# Patient Record
Sex: FEMALE | Race: WHITE | NOT HISPANIC OR LATINO | Employment: OTHER | URBAN - METROPOLITAN AREA
[De-identification: names, ages, dates, MRNs, and addresses within clinical notes are randomized per-mention and may not be internally consistent; named-entity substitution may affect disease eponyms.]

---

## 2017-09-09 ENCOUNTER — APPOINTMENT (EMERGENCY)
Dept: CT IMAGING | Facility: HOSPITAL | Age: 52
End: 2017-09-09
Payer: COMMERCIAL

## 2017-09-09 ENCOUNTER — HOSPITAL ENCOUNTER (EMERGENCY)
Facility: HOSPITAL | Age: 52
Discharge: HOME | End: 2017-09-10
Attending: EMERGENCY MEDICINE | Admitting: EMERGENCY MEDICINE
Payer: COMMERCIAL

## 2017-09-09 DIAGNOSIS — N83.8 OVARIAN MASS: ICD-10-CM

## 2017-09-09 DIAGNOSIS — R10.11 RUQ PAIN: Primary | ICD-10-CM

## 2017-09-09 DIAGNOSIS — R74.8 ELEVATED LIPASE: ICD-10-CM

## 2017-09-09 LAB
ALBUMIN SERPL BCP-MCNC: 3.3 G/DL (ref 3.5–5)
ALP SERPL-CCNC: 123 U/L (ref 46–116)
ALT SERPL W P-5'-P-CCNC: 33 U/L (ref 12–78)
ANION GAP SERPL CALCULATED.3IONS-SCNC: 8 MMOL/L (ref 4–13)
AST SERPL W P-5'-P-CCNC: 18 U/L (ref 5–45)
BASOPHILS # BLD AUTO: 0.05 THOUSANDS/ΜL (ref 0–0.1)
BASOPHILS NFR BLD AUTO: 1 % (ref 0–1)
BILIRUB SERPL-MCNC: 0.4 MG/DL (ref 0.2–1)
BUN SERPL-MCNC: 18 MG/DL (ref 5–25)
CALCIUM SERPL-MCNC: 9.3 MG/DL (ref 8.3–10.1)
CHLORIDE SERPL-SCNC: 99 MMOL/L (ref 100–108)
CO2 SERPL-SCNC: 31 MMOL/L (ref 21–32)
CREAT SERPL-MCNC: 0.94 MG/DL (ref 0.6–1.3)
EOSINOPHIL # BLD AUTO: 0.27 THOUSAND/ΜL (ref 0–0.61)
EOSINOPHIL NFR BLD AUTO: 3 % (ref 0–6)
ERYTHROCYTE [DISTWIDTH] IN BLOOD BY AUTOMATED COUNT: 13.2 % (ref 11.6–15.1)
GFR SERPL CREATININE-BSD FRML MDRD: 71 ML/MIN/1.73SQ M
GLUCOSE SERPL-MCNC: 229 MG/DL (ref 65–140)
HCG UR QL: NEGATIVE
HCT VFR BLD AUTO: 45.3 % (ref 34.8–46.1)
HGB BLD-MCNC: 15.4 G/DL (ref 11.5–15.4)
LIPASE SERPL-CCNC: 535 U/L (ref 73–393)
LYMPHOCYTES # BLD AUTO: 3.76 THOUSANDS/ΜL (ref 0.6–4.47)
LYMPHOCYTES NFR BLD AUTO: 39 % (ref 14–44)
MCH RBC QN AUTO: 27.9 PG (ref 26.8–34.3)
MCHC RBC AUTO-ENTMCNC: 34 G/DL (ref 31.4–37.4)
MCV RBC AUTO: 82 FL (ref 82–98)
MONOCYTES # BLD AUTO: 0.5 THOUSAND/ΜL (ref 0.17–1.22)
MONOCYTES NFR BLD AUTO: 5 % (ref 4–12)
NEUTROPHILS # BLD AUTO: 4.93 THOUSANDS/ΜL (ref 1.85–7.62)
NEUTS SEG NFR BLD AUTO: 52 % (ref 43–75)
NRBC BLD AUTO-RTO: 0 /100 WBCS
PLATELET # BLD AUTO: 300 THOUSANDS/UL (ref 149–390)
PMV BLD AUTO: 9.2 FL (ref 8.9–12.7)
POTASSIUM SERPL-SCNC: 3.6 MMOL/L (ref 3.5–5.3)
PROT SERPL-MCNC: 8 G/DL (ref 6.4–8.2)
RBC # BLD AUTO: 5.52 MILLION/UL (ref 3.81–5.12)
SODIUM SERPL-SCNC: 138 MMOL/L (ref 136–145)
TROPONIN I SERPL-MCNC: <0.02 NG/ML
WBC # BLD AUTO: 9.55 THOUSAND/UL (ref 4.31–10.16)

## 2017-09-09 PROCEDURE — 85025 COMPLETE CBC W/AUTO DIFF WBC: CPT | Performed by: EMERGENCY MEDICINE

## 2017-09-09 PROCEDURE — 74177 CT ABD & PELVIS W/CONTRAST: CPT

## 2017-09-09 PROCEDURE — 36415 COLL VENOUS BLD VENIPUNCTURE: CPT | Performed by: EMERGENCY MEDICINE

## 2017-09-09 PROCEDURE — 80053 COMPREHEN METABOLIC PANEL: CPT | Performed by: EMERGENCY MEDICINE

## 2017-09-09 PROCEDURE — 96374 THER/PROPH/DIAG INJ IV PUSH: CPT

## 2017-09-09 PROCEDURE — 83690 ASSAY OF LIPASE: CPT | Performed by: EMERGENCY MEDICINE

## 2017-09-09 PROCEDURE — 81025 URINE PREGNANCY TEST: CPT | Performed by: EMERGENCY MEDICINE

## 2017-09-09 PROCEDURE — 84484 ASSAY OF TROPONIN QUANT: CPT | Performed by: EMERGENCY MEDICINE

## 2017-09-09 PROCEDURE — 93005 ELECTROCARDIOGRAM TRACING: CPT | Performed by: EMERGENCY MEDICINE

## 2017-09-09 RX ORDER — KETOROLAC TROMETHAMINE 30 MG/ML
30 INJECTION, SOLUTION INTRAMUSCULAR; INTRAVENOUS ONCE
Status: DISCONTINUED | OUTPATIENT
Start: 2017-09-09 | End: 2017-09-09

## 2017-09-09 RX ORDER — KETOROLAC TROMETHAMINE 30 MG/ML
15 INJECTION, SOLUTION INTRAMUSCULAR; INTRAVENOUS ONCE
Status: COMPLETED | OUTPATIENT
Start: 2017-09-09 | End: 2017-09-09

## 2017-09-09 RX ADMIN — KETOROLAC TROMETHAMINE 15 MG: 30 INJECTION, SOLUTION INTRAMUSCULAR at 23:01

## 2017-09-09 RX ADMIN — IOHEXOL 100 ML: 350 INJECTION, SOLUTION INTRAVENOUS at 23:48

## 2017-09-10 VITALS
TEMPERATURE: 98.5 F | HEIGHT: 61 IN | WEIGHT: 176.59 LBS | DIASTOLIC BLOOD PRESSURE: 67 MMHG | HEART RATE: 76 BPM | RESPIRATION RATE: 18 BRPM | SYSTOLIC BLOOD PRESSURE: 112 MMHG | OXYGEN SATURATION: 96 % | BODY MASS INDEX: 33.34 KG/M2

## 2017-09-10 PROCEDURE — 99285 EMERGENCY DEPT VISIT HI MDM: CPT

## 2017-09-10 RX ORDER — NAPROXEN 500 MG/1
500 TABLET ORAL 2 TIMES DAILY PRN
Qty: 20 TABLET | Refills: 0 | Status: SHIPPED | OUTPATIENT
Start: 2017-09-10 | End: 2018-04-26

## 2017-09-12 LAB
ATRIAL RATE: 93 BPM
P AXIS: 68 DEGREES
PR INTERVAL: 144 MS
QRS AXIS: 69 DEGREES
QRSD INTERVAL: 130 MS
QT INTERVAL: 386 MS
QTC INTERVAL: 479 MS
T WAVE AXIS: 43 DEGREES
VENTRICULAR RATE: 93 BPM

## 2017-09-13 ENCOUNTER — ALLSCRIPTS OFFICE VISIT (OUTPATIENT)
Dept: OTHER | Facility: OTHER | Age: 52
End: 2017-09-13

## 2017-09-13 DIAGNOSIS — R93.89 ABNORMAL FINDINGS ON DIAGNOSTIC IMAGING OF OTHER SPECIFIED BODY STRUCTURES: ICD-10-CM

## 2017-09-13 DIAGNOSIS — R10.13 EPIGASTRIC PAIN: ICD-10-CM

## 2017-09-13 DIAGNOSIS — R10.11 RIGHT UPPER QUADRANT PAIN: ICD-10-CM

## 2017-09-13 DIAGNOSIS — N83.201 CYST OF RIGHT OVARY: ICD-10-CM

## 2017-09-14 ENCOUNTER — GENERIC CONVERSION - ENCOUNTER (OUTPATIENT)
Dept: OTHER | Facility: OTHER | Age: 52
End: 2017-09-14

## 2017-09-15 ENCOUNTER — HOSPITAL ENCOUNTER (OUTPATIENT)
Dept: ULTRASOUND IMAGING | Facility: HOSPITAL | Age: 52
Discharge: HOME | End: 2017-09-15
Payer: COMMERCIAL

## 2017-09-15 ENCOUNTER — HOSPITAL ENCOUNTER (OUTPATIENT)
Dept: ULTRASOUND IMAGING | Facility: HOSPITAL | Age: 52
Discharge: HOME | End: 2017-09-15
Attending: OBSTETRICS & GYNECOLOGY
Payer: COMMERCIAL

## 2017-09-15 DIAGNOSIS — R10.11 RIGHT UPPER QUADRANT PAIN: ICD-10-CM

## 2017-09-15 DIAGNOSIS — N83.201 CYST OF RIGHT OVARY: ICD-10-CM

## 2017-09-15 PROCEDURE — 76856 US EXAM PELVIC COMPLETE: CPT

## 2017-09-15 PROCEDURE — 76700 US EXAM ABDOM COMPLETE: CPT

## 2017-09-15 PROCEDURE — 76830 TRANSVAGINAL US NON-OB: CPT

## 2017-09-20 RX ORDER — INDOMETHACIN 50 MG/1
50 CAPSULE ORAL 2 TIMES DAILY WITH MEALS
COMMUNITY
End: 2018-04-26

## 2017-09-20 RX ORDER — BUPROPION HYDROCHLORIDE 150 MG/1
150 TABLET ORAL DAILY
COMMUNITY
End: 2018-04-26

## 2017-09-20 RX ORDER — LISINOPRIL 10 MG/1
10 TABLET ORAL DAILY
COMMUNITY
End: 2018-04-26

## 2017-09-20 RX ORDER — BUTALBITAL, ACETAMINOPHEN AND CAFFEINE 300; 40; 50 MG/1; MG/1; MG/1
CAPSULE ORAL
COMMUNITY
End: 2019-10-02 | Stop reason: ALTCHOICE

## 2017-09-20 RX ORDER — MELOXICAM 15 MG/1
15 TABLET ORAL DAILY
COMMUNITY
End: 2018-04-26

## 2017-09-20 RX ORDER — CYCLOBENZAPRINE HCL 10 MG
10 TABLET ORAL 3 TIMES DAILY PRN
Status: ON HOLD | COMMUNITY
End: 2018-10-10 | Stop reason: ALTCHOICE

## 2017-09-20 RX ORDER — GLYBURIDE 5 MG/1
5 TABLET ORAL
COMMUNITY
End: 2018-04-26

## 2017-09-20 RX ORDER — ATORVASTATIN CALCIUM 40 MG/1
40 TABLET, FILM COATED ORAL DAILY
COMMUNITY
End: 2018-04-26

## 2017-09-20 RX ORDER — METOPROLOL SUCCINATE 100 MG/1
50 TABLET, EXTENDED RELEASE ORAL DAILY
Status: ON HOLD | COMMUNITY
End: 2018-10-10 | Stop reason: ALTCHOICE

## 2017-09-20 RX ORDER — NORTRIPTYLINE HYDROCHLORIDE 10 MG/1
10 CAPSULE ORAL DAILY PRN
Status: ON HOLD | COMMUNITY
End: 2018-10-10 | Stop reason: ALTCHOICE

## 2017-09-20 RX ORDER — ZOLPIDEM TARTRATE 10 MG/1
10 TABLET ORAL
COMMUNITY

## 2017-09-25 ENCOUNTER — ANESTHESIA EVENT (OUTPATIENT)
Dept: PERIOP | Facility: HOSPITAL | Age: 52
End: 2017-09-25
Payer: COMMERCIAL

## 2017-09-26 ENCOUNTER — HOSPITAL ENCOUNTER (OUTPATIENT)
Facility: HOSPITAL | Age: 52
Setting detail: OUTPATIENT SURGERY
Discharge: HOME | End: 2017-09-26
Attending: INTERNAL MEDICINE | Admitting: INTERNAL MEDICINE
Payer: COMMERCIAL

## 2017-09-26 ENCOUNTER — GENERIC CONVERSION - ENCOUNTER (OUTPATIENT)
Dept: OTHER | Facility: OTHER | Age: 52
End: 2017-09-26

## 2017-09-26 ENCOUNTER — ANESTHESIA (OUTPATIENT)
Dept: PERIOP | Facility: HOSPITAL | Age: 52
End: 2017-09-26
Payer: COMMERCIAL

## 2017-09-26 VITALS
HEIGHT: 61 IN | WEIGHT: 169.38 LBS | RESPIRATION RATE: 20 BRPM | OXYGEN SATURATION: 98 % | DIASTOLIC BLOOD PRESSURE: 57 MMHG | BODY MASS INDEX: 31.98 KG/M2 | HEART RATE: 72 BPM | TEMPERATURE: 97.2 F | SYSTOLIC BLOOD PRESSURE: 115 MMHG

## 2017-09-26 DIAGNOSIS — R10.11 RIGHT UPPER QUADRANT PAIN: ICD-10-CM

## 2017-09-26 DIAGNOSIS — R10.13 EPIGASTRIC PAIN: ICD-10-CM

## 2017-09-26 DIAGNOSIS — Z12.11 ENCOUNTER FOR SCREENING FOR MALIGNANT NEOPLASM OF COLON: ICD-10-CM

## 2017-09-26 LAB — GLUCOSE SERPL-MCNC: 168 MG/DL (ref 65–140)

## 2017-09-26 PROCEDURE — 88305 TISSUE EXAM BY PATHOLOGIST: CPT | Performed by: INTERNAL MEDICINE

## 2017-09-26 PROCEDURE — 82948 REAGENT STRIP/BLOOD GLUCOSE: CPT

## 2017-09-26 PROCEDURE — 88342 IMHCHEM/IMCYTCHM 1ST ANTB: CPT | Performed by: INTERNAL MEDICINE

## 2017-09-26 RX ORDER — PANTOPRAZOLE SODIUM 20 MG/1
20 TABLET, DELAYED RELEASE ORAL DAILY
COMMUNITY
End: 2018-02-23 | Stop reason: SDUPTHER

## 2017-09-26 RX ORDER — LIDOCAINE HYDROCHLORIDE 10 MG/ML
INJECTION, SOLUTION INFILTRATION; PERINEURAL AS NEEDED
Status: DISCONTINUED | OUTPATIENT
Start: 2017-09-26 | End: 2017-09-26 | Stop reason: SURG

## 2017-09-26 RX ORDER — HYDROXYZINE HYDROCHLORIDE 25 MG/1
25 TABLET, FILM COATED ORAL EVERY 6 HOURS PRN
COMMUNITY
End: 2018-04-26

## 2017-09-26 RX ORDER — ONDANSETRON 4 MG/1
4 TABLET, FILM COATED ORAL EVERY 8 HOURS PRN
COMMUNITY
End: 2018-04-26

## 2017-09-26 RX ORDER — PROPOFOL 10 MG/ML
INJECTION, EMULSION INTRAVENOUS AS NEEDED
Status: DISCONTINUED | OUTPATIENT
Start: 2017-09-26 | End: 2017-09-26 | Stop reason: SURG

## 2017-09-26 RX ORDER — ASPIRIN 81 MG/1
81 TABLET ORAL DAILY
COMMUNITY
End: 2018-04-26

## 2017-09-26 RX ORDER — SODIUM CHLORIDE, SODIUM LACTATE, POTASSIUM CHLORIDE, CALCIUM CHLORIDE 600; 310; 30; 20 MG/100ML; MG/100ML; MG/100ML; MG/100ML
125 INJECTION, SOLUTION INTRAVENOUS CONTINUOUS
Status: CANCELLED | OUTPATIENT
Start: 2017-09-26

## 2017-09-26 RX ORDER — SODIUM CHLORIDE, SODIUM LACTATE, POTASSIUM CHLORIDE, CALCIUM CHLORIDE 600; 310; 30; 20 MG/100ML; MG/100ML; MG/100ML; MG/100ML
125 INJECTION, SOLUTION INTRAVENOUS CONTINUOUS
Status: DISCONTINUED | OUTPATIENT
Start: 2017-09-26 | End: 2017-09-26 | Stop reason: HOSPADM

## 2017-09-26 RX ORDER — TRAMADOL HYDROCHLORIDE 50 MG/1
50 TABLET ORAL EVERY 6 HOURS PRN
COMMUNITY
End: 2017-10-24

## 2017-09-26 RX ADMIN — PROPOFOL 100 MG: 10 INJECTION, EMULSION INTRAVENOUS at 07:42

## 2017-09-26 RX ADMIN — LIDOCAINE HYDROCHLORIDE 20 MG: 10 INJECTION, SOLUTION INFILTRATION; PERINEURAL at 07:42

## 2017-09-26 RX ADMIN — PROPOFOL 40 MG: 10 INJECTION, EMULSION INTRAVENOUS at 07:48

## 2017-09-26 RX ADMIN — PROPOFOL 60 MG: 10 INJECTION, EMULSION INTRAVENOUS at 07:51

## 2017-09-26 RX ADMIN — PROPOFOL 50 MG: 10 INJECTION, EMULSION INTRAVENOUS at 07:55

## 2017-09-26 RX ADMIN — SODIUM CHLORIDE, POTASSIUM CHLORIDE, SODIUM LACTATE AND CALCIUM CHLORIDE 125 ML/HR: 600; 310; 30; 20 INJECTION, SOLUTION INTRAVENOUS at 06:48

## 2017-09-26 NOTE — OP NOTE
**** GI/ENDOSCOPY REPORT ****     PATIENT NAME: LESLEY AMBROSIO - VISIT ID:  Patient ID: [id number]   YOB: 1965     INTRODUCTION: Esophagogastroduodenoscopy - A 50 female patient presents   for an outpatient Esophagogastroduodenoscopy at [address] [place] [address]  INDICATIONS: Pain located in the right upper quadrant of the abdomen  Nausea  CONSENT: The benefits, risks, and alternatives to the procedure were   discussed and informed consent was obtained from the patient  PREPARATION:  EKG, pulse, pulse oximetry and blood pressure were monitored   throughout the procedure  MEDICATIONS:asa 2     PROCEDURE:  The endoscope was passed without difficulty through the mouth   under direct visualization and advanced to the 3rd portion of the   duodenum  The scope was withdrawn and the mucosa was carefully examined  FINDINGS:   Esophagus: A possible tongue of Menendez's esophagus was found   in the GE junction, spanning 1 cm 34 cm from the entry site  A biopsy was   taken  The Z line was visualized at 35 cm from the entry site  There was a   2 cm hiatus hernia visible in the esophagus  Stomach: The antrum, body of   the stomach, cardia, fundus, incisura, and pylorus appeared to be normal    A biopsy was taken from the antrum, body of the stomach, and fundus  Duodenum: The duodenal bulb, 2nd portion of the duodenum, and 3rd portion   of the duodenum appeared to be normal      COMPLICATIONS: There were no complications  IMPRESSIONS: Menendez's esophagus noted  Biopsy taken  Z line visualized  A   hiatus hernia found  Normal antrum, body of the stomach, cardia, fundus,   incisura, and pylorus  Biopsy taken  Normal duodenal bulb, 2nd portion of   the duodenum, and 3rd portion of the duodenum  RECOMMENDATIONS: Follow-up on the results of the biopsy specimens in 1   week  Continue current medications  ESTIMATED BLOOD LOSS: Insignificant  PATHOLOGY SPECIMENS: Biopsy taken  Associated finding: Menendez's   esophagus  Random biopsy taken from the antrum, body of the stomach, and   fundus  PROCEDURE CODES: 12685 - EGD flexible; with biopsy     ICD-9 Codes: 789 01 Abdominal pain, right upper quadrant 787 02 Nausea   alone 530 85 Menendez's esophagus 553 3 Diaphragmatic hernia without   mention of obstruction or gangrene     ICD-10 Codes: R10 11 Right upper quadrant pain R11 0 Nausea K22 7   Menendez's esophagus K44 Diaphragmatic hernia     PERFORMED BY: FREDY Gamble  [facility]  on 09/26/2017  Version 1, electronically signed by FREDY Stevenson , D O  on   09/26/2017 at 07:51

## 2017-09-26 NOTE — DISCHARGE INSTRUCTIONS

## 2017-09-26 NOTE — OP NOTE
**** GI/ENDOSCOPY REPORT ****     PATIENT NAME: LESLEY AMBROSIO ------ VISIT ID:  Patient ID:   [id number] YOB: 1965     INTRODUCTION: Colonoscopy - A 50 female patient presents for an outpatient   Colonoscopy at [facility]  PREVIOUS COLONOSCOPY: none     INDICATIONS: Screening-ADR  CONSENT:  The benefits, risks, and alternatives to the procedure were   discussed and informed consent was obtained from the patient  PREPARATION: EKG, pulse, pulse oximetry and blood pressure were monitored   throughout the procedure  The patient was identified by myself both   verbally and by visual inspection of ID band  Airway Assessment   Classification: Airway class 2 - Visualization of the soft palate, fauces   and uvula  ASA Classification: Class 2 - Patient has mild to moderate   systemic disturbance that may or may not be related to the disorder   requiring surgery  MEDICATIONS: Anesthesia-check records     PROCEDURE:  The endoscope was passed without difficulty through the anus   under direct visualization and advanced to the cecum, confirmed by   appendiceal orifice and ileocecal valve  The scope was withdrawn and the   mucosa was carefully examined  The quality of the preparation was   excellent  Cecal Intubation Time: 3 minutes(s) Scope Withdrawal Time: 2   minutes(s)     RECTAL EXAM: Normal rectal exam      FINDINGS:  A single diminutive polyp was found in the proximal transverse   colon  The polyp was completely removed by hot biopsy polypectomy  The   polyp was retrieved  Otherwise, the colon appeared to be normal      COMPLICATIONS: There were no complications  IMPRESSIONS: A single diminutive polyp found in the proximal transverse   colon; removed by hot biopsy polypectomy  RECOMMENDATIONS: Follow-up on the results of the biopsy specimens  Colonoscopy recommended in 5 years  ESTIMATED BLOOD LOSS: None       PATHOLOGY SPECIMENS: Single polyp completely removed by hot biopsy   polypectomy   PROCEDURE CODES: : Colonoscopy with removal of tumor(s), polyp(s), or   other lesion(s) by hot biopsy forceps or bipolar cautery     ICD-9 Codes: 211 3 Benign neoplasm of colon     ICD-10 Codes: K63 5 Polyp of colon     PERFORMED BY: FREDY Mireles  on 09/26/2017  Version 1, electronically signed by FREDY Erwin , D O  on   09/26/2017 at 08:01

## 2017-10-11 ENCOUNTER — GENERIC CONVERSION - ENCOUNTER (OUTPATIENT)
Dept: OTHER | Facility: OTHER | Age: 52
End: 2017-10-11

## 2017-10-11 PROCEDURE — 88305 TISSUE EXAM BY PATHOLOGIST: CPT | Performed by: OBSTETRICS & GYNECOLOGY

## 2017-10-12 ENCOUNTER — LAB REQUISITION (OUTPATIENT)
Dept: LAB | Facility: HOSPITAL | Age: 52
End: 2017-10-12
Payer: COMMERCIAL

## 2017-10-12 DIAGNOSIS — R93.89 ABNORMAL FINDINGS ON DIAGNOSTIC IMAGING OF OTHER SPECIFIED BODY STRUCTURES: ICD-10-CM

## 2017-10-19 ENCOUNTER — ALLSCRIPTS OFFICE VISIT (OUTPATIENT)
Dept: OTHER | Facility: OTHER | Age: 52
End: 2017-10-19

## 2017-10-19 DIAGNOSIS — M19.90 OSTEOARTHRITIS: ICD-10-CM

## 2017-10-19 DIAGNOSIS — R10.11 RIGHT UPPER QUADRANT PAIN: ICD-10-CM

## 2017-10-23 ENCOUNTER — HOSPITAL ENCOUNTER (EMERGENCY)
Facility: HOSPITAL | Age: 52
Discharge: HOME | End: 2017-10-24
Attending: EMERGENCY MEDICINE
Payer: COMMERCIAL

## 2017-10-23 ENCOUNTER — APPOINTMENT (EMERGENCY)
Dept: RADIOLOGY | Facility: HOSPITAL | Age: 52
End: 2017-10-23
Payer: COMMERCIAL

## 2017-10-23 VITALS
HEART RATE: 91 BPM | OXYGEN SATURATION: 98 % | DIASTOLIC BLOOD PRESSURE: 79 MMHG | BODY MASS INDEX: 32.28 KG/M2 | SYSTOLIC BLOOD PRESSURE: 168 MMHG | RESPIRATION RATE: 16 BRPM | HEIGHT: 61 IN | WEIGHT: 171 LBS | TEMPERATURE: 97.8 F

## 2017-10-23 DIAGNOSIS — S92.323A: Primary | ICD-10-CM

## 2017-10-23 PROCEDURE — 73630 X-RAY EXAM OF FOOT: CPT | Performed by: EMERGENCY MEDICINE

## 2017-10-24 PROCEDURE — 99283 EMERGENCY DEPT VISIT LOW MDM: CPT

## 2017-10-24 RX ORDER — OXYCODONE HYDROCHLORIDE 5 MG/1
5 TABLET ORAL EVERY 4 HOURS PRN
Qty: 5 TABLET | Refills: 0 | Status: SHIPPED | OUTPATIENT
Start: 2017-10-24 | End: 2018-02-19

## 2017-10-24 NOTE — DISCHARGE INSTRUCTIONS
Foot Fracture in Adults   WHAT YOU NEED TO KNOW:   A foot fracture is a break in one or more of the bones in your foot  Foot fractures are commonly caused by trauma, falls, or repeated stress injuries  DISCHARGE INSTRUCTIONS:   Medicines:   · Antibiotics: This medicine is given to help treat or prevent an infection caused by bacteria  · NSAIDs:  These medicines decrease swelling and pain  NSAIDs are available without a doctor's order  Ask which medicine is right for you  Ask how much to take and when to take it  Take as directed  NSAIDs can cause stomach bleeding and kidney problems if not taken correctly  · Pain medicine: You may be given a prescription medicine to decrease pain  Do not wait until the pain is severe before you take this medicine  · Take your medicine as directed  Contact your healthcare provider if you think your medicine is not helping or if you have side effects  Tell him of her if you are allergic to any medicine  Keep a list of the medicines, vitamins, and herbs you take  Include the amounts, and when and why you take them  Bring the list or the pill bottles to follow-up visits  Carry your medicine list with you in case of an emergency  Follow up with your healthcare provider or bone specialist as directed: You may need to return to have your splint or stitches removed  You may also need to return for tests to make sure your foot is healing  Write down your questions so you remember to ask them during your visits  Wound care:  Carefully wash the wound with soap and water  Dry the area and put on new, clean bandages as directed  Change your bandages when they get wet or dirty  Self-care:   · Rest:  You may need to rest your foot and avoid activities that cause pain  For stress fractures, you will need to avoid the activity that caused the fracture until it heals  Ask when you can return to your normal activities such as work and sports      · Ice:  Ice helps decrease swelling and pain  Ice may also help prevent tissue damage  Use an ice pack or put crushed ice in a plastic bag  Cover it with a towel, and place it on your foot for 15 to 20 minutes every hour as directed  · Elevate your foot:  Raise your foot at or above the level of your heart as often as you can  This will help decrease swelling and pain  Prop your foot on pillows or blankets to keep it elevated comfortably  · Physical therapy: Once your foot has healed, a physical therapist can teach you exercises to help improve movement and strength, and to decrease pain  Splint care:   · Check the skin around your splint daily for any redness or open areas  · Do not use a sharp or pointed object to scratch your skin under the splint  · Do not remove your splint unless your healthcare provider or orthopedic surgeon says it is okay  Bathing with a splint:  Do not let your splint get wet  Before bathing, cover the splint with a plastic bag  Tape the bag to your skin above the splint to seal out the water  Keep your foot out of the water in case the bag leaks  Ask when it is okay to take a bath or shower  Assistive devices: You may be given a hard-soled shoe to wear while your foot is healing  You also may need to use crutches to help you walk while your foot heals  It is important to use your crutches correctly  Ask for more information about how to use crutches  Contact your healthcare provider or bone specialist if:   · You have a fever  · You have new sores around your boot or splint  · You have new or worsening trouble moving your foot  · You notice a foul smell coming from under your splint  · Your boot or splint gets damaged  · You have questions or concerns about your condition or care  Return to the emergency department if:   · The pain in your injured foot gets worse even after you rest and take pain medicine      · The skin or toes of your foot become numb, swollen, cold, white, or blue     · You have more pain or swelling than you did before the splint was put on  · Your wound is draining fluid or pus  · Blood soaks through your bandage  · Your leg feels warm, tender, and painful  It may look swollen and red  · You suddenly feel lightheaded and short of breath  · You have chest pain when you take a deep breath or cough  You may cough up blood  © 2017 [employer]  Information is for End User's use only and may not be sold, redistributed or otherwise used for commercial purposes  All illustrations and images included in CareNotes® are the copyrighted property of A [employer] M [employer]  or [employer]  The above information is an  only  It is not intended as medical advice for individual conditions or treatments  Talk to your doctor, nurse or pharmacist before following any medical regimen to see if it is safe and effective for you

## 2017-10-24 NOTE — ED PROVIDER NOTES
History  Chief Complaint   Patient presents with    Foot Swelling     states "2 weeks ago hit right foot with metal strap   c/o pain and swelling"     Drea Ruff is a 50 y o  female w PMH anxiety, DM, HLD, CAD, HTN, GERD who presents for evaluation of foot pain  Pt was walking outside and trying to get things into her car when a piece of metal hit against her foot  Did not otherwise fall  Since has had pain of the R foot  Some pain w ambulation but able to get around  Prior to Admission Medications   Prescriptions Last Dose Informant Patient Reported? Taking?    Butalbital-APAP-Caffeine (FIORICET) -40 MG CAPS   Yes No   Sig: Take by mouth   Liraglutide 18 MG/3ML SOPN   Yes No   Sig: Inject under the skin   aspirin (ECOTRIN LOW STRENGTH) 81 mg EC tablet   Yes No   Sig: Take 81 mg by mouth daily   atorvastatin (LIPITOR) 40 mg tablet   Yes No   Sig: Take 40 mg by mouth daily   buPROPion (WELLBUTRIN XL) 150 mg 24 hr tablet   Yes No   Sig: Take 150 mg by mouth daily   cyclobenzaprine (FLEXERIL) 10 mg tablet   Yes No   Sig: Take 10 mg by mouth 3 (three) times a day as needed for muscle spasms   glyBURIDE (DIABETA) 5 mg tablet   Yes No   Sig: Take 5 mg by mouth daily with breakfast   hydrOXYzine HCL (ATARAX) 25 mg tablet   Yes No   Sig: Take 25 mg by mouth every 6 (six) hours as needed for itching   indomethacin (INDOCIN) 50 mg capsule   Yes No   Sig: Take 50 mg by mouth 2 (two) times a day with meals   lisinopril (ZESTRIL) 10 mg tablet   Yes No   Sig: Take 10 mg by mouth daily   meloxicam (MOBIC) 15 mg tablet   Yes No   Sig: Take 15 mg by mouth daily   metoprolol succinate (TOPROL-XL) 100 mg 24 hr tablet   Yes No   Sig: Take 100 mg by mouth daily   naproxen (NAPROSYN) 500 mg tablet   No No   Sig: Take 1 tablet by mouth 2 (two) times a day as needed for mild pain (take with food) for up to 20 doses   nortriptyline (PAMELOR) 10 mg capsule   Yes No   Sig: Take by mouth daily at bedtime   ondansetron (ZOFRAN) 4 mg tablet   Yes No   Sig: Take 4 mg by mouth every 8 (eight) hours as needed for nausea or vomiting   pantoprazole (PROTONIX) 20 mg tablet   Yes No   Sig: Take 20 mg by mouth daily   zolpidem (AMBIEN) 10 mg tablet   Yes No   Sig: Take 10 mg by mouth daily at bedtime as needed for sleep      Facility-Administered Medications: None       Past Medical History:   Diagnosis Date    [facility] p[facility]is [facility] [address] )     Anxiety     Arthritis     Diabetes mellitus ([facility] )     Headache     Hyperlipidemia     Kidney stone     Left bundle branch block     MI (myocardial infarction)     Shortness of breath        Past Surgical History:   Procedure Laterality Date    BREAST SURGERY       SECTION      KIDNEY STONE SURGERY      LAPAROSCOPY      TX COLONOSCOPY FLX DX W/COLLJ SPEC WHEN PFRMD N/A 2017    Procedure: EGD AND COLONOSCOPY;  Surgeon: Kevin Montana MD;  Location: [facility]; Service: Gastroenterology    WRIST SURGERY Right        History reviewed  No pertinent family history  I have reviewed and agree with the history as documented  Social History   Substance Use Topics    Smoking status: Current Every Day Smoker     Packs/day: 0 50    Smokeless tobacco: Never Used    Alcohol use No        Review of Systems   Constitutional: Negative for chills, diaphoresis and fever  HENT: Negative for congestion and sore throat  Eyes: Negative for visual disturbance  Respiratory: Negative for cough, chest tightness, shortness of breath and wheezing  Cardiovascular: Negative for chest pain and leg swelling  Gastrointestinal: Negative for abdominal pain, constipation, diarrhea, nausea and vomiting  Genitourinary: Negative for difficulty urinating, dysuria, frequency, hematuria, urgency, vaginal bleeding, vaginal discharge and vaginal pain  Musculoskeletal: Positive for arthralgias  Negative for myalgias     Neurological: Negative for dizziness, weakness, light-headedness, numbness and headaches  Psychiatric/Behavioral: The patient is not nervous/anxious  Physical Exam  ED Triage Vitals [10/23/17 2259]   Temperature Pulse Respirations Blood Pressure SpO2   97 8 °F (36 6 °C) 91 16 168/79 98 %      Temp Source Heart Rate Source Patient Position - Orthostatic VS BP Location FiO2 (%)   Temporal Monitor Sitting Right arm --      Pain Score       Worst Possible Pain           Physical Exam   Constitutional: She is oriented to person, place, and time  She appears well-developed and well-nourished  No distress  HENT:   Head: Normocephalic and atraumatic  Eyes: Pupils are equal, round, and reactive to light  Neck: Neck supple  No tracheal deviation present  Cardiovascular: Normal rate, regular rhythm and intact distal pulses  Exam reveals no gallop and no friction rub  No murmur heard  Pulmonary/Chest: Effort normal and breath sounds normal  No respiratory distress  She has no wheezes  She has no rales  Abdominal: Soft  Bowel sounds are normal  She exhibits no distension and no mass  There is no tenderness  There is no guarding  Musculoskeletal: She exhibits no edema or deformity  Acute pain over the 2nd and 3rd metatarsal  Does have from of the toes  Able to ambulate but w some pain  Has from ankle / no instability  Intact sensation / normal distal pulses and cap refill  5/5 strength to dorsi and plantar flexion   Neurological: She is alert and oriented to person, place, and time  Skin: Skin is warm and dry  She is not diaphoretic  Some ecchymosis in latter stages of healing over the dorsum of foot    Psychiatric: She has a normal mood and affect  Her behavior is normal    Nursing note and vitals reviewed        ED Medications  Medications - No data to display    Diagnostic Studies  Labs Reviewed - No data to display    XR foot 3+ views RIGHT   ED Interpretation   fx of the 2nd MTP       Final Result      Acute oblique nondisplaced fracture right 2nd metatarsal distal metadiaphysis  Findings concur with the preliminary report by the referring clinician already in PACS and/or our electronic record EPIC  Workstation performed: [id number]             Procedures  Procedures      Phone Contacts  ED Phone Contact    ED Course  ED Course                                MDM  Number of Diagnoses or Management Options  Closed fracture of 2nd metatarsal:   Diagnosis management comments: DDX includes but not ltd to: Foot sprain vs fx     Plan is to obtain:  XR of affected joint(s) for acute osseous abnormality     Based on results:  2nd metatarsal fx - short leg splint applied by ED tech and examined by myself prior to d/c w intact nv status - f/u w podiatry and otherwise elevate / nsaids / tylenol     Return parameters discussed  Pt requires f/u as an outpt  Pt expresses understanding w above treatment plan  All questions answered prior to d/c  Portions of the record may have been created with voice recognition software   Occasional wrong word or "sound a like" substitutions may have occurred due to the inherent limitations of voice recognition software   Read the chart carefully and recognize, using context, where substitutions have occurred  CritCare Time    Disposition  Final diagnoses:   Closed fracture of 2nd metatarsal - R foot     Time reflects when diagnosis was documented in both MDM as applicable and the Disposition within this note     Time User Action Codes Description Comment    10/24/2017 12:22 AM Edgar Lopez Add [U87 193B] Closed fracture of 2nd metatarsal     10/24/2017 12:22 AM Edgar Lopez Modify [C19 592V] Closed fracture of 2nd metatarsal R foot      ED Disposition     ED Disposition Condition Comment    Discharge  Mamta Merry discharge to home/self care      Condition at discharge: Good        Follow-up Information     Follow up With Specialties Details Why Contact Info Additional [address] [facility] Emergency Medicine  If symptoms worsen [address] [facility] [place] [address]  [phone] [place] [facility], [address] [place], [place], [address]  Call in 1 day  [address]  [phone]         Discharge Medication List as of 10/24/2017 12:27 AM      START taking these medications    Details   oxyCODONE (ROXICODONE) 5 mg immediate release tablet Take 1 tablet by mouth every 4 (four) hours as needed for moderate pain for up to 5 doses Max Daily Amount: 30 mg, Starting Tue 10/24/2017, Print         CONTINUE these medications which have NOT CHANGED    Details   aspirin (ECOTRIN LOW STRENGTH) 81 mg EC tablet Take 81 mg by mouth daily, Historical Med      atorvastatin (LIPITOR) 40 mg tablet Take 40 mg by mouth daily, Historical Med      buPROPion (WELLBUTRIN XL) 150 mg 24 hr tablet Take 150 mg by mouth daily, Historical Med      Butalbital-APAP-Caffeine (FIORICET) -40 MG CAPS Take by mouth, Historical Med      cyclobenzaprine (FLEXERIL) 10 mg tablet Take 10 mg by mouth 3 (three) times a day as needed for muscle spasms, Historical Med      glyBURIDE (DIABETA) 5 mg tablet Take 5 mg by mouth daily with breakfast, Historical Med      hydrOXYzine HCL (ATARAX) 25 mg tablet Take 25 mg by mouth every 6 (six) hours as needed for itching, Historical Med      indomethacin (INDOCIN) 50 mg capsule Take 50 mg by mouth 2 (two) times a day with meals, Historical Med      Liraglutide 18 MG/3ML SOPN Inject under the skin, Historical Med      lisinopril (ZESTRIL) 10 mg tablet Take 10 mg by mouth daily, Historical Med      meloxicam (MOBIC) 15 mg tablet Take 15 mg by mouth daily, Historical Med      metoprolol succinate (TOPROL-XL) 100 mg 24 hr tablet Take 100 mg by mouth daily, Historical Med      naproxen (NAPROSYN) 500 mg tablet Take 1 tablet by mouth 2 (two) times a day as needed for mild pain (take with food) for up to 20 doses, Starting Sun 9/10/2017, Print      nortriptyline (PAMELOR) 10 mg capsule Take by mouth daily at bedtime, Historical Med      ondansetron (ZOFRAN) 4 mg tablet Take 4 mg by mouth every 8 (eight) hours as needed for nausea or vomiting, Historical Med      pantoprazole (PROTONIX) 20 mg tablet Take 20 mg by mouth daily, Historical Med      zolpidem (AMBIEN) 10 mg tablet Take 10 mg by mouth daily at bedtime as needed for sleep, Historical Med           No discharge procedures on file      ED Provider  Electronically Signed by       Abel Jimenez PA-C  11/13/17 9747

## 2017-11-21 ENCOUNTER — HOSPITAL ENCOUNTER (OUTPATIENT)
Dept: NUCLEAR MEDICINE | Facility: HOSPITAL | Age: 52
Discharge: HOME | End: 2017-11-21
Payer: COMMERCIAL

## 2017-11-21 DIAGNOSIS — M19.90 OSTEOARTHRITIS: ICD-10-CM

## 2017-11-21 DIAGNOSIS — R10.11 RIGHT UPPER QUADRANT PAIN: ICD-10-CM

## 2017-11-21 PROCEDURE — 78264 GASTRIC EMPTYING IMG STUDY: CPT

## 2017-11-21 PROCEDURE — A9541 TC99M SULFUR COLLOID: HCPCS

## 2017-11-22 ENCOUNTER — GENERIC CONVERSION - ENCOUNTER (OUTPATIENT)
Dept: OTHER | Facility: OTHER | Age: 52
End: 2017-11-22

## 2017-11-28 ENCOUNTER — GENERIC CONVERSION - ENCOUNTER (OUTPATIENT)
Dept: OTHER | Facility: OTHER | Age: 52
End: 2017-11-28

## 2017-12-15 DIAGNOSIS — N83.201 CYST OF RIGHT OVARY: ICD-10-CM

## 2017-12-15 DIAGNOSIS — K76.0 FATTY (CHANGE OF) LIVER, NOT ELSEWHERE CLASSIFIED: ICD-10-CM

## 2017-12-28 ENCOUNTER — GENERIC CONVERSION - ENCOUNTER (OUTPATIENT)
Dept: OTHER | Facility: OTHER | Age: 52
End: 2017-12-28

## 2018-01-13 VITALS
BODY MASS INDEX: 31.83 KG/M2 | HEIGHT: 62 IN | DIASTOLIC BLOOD PRESSURE: 74 MMHG | SYSTOLIC BLOOD PRESSURE: 138 MMHG | WEIGHT: 173 LBS

## 2018-01-13 VITALS
DIASTOLIC BLOOD PRESSURE: 68 MMHG | SYSTOLIC BLOOD PRESSURE: 126 MMHG | BODY MASS INDEX: 26.98 KG/M2 | HEIGHT: 68 IN | WEIGHT: 178 LBS

## 2018-01-15 NOTE — MISCELLANEOUS
Message   Recorded as Task   Date: 10/18/2017 09:40 AM, Created By: Sven Silver   Task Name: Call Back   Assigned To: Demetria Torres   Regarding Patient: Kailee Chapman, Status: In Progress   CommentKurtis Keys - 18 Oct 2017 9:40 AM     TASK CREATED  Caller: Self; Results Inquiry; [phone] (Home)  Pt calling for results of uterine biopsy  Pt states it is ok to [facility] kia Purdyarmando Carrasco - 18 Oct 2017 9:47 AM     TASK IN PROGRESS   Clarice Hsieh - 18 Oct 2017 9:53 AM     TASK REPLIED TO: Previously Assigned To [facility],Team  Left message as pt requested but that her results are not back yet and actually the lab is running a little behind with test results asa we get the results we would call her with results  Active Problems    1  Abdominal pain, RUQ (789 01) (R10 11)   2  Arthritis (716 90) (M19 90)   3  Common migraine without aura (346 10) (G43 009)   4  Cyst of right ovary (620 2) (N83 201)   5  Dyspepsia (536 8) (R10 13)   6  Encounter for screening colonoscopy (V76 51) (Z12 11)   7  Endometrial thickening on ultra sound (793 5) (R93 8)   8  Generalized obesity (278 00) (E66 9)   9  Hyperlipidemia (272 4) (E78 5)   10  Thickened endometrium (793 5) (R93 8)   11  Type 2 diabetes mellitus (250 00) (E11 9)    Current Meds   1  Ambien 10 MG Oral Tablet (Zolpidem Tartrate); Therapy: (Recorded:47Ooj6442) to Recorded   2  Atorvastatin Calcium 40 MG Oral Tablet; Therapy: (Recorded:14Kam4204) to Recorded   3  Fioricet -40 MG Oral Capsule (Butalbital-APAP-Caffeine); Therapy: (Recorded:89Xjo8558) to Recorded   4  Flexeril 10 MG TABS (Cyclobenzaprine HCl); Therapy: (Recorded:56Ksi5252) to Recorded   5  GlyBURIDE 5 MG Oral Tablet; Therapy: (Recorded:72Hgu4549) to Recorded   6  Indomethacin 50 MG Oral Capsule; Therapy: (Recorded:39Jzd5695) to Recorded   7  Lisinopril 10 MG Oral Tablet; Therapy: (Recorded:66Ghw0253) to Recorded   8   Meloxicam 15 MG Oral Tablet; Therapy: (Recorded:84Oyh6776) to Recorded   9  Metoprolol Tartrate 100 MG Oral Tablet; Therapy: (Recorded:95Zwg1565) to Recorded   10  Nortriptyline HCl - 10 MG Oral Capsule; Therapy: (Recorded:77Vzj0695) to Recorded   11  Ondansetron HCl - 4 MG Oral Tablet; TAKE 1 TABLET Every 8 hours PRN nausea; Therapy: 66Nwz8889 to (Evaluate:80Csc6431)  Requested for: 06Jye9271; Last    Rx:49Cdp9181 Ordered   12  Pantoprazole Sodium 20 MG Oral Tablet Delayed Release; TAKE 1 TABLET TWICE    DAILY; Therapy: 98Nqt2486 to (Evaluate:41Dll4828)  Requested for: 99Xqs2758; Last    Rx:53Uzy1149 Ordered   13  Wellbutrin  MG Oral Tablet Extended Release 12 Hour (BuPROPion HCl ER    (SR)); Therapy: (Recorded:05Tct3476) to Recorded    Allergies    1   Surgical TAPE    Signatures   Electronically signed by : Tha Duarte MA; Oct 18 2017  9:53AM EST                       (Author)

## 2018-01-22 VITALS
HEIGHT: 61 IN | WEIGHT: 170 LBS | SYSTOLIC BLOOD PRESSURE: 126 MMHG | BODY MASS INDEX: 32.1 KG/M2 | DIASTOLIC BLOOD PRESSURE: 74 MMHG

## 2018-01-22 VITALS
BODY MASS INDEX: 25.91 KG/M2 | WEIGHT: 171 LBS | SYSTOLIC BLOOD PRESSURE: 124 MMHG | HEIGHT: 68 IN | DIASTOLIC BLOOD PRESSURE: 80 MMHG

## 2018-01-22 VITALS
DIASTOLIC BLOOD PRESSURE: 70 MMHG | WEIGHT: 171 LBS | HEIGHT: 68 IN | SYSTOLIC BLOOD PRESSURE: 122 MMHG | BODY MASS INDEX: 25.91 KG/M2

## 2018-01-24 VITALS
BODY MASS INDEX: 31.06 KG/M2 | SYSTOLIC BLOOD PRESSURE: 118 MMHG | HEART RATE: 76 BPM | HEIGHT: 61 IN | DIASTOLIC BLOOD PRESSURE: 70 MMHG | WEIGHT: 164.5 LBS

## 2018-02-12 ENCOUNTER — HOSPITAL ENCOUNTER (EMERGENCY)
Facility: HOSPITAL | Age: 53
Discharge: HOME | End: 2018-02-12
Admitting: EMERGENCY MEDICINE
Payer: COMMERCIAL

## 2018-02-12 ENCOUNTER — APPOINTMENT (EMERGENCY)
Dept: RADIOLOGY | Facility: HOSPITAL | Age: 53
End: 2018-02-12
Payer: COMMERCIAL

## 2018-02-12 VITALS
HEART RATE: 85 BPM | TEMPERATURE: 97.5 F | OXYGEN SATURATION: 95 % | DIASTOLIC BLOOD PRESSURE: 74 MMHG | WEIGHT: 168 LBS | RESPIRATION RATE: 18 BRPM | SYSTOLIC BLOOD PRESSURE: 122 MMHG | BODY MASS INDEX: 31.74 KG/M2

## 2018-02-12 DIAGNOSIS — G89.29 CHRONIC LOWER BACK PAIN: ICD-10-CM

## 2018-02-12 DIAGNOSIS — M25.521 CHRONIC ELBOW PAIN, RIGHT: Primary | ICD-10-CM

## 2018-02-12 DIAGNOSIS — M54.50 CHRONIC LOWER BACK PAIN: ICD-10-CM

## 2018-02-12 DIAGNOSIS — G89.29 CHRONIC ELBOW PAIN, RIGHT: Primary | ICD-10-CM

## 2018-02-12 PROCEDURE — 99283 EMERGENCY DEPT VISIT LOW MDM: CPT

## 2018-02-12 PROCEDURE — 73080 X-RAY EXAM OF ELBOW: CPT

## 2018-02-12 PROCEDURE — 72100 X-RAY EXAM L-S SPINE 2/3 VWS: CPT

## 2018-02-12 RX ORDER — LIDOCAINE 50 MG/G
1 PATCH TOPICAL EVERY 24 HOURS
Qty: 5 PATCH | Refills: 0 | Status: SHIPPED | OUTPATIENT
Start: 2018-02-12 | End: 2018-04-26

## 2018-02-12 NOTE — DISCHARGE INSTRUCTIONS
Chronic Back Pain, Ambulatory Care   GENERAL INFORMATION:   Chronic back pain  is back pain that lasts 3 months or longer  This may include pain that has not been controlled or does not improve with treatment  Your back pain may cause weakness or pain that spreads to your arms or legs  Seek immediate care for the following symptoms:   · Severe pain    · New signs of numbness or weakness, especially in your lower back, legs, arms, or genital area    · Unable to control of your bladder or bowel movements    · A fever or sudden weight loss  Treatment for chronic back pain  may include any of the following:  · NSAIDs  help decrease swelling and pain or fever  This medicine is available with or without a doctor's order  NSAIDs can cause stomach bleeding or kidney problems in certain people  If you take blood thinner medicine, always ask if NSAIDs are safe for you  Always read the medicine label and follow directions  Do not give these medicines to children under 10months of age without direction from your child's doctor  · Acetaminophen  decreases pain  It is available without a doctor's order  Ask how much to take and how often to take it  Follow directions  Acetaminophen can cause liver damage if not taken correctly  · Prescription pain medicine  may be given  Ask how to take this medicine safely  · Muscle relaxers  help decrease muscle spasms and back pain  Manage your chronic back pain:   · Apply heat  on your back for 20 to 30 minutes every 2 hours for as many days as directed  Heat helps decrease pain and muscle spasms  · Stay active  as much as you can without causing more pain  Ask your healthcare provider what exercises are right for you  Do not sit or lie down for long periods  This could make your back pain worse  Avoid heavy lifting until your pain is gone  · Go to physical therapy as directed    A physical therapist teaches you exercises to help improve movement and strength, and to decrease pain  Follow up with your healthcare provider as directed: You may be referred to a sports medicine or spine specialist  Write down your questions so you remember to ask them during your visits  CARE AGREEMENT:   You have the right to help plan your care  Learn about your health condition and how it may be treated  Discuss treatment options with your caregivers to decide what care you want to receive  You always have the right to refuse treatment  The above information is an  only  It is not intended as medical advice for individual conditions or treatments  Talk to your doctor, nurse or pharmacist before following any medical regimen to see if it is safe and effective for you  © 2014 [address] is for End User's use only and may not be sold, redistributed or otherwise used for commercial purposes  All illustrations and images included in CareNotes® are the copyrighted property of [employer] , [employer]  or Jonny Colon  RICE Therapy   WHAT YOU NEED TO KNOW:   RICE therapy is a 4-step process used to reduce swelling and pain from an injury  RICE stands for rest, ice, compression, and elevation  RICE should be done within the first 24 to 48 hours after an injury  DISCHARGE INSTRUCTIONS:   Follow up with your healthcare provider as directed:  Write down your questions so you remember to ask them during your visits  How to use RICE therapy:   · Rest  the injured area so that it can heal  You may need to avoid putting any weight on your injury for at least 48 hours  Return to normal activities as directed  · Ice  the injury for 20 minutes every 4 hours, or as directed  Use an ice pack, or put crushed ice in a plastic bag  Cover it with a towel to protect your skin  Ice helps prevent tissue damage and decreases swelling and pain  · Compress  the injury with an elastic bandage, air cast, special boot, or splint to reduce swelling   Ask your healthcare provider which compression device to use, and how tight it should be  · Elevate  the injured area above the level of your heart as often as you can  This will help decrease swelling and pain  If possible, prop the injured area on pillows or blankets to keep it elevated comfortably  Contact your healthcare provider if:   · Your pain does not decrease, even after treatment  · You have questions or concerns about your condition or care  Return to the emergency department if:   · You have severe pain in the injured area  · You have numbness in the injured area  · You cannot put any weight on or move the injured area  © 2017 [facility] Information is for End User's use only and may not be sold, redistributed or otherwise used for commercial purposes  All illustrations and images included in CareNotes® are the copyrighted property of [employer]  or [address]  The above information is an  only  It is not intended as medical advice for individual conditions or treatments  Talk to your doctor, nurse or pharmacist before following any medical regimen to see if it is safe and effective for you

## 2018-02-12 NOTE — ED PROVIDER NOTES
History  Chief Complaint   Patient presents with    Back Pain     b/l lower back and hip pain  pt denies any injury     Carolina Marlow is a 50 y o  female w PMH anxiety, DM, headache, CAD, arthritis who presents for evaluation of back pain  Pt w chronic back pain that is located to the low back, ongoing for months, some radiation down to the LE and hips  Denies saddle anesthesia and bladder or bowel incontinence  She also has chronic elbow pain of the R elbow, also for months  Has tried cyclobenzaprine at home wo relief  Has tried to go to a chiroparctor but this is not covered by insurance  She cannot recall any trauma, injury or overuse that preceded either area of pain  Prior to Admission Medications   Prescriptions Last Dose Informant Patient Reported? Taking?    Butalbital-APAP-Caffeine (FIORICET) -40 MG CAPS   Yes No   Sig: Take by mouth   Liraglutide 18 MG/3ML SOPN   Yes No   Sig: Inject under the skin   aspirin (ECOTRIN LOW STRENGTH) 81 mg EC tablet   Yes No   Sig: Take 81 mg by mouth daily   atorvastatin (LIPITOR) 40 mg tablet   Yes No   Sig: Take 40 mg by mouth daily   buPROPion (WELLBUTRIN XL) 150 mg 24 hr tablet   Yes No   Sig: Take 150 mg by mouth daily   cyclobenzaprine (FLEXERIL) 10 mg tablet   Yes No   Sig: Take 10 mg by mouth 3 (three) times a day as needed for muscle spasms   glyBURIDE (DIABETA) 5 mg tablet   Yes No   Sig: Take 5 mg by mouth daily with breakfast   hydrOXYzine HCL (ATARAX) 25 mg tablet   Yes No   Sig: Take 25 mg by mouth every 6 (six) hours as needed for itching   indomethacin (INDOCIN) 50 mg capsule   Yes No   Sig: Take 50 mg by mouth 2 (two) times a day with meals   lisinopril (ZESTRIL) 10 mg tablet   Yes No   Sig: Take 10 mg by mouth daily   meloxicam (MOBIC) 15 mg tablet   Yes No   Sig: Take 15 mg by mouth daily   metoprolol succinate (TOPROL-XL) 100 mg 24 hr tablet   Yes No   Sig: Take 100 mg by mouth daily   naproxen (NAPROSYN) 500 mg tablet   No No Sig: Take 1 tablet by mouth 2 (two) times a day as needed for mild pain (take with food) for up to 20 doses   nortriptyline (PAMELOR) 10 mg capsule   Yes No   Sig: Take by mouth daily at bedtime   ondansetron (ZOFRAN) 4 mg tablet   Yes No   Sig: Take 4 mg by mouth every 8 (eight) hours as needed for nausea or vomiting   oxyCODONE (ROXICODONE) 5 mg immediate release tablet   No No   Sig: Take 1 tablet by mouth every 4 (four) hours as needed for moderate pain for up to 5 doses Max Daily Amount: 30 mg   pantoprazole (PROTONIX) 20 mg tablet   Yes No   Sig: Take 20 mg by mouth daily   zolpidem (AMBIEN) 10 mg tablet   Yes No   Sig: Take 10 mg by mouth daily at bedtime as needed for sleep      Facility-Administered Medications: None       Past Medical History:   Diagnosis Date    [facility]is [facility] [address] )     Anxiety     Arthritis     Diabetes mellitus ([facility] [address] )     Headache     Hyperlipidemia     Kidney stone     Left bundle branch block     MI (myocardial infarction)     Shortness of breath        Past Surgical History:   Procedure Laterality Date    BREAST SURGERY       SECTION      KIDNEY STONE SURGERY      LAPAROSCOPY      MI COLONOSCOPY FLX DX W/COLLJ SPEC WHEN PFRMD N/A 2017    Procedure: EGD AND COLONOSCOPY;  Surgeon: Kailash Martinez MD;  Location: [facility]; Service: Gastroenterology    WRIST SURGERY Right        History reviewed  No pertinent family history  I have reviewed and agree with the history as documented  Social History   Substance Use Topics    Smoking status: Current Every Day Smoker     Packs/day: 0 50    Smokeless tobacco: Never Used    Alcohol use No        Review of Systems   Constitutional: Negative for chills, diaphoresis and fever  HENT: Negative for congestion and sore throat  Eyes: Negative for visual disturbance  Respiratory: Negative for cough, chest tightness, shortness of breath and wheezing      Cardiovascular: Negative for chest pain and leg swelling  Gastrointestinal: Negative for abdominal pain, constipation, diarrhea, nausea and vomiting  Genitourinary: Negative for difficulty urinating, dysuria, frequency, hematuria, urgency, vaginal bleeding, vaginal discharge and vaginal pain  Musculoskeletal: Positive for arthralgias and back pain  Negative for myalgias  Neurological: Negative for dizziness, weakness, light-headedness, numbness and headaches  Psychiatric/Behavioral: The patient is not nervous/anxious  Physical Exam  ED Triage Vitals [02/12/18 1244]   Temperature Pulse Respirations Blood Pressure SpO2   97 5 °F (36 4 °C) 87 16 148/80 95 %      Temp Source Heart Rate Source Patient Position - Orthostatic VS BP Location FiO2 (%)   Oral Monitor Sitting Left arm --      Pain Score       6           Orthostatic Vital Signs  Vitals:    02/12/18 1244 02/12/18 1553   BP: 148/80 122/74   Pulse: 87 85   Patient Position - Orthostatic VS: Sitting Sitting       Physical Exam   Constitutional: She is oriented to person, place, and time  She appears well-developed and well-nourished  No distress  HENT:   Head: Normocephalic and atraumatic  Right Ear: External ear normal    Left Ear: External ear normal    Eyes: Pupils are equal, round, and reactive to light  Neck: Neck supple  No tracheal deviation present  Cardiovascular: Normal rate, regular rhythm, normal heart sounds and intact distal pulses  Exam reveals no gallop and no friction rub  No murmur heard  Pulmonary/Chest: Effort normal and breath sounds normal  No respiratory distress  She has no wheezes  She has no rales  Abdominal: Soft  Bowel sounds are normal  She exhibits no distension and no mass  There is no tenderness  There is no guarding  Musculoskeletal: Normal range of motion  She exhibits no edema or deformity     There is lumbar pain to palpation wo any stepoff or deformity   There is no paraspinal tenderness  There is normal strength and sensation of the lower extremities in all muscle groups  The R elbow has no erythema, edema, there is from but she reports mild pain with extension and flexion, no pain w pronation, supination, tender over lateral aspect / epicondyle   Neurological: She is alert and oriented to person, place, and time  Skin: Skin is warm and dry  She is not diaphoretic  Psychiatric: She has a normal mood and affect  Her behavior is normal    Nursing note and vitals reviewed  ED Medications  Medications - No data to display    Diagnostic Studies  Results Reviewed     None                 XR elbow 3+ vw RIGHT   ED Interpretation by Rasta Eisenberg PA-C (02/12 1532)   No acute abnormalities      Final Result by Garima Walsh MD (02/12 1536)      No acute displaced fracture seen   Minimal enthesopathic changes seen in the lateral epicondyle         Workstation performed: [id number]         XR spine lumbar 2 or 3 views injury   ED Interpretation by Rasta Eisenberg PA-C (02/12 1536)   No acute abnormality       Final Result by Garima Walsh MD (02/12 1535)      No acute compression collapse of the vertebra seen      Calcification seen in the left mid abdomen may represent possible calcific lesion or may represent small 4 mm renal calculus      Workstation performed: [id number]                    Procedures  Procedures       Phone Contacts  ED Phone Contact    ED Course  ED Course                                MDM  Number of Diagnoses or Management Options  Chronic elbow pain, right:   Chronic lower back pain:   Diagnosis management comments: DDX includes but not ltd to: Pt w chronic pain of the low back and R elbow - possible arthritis, msk strain     Plan is to obtain:  XR of affected joint(s) for acute osseous abnormality     Based on results:  Discussed XR findings and plan for follow up with ortho  Discussed the possible kidney stone and this is known  It does not correlate w the pain she has  Return parameters discussed   Pt requires f/u as an outpt  Pt expresses understanding w above treatment plan  All questions answered prior to d/c  Portions of the record may have been created with voice recognition software   Occasional wrong word or "sound a like" substitutions may have occurred due to the inherent limitations of voice recognition software   Read the chart carefully and recognize, using context, where substitutions have occurred  CritCare Time    Disposition  Final diagnoses:   Chronic elbow pain, right   Chronic lower back pain     Time reflects when diagnosis was documented in both MDM as applicable and the Disposition within this note     Time User Action Codes Description Comment    2/12/2018  3:37 PM Jacolyn Teto Add [M54 9,  G89 29] Chronic back pain     2/12/2018  3:37 PM Jacolyn Teto Add [C35 346,  G89 29] Chronic elbow pain, right     2/12/2018  3:37 PM Jacolyn Teto Modify [W46 593,  G89 29] Chronic elbow pain, right     2/12/2018  3:37 PM Jacolyn Teto Remove [M54 9,  G89 29] Chronic back pain     2/12/2018  3:38 PM Jacolyn Teto Add [M54 5,  G89 29] Chronic lower back pain       ED Disposition     ED Disposition Condition Comment    Discharge  Shantell Guzman discharge to home/self care      Condition at discharge: Good        Follow-up Information     Follow up With Specialties Details Why Contact Info Additional Information    [address] [place] [address] Emergency Department Emergency Medicine  If symptoms worsen [address]  [phone] [place] [facility], [address] [place], [place], [address] [facility] Orthopedic Surgery Call in 1 day  [address] [place] [address] [phone]         Discharge Medication List as of 2/12/2018  3:40 PM      START taking these medications    Details   diclofenac sodium (VOLTAREN) 1 % Apply 2 g topically 4 (four) times a day for 30 days, Starting Mon 2/12/2018, Until Wed 3/14/2018, Print      lidocaine (LIDODERM) 5 % Place 1 patch on the skin every 24 hours for 5 doses Remove & Discard patch within 12 hours or as directed by MD, Starting Mon 2/12/2018, Until Sat 2/17/2018, Print         CONTINUE these medications which have NOT CHANGED    Details   aspirin (ECOTRIN LOW STRENGTH) 81 mg EC tablet Take 81 mg by mouth daily, Historical Med      atorvastatin (LIPITOR) 40 mg tablet Take 40 mg by mouth daily, Historical Med      buPROPion (WELLBUTRIN XL) 150 mg 24 hr tablet Take 150 mg by mouth daily, Historical Med      Butalbital-APAP-Caffeine (FIORICET) -40 MG CAPS Take by mouth, Historical Med      cyclobenzaprine (FLEXERIL) 10 mg tablet Take 10 mg by mouth 3 (three) times a day as needed for muscle spasms, Historical Med      glyBURIDE (DIABETA) 5 mg tablet Take 5 mg by mouth daily with breakfast, Historical Med      hydrOXYzine HCL (ATARAX) 25 mg tablet Take 25 mg by mouth every 6 (six) hours as needed for itching, Historical Med      indomethacin (INDOCIN) 50 mg capsule Take 50 mg by mouth 2 (two) times a day with meals, Historical Med      Liraglutide 18 MG/3ML SOPN Inject under the skin, Historical Med      lisinopril (ZESTRIL) 10 mg tablet Take 10 mg by mouth daily, Historical Med      meloxicam (MOBIC) 15 mg tablet Take 15 mg by mouth daily, Historical Med      metoprolol succinate (TOPROL-XL) 100 mg 24 hr tablet Take 100 mg by mouth daily, Historical Med      naproxen (NAPROSYN) 500 mg tablet Take 1 tablet by mouth 2 (two) times a day as needed for mild pain (take with food) for up to 20 doses, Starting Sun 9/10/2017, Print      nortriptyline (PAMELOR) 10 mg capsule Take by mouth daily at bedtime, Historical Med      ondansetron (ZOFRAN) 4 mg tablet Take 4 mg by mouth every 8 (eight) hours as needed for nausea or vomiting, Historical Med      oxyCODONE (ROXICODONE) 5 mg immediate release tablet Take 1 tablet by mouth every 4 (four) hours as needed for moderate pain for up to 5 doses Max Daily Amount: 30 mg, Starting Tue 10/24/2017, Print      pantoprazole (PROTONIX) 20 mg tablet Take 20 mg by mouth daily, Historical Med      zolpidem (AMBIEN) 10 mg tablet Take 10 mg by mouth daily at bedtime as needed for sleep, Historical Med           No discharge procedures on file      ED Provider  Electronically Signed by           Shashi Valentin PA-C  02/13/18 6688

## 2018-02-14 ENCOUNTER — OFFICE VISIT (OUTPATIENT)
Dept: OBGYN CLINIC | Facility: CLINIC | Age: 53
End: 2018-02-14
Payer: COMMERCIAL

## 2018-02-14 VITALS
WEIGHT: 171 LBS | HEIGHT: 61 IN | DIASTOLIC BLOOD PRESSURE: 62 MMHG | SYSTOLIC BLOOD PRESSURE: 109 MMHG | BODY MASS INDEX: 32.28 KG/M2 | HEART RATE: 88 BPM

## 2018-02-14 DIAGNOSIS — M54.50 CHRONIC MIDLINE LOW BACK PAIN WITHOUT SCIATICA: ICD-10-CM

## 2018-02-14 DIAGNOSIS — G89.29 CHRONIC MIDLINE LOW BACK PAIN WITHOUT SCIATICA: ICD-10-CM

## 2018-02-14 DIAGNOSIS — M47.816 LUMBAR SPONDYLOSIS: Primary | ICD-10-CM

## 2018-02-14 PROCEDURE — 99203 OFFICE O/P NEW LOW 30 MIN: CPT | Performed by: FAMILY MEDICINE

## 2018-02-14 RX ORDER — MELOXICAM 15 MG/1
15 TABLET ORAL DAILY
Qty: 30 TABLET | Refills: 2 | Status: SHIPPED | OUTPATIENT
Start: 2018-02-14 | End: 2018-04-26

## 2018-02-14 RX ORDER — LIDOCAINE 50 MG/G
1 PATCH TOPICAL DAILY
Qty: 30 PATCH | Refills: 0 | Status: SHIPPED | OUTPATIENT
Start: 2018-02-14 | End: 2018-04-26

## 2018-02-14 NOTE — PATIENT INSTRUCTIONS
Arthritis   WHAT YOU NEED TO KNOW:   What is arthritis? Arthritis is a disease that causes inflammation in one or more joints  There are many types of arthritis, such as osteoarthritis, rheumatoid arthritis, and septic arthritis  Some types cause inflammation in the joints  Other types wear away the cartilage between joints  This makes the bones of the joint rub together when you move the joint  Your symptoms may be constant, or symptoms may come and go  Arthritis often gets worse over time and can cause permanent joint damage  What increases my risk for arthritis? · A family history of arthritis    · Infection, trauma, or injury to the joint    · Obesity    · A disease such as diabetes, heart disease, hypothyroidism, or psoriasis    · An immune deficiency disorder, such as AIDS or lupus  What are the signs and symptoms of arthritis? · Pain, swelling, or stiffness in the joint    · Limited range of motion in the joint    · Warmth or redness over the joint    · Tenderness when you touch the joint    · Stiff joints in the morning that loosen with movement    · A creaking or grinding sound when you move the joint    · Fever  How is arthritis diagnosed? · Blood tests  are used to measure the amount of inflammation in your body  · An x-ray, CT, MRI, or ultrasound  may be used to check for joint damage, swelling, or loss of bone  Do not enter the MRI room with anything metal  Metal can cause serious damage  Tell the healthcare provider if you have any metal in or on your body  · A sample  of the fluid in the joint may be tested for uric acid or calcium crystals, or for signs of infection  How is arthritis treated? Treatment will depend on the type of arthritis you have and if it is severe  You may need any of the following:  · Acetaminophen  decreases pain and fever  It is available without a doctor's order  Ask how much to take and how often to take it  Follow directions   Acetaminophen can cause liver damage if not taken correctly  · NSAIDs , such as ibuprofen, help decrease swelling, pain, and fever  This medicine is available with or without a doctor's order  NSAIDs can cause stomach bleeding or kidney problems in certain people  If you take blood thinner medicine, always ask your healthcare provider if NSAIDs are safe for you  Always read the medicine label and follow directions  · Steroid medicine  helps reduce swelling and pain  · Surgery  may be needed to repair or replace a damaged joint  What can I do to manage arthritis? · Rest your painful joint so it can heal   Your healthcare provider may recommend crutches or a walker if the affected joint is in a leg  · Apply ice or heat to the joint  Both can help decrease swelling and pain  Ice may also help prevent tissue damage  Use an ice pack, or put crushed ice in a plastic bag  Cover it with a towel and place it on your joint for 15 to 20 minutes every hour or as directed  You can apply heat for 20 minutes every 2 hours  Heat treatment includes hot packs or heat lamps  · Elevate your joint  Elevation helps reduce swelling and pain  Raise your joint above the level of your heart as often as you can  Prop your painful joint on pillows to keep it above your heart comfortably  · Go to therapy as directed  A physical therapist can teach you exercises to improve flexibility and range of motion  You may also be shown non-weight-bearing exercises that are safe for your joints, such as swimming  Exercise can help keep your joints flexible and reduce pain  An occupational therapist can help you learn to do your daily activities when your joints are stiff or sore  · Maintain a healthy weight  Extra weight puts increased pressure on your joints  Ask your healthcare provider what you should weigh   If you need to lose weight, he can help you create a weight loss program  Weight loss can help reduce pain and increase your ability to do your activities  The amount of exercise you do may vary each day, depending on your symptoms  · Wear flat or low-heeled shoes  This will help decrease pain and reduce pressure on your ankle, knee, and hip joints  · Use support devices  You may be given splints to wear on your hands to help your joints rest and to decrease inflammation  While you sleep, use a pillow that is firm enough to support your neck and head  What other equipment should I use? The following may help you move and prevent falls:  · Orthotic shoes or insoles  help support your feet when you walk  · Crutches, a cane, or a walker  may help decrease your risk for falling  They also decrease stress on affected joints  · Devices to prevent falls  include raised toilet seats and bathtub bars to help you get up from sitting  Handrails can be placed in areas where you need balance and support  When should I seek immediate care? · You have a fever and severe joint pain or swelling  · You cannot move the affected joint  · You have severe joint pain you cannot tolerate  When should I contact my healthcare provider? · Your pain or swelling does not get better with treatment  · You have questions or concerns about your condition or care  CARE AGREEMENT:   You have the right to help plan your care  Learn about your health condition and how it may be treated  Discuss treatment options with your caregivers to decide what care you want to receive  You always have the right to refuse treatment  The above information is an  only  It is not intended as medical advice for individual conditions or treatments  Talk to your doctor, nurse or pharmacist before following any medical regimen to see if it is safe and effective for you  © 2017 [employer] Information is for End User's use only and may not be sold, redistributed or otherwise used for commercial purposes   All illustrations and images included in CareNotes® are the copyrighted property of [employer]  or Jonny Colon

## 2018-02-14 NOTE — PROGRESS NOTES
Assessment/Plan:  Assessment/Plan   Diagnoses and all orders for this visit:    Lumbar spondylosis  -     meloxicam (MOBIC) 15 mg tablet; Take 1 tablet (15 mg total) by mouth daily  -     lidocaine (LIDODERM) 5 %; Place 1 patch on the skin daily Remove & Discard patch within 12 hours or as directed by MD  -     Ambulatory referral to Physical Therapy; Future    Chronic midline low back pain without sciatica  -     meloxicam (MOBIC) 15 mg tablet; Take 1 tablet (15 mg total) by mouth daily  -     lidocaine (LIDODERM) 5 %; Place 1 patch on the skin daily Remove & Discard patch within 12 hours or as directed by MD  -     Ambulatory referral to Physical Therapy; Future        50year old female with low back pain  Discussed with patient physical exam, radiographs, impression, and plan  X-rays are significant for facet arthritic changes of the lumbar spine and physical exam remarkable for lumbar midline tenderness and paraspinal hypertonicity  I will refer her to physical therapy which she is to start as soon as possible and do home exercises as directed  She is to start taking meloxicam 15mg once daily with food and use lidoderm patch  She will return for re-evaluation in 2 months  If no improvement will consider referral to pain management for further treatment  Subjective:   Patient ID: Awilda Espinosa is a 50 y o  female  Chief Complaint   Patient presents with    Lower Back - Pain, Numbness       50year old female here for evaluation of low back pain of many years duration since fall in 1989 at home  Pain is localized to the lumbosacral area, midline, radiates to both thighs, associated with cramping, 9 out of 10 intensity, sharp, associated with numbness/tingling in left leg, and bilateral leg weakness  She denies any incontinence  She's had physical therapy many years ago, has taken cyclobenzaprine once at night with little improvement  Back Pain   This is a chronic problem  Episode onset: Many years  The problem occurs constantly  The problem has been unchanged  Associated symptoms include arthralgias and numbness  Pertinent negatives include no abdominal pain, chest pain, chills, fever, rash, sore throat or weakness  Treatments tried: Muscle relaxant  The treatment provided mild relief  The following portions of the patient's history were reviewed and updated as appropriate: She  has a past medical history of Angina pectoris ([facility] [address] ); Anxiety; Arthritis; Diabetes ([facility] [address] ); Diabetes mellitus ([facility] [address] ); Headache; Hyperlipidemia; Kidney stone; Left bundle branch block; MI (myocardial infarction); and Shortness of breath  She  has a past surgical history that includes Breast surgery; Kidney stone surgery;  section; Wrist surgery (Right); LAPAROSCOPY; and pr colonoscopy flx dx w/collj spec when pfrmd (N/A, 2017)  Her family history includes Cancer in her father and mother; Diabetes in her mother and sister  She  reports that she has been smoking  She has been smoking about 0 50 packs per day  She has never used smokeless tobacco  She reports that she does not drink alcohol or use drugs  She is allergic to other and prednisone       Review of Systems   Constitutional: Negative for chills and fever  HENT: Negative for sore throat  Eyes: Negative for visual disturbance  Respiratory: Negative for shortness of breath  Cardiovascular: Negative for chest pain  Gastrointestinal: Negative for abdominal pain  Genitourinary: Negative for difficulty urinating  Musculoskeletal: Positive for arthralgias and back pain  Skin: Negative for rash and wound  Neurological: Positive for numbness  Negative for weakness  Hematological: Does not bruise/bleed easily  Psychiatric/Behavioral: Negative for self-injury         Objective:  Vitals:    18 1503   BP: 109/62   BP Location: Right arm   Patient Position: Sitting   Cuff Size: Large   Pulse: 88   Weight: 77 6 kg (171 lb)   Height: 5' 1" (1 549 m)     Right Hip Exam   Right hip exam is normal      Tests   CASSIA: negative    Comments:  Negative FADDIR      Left Hip Exam   Left hip exam is normal     Tests   CASSIA: negative    Comments:  Negative FADDIR      Back Exam     Tenderness   The patient is experiencing tenderness in the lumbar (Lumbar midline L2-L5, paraspinal hypertonicity bilaterally)  Range of Motion   The patient has normal back ROM  Tests   Straight leg raise right: negative  Straight leg raise left: negative    Reflexes   Patellar: 1/4    Other   Gait: normal     Comments:  Equivocal stork   Symmetric reflexes bilaterally  Lower extremity sensation intact            Physical Exam    I have personally reviewed pertinent films in PACS and my interpretation is No acute osseous abnormality  Multilevel facet degenerative changes

## 2018-02-19 ENCOUNTER — OFFICE VISIT (OUTPATIENT)
Dept: OBGYN CLINIC | Facility: CLINIC | Age: 53
End: 2018-02-19
Payer: COMMERCIAL

## 2018-02-19 VITALS
BODY MASS INDEX: 31.91 KG/M2 | HEIGHT: 61 IN | SYSTOLIC BLOOD PRESSURE: 116 MMHG | HEART RATE: 80 BPM | DIASTOLIC BLOOD PRESSURE: 80 MMHG | WEIGHT: 169 LBS

## 2018-02-19 DIAGNOSIS — M25.522 PAIN IN LEFT ELBOW: ICD-10-CM

## 2018-02-19 DIAGNOSIS — M77.12 LATERAL EPICONDYLITIS OF LEFT ELBOW: Primary | ICD-10-CM

## 2018-02-19 PROCEDURE — 99214 OFFICE O/P EST MOD 30 MIN: CPT | Performed by: FAMILY MEDICINE

## 2018-02-19 NOTE — PATIENT INSTRUCTIONS
Tennis Elbow Exercises   AMBULATORY CARE:   Tennis elbow exercises  help decrease pain in your elbow, forearm, wrist, and hand  They also help strengthen your arm muscles and prevent further injury  Start these exercises slowly  Do the exercises on both arms  Stop if you feel pain  · Finger extensions:  Hold your fingers close together  Put a rubber band around the outside of your fingers and thumb  Spread your fingers apart and then slowly bring them together without letting the rubber band fall off  Repeat 40 times  · Wrist flexor stretch:  Hold your arm straight out in front of you with your palm facing down  Use your other hand to grasp your fingers  Keep your elbow straight and slowly bend your hand back  Your fingertips should point up and your palm should face away from you  Do this until you feel a stretch in the top of your wrist  Hold for 10 seconds  Repeat 5 times  · Wrist extensor stretch: This stretch is the opposite of the wrist flexor stretch  Hold your arm straight out in front of you with your palm facing down  Use your other hand to grasp your fingers  Keep your elbow straight and slowly bend your hand down  Your fingertips should point down and your palm should face you  Do this until you feel a stretch in your wrist  Hold for 10 seconds  Repeat 5 times  · Bicep curls:  Place your hand under your injured elbow for support  Turn your palm so that it faces up and hold a weight in your hand  Ask your healthcare provider how much weight you should use  Slowly bend and straighten your elbow 30 times  · :  Hold a soft rubber ball or tennis ball in your hand  Squeeze the ball as hard as you can and hold this position  Ask your healthcare provider how long to hold this position  Repeat this exercise as directed by your healthcare provider    Contact your healthcare provider if:   · You have increased pain or weakness in your arm, wrist, hand, or fingers  · You have new numbness or tingling in your arm, hand, or fingers  · You have questions or concerns about tennis elbow exercises  © 2017 [employer] Information is for End User's use only and may not be sold, redistributed or otherwise used for commercial purposes  All illustrations and images included in CareNotes® are the copyrighted property of [employer]  or [employer]  The above information is an  only  It is not intended as medical advice for individual conditions or treatments  Talk to your doctor, nurse or pharmacist before following any medical regimen to see if it is safe and effective for you

## 2018-02-19 NOTE — PROGRESS NOTES
Assessment/Plan:  Assessment/Plan   Diagnoses and all orders for this visit:    Lateral epicondylitis of left elbow  -     Ambulatory referral to Physical Therapy; Future    Pain in left elbow  -     Ambulatory referral to Physical Therapy; Future        50year old right hand dominant female with left elbow pain  Discussed with patient physical exam, impression, and plan  Her physical symptoms and physical exam are consistent with lateral epicondylitis  She has mild to moderate disease as she has pain with resisted wrist extension while elbow extended, but not while the elbow is flexed  I discussed treatment course of wrist brace, tennis elbow strap for when she is not wearing the wrist brace, and physical therapy to which she agreed  She is to start physical therapy as soon as possible and do home exercises as directed  She will follow-up in 2 weeks at which point she will be re-evaluated  Subjective:   Patient ID: Amie Lee is a 50 y o  female  Chief Complaint   Patient presents with    Left Elbow - Pain       50year old right hand dominant female presents for evaluation of left elbow pain of 5 months duration  She denies any trauma or inciting event  Onset was sudden  Pain is described as localized to the radial aspect of the elbow, nonradiating, constant, 9 out of 10 intensity, worse with lifting objects, and improves with rest  She has been rubbing Vicks over the area which provides some mild temporary relief  She has chronic numbness of both hands and feet  Elbow Pain   This is a chronic problem  Episode onset: 5 months  The problem occurs constantly  The problem has been gradually worsening  Associated symptoms include arthralgias  Pertinent negatives include no abdominal pain, chest pain, chills, fever, numbness, rash, sore throat or weakness  Exacerbated by: Lifting objects  Treatments tried: Topical Vicks  The treatment provided mild relief             The following portions of the patient's history were reviewed and updated as appropriate: She  has a past medical history of Angina pectoris ([facility] [address] ); Anxiety; Arthritis; Diabetes ([facility] [address] ); Diabetes mellitus ([facility] [address] ); Headache; Hyperlipidemia; Kidney stone; Left bundle branch block; MI (myocardial infarction); and Shortness of breath  She  has a past surgical history that includes Breast surgery; Kidney stone surgery;  section; Wrist surgery (Right); LAPAROSCOPY; and pr colonoscopy flx dx w/collj spec when pfrmd (N/A, 2017)  Her family history includes Cancer in her father and mother; Diabetes in her mother and sister  She  reports that she has been smoking  She has been smoking about 0 50 packs per day  She has never used smokeless tobacco  She reports that she does not drink alcohol or use drugs  She is allergic to other and prednisone       Review of Systems   Constitutional: Negative for chills and fever  HENT: Negative for sore throat  Eyes: Negative for visual disturbance  Respiratory: Negative for shortness of breath  Cardiovascular: Negative for chest pain  Gastrointestinal: Negative for abdominal pain  Genitourinary: Negative for difficulty urinating  Musculoskeletal: Positive for arthralgias  Skin: Negative for rash and wound  Neurological: Negative for weakness and numbness  Hematological: Does not bruise/bleed easily  Psychiatric/Behavioral: Negative for self-injury  Objective:  Vitals:    18 1134   BP: 116/80   BP Location: Right arm   Patient Position: Sitting   Cuff Size: Standard   Pulse: 80   Weight: 76 7 kg (169 lb)   Height: 5' 0 98" (1 549 m)     Left Elbow Exam     Tenderness   The patient is experiencing tenderness in the lateral epicondyle  Range of Motion   The patient has normal left elbow ROM      Muscle Strength   Pronation:  5/5   Supination:  4/5     Tests Varus: negative  Valgus: negative  Tinel's Sign (cubital tunnel): negative    Other   Pulse: present    Comments: Lateral elbow pain with resisted wrist extension with elbow extended  No pain with resisted wrist extension with elbow flexed    Wrist extensor muscle belly tenderness      Left Shoulder Exam   Left shoulder exam is normal             Physical Exam   Constitutional: She is oriented to person, place, and time  She appears well-developed  No distress  HENT:   Head: Normocephalic  Eyes: Conjunctivae are normal    Neck: No tracheal deviation present  Cardiovascular: Normal rate  Pulmonary/Chest: Effort normal  No respiratory distress  Abdominal: She exhibits no distension  Neurological: She is alert and oriented to person, place, and time  Skin: Skin is warm and dry  Psychiatric: She has a normal mood and affect   Her behavior is normal

## 2018-02-21 ENCOUNTER — TELEPHONE (OUTPATIENT)
Dept: OBGYN CLINIC | Facility: HOSPITAL | Age: 53
End: 2018-02-21

## 2018-02-21 NOTE — TELEPHONE ENCOUNTER
Please inform patient that pharmacy and her insurance company have been contacted by the physician for authorization for lidocaine patch and we are currently awaiting approval    Thanks

## 2018-02-22 ENCOUNTER — EVALUATION (OUTPATIENT)
Dept: PHYSICAL THERAPY | Facility: CLINIC | Age: 53
End: 2018-02-22
Payer: COMMERCIAL

## 2018-02-22 DIAGNOSIS — M47.816 LUMBAR SPONDYLOSIS: Primary | ICD-10-CM

## 2018-02-22 PROCEDURE — G8991 OTHER PT/OT GOAL STATUS: HCPCS

## 2018-02-22 PROCEDURE — G0283 ELEC STIM OTHER THAN WOUND: HCPCS

## 2018-02-22 PROCEDURE — 97014 ELECTRIC STIMULATION THERAPY: CPT

## 2018-02-22 PROCEDURE — G8990 OTHER PT/OT CURRENT STATUS: HCPCS

## 2018-02-22 PROCEDURE — 97162 PT EVAL MOD COMPLEX 30 MIN: CPT

## 2018-02-22 NOTE — PROGRESS NOTES
PT Evaluation     Today's date: 2018  Patient name: Audra Shahid  : 1965  MRN: [id number]  Referring provider: Favio Delgadillo DO  Dx:   Encounter Diagnosis     ICD-10-CM    1  Lumbar spondylosis M47 816                   Assessment  Impairments: abnormal gait, abnormal or restricted ROM, impaired physical strength, lacks appropriate home exercise program and pain with function    Assessment details: Pt presents with chronic LBP  Reports symptoms radiating to bilateral hips  Reports pain constant  She reports ambulation/ADL's limited due to symptoms  PT notes limited strength/ROM of trunk and BLE  Tenderness to palpation and mm spasms noted Lumbar and Sacral regions  Pt will benefit from PT tx to decrease symptoms and improve functional level  Prognosis: good    Goals  ST  Decrease pain 25% 4 wk  2  Increase trunk ROM by 10-15 degree 4 wk  3  Increase trunk strength to Fair 4 wk  4  Increase BLE strength to 4/5 4 wk  LT  Pt will report 50% decrease in pain 8 wk  2  Increase trunk ROM to WNL 8 wk  3  Increase trunk strength to Fair+  8 wk  4  Pt will report no limitations with ADL's 8 wk  5  Pt will report no limitations with ambulation 8 wk    Plan  Patient would benefit from: PT eval  Planned modality interventions: cryotherapy, thermotherapy: hydrocollator packs, ultrasound and unattended electrical stimulation  Planned therapy interventions: abdominal trunk stabilization, manual therapy, strengthening, therapeutic exercise, stretching, home exercise program, functional ROM exercises and flexibility  Frequency: 2x week  Duration in weeks: 8  Treatment plan discussed with: patient        Subjective Evaluation    History of Present Illness  Mechanism of injury: Pt presents with chronic LBP  Pain radiates into bilateral hips  Reports difficulty with ambulation due to pain  No prior tx noted  Reports RLS and Neuropathy as well  Xray revealed arthritis lower back  Reports waiting approval for Lidocaine patches  Reports pain centralized in L-spine and will radiate to sacrum  Intermittent parasthesia in BLE  Reports LE cramping as well  Pain  Current pain ratin  At best pain ratin  At worst pain rating: 10  Location: Low back/Bilateral Hip  Quality: sharp  Aggravating factors: walking and standing      Diagnostic Tests  X-ray: abnormal        Objective     Tenderness     Lumbar Spine  Tenderness in the spinous process  Additional Tenderness Details  Tender to palpation over spinous processes L-spine segments, all aspects of sacrum  MM spasms noted bilateral lumbar paraspinal mm, bilateral gluteal mm/piriformis  Active Range of Motion     Lumbar   Flexion: 50 degrees with pain  Extension: 20 degrees with pain  Left lateral flexion: 20 degrees with pain  Right lateral flexion: 23 degrees with pain  Left rotation: WFL and with pain  Right rotation: WFL and with pain    Additional Active Range of Motion Details  Pain central aspect L-spine/Sacral region    Strength/Myotome Testing     Left Hip   Planes of Motion   Flexion: 4-  Extension: 4-  Abduction: 4-  Adduction: 4-    Right Hip   Planes of Motion   Flexion: 4-  Extension: 4-  Abduction: 4-  Adduction: 4-    Left Knee   Flexion: 4-  Extension: 4-    Right Knee   Flexion: 4-  Extension: 4-    Left Ankle/Foot   Dorsiflexion: 4-  Plantar flexion: 4-    Right Ankle/Foot   Dorsiflexion: 4-  Plantar flexion: 4-    Additional Strength Details  Fair- trunk strength with seated resisted trunk movements      Tests     Lumbar     Left   Positive passive SLR  Right   Negative passive SLR  Left Pelvic Girdle/Sacrum   Positive: sacral spring       Ambulation     Observational Gait     Additional Observational Gait Details  Reports difficulty clearing feet at times with gait      Flowsheet Rows    Flowsheet Row Most Recent Value   PT/OT G-Codes   Assessment Type  Evaluation   G code set  Other PT/OT Primary Other PT Primary Current Status ()  CL   Other PT Primary Goal Status ()  CJ        Precautions:  No surgical tape    Specialty Daily Treatment Diary     Manual  2-22-18       Stretch BLE                                            Exercise Diary         nustep        UBE  retro        Mini squats        TR/HR        St hip flex/abd/ext        LTP/MTP        PPT        bridges        abd crunch        LTR        Add sq        t-band hip abd                                                                            Modalities 2-22-18       IFC/MHP 15'       Ultrasound

## 2018-02-23 DIAGNOSIS — K21.00 GERD WITH ESOPHAGITIS: Primary | ICD-10-CM

## 2018-02-26 ENCOUNTER — APPOINTMENT (OUTPATIENT)
Dept: PHYSICAL THERAPY | Facility: CLINIC | Age: 53
End: 2018-02-26
Payer: COMMERCIAL

## 2018-02-26 ENCOUNTER — APPOINTMENT (OUTPATIENT)
Dept: OCCUPATIONAL THERAPY | Facility: CLINIC | Age: 53
End: 2018-02-26
Payer: COMMERCIAL

## 2018-02-27 RX ORDER — PANTOPRAZOLE SODIUM 20 MG/1
TABLET, DELAYED RELEASE ORAL
Qty: 60 TABLET | Refills: 1 | Status: SHIPPED | OUTPATIENT
Start: 2018-02-27 | End: 2018-05-01 | Stop reason: SDUPTHER

## 2018-02-28 ENCOUNTER — OFFICE VISIT (OUTPATIENT)
Dept: OCCUPATIONAL THERAPY | Facility: CLINIC | Age: 53
End: 2018-02-28
Payer: COMMERCIAL

## 2018-02-28 ENCOUNTER — OFFICE VISIT (OUTPATIENT)
Dept: PHYSICAL THERAPY | Facility: CLINIC | Age: 53
End: 2018-02-28
Payer: COMMERCIAL

## 2018-02-28 DIAGNOSIS — M47.816 LUMBAR SPONDYLOSIS: Primary | ICD-10-CM

## 2018-02-28 DIAGNOSIS — M77.11 LATERAL EPICONDYLITIS OF RIGHT ELBOW: Primary | ICD-10-CM

## 2018-02-28 PROCEDURE — G8984 CARRY CURRENT STATUS: HCPCS

## 2018-02-28 PROCEDURE — G8985 CARRY GOAL STATUS: HCPCS

## 2018-02-28 PROCEDURE — 97166 OT EVAL MOD COMPLEX 45 MIN: CPT

## 2018-02-28 PROCEDURE — 97010 HOT OR COLD PACKS THERAPY: CPT

## 2018-02-28 PROCEDURE — G8986 CARRY D/C STATUS: HCPCS

## 2018-02-28 PROCEDURE — 97110 THERAPEUTIC EXERCISES: CPT

## 2018-02-28 PROCEDURE — 97140 MANUAL THERAPY 1/> REGIONS: CPT

## 2018-02-28 PROCEDURE — 97035 APP MDLTY 1+ULTRASOUND EA 15: CPT

## 2018-02-28 NOTE — PROGRESS NOTES
Daily Note     Today's date: 2018  Patient name: Edmundo Sellers  : 1965  MRN: [id number]  Referring provider: Anel Farfan DO  Dx:   Encounter Diagnosis     ICD-10-CM    1  Lumbar spondylosis M47 816                   Subjective:  No new c/o      Objective: See treatment diary below    Pain 8/10      Assessment:  Patient 30 minutes late for visit  Fair tolerance with therex  No c/o pain with therex  Plan: Continue with POC as tolerated      Specialty Daily Treatment Diary     Manual  18      Stretch BLE  10                                          Exercise Diary   18      nustep  10      UBE  retro  5      Mini squats        TR/HR        St hip flex/abd/ext        LTP/MTP        PPT  20      bridges  20      abd crunch  20      LTR  20      Add sq        t-band hip abd                                                                            Modalities 18      IFC/P 15'       Ultrasound

## 2018-02-28 NOTE — PROGRESS NOTES
OT Evaluation     Today's date: 2018  Patient name: Edmundo Sellers  : 1965  MRN: [id number]  Referring provider: Anel Farfan DO  Dx:   Encounter Diagnosis     ICD-10-CM    1  Lateral epicondylitis of right elbow M77 11                   Assessment    Assessment details: Patient reports symptoms began in October  Patient reports symptoms included increased pain  Patient reports going to hospital on 2018 and had X-Rays completed  Patient was referred to Ortho  Patient had initial appointment with Dr Betzy Molina on 2018 with a referral to OP OT services  Patient was given a wrist and elbow brace to be worn as frequently as possible as per patient  Patient has follow-up with Ortho on 2018  Understanding of Dx/Px/POC: good   Prognosis: good    Goals  STGs    Pt will increase  strength by 5-10#  Pt will increase elbow strength by 1/2 grade  Pt will report decrease in morning stiffness by 25%    Independent with HEP      LTGs     Pt will increase  strength by an additional 5-10#  Pt will increase elbow strength by 1/2 grade  Pt will report decrease in morning stiffness by 50%    Pt will report an increase in ADL/IADL participation          Plan  Patient would benefit from: OT eval and skilled OT  Planned modality interventions: ultrasound, cryotherapy and thermotherapy: hydrocollator packs  Planned therapy interventions: manual therapy, massage, patient education, stretching, strengthening, therapeutic exercise, home exercise program and fine motor coordination training  Frequency: 2x week  Duration in visits: 16  Duration in weeks: 8  Treatment plan discussed with: patient  Plan details: Patient has presenting to OP OT services with a dx of lateral epicondylitis of right elbow  Patient demonstrating increased pain, decreased strength, decreased ROM and decreased activity   Pt would benefit from continued Occupational Therapy services two times per week for eight weeks to return to prior level of function and achieve all established goals  Thank you for the referral!          Subjective Evaluation    History of Present Illness  Onset date: October    Mechanism of injury: No specific pathology  Quality of life: good    Pain  Current pain ratin  At best pain ratin  At worst pain ratin    Hand dominance: right      Diagnostic Tests  X-ray: normal  Patient Goals  Patient goals for therapy: decreased pain, increased motion, independence with ADLs/IADLs and increased strength          Objective     Active Range of Motion     Left Elbow   Flexion: WFL  Extension: [facility]  Forearm supination: [facility]  Forearm pronation: WFL    Right Elbow   Flexion: WFL  Extension: WFL  Forearm supination: WFL  Forearm pronation: WFL    Left Wrist   Wrist flexion: WFL  Wrist extension: WFL  Radial deviation: WFL  Ulnar deviation: WFL      Right Wrist   Wrist flexion: WFL  Wrist extension: WFl  Radial deviation: WFL  Ulnar deviation: WFL    Strength/Myotome Testing     Left Elbow   Normal strength    Right Elbow   Flexion: 4-  Extension: 4-  Forearm supination: 4-  Forearm pronation: 4-    Left Wrist/Hand   Normal wrist strength     (2nd hand position)     Trial 1: 30    Right Wrist/Hand   Wrist extension: 3+  Wrist flexion: 3+  Radial deviation: 3+  Ulnar deviation: 3+     (2nd hand position)     Trial 1: 5      Flowsheet Rows    Flowsheet Row Most Recent Value   PT/OT G-Codes   FOTO information reviewed  N/A   Assessment Type  Evaluation   G code set  Carrying, Moving & Handling Objects   Carrying, Moving and Handling Objects Current Status ()  CJ   Carrying, Moving and Handling Objects Goal Status ()  CI          Precautions NA    Specialty Daily Treatment Diary     Manual  2018       Massage Extensor Mass NP       GT 4# Extensor Mass NP       Wrist Extensor Stretch NP                           Exercise Diary  2018       Wrist Flex/Ext NP       Supination/ Pronation NP Wrist RD/UD NP       Digi-Flex NP       UBE Machine NP                                                                                                                                   Modalities 02/28/2018       Ultrasound 10 Min       CP w/ Bio Freeze 10 Min

## 2018-03-14 ENCOUNTER — OFFICE VISIT (OUTPATIENT)
Dept: OBGYN CLINIC | Facility: CLINIC | Age: 53
End: 2018-03-14
Payer: COMMERCIAL

## 2018-03-14 VITALS
HEART RATE: 89 BPM | HEIGHT: 61 IN | SYSTOLIC BLOOD PRESSURE: 116 MMHG | WEIGHT: 169.09 LBS | DIASTOLIC BLOOD PRESSURE: 79 MMHG | BODY MASS INDEX: 31.93 KG/M2

## 2018-03-14 DIAGNOSIS — G89.29 CHRONIC MIDLINE LOW BACK PAIN WITH BILATERAL SCIATICA: Primary | ICD-10-CM

## 2018-03-14 DIAGNOSIS — M54.42 CHRONIC MIDLINE LOW BACK PAIN WITH BILATERAL SCIATICA: Primary | ICD-10-CM

## 2018-03-14 DIAGNOSIS — M47.816 FACET ARTHROPATHY, LUMBAR: ICD-10-CM

## 2018-03-14 DIAGNOSIS — M54.41 CHRONIC MIDLINE LOW BACK PAIN WITH BILATERAL SCIATICA: Primary | ICD-10-CM

## 2018-03-14 PROCEDURE — 99213 OFFICE O/P EST LOW 20 MIN: CPT | Performed by: FAMILY MEDICINE

## 2018-03-14 RX ORDER — LIDOCAINE 50 MG/G
1 PATCH TOPICAL DAILY
Qty: 30 PATCH | Refills: 0 | Status: SHIPPED | OUTPATIENT
Start: 2018-03-14 | End: 2018-04-26

## 2018-03-14 RX ORDER — TRAMADOL HYDROCHLORIDE 50 MG/1
50 TABLET ORAL EVERY 8 HOURS PRN
Qty: 30 TABLET | Refills: 0 | Status: SHIPPED | OUTPATIENT
Start: 2018-03-14 | End: 2018-04-09 | Stop reason: SDUPTHER

## 2018-03-14 NOTE — PROGRESS NOTES
Assessment/Plan:  Assessment/Plan    Diagnoses and all orders for this visit:    Chronic midline low back pain with bilateral sciatica  -     lidocaine (LIDODERM) 5 %; Place 1 patch on the skin daily Remove & Discard patch within 12 hours or as directed by MD  -     MRI lumbar spine wo contrast; Future  -     traMADol (ULTRAM) 50 mg tablet; Take 1 tablet (50 mg total) by mouth every 8 (eight) hours as needed for moderate pain    Facet arthropathy, lumbar  -     lidocaine (LIDODERM) 5 %; Place 1 patch on the skin daily Remove & Discard patch within 12 hours or as directed by MD        57-year-old female with exacerbation of chronic low back pain  Discussed with patient physical exam, impression, and plan  She had previous x-ray showing lumbar facet arthropathy  Patient reports having had history of low back  of more than 20 years duration  However it has significantly worsened over the past 2 months  Patient reports pain with radiation into both lower extremities with numbness and tingling  She has been doing physical therapy and home exercises as directed without improvement, as well as taking oral medications without any relief  At this time I will refer her for MRI of the lumbar spine to evaluate for any occult osseous injury and for evaluation of soft tissue particularly disc pathology  She will continue  With taking meloxicam and muscle relaxants  I will prescribe her lidocaine patch to be used once daily and tramadol 50 mg q 8 hours as needed  She will follow up after getting MRI done for determination of further treatment  Subjective:   Patient ID: Amie Lee is a 50 y o  female  Chief Complaint   Patient presents with    Lower Back - Pain, Follow-up       50year old female with low back pain without sciatica  She has been doing physical therapy without any improvement  Sessions are twice a week and she has been doing exercises at home as directed    During therapy sessions she has pain of the low back, and then pain after getting home from therapy  Pain at worst is 10 out of 10 intensity, dull and achy and sometimes sharp  She has been taking Meloxicam 15mg once daily and muscle relaxants twice daily as directed with very little to no improvement  She has been trying to change positions when laying down to help with pain but it has been difficult to find comfortable position  She has tried using lidocaine patch from a friend and states it provided relief of her pain  She denies any bowel or bladder incontinence  She reports numbness of both lower extremities going from the thighs down to the toes of both feet  She has not had any new injury since previous visit  Back Pain   This is a recurrent problem  Episode onset: more than 2 months  The problem occurs constantly  The problem has been unchanged  Associated symptoms include numbness  Pertinent negatives include no abdominal pain  The symptoms are aggravated by walking  She has tried rest and NSAIDs (Physical therapy, muscle relaxants) for the symptoms  The treatment provided no relief  Review of Systems   Gastrointestinal: Negative for abdominal pain  Genitourinary: Negative for difficulty urinating  Musculoskeletal: Positive for back pain  Neurological: Positive for numbness  Objective:  Vitals:    03/14/18 1151   BP: 116/79   Pulse: 89   Weight: 76 7 kg (169 lb 1 5 oz)   Height: 5' 1" (1 549 m)     Back Exam     Tenderness   The patient is experiencing tenderness in the lumbar      Range of Motion   Extension: normal   Flexion: normal   Lateral Bend Right: normal   Lateral Bend Left: normal   Rotation Right: normal   Rotation Left: normal     Muscle Strength   Right Quadriceps:  4/5   Left Quadriceps:  5/5   Right Hamstrings:  4/5   Left Hamstrings:  5/5     Tests   Straight leg raise right: negative  Straight leg raise left: negative    Reflexes   Patellar: 1/4    Other   Gait: normal             Physical Exam Constitutional: She is oriented to person, place, and time  She appears well-developed  No distress  HENT:   Head: Normocephalic  Eyes: Conjunctivae are normal    Neck: No tracheal deviation present  Cardiovascular: Normal rate  Pulmonary/Chest: Effort normal  No respiratory distress  Abdominal: She exhibits no distension  Neurological: She is alert and oriented to person, place, and time  Skin: Skin is warm and dry  Psychiatric: She has a normal mood and affect   Her behavior is normal

## 2018-03-25 ENCOUNTER — HOSPITAL ENCOUNTER (OUTPATIENT)
Dept: MRI IMAGING | Facility: HOSPITAL | Age: 53
Discharge: HOME | End: 2018-03-25
Payer: COMMERCIAL

## 2018-03-25 DIAGNOSIS — G89.29 CHRONIC MIDLINE LOW BACK PAIN WITH BILATERAL SCIATICA: ICD-10-CM

## 2018-03-25 DIAGNOSIS — M54.42 CHRONIC MIDLINE LOW BACK PAIN WITH BILATERAL SCIATICA: ICD-10-CM

## 2018-03-25 DIAGNOSIS — M54.41 CHRONIC MIDLINE LOW BACK PAIN WITH BILATERAL SCIATICA: ICD-10-CM

## 2018-03-25 PROCEDURE — 72148 MRI LUMBAR SPINE W/O DYE: CPT

## 2018-03-28 ENCOUNTER — OFFICE VISIT (OUTPATIENT)
Dept: OBGYN CLINIC | Facility: CLINIC | Age: 53
End: 2018-03-28
Payer: COMMERCIAL

## 2018-03-28 VITALS
WEIGHT: 169.97 LBS | BODY MASS INDEX: 36.67 KG/M2 | HEIGHT: 57 IN | SYSTOLIC BLOOD PRESSURE: 126 MMHG | HEART RATE: 80 BPM | DIASTOLIC BLOOD PRESSURE: 85 MMHG

## 2018-03-28 DIAGNOSIS — M54.41 CHRONIC MIDLINE LOW BACK PAIN WITH BILATERAL SCIATICA: Primary | ICD-10-CM

## 2018-03-28 DIAGNOSIS — M54.42 CHRONIC MIDLINE LOW BACK PAIN WITH BILATERAL SCIATICA: Primary | ICD-10-CM

## 2018-03-28 DIAGNOSIS — G89.29 CHRONIC MIDLINE LOW BACK PAIN WITH BILATERAL SCIATICA: Primary | ICD-10-CM

## 2018-03-28 DIAGNOSIS — B37.0 THRUSH, ORAL: ICD-10-CM

## 2018-03-28 DIAGNOSIS — M51.26 L4-L5 DISC BULGE: ICD-10-CM

## 2018-03-28 DIAGNOSIS — M47.817 FACET HYPERTROPHY OF LUMBOSACRAL REGION: ICD-10-CM

## 2018-03-28 PROCEDURE — 99213 OFFICE O/P EST LOW 20 MIN: CPT | Performed by: FAMILY MEDICINE

## 2018-03-28 RX ORDER — LIDOCAINE 40 MG/G
CREAM TOPICAL 2 TIMES DAILY
Qty: 100 G | Refills: 0 | Status: ON HOLD | OUTPATIENT
Start: 2018-03-28 | End: 2018-10-10 | Stop reason: ALTCHOICE

## 2018-03-28 RX ORDER — PREDNISONE 20 MG/1
20 TABLET ORAL DAILY
Qty: 6 TABLET | Refills: 0 | Status: SHIPPED | OUTPATIENT
Start: 2018-03-28 | End: 2018-04-03

## 2018-03-28 NOTE — PROGRESS NOTES
Assessment/Plan:  Assessment/Plan   Diagnoses and all orders for this visit:    Chronic midline low back pain with bilateral sciatica  -     predniSONE 20 mg tablet; Take 1 tablet (20 mg total) by mouth daily for 6 days  -     lidocaine (LMX) 4 % cream; Apply topically 2 (two) times a day    L4-L5 disc bulge  -     predniSONE 20 mg tablet; Take 1 tablet (20 mg total) by mouth daily for 6 days  -     lidocaine (LMX) 4 % cream; Apply topically 2 (two) times a day    Facet hypertrophy of lumbosacral region  -     predniSONE 20 mg tablet; Take 1 tablet (20 mg total) by mouth daily for 6 days  -     lidocaine (LMX) 4 % cream; Apply topically 2 (two) times a day    Thrush, oral  -     nystatin (MYCOSTATIN) 100,000 units/mL suspension; Apply 5 mL (500,000 Units total) to the mouth or throat 4 (four) times a day    Other orders  -     sertraline (ZOLOFT) 50 mg tablet; Take 50 mg by mouth daily      50year old female with chronic midline low back pain  Discussed with patient physical exam, MRI results, impression, and plan  Her MRI is noted for diffuse disc bulge at L4-L5 and L5-S1 without central canal or neural foraminal narrowing  She has started physical therapy and she is to continue with physical therapy and home exercise program  She is to take Cyclobenzaprine 10mg at night and titrate to three times a day if tolerated  She is to take Prednisone 20mg once daily for 6 days  Due to history of oral thrush with taking prednisone she is to use Nystatin swish and swallow daily as directed while taking Prednisone  I discussed with patient that if there is no improvement with current regimen she may need referral to pain management  She will follow-up with me in 10 days to assess her response to prednisone  Subjective:   Patient ID: Gerhard Sosa is a 50 y o  female  Chief Complaint   Patient presents with    Lower Back - Pain       50year old female following up for low back pain of many years duration   Pain started after fall injury in 1989  Pain is described as localized to the lumbosacral midline with radiation to both thighs, constant, fluctuating in intensity, 9 out of 10 intensity a worst, and associated with left lower extremity numbness  She has been doing physical therapy and home exercises without improvement  She has been on NSAIDs including Meloxicam and pain medication Tramadol with little to no relief  Back Pain   This is a chronic problem  The current episode started more than 1 year ago  The problem occurs constantly  The problem has been unchanged  Associated symptoms include numbness  The symptoms are aggravated by twisting  She has tried rest, NSAIDs and oral narcotics (Physical therapy) for the symptoms  The treatment provided mild relief  Review of Systems   Musculoskeletal: Positive for back pain  Neurological: Positive for numbness  Objective:  Vitals:    03/28/18 1145   BP: 126/85   Pulse: 80   Weight: 77 1 kg (169 lb 15 6 oz)   Height: 4' 9 44" (1 459 m)     Right Hip Exam     Tests   CASSIA: negative    Comments:    Negative FADDIR      Left Hip Exam     Tests   CASSIA: negative    Comments:    Negative FADDIR      Back Exam     Tenderness   The patient is experiencing tenderness in the lumbar  Range of Motion   Extension: 10   Flexion:  50 normal   Lateral Bend Right: normal   Lateral Bend Left: normal   Rotation Right: normal   Rotation Left: normal     Tests   Straight leg raise right: negative  Straight leg raise left: negative    Reflexes   Patellar: 2/4    Other   Sensation: normal  Gait: normal             Physical Exam   Constitutional: She is oriented to person, place, and time  She appears well-developed  No distress  HENT:   Head: Normocephalic  Eyes: Conjunctivae are normal    Neck: No tracheal deviation present  Cardiovascular: Normal rate  Pulmonary/Chest: Effort normal  No respiratory distress  Abdominal: She exhibits no distension  Neurological: She is alert and oriented to person, place, and time  Skin: Skin is warm and dry  Psychiatric: She has a normal mood and affect  Her behavior is normal        I have personally reviewed pertinent films in PACS and my interpretation is L4-L5 and L5-S1 diffuse disc bulge

## 2018-04-09 ENCOUNTER — OFFICE VISIT (OUTPATIENT)
Dept: OBGYN CLINIC | Facility: CLINIC | Age: 53
End: 2018-04-09
Payer: COMMERCIAL

## 2018-04-09 VITALS
BODY MASS INDEX: 36.46 KG/M2 | DIASTOLIC BLOOD PRESSURE: 78 MMHG | SYSTOLIC BLOOD PRESSURE: 127 MMHG | HEART RATE: 75 BPM | WEIGHT: 169 LBS | HEIGHT: 57 IN

## 2018-04-09 DIAGNOSIS — M54.42 CHRONIC MIDLINE LOW BACK PAIN WITH BILATERAL SCIATICA: Primary | ICD-10-CM

## 2018-04-09 DIAGNOSIS — M47.817 FACET ARTHROPATHY, LUMBOSACRAL: ICD-10-CM

## 2018-04-09 DIAGNOSIS — M54.41 CHRONIC MIDLINE LOW BACK PAIN WITH BILATERAL SCIATICA: Primary | ICD-10-CM

## 2018-04-09 DIAGNOSIS — G89.29 CHRONIC MIDLINE LOW BACK PAIN WITH BILATERAL SCIATICA: Primary | ICD-10-CM

## 2018-04-09 DIAGNOSIS — M51.26 L4-L5 DISC BULGE: ICD-10-CM

## 2018-04-09 PROCEDURE — 99213 OFFICE O/P EST LOW 20 MIN: CPT | Performed by: FAMILY MEDICINE

## 2018-04-09 RX ORDER — TRAMADOL HYDROCHLORIDE 50 MG/1
50 TABLET ORAL EVERY 8 HOURS PRN
Qty: 30 TABLET | Refills: 0 | Status: ON HOLD | OUTPATIENT
Start: 2018-04-09 | End: 2018-10-10 | Stop reason: CLARIF

## 2018-04-09 NOTE — PROGRESS NOTES
Assessment/Plan:  Assessment/Plan   Diagnoses and all orders for this visit:    Chronic midline low back pain with bilateral sciatica  -     Ambulatory referral to Pain Management; Future  -     traMADol (ULTRAM) 50 mg tablet; Take 1 tablet (50 mg total) by mouth every 8 (eight) hours as needed for moderate pain    L4-L5 disc bulge  -     Ambulatory referral to Pain Management; Future  -     traMADol (ULTRAM) 50 mg tablet; Take 1 tablet (50 mg total) by mouth every 8 (eight) hours as needed for moderate pain    Facet arthropathy, lumbosacral ([facility] [address] )  -     Ambulatory referral to Pain Management; Future     61-year-old female with low back pain  Discussed with patient physical exam, impression, and plan  At previous visit MRI results were reviewed with her and they revealed L4-L5, and L5-S1 disc bulge  Her clinical exam is noted for weakness with left lower extremity hip flexion, knee extension, and knee flexion  Patient has done extensive physical therapy and home exercise, as well as trial on various oral medications and still has not had improvement in pain  At this time I will refer her to pain management for further evaluation and treatment  She will continue with tramadol 50 milligrams every 8 hours as needed for pain  Further care and treatment as per pain management  Subjective:   Patient ID: Gisele Fernandez is a 50 y o  female  Chief Complaint   Patient presents with    Lower Back - Pain, Follow-up       61-year-old female following up for chronic low back pain  She was last seen 2 weeks ago at which point MRI was reviewed and she was found to have bulging disc at L4-L5 and L5-S1  She was placed on prednisone 20 milligrams once daily for 6 days  Today she reports she has not had any change in her pain   Pain is described as localized to the lumbosacral area, radiating to both thighs, constant, fluctuating in intensity, 9 out of 10 at worst, and associated with intermittent numbness and tingling of the left lower extremity  She denies any injury since her last visit  Back Pain   This is a chronic problem  The current episode started more than 1 year ago  The problem occurs constantly  The problem has been unchanged  Associated symptoms include numbness  She has tried rest, oral narcotics and NSAIDs (Physical therapy) for the symptoms  The treatment provided mild relief  Review of Systems   Musculoskeletal: Positive for back pain  Neurological: Positive for numbness  Objective:  Vitals:    04/09/18 1144   BP: 127/78   Pulse: 75   Weight: 76 7 kg (169 lb)   Height: 4' 9 44" (1 459 m)     Back Exam     Tenderness   The patient is experiencing tenderness in the lumbar and sacroiliac (Midline L3-S1)  Range of Motion   Extension: 10   Flexion: normal   Lateral Bend Right: normal   Lateral Bend Left: normal   Rotation Right: normal   Rotation Left: normal     Muscle Strength   Right Quadriceps:  5/5   Left Quadriceps:  4/5   Right Hamstrings:  5/5   Left Hamstrings:  4/5     Tests   Straight leg raise right: negative  Straight leg raise left: negative    Reflexes   Patellar: 2/4    Other   Sensation: normal  Gait: normal             Physical Exam   Constitutional: She is oriented to person, place, and time  She appears well-developed  No distress  HENT:   Head: Normocephalic  Eyes: Conjunctivae are normal    Neck: No tracheal deviation present  Cardiovascular: Normal rate  Pulmonary/Chest: Effort normal  No respiratory distress  Abdominal: She exhibits no distension  Neurological: She is alert and oriented to person, place, and time  Skin: Skin is warm and dry  Psychiatric: She has a normal mood and affect   Her behavior is normal

## 2018-04-16 ENCOUNTER — HOSPITAL ENCOUNTER (OUTPATIENT)
Dept: ULTRASOUND IMAGING | Facility: HOSPITAL | Age: 53
Discharge: HOME | End: 2018-04-16
Attending: OBSTETRICS & GYNECOLOGY
Payer: COMMERCIAL

## 2018-04-16 DIAGNOSIS — N83.201 CYST OF RIGHT OVARY: ICD-10-CM

## 2018-04-16 PROCEDURE — 76830 TRANSVAGINAL US NON-OB: CPT

## 2018-04-16 PROCEDURE — 76856 US EXAM PELVIC COMPLETE: CPT

## 2018-04-18 ENCOUNTER — TELEPHONE (OUTPATIENT)
Dept: OBGYN CLINIC | Facility: CLINIC | Age: 53
End: 2018-04-18

## 2018-04-18 DIAGNOSIS — N83.8 OVARIAN MASS: Primary | ICD-10-CM

## 2018-04-18 NOTE — TELEPHONE ENCOUNTER
Spoke with patient about her ultrasound report   referral to gyn Oncology for complex septated ovarian mass

## 2018-04-18 NOTE — TELEPHONE ENCOUNTER
Dominic Stephen from Radiology called stating that she was calling to inform the MD that there was significant findings on the pt's most recent pelvic ultrasound      Best number to reach Madhav Ruelas: [phone]

## 2018-04-19 ENCOUNTER — TELEPHONE (OUTPATIENT)
Dept: CARDIOLOGY CLINIC | Facility: CLINIC | Age: 53
End: 2018-04-19

## 2018-04-19 ENCOUNTER — OFFICE VISIT (OUTPATIENT)
Dept: GYNECOLOGIC ONCOLOGY | Facility: CLINIC | Age: 53
End: 2018-04-19
Payer: COMMERCIAL

## 2018-04-19 VITALS
BODY MASS INDEX: 32.08 KG/M2 | SYSTOLIC BLOOD PRESSURE: 130 MMHG | HEIGHT: 61 IN | RESPIRATION RATE: 18 BRPM | HEART RATE: 80 BPM | WEIGHT: 169.9 LBS | DIASTOLIC BLOOD PRESSURE: 70 MMHG | TEMPERATURE: 97.3 F

## 2018-04-19 DIAGNOSIS — D49.59 OVARIAN NEOPLASM: Primary | ICD-10-CM

## 2018-04-19 DIAGNOSIS — N83.8 OVARIAN MASS: ICD-10-CM

## 2018-04-19 PROCEDURE — 99244 OFF/OP CNSLTJ NEW/EST MOD 40: CPT | Performed by: OBSTETRICS & GYNECOLOGY

## 2018-04-19 NOTE — TELEPHONE ENCOUNTER
Fe Landers is calling from [address]  She needs to schedule pt for cardiac clearance for a hysterectomy- she will be a new pt with us  Pts surgery is on May 8th & Dr Den Ahumada do not have any openings before that to clear the pt  Will Dr Josefina Perla see her?? Please call Ofelia back

## 2018-04-19 NOTE — LETTER
April 19, 2018     Herminia Manuel MD  [address] [facility] [address]    Patient: Hugh Ellis   YOB: 1965   Date of Visit: 4/19/2018       Dear Dr Farris Bound: Thank you for referring Logan Del Valle to me for evaluation  Below are my notes for this consultation  If you have questions, please do not hesitate to call me  I look forward to following your patient along with you  Sincerely,        Leonardo Chang MD        CC: No Recipients  Leonardo Chang MD  4/19/2018  3:15 PM  Incomplete  Hugh Ellis  1965  [facility]  [facility] [address] [address] [phone]    Chief Complaint   Patient presents with    Consult     Ovarian neoplasm       Assessment/Plan     Assessment:  1  Ovarian neoplasm  Case request operating room: HYSTERECTOMY LAPAROSCOPIC TOTAL ([address] [place] [address]) W/ ROBOTICS    Type and screen    Comprehensive metabolic panel    CBC and differential    HEMOGLOBIN A1C W/ EAG ESTIMATION    Case request operating room: HYSTERECTOMY LAPAROSCOPIC TOTAL ([address] [place] [address]) W/ ROBOTICS        ECG 12 lead   2  Ovarian mass  Ambulatory referral to Gynecologic Oncology     No matching staging information was found for the patient  Plan:      History of Present Illness: ***     No history exists  Review of Systems   Constitutional: Negative for activity change, appetite change, chills, diaphoresis, fatigue, fever and unexpected weight change  HENT: Positive for sinus pain, sinus pressure and sore throat  Negative for congestion, dental problem, drooling, ear discharge, ear pain, facial swelling, hearing loss, mouth sores, nosebleeds, postnasal drip, rhinorrhea, sneezing, tinnitus, trouble swallowing and voice change  Eyes: Negative for photophobia, pain, discharge, redness, itching and visual disturbance  Respiratory: Positive for cough   Negative for apnea, choking, chest tightness, shortness of breath, wheezing and stridor  Cardiovascular: Negative for chest pain, palpitations and leg swelling  Gastrointestinal: Negative for abdominal distention, abdominal pain, anal bleeding, blood in stool, constipation, diarrhea, nausea, rectal pain and vomiting  Endocrine: Negative for cold intolerance, heat intolerance, polydipsia, polyphagia and polyuria  Genitourinary: Negative for decreased urine volume, difficulty urinating, dyspareunia, dysuria, enuresis, flank pain, frequency, genital sores, hematuria, menstrual problem, pelvic pain, urgency, vaginal bleeding, vaginal discharge and vaginal pain  Musculoskeletal: Positive for back pain, neck pain and neck stiffness  Negative for arthralgias, gait problem, joint swelling and myalgias  Skin: Negative for color change, pallor, rash and wound  Allergic/Immunologic: Negative for environmental allergies, food allergies and immunocompromised state  Neurological: Positive for numbness and headaches  Negative for dizziness, tremors, seizures, syncope, facial asymmetry, speech difficulty, weakness and light-headedness  Hematological: Negative for adenopathy  Does not bruise/bleed easily  Psychiatric/Behavioral: Positive for sleep disturbance  Negative for agitation, behavioral problems, confusion, decreased concentration, dysphoric mood, hallucinations, self-injury and suicidal ideas  The patient is nervous/anxious  The patient is not hyperactive          Past Medical History:   Diagnosis Date    [facility]     Anxiety     Arthritis     Diabetes [facility]     Diabetes mellitus ([facility]     Headache     Hyperlipidemia     Kidney stone     Left bundle branch block     [facility]     Shortness of breath        Past Surgical History:   Procedure Laterality Date    BREAST SURGERY       SECTION      COLONOSCOPY      KIDNEY STONE SURGERY      LAPAROSCOPY      [place] COLONOSCOPY FLX DX W/COLLJ SPEC WHEN PFRMD N/A 2017    Procedure: EGD AND COLONOSCOPY;  Surgeon: Jose Eduardo William MD;  Location: [facility]; Service: Gastroenterology    UPPER GASTROINTESTINAL ENDOSCOPY      WRIST SURGERY Right        OB History      Para Term  AB Living    4 2            SAB TAB Ectopic Multiple Live Births                       Family History   Problem Relation Age of Onset    Diabetes Mother    Kishan Amado Breast cancer Mother 39    Lung cancer Father 47    Diabetes Sister     Brain cancer Maternal Aunt 67    Breast cancer Other 25       Social History     Social History    Marital status: /Civil Union     Spouse name: N/A    Number of children: N/A    Years of education: N/A     Occupational History    Not on file       Social History Main Topics    Smoking status: Current Every Day Smoker     Packs/day: 1 00    Smokeless tobacco: Former User    Alcohol use No    Drug use: No    Sexual activity: Not on file     Other Topics Concern    Not on file     Social History Narrative    No narrative on file         Current Outpatient Prescriptions:     Butalbital-APAP-Caffeine (FIORICET) -40 MG CAPS, Take by mouth, Disp: , Rfl:     cyclobenzaprine (FLEXERIL) 10 mg tablet, Take 10 mg by mouth 3 (three) times a day as needed for muscle spasms, Disp: , Rfl:     hydrOXYzine HCL (ATARAX) 25 mg tablet, Take 25 mg by mouth every 6 (six) hours as needed for itching, Disp: , Rfl:     lidocaine (LMX) 4 % cream, Apply topically 2 (two) times a day, Disp: 100 g, Rfl: 0    Liraglutide 18 MG/3ML SOPN, Inject under the skin, Disp: , Rfl:     meloxicam (MOBIC) 15 mg tablet, Take 15 mg by mouth daily, Disp: , Rfl:     meloxicam (MOBIC) 15 mg tablet, Take 1 tablet (15 mg total) by mouth daily, Disp: 30 tablet, Rfl: 2    naproxen (NAPROSYN) 500 mg tablet, Take 1 tablet by mouth 2 (two) times a day as needed for mild pain (take with food) for up to 20 doses, Disp: 20 tablet, Rfl: 0    nortriptyline (PAMELOR) 10 mg capsule, Take by mouth daily at bedtime, Disp: , Rfl:     nystatin (MYCOSTATIN) 100,000 units/mL suspension, Apply 5 mL (500,000 Units total) to the mouth or throat 4 (four) times a day, Disp: 100 mL, Rfl: 0    ondansetron (ZOFRAN) 4 mg tablet, Take 4 mg by mouth every 8 (eight) hours as needed for nausea or vomiting, Disp: , Rfl:     pantoprazole (PROTONIX) 20 mg tablet, TAKE 1 TABLET BY MOUTH TWICE A DAY, Disp: 60 tablet, Rfl: 1    sertraline (ZOLOFT) 50 mg tablet, Take 50 mg by mouth daily, Disp: , Rfl:     traMADol (ULTRAM) 50 mg tablet, Take 1 tablet (50 mg total) by mouth every 8 (eight) hours as needed for moderate pain, Disp: 30 tablet, Rfl: 0    zolpidem (AMBIEN) 10 mg tablet, Take 10 mg by mouth daily at bedtime as needed for sleep, Disp: , Rfl:     aspirin (ECOTRIN LOW STRENGTH) 81 mg EC tablet, Take 81 mg by mouth daily, Disp: , Rfl:     atorvastatin (LIPITOR) 40 mg tablet, Take 40 mg by mouth daily, Disp: , Rfl:     buPROPion (WELLBUTRIN XL) 150 mg 24 hr tablet, Take 150 mg by mouth daily, Disp: , Rfl:     diclofenac sodium (VOLTAREN) 1 %, Apply 2 g topically 4 (four) times a day for 30 days, Disp: 1 Tube, Rfl: 0    glyBURIDE (DIABETA) 5 mg tablet, Take 5 mg by mouth daily with breakfast, Disp: , Rfl:     indomethacin (INDOCIN) 50 mg capsule, Take 50 mg by mouth 2 (two) times a day with meals, Disp: , Rfl:     lidocaine (LIDODERM) 5 %, Place 1 patch on the skin every 24 hours for 5 doses Remove & Discard patch within 12 hours or as directed by MD, Disp: 5 patch, Rfl: 0    lidocaine (LIDODERM) 5 %, Place 1 patch on the skin daily Remove & Discard patch within 12 hours or as directed by MD, Disp: 30 patch, Rfl: 0    lidocaine (LIDODERM) 5 %, Place 1 patch on the skin daily Remove & Discard patch within 12 hours or as directed by MD, Disp: 30 patch, Rfl: 0    lisinopril (ZESTRIL) 10 mg tablet, Take 10 mg by mouth daily, Disp: , Rfl:     metoprolol succinate (TOPROL-XL) 100 mg 24 hr tablet, Take 100 mg by mouth daily, Disp: , Rfl:     Allergies   Allergen Reactions    Other Hives     Surgical tape    Prednisone Other (See Comments)     Thrush         Physical Exam   Constitutional: She is oriented to person, place, and time  She appears well-developed and well-nourished  No distress  HENT:   Head: Normocephalic and atraumatic  Right Ear: External ear normal    Left Ear: External ear normal    Nose: Nose normal    Mouth/Throat: No oropharyngeal exudate  Eyes: Pupils are equal, round, and reactive to light  Right eye exhibits no discharge  Left eye exhibits no discharge  No scleral icterus  Neck: Normal range of motion  Neck supple  No JVD present  No tracheal deviation present  No thyromegaly present  Cardiovascular: Normal rate, regular rhythm, normal heart sounds and intact distal pulses  Exam reveals no gallop and no friction rub  No murmur heard  Pulmonary/Chest: Effort normal and breath sounds normal  No stridor  No respiratory distress  She has no wheezes  She has no rales  She exhibits no tenderness  Abdominal: Soft  Bowel sounds are normal  She exhibits no distension and no mass  There is no tenderness  There is no rebound and no guarding  Genitourinary: Vagina normal and uterus normal    Genitourinary Comments: The external female genitalia is normal  The bartholin's, uretheral and skenes glands are normal  The urethral meatus is normal  Speculum exam reveals a grossly normal vagina with age-appropriate atrophy  No masses, lesions or bleeding  Bimanual exam notes a normal uterus with no adnexal masses  Musculoskeletal: Normal range of motion  She exhibits no edema, tenderness or deformity  Lymphadenopathy:     She has no cervical adenopathy  Neurological: She is alert and oriented to person, place, and time  She has normal reflexes  No cranial nerve deficit  She exhibits normal muscle tone  Coordination normal    Skin: Skin is warm and dry  No rash noted   She is not diaphoretic  No erythema  No pallor  Psychiatric: She has a normal mood and affect  Her behavior is normal  Judgment and thought content normal      4/16/2018 Pelvic ultrasound: FINDINGS:     UTERUS:  The uterus is anteverted in position, measuring 7 5 x 3 7 x 4 5 cm  Contour and echotexture appear normal    Anterior fibroid again visualized, stable, measuring 1 8 x 1 1 x 1 7 cm  Prior measurement 1 8 x 1 2 x 1 3 cm  The cervix shows no suspicious abnormality      ENDOMETRIUM:    Normal caliber of 3 mm  Mildly heterogeneous with scattered echogenic foci  No hypervascularity      OVARIES/ADNEXA:  Right ovary:  3 7 x 3 6 x 5 4 cm  Complex right ovarian cyst with thick septation again visualized measuring 5 3 x 3 3 x 3 4 cm  Prior measurement 4 8 x 4 cm  Doppler flow within normal limits      Left ovary:  1 6 x 1 4 x 1 4 cm  No suspicious left ovarian abnormality  Doppler flow within normal limits      There is no free fluid      IMPRESSION:     1  Complex right ovarian cyst with thick septation again visualized measuring 5 3 x 3 3 x 3 4 cm  According to the consensus conference statement from the Society of Radiologists in Ultrasound (Radiology:Volume 256: Number 3-September 2010  pp [phone] )   this lesion has imaging features of a indeterminate cyst   In this postmenopausal woman, this should be further evaluated by MR and/or surgical consultation  Daron English MD, PhD, Kishan Hong  Attending Physician, [address] [place] [address]

## 2018-04-19 NOTE — PROGRESS NOTES
Renetta Banda  1965  [facility]  [facility] Skye Mitchpranay  [address] [address] [phone]    Chief Complaint   Patient presents with    Consult     Ovarian neoplasm       Assessment/Plan     Assessment:  1  Ovarian neoplasm  Case request operating room: HYSTERECTOMY LAPAROSCOPIC TOTAL ([address]) W/ ROBOTICS    Type and screen    Comprehensive metabolic panel    CBC and differential    HEMOGLOBIN A1C W/ EAG ESTIMATION    Case request operating room: HYSTERECTOMY LAPAROSCOPIC TOTAL ([address]) W/ ROBOTICS        ECG 12 lead   2  Ovarian mass  Ambulatory referral to Gynecologic Oncology     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  Plan:  The patient desires definitive surgical management  I believe this is warranted since her cyst has been persistent over 7 months and does have some complex features  The patient has signed informed consent for a robotic assisted total laparoscopic hysterectomy, bilateral salpingo-oophorectomy, possible staging, possible exploratory laparotomy  Patient understands the risks, benefits and alternative treatments and agrees to proceed  The patient understands the risks associated with surgery which include, but are not limited to: infection, deep vein thrombosis, blood clots to the lungs, wound healing problems, complications with extensive blood loss, blood transfusion, damage to nearby organs (ureters, bladder, bowel, major nerves and blood vessels), anesthesia and death  The patient is comorbidities are extensive and she will require both medical and cardiac clearance prior to surgery  I will also check a  to establish a baseline  History of Present Illness: The patient is a delightful 61-year-old  referred for an ovarian neoplasm of uncertain behavior  She presents for evaluation and treatment  Patient was 1st diagnosed with a ovarian neoplasm in September when she had a pelvic ultrasound  Patient had a recent ultrasound which shows that the cystic portion of her ovary is marginally bigger  Interestingly enough, the overall size of her ovary has decreased  Patient has considerable comorbidities including hypertension, diabetes, hyperlipidemia, a left bundle branch block  Patient does not seem to be very compliant with her medications  Patient surgical history is notable for 2  sections via Pfannenstiel incision  Patient also had some laparoscopic surgery for infertility in the past           Review of Systems   Constitutional: Negative for activity change, appetite change, chills, diaphoresis, fatigue, fever and unexpected weight change  HENT: Positive for sinus pain, sinus pressure and sore throat  Negative for congestion, dental problem, drooling, ear discharge, ear pain, facial swelling, hearing loss, mouth sores, nosebleeds, postnasal drip, rhinorrhea, sneezing, tinnitus, trouble swallowing and voice change  Eyes: Negative for photophobia, pain, discharge, redness, itching and visual disturbance  Respiratory: Positive for cough  Negative for apnea, choking, chest tightness, shortness of breath, wheezing and stridor  Cardiovascular: Negative for chest pain, palpitations and leg swelling  Gastrointestinal: Negative for abdominal distention, abdominal pain, anal bleeding, blood in stool, constipation, diarrhea, nausea, rectal pain and vomiting  Endocrine: Negative for cold intolerance, heat intolerance, polydipsia, polyphagia and polyuria  Genitourinary: Negative for decreased urine volume, difficulty urinating, dyspareunia, dysuria, enuresis, flank pain, frequency, genital sores, hematuria, menstrual problem, pelvic pain, urgency, vaginal bleeding, vaginal discharge and vaginal pain  Musculoskeletal: Positive for back pain, neck pain and neck stiffness  Negative for arthralgias, gait problem, joint swelling and myalgias     Skin: Negative for color change, pallor, rash and wound  Allergic/Immunologic: Negative for environmental allergies, food allergies and immunocompromised state  Neurological: Positive for numbness and headaches  Negative for dizziness, tremors, seizures, syncope, facial asymmetry, speech difficulty, weakness and light-headedness  Hematological: Negative for adenopathy  Does not bruise/bleed easily  Psychiatric/Behavioral: Positive for sleep disturbance  Negative for agitation, behavioral problems, confusion, decreased concentration, dysphoric mood, hallucinations, self-injury and suicidal ideas  The patient is nervous/anxious  The patient is not hyperactive  Past Medical History:   Diagnosis Date    [facility]is ([facility] )     Anxiety     Arthritis     Diabetes ([facility] )     Diabetes mellitus ([facility] )     Headache     Hyperlipidemia     Kidney stone     Left bundle branch block     MI (myocardial infarction) ([place] [address] )     Shortness of breath        Past Surgical History:   Procedure Laterality Date    BREAST SURGERY       SECTION      COLONOSCOPY      KIDNEY STONE SURGERY      LAPAROSCOPY      [place] COLONOSCOPY FLX DX W/COLLJ SPEC WHEN PFRMD N/A 2017    Procedure: EGD AND COLONOSCOPY;  Surgeon: Artur Haddad MD;  Location: [facility]; Service: Gastroenterology    UPPER GASTROINTESTINAL ENDOSCOPY      WRIST SURGERY Right        OB History      Para Term  AB Living    4 2            SAB TAB Ectopic Multiple Live Births                       Family History   Problem Relation Age of Onset    Diabetes Mother    Carolynn Curry Breast cancer Mother 39    Lung cancer Father 47    Diabetes Sister     Brain cancer Maternal Aunt 67    Breast cancer Other 25       Social History     Social History    Marital status: /Civil Union     Spouse name: N/A    Number of children: N/A    Years of education: N/A     Occupational History    Not on file       Social History Main Topics    Smoking status: Current Every Day Smoker     Packs/day: 1 00    Smokeless tobacco: Former User    Alcohol use No    Drug use: No    Sexual activity: Not on file     Other Topics Concern    Not on file     Social History Narrative    No narrative on file         Current Outpatient Prescriptions:     Butalbital-APAP-Caffeine (FIORICET) -40 MG CAPS, Take by mouth, Disp: , Rfl:     cyclobenzaprine (FLEXERIL) 10 mg tablet, Take 10 mg by mouth 3 (three) times a day as needed for muscle spasms, Disp: , Rfl:     hydrOXYzine HCL (ATARAX) 25 mg tablet, Take 25 mg by mouth every 6 (six) hours as needed for itching, Disp: , Rfl:     lidocaine (LMX) 4 % cream, Apply topically 2 (two) times a day, Disp: 100 g, Rfl: 0    Liraglutide 18 MG/3ML SOPN, Inject under the skin, Disp: , Rfl:     meloxicam (MOBIC) 15 mg tablet, Take 15 mg by mouth daily, Disp: , Rfl:     meloxicam (MOBIC) 15 mg tablet, Take 1 tablet (15 mg total) by mouth daily, Disp: 30 tablet, Rfl: 2    naproxen (NAPROSYN) 500 mg tablet, Take 1 tablet by mouth 2 (two) times a day as needed for mild pain (take with food) for up to 20 doses, Disp: 20 tablet, Rfl: 0    nortriptyline (PAMELOR) 10 mg capsule, Take by mouth daily at bedtime, Disp: , Rfl:     nystatin (MYCOSTATIN) 100,000 units/mL suspension, Apply 5 mL (500,000 Units total) to the mouth or throat 4 (four) times a day, Disp: 100 mL, Rfl: 0    ondansetron (ZOFRAN) 4 mg tablet, Take 4 mg by mouth every 8 (eight) hours as needed for nausea or vomiting, Disp: , Rfl:     pantoprazole (PROTONIX) 20 mg tablet, TAKE 1 TABLET BY MOUTH TWICE A DAY, Disp: 60 tablet, Rfl: 1    sertraline (ZOLOFT) 50 mg tablet, Take 50 mg by mouth daily, Disp: , Rfl:     traMADol (ULTRAM) 50 mg tablet, Take 1 tablet (50 mg total) by mouth every 8 (eight) hours as needed for moderate pain, Disp: 30 tablet, Rfl: 0    zolpidem (AMBIEN) 10 mg tablet, Take 10 mg by mouth daily at bedtime as needed for sleep, Disp: , Rfl:     aspirin (ECOTRIN LOW STRENGTH) 81 mg EC tablet, Take 81 mg by mouth daily, Disp: , Rfl:     atorvastatin (LIPITOR) 40 mg tablet, Take 40 mg by mouth daily, Disp: , Rfl:     buPROPion (WELLBUTRIN XL) 150 mg 24 hr tablet, Take 150 mg by mouth daily, Disp: , Rfl:     diclofenac sodium (VOLTAREN) 1 %, Apply 2 g topically 4 (four) times a day for 30 days, Disp: 1 Tube, Rfl: 0    glyBURIDE (DIABETA) 5 mg tablet, Take 5 mg by mouth daily with breakfast, Disp: , Rfl:     indomethacin (INDOCIN) 50 mg capsule, Take 50 mg by mouth 2 (two) times a day with meals, Disp: , Rfl:     lidocaine (LIDODERM) 5 %, Place 1 patch on the skin every 24 hours for 5 doses Remove & Discard patch within 12 hours or as directed by MD, Disp: 5 patch, Rfl: 0    lidocaine (LIDODERM) 5 %, Place 1 patch on the skin daily Remove & Discard patch within 12 hours or as directed by MD, Disp: 30 patch, Rfl: 0    lidocaine (LIDODERM) 5 %, Place 1 patch on the skin daily Remove & Discard patch within 12 hours or as directed by MD, Disp: 30 patch, Rfl: 0    lisinopril (ZESTRIL) 10 mg tablet, Take 10 mg by mouth daily, Disp: , Rfl:     metoprolol succinate (TOPROL-XL) 100 mg 24 hr tablet, Take 100 mg by mouth daily, Disp: , Rfl:     Allergies   Allergen Reactions    Other Hives     Surgical tape    Prednisone Other (See Comments)     Thrush         Physical Exam   Constitutional: She is oriented to person, place, and time  She appears well-developed and well-nourished  No distress  HENT:   Head: Normocephalic and atraumatic  Right Ear: External ear normal    Left Ear: External ear normal    Nose: Nose normal    Mouth/Throat: No oropharyngeal exudate  Eyes: Pupils are equal, round, and reactive to light  Right eye exhibits no discharge  Left eye exhibits no discharge  No scleral icterus  Neck: Normal range of motion  Neck supple  No JVD present  No tracheal deviation present  No thyromegaly present     Cardiovascular: Normal rate, regular rhythm, normal heart sounds and intact distal pulses  Exam reveals no gallop and no friction rub  No murmur heard  Pulmonary/Chest: Effort normal and breath sounds normal  No stridor  No respiratory distress  She has no wheezes  She has no rales  She exhibits no tenderness  Abdominal: Soft  Bowel sounds are normal  She exhibits no distension and no mass  There is no tenderness  There is no rebound and no guarding  Genitourinary: Vagina normal and uterus normal    Genitourinary Comments: The external female genitalia is normal  The bartholin's, uretheral and skenes glands are normal  The urethral meatus is normal  Speculum exam reveals a grossly normal vagina with age-appropriate atrophy  No masses, lesions or bleeding  Bimanual exam notes a normal uterus with no adnexal masses  Musculoskeletal: Normal range of motion  She exhibits no edema, tenderness or deformity  Lymphadenopathy:     She has no cervical adenopathy  Neurological: She is alert and oriented to person, place, and time  She has normal reflexes  No cranial nerve deficit  She exhibits normal muscle tone  Coordination normal    Skin: Skin is warm and dry  No rash noted  She is not diaphoretic  No erythema  No pallor  Psychiatric: She has a normal mood and affect  Her behavior is normal  Judgment and thought content normal      4/16/2018 Pelvic ultrasound: FINDINGS:     UTERUS:  The uterus is anteverted in position, measuring 7 5 x 3 7 x 4 5 cm  Contour and echotexture appear normal    Anterior fibroid again visualized, stable, measuring 1 8 x 1 1 x 1 7 cm  Prior measurement 1 8 x 1 2 x 1 3 cm  The cervix shows no suspicious abnormality      ENDOMETRIUM:    Normal caliber of 3 mm  Mildly heterogeneous with scattered echogenic foci  No hypervascularity      OVARIES/ADNEXA:  Right ovary:  3 7 x 3 6 x 5 4 cm  Complex right ovarian cyst with thick septation again visualized measuring 5 3 x 3 3 x 3 4 cm  Prior measurement 4 8 x 4 cm    Doppler flow within normal limits      Left ovary:  1 6 x 1 4 x 1 4 cm  No suspicious left ovarian abnormality  Doppler flow within normal limits      There is no free fluid      IMPRESSION:     1  Complex right ovarian cyst with thick septation again visualized measuring 5 3 x 3 3 x 3 4 cm  According to the consensus conference statement from the Society of Radiologists in Ultrasound (Radiology:Volume 256: Number 3-September 2010  pp [phone] )   this lesion has imaging features of a indeterminate cyst   In this postmenopausal woman, this should be further evaluated by MR and/or surgical consultation  Daron Connolly MD, PhD, Sommer Martinez  Attending Physician, [address] [place] [address]

## 2018-04-19 NOTE — LETTER
April 19, 2018     Vishnu Mckinley MD  [address] [place] [id number]    Patient: John Orlando   YOB: 1965   Date of Visit: 4/19/2018       Dear Dr Marvin Suarez: Thank you for referring Mayi Grullon to me for evaluation  Below are my notes for this consultation  If you have questions, please do not hesitate to call me  I look forward to following your patient along with you  Sincerely,        Lindsay Quinn MD        CC: No Recipients  Lindsay Quinn MD  4/19/2018  3:24 PM  Sign at close encounter  John Jimenezour  1965  [facility]  [address]  [phone]    Chief Complaint   Patient presents with    Consult     Ovarian neoplasm       Assessment/Plan     Assessment:  1  Ovarian neoplasm  Case request operating room: HYSTERECTOMY LAPAROSCOPIC TOTAL ([address]) W/ ROBOTICS    Type and screen    Comprehensive metabolic panel    CBC and differential    HEMOGLOBIN A1C W/ EAG ESTIMATION    Case request operating room: HYSTERECTOMY LAPAROSCOPIC TOTAL ([address] [place] [address]) W/ ROBOTICS        ECG 12 lead   2  Ovarian mass  Ambulatory referral to Gynecologic Oncology     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  Plan:  The patient desires definitive surgical management  I believe this is warranted since her cyst has been persistent over 7 months and does have some complex features  The patient has signed informed consent for a robotic assisted total laparoscopic hysterectomy, bilateral salpingo-oophorectomy, possible staging, possible exploratory laparotomy  Patient understands the risks, benefits and alternative treatments and agrees to proceed    The patient understands the risks associated with surgery which include, but are not limited to: infection, deep vein thrombosis, blood clots to the lungs, wound healing problems, complications with extensive blood loss, blood transfusion, damage to nearby organs (ureters, bladder, bowel, major nerves and blood vessels), anesthesia and death  The patient is comorbidities are extensive and she will require both medical and cardiac clearance prior to surgery  I will also check a  to establish a baseline  History of Present Illness: The patient is a delightful 61-year-old  referred for an ovarian neoplasm of uncertain behavior  She presents for evaluation and treatment  Patient was 1st diagnosed with a ovarian neoplasm in September when she had a pelvic ultrasound  Patient had a recent ultrasound which shows that the cystic portion of her ovary is marginally bigger  Interestingly enough, the overall size of her ovary has decreased  Patient has considerable comorbidities including hypertension, diabetes, hyperlipidemia, a left bundle branch block  Patient does not seem to be very compliant with her medications  Patient surgical history is notable for 2  sections via Pfannenstiel incision  Patient also had some laparoscopic surgery for infertility in the past           Review of Systems   Constitutional: Negative for activity change, appetite change, chills, diaphoresis, fatigue, fever and unexpected weight change  HENT: Positive for sinus pain, sinus pressure and sore throat  Negative for congestion, dental problem, drooling, ear discharge, ear pain, facial swelling, hearing loss, mouth sores, nosebleeds, postnasal drip, rhinorrhea, sneezing, tinnitus, trouble swallowing and voice change  Eyes: Negative for photophobia, pain, discharge, redness, itching and visual disturbance  Respiratory: Positive for cough  Negative for apnea, choking, chest tightness, shortness of breath, wheezing and stridor  Cardiovascular: Negative for chest pain, palpitations and leg swelling     Gastrointestinal: Negative for abdominal distention, abdominal pain, anal bleeding, blood in stool, constipation, diarrhea, nausea, rectal pain and vomiting  Endocrine: Negative for cold intolerance, heat intolerance, polydipsia, polyphagia and polyuria  Genitourinary: Negative for decreased urine volume, difficulty urinating, dyspareunia, dysuria, enuresis, flank pain, frequency, genital sores, hematuria, menstrual problem, pelvic pain, urgency, vaginal bleeding, vaginal discharge and vaginal pain  Musculoskeletal: Positive for back pain, neck pain and neck stiffness  Negative for arthralgias, gait problem, joint swelling and myalgias  Skin: Negative for color change, pallor, rash and wound  Allergic/Immunologic: Negative for environmental allergies, food allergies and immunocompromised state  Neurological: Positive for numbness and headaches  Negative for dizziness, tremors, seizures, syncope, facial asymmetry, speech difficulty, weakness and light-headedness  Hematological: Negative for adenopathy  Does not bruise/bleed easily  Psychiatric/Behavioral: Positive for sleep disturbance  Negative for agitation, behavioral problems, confusion, decreased concentration, dysphoric mood, hallucinations, self-injury and suicidal ideas  The patient is nervous/anxious  The patient is not hyperactive  Past Medical History:   Diagnosis Date    [facility]     Anxiety     Arthritis     Diabetes [facility]     Diabetes mellitus ([facility]     Headache     Hyperlipidemia     Kidney stone     Left bundle branch block     [facility]ocardial infarction[facility]     Shortness of breath        Past Surgical History:   Procedure Laterality Date    BREAST SURGERY       SECTION      COLONOSCOPY      KIDNEY STONE SURGERY      LAPAROSCOPY      UT COLONOSCOPY FLX DX W/COLLJ SPEC WHEN PFRMD N/A 2017    Procedure: EGD AND COLONOSCOPY;  Surgeon: Janee Madrigal MD;  Location: [facility];   Service: Gastroenterology    UPPER GASTROINTESTINAL ENDOSCOPY      WRIST SURGERY Right        OB History      Para Term  AB Living    4 2 SAB TAB Ectopic Multiple Live Births                       Family History   Problem Relation Age of Onset    Diabetes Mother    [employer] Breast cancer Mother 39    Lung cancer Father 47    Diabetes Sister     Brain cancer Maternal Aunt 67    Breast cancer Other 25       Social History     Social History    Marital status: /Civil Union     Spouse name: N/A    Number of children: N/A    Years of education: N/A     Occupational History    Not on file       Social History Main Topics    Smoking status: Current Every Day Smoker     Packs/day: 1 00    Smokeless tobacco: Former User    Alcohol use No    Drug use: No    Sexual activity: Not on file     Other Topics Concern    Not on file     Social History Narrative    No narrative on file         Current Outpatient Prescriptions:     Butalbital-APAP-Caffeine (FIORICET) -40 MG CAPS, Take by mouth, Disp: , Rfl:     cyclobenzaprine (FLEXERIL) 10 mg tablet, Take 10 mg by mouth 3 (three) times a day as needed for muscle spasms, Disp: , Rfl:     hydrOXYzine HCL (ATARAX) 25 mg tablet, Take 25 mg by mouth every 6 (six) hours as needed for itching, Disp: , Rfl:     lidocaine (LMX) 4 % cream, Apply topically 2 (two) times a day, Disp: 100 g, Rfl: 0    Liraglutide 18 MG/3ML SOPN, Inject under the skin, Disp: , Rfl:     meloxicam (MOBIC) 15 mg tablet, Take 15 mg by mouth daily, Disp: , Rfl:     meloxicam (MOBIC) 15 mg tablet, Take 1 tablet (15 mg total) by mouth daily, Disp: 30 tablet, Rfl: 2    naproxen (NAPROSYN) 500 mg tablet, Take 1 tablet by mouth 2 (two) times a day as needed for mild pain (take with food) for up to 20 doses, Disp: 20 tablet, Rfl: 0    nortriptyline (PAMELOR) 10 mg capsule, Take by mouth daily at bedtime, Disp: , Rfl:     nystatin (MYCOSTATIN) 100,000 units/mL suspension, Apply 5 mL (500,000 Units total) to the mouth or throat 4 (four) times a day, Disp: 100 mL, Rfl: 0    ondansetron (ZOFRAN) 4 mg tablet, Take 4 mg by mouth every 8 (eight) hours as needed for nausea or vomiting, Disp: , Rfl:     pantoprazole (PROTONIX) 20 mg tablet, TAKE 1 TABLET BY MOUTH TWICE A DAY, Disp: 60 tablet, Rfl: 1    sertraline (ZOLOFT) 50 mg tablet, Take 50 mg by mouth daily, Disp: , Rfl:     traMADol (ULTRAM) 50 mg tablet, Take 1 tablet (50 mg total) by mouth every 8 (eight) hours as needed for moderate pain, Disp: 30 tablet, Rfl: 0    zolpidem (AMBIEN) 10 mg tablet, Take 10 mg by mouth daily at bedtime as needed for sleep, Disp: , Rfl:     aspirin (ECOTRIN LOW STRENGTH) 81 mg EC tablet, Take 81 mg by mouth daily, Disp: , Rfl:     atorvastatin (LIPITOR) 40 mg tablet, Take 40 mg by mouth daily, Disp: , Rfl:     buPROPion (WELLBUTRIN XL) 150 mg 24 hr tablet, Take 150 mg by mouth daily, Disp: , Rfl:     diclofenac sodium (VOLTAREN) 1 %, Apply 2 g topically 4 (four) times a day for 30 days, Disp: 1 Tube, Rfl: 0    glyBURIDE (DIABETA) 5 mg tablet, Take 5 mg by mouth daily with breakfast, Disp: , Rfl:     indomethacin (INDOCIN) 50 mg capsule, Take 50 mg by mouth 2 (two) times a day with meals, Disp: , Rfl:     lidocaine (LIDODERM) 5 %, Place 1 patch on the skin every 24 hours for 5 doses Remove & Discard patch within 12 hours or as directed by MD, Disp: 5 patch, Rfl: 0    lidocaine (LIDODERM) 5 %, Place 1 patch on the skin daily Remove & Discard patch within 12 hours or as directed by MD, Disp: 30 patch, Rfl: 0    lidocaine (LIDODERM) 5 %, Place 1 patch on the skin daily Remove & Discard patch within 12 hours or as directed by MD, Disp: 30 patch, Rfl: 0    lisinopril (ZESTRIL) 10 mg tablet, Take 10 mg by mouth daily, Disp: , Rfl:     metoprolol succinate (TOPROL-XL) 100 mg 24 hr tablet, Take 100 mg by mouth daily, Disp: , Rfl:     Allergies   Allergen Reactions    Other Hives     Surgical tape    Prednisone Other (See Comments)     Thrush         Physical Exam   Constitutional: She is oriented to person, place, and time   She appears well-developed and well-nourished  No distress  HENT:   Head: Normocephalic and atraumatic  Right Ear: External ear normal    Left Ear: External ear normal    Nose: Nose normal    Mouth/Throat: No oropharyngeal exudate  Eyes: Pupils are equal, round, and reactive to light  Right eye exhibits no discharge  Left eye exhibits no discharge  No scleral icterus  Neck: Normal range of motion  Neck supple  No JVD present  No tracheal deviation present  No thyromegaly present  Cardiovascular: Normal rate, regular rhythm, normal heart sounds and intact distal pulses  Exam reveals no gallop and no friction rub  No murmur heard  Pulmonary/Chest: Effort normal and breath sounds normal  No stridor  No respiratory distress  She has no wheezes  She has no rales  She exhibits no tenderness  Abdominal: Soft  Bowel sounds are normal  She exhibits no distension and no mass  There is no tenderness  There is no rebound and no guarding  Genitourinary: Vagina normal and uterus normal    Genitourinary Comments: The external female genitalia is normal  The bartholin's, uretheral and skenes glands are normal  The urethral meatus is normal  Speculum exam reveals a grossly normal vagina with age-appropriate atrophy  No masses, lesions or bleeding  Bimanual exam notes a normal uterus with no adnexal masses  Musculoskeletal: Normal range of motion  She exhibits no edema, tenderness or deformity  Lymphadenopathy:     She has no cervical adenopathy  Neurological: She is alert and oriented to person, place, and time  She has normal reflexes  No cranial nerve deficit  She exhibits normal muscle tone  Coordination normal    Skin: Skin is warm and dry  No rash noted  She is not diaphoretic  No erythema  No pallor  Psychiatric: She has a normal mood and affect  Her behavior is normal  Judgment and thought content normal      4/16/2018 Pelvic ultrasound:   FINDINGS:     UTERUS:  The uterus is anteverted in position, measuring 7 5 x 3 7 x 4 5 cm  Contour and echotexture appear normal    Anterior fibroid again visualized, stable, measuring 1 8 x 1 1 x 1 7 cm  Prior measurement 1 8 x 1 2 x 1 3 cm  The cervix shows no suspicious abnormality      ENDOMETRIUM:    Normal caliber of 3 mm  Mildly heterogeneous with scattered echogenic foci  No hypervascularity      OVARIES/ADNEXA:  Right ovary:  3 7 x 3 6 x 5 4 cm  Complex right ovarian cyst with thick septation again visualized measuring 5 3 x 3 3 x 3 4 cm  Prior measurement 4 8 x 4 cm  Doppler flow within normal limits      Left ovary:  1 6 x 1 4 x 1 4 cm  No suspicious left ovarian abnormality  Doppler flow within normal limits      There is no free fluid      IMPRESSION:     1  Complex right ovarian cyst with thick septation again visualized measuring 5 3 x 3 3 x 3 4 cm  According to the consensus conference statement from the Society of Radiologists in Ultrasound (Radiology:Volume 256: Number 3-September 2010  pp [phone] )   this lesion has imaging features of a indeterminate cyst   In this postmenopausal woman, this should be further evaluated by MR and/or surgical consultation  Daron Sousa MD, PhD, Jake Saucedo  Attending Physician, [address] [place] [address]

## 2018-04-25 ENCOUNTER — ANESTHESIA EVENT (OUTPATIENT)
Dept: PERIOP | Facility: HOSPITAL | Age: 53
End: 2018-04-25
Payer: COMMERCIAL

## 2018-04-26 ENCOUNTER — APPOINTMENT (OUTPATIENT)
Dept: PREADMISSION TESTING | Facility: HOSPITAL | Age: 53
End: 2018-04-26
Payer: COMMERCIAL

## 2018-04-26 DIAGNOSIS — D49.59 OVARIAN NEOPLASM: ICD-10-CM

## 2018-04-26 LAB
ABO GROUP BLD: NORMAL
ALBUMIN SERPL BCP-MCNC: 3 G/DL (ref 3.5–5)
ALP SERPL-CCNC: 112 U/L (ref 46–116)
ALT SERPL W P-5'-P-CCNC: 26 U/L (ref 12–78)
ANION GAP SERPL CALCULATED.3IONS-SCNC: 6 MMOL/L (ref 4–13)
AST SERPL W P-5'-P-CCNC: 15 U/L (ref 5–45)
BASOPHILS # BLD AUTO: 0.02 THOUSANDS/ΜL (ref 0–0.1)
BASOPHILS NFR BLD AUTO: 0 % (ref 0–1)
BILIRUB SERPL-MCNC: 0.32 MG/DL (ref 0.2–1)
BLD GP AB SCN SERPL QL: NEGATIVE
BUN SERPL-MCNC: 13 MG/DL (ref 5–25)
CALCIUM SERPL-MCNC: 8.8 MG/DL (ref 8.3–10.1)
CANCER AG125 SERPL-ACNC: 5.4 U/ML (ref 0–30)
CHLORIDE SERPL-SCNC: 105 MMOL/L (ref 100–108)
CO2 SERPL-SCNC: 28 MMOL/L (ref 21–32)
CREAT SERPL-MCNC: 0.77 MG/DL (ref 0.6–1.3)
EOSINOPHIL # BLD AUTO: 0.4 THOUSAND/ΜL (ref 0–0.61)
EOSINOPHIL NFR BLD AUTO: 5 % (ref 0–6)
ERYTHROCYTE [DISTWIDTH] IN BLOOD BY AUTOMATED COUNT: 13.6 % (ref 11.6–15.1)
EST. AVERAGE GLUCOSE BLD GHB EST-MCNC: 183 MG/DL
GFR SERPL CREATININE-BSD FRML MDRD: 90 ML/MIN/1.73SQ M
GLUCOSE P FAST SERPL-MCNC: 177 MG/DL (ref 65–99)
HBA1C MFR BLD: 8 % (ref 4.2–6.3)
HCT VFR BLD AUTO: 44.2 % (ref 34.8–46.1)
HGB BLD-MCNC: 15.3 G/DL (ref 11.5–15.4)
LYMPHOCYTES # BLD AUTO: 2.7 THOUSANDS/ΜL (ref 0.6–4.47)
LYMPHOCYTES NFR BLD AUTO: 32 % (ref 14–44)
MCH RBC QN AUTO: 28.8 PG (ref 26.8–34.3)
MCHC RBC AUTO-ENTMCNC: 34.6 G/DL (ref 31.4–37.4)
MCV RBC AUTO: 83 FL (ref 82–98)
MONOCYTES # BLD AUTO: 0.39 THOUSAND/ΜL (ref 0.17–1.22)
MONOCYTES NFR BLD AUTO: 5 % (ref 4–12)
NEUTROPHILS # BLD AUTO: 4.83 THOUSANDS/ΜL (ref 1.85–7.62)
NEUTS SEG NFR BLD AUTO: 58 % (ref 43–75)
NRBC BLD AUTO-RTO: 0 /100 WBCS
PLATELET # BLD AUTO: 289 THOUSANDS/UL (ref 149–390)
PMV BLD AUTO: 9.8 FL (ref 8.9–12.7)
POTASSIUM SERPL-SCNC: 3.9 MMOL/L (ref 3.5–5.3)
PROT SERPL-MCNC: 7.5 G/DL (ref 6.4–8.2)
RBC # BLD AUTO: 5.32 MILLION/UL (ref 3.81–5.12)
RH BLD: POSITIVE
SODIUM SERPL-SCNC: 139 MMOL/L (ref 136–145)
SPECIMEN EXPIRATION DATE: NORMAL
WBC # BLD AUTO: 8.38 THOUSAND/UL (ref 4.31–10.16)

## 2018-04-26 PROCEDURE — 86304 IMMUNOASSAY TUMOR CA 125: CPT

## 2018-04-26 PROCEDURE — 86900 BLOOD TYPING SEROLOGIC ABO: CPT

## 2018-04-26 PROCEDURE — 83036 HEMOGLOBIN GLYCOSYLATED A1C: CPT

## 2018-04-26 PROCEDURE — 85025 COMPLETE CBC W/AUTO DIFF WBC: CPT

## 2018-04-26 PROCEDURE — 80053 COMPREHEN METABOLIC PANEL: CPT

## 2018-04-26 PROCEDURE — 36415 COLL VENOUS BLD VENIPUNCTURE: CPT

## 2018-04-26 PROCEDURE — 86850 RBC ANTIBODY SCREEN: CPT

## 2018-04-26 PROCEDURE — 86901 BLOOD TYPING SEROLOGIC RH(D): CPT

## 2018-04-26 RX ORDER — VARENICLINE TARTRATE 0.5 MG/1
0.5 TABLET, FILM COATED ORAL 2 TIMES DAILY
COMMUNITY
End: 2019-10-02 | Stop reason: ALTCHOICE

## 2018-04-26 RX ORDER — DIAZEPAM 5 MG/1
5 TABLET ORAL DAILY PRN
Status: ON HOLD | COMMUNITY
End: 2018-10-10 | Stop reason: ALTCHOICE

## 2018-04-26 NOTE — PRE-PROCEDURE INSTRUCTIONS
Pre-Surgery Instructions:   Medication Instructions    Butalbital-APAP-Caffeine (FIORICET) -40 MG CAPS Instructed patient per Anesthesia Guidelines   cyclobenzaprine (FLEXERIL) 10 mg tablet Instructed patient per Anesthesia Guidelines   diazepam (VALIUM) 5 mg tablet Instructed patient per Anesthesia Guidelines   lidocaine (LMX) 4 % cream Instructed patient per Anesthesia Guidelines   Liraglutide 18 MG/3ML SOPN Instructed patient per Anesthesia Guidelines   metoprolol succinate (TOPROL-XL) 100 mg 24 hr tablet Instructed patient per Anesthesia Guidelines   nortriptyline (PAMELOR) 10 mg capsule Instructed patient per Anesthesia Guidelines   pantoprazole (PROTONIX) 20 mg tablet Instructed patient per Anesthesia Guidelines   sertraline (ZOLOFT) 50 mg tablet Instructed patient per Anesthesia Guidelines   traMADol (ULTRAM) 50 mg tablet Instructed patient per Anesthesia Guidelines   varenicline (CHANTIX) 0 5 mg tablet Instructed patient per Anesthesia Guidelines   zolpidem (AMBIEN) 10 mg tablet Instructed patient per Anesthesia Guidelines  REVIEWED  PRINTED SURGICAL INSTRUCTIONS WITH PATIENT , PATIENT VERBALIZED UNDERSTANDING   MEDICATIONS REVIEWED

## 2018-05-01 ENCOUNTER — TELEPHONE (OUTPATIENT)
Dept: CARDIOLOGY CLINIC | Facility: CLINIC | Age: 53
End: 2018-05-01

## 2018-05-01 ENCOUNTER — OFFICE VISIT (OUTPATIENT)
Dept: CARDIOLOGY CLINIC | Facility: CLINIC | Age: 53
End: 2018-05-01
Payer: COMMERCIAL

## 2018-05-01 VITALS
DIASTOLIC BLOOD PRESSURE: 78 MMHG | HEART RATE: 87 BPM | WEIGHT: 170.6 LBS | SYSTOLIC BLOOD PRESSURE: 112 MMHG | OXYGEN SATURATION: 96 % | HEIGHT: 61 IN | BODY MASS INDEX: 32.21 KG/M2

## 2018-05-01 DIAGNOSIS — I44.7 LBBB (LEFT BUNDLE BRANCH BLOCK): Primary | ICD-10-CM

## 2018-05-01 DIAGNOSIS — K21.00 GERD WITH ESOPHAGITIS: ICD-10-CM

## 2018-05-01 DIAGNOSIS — R06.00 DYSPNEA ON EFFORT: ICD-10-CM

## 2018-05-01 DIAGNOSIS — R07.89 CHEST DISCOMFORT: ICD-10-CM

## 2018-05-01 PROCEDURE — 99244 OFF/OP CNSLTJ NEW/EST MOD 40: CPT | Performed by: INTERNAL MEDICINE

## 2018-05-01 RX ORDER — PANTOPRAZOLE SODIUM 20 MG/1
TABLET, DELAYED RELEASE ORAL
Qty: 60 TABLET | Refills: 1 | Status: SHIPPED | OUTPATIENT
Start: 2018-05-01 | End: 2018-09-11

## 2018-05-01 NOTE — LETTER
May 3, 2018     Jesus Patel DO  [address] [facility] [address] [facility] [address] [address]    Patient: Marina Crowley   YOB: 1965   Date of Visit: 5/1/2018       Dear Dr Angelique Pace: Thank you for referring Pop Berg to me for evaluation  Below are my notes for this consultation  If you have questions, please do not hesitate to call me  I look forward to following your patient along with you  Sincerely,        Candido Primrose, MD        CC: No Recipients  Candido Primrose, MD  5/1/2018  9:02 AM  Sign at close encounter                                             Cardiology Consultation     Marina Crowley  [id number]  1965  [facility]  [address]    This patient presents for cardiology consultation for preoperative evaluation  Patient does have history of diabetes hypertension smoking and strong family history of coronary artery disease  Patient also has history of left bundle branch block  Patient has not had any recent cardiac evaluation  Patient is scheduled for surgery next week  Patient does state that she has symptoms of discomfort in the chest as well as shortness of breath with exertion  Patient smokes at least 1 pack per day  Patient also states that she has history of CAD and has had its myocardial infarction in the past but she does not remember having any procedures including cardiac catheterization  No history of rheumatic fever in the past   She states that she has been compliant with all her present medications  She also has chronic low back pain which has been followed by pain management                Patient Active Problem List   Diagnosis    Ovarian neoplasm     Past Medical History:   Diagnosis Date    Angina pectoris ([address] )     recent    Anxiety     Arthritis     Chronic headaches     [facility]     Diabetes mellitus ([facility]     GERD (gastroesophageal reflux disease)     Headache  Hyperlipidemia     Kidney stone     Left bundle branch block     MI (myocardial infarction) (HCC)     RLS (restless legs syndrome)     Shortness of breath      Social History     Social History    Marital status: /Civil Union     Spouse name: N/A    Number of children: N/A    Years of education: N/A     Occupational History    Not on file  Social History Main Topics    Smoking status: Current Every Day Smoker     Packs/day: 1 00    Smokeless tobacco: Former User      Comment: pt advised to start chantix ASAP    Alcohol use No    Drug use: No    Sexual activity: Not on file     Other Topics Concern    Not on file     Social History Narrative    No narrative on file      Family History   Problem Relation Age of Onset    Diabetes Mother     Breast cancer Mother 39    Lung cancer Father 47    Diabetes Sister     Brain cancer Maternal Aunt 67    Breast cancer Other 25     Past Surgical History:   Procedure Laterality Date    BREAST SURGERY       SECTION      COLONOSCOPY      KIDNEY STONE SURGERY      LAPAROSCOPY      [place] COLONOSCOPY FLX DX W/COLLJ SPEC WHEN PFRMD N/A 2017    Procedure: EGD AND COLONOSCOPY;  Surgeon: Anju Araujo MD;  Location: [facility];   Service: Gastroenterology    UPPER GASTROINTESTINAL ENDOSCOPY      WRIST SURGERY Right        Current Outpatient Prescriptions:     Butalbital-APAP-Caffeine (FIORICET) -40 MG CAPS, Take by mouth, Disp: , Rfl:     cyclobenzaprine (FLEXERIL) 10 mg tablet, Take 10 mg by mouth 3 (three) times a day as needed for muscle spasms, Disp: , Rfl:     diazepam (VALIUM) 5 mg tablet, Take 5 mg by mouth daily as needed for anxiety, Disp: , Rfl:     lidocaine (LMX) 4 % cream, Apply topically 2 (two) times a day, Disp: 100 g, Rfl: 0    Liraglutide 18 MG/3ML SOPN, Inject under the skin, Disp: , Rfl:     metoprolol succinate (TOPROL-XL) 100 mg 24 hr tablet, Take 50 mg by mouth daily Take day of surgery , Disp: , Rfl:     nortriptyline (PAMELOR) 10 mg capsule, Take 10 mg by mouth daily as needed  , Disp: , Rfl:     pantoprazole (PROTONIX) 20 mg tablet, TAKE 1 TABLET BY MOUTH TWICE A DAY, Disp: 60 tablet, Rfl: 1    sertraline (ZOLOFT) 50 mg tablet, Take 100 mg by mouth daily  , Disp: , Rfl:     traMADol (ULTRAM) 50 mg tablet, Take 1 tablet (50 mg total) by mouth every 8 (eight) hours as needed for moderate pain, Disp: 30 tablet, Rfl: 0    varenicline (CHANTIX) 0 5 mg tablet, Take 0 5 mg by mouth 2 (two) times a day, Disp: , Rfl:     zolpidem (AMBIEN) 10 mg tablet, Take 10 mg by mouth daily at bedtime as needed for sleep, Disp: , Rfl:   Allergies   Allergen Reactions    Other Hives     Surgical tape    Prednisone Other (See Comments)     Thrush       Vitals:    05/01/18 0830   BP: 112/78   Pulse: 87   SpO2: 96%   Weight: 77 4 kg (170 lb 9 6 oz)   Height: 5' 1" (1 549 m)       Labs:  Appointment on 04/26/2018   Component Date Value    ABO Grouping 04/26/2018 A     Rh Factor 04/26/2018 Positive     Antibody Screen 04/26/2018 Negative     Specimen Expiration Date 04/26/2018 [id number]     Sodium 04/26/2018 139     Potassium 04/26/2018 3 9     Chloride 04/26/2018 105     CO2 04/26/2018 28     Anion Gap 04/26/2018 6     BUN 04/26/2018 13     Creatinine 04/26/2018 0 77     Glucose, Fasting 04/26/2018 177*    Calcium 04/26/2018 8 8     AST 04/26/2018 15     ALT 04/26/2018 26     Alkaline Phosphatase 04/26/2018 112     Total Protein 04/26/2018 7 5     Albumin 04/26/2018 3 0*    Total Bilirubin 04/26/2018 0 32     eGFR 04/26/2018 90     WBC 04/26/2018 8 38     RBC 04/26/2018 5 32*    Hemoglobin 04/26/2018 15 3     Hematocrit 04/26/2018 44 2     MCV 04/26/2018 83     MCH 04/26/2018 28 8     MCHC 04/26/2018 34 6     RDW 04/26/2018 13 6     MPV 04/26/2018 9 8     Platelets 02/65/5889 289     nRBC 04/26/2018 0     Neutrophils Relative 04/26/2018 58     Lymphocytes Relative 04/26/2018 32     Monocytes Relative 04/26/2018 5     Eosinophils Relative 04/26/2018 5     Basophils Relative 04/26/2018 0     Neutrophils Absolute 04/26/2018 4 83     Lymphocytes Absolute 04/26/2018 2 70     Monocytes Absolute 04/26/2018 0 39     Eosinophils Absolute 04/26/2018 0 40     Basophils Absolute 04/26/2018 0 02     Hemoglobin A1C 04/26/2018 8 0*    EAG 04/26/2018 183      04/26/2018 5 4      Imaging: Us Pelvis Complete W Transvaginal    Result Date: 4/18/2018  Narrative: PELVIC ULTRASOUND, COMPLETE INDICATION:  The patient is 50years old  N83 201: Unspecified ovarian cyst, right side  COMPARISON: 9/15/2017 TECHNIQUE:   Transabdominal pelvic ultrasound was performed in sagittal and transverse planes with a curvilinear transducer  Additional transvaginal imaging was performed to better evaluate the endometrium and ovaries  Imaging included volumetric sweeps as well as traditional still imaging technique  FINDINGS: UTERUS: The uterus is anteverted in position, measuring 7 5 x 3 7 x 4 5 cm  Contour and echotexture appear normal  Anterior fibroid again visualized, stable, measuring 1 8 x 1 1 x 1 7 cm  Prior measurement 1 8 x 1 2 x 1 3 cm  The cervix shows no suspicious abnormality  ENDOMETRIUM:  Normal caliber of 3 mm  Mildly heterogeneous with scattered echogenic foci  No hypervascularity  OVARIES/ADNEXA: Right ovary:  3 7 x 3 6 x 5 4 cm  Complex right ovarian cyst with thick septation again visualized measuring 5 3 x 3 3 x 3 4 cm  Prior measurement 4 8 x 4 cm  Doppler flow within normal limits  Left ovary:  1 6 x 1 4 x 1 4 cm  No suspicious left ovarian abnormality  Doppler flow within normal limits  There is no free fluid  Impression: 1  Complex right ovarian cyst with thick septation again visualized measuring 5 3 x 3 3 x 3 4 cm  According to the consensus conference statement from the Society of Radiologists in Ultrasound (Radiology:Volume 256: Number 3-September 2010   pp [phone] )  this lesion has imaging features of a indeterminate cyst   In this postmenopausal woman, this should be further evaluated by MR and/or surgical consultation  Workstation performed: [id number]       Review of Systems:  Review of Systems   REVIEW OF SYSTEMS:  Constitutional:  Denies fever or chills   Eyes:  Denies change in visual acuity   HENT:  Denies nasal congestion or sore throat   Respiratory: shortness of breath   Cardiovascular:   chest pain   GI:  Denies abdominal pain, nausea, vomiting, bloody stools or diarrhea   :  Denies dysuria, frequency, difficulty in micturition and nocturia  Musculoskeletal:  Denies back pain or joint pain   Neurologic:  Denies headache, focal weakness or sensory changes   Endocrine:  Denies polyuria or polydipsia   Lymphatic:  Denies swollen glands   Psychiatric:  Denies depression or anxiety     Physical Exam:  Physical Exam   PHYSICAL EXAM:  General:  Patient is not in acute distress   Head: Normocephalic, Atraumatic  HEENT:  Both pupils normal-size atraumatic, normocephalic, nonicteric  Neck:  JVP not raised  Trachea central  No carotid bruit  Respiratory:  Decreased breath sounds bilaterally  Cardiovascular:  Regular rate and rhythm no S3 no murmurs  GI:  Abdomen soft nontender  No organomegaly  Lymphatic:  No cervical or inguinal lymphadenopathy  Neurologic:  Patient is awake alert, oriented   Grossly nonfocal      Previous EKGs were reviewed  EKG shows sinus rhythm left bundle branch block    Discussion/Summary:  Patient with known history of diabetes, hypertension, smoking, and strong family history of coronary artery disease with symptoms of chest pain and shortness of breath with left bundle branch block was for preoperative cardiovascular evaluation  Patient will need further cardiac workup prior to surgery  Patient has not had any recent cardiac testing    Patient will be scheduled for an echocardiogram to evaluate ejection fraction valves and look for evidence of pulmonary hypertension given symptoms of shortness of breath smoking and obesity  Patient will also be scheduled for a pharmacological nuclear stress test to assess for ischemia as a part of preoperative risk assessment  Patient has left bundle branch block and hence pharmacological stress testing is the ideal test of choice  In addition patient has significant issues with the back pain and is unable to exercise on the treadmill  Will make further recommendations regarding her preoperative risk assessment for surgery which has been scheduled next week  Patient also counseled to quit smoking to prevent future cardiovascular events  Plan of care was discussed with the patient  She is agreeable with the plan

## 2018-05-01 NOTE — TELEPHONE ENCOUNTER
Tri Rivas checked for prior auth which is not needed for either test, arranged appt with [place] for the echo and stress Thursday at 12-pt aware and Dr Gerson Quintero aware-MS

## 2018-05-01 NOTE — TELEPHONE ENCOUNTER
----- Message from Dylan Fonseca MD sent at 5/1/2018  9:04 AM EDT -----  Regarding: Pre cert  Please see if the echocardiogram and stress test can be precerted and scheduled this week if possible  Patient for surgery next week   Thank you

## 2018-05-01 NOTE — PROGRESS NOTES
Cardiology Consultation     Boogie Santana  [id number]  1965  [facility] [address] [facility] [address]    This patient presents for cardiology consultation for preoperative evaluation  Patient does have history of diabetes hypertension smoking and strong family history of coronary artery disease  Patient also has history of left bundle branch block  Patient has not had any recent cardiac evaluation  Patient is scheduled for surgery next week  Patient does state that she has symptoms of discomfort in the chest as well as shortness of breath with exertion  Patient smokes at least 1 pack per day  Patient also states that she has history of CAD and has had its myocardial infarction in the past but she does not remember having any procedures including cardiac catheterization  No history of rheumatic fever in the past   She states that she has been compliant with all her present medications  She also has chronic low back pain which has been followed by pain management  Patient Active Problem List   Diagnosis    Ovarian neoplasm     Past Medical History:   Diagnosis Date    Angina p[facility] ([facility] )     recent    Anxiety     Arthritis     Chronic headaches     Diabetes ([facility] )     Diabetes mellitus ([facility] )     GERD (gastroesophageal reflux disease)     Headache     Hyperlipidemia     Kidney stone     Left bundle branch block     MI (myocardial infarction) ([facility] )     RLS (restless legs syndrome)     Shortness of breath      Social History     Social History    Marital status: /Civil Union     Spouse name: N/A    Number of children: N/A    Years of education: N/A     Occupational History    Not on file       Social History Main Topics    Smoking status: Current Every Day Smoker     Packs/day: 1 00    Smokeless tobacco: Former User      Comment: pt advised to start chantix ASAP    Alcohol use No    Drug use: No    Sexual activity: Not on file     Other Topics Concern    Not on file     Social History Narrative    No narrative on file      Family History   Problem Relation Age of Onset    Diabetes Mother     Breast cancer Mother 39    Lung cancer Father 47    Diabetes Sister     Brain cancer Maternal Aunt 67    Breast cancer Other 25     Past Surgical History:   Procedure Laterality Date    BREAST SURGERY       SECTION      COLONOSCOPY      KIDNEY STONE SURGERY      LAPAROSCOPY      [place] COLONOSCOPY FLX DX W/COLLJ SPEC WHEN PFRMD N/A 2017    Procedure: EGD AND COLONOSCOPY;  Surgeon: Liz Kasper MD;  Location: [facility];   Service: Gastroenterology    UPPER GASTROINTESTINAL ENDOSCOPY      WRIST SURGERY Right        Current Outpatient Prescriptions:     Butalbital-APAP-Caffeine (FIORICET) -40 MG CAPS, Take by mouth, Disp: , Rfl:     cyclobenzaprine (FLEXERIL) 10 mg tablet, Take 10 mg by mouth 3 (three) times a day as needed for muscle spasms, Disp: , Rfl:     diazepam (VALIUM) 5 mg tablet, Take 5 mg by mouth daily as needed for anxiety, Disp: , Rfl:     lidocaine (LMX) 4 % cream, Apply topically 2 (two) times a day, Disp: 100 g, Rfl: 0    Liraglutide 18 MG/3ML SOPN, Inject under the skin, Disp: , Rfl:     metoprolol succinate (TOPROL-XL) 100 mg 24 hr tablet, Take 50 mg by mouth daily Take day of surgery , Disp: , Rfl:     nortriptyline (PAMELOR) 10 mg capsule, Take 10 mg by mouth daily as needed  , Disp: , Rfl:     pantoprazole (PROTONIX) 20 mg tablet, TAKE 1 TABLET BY MOUTH TWICE A DAY, Disp: 60 tablet, Rfl: 1    sertraline (ZOLOFT) 50 mg tablet, Take 100 mg by mouth daily  , Disp: , Rfl:     traMADol (ULTRAM) 50 mg tablet, Take 1 tablet (50 mg total) by mouth every 8 (eight) hours as needed for moderate pain, Disp: 30 tablet, Rfl: 0    varenicline (CHANTIX) 0 5 mg tablet, Take 0 5 mg by mouth 2 (two) times a day, Disp: , Rfl:    zolpidem (AMBIEN) 10 mg tablet, Take 10 mg by mouth daily at bedtime as needed for sleep, Disp: , Rfl:   Allergies   Allergen Reactions    Other Hives     Surgical tape    Prednisone Other (See Comments)     Thrush       Vitals:    05/01/18 0830   BP: 112/78   Pulse: 87   SpO2: 96%   Weight: 77 4 kg (170 lb 9 6 oz)   Height: 5' 1" (1 549 m)       Labs:  Appointment on 04/26/2018   Component Date Value    ABO Grouping 04/26/2018 A     Rh Factor 04/26/2018 Positive     Antibody Screen 04/26/2018 Negative     Specimen Expiration Date 04/26/2018 [id number]     Sodium 04/26/2018 139     Potassium 04/26/2018 3 9     Chloride 04/26/2018 105     CO2 04/26/2018 28     Anion Gap 04/26/2018 6     BUN 04/26/2018 13     Creatinine 04/26/2018 0 77     Glucose, Fasting 04/26/2018 177*    Calcium 04/26/2018 8 8     AST 04/26/2018 15     ALT 04/26/2018 26     Alkaline Phosphatase 04/26/2018 112     Total Protein 04/26/2018 7 5     Albumin 04/26/2018 3 0*    Total Bilirubin 04/26/2018 0 32     eGFR 04/26/2018 90     WBC 04/26/2018 8 38     RBC 04/26/2018 5 32*    Hemoglobin 04/26/2018 15 3     Hematocrit 04/26/2018 44 2     MCV 04/26/2018 83     MCH 04/26/2018 28 8     MCHC 04/26/2018 34 6     RDW 04/26/2018 13 6     MPV 04/26/2018 9 8     Platelets 35/42/7905 289     nRBC 04/26/2018 0     Neutrophils Relative 04/26/2018 58     Lymphocytes Relative 04/26/2018 32     Monocytes Relative 04/26/2018 5     Eosinophils Relative 04/26/2018 5     Basophils Relative 04/26/2018 0     Neutrophils Absolute 04/26/2018 4 83     Lymphocytes Absolute 04/26/2018 2 70     Monocytes Absolute 04/26/2018 0 39     Eosinophils Absolute 04/26/2018 0 40     Basophils Absolute 04/26/2018 0 02     Hemoglobin A1C 04/26/2018 8 0*    EAG 04/26/2018 183      04/26/2018 5 4      Imaging: Us Pelvis Complete W Transvaginal    Result Date: 4/18/2018  Narrative: PELVIC ULTRASOUND, COMPLETE INDICATION:  The patient is 50 years old  N83 201: Unspecified ovarian cyst, right side  COMPARISON: 9/15/2017 TECHNIQUE:   Transabdominal pelvic ultrasound was performed in sagittal and transverse planes with a curvilinear transducer  Additional transvaginal imaging was performed to better evaluate the endometrium and ovaries  Imaging included volumetric sweeps as well as traditional still imaging technique  FINDINGS: UTERUS: The uterus is anteverted in position, measuring 7 5 x 3 7 x 4 5 cm  Contour and echotexture appear normal  Anterior fibroid again visualized, stable, measuring 1 8 x 1 1 x 1 7 cm  Prior measurement 1 8 x 1 2 x 1 3 cm  The cervix shows no suspicious abnormality  ENDOMETRIUM:  Normal caliber of 3 mm  Mildly heterogeneous with scattered echogenic foci  No hypervascularity  OVARIES/ADNEXA: Right ovary:  3 7 x 3 6 x 5 4 cm  Complex right ovarian cyst with thick septation again visualized measuring 5 3 x 3 3 x 3 4 cm  Prior measurement 4 8 x 4 cm  Doppler flow within normal limits  Left ovary:  1 6 x 1 4 x 1 4 cm  No suspicious left ovarian abnormality  Doppler flow within normal limits  There is no free fluid  Impression: 1  Complex right ovarian cyst with thick septation again visualized measuring 5 3 x 3 3 x 3 4 cm  According to the consensus conference statement from the Society of Radiologists in Ultrasound (Radiology:Volume 256: Number 3-September 2010  pp [phone] )  this lesion has imaging features of a indeterminate cyst   In this postmenopausal woman, this should be further evaluated by MR and/or surgical consultation   Workstation performed: [id number]       Review of Systems:  Review of Systems   REVIEW OF SYSTEMS:  Constitutional:  Denies fever or chills   Eyes:  Denies change in visual acuity   HENT:  Denies nasal congestion or sore throat   Respiratory: shortness of breath   Cardiovascular:   chest pain   GI:  Denies abdominal pain, nausea, vomiting, bloody stools or diarrhea   :  Denies dysuria, frequency, difficulty in micturition and nocturia  Musculoskeletal:  Denies back pain or joint pain   Neurologic:  Denies headache, focal weakness or sensory changes   Endocrine:  Denies polyuria or polydipsia   Lymphatic:  Denies swollen glands   Psychiatric:  Denies depression or anxiety     Physical Exam:  Physical Exam   PHYSICAL EXAM:  General:  Patient is not in acute distress   Head: Normocephalic, Atraumatic  HEENT:  Both pupils normal-size atraumatic, normocephalic, nonicteric  Neck:  JVP not raised  Trachea central  No carotid bruit  Respiratory:  Decreased breath sounds bilaterally  Cardiovascular:  Regular rate and rhythm no S3 no murmurs  GI:  Abdomen soft nontender  No organomegaly  Lymphatic:  No cervical or inguinal lymphadenopathy  Neurologic:  Patient is awake alert, oriented   Grossly nonfocal      Previous EKGs were reviewed  EKG shows sinus rhythm left bundle branch block    Discussion/Summary:  Patient with known history of diabetes, hypertension, smoking, and strong family history of coronary artery disease with symptoms of chest pain and shortness of breath with left bundle branch block was for preoperative cardiovascular evaluation  Patient will need further cardiac workup prior to surgery  Patient has not had any recent cardiac testing  Patient will be scheduled for an echocardiogram to evaluate ejection fraction valves and look for evidence of pulmonary hypertension given symptoms of shortness of breath smoking and obesity  Patient will also be scheduled for a pharmacological nuclear stress test to assess for ischemia as a part of preoperative risk assessment  Patient has left bundle branch block and hence pharmacological stress testing is the ideal test of choice  In addition patient has significant issues with the back pain and is unable to exercise on the treadmill    Will make further recommendations regarding her preoperative risk assessment for surgery which has been scheduled next week  Patient also counseled to quit smoking to prevent future cardiovascular events  Plan of care was discussed with the patient  She is agreeable with the plan

## 2018-05-01 NOTE — LETTER
May 1, 2018     Jennie Worthington DO  [address] [facility] [address] [place] [address] [id number]    Patient: Aruna Zurita   YOB: 1965   Date of Visit: 5/1/2018       Dear Dr Gila Zhao: Thank you for referring Ioana Smoker to me for evaluation  Below are my notes for this consultation  If you have questions, please do not hesitate to call me  I look forward to following your patient along with you           Sincerely,        Paddy Murillo MD        CC: No Recipients

## 2018-05-02 ENCOUNTER — CONSULT (OUTPATIENT)
Dept: PAIN MEDICINE | Facility: CLINIC | Age: 53
End: 2018-05-02
Payer: COMMERCIAL

## 2018-05-02 ENCOUNTER — TELEPHONE (OUTPATIENT)
Dept: PAIN MEDICINE | Facility: CLINIC | Age: 53
End: 2018-05-02

## 2018-05-02 VITALS
SYSTOLIC BLOOD PRESSURE: 130 MMHG | WEIGHT: 170 LBS | HEART RATE: 88 BPM | DIASTOLIC BLOOD PRESSURE: 86 MMHG | HEIGHT: 61 IN | RESPIRATION RATE: 16 BRPM | BODY MASS INDEX: 32.1 KG/M2

## 2018-05-02 DIAGNOSIS — M54.41 CHRONIC MIDLINE LOW BACK PAIN WITH BILATERAL SCIATICA: ICD-10-CM

## 2018-05-02 DIAGNOSIS — M51.26 L4-L5 DISC BULGE: ICD-10-CM

## 2018-05-02 DIAGNOSIS — M47.816 LUMBAR SPONDYLOSIS: Primary | ICD-10-CM

## 2018-05-02 DIAGNOSIS — M47.817 FACET ARTHROPATHY, LUMBOSACRAL: ICD-10-CM

## 2018-05-02 DIAGNOSIS — M54.42 CHRONIC MIDLINE LOW BACK PAIN WITH BILATERAL SCIATICA: ICD-10-CM

## 2018-05-02 DIAGNOSIS — G89.29 CHRONIC MIDLINE LOW BACK PAIN WITH BILATERAL SCIATICA: ICD-10-CM

## 2018-05-02 PROCEDURE — 99204 OFFICE O/P NEW MOD 45 MIN: CPT | Performed by: ANESTHESIOLOGY

## 2018-05-02 NOTE — PROGRESS NOTES
Assessment:  1  Lumbar spondylosis     2  Facet arthropathy, lumbosacral ([address] )  Ambulatory referral to Pain Management   3  Chronic midline low back pain with bilateral sciatica  Ambulatory referral to Pain Management   4  L4-L5 disc bulge  Ambulatory referral to Pain Management       Plan:   this is a 53-year-old female who presents today for initial consultation for management of chronic low back pain which is multifocal in nature  On physical examination, there is tenderness at lumbar paraspinal musculature  Low back pain is primarily aggravated with lateral rotation and extension  The patient's pain persists despite time, relative rest, activity modification and therapy  Based on the patient's symptoms and examination, I suspect that a component of pain is being generated by the facet joints  The facet joints are only one of several possible axial pain generators  This was reviewed with the patient today  We will move forward with medial branch blockade at levels [b/l L5] utilizing a double block paradigm  If the patient receives significant relief of appropriate duration with 2% lidocaine, we will confirm with   25% bupivacaine  If the patient demonstrates appropriate response to medial branch blockade we will schedule radiofrequency ablation of the lumbar medial branches to essentially denervate the facet joints  In the office today, we reviewed the nature of the patient's pathology in depth using diagrams and models  We discussed the approach we would take for the medial branch block and provided literature for home review  The patient understands the risks associated with the procedure including bleeding, infection, tissue action, allergic reaction, nerve injury  Patient states that she is scheduled for hysterectomy next week and that she will call and schedule after she has healed completely       She was provided with name of physicians who participates in Medical Marijuana around the area     Home exercises encouraged  I informed her that I will not write for pain medications as she uses marijuana daily  My impressions and treatment recommendations were discussed in detail with the patient who verbalized understanding and had no further questions  Discharge instructions were provided  I personally saw and examined the patient and I agree with the above discussed plan of care  History of Present Illness:    Katherin Patterson is a 50 y o  female who presents today for Initial consultation for management of chronic low back pain which is constant, with no typical pattern  Today the patient reports pain 10/10 on a pain scale  She further describes her pain as burning, cramping, shooting with numbness and pins and needles sensation down her legs bilaterally  Patient states that her toes cramps up due to pain  Her pain is aggravated with prolonged standing, bending, sitting, walking and exercise  Patient did 2 weeks of physical therapy however, her pain got worse and she is unable to tolerate physical therapy  She has tried tramadol 50mg, and Flexeril 10 mg p o  Daily, without much relief of pain  Patient reports home exercises which includes walking 2-3  Times a week  Patient had an MRI of the lumbosacral spine which demonstrates diffuse disc bulge, at L4-L5, L5-S1  There is also bilateral facet hypertrophy at L5-S1  She states that she does marijuana at bedtime and it helps  I have personally reviewed and/or updated the patient's past medical history, past surgical history, family history, social history, current medications, allergies, and vital signs today  Review of Systems:    Review of Systems   Constitutional: Negative for fever and unexpected weight change  HENT: Negative for trouble swallowing  Eyes: Negative for visual disturbance  Respiratory: Positive for shortness of breath  Negative for wheezing      Cardiovascular: Negative for chest pain and palpitations  Gastrointestinal: Negative for constipation, diarrhea, nausea and vomiting  Endocrine: Negative for cold intolerance, heat intolerance and polydipsia  Genitourinary: Negative for difficulty urinating and frequency  Musculoskeletal: Positive for gait problem  Negative for arthralgias, joint swelling and myalgias  Skin: Negative for rash  Neurological: Negative for dizziness, seizures, syncope, weakness and headaches  Hematological: Does not bruise/bleed easily  Psychiatric/Behavioral: Positive for dysphoric mood  All other systems reviewed and are negative  Patient Active Problem List   Diagnosis    Ovarian neoplasm       Past Medical History:   Diagnosis Date    Angina pectoris ([facility] )     recent    Anxiety     Arthritis     Chronic headaches     Diabetes ([facility] )     Diabetes mellitus ([place] [address] )     GERD (gastroesophageal reflux disease)     Headache     Hyperlipidemia     Kidney stone     Left bundle branch block     MI (myocardial infarction) ([place] [address] )     RLS (restless legs syndrome)     Shortness of breath        Past Surgical History:   Procedure Laterality Date    BREAST SURGERY       SECTION      COLONOSCOPY      KIDNEY STONE SURGERY      LAPAROSCOPY      DE COLONOSCOPY FLX DX W/COLLJ SPEC WHEN PFRMD N/A 2017    Procedure: EGD AND COLONOSCOPY;  Surgeon: Macario Mccain MD;  Location: [facility]; Service: Gastroenterology    UPPER GASTROINTESTINAL ENDOSCOPY      WRIST SURGERY Right        Family History   Problem Relation Age of Onset    Diabetes Mother     Breast cancer Mother 39    Lung cancer Father 47    Diabetes Sister     Brain cancer Maternal Aunt 67    Breast cancer Other 25       Social History     Occupational History    Not on file       Social History Main Topics    Smoking status: Current Every Day Smoker     Packs/day: 1 00    Smokeless tobacco: Former User      Comment: pt advised to start chantix ASAP    Alcohol use No    Drug use: No    Sexual activity: Not on file       Current Outpatient Prescriptions on File Prior to Visit   Medication Sig    Butalbital-APAP-Caffeine (FIORICET) -40 MG CAPS Take by mouth    cyclobenzaprine (FLEXERIL) 10 mg tablet Take 10 mg by mouth 3 (three) times a day as needed for muscle spasms    diazepam (VALIUM) 5 mg tablet Take 5 mg by mouth daily as needed for anxiety    lidocaine (LMX) 4 % cream Apply topically 2 (two) times a day    Liraglutide 18 MG/3ML SOPN Inject under the skin    metoprolol succinate (TOPROL-XL) 100 mg 24 hr tablet Take 50 mg by mouth daily Take day of surgery     nortriptyline (PAMELOR) 10 mg capsule Take 10 mg by mouth daily as needed      pantoprazole (PROTONIX) 20 mg tablet TAKE 1 TABLET BY MOUTH TWICE A DAY    sertraline (ZOLOFT) 50 mg tablet Take 100 mg by mouth daily      traMADol (ULTRAM) 50 mg tablet Take 1 tablet (50 mg total) by mouth every 8 (eight) hours as needed for moderate pain    varenicline (CHANTIX) 0 5 mg tablet Take 0 5 mg by mouth 2 (two) times a day    zolpidem (AMBIEN) 10 mg tablet Take 10 mg by mouth daily at bedtime as needed for sleep     No current facility-administered medications on file prior to visit  Allergies   Allergen Reactions    Other Hives     Surgical tape    Prednisone Other (See Comments)     Thrush         Physical Exam:    /86   Pulse 88   Resp 16   Ht 5' 1" (1 549 m)   Wt 77 1 kg (170 lb)   BMI 32 12 kg/m²     Constitutional: normal, well developed, well nourished, alert, in no distress and non-toxic and no overt pain behavior    Eyes: anicteric  HEENT: grossly intact  Neck: supple, symmetric, trachea midline and no masses   Pulmonary:even and unlabored  Cardiovascular:No edema or pitting edema present  Skin:Normal without rashes or lesions and well hydrated  Psychiatric:Mood and affect appropriate  Neurologic:Cranial Nerves II-XII grossly intact  Musculoskeletal:normal    Lumbar Spine Exam    Appearance:  Normal lordosis  Palpation/Tenderness:  left lumbar paraspinal tenderness  right lumbar paraspinal tenderness  Sensory:  no sensory deficits noted  Range of Motion:  Extension:  Moderately limited  with pain  Motor Strength:  Left foot dorsiflexion:  5/5  Left foot plantar flexion:  5/5  Right foot dorsiflexion:  5/5  Right foot plantar flexion:  5/5  Reflexes:  Left Patellar:  2+   Right Patellar:  2+       Imaging   MRI LUMBAR SPINE WITHOUT CONTRAST     INDICATION:   M54 41: Lumbago with sciatica, right side  M54 42: Lumbago with sciatica, left side  G89 29: Other chronic pain  chronic lower back pain that radiates into both legs and both hips, numbness in both feet,     COMPARISON:  12/12/2018     TECHNIQUE:  Sagittal T1, sagittal T2, sagittal inversion recovery, axial T1 and axial T2, coronal T2        IMAGE QUALITY:  Diagnostic     FINDINGS:     ALIGNMENT:  Mild levoscoliosis of the mid lumbar spine  Slight dextroscoliosis of lower thoracic spine  No subluxation      MARROW SIGNAL:  Normal marrow signal is identified within the visualized bony structures  No discrete marrow lesion      DISTAL CORD AND CONUS:  Normal size and signal within the distal cord and conus  The conus ends at the superior endplate of L2 level      PARASPINAL SOFT TISSUES:  Paraspinal soft tissues are unremarkable      SACRUM:  Normal signal within the sacrum  No evidence of insufficiency or stress fracture      LOWER THORACIC DISC SPACES:  Normal disc height and signal   No disc herniation, canal stenosis or foraminal narrowing      LUMBAR DISC SPACES:     L1-L2:  Normal      L2-L3:  Normal      L3-L4:  Normal      L4-L5:  There is a diffuse disk bulge  No significant central canal or neural foraminal narrowing      L5-S1:  There is a diffuse disk bulge  No significant central canal or neural foraminal narrowing    Bilateral facet hypertrophy noted      IMPRESSION:     Mild S-shaped scoliosis of the visualized spine without focal disc herniation

## 2018-05-02 NOTE — TELEPHONE ENCOUNTER
Called PT to verify which ins she will be using for her 130 appt today  She has [employer] and [facility] family  I lm that we do not take [facility] family

## 2018-05-03 ENCOUNTER — HOSPITAL ENCOUNTER (OUTPATIENT)
Dept: NON INVASIVE DIAGNOSTICS | Facility: CLINIC | Age: 53
Discharge: HOME | End: 2018-05-03
Payer: COMMERCIAL

## 2018-05-03 DIAGNOSIS — I44.7 LBBB (LEFT BUNDLE BRANCH BLOCK): ICD-10-CM

## 2018-05-03 DIAGNOSIS — R07.89 CHEST DISCOMFORT: ICD-10-CM

## 2018-05-03 DIAGNOSIS — R06.00 DYSPNEA ON EFFORT: ICD-10-CM

## 2018-05-03 LAB
ARRHY DURING EX: NORMAL
CHEST PAIN STATEMENT: NORMAL
MAX DIASTOLIC BP: 64 MMHG
MAX HEART RATE: 104 BPM
MAX PREDICTED HEART RATE: 170 BPM
MAX. SYSTOLIC BP: 120 MMHG
PROTOCOL NAME: NORMAL
REASON FOR TERMINATION: NORMAL
TARGET HR FORMULA: NORMAL
TEST INDICATION: NORMAL
TIME IN EXERCISE PHASE: NORMAL

## 2018-05-03 PROCEDURE — 78452 HT MUSCLE IMAGE SPECT MULT: CPT

## 2018-05-03 PROCEDURE — A9502 TC99M TETROFOSMIN: HCPCS

## 2018-05-03 PROCEDURE — 93017 CV STRESS TEST TRACING ONLY: CPT

## 2018-05-03 PROCEDURE — 93306 TTE W/DOPPLER COMPLETE: CPT

## 2018-05-03 RX ADMIN — REGADENOSON 0.4 MG: 0.08 INJECTION, SOLUTION INTRAVENOUS at 13:12

## 2018-05-04 ENCOUNTER — TELEPHONE (OUTPATIENT)
Dept: CARDIOLOGY CLINIC | Facility: CLINIC | Age: 53
End: 2018-05-04

## 2018-05-04 ENCOUNTER — TELEPHONE (OUTPATIENT)
Dept: OBGYN CLINIC | Facility: HOSPITAL | Age: 53
End: 2018-05-04

## 2018-05-04 PROCEDURE — 93018 CV STRESS TEST I&R ONLY: CPT | Performed by: INTERNAL MEDICINE

## 2018-05-04 PROCEDURE — 78452 HT MUSCLE IMAGE SPECT MULT: CPT | Performed by: INTERNAL MEDICINE

## 2018-05-04 PROCEDURE — 93306 TTE W/DOPPLER COMPLETE: CPT | Performed by: INTERNAL MEDICINE

## 2018-05-04 PROCEDURE — 93016 CV STRESS TEST SUPVJ ONLY: CPT | Performed by: INTERNAL MEDICINE

## 2018-05-04 NOTE — TELEPHONE ENCOUNTER
Lien from [facility] called and would like to know if patient is cleared for surgery on 5/8/  Please fax documentation or Amend note in chart   Fax # [phone]

## 2018-05-04 NOTE — TELEPHONE ENCOUNTER
I talked to patient  Surgery on hold   Needs cardiac cath next week at [place], [place] with Conor Guerrero as discussed

## 2018-05-07 ENCOUNTER — TELEPHONE (OUTPATIENT)
Dept: CARDIOLOGY CLINIC | Facility: CLINIC | Age: 53
End: 2018-05-07

## 2018-05-07 DIAGNOSIS — R94.39 ABNORMAL NUCLEAR STRESS TEST: Primary | ICD-10-CM

## 2018-05-07 NOTE — TELEPHONE ENCOUNTER
S/w pt, scheduled for [facility] on Thursday 5-10-18

## 2018-05-07 NOTE — TELEPHONE ENCOUNTER
PT called and would like a call back  PT is a little confused as to why her surgery was placed on hold   PT would like a call back at [phone]

## 2018-05-07 NOTE — TELEPHONE ENCOUNTER
Please advise, I see you spoke with pt RE this in prev task  Is the only reason it's on hold is because she has to get the cath first or is there anything else I should tell her?

## 2018-05-07 NOTE — TELEPHONE ENCOUNTER
Yes   She needs a cardiac cath before we clear her for surgery  This was discussed by me with her   She should be scheduled this week for cath

## 2018-05-08 ENCOUNTER — TELEPHONE (OUTPATIENT)
Dept: NON INVASIVE DIAGNOSTICS | Facility: HOSPITAL | Age: 53
End: 2018-05-08

## 2018-05-08 ENCOUNTER — ANESTHESIA (OUTPATIENT)
Dept: PERIOP | Facility: HOSPITAL | Age: 53
End: 2018-05-08
Payer: COMMERCIAL

## 2018-05-08 RX ORDER — SODIUM CHLORIDE 9 MG/ML
75 INJECTION, SOLUTION INTRAVENOUS CONTINUOUS
Status: CANCELLED | OUTPATIENT
Start: 2018-05-08

## 2018-05-09 ENCOUNTER — HOSPITAL ENCOUNTER (OUTPATIENT)
Dept: INTERVENTIONAL RADIOLOGY/VASCULAR | Facility: HOSPITAL | Age: 53
Discharge: HOME | End: 2018-05-09
Attending: INTERNAL MEDICINE
Payer: COMMERCIAL

## 2018-05-09 VITALS
WEIGHT: 172 LBS | HEART RATE: 75 BPM | HEIGHT: 61 IN | BODY MASS INDEX: 32.47 KG/M2 | OXYGEN SATURATION: 95 % | RESPIRATION RATE: 22 BRPM | DIASTOLIC BLOOD PRESSURE: 71 MMHG | TEMPERATURE: 95.7 F | SYSTOLIC BLOOD PRESSURE: 126 MMHG

## 2018-05-09 DIAGNOSIS — R94.39 ABNORMAL NUCLEAR STRESS TEST: ICD-10-CM

## 2018-05-09 LAB
GLUCOSE SERPL-MCNC: 251 MG/DL (ref 65–140)
INR PPP: 0.89 (ref 0.86–1.16)
PROTHROMBIN TIME: 12.2 SECONDS (ref 12.1–14.4)

## 2018-05-09 PROCEDURE — 85610 PROTHROMBIN TIME: CPT | Performed by: INTERNAL MEDICINE

## 2018-05-09 PROCEDURE — 99152 MOD SED SAME PHYS/QHP 5/>YRS: CPT | Performed by: INTERNAL MEDICINE

## 2018-05-09 PROCEDURE — 93458 L HRT ARTERY/VENTRICLE ANGIO: CPT | Performed by: INTERNAL MEDICINE

## 2018-05-09 PROCEDURE — C1894 INTRO/SHEATH, NON-LASER: HCPCS | Performed by: INTERNAL MEDICINE

## 2018-05-09 PROCEDURE — C1769 GUIDE WIRE: HCPCS | Performed by: INTERNAL MEDICINE

## 2018-05-09 PROCEDURE — 82948 REAGENT STRIP/BLOOD GLUCOSE: CPT

## 2018-05-09 RX ORDER — REPAGLINIDE 1 MG/1
1 TABLET ORAL
COMMUNITY
End: 2022-07-13

## 2018-05-09 RX ORDER — FENTANYL CITRATE 50 UG/ML
INJECTION, SOLUTION INTRAMUSCULAR; INTRAVENOUS CODE/TRAUMA/SEDATION MEDICATION
Status: COMPLETED | OUTPATIENT
Start: 2018-05-09 | End: 2018-05-09

## 2018-05-09 RX ORDER — ALBUTEROL SULFATE 90 UG/1
2 AEROSOL, METERED RESPIRATORY (INHALATION) EVERY 6 HOURS PRN
COMMUNITY

## 2018-05-09 RX ORDER — SODIUM CHLORIDE 9 MG/ML
75 INJECTION, SOLUTION INTRAVENOUS CONTINUOUS
Status: DISPENSED | OUTPATIENT
Start: 2018-05-09 | End: 2018-05-09

## 2018-05-09 RX ORDER — SODIUM CHLORIDE 9 MG/ML
75 INJECTION, SOLUTION INTRAVENOUS CONTINUOUS
Status: DISCONTINUED | OUTPATIENT
Start: 2018-05-09 | End: 2018-05-13 | Stop reason: HOSPADM

## 2018-05-09 RX ORDER — HEPARIN SODIUM 1000 [USP'U]/ML
INJECTION, SOLUTION INTRAVENOUS; SUBCUTANEOUS CODE/TRAUMA/SEDATION MEDICATION
Status: COMPLETED | OUTPATIENT
Start: 2018-05-09 | End: 2018-05-09

## 2018-05-09 RX ORDER — ONDANSETRON 4 MG/1
4 TABLET, FILM COATED ORAL EVERY 8 HOURS PRN
COMMUNITY
End: 2018-05-26 | Stop reason: SDUPTHER

## 2018-05-09 RX ORDER — LIDOCAINE HYDROCHLORIDE 10 MG/ML
INJECTION, SOLUTION INFILTRATION; PERINEURAL CODE/TRAUMA/SEDATION MEDICATION
Status: COMPLETED | OUTPATIENT
Start: 2018-05-09 | End: 2018-05-09

## 2018-05-09 RX ORDER — MIDAZOLAM HYDROCHLORIDE 1 MG/ML
INJECTION INTRAMUSCULAR; INTRAVENOUS CODE/TRAUMA/SEDATION MEDICATION
Status: COMPLETED | OUTPATIENT
Start: 2018-05-09 | End: 2018-05-09

## 2018-05-09 RX ADMIN — MIDAZOLAM HYDROCHLORIDE 2 MG: 1 INJECTION, SOLUTION INTRAMUSCULAR; INTRAVENOUS at 09:24

## 2018-05-09 RX ADMIN — HEPARIN SODIUM 5000 UNITS: 1000 INJECTION INTRAVENOUS; SUBCUTANEOUS at 09:31

## 2018-05-09 RX ADMIN — FENTANYL CITRATE 50 MCG: 50 INJECTION, SOLUTION INTRAMUSCULAR; INTRAVENOUS at 09:24

## 2018-05-09 RX ADMIN — LIDOCAINE HYDROCHLORIDE 1 ML: 10 INJECTION, SOLUTION INFILTRATION; PERINEURAL at 09:24

## 2018-05-09 RX ADMIN — IOHEXOL 40 ML: 350 INJECTION, SOLUTION INTRAVENOUS at 09:33

## 2018-05-09 RX ADMIN — FENTANYL CITRATE 25 MCG: 50 INJECTION, SOLUTION INTRAMUSCULAR; INTRAVENOUS at 09:26

## 2018-05-09 RX ADMIN — SODIUM CHLORIDE 75 ML/HR: 0.9 INJECTION, SOLUTION INTRAVENOUS at 08:55

## 2018-05-09 NOTE — DISCHARGE SUMMARY
Discharge Summary - Shelley Goldstein 50 y o  female MRN: [id number]    Unit/Bed#:  Encounter: [id number]    Admission Date: 5/9/2018    Admitting Diagnosis: Abnormal nuclear stress test [R94 39]    HPI: Patient with history of DM and HTN, smoking and strong FHx of CAD, LBBB originally seen by Dr Roberto Dotson on 05/01/2018 for symptoms of chest discomfort and shortness of breath on exertion  Had abnormal stress test   Patient was brought today for cardiac catheterization  No PCI performed  No complications status post catheterization  Procedures Performed:   Orders Placed This Encounter   Procedures    Cardiac catheterization       Summary of Hospital Course:  Cardiac catheterization procedure performed  , no PCI done  No complications  Significant Findings, Care, Treatment and Services Provided:  Mild nonobstructive CAD    Complications:  None    Discharge Diagnosis:  Mild nonobstructive CAD    Resolved Problems  Date Reviewed: 5/2/2018    None          Condition at Discharge: good       Discharge instructions/Information to patient and family:   See after visit summary for information provided to patient and family  Provisions for Follow-Up Care:  See after visit summary for information related to follow-up care and any pertinent home health orders  PCP: Contreras Juarez DO    Disposition: Home    Planned Readmission: No    Discharge Statement   I spent 35 minutes discharging the patient  This time was spent on the day of discharge  I had direct contact with the patient on the day of discharge  Additional documentation is required if more than 30 minutes were spent on discharge  Discharge Medications:  See after visit summary for reconciled discharge medications provided to patient and family

## 2018-05-09 NOTE — PROGRESS NOTES
Patient transported to PACU 1 on stretcher, report given to [facility], care assumed  Assessed right radial puncture site with RN, site is clean, dry and intact  No signs or symptoms of bleeding or hematoma  Cap refill is brisk

## 2018-05-09 NOTE — DISCHARGE INSTRUCTIONS
After Heart Catheterization   AMBULATORY CARE:   Call 911 for any of the following:   · You have any of the following signs of a heart attack:      ¨ Squeezing, pressure, or pain in your chest that lasts longer than 5 minutes or returns    ¨ Discomfort or pain in your back, neck, jaw, stomach, or arm     ¨ Trouble breathing    ¨ Nausea or vomiting    ¨ Lightheadedness or a sudden cold sweat, especially with chest pain or trouble breathing    · You have any of the following signs of a stroke:      ¨ Numbness or drooping on one side of your face     ¨ Weakness in an arm or leg    ¨ Confusion or difficulty speaking    ¨ Dizziness, a severe headache, or vision loss    · You feel lightheaded, short of breath, and have chest pain  · You cough up blood  · You have trouble breathing  · You cannot stop the bleeding from your wound even after you hold firm pressure for 10 minutes  Seek care immediately if:   · Blood soaks through your bandage  · Your stitches come apart  · Your arm or leg feels numb, cool, or looks pale  · Your wound gets swollen quickly  Contact your healthcare provider if:   · You have a fever or chills  · Your wound is red, swollen, or draining pus  · Your wound looks more bruised or you have new bruising on the side of your leg or arm  · You have nausea or are vomiting  · Your skin is itchy, swollen, or you have a rash  · You have questions or concerns about your condition or care  Medicines: You may need any of the following:  · Blood thinners    help prevent blood clots  Examples of blood thinners include heparin and warfarin  Clots can cause strokes, heart attacks, and death  The following are general safety guidelines to follow while you are taking a blood thinner:    ¨ Watch for bleeding and bruising while you take blood thinners  Watch for bleeding from your gums or nose  Watch for blood in your urine and bowel movements   Use a soft washcloth on your skin, and a soft toothbrush to brush your teeth  This can keep your skin and gums from bleeding  If you shave, use an electric shaver  Do not play contact sports  ¨ Tell your dentist and other healthcare providers that you take anticoagulants  Wear a bracelet or necklace that says you take this medicine  ¨ Do not start or stop any medicines unless your healthcare provider tells you to  Many medicines cannot be used with blood thinners  ¨ Tell your healthcare provider right away if you forget to take the medicine, or if you take too much  ¨ Warfarin  is a blood thinner that you may need to take  The following are things you should be aware of if you take warfarin  § Foods and medicines can affect the amount of warfarin in your blood  Do not make major changes to your diet while you take warfarin  Warfarin works best when you eat about the same amount of vitamin K every day  Vitamin K is found in green leafy vegetables and certain other foods  Ask for more information about what to eat when you are taking warfarin  § You will need to see your healthcare provider for follow-up visits when you are on warfarin  You will need regular blood tests  These tests are used to decide how much medicine you need  · Acetaminophen  helps decrease pain and fever  This medicine is available without a doctor's order  Ask how much medicine is safe to take, and how often to take it  Acetaminophen can cause liver damage if not taken correctly  · Take your medicine as directed  Contact your healthcare provider if you think your medicine is not helping or if you have side effects  Tell him or her if you are allergic to any medicine  Keep a list of the medicines, vitamins, and herbs you take  Include the amounts, and when and why you take them  Bring the list or the pill bottles to follow-up visits  Carry your medicine list with you in case of an emergency  Bathing:   You may be able to shower the day after your procedure  Remove your pressure bandage before you shower  Do not take baths or go in hot tubs or pools  Carefully wash the wound with soap and water  Pat the area dry  Care for your wound as directed:  Change your bandage when it gets wet or dirty  A small bandage can be placed on your wound after you remove the pressure bandage  Do not put powders, lotions, or creams on your wound  They may cause your wound to get infected  Monitor your wound every day for signs of infection, such as redness, swelling, or pus  Mild bruising is normal and expected  If bleeding from your wound occurs:  Apply firm, steady pressure to stop the bleeding  Apply pressure with a clean gauze or towel for 5 to 10 minutes  Call 911 if bleeding becomes heavy or does not stop  Activity:  Do not lift anything heavier than 5 pounds until directed by your healthcare provider  Heavy lifting can put stress on your wound and cause bleeding  Do not push or pull with the arm that was used for the procedure  Do not do vigorous activity for at least 48 hours  Vigorous activity may cause bleeding from your wound  Rest and do quiet activities  Short walks to the bathroom and around the house are okay  Limit your stair climbing to prevent bleeding  Ask your healthcare provider when you can return to your normal activities  Do not strain when you have a bowel movement:  Your wound may bleed if you strain to have a bowel movement  Keep your legs flat on the floor and your hips at a 90° angle  Talk to your healthcare provider if you are constipated  You may need medicine to make it easier for you to have a bowel movement and to prevent straining  Drink liquids as directed:  Liquids will help flush the contrast liquid from your body  Ask how much liquid to drink each day and which liquids are best for you  Driving:  Ask your healthcare provider when it is okay for you to drive   He may tell you to wait 48 hours before you drive to decrease your risk for bleeding  Returning to work: You may not be able to return to work for at least 2 days after your procedure if your job involves heavy lifting  Ask your healthcare provider when it is okay for you to return to work  © 2017 [employer] Information is for End User's use only and may not be sold, redistributed or otherwise used for commercial purposes  All illustrations and images included in CareNotes® are the copyrighted property of [employer]  or [employer]  The above information is an  only  It is not intended as medical advice for individual conditions or treatments  Talk to your doctor, nurse or pharmacist before following any medical regimen to see if it is safe and effective for you

## 2018-05-09 NOTE — PROGRESS NOTES
Pt found several times using right hand  Pt instructed on reasons to not use right hand  Pt verbalizes understanding

## 2018-05-09 NOTE — PROGRESS NOTES
Pt instructed on not using right hand today  Stressed importance to pt  Consent obtained for medical records to send cath report to pt   Request faxed to [place]

## 2018-05-10 ENCOUNTER — TELEPHONE (OUTPATIENT)
Dept: RADIOLOGY | Facility: CLINIC | Age: 53
End: 2018-05-10

## 2018-05-10 ENCOUNTER — TELEPHONE (OUTPATIENT)
Dept: PAIN MEDICINE | Facility: MEDICAL CENTER | Age: 53
End: 2018-05-10

## 2018-05-10 NOTE — TELEPHONE ENCOUNTER
Patient reports using marijuana chronically  I will not write for pain medication at this time because of this  Also, I have only seen her once  I need to establish a continual relationship with her first before I consider writing for any opiates  She may follow up with her PCP regarding filling tramadol

## 2018-05-10 NOTE — TELEPHONE ENCOUNTER
Pt called for refill on Tramadol 50 mg  1 tablet every 8 hours PRN  Pt is completely out of medication  She uses [facility] pharmacy (on file)  Pt can be reached at [phone]

## 2018-05-11 ENCOUNTER — TELEPHONE (OUTPATIENT)
Dept: CARDIOLOGY CLINIC | Facility: CLINIC | Age: 53
End: 2018-05-11

## 2018-05-11 NOTE — TELEPHONE ENCOUNTER
S/w pt  States that the hospital has the lettert and she is cleared from a cardiac standpoint for surgery, she is just waiting on her PCP  As per Dr Trae Farrell, pt can take off her wrist brace  Pt aware

## 2018-05-11 NOTE — TELEPHONE ENCOUNTER
Tried to reach pt  Person answered the phone and stated he had phone and was out of PA at this time and he would try to get a message to pt to cb  No alternate #  Will retry Monday, if pt does not return call

## 2018-05-11 NOTE — TELEPHONE ENCOUNTER
S/w pt, advised that she is cleared for surgery  Pt stated that she will call back with the fax number for her surgeon so that Dr LOGAN's letter dated May 10th  can be faxed to him

## 2018-05-15 ENCOUNTER — TELEPHONE (OUTPATIENT)
Dept: CARDIOLOGY CLINIC | Facility: CLINIC | Age: 53
End: 2018-05-15

## 2018-05-15 NOTE — TELEPHONE ENCOUNTER
PHAN FROM [facility] CALLED AND SAID THEY DONT SEE PT EKG STRIP  PLEASE PUT IN EPIC  PRE ADMISSION NEEDS THIS   Amelie Cary

## 2018-05-22 ENCOUNTER — HOSPITAL ENCOUNTER (OUTPATIENT)
Facility: HOSPITAL | Age: 53
Setting detail: OUTPATIENT SURGERY
Discharge: HOME | End: 2018-05-22
Attending: OBSTETRICS & GYNECOLOGY | Admitting: OBSTETRICS & GYNECOLOGY
Payer: COMMERCIAL

## 2018-05-22 VITALS
RESPIRATION RATE: 18 BRPM | SYSTOLIC BLOOD PRESSURE: 107 MMHG | HEART RATE: 56 BPM | BODY MASS INDEX: 32.28 KG/M2 | WEIGHT: 171 LBS | DIASTOLIC BLOOD PRESSURE: 57 MMHG | OXYGEN SATURATION: 94 % | TEMPERATURE: 99 F | HEIGHT: 61 IN

## 2018-05-22 DIAGNOSIS — D49.59 OVARIAN NEOPLASM: Primary | ICD-10-CM

## 2018-05-22 PROBLEM — Z90.710 STATUS POST LAPAROSCOPIC HYSTERECTOMY: Status: ACTIVE | Noted: 2018-05-22

## 2018-05-22 LAB
ABO GROUP BLD: NORMAL
BLD GP AB SCN SERPL QL: NEGATIVE
EXT PREGNANCY TEST URINE: NEGATIVE
GLUCOSE SERPL-MCNC: 246 MG/DL (ref 65–140)
GLUCOSE SERPL-MCNC: 297 MG/DL (ref 65–140)
RH BLD: POSITIVE
SPECIMEN EXPIRATION DATE: NORMAL

## 2018-05-22 PROCEDURE — 86850 RBC ANTIBODY SCREEN: CPT | Performed by: OBSTETRICS & GYNECOLOGY

## 2018-05-22 PROCEDURE — 82948 REAGENT STRIP/BLOOD GLUCOSE: CPT

## 2018-05-22 PROCEDURE — 81025 URINE PREGNANCY TEST: CPT | Performed by: OBSTETRICS & GYNECOLOGY

## 2018-05-22 PROCEDURE — 88307 TISSUE EXAM BY PATHOLOGIST: CPT | Performed by: PATHOLOGY

## 2018-05-22 PROCEDURE — 58571 TLH W/T/O 250 G OR LESS: CPT | Performed by: OBSTETRICS & GYNECOLOGY

## 2018-05-22 PROCEDURE — 86900 BLOOD TYPING SEROLOGIC ABO: CPT | Performed by: OBSTETRICS & GYNECOLOGY

## 2018-05-22 PROCEDURE — 88112 CYTOPATH CELL ENHANCE TECH: CPT | Performed by: PATHOLOGY

## 2018-05-22 PROCEDURE — 86901 BLOOD TYPING SEROLOGIC RH(D): CPT | Performed by: OBSTETRICS & GYNECOLOGY

## 2018-05-22 RX ORDER — MAGNESIUM HYDROXIDE 1200 MG/15ML
LIQUID ORAL AS NEEDED
Status: DISCONTINUED | OUTPATIENT
Start: 2018-05-22 | End: 2018-05-22 | Stop reason: HOSPADM

## 2018-05-22 RX ORDER — OXYCODONE HYDROCHLORIDE AND ACETAMINOPHEN 5; 325 MG/1; MG/1
1 TABLET ORAL EVERY 4 HOURS PRN
Qty: 30 TABLET | Refills: 0 | Status: SHIPPED | OUTPATIENT
Start: 2018-05-22 | End: 2018-06-01

## 2018-05-22 RX ORDER — BUPIVACAINE HYDROCHLORIDE 2.5 MG/ML
INJECTION, SOLUTION INFILTRATION; PERINEURAL AS NEEDED
Status: DISCONTINUED | OUTPATIENT
Start: 2018-05-22 | End: 2018-05-22 | Stop reason: HOSPADM

## 2018-05-22 RX ORDER — SODIUM CHLORIDE, SODIUM LACTATE, POTASSIUM CHLORIDE, CALCIUM CHLORIDE 600; 310; 30; 20 MG/100ML; MG/100ML; MG/100ML; MG/100ML
125 INJECTION, SOLUTION INTRAVENOUS CONTINUOUS
Status: DISCONTINUED | OUTPATIENT
Start: 2018-05-22 | End: 2018-05-22

## 2018-05-22 RX ORDER — IBUPROFEN 600 MG/1
600 TABLET ORAL EVERY 6 HOURS PRN
Status: DISCONTINUED | OUTPATIENT
Start: 2018-05-22 | End: 2018-05-22 | Stop reason: HOSPADM

## 2018-05-22 RX ORDER — HYDROMORPHONE HYDROCHLORIDE 2 MG/ML
INJECTION, SOLUTION INTRAMUSCULAR; INTRAVENOUS; SUBCUTANEOUS AS NEEDED
Status: DISCONTINUED | OUTPATIENT
Start: 2018-05-22 | End: 2018-05-22 | Stop reason: SURG

## 2018-05-22 RX ORDER — GLYCOPYRROLATE 0.2 MG/ML
INJECTION INTRAMUSCULAR; INTRAVENOUS AS NEEDED
Status: DISCONTINUED | OUTPATIENT
Start: 2018-05-22 | End: 2018-05-22 | Stop reason: SURG

## 2018-05-22 RX ORDER — ONDANSETRON 2 MG/ML
4 INJECTION INTRAMUSCULAR; INTRAVENOUS EVERY 6 HOURS PRN
Status: DISCONTINUED | OUTPATIENT
Start: 2018-05-22 | End: 2018-05-22 | Stop reason: HOSPADM

## 2018-05-22 RX ORDER — ROCURONIUM BROMIDE 10 MG/ML
INJECTION, SOLUTION INTRAVENOUS AS NEEDED
Status: DISCONTINUED | OUTPATIENT
Start: 2018-05-22 | End: 2018-05-22 | Stop reason: SURG

## 2018-05-22 RX ORDER — SODIUM CHLORIDE, SODIUM LACTATE, POTASSIUM CHLORIDE, CALCIUM CHLORIDE 600; 310; 30; 20 MG/100ML; MG/100ML; MG/100ML; MG/100ML
125 INJECTION, SOLUTION INTRAVENOUS CONTINUOUS
Status: DISCONTINUED | OUTPATIENT
Start: 2018-05-22 | End: 2018-05-22 | Stop reason: HOSPADM

## 2018-05-22 RX ORDER — FENTANYL CITRATE/PF 50 MCG/ML
25 SYRINGE (ML) INJECTION
Status: DISCONTINUED | OUTPATIENT
Start: 2018-05-22 | End: 2018-05-22 | Stop reason: HOSPADM

## 2018-05-22 RX ORDER — MIDAZOLAM HYDROCHLORIDE 1 MG/ML
INJECTION INTRAMUSCULAR; INTRAVENOUS AS NEEDED
Status: DISCONTINUED | OUTPATIENT
Start: 2018-05-22 | End: 2018-05-22 | Stop reason: SURG

## 2018-05-22 RX ORDER — ALBUTEROL SULFATE 90 UG/1
AEROSOL, METERED RESPIRATORY (INHALATION) AS NEEDED
Status: DISCONTINUED | OUTPATIENT
Start: 2018-05-22 | End: 2018-05-22 | Stop reason: SURG

## 2018-05-22 RX ORDER — EPHEDRINE SULFATE 50 MG/ML
INJECTION, SOLUTION INTRAVENOUS AS NEEDED
Status: DISCONTINUED | OUTPATIENT
Start: 2018-05-22 | End: 2018-05-22 | Stop reason: SURG

## 2018-05-22 RX ORDER — FENTANYL CITRATE 50 UG/ML
INJECTION, SOLUTION INTRAMUSCULAR; INTRAVENOUS AS NEEDED
Status: DISCONTINUED | OUTPATIENT
Start: 2018-05-22 | End: 2018-05-22 | Stop reason: SURG

## 2018-05-22 RX ORDER — PROPOFOL 10 MG/ML
INJECTION, EMULSION INTRAVENOUS AS NEEDED
Status: DISCONTINUED | OUTPATIENT
Start: 2018-05-22 | End: 2018-05-22 | Stop reason: SURG

## 2018-05-22 RX ORDER — LIDOCAINE HYDROCHLORIDE 10 MG/ML
INJECTION, SOLUTION INFILTRATION; PERINEURAL AS NEEDED
Status: DISCONTINUED | OUTPATIENT
Start: 2018-05-22 | End: 2018-05-22 | Stop reason: SURG

## 2018-05-22 RX ORDER — SODIUM CHLORIDE 9 MG/ML
INJECTION, SOLUTION INTRAVENOUS CONTINUOUS PRN
Status: DISCONTINUED | OUTPATIENT
Start: 2018-05-22 | End: 2018-05-22 | Stop reason: SURG

## 2018-05-22 RX ORDER — OXYCODONE HYDROCHLORIDE AND ACETAMINOPHEN 5; 325 MG/1; MG/1
2 TABLET ORAL EVERY 6 HOURS PRN
Status: DISCONTINUED | OUTPATIENT
Start: 2018-05-22 | End: 2018-05-22 | Stop reason: HOSPADM

## 2018-05-22 RX ORDER — OXYCODONE HYDROCHLORIDE AND ACETAMINOPHEN 5; 325 MG/1; MG/1
1 TABLET ORAL EVERY 4 HOURS PRN
Status: DISCONTINUED | OUTPATIENT
Start: 2018-05-22 | End: 2018-05-22 | Stop reason: HOSPADM

## 2018-05-22 RX ADMIN — SODIUM CHLORIDE, SODIUM LACTATE, POTASSIUM CHLORIDE, AND CALCIUM CHLORIDE: .6; .31; .03; .02 INJECTION, SOLUTION INTRAVENOUS at 13:25

## 2018-05-22 RX ADMIN — ROCURONIUM BROMIDE 10 MG: 10 INJECTION INTRAVENOUS at 14:31

## 2018-05-22 RX ADMIN — LIDOCAINE HYDROCHLORIDE 50 MG: 10 INJECTION, SOLUTION INFILTRATION; PERINEURAL at 13:34

## 2018-05-22 RX ADMIN — EPHEDRINE SULFATE 10 MG: 50 INJECTION, SOLUTION INTRAMUSCULAR; INTRAVENOUS; SUBCUTANEOUS at 13:34

## 2018-05-22 RX ADMIN — SODIUM CHLORIDE: 0.9 INJECTION, SOLUTION INTRAVENOUS at 13:43

## 2018-05-22 RX ADMIN — ALBUTEROL SULFATE 6 PUFF: 90 AEROSOL, METERED RESPIRATORY (INHALATION) at 13:43

## 2018-05-22 RX ADMIN — FENTANYL CITRATE 25 MCG: 50 INJECTION INTRAMUSCULAR; INTRAVENOUS at 16:18

## 2018-05-22 RX ADMIN — FENTANYL CITRATE 25 MCG: 50 INJECTION INTRAMUSCULAR; INTRAVENOUS at 16:13

## 2018-05-22 RX ADMIN — SODIUM CHLORIDE, SODIUM LACTATE, POTASSIUM CHLORIDE, AND CALCIUM CHLORIDE 125 ML/HR: .6; .31; .03; .02 INJECTION, SOLUTION INTRAVENOUS at 16:28

## 2018-05-22 RX ADMIN — FENTANYL CITRATE 25 MCG: 50 INJECTION INTRAMUSCULAR; INTRAVENOUS at 16:05

## 2018-05-22 RX ADMIN — FENTANYL CITRATE 25 MCG: 50 INJECTION INTRAMUSCULAR; INTRAVENOUS at 15:44

## 2018-05-22 RX ADMIN — ROCURONIUM BROMIDE 50 MG: 10 INJECTION INTRAVENOUS at 13:36

## 2018-05-22 RX ADMIN — MIDAZOLAM 2 MG: 1 INJECTION INTRAMUSCULAR; INTRAVENOUS at 13:25

## 2018-05-22 RX ADMIN — HYDROMORPHONE HYDROCHLORIDE 0.5 MG: 2 INJECTION, SOLUTION INTRAMUSCULAR; INTRAVENOUS; SUBCUTANEOUS at 14:11

## 2018-05-22 RX ADMIN — PROPOFOL 120 MG: 10 INJECTION, EMULSION INTRAVENOUS at 13:34

## 2018-05-22 RX ADMIN — FENTANYL CITRATE 50 MCG: 50 INJECTION, SOLUTION INTRAMUSCULAR; INTRAVENOUS at 13:34

## 2018-05-22 RX ADMIN — SODIUM CHLORIDE, SODIUM LACTATE, POTASSIUM CHLORIDE, AND CALCIUM CHLORIDE 125 ML/HR: .6; .31; .03; .02 INJECTION, SOLUTION INTRAVENOUS at 12:15

## 2018-05-22 RX ADMIN — EPHEDRINE SULFATE 10 MG: 50 INJECTION, SOLUTION INTRAMUSCULAR; INTRAVENOUS; SUBCUTANEOUS at 14:20

## 2018-05-22 RX ADMIN — FENTANYL CITRATE 50 MCG: 50 INJECTION, SOLUTION INTRAMUSCULAR; INTRAVENOUS at 15:15

## 2018-05-22 RX ADMIN — FENTANYL CITRATE 25 MCG: 50 INJECTION INTRAMUSCULAR; INTRAVENOUS at 15:39

## 2018-05-22 RX ADMIN — ALBUTEROL SULFATE 6 PUFF: 90 AEROSOL, METERED RESPIRATORY (INHALATION) at 15:07

## 2018-05-22 RX ADMIN — SUGAMMADEX 155 MG: 100 INJECTION, SOLUTION INTRAVENOUS at 15:08

## 2018-05-22 RX ADMIN — GLYCOPYRROLATE 0.7 MG: 0.2 INJECTION, SOLUTION INTRAMUSCULAR; INTRAVENOUS at 15:00

## 2018-05-22 RX ADMIN — NEOSTIGMINE METHYLSULFATE 4 MG: 1 INJECTION, SOLUTION INTRAMUSCULAR; INTRAVENOUS; SUBCUTANEOUS at 15:00

## 2018-05-22 RX ADMIN — PHENYLEPHRINE HYDROCHLORIDE 30 MCG/MIN: 10 INJECTION INTRAVENOUS at 13:45

## 2018-05-22 RX ADMIN — INSULIN HUMAN 5 UNITS: 100 INJECTION, SOLUTION PARENTERAL at 15:52

## 2018-05-22 RX ADMIN — ROCURONIUM BROMIDE 10 MG: 10 INJECTION INTRAVENOUS at 14:41

## 2018-05-22 RX ADMIN — Medication 2000 MG: at 13:50

## 2018-05-22 RX ADMIN — FENTANYL CITRATE 25 MCG: 50 INJECTION INTRAMUSCULAR; INTRAVENOUS at 16:24

## 2018-05-22 NOTE — DISCHARGE INSTRUCTIONS
You will have pain post-operatively while you recover  You have been provided Percocet for moderate to severe pain  Please take them as instructed and as needed for pain  Nothing in the vagina for 6-8 weeks until cleared by your physician  This includes no intercourse or sexual activity, no tampons, no tub baths, and no swimming during this period  Do not carry anything that requires any strain, typically nothing heavier than a gallon of a milk for 1-2 weeks  No driving while requiring pain meds  You may have light spotting, but any heavy bleeding requiring 1 pad/hour is not normal   You have multiple small incisions on your abdomen  Daily soap and water will suffice for cleaning  Please monitor signs of infection like redness, pain, white discharge, bleeding from incision sites  Please call your surgeon for any worsening pain, fevers, nausea/vomiting, or signs of infection  Thank you  Robot Assisted Laparoscopic Hysterectomy   WHAT YOU SHOULD KNOW:   Robot-assisted laparoscopic hysterectomy (RH) is surgery to remove your uterus and cervix using a machine controlled by your surgeon  Your ovaries, fallopian tubes, supporting tissues, some lymph nodes, and the top of your vagina may also be removed  After RH, you will not be able to become pregnant  You will go through menopause if your ovaries are removed  AFTER YOU LEAVE:   Medicines:  · Medicines  may be given to decrease pain or prevent a bacterial infection  You may also need to take hormone medicine such as estrogen  Ask your healthcare provider how to take this medicine safely  · Take your medicine as directed  Call your healthcare provider if you think your medicine is not helping or if you have side effects  Tell him if you are allergic to any medicine  Keep a list of the medicines, vitamins, and herbs you take  Include the amounts, and when and why you take them  Bring the list or the pill bottles to follow-up visits   Carry your medicine list with you in case of an emergency  Activity guidelines:   · You may feel like resting more after surgery  Slowly start to do more each day  Rest when you feel it is needed  · Ask when you can start having sexual intercourse again  What to expect after surgery: It is normal to bleed from your vagina after your uterus and cervix are removed  Change the sanitary pad often to prevent infection  Ask your gynecologist or healthcare provider how much bleeding to expect  Contact your healthcare provider if:   · You have a fever  · Your pain is getting worse, even after you take medicine  · Your incisions look red and swollen, or they have bad-smelling drainage coming from them  · You see new or an increased amount of bright red blood coming from your vagina or your incisions  · You have yellow, green, or bad-smelling discharge coming from your vagina  · You feel pain when you urinate or you need to urinate more often than usual     · You have trouble having a bowel movement  · Your skin is itchy, swollen, or has a rash  · You have questions or concerns about your condition or care  Seek care immediately or call 911 if:   · You feel lightheaded, short of breath, and have chest pain  · You cough up blood  · Your arm or leg feels warm, tender, and painful  It may look swollen and red  · You have more bleeding from your vagina than you were told to expect  © 2014 [employer] is for End User's use only and may not be sold, redistributed or otherwise used for commercial purposes  All illustrations and images included in CareNotes® are the copyrighted property of [employer]  or Jnony Colon  The above information is an  only  It is not intended as medical advice for individual conditions or treatments   Talk to your doctor, nurse or pharmacist before following any medical regimen to see if it is safe and effective for you

## 2018-05-22 NOTE — ANESTHESIA PREPROCEDURE EVALUATION
Review of Systems/Medical History  Patient summary reviewed  Chart reviewed      Cardiovascular  EKG reviewed, Hyperlipidemia, Hypertension , Past MI , Dysrhythmias (LBBB) , Angina ,   Comment: Cardiac cath 5/9/18:   -  Left main: Normal   -  LAD: The vessel was normal sized  Angiography showed mild atherosclerosis  --  Mid LAD: There was a tubular 40 % stenosis  The lesion was irregularly contoured  --  Circumflex: The vessel was normal sized  Angiography showed mild atherosclerosis  --  1st obtuse marginal: The vessel was normal sized  Angiography showed mild atherosclerosis  --  RCA: The vessel was large sized (dominant)  Angiography showed mild atherosclerosis  --  Right PDA: The vessel was normal sized  Angiography showed minor luminal irregularities  --  Right posterolateral segment: The vessel was normal sized  Angiography showed minor luminal irregularities  Stress test 5/3/18  - There was no chest pain during stress  -  ECG conclusions: The stress ECG was non-diagnostic due to baseline left bundle branch block  -  Gated SPECT: The calculated left ventricular ejection fraction was 37 %  There was mild to moderate global left ventricular hypokinesis      IMPRESSIONS: Abnormal study  Small to moderate sized, medium intensity anterior defect with some reversibilty which could represent ischemia  Left ventricular systolic function was reduced, with global hypokinesis  ECHO 5/2/18: LEFT VENTRICLE:  Ejection fraction was estimated to be 40 %  Dyssynchronous septal motion likely secondary to LBBB  There was mild diffuse hypokinesis      MITRAL VALVE:  There was trace regurgitation  ,  Pulmonary  Smoker (long term use of marijuana) , Shortness of breath,        GI/Hepatic    GERD ,        Kidney stones,        Endo/Other  Diabetes ,      GYN       Hematology   Musculoskeletal    Comment: Restless leg syndrome Arthritis     Neurology    Headaches,    Psychology   Anxiety, Depression , Anesthesia Plan  ASA Score- 3     Anesthesia Type- general with ASA Monitors  Additional Monitors: arterial line  Airway Plan: ETT  Comment: GA with ETT, IV, brian, antiemetics  Cardiac clearance: mod risk for cardiac events   Chronic marijuana user  Plan Factors-    Induction- intravenous  Postoperative Plan- Plan for postoperative opioid use   Planned trial extubation    Informed Consent-

## 2018-05-22 NOTE — H&P
H&P Exam - Gynecology   Emily Wolfe 50 y o  female MRN: [id number]  Unit/Bed#:  Encounter: [id number]  History of Present Illness     HPI:  Emily Wolfe is a 50 y o   female was referred to [address] for evaluation and treatment of an ovarian neoplasm of uncertain behavior  She initially presented in 2017 with right ovarian cyst measuring 4 8 x 4 0cm with single borderline thick septation on ultrasound  Follow up imaging 2018 shows that the cystic portion of her ovary is marginally bigger  The patient was evaluated in office by Dr Janis Duran on 2018  She desired definitive surgical management and signed informed consent for a robotic assisted total laparoscopic hysterectomy, bilateral salpingo-oophorectomy, possible staging, possible exploratory laparotomy  Past medical history is notable for:  hypertension, diabetes, hyperlipidemia, a left bundle branch block  Surgical history is notable for: 2  sections via Pfannenstiel incision and laparoscopic surgery for infertility  Review of Systems   Constitutional: Negative for chills and fever  Respiratory: Negative for shortness of breath  Cardiovascular: Negative for chest pain  Gastrointestinal: Negative for abdominal pain, diarrhea, nausea and vomiting  Neurological: Negative for dizziness and headaches          Historical Information   Past Medical History:   Diagnosis Date    Angina pectoris ([facility] [address] )     recent    Anxiety     Arthritis     Chronic headaches     Diabetes ([facility] [address] )     Diabetes mellitus ([facility] [address] )     GERD (gastroesophageal reflux disease)     Headache     Hyperlipidemia     Kidney stone     Left bundle branch block     MI (myocardial infarction) (HCC)     RLS (restless legs syndrome)     Shortness of breath      Past Surgical History:   Procedure Laterality Date    BREAST SURGERY       SECTION      COLONOSCOPY      KIDNEY STONE SURGERY      LAPAROSCOPY      [place] COLONOSCOPY FLX DX W/COLLJ SPEC WHEN PFRMD N/A 2017    Procedure: EGD AND COLONOSCOPY;  Surgeon: Irma Lake MD;  Location: [facility]; Service: Gastroenterology    UPPER GASTROINTESTINAL ENDOSCOPY      WRIST SURGERY Right      OB/GYN History:     section x 2     Family History   Problem Relation Age of Onset    Diabetes Mother    [de-identified] Breast cancer Mother 39    Lung cancer Father 47    Diabetes Sister     Brain cancer Maternal Aunt 67    Breast cancer Other 25     Social History   History   Alcohol Use No     History   Drug Use    Frequency: 0 5 times per week    Types: Marijuana     Comment: medical marijuana     History   Smoking Status    Current Every Day Smoker    Packs/day: 0 50    Years: 44 00   Smokeless Tobacco    Never Used     Comment: pt advised to start chantix ASAP       Meds/Allergies   No prescriptions prior to admission  Allergies   Allergen Reactions    Other Hives     Surgical tape    Prednisone Other (See Comments)     Thrush         Objective   Vitals: There were no vitals taken for this visit  No intake or output data in the 24 hours ending 18 4254    Invasive Devices: Invasive Devices          No matching active lines, drains, or airways          Physical Exam   Constitutional: She is oriented to person, place, and time  She appears well-developed  Cardiovascular: Normal rate and regular rhythm  Pulmonary/Chest: Effort normal  No respiratory distress  Abdominal: Soft  There is no tenderness  Neurological: She is alert and oriented to person, place, and time  Skin: Skin is warm and dry  Psychiatric: She has a normal mood and affect  Lab Results:   No visits with results within 1 Day(s) from this visit     Latest known visit with results is:   Hospital Outpatient Visit on 2018   Component Date Value    Protime 2018 12 2     INR 2018 0 89     POC Glucose 2018 251*      Imaging: I have personally reviewed pertinent reports  EKG, Pathology, and Other Studies: I have personally reviewed pertinent reports  Assessment/Plan     Assessment:  50year old  female with ovarian neoplasm of uncertain behavior scheduled for robotic assisted total laparoscopic hysterectomy, bilateral salpingo-oophorectomy, possible staging, possible exploratory laparotomy     : 5 4    Plan:  - Proceed with surgery as mentioned aboved  - Ancef for infection prophylaxis  - Anesthesia aware    Code Status: Prior    Lazarus Carballo MD

## 2018-05-22 NOTE — ANESTHESIA POSTPROCEDURE EVALUATION
Post-Op Assessment Note      CV Status:  Stable    Mental Status:  Alert and awake    Hydration Status:  Euvolemic    PONV Controlled:  Controlled    Airway Patency:  Patent    Post Op Vitals Reviewed: Yes          Staff: CRNA, Anesthesiologist           BP   120/61   Temp   97 4   Pulse 51   Resp   14   SpO2   98

## 2018-05-22 NOTE — OP NOTE
OPERATIVE REPORT  PATIENT NAME: Emily Wolfe    :  1965  MRN: [id number]  Pt Location: [facility] OR ROOM 14    SURGERY DATE: 2018    Surgeon(s) and Role:     * Mary Guerrero MD - Primary     * Bishop Yudith MD - Assisting     * Kyra lAlen MD - Assisting     * Alla Hernandez PA-C - Assisting    Preop Diagnosis:  Ovarian neoplasm [D49 59]    Post-Op Diagnosis Codes:     * Ovarian neoplasm [D49 59]    Procedures:  Robotic assisted total laparoscopic hysterectomy with bilateral salpingo-oophorectomy for uterus less than 250 g    Specimen(s):  ID Type Source Tests Collected by Time Destination   1 :  Washing Pelvic Washing NON-GYNECOLOGIC CYTOLOGY Mary Guerrero MD 2018 1422    2 : cervix Tissue Uterus w/Bilateral Ovaries and Fallopian Tubes TISSUE EXAM Mary Guerrero MD 2018 1433        Estimated Blood Loss:   100 mL    Drains:  [REMOVED] Urethral Catheter Non-latex; Double-lumen 16 Fr  (Removed)   Number of days: 0       Anesthesia Type:   General    Operative Indications:  Ovarian neoplasm [D49 59]  The patient is a delightful 61-year-old with a persistent ovarian neoplasm  Patient opted for definitive surgical management  Operative Findings:  1) 7 cm cystic appearing right ovarian neoplasm which was benign appearing  2) grossly normal appearing cervix, uterus, bilateral fallopian tubes and left ovary    Complications:   None    Procedure and Technique:  The patient was taken to the operating room where general endotracheal anesthesia was induced without complications  The patient received antibiotic prophylaxis per hospital protocol  Sequential compression devices were applied to the lower extremities and activated prior to induction of anesthesia  The patient was placed in the dorsolithotomy position in [address] [place] [address] and her lower abdomen, perineum and vagina were prepped and draped in the usual sterile fashion   Under direct visualization the uterus was sounded and the endocervix was dilated  A  MART uterine manipulator was easily placed  Christopher catheter was placed and the intravaginal occluder balloon was inflated  Attention was turned to the abdomen  All port sites were infiltrated with bupivacaine at the beginning of completion of the procedure  A 10 mm skin incision was made approximately 4 cm above the umbilicus near the midline  Then, using a 5 mm Endopath Excel trocar and the 5 mm laparoscope, the peritoneal cavity was entered under direct visualization  Good intraperitoneal location was confirmed and pneumoperitoneum was created to maximal pressure 15 mm of mercury  Three 8 mm robotic trocars were placed (2 on the left and 1 on the right) and an additional 8 mm long trocar was placed in the midline port site for camera use  A short 11 mm trocar was placed in the right flank for assistant's use  The patient was placed in steep Trendelenburg and the [employer] system was docked  The above-mentioned findings were noted  The round ligaments were cauterized and divided bilaterally and the bladder was mobilized anteriorly  The peritoneum lateral to the ovarian vessels was divided to both sides, the paravesical and pararectal spaces were developed  The ureters were clearly identified  on both sides  Bilateral ovarian vessels were skeletonized, cauterized and divided  The uterine vessels were skeletonized cauterized and divided bilaterally  The cardinal ligaments were serially cauterized and divided on both sides  The bladder was further mobilized anteriorly and a circumferential colpotomy was made  The specimen was easily delivered through the vagina  The vaginal cuff was closed using a running stitch of 2-0 Stratafix  Airtight closure and excellent hemostasis was confirmed  Copious irrigation was used  All areas of dissection were reinspected and hemostasis was confirmed at low intraperitoneal pressures  The robot was undocked    The 11 mm trocar sites was closed using a deep stitch of zero back with a [employer] Timpson needle device  Pneumoperitoneum was completely released with the assistance of Valsalva maneuvers  The skin at all port sites was closed using a subcuticular stitch of 4-0 Monocryl  Histoacryl was applied to all incisions  The Christopher was removed  The patient tolerated the procedure well  Sponge, lap, needle and instrument counts were reported as correct x2  The patient was successfully extubated in the operating room and transferred to the post anesthetic care unit in stable condition       I was present for the entire procedure    Patient Disposition:  PACU     SIGNATURE: Wicho Farfan MD  DATE: May 22, 2018  TIME: 3:02 PM

## 2018-05-23 LAB — GLUCOSE SERPL-MCNC: 198 MG/DL (ref 65–140)

## 2018-05-24 NOTE — PROGRESS NOTES
PT DISCHARGE    Today's date: 2018  Patient name: Shital Daniels  : 1965  MRN: [id number]  Referring provider: Marycarmen Asencio DO  Dx:   Encounter Diagnosis     ICD-10-CM    1  Lumbar spondylosis M47 816        Start Time: 1030  Stop Time: 1100  Total time in clinic (min): 30 minutes    Assessment  Impairments: abnormal gait, abnormal or restricted ROM, impaired physical strength, lacks appropriate home exercise program and pain with function    Assessment details: Pt presents with chronic LBP  Reports symptoms radiating to bilateral hips  Reports pain constant  She reports ambulation/ADL's limited due to symptoms  PT notes limited strength/ROM of trunk and BLE  Tenderness to palpation and mm spasms noted Lumbar and Sacral regions  Pt attended 2 total PT sessions  She did not return after that time  D/C PT services  Findings per initial evaluation      Prognosis: good    Goals  ST  Decrease pain 25% 4 wk  2  Increase trunk ROM by 10-15 degree 4 wk  3  Increase trunk strength to Fair 4 wk  4  Increase BLE strength to 4/5 4 wk  LT  Pt will report 50% decrease in pain 8 wk  2  Increase trunk ROM to WNL 8 wk  3  Increase trunk strength to Fair+  8 wk  4  Pt will report no limitations with ADL's 8 wk  5  Pt will report no limitations with ambulation 8 wk    Plan  Patient would benefit from: PT eval  Planned modality interventions: cryotherapy, thermotherapy: hydrocollator packs, ultrasound and unattended electrical stimulation  Planned therapy interventions: abdominal trunk stabilization, manual therapy, strengthening, therapeutic exercise, stretching, home exercise program, functional ROM exercises and flexibility  Plan details: D/C PT services  Findings per initial evaluation         Subjective Evaluation    History of Present Illness  Mechanism of injury: Pt presents with chronic LBP  Pain radiates into bilateral hips  Reports difficulty with ambulation due to pain    No prior tx noted  Reports RLS and Neuropathy as well  Xray revealed arthritis lower back  Reports waiting approval for Lidocaine patches  Reports pain centralized in L-spine and will radiate to sacrum  Intermittent parasthesia in BLE  Reports LE cramping as well  Pain  Current pain ratin  At best pain ratin  At worst pain rating: 10  Location: Low back/Bilateral Hip  Quality: sharp  Aggravating factors: walking and standing      Diagnostic Tests  X-ray: abnormal        Objective     Tenderness     Lumbar Spine  Tenderness in the spinous process  Additional Tenderness Details  Tender to palpation over spinous processes L-spine segments, all aspects of sacrum  MM spasms noted bilateral lumbar paraspinal mm, bilateral gluteal mm/piriformis  Active Range of Motion     Lumbar   Flexion: 50 degrees with pain  Extension: 20 degrees with pain  Left lateral flexion: 20 degrees with pain  Right lateral flexion: 23 degrees with pain  Left rotation: WFL and with pain  Right rotation: WFL and with pain    Additional Active Range of Motion Details  Pain central aspect L-spine/Sacral region    Strength/Myotome Testing     Left Hip   Planes of Motion   Flexion: 4-  Extension: 4-  Abduction: 4-  Adduction: 4-    Right Hip   Planes of Motion   Flexion: 4-  Extension: 4-  Abduction: 4-  Adduction: 4-    Left Knee   Flexion: 4-  Extension: 4-    Right Knee   Flexion: 4-  Extension: 4-    Left Ankle/Foot   Dorsiflexion: 4-  Plantar flexion: 4-    Right Ankle/Foot   Dorsiflexion: 4-  Plantar flexion: 4-    Additional Strength Details  Fair- trunk strength with seated resisted trunk movements      Tests     Lumbar     Left   Positive passive SLR  Right   Negative passive SLR  Left Pelvic Girdle/Sacrum   Positive: sacral spring       Ambulation     Observational Gait     Additional Observational Gait Details  Reports difficulty clearing feet at times with gait        Precautions:  No surgical tape    Specialty Daily Treatment Diary     Manual  2-22-18       Stretch BLE                                            Exercise Diary         nustep        UBE  retro        Mini squats        TR/HR        St hip flex/abd/ext        LTP/MTP        PPT        bridges        abd crunch        LTR        Add sq        t-band hip abd                                                                            Modalities 2-22-18       IFC/MHP 15'       Ultrasound

## 2018-05-26 DIAGNOSIS — K31.84 GASTROPARESIS: Primary | ICD-10-CM

## 2018-05-29 RX ORDER — ONDANSETRON 4 MG/1
TABLET, FILM COATED ORAL
Qty: 20 TABLET | Refills: 2 | Status: ON HOLD | OUTPATIENT
Start: 2018-05-29 | End: 2018-10-10 | Stop reason: ALTCHOICE

## 2018-05-30 NOTE — TELEPHONE ENCOUNTER
Patient left a voicemail on [employer] she received a "cant reach you letter" and is returning our call  Please call patient at [phone]  Thank you

## 2018-05-31 NOTE — TELEPHONE ENCOUNTER
Lm advising office is trying to reach her in regards to message she left 3 weeks ago  Advised no need to return call unless pt needed something or wanted to schedule a f/u ov

## 2018-06-05 NOTE — PROGRESS NOTES
OT DISCHARGE    Patient has Consistent attended OP OT services since start of care  Patient has attended 1 visits since start of care  Patient last missed visit/visit was on 02/28/2018  Patient has not returned to clinic for further treatment  Thank you for the referral  Please refer to patient's last assessment for most recent objective measurements

## 2018-06-06 ENCOUNTER — OFFICE VISIT (OUTPATIENT)
Dept: GYNECOLOGIC ONCOLOGY | Facility: CLINIC | Age: 53
End: 2018-06-06

## 2018-06-06 VITALS
WEIGHT: 174 LBS | DIASTOLIC BLOOD PRESSURE: 72 MMHG | RESPIRATION RATE: 18 BRPM | HEART RATE: 70 BPM | HEIGHT: 61 IN | TEMPERATURE: 97.6 F | SYSTOLIC BLOOD PRESSURE: 124 MMHG | BODY MASS INDEX: 32.85 KG/M2

## 2018-06-06 DIAGNOSIS — Z90.710 STATUS POST LAPAROSCOPIC HYSTERECTOMY: Primary | ICD-10-CM

## 2018-06-06 PROCEDURE — 99024 POSTOP FOLLOW-UP VISIT: CPT | Performed by: OBSTETRICS & GYNECOLOGY

## 2018-06-06 NOTE — PROGRESS NOTES
Shoaib Pipes  1965  [facility]  GYN ONC MO  [address] [facility] [address]      Chief Complaint   Patient presents with    Post-op     Previous Therapy: 5/22/2018: Robotic assisted total laparoscopic hysterectomy with bilateral salpingo-oophorectomy revealing benign pathology   History of Present Illness: The patient is a delightful 47-year-old status post definitive surgical management for an ovarian neoplasm which was benign  Patient comes in today for 1st postoperative visit with no complaints  Review of Systems    Patient Active Problem List   Diagnosis    Ovarian neoplasm    Facet arthropathy, lumbosacral (HCC)    Lumbar spondylosis    Chronic midline low back pain with bilateral sciatica    Status post robotic assisted total laparoscopic hysterectomy, bilateral salpingo-oophorectomy     Social History     Social History    Marital status: /Civil Union     Spouse name: N/A    Number of children: N/A    Years of education: N/A     Occupational History    Not on file       Social History Main Topics    Smoking status: Current Every Day Smoker     Packs/day: 0 50     Years: 44 00    Smokeless tobacco: Never Used      Comment: pt advised to start chantix ASAP    Alcohol use No    Drug use: Yes     Frequency: 0 5 times per week     Types: Marijuana      Comment: medical marijuana    Sexual activity: Not on file     Other Topics Concern    Not on file     Social History Narrative    No narrative on file     Past Medical History:   Diagnosis Date    [facility] [address] [facility]     recent    Anxiety     Arthritis     Chronic headaches     Diabetes ([facility] [address] [facility]     Diabetes mellitus ([facility] [address] [facility]     GERD (gastroesophageal reflux disease)     Headache     Hyperlipidemia     Kidney stone     Left bundle branch block     MI (myocardial infarction) ([facility] [address] [facility]     RLS (restless legs syndrome)     Shortness of breath        Current Outpatient Prescriptions:     albuterol (PROVENTIL HFA,VENTOLIN HFA) 90 mcg/act inhaler, Inhale 2 puffs every 6 (six) hours as needed for wheezing, Disp: , Rfl:     Butalbital-APAP-Caffeine (FIORICET) -40 MG CAPS, Take by mouth, Disp: , Rfl:     cyclobenzaprine (FLEXERIL) 10 mg tablet, Take 10 mg by mouth 3 (three) times a day as needed for muscle spasms, Disp: , Rfl:     diazepam (VALIUM) 5 mg tablet, Take 5 mg by mouth daily as needed for anxiety, Disp: , Rfl:     lidocaine (LMX) 4 % cream, Apply topically 2 (two) times a day, Disp: 100 g, Rfl: 0    Liraglutide 18 MG/3ML SOPN, Inject under the skin, Disp: , Rfl:     metoprolol succinate (TOPROL-XL) 100 mg 24 hr tablet, Take 50 mg by mouth daily Take day of surgery , Disp: , Rfl:     nortriptyline (PAMELOR) 10 mg capsule, Take 10 mg by mouth daily as needed  , Disp: , Rfl:     ondansetron (ZOFRAN) 4 mg tablet, TAKE 1 TABLET BY MOUTH EVERY 8 HOURS AS NEEDED FOR NAUSEA, Disp: 20 tablet, Rfl: 2    pantoprazole (PROTONIX) 20 mg tablet, TAKE 1 TABLET BY MOUTH TWICE A DAY, Disp: 60 tablet, Rfl: 1    repaglinide (PRANDIN) 1 mg tablet, Take 1 mg by mouth 3 (three) times a day before meals, Disp: , Rfl:     sertraline (ZOLOFT) 50 mg tablet, Take 100 mg by mouth daily  , Disp: , Rfl:     traMADol (ULTRAM) 50 mg tablet, Take 1 tablet (50 mg total) by mouth every 8 (eight) hours as needed for moderate pain, Disp: 30 tablet, Rfl: 0    varenicline (CHANTIX) 0 5 mg tablet, Take 0 5 mg by mouth 2 (two) times a day, Disp: , Rfl:     zolpidem (AMBIEN) 10 mg tablet, Take 10 mg by mouth daily at bedtime as needed for sleep, Disp: , Rfl:   Allergies   Allergen Reactions    Other Hives     Surgical tape    Prednisone Other (See Comments)     Thrush       Vitals:    06/06/18 1007   BP: 124/72   Pulse: 70   Resp: 18   Temp: 97 6 °F (36 4 °C)       Labs:  CMP  Lab Results   Component Value Date     04/26/2018    K 3 9 04/26/2018     04/26/2018    CO2 28 04/26/2018    ANIONGAP 6 04/26/2018    BUN 13 04/26/2018    CREATININE 0 77 04/26/2018    GLUCOSE 229 (H) 09/09/2017    GLUF 177 (H) 04/26/2018    CALCIUM 8 8 04/26/2018    AST 15 04/26/2018    ALT 26 04/26/2018    ALKPHOS 112 04/26/2018    PROT 7 5 04/26/2018    BILITOT 0 32 04/26/2018    EGFR 90 04/26/2018       BMP  Lab Results   Component Value Date    GLUCOSE 229 (H) 09/09/2017    CALCIUM 8 8 04/26/2018     04/26/2018    K 3 9 04/26/2018    CO2 28 04/26/2018     04/26/2018    BUN 13 04/26/2018    CREATININE 0 77 04/26/2018       Lipids  No results found for: CHOL  No results found for: HDL  No results found for: LDLCALC  No results found for: TRIG  No components found for: CHOLHDL    Hemoglobin A1C  Lab Results   Component Value Date    HGBA1C 8 0 (H) 04/26/2018       Fasting Glucose  Lab Results   Component Value Date    GLUF 177 (H) 04/26/2018       Insulin     Thyroid  No results found for: TSH, Z6SBPTF, J1LTICB, THYROIDAB    Hepatic Function Panel  Lab Results   Component Value Date    ALT 26 04/26/2018    AST 15 04/26/2018    ALKPHOS 112 04/26/2018    BILITOT 0 32 04/26/2018       Celiac Disease Antibody Panel  No results found for: ENDOMYSIAL IGA, GLIADIN IGA, GLIADIN IGG, IGA, TISSUE TRANSGLUT AB, TTG IGA   Iron  No results found for: IRON, TIBC, FERRITIN    Other Labs:     Final Diagnosis   A  Uterus, cervix, bilateral ovaries and fallopian tubes; hysterectomy and salpingo-oophorectomies (61g):  -  Right ovary:  Benign seromucinous cystadenoma  -  Left ovary: Benign left ovarian parenchyma, negative for atypia or malignancy  -  Bilateral fallopian tubes: Benign bilateral fallopian tube with right-sided paratubal cyst, negative for atypia or malignancy  -  Cervix:  Benign cervix, negative for dysplasia or carcinoma  -  Endometrium:  Benign inactive to weakly proliferative endometrium, negative for hyperplasia or carcinoma  -  Myometrium:  Benign myometrium with benign intramural leiomyomata (up to 1 6 cm)               Physical Exam Constitutional: She is oriented to person, place, and time  No distress  Pulmonary/Chest: Effort normal and breath sounds normal  No respiratory distress  She has no wheezes  She has no rales  She exhibits no tenderness  Abdominal: Soft  Bowel sounds are normal  She exhibits no distension and no mass  There is no tenderness  There is no rebound and no guarding  Well healed incision(s)   Neurological: She is alert and oriented to person, place, and time  Skin: She is not diaphoretic  Assessment      Status post surgical resection for benign pathology    Plan      The pathology report was explained to the patient in detail  The patient will return in 4 weeks for her 2nd postoperative visit  Daron Sousa MD, PhD, Jake Saucedo  Attending Physician, [address] [place] [address]

## 2018-06-12 ENCOUNTER — TELEPHONE (OUTPATIENT)
Dept: OBGYN CLINIC | Facility: HOSPITAL | Age: 53
End: 2018-06-12

## 2018-06-18 ENCOUNTER — TELEPHONE (OUTPATIENT)
Dept: GYNECOLOGIC ONCOLOGY | Facility: CLINIC | Age: 53
End: 2018-06-18

## 2018-06-18 DIAGNOSIS — M54.42 CHRONIC MIDLINE LOW BACK PAIN WITH BILATERAL SCIATICA: Primary | ICD-10-CM

## 2018-06-18 DIAGNOSIS — G89.29 CHRONIC MIDLINE LOW BACK PAIN WITH BILATERAL SCIATICA: Primary | ICD-10-CM

## 2018-06-18 DIAGNOSIS — M54.41 CHRONIC MIDLINE LOW BACK PAIN WITH BILATERAL SCIATICA: Primary | ICD-10-CM

## 2018-06-18 RX ORDER — MELOXICAM 15 MG/1
15 TABLET ORAL DAILY
Qty: 30 TABLET | Refills: 0 | Status: ON HOLD | OUTPATIENT
Start: 2018-06-18 | End: 2018-10-10 | Stop reason: ALTCHOICE

## 2018-06-18 NOTE — TELEPHONE ENCOUNTER
Patient called asking if she was able to participate in activity at a [employer] today with her grandchildren  Tomorrow she will be 4 weeks post-op  She was advised to avoid any activity that creates pressure in or on her abdomen or that would engage her core muscles  Also, she was encouraged to avoid situations where collision with other individuals is possible  She verbalizes understanding and states that she will "jump lightly" and wear a support band

## 2018-07-03 ENCOUNTER — OFFICE VISIT (OUTPATIENT)
Dept: GYNECOLOGIC ONCOLOGY | Facility: CLINIC | Age: 53
End: 2018-07-03

## 2018-07-03 VITALS
SYSTOLIC BLOOD PRESSURE: 120 MMHG | RESPIRATION RATE: 16 BRPM | DIASTOLIC BLOOD PRESSURE: 68 MMHG | TEMPERATURE: 97.7 F | BODY MASS INDEX: 32 KG/M2 | HEART RATE: 68 BPM | WEIGHT: 169.5 LBS | HEIGHT: 61 IN

## 2018-07-03 DIAGNOSIS — Z90.710 STATUS POST LAPAROSCOPIC HYSTERECTOMY: Primary | ICD-10-CM

## 2018-07-03 PROCEDURE — 99024 POSTOP FOLLOW-UP VISIT: CPT | Performed by: OBSTETRICS & GYNECOLOGY

## 2018-07-03 NOTE — PROGRESS NOTES
Yumiko Chuck  1965  [facility]  GYN ONC MO  [facility] [address]      Chief Complaint   Patient presents with    Post-op     Previous Therapy: 5/22/2018: Robotic assisted total laparoscopic hysterectomy with bilateral salpingo-oophorectomy revealing benign pathology   History of Present Illness: The patient is a delightful 61-year-old status post definitive surgical management for an ovarian neoplasm which was benign  Patient comes in today for 2nd postoperative visit with no complaints  Review of Systems    Patient Active Problem List   Diagnosis    Ovarian neoplasm    Facet arthropathy, lumbosacral (HCC)    Lumbar spondylosis    Chronic midline low back pain with bilateral sciatica    Status post robotic assisted total laparoscopic hysterectomy, bilateral salpingo-oophorectomy     Social History     Social History    Marital status: /Civil Union     Spouse name: N/A    Number of children: N/A    Years of education: N/A     Occupational History    Not on file       Social History Main Topics    Smoking status: Current Every Day Smoker     Packs/day: 0 50     Years: 44 00    Smokeless tobacco: Never Used      Comment: pt advised to start chantix ASAP    Alcohol use No    Drug use: Yes     Frequency: 0 5 times per week     Types: Marijuana      Comment: medical marijuana    Sexual activity: Not on file     Other Topics Concern    Not on file     Social History Narrative    No narrative on file     Past Medical History:   Diagnosis Date    Angina p[facility]is [facility] [address] [facility]     recent    Anxiety     Arthritis     Chronic headaches     Diabetes ([facility] [address] [facility]     Diabetes mellitus ([facility] [address] [facility]     GERD (gastroesophageal reflux disease)     Headache     Hyperlipidemia     Kidney stone     Left bundle branch block     MI (myocardial infarction) ([facility] [address] [facility]     RLS (restless legs syndrome)     Shortness of breath        Current Outpatient Prescriptions:     albuterol (PROVENTIL HFA,VENTOLIN HFA) 90 mcg/act inhaler, Inhale 2 puffs every 6 (six) hours as needed for wheezing, Disp: , Rfl:     Butalbital-APAP-Caffeine (FIORICET) -40 MG CAPS, Take by mouth, Disp: , Rfl:     diazepam (VALIUM) 5 mg tablet, Take 5 mg by mouth daily as needed for anxiety, Disp: , Rfl:     lidocaine (LMX) 4 % cream, Apply topically 2 (two) times a day, Disp: 100 g, Rfl: 0    Liraglutide 18 MG/3ML SOPN, Inject under the skin, Disp: , Rfl:     metoprolol succinate (TOPROL-XL) 100 mg 24 hr tablet, Take 50 mg by mouth daily Take day of surgery , Disp: , Rfl:     nortriptyline (PAMELOR) 10 mg capsule, Take 10 mg by mouth daily as needed  , Disp: , Rfl:     ondansetron (ZOFRAN) 4 mg tablet, TAKE 1 TABLET BY MOUTH EVERY 8 HOURS AS NEEDED FOR NAUSEA, Disp: 20 tablet, Rfl: 2    pantoprazole (PROTONIX) 20 mg tablet, TAKE 1 TABLET BY MOUTH TWICE A DAY, Disp: 60 tablet, Rfl: 1    repaglinide (PRANDIN) 1 mg tablet, Take 1 mg by mouth 3 (three) times a day before meals, Disp: , Rfl:     sertraline (ZOLOFT) 50 mg tablet, Take 100 mg by mouth daily  , Disp: , Rfl:     varenicline (CHANTIX) 0 5 mg tablet, Take 0 5 mg by mouth 2 (two) times a day, Disp: , Rfl:     zolpidem (AMBIEN) 10 mg tablet, Take 10 mg by mouth daily at bedtime as needed for sleep, Disp: , Rfl:     cyclobenzaprine (FLEXERIL) 10 mg tablet, Take 10 mg by mouth 3 (three) times a day as needed for muscle spasms, Disp: , Rfl:     meloxicam (MOBIC) 15 mg tablet, Take 1 tablet (15 mg total) by mouth daily, Disp: 30 tablet, Rfl: 0    traMADol (ULTRAM) 50 mg tablet, Take 1 tablet (50 mg total) by mouth every 8 (eight) hours as needed for moderate pain, Disp: 30 tablet, Rfl: 0  Allergies   Allergen Reactions    Other Hives     Surgical tape    Prednisone Other (See Comments)     Thrush       Vitals:    07/03/18 1321   BP: 120/68   Pulse: 68   Resp: 16   Temp: 97 7 °F (36 5 °C)       Labs:  CMP  Lab Results   Component Value Date     04/26/2018 K 3 9 04/26/2018     04/26/2018    CO2 28 04/26/2018    ANIONGAP 6 04/26/2018    BUN 13 04/26/2018    CREATININE 0 77 04/26/2018    GLUCOSE 229 (H) 09/09/2017    GLUF 177 (H) 04/26/2018    CALCIUM 8 8 04/26/2018    AST 15 04/26/2018    ALT 26 04/26/2018    ALKPHOS 112 04/26/2018    PROT 7 5 04/26/2018    BILITOT 0 32 04/26/2018    EGFR 90 04/26/2018       BMP  Lab Results   Component Value Date    GLUCOSE 229 (H) 09/09/2017    CALCIUM 8 8 04/26/2018     04/26/2018    K 3 9 04/26/2018    CO2 28 04/26/2018     04/26/2018    BUN 13 04/26/2018    CREATININE 0 77 04/26/2018       Lipids  No results found for: CHOL  No results found for: HDL  No results found for: LDLCALC  No results found for: TRIG  No components found for: CHOLHDL    Hemoglobin A1C  Lab Results   Component Value Date    HGBA1C 8 0 (H) 04/26/2018       Fasting Glucose  Lab Results   Component Value Date    GLUF 177 (H) 04/26/2018       Insulin     Thyroid  No results found for: TSH, S3AZPDV, C9YQVXL, THYROIDAB    Hepatic Function Panel  Lab Results   Component Value Date    ALT 26 04/26/2018    AST 15 04/26/2018    ALKPHOS 112 04/26/2018    BILITOT 0 32 04/26/2018       Celiac Disease Antibody Panel  No results found for: ENDOMYSIAL IGA, GLIADIN IGA, GLIADIN IGG, IGA, TISSUE TRANSGLUT AB, TTG IGA   Iron  No results found for: IRON, TIBC, FERRITIN    Other Labs:     Final Diagnosis   A  Uterus, cervix, bilateral ovaries and fallopian tubes; hysterectomy and salpingo-oophorectomies (61g):  -  Right ovary:  Benign seromucinous cystadenoma  -  Left ovary: Benign left ovarian parenchyma, negative for atypia or malignancy  -  Bilateral fallopian tubes: Benign bilateral fallopian tube with right-sided paratubal cyst, negative for atypia or malignancy  -  Cervix:  Benign cervix, negative for dysplasia or carcinoma  -  Endometrium:  Benign inactive to weakly proliferative endometrium, negative for hyperplasia or carcinoma    -  Myometrium: Benign myometrium with benign intramural leiomyomata (up to 1 6 cm)  Physical Exam   Constitutional: She is oriented to person, place, and time  No distress  Pulmonary/Chest: Effort normal and breath sounds normal  No respiratory distress  She has no wheezes  She has no rales  She exhibits no tenderness  Abdominal: Soft  Bowel sounds are normal  She exhibits no distension and no mass  There is no tenderness  There is no rebound and no guarding  Well healed incision(s)   Genitourinary:   Genitourinary Comments: The vaginal cuff is well healed  Surgical absence of the cervix, uterus, bilateral fallopian tubes and ovaries  Neurological: She is alert and oriented to person, place, and time  Skin: She is not diaphoretic  Assessment      Status post surgical resection for benign pathology    Plan      No further gynecological oncology workup necessary  Daron Milan MD, PhD, Mao Layne  Attending Physician, [address] [place] [address]

## 2018-07-19 ENCOUNTER — TRANSCRIBE ORDERS (OUTPATIENT)
Dept: ADMINISTRATIVE | Facility: HOSPITAL | Age: 53
End: 2018-07-19

## 2018-07-19 DIAGNOSIS — N20.0 URIC ACID NEPHROLITHIASIS: Primary | ICD-10-CM

## 2018-07-24 ENCOUNTER — HOSPITAL ENCOUNTER (OUTPATIENT)
Dept: ULTRASOUND IMAGING | Facility: HOSPITAL | Age: 53
Discharge: HOME | End: 2018-07-24
Payer: COMMERCIAL

## 2018-07-24 DIAGNOSIS — N20.0 URIC ACID NEPHROLITHIASIS: ICD-10-CM

## 2018-07-24 PROCEDURE — 76770 US EXAM ABDO BACK WALL COMP: CPT

## 2018-08-16 ENCOUNTER — EVALUATION (OUTPATIENT)
Dept: PHYSICAL THERAPY | Facility: CLINIC | Age: 53
End: 2018-08-16
Payer: COMMERCIAL

## 2018-08-16 DIAGNOSIS — M50.90 CERVICAL DISC DISORDER, UNSPECIFIED, UNSPECIFIED CERVICAL REGION: Primary | ICD-10-CM

## 2018-08-16 PROCEDURE — G8991 OTHER PT/OT GOAL STATUS: HCPCS

## 2018-08-16 PROCEDURE — 97161 PT EVAL LOW COMPLEX 20 MIN: CPT

## 2018-08-16 PROCEDURE — G8990 OTHER PT/OT CURRENT STATUS: HCPCS

## 2018-08-16 PROCEDURE — 97535 SELF CARE MNGMENT TRAINING: CPT

## 2018-08-16 RX ORDER — ALPRAZOLAM 1 MG/1
TABLET ORAL
COMMUNITY
End: 2022-01-19 | Stop reason: ALTCHOICE

## 2018-08-16 NOTE — PROGRESS NOTES
PT Evaluation     Today's date: 2018  Patient name: Hugh Ellis  : 1965  MRN: [id number]  Referring provider: Velia Mathis MD  Dx:   Encounter Diagnosis     ICD-10-CM    1  Cervical disc disorder, unspecified, unspecified cervical region M50 [id number]        Start Time: 1315  Stop Time: 1350  Total time in clinic (min): 35 minutes    Assessment  Impairments: abnormal or restricted ROM, activity intolerance, impaired physical strength, lacks appropriate home exercise program and pain with function    Assessment details: Pt presents with increased c-spine and RUE pain for the past 2-3 weeks  Reports c-spine pain prior but not as intense  Pt did receive injection to right c-spine this morning which did help decrease intensity of symptoms  She reports continued pain  PT notes limited c-spine strength and ROM  Pt hypersensitive to light palpation right c-spine/upper trap regions/scapular regions  Pt will benefit from PT tx to decrease symptoms and improve functional level  Prognosis: fair    Goals  ST  Decrease pain 25-50% 4 wk  2  Increase c-spine ROM by 10-20 degrees 4 wk  3  Increase c-spine strength to 4/5 4 wk  4  Increase BUE strength to 4+/5 4 wk  LT  Pt will report 50-75% decrease in pain 8 wk  2  Increase c-spine ROM to WNL 8 wk  3  Increase c-spine strength to  8 wk  4    Pt will report no limitations with ADL's 8 wk      Plan  Patient would benefit from: PT eval  Planned modality interventions: cryotherapy, thermotherapy: hydrocollator packs, ultrasound and unattended electrical stimulation  Planned therapy interventions: joint mobilization, manual therapy, strengthening, stretching, therapeutic activities, therapeutic exercise, home exercise program, functional ROM exercises and flexibility  Frequency: 3x week  Duration in weeks: 8  Treatment plan discussed with: patient        Subjective Evaluation    History of Present Illness  Mechanism of injury: Pt reports pain in c-spine and RUE began approx 2-3 weeks ago after having blood work  Reports pain prior but not this severe  Had injection to c-spine today which did help with symptoms  Reports decreased pain c-spine/RUE but symptoms do continue  Reports altered sensation right hand due to CTS  Reports medical marijuana for L-spine which provides only short term relief of symptoms  Taking Tylenol with codiene as well      Pain  Current pain ratin  At best pain ratin  At worst pain rating: 10  Location: Right c-spine/RUE  Quality: sharp    Hand dominance: right  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            Objective     Postural Observations    Additional Postural Observation Details  Forward head  Forward rounded shoulders  Band aid in place right lower c-spine from injection today    Tenderness     Additional Tenderness Details  Hypersensitive to light paplaption right c-spine paraspinal mm, right upper and mid trap, right scapula, right upper arm to elbow all aspects    Active Range of Motion   Cervical/Thoracic Spine   Cervical    Flexion: 35 degrees with pain  Extension: 10 degrees with pain  Left lateral flexion: 40 degrees with pain  Right lateral flexion: 20 degrees with pain  Left rotation: 55 degrees with pain  Right rotation: 40 degrees with pain    Additional Active Range of Motion Details  Pain right c-spine all movements    Strength/Myotome Testing   Cervical Spine   Neck extension: 4-  Neck flexion: 4-    Left   Neck lateral flexion (C3): 4-    Right etr  Neck lateral flexion (C3): 4-    Left Shoulder     Planes of Motion   Flexion: 4   Abduction: 4   External rotation at 0°: 4   Internal rotation at 0°: 4     Isolated Muscles   Upper trapezius: 4-     Right Shoulder     Planes of Motion   Flexion: 4   Abduction: 4   External rotation at 0°: 4   Internal rotation at 0°: 4     Isolated Muscles   Upper trapezius: 4-     Left Elbow   Flexion: 4  Extension: 4    Right Elbow   Flexion: 4  Extension: 4    Additional Strength Details  Pain right c-spine with all resisted head movements    Tests   Cervical     Left   Negative cervical distraction and cervical compression test       Right   Negative cervical distraction and cervical compression test       Additional Tests Details  MM spasms noted right c-spine paraspinal mm and upper trap region      Flowsheet Rows      Most Recent Value   PT/OT G-Codes   Assessment Type  Evaluation   G code set  Other PT/OT Primary   Other PT Primary Current Status ()  CL   Other PT Primary Goal Status ()  CK        Precautions:  N/A    Specialty Daily Treatment Diary     Manual         C-spine                                            Exercise Diary         c-spine AROM        UBE        LTP/MTP        Shrugs/post roll        scap retractions        Bent over row        Chin tucks        c-spine isometrics        Doorway ER stretch        Prone shld flex/abd/ext                                                                                            Modalities        MHP/E-stim        Ultrasound

## 2018-08-16 NOTE — LETTER
2018    Richie Valenzuela MD   [address]    Patient: Katherin Patterson   YOB: 1965   Date of Visit: 2018     Encounter Diagnosis     ICD-10-CM    1  Cervical disc disorder, unspecified, unspecified cervical region M50 90        Dear Dr Holloway Button:    Please review the attached Plan of Care from [facility] recent visit  Please verify that you agree therapy should continue by signing the attached document and sending it back to our office  If you have any questions or concerns, please don't hesitate to call  Sincerely,    Nancy Dickson, PT      Referring Provider:      I certify that I have read the below Plan of Care and certify the need for these services furnished under this plan of treatment while under my care  Richie Valenzuela MD   [address]: [phone]          PT Evaluation     Today's date: 2018  Patient name: Katherin Patterson  : 1965  MRN: [id number]  Referring provider: David Kevin MD  Dx:   Encounter Diagnosis     ICD-10-CM    1  Cervical disc disorder, unspecified, unspecified cervical region M50 90        Start Time: 1315  Stop Time: 1350  Total time in clinic (min): 35 minutes    Assessment  Impairments: abnormal or restricted ROM, activity intolerance, impaired physical strength, lacks appropriate home exercise program and pain with function    Assessment details: Pt presents with increased c-spine and RUE pain for the past 2-3 weeks  Reports c-spine pain prior but not as intense  Pt did receive injection to right c-spine this morning which did help decrease intensity of symptoms  She reports continued pain  PT notes limited c-spine strength and ROM  Pt hypersensitive to light palpation right c-spine/upper trap regions/scapular regions  Pt will benefit from PT tx to decrease symptoms and improve functional level  Prognosis: fair    Goals  ST  Decrease pain 25-50% 4 wk  2  Increase c-spine ROM by 10-20 degrees 4 wk  3  Increase c-spine strength to 4/5 4 wk  4  Increase BUE strength to 4+/5 4 wk  LT  Pt will report 50-75% decrease in pain 8 wk  2  Increase c-spine ROM to WNL 8 wk  3  Increase c-spine strength to  8 wk  4  Pt will report no limitations with ADL's 8 wk      Plan  Patient would benefit from: PT eval  Planned modality interventions: cryotherapy, thermotherapy: hydrocollator packs, ultrasound and unattended electrical stimulation  Planned therapy interventions: joint mobilization, manual therapy, strengthening, stretching, therapeutic activities, therapeutic exercise, home exercise program, functional ROM exercises and flexibility  Frequency: 3x week  Duration in weeks: 8  Treatment plan discussed with: patient        Subjective Evaluation    History of Present Illness  Mechanism of injury: Pt reports pain in c-spine and RUE began approx 2-3 weeks ago after having blood work  Reports pain prior but not this severe  Had injection to c-spine today which did help with symptoms  Reports decreased pain c-spine/RUE but symptoms do continue  Reports altered sensation right hand due to CTS  Reports medical marijuana for L-spine which provides only short term relief of symptoms  Taking Tylenol with codiene as well      Pain  Current pain ratin  At best pain ratin  At worst pain rating: 10  Location: Right c-spine/RUE  Quality: sharp    Hand dominance: right  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            Objective     Postural Observations    Additional Postural Observation Details  Forward head  Forward rounded shoulders  Band aid in place right lower c-spine from injection today    Tenderness     Additional Tenderness Details  Hypersensitive to light paplaption right c-spine paraspinal mm, right upper and mid trap, right scapula, right upper arm to elbow all aspects    Active Range of Motion   Cervical/Thoracic Spine   Cervical    Flexion: 35 degrees with pain  Extension: 10 degrees with pain  Left lateral flexion: 40 degrees with pain  Right lateral flexion: 20 degrees with pain  Left rotation: 55 degrees with pain  Right rotation: 40 degrees with pain    Additional Active Range of Motion Details  Pain right c-spine all movements    Strength/Myotome Testing   Cervical Spine   Neck extension: 4-  Neck flexion: 4-    Left   Neck lateral flexion (C3): 4-    Right etr  Neck lateral flexion (C3): 4-    Left Shoulder     Planes of Motion   Flexion: 4   Abduction: 4   External rotation at 0°:  4   Internal rotation at 0°:  4     Isolated Muscles   Upper trapezius: 4-     Right Shoulder     Planes of Motion   Flexion: 4   Abduction: 4   External rotation at 0°:  4   Internal rotation at 0°:  4     Isolated Muscles   Upper trapezius: 4-     Left Elbow   Flexion: 4  Extension: 4    Right Elbow   Flexion: 4  Extension: 4    Additional Strength Details  Pain right c-spine with all resisted head movements    Tests   Cervical     Left   Negative cervical distraction and cervical compression test       Right   Negative cervical distraction and cervical compression test       Additional Tests Details  MM spasms noted right c-spine paraspinal mm and upper trap region      Flowsheet Rows      Most Recent Value   PT/OT G-Codes   Assessment Type  Evaluation   G code set  Other PT/OT Primary   Other PT Primary Current Status ()  CL   Other PT Primary Goal Status ()  CK        Precautions:  N/A    Specialty Daily Treatment Diary     Manual         C-spine                                            Exercise Diary         c-spine AROM        UBE        LTP/MTP        Shrugs/post roll        scap retractions        Bent over row        Chin tucks        c-spine isometrics        Doorway ER stretch        Prone shld flex/abd/ext Modalities        MHP/E-stim        Ultrasound

## 2018-08-20 ENCOUNTER — OFFICE VISIT (OUTPATIENT)
Dept: PHYSICAL THERAPY | Facility: CLINIC | Age: 53
End: 2018-08-20
Payer: COMMERCIAL

## 2018-08-20 DIAGNOSIS — M50.90 CERVICAL DISC DISORDER, UNSPECIFIED, UNSPECIFIED CERVICAL REGION: Primary | ICD-10-CM

## 2018-08-20 PROCEDURE — 97140 MANUAL THERAPY 1/> REGIONS: CPT

## 2018-08-20 PROCEDURE — 97110 THERAPEUTIC EXERCISES: CPT

## 2018-08-20 NOTE — PROGRESS NOTES
Daily Note     Today's date: 2018  Patient name: Katelyn Cullen  : 1965  MRN: [id number]  Referring provider: Arslan Hanley MD  Dx:   Encounter Diagnosis     ICD-10-CM    1  Cervical disc disorder, unspecified, unspecified cervical region M50 90                   Subjective: "Im in a lot of pain'        Objective: See treatment diary below   Pain in 7/10      Assessment: Tolerated treatment fair  Pt reported increased s/s with therex  End range pain evident with MT      Pt states she did comply with HEP  Plan: Continue per plan of care       Precautions:  N/A    Specialty Daily Treatment Diary     Manual  18       C-spine 10                                           Exercise Diary  18       c-spine AROM 20 each       UBE 10'       LTP/MTP 2x10 each       Shrugs/post roll 20 each       scap retractions 20 each       Bent over row        Chin tucks 20       c-spine isometrics 10 each       Doorway ER stretch 5x15"       Prone shld flex/abd/ext                                                                                            Modalities 18       MHP/E-stim        Ultrasound        CP 10

## 2018-08-23 ENCOUNTER — APPOINTMENT (OUTPATIENT)
Dept: PHYSICAL THERAPY | Facility: CLINIC | Age: 53
End: 2018-08-23
Payer: COMMERCIAL

## 2018-08-27 ENCOUNTER — OFFICE VISIT (OUTPATIENT)
Dept: PHYSICAL THERAPY | Facility: CLINIC | Age: 53
End: 2018-08-27
Payer: COMMERCIAL

## 2018-08-27 DIAGNOSIS — M50.90 CERVICAL DISC DISORDER, UNSPECIFIED, UNSPECIFIED CERVICAL REGION: Primary | ICD-10-CM

## 2018-08-27 PROCEDURE — 97140 MANUAL THERAPY 1/> REGIONS: CPT

## 2018-08-27 PROCEDURE — 97110 THERAPEUTIC EXERCISES: CPT

## 2018-08-27 NOTE — PROGRESS NOTES
Daily Note     Today's date: 2018  Patient name: Kb Blackwell  : 1965  MRN: [id number]  Referring provider: Travon Mendoza MD  Dx:   Encounter Diagnosis     ICD-10-CM    1  Cervical disc disorder, unspecified, unspecified cervical region M50 90                   Subjective:  "i feel  a little bit better"        Objective: See treatment diary below   Pain in 4/10      Assessment: Tolerated treatment well  Patient exhibited good technique with therapeutic exercises    Additional weight and therex without incidence  Patient would benefit from progressive strengthening      Plan: Continue per plan of care         Precautions:  N/A    Specialty Daily Treatment Diary     Manual  18      C-spine 10 10                                          Exercise Diary  18      c-spine AROM 20 each 20      UBE 10' 10      LTP/MTP  IR/ER 2x10 each 20gr  20 each       Shrugs/post roll 20 each 20   1#      scap retractions 20 each 20  1#      Bent over row        Chin tucks 20 20      c-spine isometrics 10 each 10      Doorway ER stretch 5x15" 5X15'      Prone shld flex/abd/ext                                                                                            Modalities 18      MHP/E-stim        Ultrasound        CP 10

## 2018-08-30 ENCOUNTER — APPOINTMENT (OUTPATIENT)
Dept: PHYSICAL THERAPY | Facility: CLINIC | Age: 53
End: 2018-08-30
Payer: COMMERCIAL

## 2018-09-11 ENCOUNTER — OFFICE VISIT (OUTPATIENT)
Dept: GASTROENTEROLOGY | Facility: CLINIC | Age: 53
End: 2018-09-11
Payer: COMMERCIAL

## 2018-09-11 VITALS
BODY MASS INDEX: 32.47 KG/M2 | DIASTOLIC BLOOD PRESSURE: 72 MMHG | HEIGHT: 61 IN | SYSTOLIC BLOOD PRESSURE: 120 MMHG | WEIGHT: 172 LBS | HEART RATE: 70 BPM

## 2018-09-11 DIAGNOSIS — Z79.899 MEDICAL MARIJUANA USE: ICD-10-CM

## 2018-09-11 DIAGNOSIS — G89.29 OTHER CHRONIC PAIN: ICD-10-CM

## 2018-09-11 DIAGNOSIS — R13.19 ESOPHAGEAL DYSPHAGIA: Primary | ICD-10-CM

## 2018-09-11 DIAGNOSIS — K21.00 GASTROESOPHAGEAL REFLUX DISEASE WITH ESOPHAGITIS: ICD-10-CM

## 2018-09-11 DIAGNOSIS — K76.0 FATTY LIVER: ICD-10-CM

## 2018-09-11 DIAGNOSIS — K31.84 GASTROPARESIS DUE TO DM (HCC): ICD-10-CM

## 2018-09-11 DIAGNOSIS — K20.80 PILL ESOPHAGITIS DUE TO POTASSIUM CHLORIDE: ICD-10-CM

## 2018-09-11 DIAGNOSIS — T50.3X5A PILL ESOPHAGITIS DUE TO POTASSIUM CHLORIDE: ICD-10-CM

## 2018-09-11 DIAGNOSIS — R11.14 BILIOUS VOMITING WITH NAUSEA: ICD-10-CM

## 2018-09-11 DIAGNOSIS — E11.43 GASTROPARESIS DUE TO DM (HCC): ICD-10-CM

## 2018-09-11 PROBLEM — R13.10 DYSPHAGIA: Status: ACTIVE | Noted: 2018-09-11

## 2018-09-11 PROCEDURE — 99214 OFFICE O/P EST MOD 30 MIN: CPT | Performed by: NURSE PRACTITIONER

## 2018-09-11 RX ORDER — PANTOPRAZOLE SODIUM 40 MG/1
40 TABLET, DELAYED RELEASE ORAL DAILY
Qty: 90 TABLET | Refills: 0 | Status: SHIPPED | OUTPATIENT
Start: 2018-09-11 | End: 2019-03-19 | Stop reason: SDUPTHER

## 2018-09-11 RX ORDER — METHOCARBAMOL 750 MG/1
500 TABLET, FILM COATED ORAL EVERY 6 HOURS PRN
COMMUNITY

## 2018-09-11 RX ORDER — ACETAMINOPHEN AND CODEINE PHOSPHATE 300; 60 MG/1; MG/1
1 TABLET ORAL EVERY 4 HOURS PRN
COMMUNITY
End: 2020-10-19 | Stop reason: ALTCHOICE

## 2018-09-11 RX ORDER — POTASSIUM CHLORIDE 1125 MG/1
15 TABLET, EXTENDED RELEASE ORAL 2 TIMES DAILY
COMMUNITY

## 2018-09-11 RX ORDER — LISINOPRIL 5 MG/1
5 TABLET ORAL DAILY
COMMUNITY
End: 2019-11-14 | Stop reason: SDUPTHER

## 2018-09-11 RX ORDER — FLUOXETINE 20 MG/1
20 TABLET, FILM COATED ORAL DAILY
Status: ON HOLD | COMMUNITY
End: 2018-10-10 | Stop reason: ALTCHOICE

## 2018-09-11 RX ORDER — RANITIDINE 15 MG/ML
300 SYRUP ORAL DAILY
Qty: 300 ML | Refills: 1 | Status: SHIPPED | OUTPATIENT
Start: 2018-09-11 | End: 2019-03-19 | Stop reason: SDUPTHER

## 2018-09-11 RX ORDER — ATORVASTATIN CALCIUM 40 MG/1
40 TABLET, FILM COATED ORAL DAILY
COMMUNITY
End: 2018-12-21

## 2018-09-11 RX ORDER — GABAPENTIN 300 MG/1
400 CAPSULE ORAL 2 TIMES DAILY
COMMUNITY
End: 2022-01-19 | Stop reason: ALTCHOICE

## 2018-09-11 RX ORDER — DICLOFENAC SODIUM 75 MG/1
75 TABLET, DELAYED RELEASE ORAL 2 TIMES DAILY
COMMUNITY

## 2018-09-11 RX ORDER — METOCLOPRAMIDE HYDROCHLORIDE 5 MG/5ML
5 SOLUTION ORAL 3 TIMES DAILY
Qty: 120 ML | Refills: 0 | Status: SHIPPED | OUTPATIENT
Start: 2018-09-11 | End: 2019-03-19 | Stop reason: SDUPTHER

## 2018-09-11 NOTE — PROGRESS NOTES
Assessment/Plan:    1  Esophageal dysphagia, pill esophagitis, nausea and vomiting:  Endoscopy scheduled  Protonix increased to 40 mg a day  Ranitidine 300 mg in the evening    2  Gastroparesis, nausea and vomiting:  Reglan 5 mg t i d  before meals    3  Fatty liver  Hepatic function panel    4  Patient strongly encouraged to decrease her A1c to 6% or below and to eat small frequent meals  Diagnoses and all orders for this visit:    Esophageal dysphagia  -     metoclopramide (REGLAN) 5 mg/5 mL oral solution; Take 5 mL (5 mg total) by mouth 3 (three) times a day  -     pantoprazole (PROTONIX) 40 mg tablet; Take 1 tablet (40 mg total) by mouth daily  -     ranitidine (ZANTAC) 150 MG/10ML syrup; Take 20 mL (300 mg total) by mouth daily  -     Hepatic function panel; Future    Bilious vomiting with nausea  -     metoclopramide (REGLAN) 5 mg/5 mL oral solution; Take 5 mL (5 mg total) by mouth 3 (three) times a day  -     pantoprazole (PROTONIX) 40 mg tablet; Take 1 tablet (40 mg total) by mouth daily  -     ranitidine (ZANTAC) 150 MG/10ML syrup; Take 20 mL (300 mg total) by mouth daily  -     Hepatic function panel; Future    Gastroesophageal reflux disease with esophagitis  -     metoclopramide (REGLAN) 5 mg/5 mL oral solution; Take 5 mL (5 mg total) by mouth 3 (three) times a day  -     pantoprazole (PROTONIX) 40 mg tablet; Take 1 tablet (40 mg total) by mouth daily  -     ranitidine (ZANTAC) 150 MG/10ML syrup; Take 20 mL (300 mg total) by mouth daily  -     Hepatic function panel; Future    Other chronic pain  -     metoclopramide (REGLAN) 5 mg/5 mL oral solution; Take 5 mL (5 mg total) by mouth 3 (three) times a day  -     pantoprazole (PROTONIX) 40 mg tablet; Take 1 tablet (40 mg total) by mouth daily  -     ranitidine (ZANTAC) 150 MG/10ML syrup; Take 20 mL (300 mg total) by mouth daily  -     Hepatic function panel; Future    Medical marijuana use  -     metoclopramide (REGLAN) 5 mg/5 mL oral solution;  Take 5 mL (5 mg total) by mouth 3 (three) times a day  -     pantoprazole (PROTONIX) 40 mg tablet; Take 1 tablet (40 mg total) by mouth daily  -     ranitidine (ZANTAC) 150 MG/10ML syrup; Take 20 mL (300 mg total) by mouth daily  -     Hepatic function panel; Future    Fatty liver  -     Hepatic function panel; Future    Gastroparesis due to DM (HCC)    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    Other orders  -     FLUoxetine (PROzac) 20 MG tablet; Take 20 mg by mouth daily  -     lisinopril (ZESTRIL) 5 mg tablet; Take 5 mg by mouth daily  -     gabapentin (NEURONTIN) 300 mg capsule; Take 100 mg by mouth 3 (three) times a day  -     acetaminophen-codeine (TYLENOL with CODEINE #4) 300-60 MG per tablet; Take 1 tablet by mouth every 4 (four) hours as needed for moderate pain  -     atorvastatin (LIPITOR) 40 mg tablet; Take 40 mg by mouth daily  -     diclofenac (VOLTAREN) 75 mg EC tablet; Take 50 mg by mouth 2 (two) times a day  -     methocarbamol (ROBAXIN) 750 mg tablet; Take 500 mg by mouth every 6 (six) hours as needed for muscle spasms  -     potassium chloride SA (KLOR-CON M15) 15 MEQ tablet; Take 15 mEq by mouth 2 (two) times a day    @ASSESSMENTEN  D@     Subjective:      Patient ID: Boogie Santana is a 48 y o  female  [age]year-old female with gastroparesis, GERD with esophagitis, fatty liver and chronic pain  She is here due to recent changes in her medication regimen for her chronic pain, diabetes, and worsening upper GI symptoms  She recently had obstructive sleep study test and was told her throat is now 0  She has nausea, vomiting, heartburn, regurgitation of bile, epigastric pain  She has abdominal bloating  She has daily bowel movements without melena or hematochezia  She does not follow a diet for GERD or for gastroparesis but is taking her pantoprazole  She is on multiple and states an is smoking medical marijuana as well as potassium chloride by her renal doctor    We talked about taking her medications with few because she is only eating one large meal a day and take in all of these medications without food  I told her it does worsen her symptoms  Plan is for an endoscopy with possible dilation for the dysphagia, nausea and vomiting and regurgitation by lb  I will also start arm Reglan and she will eat small frequent meals throughout the day and take her medication with food  I did change her pantoprazole to 40 mg a day and ranitidine liquid at night  She is choking on her pills  I did ask her to call her renal doctor and obtain her potassium in liquid form  Her ultrasound from September 2017 showed fatty liver and her most recent liver panel was in April and normal  Her last hemoglobin A1c was 7 7%  For her nausea and vomiting she will continue the Zofran as previously prescribed  I did tell her to do protein drink to help with her protein intake  I did encourage her to lose weight  I also encouraged her to get her A1c after below 6%        The following portions of the patient's history were reviewed and updated as appropriate: She  has a past medical history of Angina pectoris ([address] ); Anxiety; Arthritis; Chronic headaches; Diabetes ([address] ); Diabetes mellitus ([address] ); GERD (gastroesophageal reflux disease); Headache; Hyperlipidemia; Kidney stone; Left bundle branch block; MI (myocardial infarction) ([address] ); RLS (restless legs syndrome); and Shortness of breath    She   Patient Active Problem List    Diagnosis Date Noted    Medical marijuana use 09/11/2018    Other chronic pain 09/11/2018    Gastroesophageal reflux disease with esophagitis 09/11/2018    Bilious vomiting with nausea 09/11/2018    Esophageal dysphagia 09/11/2018    Dysphagia 09/11/2018    Gastroparesis due to DM ([address] ) 09/11/2018    Fatty liver 09/11/2018    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 09/11/2018    Status post robotic assisted total laparoscopic hysterectomy, bilateral salpingo-oophorectomy 05/22/2018    Facet arthropathy, lumbosacral ([facility] ) 2018    Lumbar spondylosis 2018    Chronic midline low back pain with bilateral sciatica 2018    Ovarian neoplasm 2018     She  has a past surgical history that includes Breast surgery; Kidney stone surgery;  section; Wrist surgery (Right); LAPAROSCOPY; pr colonoscopy flx dx w/collj spec when pfrmd (N/A, 2017); Colonoscopy; Upper gastrointestinal endoscopy; and pr laparoscopy w tot hysterectuterus <=250 gram  w tube/ovary (N/A, 2018)  Her family history includes Brain cancer (age of onset: 67) in her maternal aunt; Breast cancer (age of onset: 22) in her other; Breast cancer (age of onset: 39) in her mother; Diabetes in her mother and sister; Lung cancer (age of onset: 47) in her father  She  reports that she has been smoking  She has a 22 00 pack-year smoking history  She has never used smokeless tobacco  She reports that she uses drugs, including Marijuana, about   5 times per week  She reports that she does not drink alcohol    Current Outpatient Prescriptions   Medication Sig Dispense Refill    acetaminophen-codeine (TYLENOL with CODEINE #4) 300-60 MG per tablet Take 1 tablet by mouth every 4 (four) hours as needed for moderate pain      albuterol (PROVENTIL HFA,VENTOLIN HFA) 90 mcg/act inhaler Inhale 2 puffs every 6 (six) hours as needed for wheezing      ALPRAZolam (XANAX) 1 mg tablet Take by mouth daily at bedtime as needed for anxiety      atorvastatin (LIPITOR) 40 mg tablet Take 40 mg by mouth daily      diclofenac (VOLTAREN) 75 mg EC tablet Take 50 mg by mouth 2 (two) times a day      FLUoxetine (PROzac) 20 MG tablet Take 20 mg by mouth daily      gabapentin (NEURONTIN) 300 mg capsule Take 100 mg by mouth 3 (three) times a day      Liraglutide 18 MG/3ML SOPN Inject under the skin      lisinopril (ZESTRIL) 5 mg tablet Take 5 mg by mouth daily      meloxicam (MOBIC) 15 mg tablet Take 1 tablet (15 mg total) by mouth daily 30 tablet 0    methocarbamol (ROBAXIN) 750 mg tablet Take 500 mg by mouth every 6 (six) hours as needed for muscle spasms      metoprolol succinate (TOPROL-XL) 100 mg 24 hr tablet Take 50 mg by mouth daily Take day of surgery       nortriptyline (PAMELOR) 10 mg capsule Take 10 mg by mouth daily as needed        ondansetron (ZOFRAN) 4 mg tablet TAKE 1 TABLET BY MOUTH EVERY 8 HOURS AS NEEDED FOR NAUSEA 20 tablet 2    potassium chloride SA (KLOR-CON M15) 15 MEQ tablet Take 15 mEq by mouth 2 (two) times a day      repaglinide (PRANDIN) 1 mg tablet Take 1 mg by mouth 3 (three) times a day before meals      zolpidem (AMBIEN) 10 mg tablet Take 10 mg by mouth daily at bedtime as needed for sleep      Butalbital-APAP-Caffeine (FIORICET) -40 MG CAPS Take by mouth      cyclobenzaprine (FLEXERIL) 10 mg tablet Take 10 mg by mouth 3 (three) times a day as needed for muscle spasms      diazepam (VALIUM) 5 mg tablet Take 5 mg by mouth daily as needed for anxiety      lidocaine (LMX) 4 % cream Apply topically 2 (two) times a day (Patient not taking: Reported on 9/11/2018 ) 100 g 0    metoclopramide (REGLAN) 5 mg/5 mL oral solution Take 5 mL (5 mg total) by mouth 3 (three) times a day 120 mL 0    pantoprazole (PROTONIX) 40 mg tablet Take 1 tablet (40 mg total) by mouth daily 90 tablet 0    ranitidine (ZANTAC) 150 MG/10ML syrup Take 20 mL (300 mg total) by mouth daily 300 mL 1    sertraline (ZOLOFT) 50 mg tablet Take 100 mg by mouth daily        traMADol (ULTRAM) 50 mg tablet Take 1 tablet (50 mg total) by mouth every 8 (eight) hours as needed for moderate pain (Patient not taking: Reported on 9/11/2018 ) 30 tablet 0    varenicline (CHANTIX) 0 5 mg tablet Take 0 5 mg by mouth 2 (two) times a day       No current facility-administered medications for this visit        Current Outpatient Prescriptions on File Prior to Visit   Medication Sig    albuterol (PROVENTIL HFA,VENTOLIN HFA) 90 mcg/act inhaler Inhale 2 puffs every 6 (six) hours as needed for wheezing    ALPRAZolam (XANAX) 1 mg tablet Take by mouth daily at bedtime as needed for anxiety    Liraglutide 18 MG/3ML SOPN Inject under the skin    meloxicam (MOBIC) 15 mg tablet Take 1 tablet (15 mg total) by mouth daily    metoprolol succinate (TOPROL-XL) 100 mg 24 hr tablet Take 50 mg by mouth daily Take day of surgery     nortriptyline (PAMELOR) 10 mg capsule Take 10 mg by mouth daily as needed      ondansetron (ZOFRAN) 4 mg tablet TAKE 1 TABLET BY MOUTH EVERY 8 HOURS AS NEEDED FOR NAUSEA    repaglinide (PRANDIN) 1 mg tablet Take 1 mg by mouth 3 (three) times a day before meals    zolpidem (AMBIEN) 10 mg tablet Take 10 mg by mouth daily at bedtime as needed for sleep    [DISCONTINUED] pantoprazole (PROTONIX) 20 mg tablet TAKE 1 TABLET BY MOUTH TWICE A DAY    Butalbital-APAP-Caffeine (FIORICET) -40 MG CAPS Take by mouth    cyclobenzaprine (FLEXERIL) 10 mg tablet Take 10 mg by mouth 3 (three) times a day as needed for muscle spasms    diazepam (VALIUM) 5 mg tablet Take 5 mg by mouth daily as needed for anxiety    lidocaine (LMX) 4 % cream Apply topically 2 (two) times a day (Patient not taking: Reported on 9/11/2018 )    sertraline (ZOLOFT) 50 mg tablet Take 100 mg by mouth daily      traMADol (ULTRAM) 50 mg tablet Take 1 tablet (50 mg total) by mouth every 8 (eight) hours as needed for moderate pain (Patient not taking: Reported on 9/11/2018 )    varenicline (CHANTIX) 0 5 mg tablet Take 0 5 mg by mouth 2 (two) times a day     No current facility-administered medications on file prior to visit  She is allergic to other and prednisone       Review of Systems   HENT: Positive for trouble swallowing  Negative for sore throat and voice change  Eyes: Negative  Respiratory: Positive for shortness of breath  Cardiovascular: Negative  Gastrointestinal: Positive for abdominal distention, abdominal pain, nausea and vomiting  Musculoskeletal: Positive for arthralgias, back pain and myalgias  Objective:      /72   Pulse 70   Ht 5' 1" (1 549 m)   Wt 78 kg (172 lb)   BMI 32 50 kg/m²          Physical Exam   Constitutional: She is oriented to person, place, and time  She appears well-developed and well-nourished  obesity   Eyes: No scleral icterus  Cardiovascular: Normal rate and regular rhythm  Pulmonary/Chest: Effort normal and breath sounds normal    Abdominal: Soft  Bowel sounds are normal    Lymphadenopathy:     She has no cervical adenopathy  Neurological: She is alert and oriented to person, place, and time  Skin: Skin is warm and dry  Psychiatric: She has a normal mood and affect

## 2018-09-11 NOTE — PATIENT INSTRUCTIONS
Pantoprazole 40 mg in the evening after dinner  Ranitidine 300 mg in the morning  Reglan 5 mg before meals- breakfast lunch and dinner- no need to take if you do not eat- can cause twitching if it does please stop the medication and call me  Zofran for nausea  Small frequent meals with protein

## 2018-10-03 NOTE — PROGRESS NOTES
PT DISCHARGE    Today's date: 10/3/2018  Patient name: Sonia Barrett  : 1965  MRN: [id number]  Referring provider: Kira Sood MD  Dx:   Encounter Diagnosis     ICD-10-CM    1  Cervical disc disorder, unspecified, unspecified cervical region M50 [id number]        Start Time: 1250  Stop Time: 0155  Total time in clinic (min): 785 minutes    Assessment  Impairments: abnormal or restricted ROM, activity intolerance, impaired physical strength, lacks appropriate home exercise program and pain with function    Assessment details: Pt presented with increased c-spine and RUE pain for the past 2-3 weeks  Reports c-spine pain prior but not as intense  Pt did receive injection to right c-spine this morning which did help decrease intensity of symptoms  She reported continued pain  PT notes limited c-spine strength and ROM  Pt was hypersensitive to light palpation right c-spine/upper trap regions/scapular regions  Pt attended 3 total PT sessions  Last session was on 18  She did not return after that time  D/C PT services  Prognosis: fair    Goals  ST  Decrease pain 25-50% 4 wk  2  Increase c-spine ROM by 10-20 degrees 4 wk  3  Increase c-spine strength to 4/5 4 wk  4  Increase BUE strength to 4+/5 4 wk  LT  Pt will report 50-75% decrease in pain 8 wk  2  Increase c-spine ROM to WNL 8 wk  3  Increase c-spine strength to  8 wk  4    Pt will report no limitations with ADL's 8 wk      Plan  Patient would benefit from: PT eval  Planned modality interventions: cryotherapy, thermotherapy: hydrocollator packs, ultrasound and unattended electrical stimulation  Planned therapy interventions: joint mobilization, manual therapy, strengthening, stretching, therapeutic activities, therapeutic exercise, home exercise program, functional ROM exercises and flexibility  Frequency: 3x week  Duration in weeks: 8  Treatment plan discussed with: patient        Subjective Evaluation    History of Present Illness  Mechanism of injury: Pt reports pain in c-spine and RUE began approx 2-3 weeks ago after having blood work  Reports pain prior but not this severe  Had injection to c-spine today which did help with symptoms  Reports decreased pain c-spine/RUE but symptoms do continue  Reports altered sensation right hand due to CTS  Reports medical marijuana for L-spine which provides only short term relief of symptoms  Taking Tylenol with codiene as well      Pain  Current pain ratin  At best pain ratin  At worst pain rating: 10  Location: Right c-spine/RUE  Quality: sharp    Hand dominance: right  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            Objective     Postural Observations    Additional Postural Observation Details  Forward head  Forward rounded shoulders  Band aid in place right lower c-spine from injection today    Tenderness     Additional Tenderness Details  Hypersensitive to light paplaption right c-spine paraspinal mm, right upper and mid trap, right scapula, right upper arm to elbow all aspects    Active Range of Motion   Cervical/Thoracic Spine   Cervical    Flexion: 35 degrees with pain  Extension: 10 degrees with pain  Left lateral flexion: 40 degrees with pain  Right lateral flexion: 20 degrees with pain  Left rotation: 55 degrees with pain  Right rotation: 40 degrees with pain    Additional Active Range of Motion Details  Pain right c-spine all movements    Strength/Myotome Testing   Cervical Spine   Neck extension: 4-  Neck flexion: 4-    Left   Neck lateral flexion (C3): 4-    Right etr  Neck lateral flexion (C3): 4-    Left Shoulder     Planes of Motion   Flexion: 4   Abduction: 4   External rotation at 0°: 4   Internal rotation at 0°: 4     Isolated Muscles   Upper trapezius: 4-     Right Shoulder     Planes of Motion   Flexion: 4   Abduction: 4   External rotation at 0°: 4   Internal rotation at 0°: 4     Isolated Muscles   Upper trapezius: 4-     Left Elbow   Flexion: 4  Extension: 4    Right Elbow   Flexion: 4  Extension: 4    Additional Strength Details  Pain right c-spine with all resisted head movements    Tests   Cervical     Left   Negative cervical distraction and cervical compression test       Right   Negative cervical distraction and cervical compression test       Additional Tests Details  MM spasms noted right c-spine paraspinal mm and upper trap region

## 2018-10-09 ENCOUNTER — ANESTHESIA EVENT (OUTPATIENT)
Dept: PERIOP | Facility: HOSPITAL | Age: 53
End: 2018-10-09
Payer: COMMERCIAL

## 2018-10-09 NOTE — ANESTHESIA PREPROCEDURE EVALUATION
Review of Systems/Medical History  Patient summary reviewed  Chart reviewed      Cardiovascular  EKG reviewed, Hyperlipidemia, Hypertension , Past MI , Dysrhythmias (LBBB) , Angina ,   Comment: Cardiac cath 5/9/18:   -  Left main: Normal   -  LAD: The vessel was normal sized  Angiography showed mild atherosclerosis  --  Mid LAD: There was a tubular 40 % stenosis  The lesion was irregularly contoured  --  Circumflex: The vessel was normal sized  Angiography showed mild atherosclerosis  --  1st obtuse marginal: The vessel was normal sized  Angiography showed mild atherosclerosis  --  RCA: The vessel was large sized (dominant)  Angiography showed mild atherosclerosis  --  Right PDA: The vessel was normal sized  Angiography showed minor luminal irregularities  --  Right posterolateral segment: The vessel was normal sized  Angiography showed minor luminal irregularities  Stress test 5/3/18  - There was no chest pain during stress  -  ECG conclusions: The stress ECG was non-diagnostic due to baseline left bundle branch block  -  Gated SPECT: The calculated left ventricular ejection fraction was 37 %  There was mild to moderate global left ventricular hypokinesis      IMPRESSIONS: Abnormal study  Small to moderate sized, medium intensity anterior defect with some reversibilty which could represent ischemia  Left ventricular systolic function was reduced, with global hypokinesis  ECHO 5/2/18: LEFT VENTRICLE:  Ejection fraction was estimated to be 40 %  Dyssynchronous septal motion likely secondary to LBBB  There was mild diffuse hypokinesis      MITRAL VALVE:  There was trace regurgitation  ,  Pulmonary  Smoker (long term use of marijuana) cigarette smoker  , Tobacco cessation counseling given , Shortness of breath,        GI/Hepatic    GERD , Liver disease ,        Kidney stones,        Endo/Other  Diabetes ,      GYN       Hematology   Musculoskeletal    Comment: Restless leg syndrome Arthritis Neurology    Headaches,    Psychology   Anxiety, Depression ,              Physical Exam    Airway    Mallampati score: II  TM Distance: >3 FB  Neck ROM: full     Dental       Cardiovascular  Cardiovascular exam normal    Pulmonary  Pulmonary exam normal     Other Findings        Anesthesia Plan  ASA Score- 3     Anesthesia Type- IV sedation with anesthesia with ASA Monitors  Additional Monitors:   Airway Plan:     Comment: Chronic marijuana user  Plan Factors-    Induction- intravenous  Postoperative Plan-     Informed Consent- Anesthetic plan and risks discussed with patient  I personally reviewed this patient with the CRNA  Discussed and agreed on the Anesthesia Plan with the CRNA  Jarett Capone

## 2018-10-10 ENCOUNTER — ANESTHESIA (OUTPATIENT)
Dept: PERIOP | Facility: HOSPITAL | Age: 53
End: 2018-10-10
Payer: COMMERCIAL

## 2018-10-10 ENCOUNTER — HOSPITAL ENCOUNTER (OUTPATIENT)
Facility: HOSPITAL | Age: 53
Setting detail: OUTPATIENT SURGERY
Discharge: HOME | End: 2018-10-10
Attending: INTERNAL MEDICINE | Admitting: INTERNAL MEDICINE
Payer: COMMERCIAL

## 2018-10-10 VITALS
RESPIRATION RATE: 21 BRPM | WEIGHT: 171.13 LBS | SYSTOLIC BLOOD PRESSURE: 118 MMHG | BODY MASS INDEX: 32.31 KG/M2 | HEART RATE: 67 BPM | DIASTOLIC BLOOD PRESSURE: 58 MMHG | HEIGHT: 61 IN | TEMPERATURE: 97.6 F | OXYGEN SATURATION: 96 %

## 2018-10-10 DIAGNOSIS — R13.10 DYSPHAGIA, UNSPECIFIED TYPE: ICD-10-CM

## 2018-10-10 LAB — GLUCOSE SERPL-MCNC: 174 MG/DL (ref 65–140)

## 2018-10-10 PROCEDURE — 88342 IMHCHEM/IMCYTCHM 1ST ANTB: CPT | Performed by: PATHOLOGY

## 2018-10-10 PROCEDURE — 82948 REAGENT STRIP/BLOOD GLUCOSE: CPT

## 2018-10-10 PROCEDURE — 88305 TISSUE EXAM BY PATHOLOGIST: CPT | Performed by: PATHOLOGY

## 2018-10-10 PROCEDURE — 43239 EGD BIOPSY SINGLE/MULTIPLE: CPT | Performed by: INTERNAL MEDICINE

## 2018-10-10 RX ORDER — PROPOFOL 10 MG/ML
INJECTION, EMULSION INTRAVENOUS AS NEEDED
Status: DISCONTINUED | OUTPATIENT
Start: 2018-10-10 | End: 2018-10-10 | Stop reason: SURG

## 2018-10-10 RX ORDER — SODIUM CHLORIDE, SODIUM LACTATE, POTASSIUM CHLORIDE, CALCIUM CHLORIDE 600; 310; 30; 20 MG/100ML; MG/100ML; MG/100ML; MG/100ML
125 INJECTION, SOLUTION INTRAVENOUS CONTINUOUS
Status: DISCONTINUED | OUTPATIENT
Start: 2018-10-10 | End: 2018-10-10 | Stop reason: HOSPADM

## 2018-10-10 RX ORDER — LIDOCAINE HYDROCHLORIDE 10 MG/ML
INJECTION, SOLUTION INFILTRATION; PERINEURAL AS NEEDED
Status: DISCONTINUED | OUTPATIENT
Start: 2018-10-10 | End: 2018-10-10 | Stop reason: SURG

## 2018-10-10 RX ORDER — DULOXETIN HYDROCHLORIDE 60 MG/1
20 CAPSULE, DELAYED RELEASE ORAL DAILY
COMMUNITY
End: 2022-01-19 | Stop reason: ALTCHOICE

## 2018-10-10 RX ORDER — KETAMINE HYDROCHLORIDE 50 MG/ML
INJECTION, SOLUTION, CONCENTRATE INTRAMUSCULAR; INTRAVENOUS AS NEEDED
Status: DISCONTINUED | OUTPATIENT
Start: 2018-10-10 | End: 2018-10-10 | Stop reason: SURG

## 2018-10-10 RX ADMIN — PROPOFOL 20 MG: 10 INJECTION, EMULSION INTRAVENOUS at 07:29

## 2018-10-10 RX ADMIN — SODIUM CHLORIDE, SODIUM LACTATE, POTASSIUM CHLORIDE, AND CALCIUM CHLORIDE 125 ML/HR: .6; .31; .03; .02 INJECTION, SOLUTION INTRAVENOUS at 07:11

## 2018-10-10 RX ADMIN — LIDOCAINE HYDROCHLORIDE 50 MG: 10 INJECTION, SOLUTION INFILTRATION; PERINEURAL at 07:26

## 2018-10-10 RX ADMIN — PROPOFOL 100 MG: 10 INJECTION, EMULSION INTRAVENOUS at 07:26

## 2018-10-10 RX ADMIN — KETAMINE HYDROCHLORIDE 20 MG: 50 INJECTION INTRAMUSCULAR; INTRAVENOUS at 07:26

## 2018-10-10 NOTE — DISCHARGE INSTR - AVS FIRST PAGE
Postoperative Note  PATIENT NAME: Amie Lee  : 1965  MRN: [id number]  [facility] ROOM 01    Surgery Date: 10/10/2018    POST-OP DIAGNOSIS: See the impression below    SEDATION: Monitored anesthesia care, check anesthesia records    PHYSICAL EXAM:  Vitals:    10/10/18 0655   BP: 122/53   Pulse: 58   Resp: 21   Temp: (!) 97 4 °F (36 3 °C)   SpO2: 95%     Body mass index is 32 33 kg/m²  General: NAD  Heart: S1 & S2 normal, RRR  Lungs: CTA, No rales or rhonchi  Abdomen: Soft, nontender, nondistended, good bowel sounds    CONSENT:  Informed consent was obtained for the procedure, including sedation after explaining the risks and benefits of the procedure  Risks including but not limited to bleeding, perforation, infection, aspiration were discussed in detail  Also explained about less than 100%$ sensitivity with the exam and other alternatives  DESCRIPTION:   Procedure:  Esophagogastroduodenoscopy with biopsy  66021    Indications:  68-year-old female with dysphagia, nausea, vomiting    ASA 3    Premedicated with mac anesthesia    Patient is identified by me  She is examined prior to the procedure and found to be in stable condition  She is attached to the cardiac monitor and pulse oximeter and placed in the left lateral position  After adequate intravenous sedation the Olympus video gastroscope was inserted under direct vision into the esophagus  The esophageal mucosa is essentially unremarkable throughout its length  There is slightly irregular Z-line at 33 cm from the incisors  Beyond this there is a 2 cm hiatal hernia with gastric mucosa  The stomach entered  The body and antrum normal   The pylorus is normal   The duodenum was cannulated into the 3rd portion and is normal   Retroversion reveals a normal GE junction fundus and cardia  Biopsies taken of the antrum body fundus with excellent hemostasis  Biopsies then taken of the distal and proximal esophagus with excellent hemostasis    Patient tolerated the procedure well and was stable throughout  Impression:  Hiatal hernia status post gastric and esophageal biopsies    RECOMMENDATIONS:  Follow up biopsy results in 1 week  Patient is advised to stop smoking marijuana as her vomiting is closely associated with this  Follow up with Rakesh Abreu in 3-4 weeks  Continue current medical management  COMPLICATIONS:  None; patient tolerated the procedure well  DISPOSITION: PACU  CONDITION: Stable    Katie Mills MD  10/10/2018,7:30 AM        Portions of the record may have been created with voice recognition software   Occasional wrong word or "sound a like" substitutions may have occurred due to the inherent limitations of voice recognition software   Read the chart carefully and recognize, using context, where substitutions have occurred

## 2018-10-10 NOTE — DISCHARGE INSTRUCTIONS
Upper Endoscopy   WHAT YOU NEED TO KNOW:   An upper endoscopy is also called an upper gastrointestinal (GI) endoscopy, or an esophagogastroduodenoscopy (EGD)  You may feel bloated, gassy, or have some abdominal discomfort after your procedure  Your throat may be sore for 24 to 36 hours  You may burp or pass gas from air that is still inside your body  DISCHARGE INSTRUCTIONS:   Call 911 if:   · You have sudden chest pain or trouble breathing  Seek care immediately if:   · You feel dizzy or faint  · You have trouble swallowing  · You have severe throat pain  · Your bowel movements are very dark or black  · Your abdomen is hard and firm and you have severe pain  · You vomit blood  Contact your healthcare provider if:   · You feel full or bloated and cannot burp or pass gas  · You have not had a bowel movement for 3 days after your procedure  · You have neck pain  · You have a fever or chills  · You have nausea or are vomiting  · You have a rash or hives  · You have questions or concerns about your endoscopy  Relieve a sore throat:  Suck on throat lozenges or crushed ice  Gargle with a small amount of warm salt water  Mix 1 teaspoon of salt and 1 cup of warm water to make salt water  Relieve gas and discomfort from bloating:  Lie on your right side with a heating pad on your abdomen  Take short walks to help pass gas  Eat small meals until bloating is relieved  Rest after your procedure:  Do not drive or make important decisions until the day after your procedure  Return to your normal activity as directed  You can usually return to work the day after your procedure  Follow up with your healthcare provider as directed:  Write down your questions so you remember to ask them during your visits  © 2017 [employer]  Information is for End User's use only and may not be sold, redistributed or otherwise used for commercial purposes   All illustrations and images included in [employer] are the copyrighted property of [employer] , [employer]  or [employer]  The above information is an  only  It is not intended as medical advice for individual conditions or treatments  Talk to your doctor, nurse or pharmacist before following any medical regimen to see if it is safe and effective for you

## 2018-10-10 NOTE — ANESTHESIA POSTPROCEDURE EVALUATION
Post-Op Assessment Note      CV Status:  Stable    Mental Status:  Somnolent    Hydration Status:  Euvolemic    PONV Controlled:  Controlled    Airway Patency:  Patent    Post Op Vitals Reviewed: Yes          Staff: CRNA           /55 (10/10/18 0734)    Temp 97 6 °F (36 4 °C) (10/10/18 0734)    Pulse 73 (10/10/18 0734)   Resp 13 (10/10/18 0734)    SpO2 92 % (10/10/18 0734)

## 2018-10-10 NOTE — OP NOTE
Postoperative Note  PATIENT NAME: Shelly Sanchez  : 1965  MRN: [id number]  [facility] 01    Surgery Date: 10/10/2018    POST-OP DIAGNOSIS: See the impression below    SEDATION: Monitored anesthesia care, check anesthesia records    PHYSICAL EXAM:  Vitals:    10/10/18 0655   BP: 122/53   Pulse: 58   Resp: 21   Temp: (!) 97 4 °F (36 3 °C)   SpO2: 95%     Body mass index is 32 33 kg/m²  General: NAD  Heart: S1 & S2 normal, RRR  Lungs: CTA, No rales or rhonchi  Abdomen: Soft, nontender, nondistended, good bowel sounds    CONSENT:  Informed consent was obtained for the procedure, including sedation after explaining the risks and benefits of the procedure  Risks including but not limited to bleeding, perforation, infection, aspiration were discussed in detail  Also explained about less than 100%$ sensitivity with the exam and other alternatives  DESCRIPTION:   Procedure:  Esophagogastroduodenoscopy with biopsy  [id number]    Indications:  51-year-old female with dysphagia, nausea, vomiting    ASA 3    Premedicated with mac anesthesia    Patient is identified by me  She is examined prior to the procedure and found to be in stable condition  She is attached to the cardiac monitor and pulse oximeter and placed in the left lateral position  After adequate intravenous sedation the Olympus video gastroscope was inserted under direct vision into the esophagus  The esophageal mucosa is essentially unremarkable throughout its length  There is slightly irregular Z-line at 33 cm from the incisors  Beyond this there is a 2 cm hiatal hernia with gastric mucosa  The stomach entered  The body and antrum normal   The pylorus is normal   The duodenum was cannulated into the 3rd portion and is normal   Retroversion reveals a normal GE junction fundus and cardia  Biopsies taken of the antrum body fundus with excellent hemostasis  Biopsies then taken of the distal and proximal esophagus with excellent hemostasis    Patient tolerated the procedure well and was stable throughout  Impression:  Hiatal hernia status post gastric and esophageal biopsies    RECOMMENDATIONS:  Follow up biopsy results in 1 week  Patient is advised to stop smoking marijuana as her vomiting is closely associated with this  Follow up with Shelvy Shone in 3-4 weeks  Continue current medical management  COMPLICATIONS:  None; patient tolerated the procedure well  DISPOSITION: PACU  CONDITION: Stable    Juarez Schilling MD  10/10/2018,7:30 AM        Portions of the record may have been created with voice recognition software   Occasional wrong word or "sound a like" substitutions may have occurred due to the inherent limitations of voice recognition software   Read the chart carefully and recognize, using context, where substitutions have occurred

## 2018-10-18 ENCOUNTER — TELEPHONE (OUTPATIENT)
Dept: GASTROENTEROLOGY | Facility: CLINIC | Age: 53
End: 2018-10-18

## 2018-11-13 ENCOUNTER — OFFICE VISIT (OUTPATIENT)
Dept: GASTROENTEROLOGY | Facility: CLINIC | Age: 53
End: 2018-11-13
Payer: COMMERCIAL

## 2018-11-13 VITALS
HEART RATE: 78 BPM | WEIGHT: 177.6 LBS | RESPIRATION RATE: 18 BRPM | DIASTOLIC BLOOD PRESSURE: 74 MMHG | BODY MASS INDEX: 33.53 KG/M2 | HEIGHT: 61 IN | SYSTOLIC BLOOD PRESSURE: 134 MMHG

## 2018-11-13 DIAGNOSIS — K59.03 DRUG-INDUCED CONSTIPATION: ICD-10-CM

## 2018-11-13 DIAGNOSIS — K31.84 GASTROPARESIS DUE TO DM (HCC): ICD-10-CM

## 2018-11-13 DIAGNOSIS — R13.13 PHARYNGEAL DYSPHAGIA: Primary | ICD-10-CM

## 2018-11-13 DIAGNOSIS — Z79.899 MEDICAL MARIJUANA USE: ICD-10-CM

## 2018-11-13 DIAGNOSIS — K76.0 FATTY LIVER: ICD-10-CM

## 2018-11-13 DIAGNOSIS — R11.14 BILIOUS VOMITING WITH NAUSEA: ICD-10-CM

## 2018-11-13 DIAGNOSIS — R74.8 ELEVATED LIVER ENZYMES: ICD-10-CM

## 2018-11-13 DIAGNOSIS — E11.43 GASTROPARESIS DUE TO DM (HCC): ICD-10-CM

## 2018-11-13 PROCEDURE — 99214 OFFICE O/P EST MOD 30 MIN: CPT | Performed by: NURSE PRACTITIONER

## 2018-11-13 NOTE — PROGRESS NOTES
Assessment/Plan:    A normal endoscopy except for a hiatal hernia  Patient with pharyngeal dysphagia for liquids and solids:  Esophageal manometry  Continue PPI therapy  Incorporate Gaviscon    Fatty liver with minimal elevation in ALT  She has been eating more sweets and ice cream since beginning medicinal marijuana: We discussed having protein when she smokes her medicinal marijuana and not smoking or eating least 3 hours before bed so she does not wake up gagging on acid    Constipation: We will try Linzess 145 mcg  If that does not work we will try more specific drug-induced constipation medications    Diabetic gastroparesis  Continue Reglan    Three months follow-up       Diagnoses and all orders for this visit:    Pharyngeal dysphagia  -     Esophageal manometry; Future  -     aluminum hydroxide-magnesium carbonate (GAVISCON)  mg/15 mL oral suspension; Take 15 mL by mouth 4 (four) times daily (after meals and at bedtime)    Gastroparesis due to DM (HCC)  -     aluminum hydroxide-magnesium carbonate (GAVISCON)  mg/15 mL oral suspension; Take 15 mL by mouth 4 (four) times daily (after meals and at bedtime)    Bilious vomiting with nausea  -     Hepatic function panel; Future  -     aluminum hydroxide-magnesium carbonate (GAVISCON)  mg/15 mL oral suspension; Take 15 mL by mouth 4 (four) times daily (after meals and at bedtime)    Medical marijuana use  -     aluminum hydroxide-magnesium carbonate (GAVISCON)  mg/15 mL oral suspension; Take 15 mL by mouth 4 (four) times daily (after meals and at bedtime)    Fatty liver  -     Hepatic function panel; Future  -     aluminum hydroxide-magnesium carbonate (GAVISCON)  mg/15 mL oral suspension; Take 15 mL by mouth 4 (four) times daily (after meals and at bedtime)    Elevated liver enzymes  -     Hepatic function panel; Future  -     aluminum hydroxide-magnesium carbonate (GAVISCON)  mg/15 mL oral suspension;  Take 15 mL by mouth 4 (four) times daily (after meals and at bedtime)    Drug-induced constipation  -     Linaclotide (LINZESS) 145 MCG CAPS; Take 1 capsule (145 mcg total) by mouth daily            Subjective:      Patient ID: Sophie Pizarro is a 48 y o  female  Has more recently began metal medical marijuana and has been eating more sweets and so her weight has climbed up as well as her liver enzymes  ALT was just slightly above normal with a normal AST and alkaline phosphatase  She has chronic pain in the medical marijuana does help but when she eats a lot of food 2 hours before bedtime she is waking up with GERD  She does report dysphagia for solids and liquids and choking on water  We did discuss central adiposity and held decreasing that will help improve her hiatal hernia as well as her blood sugar and her liver enzymes  She is up-to-date on her ultrasound of her liver  She feels a tickle in her throat when she eats and so we will try Gaviscon for that  She will continue with PPI, Reglan and we will try Linzess for her constipation because she has had long-term constipation even before the pain medications  If that does not work we will try medicines more specific for drug induced constipation  The following portions of the patient's history were reviewed and updated as appropriate: She  has a past medical history of Angina pectoris ([address] ); Anxiety; Arthritis; Chronic headaches; Diabetes ([address] ); Diabetes mellitus ([address] ); GERD (gastroesophageal reflux disease); Headache; Hyperlipidemia; Kidney stone; Left bundle branch block; MI (myocardial infarction) ([address] ); PTSD (post-traumatic stress disorder); RLS (restless legs syndrome); and Shortness of breath    She   Patient Active Problem List    Diagnosis Date Noted    Drug-induced constipation 11/13/2018    Elevated liver enzymes 11/13/2018    Medical marijuana use 09/11/2018    Other chronic pain 09/11/2018    Gastroesophageal reflux disease with esophagitis 2018    Bilious vomiting with nausea 2018    Esophageal dysphagia 2018    Dysphagia 2018    Gastroparesis due to DM ([facility] ) 2018    Fatty liver 2018    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 2018    Status post robotic assisted total laparoscopic hysterectomy, bilateral salpingo-oophorectomy 2018    Facet arthropathy, lumbosacral 2018    Lumbar spondylosis 2018    Chronic midline low back pain with bilateral sciatica 2018    Ovarian neoplasm 2018     She  has a past surgical history that includes Breast surgery; Kidney stone surgery;  section; Wrist surgery (Right); LAPAROSCOPY; pr colonoscopy flx dx w/collj spec when pfrmd (N/A, 2017); Colonoscopy; Upper gastrointestinal endoscopy; pr laparoscopy w tot hysterectuterus <=250 gram  w tube/ovary (N/A, 2018); Hysterectomy; pr esophagogastroduodenoscopy transoral diagnostic (N/A, 10/10/2018); and Cardiac catheterization (2018)  Her family history includes Brain cancer (age of onset: 67) in her maternal aunt; Breast cancer (age of onset: 22) in her other; Breast cancer (age of onset: 39) in her mother; Diabetes in her mother and sister; Lung cancer (age of onset: 47) in her father  She  reports that she has been smoking  She has a 22 00 pack-year smoking history  She has never used smokeless tobacco  She reports that she uses drugs, including Marijuana, about 7 times per week  She reports that she does not drink alcohol    Current Outpatient Prescriptions   Medication Sig Dispense Refill    acetaminophen-codeine (TYLENOL with CODEINE #4) 300-60 MG per tablet Take 1 tablet by mouth every 4 (four) hours as needed for moderate pain      albuterol (PROVENTIL HFA,VENTOLIN HFA) 90 mcg/act inhaler Inhale 2 puffs every 6 (six) hours as needed for wheezing      ALPRAZolam (XANAX) 1 mg tablet Take by mouth daily at bedtime as needed for anxiety      atorvastatin (LIPITOR) 40 mg tablet Take 40 mg by mouth daily      Butalbital-APAP-Caffeine (FIORICET) -40 MG CAPS Take by mouth      citric acid-potassium citrate (POLYCITRA K) 1,100-334 mg/5 mL solution Take by mouth 3 (three) times a day with meals      diclofenac (VOLTAREN) 75 mg EC tablet Take 50 mg by mouth 2 (two) times a day      DULoxetine (CYMBALTA) 60 mg delayed release capsule Take 20 mg by mouth daily      gabapentin (NEURONTIN) 300 mg capsule Take 100 mg by mouth 3 (three) times a day      Liraglutide 18 MG/3ML SOPN Inject under the skin      lisinopril (ZESTRIL) 5 mg tablet Take 5 mg by mouth daily      methocarbamol (ROBAXIN) 750 mg tablet Take 500 mg by mouth every 6 (six) hours as needed for muscle spasms      metoclopramide (REGLAN) 5 mg/5 mL oral solution Take 5 mL (5 mg total) by mouth 3 (three) times a day 120 mL 0    pantoprazole (PROTONIX) 40 mg tablet Take 1 tablet (40 mg total) by mouth daily 90 tablet 0    potassium chloride SA (KLOR-CON M15) 15 MEQ tablet Take 15 mEq by mouth 2 (two) times a day      ranitidine (ZANTAC) 150 MG/10ML syrup Take 20 mL (300 mg total) by mouth daily 300 mL 1    repaglinide (PRANDIN) 1 mg tablet Take 1 mg by mouth 3 (three) times a day before meals      varenicline (CHANTIX) 0 5 mg tablet Take 0 5 mg by mouth 2 (two) times a day      zolpidem (AMBIEN) 10 mg tablet Take 10 mg by mouth daily at bedtime as needed for sleep      aluminum hydroxide-magnesium carbonate (GAVISCON)  mg/15 mL oral suspension Take 15 mL by mouth 4 (four) times daily (after meals and at bedtime) 1 Bottle 0    Linaclotide (LINZESS) 145 MCG CAPS Take 1 capsule (145 mcg total) by mouth daily 30 capsule 6     No current facility-administered medications for this visit        Current Outpatient Prescriptions on File Prior to Visit   Medication Sig    acetaminophen-codeine (TYLENOL with CODEINE #4) 300-60 MG per tablet Take 1 tablet by mouth every 4 (four) hours as needed for moderate pain    albuterol (PROVENTIL HFA,VENTOLIN HFA) 90 mcg/act inhaler Inhale 2 puffs every 6 (six) hours as needed for wheezing    ALPRAZolam (XANAX) 1 mg tablet Take by mouth daily at bedtime as needed for anxiety    atorvastatin (LIPITOR) 40 mg tablet Take 40 mg by mouth daily    Butalbital-APAP-Caffeine (FIORICET) -40 MG CAPS Take by mouth    citric acid-potassium citrate (POLYCITRA K) 1,100-334 mg/5 mL solution Take by mouth 3 (three) times a day with meals    diclofenac (VOLTAREN) 75 mg EC tablet Take 50 mg by mouth 2 (two) times a day    DULoxetine (CYMBALTA) 60 mg delayed release capsule Take 20 mg by mouth daily    gabapentin (NEURONTIN) 300 mg capsule Take 100 mg by mouth 3 (three) times a day    Liraglutide 18 MG/3ML SOPN Inject under the skin    lisinopril (ZESTRIL) 5 mg tablet Take 5 mg by mouth daily    methocarbamol (ROBAXIN) 750 mg tablet Take 500 mg by mouth every 6 (six) hours as needed for muscle spasms    metoclopramide (REGLAN) 5 mg/5 mL oral solution Take 5 mL (5 mg total) by mouth 3 (three) times a day    pantoprazole (PROTONIX) 40 mg tablet Take 1 tablet (40 mg total) by mouth daily    potassium chloride SA (KLOR-CON M15) 15 MEQ tablet Take 15 mEq by mouth 2 (two) times a day    ranitidine (ZANTAC) 150 MG/10ML syrup Take 20 mL (300 mg total) by mouth daily    repaglinide (PRANDIN) 1 mg tablet Take 1 mg by mouth 3 (three) times a day before meals    varenicline (CHANTIX) 0 5 mg tablet Take 0 5 mg by mouth 2 (two) times a day    zolpidem (AMBIEN) 10 mg tablet Take 10 mg by mouth daily at bedtime as needed for sleep     No current facility-administered medications on file prior to visit  She is allergic to other and prednisone       Review of Systems      Objective:      /74   Pulse 78   Resp 18   Ht 5' 1" (1 549 m)   Wt 80 6 kg (177 lb 9 6 oz)   BMI 33 56 kg/m²          Physical Exam   Constitutional: She is oriented to person, place, and time   She appears well-developed and well-nourished  Central adiposity   Eyes: No scleral icterus  Cardiovascular: Normal rate and regular rhythm  Pulmonary/Chest: Effort normal and breath sounds normal    Abdominal: Soft  Bowel sounds are normal    Lymphadenopathy:     She has no cervical adenopathy  Neurological: She is alert and oriented to person, place, and time  Skin: Skin is warm and dry  Psychiatric: She has a normal mood and affect

## 2018-11-14 ENCOUNTER — OFFICE VISIT (OUTPATIENT)
Dept: CARDIOLOGY CLINIC | Facility: CLINIC | Age: 53
End: 2018-11-14
Payer: COMMERCIAL

## 2018-11-14 VITALS
HEART RATE: 64 BPM | HEIGHT: 61 IN | WEIGHT: 178 LBS | SYSTOLIC BLOOD PRESSURE: 100 MMHG | DIASTOLIC BLOOD PRESSURE: 58 MMHG | BODY MASS INDEX: 33.61 KG/M2

## 2018-11-14 DIAGNOSIS — I10 ESSENTIAL (PRIMARY) HYPERTENSION: Primary | ICD-10-CM

## 2018-11-14 DIAGNOSIS — R07.9 CHEST PAIN IN ADULT: ICD-10-CM

## 2018-11-14 DIAGNOSIS — I44.7 COMPLETE LEFT BUNDLE BRANCH BLOCK (LBBB): ICD-10-CM

## 2018-11-14 PROCEDURE — 99213 OFFICE O/P EST LOW 20 MIN: CPT | Performed by: INTERNAL MEDICINE

## 2018-11-14 NOTE — PROGRESS NOTES
HPI:  Moderately obese 73-year-old female with hypertension dyslipidemia type 2 diabetes and atypical chest pain  In May 2018 she underwent heart catheterization showing no critical obstructive coronary artery disease with mild LV dysfunction probably related to the underlying left bundle branch block  Patient denies exertional chest pain at this time any palpitations dizziness lightheadedness orthopnea nocturnal dyspnea or ankle swelling  Social history positive for tobacco use  Past medical history type 2 diabetes hypertension dyslipidemia and family history is positive for coronary artery disease        Current Outpatient Prescriptions:     acetaminophen-codeine (TYLENOL with CODEINE #4) 300-60 MG per tablet, Take 1 tablet by mouth every 4 (four) hours as needed for moderate pain, Disp: , Rfl:     albuterol (PROVENTIL HFA,VENTOLIN HFA) 90 mcg/act inhaler, Inhale 2 puffs every 6 (six) hours as needed for wheezing, Disp: , Rfl:     ALPRAZolam (XANAX) 1 mg tablet, Take by mouth daily at bedtime as needed for anxiety, Disp: , Rfl:     atorvastatin (LIPITOR) 40 mg tablet, Take 40 mg by mouth daily, Disp: , Rfl:     Butalbital-APAP-Caffeine (FIORICET) -40 MG CAPS, Take by mouth, Disp: , Rfl:     citric acid-potassium citrate (POLYCITRA K) 1,100-334 mg/5 mL solution, Take by mouth 3 (three) times a day with meals, Disp: , Rfl:     diclofenac (VOLTAREN) 75 mg EC tablet, Take 50 mg by mouth 2 (two) times a day, Disp: , Rfl:     DULoxetine (CYMBALTA) 60 mg delayed release capsule, Take 20 mg by mouth daily, Disp: , Rfl:     gabapentin (NEURONTIN) 300 mg capsule, Take 100 mg by mouth 2 (two) times a day  , Disp: , Rfl:     Linaclotide (LINZESS) 145 MCG CAPS, Take 1 capsule (145 mcg total) by mouth daily, Disp: 30 capsule, Rfl: 6    Liraglutide 18 MG/3ML SOPN, Inject under the skin, Disp: , Rfl:     lisinopril (ZESTRIL) 5 mg tablet, Take 5 mg by mouth daily, Disp: , Rfl:     methocarbamol (ROBAXIN) 750 mg tablet, Take 500 mg by mouth every 6 (six) hours as needed for muscle spasms, Disp: , Rfl:     metoclopramide (REGLAN) 5 mg/5 mL oral solution, Take 5 mL (5 mg total) by mouth 3 (three) times a day, Disp: 120 mL, Rfl: 0    pantoprazole (PROTONIX) 40 mg tablet, Take 1 tablet (40 mg total) by mouth daily, Disp: 90 tablet, Rfl: 0    potassium chloride SA (KLOR-CON M15) 15 MEQ tablet, Take 15 mEq by mouth 2 (two) times a day, Disp: , Rfl:     ranitidine (ZANTAC) 150 MG/10ML syrup, Take 20 mL (300 mg total) by mouth daily, Disp: 300 mL, Rfl: 1    repaglinide (PRANDIN) 1 mg tablet, Take 1 mg by mouth 3 (three) times a day before meals, Disp: , Rfl:     varenicline (CHANTIX) 0 5 mg tablet, Take 0 5 mg by mouth 2 (two) times a day, Disp: , Rfl:     zolpidem (AMBIEN) 10 mg tablet, Take 10 mg by mouth daily at bedtime as needed for sleep, Disp: , Rfl:     aluminum hydroxide-magnesium carbonate (GAVISCON)  mg/15 mL oral suspension, Take 15 mL by mouth 4 (four) times daily (after meals and at bedtime), Disp: 1 Bottle, Rfl: 0      PHYSICAL EXAM:    Blood pressure 120/70 weight 178 pulse 64 and regular respirations 18 per minute  Head eyes ears nose and throat are normal neck is supple without adenopathy no elevation of jugular venous pressure no carotid bruits carotid upstroke and volume are normal lungs are clear without wheezing rales or rhonchi no chest wall deformity no pleural friction rub cardiac exam reveals normal PMI there are no heaves lifts or thrills no murmurs rubs or gallop sounds the abdomen is obese but soft no organomegaly normal bowel sounds no abnormal pulsations the extremities are without edema cyanosis or clubbing Homans sign is negative    IMPRESSION AND PLAN:  Atypical chest pain has resolved with cardiac catheterization showing no critical obstructive coronary artery disease    Chronic left bundle branch block    Dyslipidemia on statins    Moderate obesity recommend exercise and weight reduction    Type 2 diabetes followed by primary care physician    Follow-up in 1 year

## 2018-12-21 ENCOUNTER — APPOINTMENT (EMERGENCY)
Dept: RADIOLOGY | Facility: HOSPITAL | Age: 53
End: 2018-12-21
Payer: COMMERCIAL

## 2018-12-21 ENCOUNTER — HOSPITAL ENCOUNTER (EMERGENCY)
Facility: HOSPITAL | Age: 53
Discharge: HOME | End: 2018-12-21
Attending: EMERGENCY MEDICINE | Admitting: EMERGENCY MEDICINE
Payer: COMMERCIAL

## 2018-12-21 VITALS
BODY MASS INDEX: 32.66 KG/M2 | WEIGHT: 173 LBS | HEIGHT: 61 IN | TEMPERATURE: 97.7 F | RESPIRATION RATE: 17 BRPM | OXYGEN SATURATION: 96 % | HEART RATE: 82 BPM | SYSTOLIC BLOOD PRESSURE: 137 MMHG | DIASTOLIC BLOOD PRESSURE: 77 MMHG

## 2018-12-21 DIAGNOSIS — J20.9 ACUTE BRONCHITIS: Primary | ICD-10-CM

## 2018-12-21 PROCEDURE — 99283 EMERGENCY DEPT VISIT LOW MDM: CPT

## 2018-12-21 PROCEDURE — 71046 X-RAY EXAM CHEST 2 VIEWS: CPT

## 2018-12-21 RX ORDER — DOXYCYCLINE 100 MG/1
100 CAPSULE ORAL 2 TIMES DAILY
Qty: 20 CAPSULE | Refills: 0 | Status: SHIPPED | OUTPATIENT
Start: 2018-12-21 | End: 2018-12-31

## 2018-12-21 RX ORDER — ALBUTEROL SULFATE 90 UG/1
2 AEROSOL, METERED RESPIRATORY (INHALATION) EVERY 4 HOURS PRN
Qty: 1 INHALER | Refills: 0 | Status: SHIPPED | OUTPATIENT
Start: 2018-12-21 | End: 2020-07-16 | Stop reason: ALTCHOICE

## 2018-12-21 RX ORDER — DOXYCYCLINE HYCLATE 100 MG/1
100 CAPSULE ORAL ONCE
Status: COMPLETED | OUTPATIENT
Start: 2018-12-21 | End: 2018-12-21

## 2018-12-21 RX ADMIN — DOXYCYCLINE HYCLATE 100 MG: 100 CAPSULE ORAL at 23:33

## 2018-12-22 NOTE — ED PROVIDER NOTES
History  Chief Complaint   Patient presents with    Cough     C/o dry, non productive cough and sore throat x 4 days, hurts chest, ribs, and back to cough  Denies fevers, chills, n/v       Patient is a 51-year-old female that presents emergency department complaints of cough and sore throat for last 4 days  Patient denies fever, chills, chest pain  Cough   Associated symptoms: no fever        Prior to Admission Medications   Prescriptions Last Dose Informant Patient Reported? Taking?    ALPRAZolam (XANAX) 1 mg tablet  Self Yes Yes   Sig: Take by mouth daily at bedtime as needed for anxiety   Butalbital-APAP-Caffeine (FIORICET) -40 MG CAPS  Self Yes Yes   Sig: Take by mouth   DULoxetine (CYMBALTA) 60 mg delayed release capsule  Self Yes Yes   Sig: Take 20 mg by mouth daily   Liraglutide 18 MG/3ML SOPN  Self Yes Yes   Sig: Inject under the skin   acetaminophen-codeine (TYLENOL with CODEINE #4) 300-60 MG per tablet  Self Yes Yes   Sig: Take 1 tablet by mouth every 4 (four) hours as needed for moderate pain   albuterol (PROVENTIL HFA,VENTOLIN HFA) 90 mcg/act inhaler  Self Yes Yes   Sig: Inhale 2 puffs every 6 (six) hours as needed for wheezing   aluminum hydroxide-magnesium carbonate (GAVISCON)  mg/15 mL oral suspension   No Yes   Sig: Take 15 mL by mouth 4 (four) times daily (after meals and at bedtime)   diclofenac (VOLTAREN) 75 mg EC tablet  Self Yes Yes   Sig: Take 50 mg by mouth 2 (two) times a day   gabapentin (NEURONTIN) 300 mg capsule  Self Yes Yes   Sig: Take 400 mg by mouth 2 (two) times a day     lisinopril (ZESTRIL) 5 mg tablet  Self Yes Yes   Sig: Take 5 mg by mouth daily   methocarbamol (ROBAXIN) 750 mg tablet  Self Yes Yes   Sig: Take 500 mg by mouth every 6 (six) hours as needed for muscle spasms   metoclopramide (REGLAN) 5 mg/5 mL oral solution  Self No Yes   Sig: Take 5 mL (5 mg total) by mouth 3 (three) times a day   pantoprazole (PROTONIX) 40 mg tablet  Self No Yes   Sig: Take 1 tablet (40 mg total) by mouth daily   potassium chloride SA (KLOR-CON M15) 15 MEQ tablet  Self Yes Yes   Sig: Take 15 mEq by mouth 2 (two) times a day   ranitidine (ZANTAC) 150 MG/10ML syrup  Self No Yes   Sig: Take 20 mL (300 mg total) by mouth daily   repaglinide (PRANDIN) 1 mg tablet  Self Yes Yes   Sig: Take 1 mg by mouth 3 (three) times a day before meals   varenicline (CHANTIX) 0 5 mg tablet  Self Yes Yes   Sig: Take 0 5 mg by mouth 2 (two) times a day   zolpidem (AMBIEN) 10 mg tablet  Self Yes Yes   Sig: Take 10 mg by mouth daily at bedtime as needed for sleep      Facility-Administered Medications: None       Past Medical History:   Diagnosis Date    Angina pectoris (HCC)     recent    Anxiety     Arthritis     Chronic headaches     [facility]     [facility]     GERD (gastroesophageal reflux disease)     Headache     Hyperlipidemia     Hypertension     Kidney stone     Left bundle branch block     LBBB    [facility] infarction[facility] )     PTSD (post-traumatic stress disorder)     RLS (restless legs syndrome)     Shortness of breath        Past Surgical History:   Procedure Laterality Date    BREAST SURGERY      CARDIAC CATHETERIZATION  2018     Mid LAD: There was a tubular 40 % stenosis     SECTION      COLONOSCOPY      HYSTERECTOMY      KIDNEY STONE SURGERY      LAPAROSCOPY      MS COLONOSCOPY FLX DX W/COLLJ SPEC WHEN PFRMD N/A 2017    Procedure: EGD AND COLONOSCOPY;  Surgeon: Myriam Chaves MD;  Location: [facility]; Service: Gastroenterology    MS ESOPHAGOGASTRODUODENOSCOPY TRANSORAL DIAGNOSTIC N/A 10/10/2018    Procedure: ESOPHAGOGASTRODUODENOSCOPY (EGD); Surgeon: Myriam Chaves MD;  Location: [facility];   Service: Gastroenterology    MS LAPAROSCOPY W TOT HYSTERECTUTERUS <=250 Charlsie Abelson TUBE/OVARY N/A 2018    Procedure: ROBOTIC ASSISTED TOTAL LAPAROSCOPIC HYSTERECTOMY, BILATERAL SALPINGOOPHERECTOMY,;  Surgeon: Juliet Olmos MD;  [facility];  Service: Gynecology Oncology    UPPER GASTROINTESTINAL ENDOSCOPY      WRIST SURGERY Right        Family History   Problem Relation Age of Onset    Diabetes Mother    Marv Griffin Breast cancer Mother 39    Lung cancer Father 47    Diabetes Sister     Brain cancer Maternal Aunt 67    Breast cancer Other 25     I have reviewed and agree with the history as documented  Social History   Substance Use Topics    Smoking status: Current Every Day Smoker     Packs/day: 0 50     Years: 44 00    Smokeless tobacco: Never Used      Comment: pt  smoked this am 10/10    Alcohol use No        Review of Systems   Constitutional: Negative for fever  Respiratory: Positive for cough  All other systems reviewed and are negative  Physical Exam  Physical Exam   Constitutional: She is oriented to person, place, and time  She appears well-developed and well-nourished  HENT:   Head: Normocephalic and atraumatic  Right Ear: External ear normal    Left Ear: External ear normal    Nose: Nose normal    Mouth/Throat: Oropharynx is clear and moist    Eyes: Pupils are equal, round, and reactive to light  Conjunctivae and EOM are normal    Neck: Normal range of motion  Cardiovascular: Normal rate, regular rhythm and normal heart sounds  Pulmonary/Chest: Effort normal and breath sounds normal    Abdominal: Soft  Bowel sounds are normal    Neurological: She is alert and oriented to person, place, and time  Skin: Skin is warm  Capillary refill takes less than 2 seconds  Vitals reviewed        Vital Signs  ED Triage Vitals [12/21/18 2232]   Temperature Pulse Respirations Blood Pressure SpO2   97 7 °F (36 5 °C) 82 17 137/77 96 %      Temp Source Heart Rate Source Patient Position - Orthostatic VS BP Location FiO2 (%)   Oral -- Sitting Left arm --      Pain Score       4           Vitals:    12/21/18 2232   BP: 137/77   Pulse: 82   Patient Position - Orthostatic VS: Sitting       Visual Acuity      ED Medications  Medications   doxycycline hyclate (VIBRAMYCIN) capsule 100 mg (100 mg Oral Given 12/21/18 9526)       Diagnostic Studies  Results Reviewed     None                 XR chest pa & lateral   ED Interpretation by Shiraz Meadows PA-C (12/21 0140)   No acute disease                 Procedures  Procedures       Phone Contacts  ED Phone Contact    ED Course                               MDM  Number of Diagnoses or Management Options  Acute bronchitis:   Diagnosis management comments: Patient is a 32-year-old female presents emergency department with complaints of cough and sore throat for the last 4 days  Patient has been afebrile  Patient is a smoker  Chest x-ray was reviewed and does not show any evidence of a pneumonia  Patient be treated as acute bronchitis  She is given prescription for doxycycline and albuterol  She has follow up with her family physician  Return parameters were discussed  Amount and/or Complexity of Data Reviewed  Tests in the radiology section of CPT®: ordered and reviewed  Independent visualization of images, tracings, or specimens: yes    Risk of Complications, Morbidity, and/or Mortality  Presenting problems: moderate  Diagnostic procedures: moderate  Management options: moderate    Patient Progress  Patient progress: improved    CritCare Time    Disposition  Final diagnoses:   Acute bronchitis     Time reflects when diagnosis was documented in both MDM as applicable and the Disposition within this note     Time User Action Codes Description Comment    12/21/2018 11:27 PM Elinor Pearson Add [J20 9] Acute bronchitis       ED Disposition     ED Disposition Condition Comment    Discharge  Kaylee Bear discharge to home/self care      Condition at discharge: Good        Follow-up Information     Follow up With Specialties Details Why [address] [place] [address],  Family Medicine   [address] [place] [id number] [facility] [address]  [phone]            Discharge Medication List as of 12/21/2018 11:28 PM      START taking these medications    Details   doxycycline monohydrate (MONODOX) 100 mg capsule Take 1 capsule (100 mg total) by mouth 2 (two) times a day for 10 days, Starting Fri 12/21/2018, Until Mon 12/31/2018, Print         CONTINUE these medications which have NOT CHANGED    Details   acetaminophen-codeine (TYLENOL with CODEINE #4) 300-60 MG per tablet Take 1 tablet by mouth every 4 (four) hours as needed for moderate pain, Historical Med      albuterol (PROVENTIL HFA,VENTOLIN HFA) 90 mcg/act inhaler Inhale 2 puffs every 6 (six) hours as needed for wheezing, Historical Med      ALPRAZolam (XANAX) 1 mg tablet Take by mouth daily at bedtime as needed for anxiety, Historical Med      aluminum hydroxide-magnesium carbonate (GAVISCON)  mg/15 mL oral suspension Take 15 mL by mouth 4 (four) times daily (after meals and at bedtime), Starting Tue 11/13/2018, Normal      Butalbital-APAP-Caffeine (FIORICET) -40 MG CAPS Take by mouth, Historical Med      diclofenac (VOLTAREN) 75 mg EC tablet Take 50 mg by mouth 2 (two) times a day, Historical Med      DULoxetine (CYMBALTA) 60 mg delayed release capsule Take 20 mg by mouth daily, Historical Med      gabapentin (NEURONTIN) 300 mg capsule Take 400 mg by mouth 2 (two) times a day  , Historical Med      Liraglutide 18 MG/3ML SOPN Inject under the skin, Historical Med      lisinopril (ZESTRIL) 5 mg tablet Take 5 mg by mouth daily, Historical Med      methocarbamol (ROBAXIN) 750 mg tablet Take 500 mg by mouth every 6 (six) hours as needed for muscle spasms, Historical Med      metoclopramide (REGLAN) 5 mg/5 mL oral solution Take 5 mL (5 mg total) by mouth 3 (three) times a day, Starting Tue 9/11/2018, Normal      pantoprazole (PROTONIX) 40 mg tablet Take 1 tablet (40 mg total) by mouth daily, Starting Tue 9/11/2018, Normal      potassium chloride SA (KLOR-CON M15) 15 MEQ tablet Take 15 mEq by mouth 2 (two) times a day, Historical Med      ranitidine (ZANTAC) 150 MG/10ML syrup Take 20 mL (300 mg total) by mouth daily, Starting Tue 9/11/2018, Normal      repaglinide (PRANDIN) 1 mg tablet Take 1 mg by mouth 3 (three) times a day before meals, Historical Med      varenicline (CHANTIX) 0 5 mg tablet Take 0 5 mg by mouth 2 (two) times a day, Historical Med      zolpidem (AMBIEN) 10 mg tablet Take 10 mg by mouth daily at bedtime as needed for sleep, Historical Med           No discharge procedures on file      ED Provider  Electronically Signed by           Bakari Smith PA-C  12/22/18 0140

## 2018-12-28 ENCOUNTER — HOSPITAL ENCOUNTER (OUTPATIENT)
Dept: GASTROENTEROLOGY | Facility: HOSPITAL | Age: 53
Discharge: HOME | End: 2018-12-28
Payer: COMMERCIAL

## 2018-12-28 VITALS
DIASTOLIC BLOOD PRESSURE: 67 MMHG | TEMPERATURE: 97.5 F | RESPIRATION RATE: 16 BRPM | SYSTOLIC BLOOD PRESSURE: 134 MMHG | OXYGEN SATURATION: 94 % | HEART RATE: 74 BPM

## 2018-12-28 DIAGNOSIS — R13.13 PHARYNGEAL DYSPHAGIA: ICD-10-CM

## 2018-12-28 PROCEDURE — 91020 GASTRIC MOTILITY STUDIES: CPT

## 2018-12-28 NOTE — PERIOPERATIVE NURSING NOTE
Patient brought in room and educated on procedure  Lidocaine 2 %topical solution given via both nostrils  Manometry catheter inserted via right nostril and secured  10 liquid 10 viscous and 1 rapid swallow  Catheter removed intact   Discharge instructions given to patient and left the room in no distress

## 2019-01-07 DIAGNOSIS — R13.13 PHARYNGEAL DYSPHAGIA: ICD-10-CM

## 2019-01-07 DIAGNOSIS — K31.84 GASTROPARESIS DUE TO DM (HCC): ICD-10-CM

## 2019-01-07 DIAGNOSIS — K76.0 FATTY LIVER: ICD-10-CM

## 2019-01-07 DIAGNOSIS — E11.43 GASTROPARESIS DUE TO DM (HCC): ICD-10-CM

## 2019-01-07 DIAGNOSIS — R11.14 BILIOUS VOMITING WITH NAUSEA: ICD-10-CM

## 2019-01-07 DIAGNOSIS — R74.8 ELEVATED LIVER ENZYMES: ICD-10-CM

## 2019-01-07 DIAGNOSIS — Z79.899 MEDICAL MARIJUANA USE: ICD-10-CM

## 2019-01-08 RX ORDER — ALUMINUM HYDROXIDE AND MAGNESIUM CARBONATE 95; 358 MG/15ML; MG/15ML
15 LIQUID ORAL
Qty: 355 ML | Refills: 0 | Status: SHIPPED | OUTPATIENT
Start: 2019-01-08 | End: 2019-03-19 | Stop reason: SDUPTHER

## 2019-01-11 PROCEDURE — 91010 ESOPHAGUS MOTILITY STUDY: CPT | Performed by: INTERNAL MEDICINE

## 2019-01-16 ENCOUNTER — TRANSCRIBE ORDERS (OUTPATIENT)
Dept: ADMINISTRATIVE | Facility: HOSPITAL | Age: 54
End: 2019-01-16

## 2019-01-16 DIAGNOSIS — J41.0 SIMPLE CHRONIC BRONCHITIS (HCC): Primary | ICD-10-CM

## 2019-01-24 ENCOUNTER — HOSPITAL ENCOUNTER (OUTPATIENT)
Dept: PULMONOLOGY | Facility: HOSPITAL | Age: 54
Discharge: HOME | End: 2019-01-24
Payer: COMMERCIAL

## 2019-01-24 DIAGNOSIS — J41.0 SIMPLE CHRONIC BRONCHITIS (HCC): ICD-10-CM

## 2019-01-24 PROCEDURE — 94729 DIFFUSING CAPACITY: CPT | Performed by: INTERNAL MEDICINE

## 2019-01-24 PROCEDURE — 94760 N-INVAS EAR/PLS OXIMETRY 1: CPT

## 2019-01-24 PROCEDURE — 94726 PLETHYSMOGRAPHY LUNG VOLUMES: CPT | Performed by: INTERNAL MEDICINE

## 2019-01-24 PROCEDURE — 94729 DIFFUSING CAPACITY: CPT

## 2019-01-24 PROCEDURE — 94727 GAS DIL/WSHOT DETER LNG VOL: CPT

## 2019-01-24 PROCEDURE — 94060 EVALUATION OF WHEEZING: CPT

## 2019-01-24 PROCEDURE — 94060 EVALUATION OF WHEEZING: CPT | Performed by: INTERNAL MEDICINE

## 2019-01-24 RX ORDER — ALBUTEROL SULFATE 2.5 MG/3ML
2.5 SOLUTION RESPIRATORY (INHALATION) ONCE
Status: COMPLETED | OUTPATIENT
Start: 2019-01-24 | End: 2019-01-24

## 2019-01-24 RX ADMIN — ALBUTEROL SULFATE 2.5 MG: 2.5 SOLUTION RESPIRATORY (INHALATION) at 11:18

## 2019-03-19 ENCOUNTER — OFFICE VISIT (OUTPATIENT)
Dept: GASTROENTEROLOGY | Facility: CLINIC | Age: 54
End: 2019-03-19
Payer: COMMERCIAL

## 2019-03-19 VITALS
BODY MASS INDEX: 33.99 KG/M2 | WEIGHT: 180 LBS | HEIGHT: 61 IN | DIASTOLIC BLOOD PRESSURE: 76 MMHG | SYSTOLIC BLOOD PRESSURE: 120 MMHG | RESPIRATION RATE: 18 BRPM | HEART RATE: 77 BPM

## 2019-03-19 DIAGNOSIS — K59.03 DRUG-INDUCED CONSTIPATION: ICD-10-CM

## 2019-03-19 DIAGNOSIS — R13.13 PHARYNGEAL DYSPHAGIA: ICD-10-CM

## 2019-03-19 DIAGNOSIS — T50.3X5A PILL ESOPHAGITIS DUE TO POTASSIUM CHLORIDE: ICD-10-CM

## 2019-03-19 DIAGNOSIS — R74.8 ELEVATED LIVER ENZYMES: ICD-10-CM

## 2019-03-19 DIAGNOSIS — E11.43 GASTROPARESIS DUE TO DM (HCC): ICD-10-CM

## 2019-03-19 DIAGNOSIS — K76.0 FATTY LIVER: Primary | ICD-10-CM

## 2019-03-19 DIAGNOSIS — R13.19 ESOPHAGEAL DYSPHAGIA: ICD-10-CM

## 2019-03-19 DIAGNOSIS — Z79.899 MEDICAL MARIJUANA USE: ICD-10-CM

## 2019-03-19 DIAGNOSIS — L53.8 PALMAR ERYTHEMA: ICD-10-CM

## 2019-03-19 DIAGNOSIS — R11.14 BILIOUS VOMITING WITH NAUSEA: ICD-10-CM

## 2019-03-19 DIAGNOSIS — G89.29 OTHER CHRONIC PAIN: ICD-10-CM

## 2019-03-19 DIAGNOSIS — K21.00 GASTROESOPHAGEAL REFLUX DISEASE WITH ESOPHAGITIS: ICD-10-CM

## 2019-03-19 DIAGNOSIS — K31.84 GASTROPARESIS DUE TO DM (HCC): ICD-10-CM

## 2019-03-19 DIAGNOSIS — K20.80 PILL ESOPHAGITIS DUE TO POTASSIUM CHLORIDE: ICD-10-CM

## 2019-03-19 DIAGNOSIS — R49.0 HOARSENESS: ICD-10-CM

## 2019-03-19 PROCEDURE — 99214 OFFICE O/P EST MOD 30 MIN: CPT | Performed by: NURSE PRACTITIONER

## 2019-03-19 RX ORDER — PANTOPRAZOLE SODIUM 40 MG/1
40 TABLET, DELAYED RELEASE ORAL DAILY
Qty: 90 TABLET | Refills: 0 | Status: SHIPPED | OUTPATIENT
Start: 2019-03-19 | End: 2021-11-22 | Stop reason: DRUGHIGH

## 2019-03-19 RX ORDER — METOCLOPRAMIDE HYDROCHLORIDE 5 MG/5ML
5 SOLUTION ORAL 3 TIMES DAILY
Qty: 120 ML | Refills: 0 | Status: SHIPPED | OUTPATIENT
Start: 2019-03-19 | End: 2019-08-23 | Stop reason: SDUPTHER

## 2019-03-19 RX ORDER — RANITIDINE 15 MG/ML
300 SYRUP ORAL DAILY
Qty: 300 ML | Refills: 1 | Status: SHIPPED | OUTPATIENT
Start: 2019-03-19 | End: 2019-04-30 | Stop reason: SDUPTHER

## 2019-03-19 NOTE — PATIENT INSTRUCTIONS
Fatty Liver- please get blood work  Fatty liver is caused by diet high in fats and carbohydrates  Delayed gastric emptying(gastroparesis) caused by diabetes  Esophageal spasms/and weak esophagus- caused by GERD and heartburn  Hoarse voice- ultrasound of your neck  Please quit smoking and get your A1C down

## 2019-03-19 NOTE — PROGRESS NOTES
Assessment/Plan:     history of fatty liver with no recent blood work  Patient is agreeable to blood work  She told me she I might need to wait three or six months for it  I did ask her to get it as soon as she can  Gastroparesis and GERD  We had a long discussion about held diabetes mellitus affects gastroparesis from patient is aware  Recent manometry showed we can esophageal swallow but no spasms  I asked her to continue with the Gaviscon 4 times a day as well as the pantoprazole on the Pepcid  She is agreeable to Reglan before meals because it does seem to help  We talked about GERD and gastroparesis diet  Hoarse voice with recent endoscopy that was benign will obtain an ultrasound of the soft tissue of her neck  She states she has a history of lymphadenopathy in her neck as well  Smoking cessation was discussed in ducts as well as obtaining teeth because that will help her gastroparesis  Diagnoses and all orders for this visit:    Fatty liver  -     Ferritin; Future  -     Hepatic function panel; Future  -     aluminum hydroxide-magnesium carbonate (ACID GONE)  mg/15 mL oral suspension; Take 15 mL by mouth 4 (four) times daily (after meals and at bedtime)    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    Gastroparesis due to DM (HCC)  -     aluminum hydroxide-magnesium carbonate (ACID GONE)  mg/15 mL oral suspension; Take 15 mL by mouth 4 (four) times daily (after meals and at bedtime)    Pharyngeal dysphagia  -     aluminum hydroxide-magnesium carbonate (ACID GONE)  mg/15 mL oral suspension; Take 15 mL by mouth 4 (four) times daily (after meals and at bedtime)    Drug-induced constipation    Esophageal dysphagia  -     metoclopramide (REGLAN) 5 mg/5 mL oral solution; Take 5 mL (5 mg total) by mouth 3 (three) times a day  -     pantoprazole (PROTONIX) 40 mg tablet;  Take 1 tablet (40 mg total) by mouth daily  -     ranitidine (ZANTAC) 150 MG/10ML syrup; Take 20 mL (300 mg total) by mouth daily    Hoarseness  -     US head neck soft tissue; Future    Bilious vomiting with nausea  -     aluminum hydroxide-magnesium carbonate (ACID GONE)  mg/15 mL oral suspension; Take 15 mL by mouth 4 (four) times daily (after meals and at bedtime)  -     metoclopramide (REGLAN) 5 mg/5 mL oral solution; Take 5 mL (5 mg total) by mouth 3 (three) times a day  -     pantoprazole (PROTONIX) 40 mg tablet; Take 1 tablet (40 mg total) by mouth daily  -     ranitidine (ZANTAC) 150 MG/10ML syrup; Take 20 mL (300 mg total) by mouth daily    Medical marijuana use  -     aluminum hydroxide-magnesium carbonate (ACID GONE)  mg/15 mL oral suspension; Take 15 mL by mouth 4 (four) times daily (after meals and at bedtime)  -     metoclopramide (REGLAN) 5 mg/5 mL oral solution; Take 5 mL (5 mg total) by mouth 3 (three) times a day  -     pantoprazole (PROTONIX) 40 mg tablet; Take 1 tablet (40 mg total) by mouth daily  -     ranitidine (ZANTAC) 150 MG/10ML syrup; Take 20 mL (300 mg total) by mouth daily    Elevated liver enzymes  -     aluminum hydroxide-magnesium carbonate (ACID GONE)  mg/15 mL oral suspension; Take 15 mL by mouth 4 (four) times daily (after meals and at bedtime)    Gastroesophageal reflux disease with esophagitis  -     metoclopramide (REGLAN) 5 mg/5 mL oral solution; Take 5 mL (5 mg total) by mouth 3 (three) times a day  -     pantoprazole (PROTONIX) 40 mg tablet; Take 1 tablet (40 mg total) by mouth daily  -     ranitidine (ZANTAC) 150 MG/10ML syrup; Take 20 mL (300 mg total) by mouth daily    Other chronic pain  -     metoclopramide (REGLAN) 5 mg/5 mL oral solution; Take 5 mL (5 mg total) by mouth 3 (three) times a day  -     pantoprazole (PROTONIX) 40 mg tablet; Take 1 tablet (40 mg total) by mouth daily  -     ranitidine (ZANTAC) 150 MG/10ML syrup;  Take 20 mL (300 mg total) by mouth daily    Palmar erythema            Subjective: Patient ID: Ivan Frazier is a 48 y o  female  Non compliant diabetic who smokes marijuana nightly is here for worsening gastroparesis and dysphagia along with nausea and hoarseness of her voice  Recent endoscopy was benign  She also reports get GERD but she is not taking medications way initially prescribed some  So we reviewed medication and diet  I did ask her to please try to decrease her medical marijuana but she believes that that is the only thing that is helping her chronic pain  She does eat a lot of sweets especially when she is smoking  She does not follow a gastroparesis or GERD diet and we did review that again  The following portions of the patient's history were reviewed and updated as appropriate: She  has a past medical history of Angina pectoris ([facility] ), Anxiety, Arthritis, Chronic headaches, Diabetes ([address] ), Diabetes mellitus ([address] ), GERD (gastroesophageal reflux disease), Headache, Hyperlipidemia, Hypertension, Kidney stone, Left bundle branch block, MI (myocardial infarction) ([facility] ), PTSD (post-traumatic stress disorder), RLS (restless legs syndrome), and Shortness of breath    She   Patient Active Problem List    Diagnosis Date Noted    Hoarseness 03/19/2019    Palmar erythema 03/19/2019    Simple chronic bronchitis (HCC)     Acute bronchitis 12/21/2018    Drug-induced constipation 11/13/2018    Elevated liver enzymes 11/13/2018    Medical marijuana use 09/11/2018    Other chronic pain 09/11/2018    Gastroesophageal reflux disease with esophagitis 09/11/2018    Bilious vomiting with nausea 09/11/2018    Esophageal dysphagia 09/11/2018    Dysphagia 09/11/2018    Gastroparesis due to DM ([address] ) 09/11/2018    Fatty liver 09/11/2018    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 09/11/2018    Status post robotic assisted total laparoscopic hysterectomy, bilateral salpingo-oophorectomy 05/22/2018    Facet arthropathy, lumbosacral 05/02/2018    Lumbar spondylosis 05/02/2018    Chronic midline low back pain with bilateral sciatica 2018    Ovarian neoplasm 2018     She  has a past surgical history that includes Breast surgery; Kidney stone surgery;  section; Wrist surgery (Right); LAPAROSCOPY; pr colonoscopy flx dx w/collj spec when pfrmd (N/A, 2017); Colonoscopy; Upper gastrointestinal endoscopy; pr laparoscopy w tot hysterectuterus <=250 gram  w tube/ovary (N/A, 2018); Hysterectomy; pr esophagogastroduodenoscopy transoral diagnostic (N/A, 10/10/2018); and Cardiac catheterization (2018)  Her family history includes Brain cancer (age of onset: 67) in her maternal aunt; Breast cancer (age of onset: 22) in her other; Breast cancer (age of onset: 39) in her mother; Diabetes in her mother and sister; Lung cancer (age of onset: 47) in her father  She  reports that she has been smoking  She has a 22 00 pack-year smoking history  She has never used smokeless tobacco  She reports that she has current or past drug history  Drug: Marijuana  Frequency: 7 00 times per week  She reports that she does not drink alcohol    Current Outpatient Medications   Medication Sig Dispense Refill    acetaminophen-codeine (TYLENOL with CODEINE #4) 300-60 MG per tablet Take 1 tablet by mouth every 4 (four) hours as needed for moderate pain      albuterol (PROVENTIL HFA,VENTOLIN HFA) 90 mcg/act inhaler Inhale 2 puffs every 6 (six) hours as needed for wheezing      albuterol (PROVENTIL HFA,VENTOLIN HFA) 90 mcg/act inhaler Inhale 2 puffs every 4 (four) hours as needed for wheezing 1 Inhaler 0    ALPRAZolam (XANAX) 1 mg tablet Take by mouth daily at bedtime as needed for anxiety      aluminum hydroxide-magnesium carbonate (ACID GONE)  mg/15 mL oral suspension Take 15 mL by mouth 4 (four) times daily (after meals and at bedtime) 355 mL 0    Butalbital-APAP-Caffeine (FIORICET) -40 MG CAPS Take by mouth      diclofenac (VOLTAREN) 75 mg EC tablet Take 50 mg by mouth 2 (two) times a day      DULoxetine (CYMBALTA) 60 mg delayed release capsule Take 20 mg by mouth daily      gabapentin (NEURONTIN) 300 mg capsule Take 400 mg by mouth 2 (two) times a day        Liraglutide 18 MG/3ML SOPN Inject under the skin      lisinopril (ZESTRIL) 5 mg tablet Take 5 mg by mouth daily      methocarbamol (ROBAXIN) 750 mg tablet Take 500 mg by mouth every 6 (six) hours as needed for muscle spasms      metoclopramide (REGLAN) 5 mg/5 mL oral solution Take 5 mL (5 mg total) by mouth 3 (three) times a day 120 mL 0    pantoprazole (PROTONIX) 40 mg tablet Take 1 tablet (40 mg total) by mouth daily 90 tablet 0    potassium chloride SA (KLOR-CON M15) 15 MEQ tablet Take 15 mEq by mouth 2 (two) times a day      ranitidine (ZANTAC) 150 MG/10ML syrup Take 20 mL (300 mg total) by mouth daily 300 mL 1    repaglinide (PRANDIN) 1 mg tablet Take 1 mg by mouth 3 (three) times a day before meals      varenicline (CHANTIX) 0 5 mg tablet Take 0 5 mg by mouth 2 (two) times a day      zolpidem (AMBIEN) 10 mg tablet Take 10 mg by mouth daily at bedtime as needed for sleep       No current facility-administered medications for this visit        Current Outpatient Medications on File Prior to Visit   Medication Sig    acetaminophen-codeine (TYLENOL with CODEINE #4) 300-60 MG per tablet Take 1 tablet by mouth every 4 (four) hours as needed for moderate pain    albuterol (PROVENTIL HFA,VENTOLIN HFA) 90 mcg/act inhaler Inhale 2 puffs every 6 (six) hours as needed for wheezing    albuterol (PROVENTIL HFA,VENTOLIN HFA) 90 mcg/act inhaler Inhale 2 puffs every 4 (four) hours as needed for wheezing    ALPRAZolam (XANAX) 1 mg tablet Take by mouth daily at bedtime as needed for anxiety    Butalbital-APAP-Caffeine (FIORICET) -40 MG CAPS Take by mouth    diclofenac (VOLTAREN) 75 mg EC tablet Take 50 mg by mouth 2 (two) times a day    DULoxetine (CYMBALTA) 60 mg delayed release capsule Take 20 mg by mouth daily    gabapentin (NEURONTIN) 300 mg capsule Take 400 mg by mouth 2 (two) times a day      Liraglutide 18 MG/3ML SOPN Inject under the skin    lisinopril (ZESTRIL) 5 mg tablet Take 5 mg by mouth daily    methocarbamol (ROBAXIN) 750 mg tablet Take 500 mg by mouth every 6 (six) hours as needed for muscle spasms    potassium chloride SA (KLOR-CON M15) 15 MEQ tablet Take 15 mEq by mouth 2 (two) times a day    repaglinide (PRANDIN) 1 mg tablet Take 1 mg by mouth 3 (three) times a day before meals    varenicline (CHANTIX) 0 5 mg tablet Take 0 5 mg by mouth 2 (two) times a day    zolpidem (AMBIEN) 10 mg tablet Take 10 mg by mouth daily at bedtime as needed for sleep    [DISCONTINUED] ACID GONE  MG/15ML oral suspension TAKE 15 ML BY MOUTH 4 (FOUR) TIMES DAILY (AFTER MEALS AND AT BEDTIME)    [DISCONTINUED] metoclopramide (REGLAN) 5 mg/5 mL oral solution Take 5 mL (5 mg total) by mouth 3 (three) times a day    [DISCONTINUED] pantoprazole (PROTONIX) 40 mg tablet Take 1 tablet (40 mg total) by mouth daily    [DISCONTINUED] ranitidine (ZANTAC) 150 MG/10ML syrup Take 20 mL (300 mg total) by mouth daily     No current facility-administered medications on file prior to visit  She is allergic to other and prednisone       Review of Systems      Objective:      /76   Pulse 77   Resp 18   Ht 5' 1" (1 549 m)   Wt 81 6 kg (180 lb)   BMI 34 01 kg/m²          Physical Exam   Constitutional: She is oriented to person, place, and time  She appears well-developed and well-nourished  Eyes: No scleral icterus  Neck: Neck supple  No thyromegaly present  Cardiovascular: Normal rate and regular rhythm  Pulmonary/Chest: Effort normal and breath sounds normal    Abdominal: Soft  Bowel sounds are normal    Lymphadenopathy:     She has no cervical adenopathy  Neurological: She is alert and oriented to person, place, and time  Skin: Skin is warm and dry  Psychiatric: She has a normal mood and affect

## 2019-03-20 DIAGNOSIS — K59.09 CHRONIC CONSTIPATION: Primary | ICD-10-CM

## 2019-04-30 DIAGNOSIS — G89.29 OTHER CHRONIC PAIN: ICD-10-CM

## 2019-04-30 DIAGNOSIS — K21.00 GASTROESOPHAGEAL REFLUX DISEASE WITH ESOPHAGITIS: ICD-10-CM

## 2019-04-30 DIAGNOSIS — Z79.899 MEDICAL MARIJUANA USE: ICD-10-CM

## 2019-04-30 DIAGNOSIS — R11.14 BILIOUS VOMITING WITH NAUSEA: ICD-10-CM

## 2019-04-30 DIAGNOSIS — R13.19 ESOPHAGEAL DYSPHAGIA: ICD-10-CM

## 2019-05-01 RX ORDER — RANITIDINE HYDROCHLORIDE 15 MG/ML
SOLUTION ORAL
Qty: 300 ML | Refills: 1 | Status: SHIPPED | OUTPATIENT
Start: 2019-05-01 | End: 2020-10-19 | Stop reason: ALTCHOICE

## 2019-05-21 ENCOUNTER — TELEPHONE (OUTPATIENT)
Dept: GASTROENTEROLOGY | Facility: CLINIC | Age: 54
End: 2019-05-21

## 2019-08-23 DIAGNOSIS — Z79.899 MEDICAL MARIJUANA USE: ICD-10-CM

## 2019-08-23 DIAGNOSIS — R11.14 BILIOUS VOMITING WITH NAUSEA: ICD-10-CM

## 2019-08-23 DIAGNOSIS — G89.29 OTHER CHRONIC PAIN: ICD-10-CM

## 2019-08-23 DIAGNOSIS — R13.19 ESOPHAGEAL DYSPHAGIA: ICD-10-CM

## 2019-08-23 DIAGNOSIS — K21.00 GASTROESOPHAGEAL REFLUX DISEASE WITH ESOPHAGITIS: ICD-10-CM

## 2019-08-23 RX ORDER — METOCLOPRAMIDE HYDROCHLORIDE 5 MG/5ML
5 SOLUTION ORAL 3 TIMES DAILY
Qty: 445 ML | Refills: 1 | Status: SHIPPED | OUTPATIENT
Start: 2019-08-23 | End: 2019-10-30 | Stop reason: SDUPTHER

## 2019-10-02 ENCOUNTER — OFFICE VISIT (OUTPATIENT)
Dept: GASTROENTEROLOGY | Facility: CLINIC | Age: 54
End: 2019-10-02
Payer: COMMERCIAL

## 2019-10-02 VITALS
WEIGHT: 175 LBS | HEIGHT: 61 IN | SYSTOLIC BLOOD PRESSURE: 100 MMHG | HEART RATE: 82 BPM | BODY MASS INDEX: 33.04 KG/M2 | RESPIRATION RATE: 18 BRPM | DIASTOLIC BLOOD PRESSURE: 68 MMHG

## 2019-10-02 DIAGNOSIS — K59.00 CONSTIPATION, UNSPECIFIED CONSTIPATION TYPE: ICD-10-CM

## 2019-10-02 DIAGNOSIS — K21.9 GASTROESOPHAGEAL REFLUX DISEASE WITHOUT ESOPHAGITIS: ICD-10-CM

## 2019-10-02 DIAGNOSIS — K76.0 FATTY LIVER: Primary | ICD-10-CM

## 2019-10-02 DIAGNOSIS — K31.84 GASTROPARESIS: ICD-10-CM

## 2019-10-02 PROCEDURE — 99213 OFFICE O/P EST LOW 20 MIN: CPT | Performed by: PHYSICIAN ASSISTANT

## 2019-10-02 NOTE — PROGRESS NOTES
[facility] - Outpatient Follow-up Note  Emanuel Holder 48 y o  female MRN: [id number]  Encounter: [id number]          ASSESSMENT AND PLAN:      1  Gastroesophageal reflux disease without esophagitis  2  Gastroparesis    Patient reports continued issues with N/V   EGD in 10/2018 showed a 2cm HH  Esophageal Manometry in 2018 was essentially normal   GES in 2017 showed delayed gastric emptying  She is currently on Pantoprazole daily and Reglan 5mg daily  Will increase Reglan 5mg to po TID before meals due to ongoing symptoms  Discussed risk of tardive dyskinesia  Discussed recommendation for small, low fat meals  Do not eat for at least 3 hours before lying down  She also has very uncontrolled DM with a HGB A1C of 10 2 - this is worsening her gastroparesis  She needs better DM control - she reports she refused starting insulin  I told her that she needs insulin treatment and went over all the risks of uncontrolled DM - she reports she will follow up with her physician to discuss starting insulin therapy  She also is taking medical marijuana which could be worsening her N/V     3  Fatty liver    Patient has a history of a fatty liver  No recent LFTs have been drawn  Up date labs  Recommend weight loss with diet and exercise  Discussed risk of progression to cirrhosis  She has the metabolic syndrome  4  Constipation, unspecified constipation type    She reports improvement on Linzess 145mcg but will frequently have loose stools  She reports she is taking it > 30 mins before first meal   Will decrease dose to 72mcg po daily  Colonoscopy in 9/2017 revealed a small, benign polyp  Follow up in 6-8 weeks  ______________________________________________________________________    SUBJECTIVE:  Patient is a 68-year-old female who presents to the office for follow-up  She has a history of GERD, gastroparesis, fatty liver, and chronic constipation    She reports she is still struggling with nausea and vomiting  She reports early satiety but denies any weight loss  She reports that she is taking the Reglan once a day in the Pantoprazole once a day  She also reports she takes medical marijuana for her chronic back pain which could certainly be contributing to her vomiting  She reports she often gets food cravings on the marijuana  She also has very uncontrolled diabetes and her recent hemoglobin A1c was 10 2  She reports she was recommended to begin insulin treatment for her diabetes but refused  She denies any melena or rectal bleeding  She reports that on the Linzess 145 mcgs daily she is having bowel movements but reports they are often loose  REVIEW OF SYSTEMS IS OTHERWISE NEGATIVE  Historical Information   Past Medical History:   Diagnosis Date    [facility]ina pectoris [facility]     recent    Anxiety     Arthritis     Chronic headaches     Diabetes ([facility]     Diabetes mellitus ([facility] )     GERD (gastroesophageal reflux disease)     Headache     Hyperlipidemia     Hypertension     Kidney stone     Left bundle branch block     LBBB    [facility]     PTSD (post-traumatic stress disorder)     RLS (restless legs syndrome)     Shortness of breath      Past Surgical History:   Procedure Laterality Date    BREAST SURGERY      CARDIAC CATHETERIZATION  2018     Mid LAD: There was a tubular 40 % stenosis     SECTION      COLONOSCOPY      HYSTERECTOMY      KIDNEY STONE SURGERY      LAPAROSCOPY      IL COLONOSCOPY FLX DX W/COLLJ SPEC WHEN PFRMD N/A 2017    Procedure: EGD AND COLONOSCOPY;  Surgeon: Grgeory Corbin MD;  Location: [facility]; Service: Gastroenterology    IL ESOPHAGOGASTRODUODENOSCOPY TRANSORAL DIAGNOSTIC N/A 10/10/2018    Procedure: ESOPHAGOGASTRODUODENOSCOPY (EGD); Surgeon: Gregory Corbin MD;  Location: [facility];   Service: Gastroenterology    IL LAPAROSCOPY W TOT HYSTERECTUTERUS <=250 [address]ock TUBE/OVARY N/A 5/22/2018    Procedure: ROBOTIC ASSISTED TOTAL LAPAROSCOPIC HYSTERECTOMY, BILATERAL SALPINGOOPHERECTOMY,;  Surgeon: Bernadette Witt MD;  [facility];  Service: Gynecology Oncology    UPPER GASTROINTESTINAL ENDOSCOPY      WRIST SURGERY Right      Social History   Social History     Substance and Sexual Activity   Alcohol Use No     Social History     Substance and Sexual Activity   Drug Use Yes    Frequency: 7 0 times per week    Types: Marijuana    Comment: medical marijuana     Social History     Tobacco Use   Smoking Status Current Every Day Smoker    Packs/day: 0 50    Years: 44 00    Pack years: 22 00   Smokeless Tobacco Never Used   Tobacco Comment    pt  smoked this am 10/10     Family History   Problem Relation Age of Onset    Diabetes Mother     Breast cancer Mother 39    Lung cancer Father 47    Diabetes Sister     Brain cancer Maternal Aunt 67    Breast cancer Other 25       Meds/Allergies       Current Outpatient Medications:     acetaminophen-codeine (TYLENOL with CODEINE #4) 300-60 MG per tablet    albuterol (PROVENTIL HFA,VENTOLIN HFA) 90 mcg/act inhaler    albuterol (PROVENTIL HFA,VENTOLIN HFA) 90 mcg/act inhaler    ALPRAZolam (XANAX) 1 mg tablet    aluminum hydroxide-magnesium carbonate (ACID GONE)  mg/15 mL oral suspension    diclofenac (VOLTAREN) 75 mg EC tablet    DULoxetine (CYMBALTA) 60 mg delayed release capsule    gabapentin (NEURONTIN) 300 mg capsule    Liraglutide 18 MG/3ML SOPN    lisinopril (ZESTRIL) 5 mg tablet    methocarbamol (ROBAXIN) 750 mg tablet    metoclopramide (REGLAN) 5 mg/5 mL oral solution    pantoprazole (PROTONIX) 40 mg tablet    potassium chloride SA (KLOR-CON M15) 15 MEQ tablet    ranitidine (ZANTAC) 15 mg/mL syrup    repaglinide (PRANDIN) 1 mg tablet    zolpidem (AMBIEN) 10 mg tablet    Butalbital-APAP-Caffeine (FIORICET) -40 MG CAPS    varenicline (CHANTIX) 0 5 mg tablet    Allergies   Allergen Reactions    Other Hives     Surgical tape    Prednisone Other (See Comments)     Thrush             Objective     Blood pressure 100/68, pulse 82, resp  rate 18, height 5' 1" (1 549 m), weight 79 4 kg (175 lb)  Body mass index is 33 07 kg/m²  PHYSICAL EXAM:      General Appearance:   Alert, cooperative, no distress, obese   HEENT:   Normocephalic, atraumatic, anicteric      Neck:  Supple, symmetrical, trachea midline   Lungs:   Clear to auscultation bilaterally; no rales, rhonchi or wheezing; respirations unlabored    Heart[de-identified]   Regular rate and rhythm; no murmur, rub, or gallop  Abdomen:   Soft, non-tender, non-distended; normal bowel sounds; no masses, no organomegaly    Genitalia:   Deferred    Rectal:   Deferred    Extremities:  No cyanosis, clubbing or edema    Pulses:  2+ and symmetric    Skin:  No jaundice, rashes, or lesions    Lymph nodes:  No palpable cervical lymphadenopathy        Lab Results:   No visits with results within 1 Day(s) from this visit     Latest known visit with results is:   Admission on 10/10/2018, Discharged on 10/10/2018   Component Date Value    POC Glucose 10/10/2018 174*    Case Report 10/10/2018                      Value:Surgical Pathology Report                         Case: [id number]                                   Authorizing Provider:  Magui Vaughn MD      Collected:           10/10/2018 7593              Ordering Location:     [address] [place] [address] Received:            10/10/2018 1300                                     Operating Room                                                               Pathologist:           Sangeeta Elise MD                                                                Specimens:   A) - Stomach, antrum                                                                                B) - Stomach, body                                                                                  C) - Stomach, fundus D) - Esophagus, Distal esophagus                                                                    E) - Esophagus, Proximal esophagus                                                         Final Diagnosis 10/10/2018                      Value: This result contains rich text formatting which cannot be displayed here   Note 10/10/2018                      Value: This result contains rich text formatting which cannot be displayed here   Additional Information 10/10/2018                      Value: This result contains rich text formatting which cannot be displayed here  Penn Gross Description 10/10/2018                      Value: This result contains rich text formatting which cannot be displayed here   Clinical Information 10/10/2018                      Chauncey Rodrigues, unspecified type         Radiology Results:   No results found

## 2019-10-18 ENCOUNTER — APPOINTMENT (EMERGENCY)
Dept: CT IMAGING | Facility: HOSPITAL | Age: 54
End: 2019-10-18
Payer: COMMERCIAL

## 2019-10-18 ENCOUNTER — HOSPITAL ENCOUNTER (EMERGENCY)
Facility: HOSPITAL | Age: 54
Discharge: HOME | End: 2019-10-18
Attending: EMERGENCY MEDICINE | Admitting: EMERGENCY MEDICINE
Payer: COMMERCIAL

## 2019-10-18 VITALS
BODY MASS INDEX: 33.05 KG/M2 | OXYGEN SATURATION: 95 % | HEIGHT: 61 IN | SYSTOLIC BLOOD PRESSURE: 137 MMHG | WEIGHT: 175.04 LBS | RESPIRATION RATE: 16 BRPM | HEART RATE: 75 BPM | TEMPERATURE: 97.4 F | DIASTOLIC BLOOD PRESSURE: 64 MMHG

## 2019-10-18 DIAGNOSIS — R10.31 BILATERAL LOWER ABDOMINAL CRAMPING: Primary | ICD-10-CM

## 2019-10-18 DIAGNOSIS — K52.9 COLITIS: ICD-10-CM

## 2019-10-18 DIAGNOSIS — K92.1 HEMATOCHEZIA: ICD-10-CM

## 2019-10-18 DIAGNOSIS — R10.32 BILATERAL LOWER ABDOMINAL CRAMPING: Primary | ICD-10-CM

## 2019-10-18 DIAGNOSIS — R19.7 DIARRHEA: ICD-10-CM

## 2019-10-18 LAB
ANION GAP SERPL CALCULATED.3IONS-SCNC: 9 MMOL/L (ref 4–13)
APTT PPP: 35 SECONDS (ref 23–37)
BACTERIA UR QL AUTO: ABNORMAL /HPF
BASOPHILS # BLD AUTO: 0.05 THOUSANDS/ΜL (ref 0–0.1)
BASOPHILS NFR BLD AUTO: 1 % (ref 0–1)
BILIRUB UR QL STRIP: NEGATIVE
BUN SERPL-MCNC: 17 MG/DL (ref 5–25)
CALCIUM SERPL-MCNC: 9.2 MG/DL (ref 8.3–10.1)
CHLORIDE SERPL-SCNC: 100 MMOL/L (ref 100–108)
CLARITY UR: ABNORMAL
CO2 SERPL-SCNC: 29 MMOL/L (ref 21–32)
COLOR UR: YELLOW
CREAT SERPL-MCNC: 0.83 MG/DL (ref 0.6–1.3)
EOSINOPHIL # BLD AUTO: 0.12 THOUSAND/ΜL (ref 0–0.61)
EOSINOPHIL NFR BLD AUTO: 1 % (ref 0–6)
ERYTHROCYTE [DISTWIDTH] IN BLOOD BY AUTOMATED COUNT: 13.2 % (ref 11.6–15.1)
GFR SERPL CREATININE-BSD FRML MDRD: 81 ML/MIN/1.73SQ M
GLUCOSE SERPL-MCNC: 240 MG/DL (ref 65–140)
GLUCOSE UR STRIP-MCNC: ABNORMAL MG/DL
HCT VFR BLD AUTO: 50.1 % (ref 34.8–46.1)
HGB BLD-MCNC: 16.3 G/DL (ref 11.5–15.4)
HGB UR QL STRIP.AUTO: ABNORMAL
IMM GRANULOCYTES # BLD AUTO: 0.02 THOUSAND/UL (ref 0–0.2)
IMM GRANULOCYTES NFR BLD AUTO: 0 % (ref 0–2)
INR PPP: 0.98 (ref 0.84–1.19)
KETONES UR STRIP-MCNC: NEGATIVE MG/DL
LEUKOCYTE ESTERASE UR QL STRIP: ABNORMAL
LYMPHOCYTES # BLD AUTO: 2.48 THOUSANDS/ΜL (ref 0.6–4.47)
LYMPHOCYTES NFR BLD AUTO: 26 % (ref 14–44)
MAGNESIUM SERPL-MCNC: 1.9 MG/DL (ref 1.6–2.6)
MCH RBC QN AUTO: 27.1 PG (ref 26.8–34.3)
MCHC RBC AUTO-ENTMCNC: 32.5 G/DL (ref 31.4–37.4)
MCV RBC AUTO: 83 FL (ref 82–98)
MONOCYTES # BLD AUTO: 0.35 THOUSAND/ΜL (ref 0.17–1.22)
MONOCYTES NFR BLD AUTO: 4 % (ref 4–12)
MUCOUS THREADS UR QL AUTO: ABNORMAL
NEUTROPHILS # BLD AUTO: 6.54 THOUSANDS/ΜL (ref 1.85–7.62)
NEUTS SEG NFR BLD AUTO: 68 % (ref 43–75)
NITRITE UR QL STRIP: NEGATIVE
NON-SQ EPI CELLS URNS QL MICRO: ABNORMAL /HPF
NRBC BLD AUTO-RTO: 0 /100 WBCS
OTHER STN SPEC: ABNORMAL
PH UR STRIP.AUTO: 5 [PH]
PLATELET # BLD AUTO: 292 THOUSANDS/UL (ref 149–390)
PMV BLD AUTO: 9.7 FL (ref 8.9–12.7)
POTASSIUM SERPL-SCNC: 4 MMOL/L (ref 3.5–5.3)
PROT UR STRIP-MCNC: NEGATIVE MG/DL
PROTHROMBIN TIME: 13 SECONDS (ref 11.6–14.5)
RBC # BLD AUTO: 6.02 MILLION/UL (ref 3.81–5.12)
RBC #/AREA URNS AUTO: ABNORMAL /HPF
SODIUM SERPL-SCNC: 138 MMOL/L (ref 136–145)
SP GR UR STRIP.AUTO: 1.02 (ref 1–1.03)
UROBILINOGEN UR QL STRIP.AUTO: 0.2 E.U./DL
WBC # BLD AUTO: 9.56 THOUSAND/UL (ref 4.31–10.16)
WBC #/AREA URNS AUTO: ABNORMAL /HPF

## 2019-10-18 PROCEDURE — 85610 PROTHROMBIN TIME: CPT | Performed by: EMERGENCY MEDICINE

## 2019-10-18 PROCEDURE — 87086 URINE CULTURE/COLONY COUNT: CPT | Performed by: EMERGENCY MEDICINE

## 2019-10-18 PROCEDURE — 99284 EMERGENCY DEPT VISIT MOD MDM: CPT

## 2019-10-18 PROCEDURE — 96374 THER/PROPH/DIAG INJ IV PUSH: CPT

## 2019-10-18 PROCEDURE — 83735 ASSAY OF MAGNESIUM: CPT | Performed by: EMERGENCY MEDICINE

## 2019-10-18 PROCEDURE — 96375 TX/PRO/DX INJ NEW DRUG ADDON: CPT

## 2019-10-18 PROCEDURE — 80048 BASIC METABOLIC PNL TOTAL CA: CPT | Performed by: EMERGENCY MEDICINE

## 2019-10-18 PROCEDURE — 36415 COLL VENOUS BLD VENIPUNCTURE: CPT | Performed by: EMERGENCY MEDICINE

## 2019-10-18 PROCEDURE — 74177 CT ABD & PELVIS W/CONTRAST: CPT

## 2019-10-18 PROCEDURE — 99285 EMERGENCY DEPT VISIT HI MDM: CPT | Performed by: EMERGENCY MEDICINE

## 2019-10-18 PROCEDURE — 85025 COMPLETE CBC W/AUTO DIFF WBC: CPT | Performed by: EMERGENCY MEDICINE

## 2019-10-18 PROCEDURE — 85730 THROMBOPLASTIN TIME PARTIAL: CPT | Performed by: EMERGENCY MEDICINE

## 2019-10-18 PROCEDURE — 81001 URINALYSIS AUTO W/SCOPE: CPT | Performed by: EMERGENCY MEDICINE

## 2019-10-18 RX ORDER — DICYCLOMINE HCL 20 MG
20 TABLET ORAL EVERY 6 HOURS PRN
Qty: 50 TABLET | Refills: 0 | Status: SHIPPED | OUTPATIENT
Start: 2019-10-18 | End: 2022-01-19 | Stop reason: ALTCHOICE

## 2019-10-18 RX ORDER — KETOROLAC TROMETHAMINE 30 MG/ML
15 INJECTION, SOLUTION INTRAMUSCULAR; INTRAVENOUS ONCE
Status: COMPLETED | OUTPATIENT
Start: 2019-10-18 | End: 2019-10-18

## 2019-10-18 RX ADMIN — KETOROLAC TROMETHAMINE 15 MG: 30 INJECTION, SOLUTION INTRAMUSCULAR at 18:57

## 2019-10-18 RX ADMIN — IOHEXOL 100 ML: 350 INJECTION, SOLUTION INTRAVENOUS at 18:45

## 2019-10-18 RX ADMIN — MORPHINE SULFATE 2 MG: 2 INJECTION, SOLUTION INTRAMUSCULAR; INTRAVENOUS at 18:56

## 2019-10-19 LAB — BACTERIA UR CULT: NORMAL

## 2019-10-19 NOTE — DISCHARGE INSTRUCTIONS
Take ibuprofen (Motrin, Advil) or acetaminophen (Tylenol) as needed for pain, as per the instructions  Take the prescribed pain medication as needed  Take the nausea medication as needed for nausea and vomiting  Drink plenty of fluids, and eat as you are able to tolerate  Follow up the primary care doctor, and your GI doctor as needed for further evaluation of your symptoms  Return if you develop fevers greater than 101, worsening or changing pain, persistent vomiting and inability to tolerate fluids despite your nausea medications, or for any other concerns

## 2019-10-19 NOTE — ED PROVIDER NOTES
History  Chief Complaint   Patient presents with    Abdominal Pain     pt with abd pain since this morning, states has had a few bowel movements with a little bit of red blood in them      HPI    Prior to Admission Medications   Prescriptions Last Dose Informant Patient Reported? Taking?    ALPRAZolam (XANAX) 1 mg tablet  Self Yes No   Sig: Take by mouth daily at bedtime as needed for anxiety   DULoxetine (CYMBALTA) 60 mg delayed release capsule  Self Yes No   Sig: Take 20 mg by mouth daily   Liraglutide 18 MG/3ML SOPN  Self Yes No   Sig: Inject under the skin   acetaminophen-codeine (TYLENOL with CODEINE #4) 300-60 MG per tablet  Self Yes No   Sig: Take 1 tablet by mouth every 4 (four) hours as needed for moderate pain   albuterol (PROVENTIL HFA,VENTOLIN HFA) 90 mcg/act inhaler  Self Yes No   Sig: Inhale 2 puffs every 6 (six) hours as needed for wheezing   albuterol (PROVENTIL HFA,VENTOLIN HFA) 90 mcg/act inhaler  Self No No   Sig: Inhale 2 puffs every 4 (four) hours as needed for wheezing   aluminum hydroxide-magnesium carbonate (ACID GONE)  mg/15 mL oral suspension   No No   Sig: Take 15 mL by mouth 4 (four) times daily (after meals and at bedtime)   diclofenac (VOLTAREN) 75 mg EC tablet  Self Yes No   Sig: Take 50 mg by mouth 2 (two) times a day   gabapentin (NEURONTIN) 300 mg capsule  Self Yes No   Sig: Take 400 mg by mouth 2 (two) times a day     linaCLOtide (LINZESS) 72 MCG CAPS   No No   Sig: Take 72 mcg by mouth daily   lisinopril (ZESTRIL) 5 mg tablet  Self Yes No   Sig: Take 5 mg by mouth daily   methocarbamol (ROBAXIN) 750 mg tablet  Self Yes No   Sig: Take 500 mg by mouth every 6 (six) hours as needed for muscle spasms   metoclopramide (REGLAN) 5 mg/5 mL oral solution   No No   Sig: Take 5 mL (5 mg total) by mouth 3 (three) times a day   pantoprazole (PROTONIX) 40 mg tablet   No No   Sig: Take 1 tablet (40 mg total) by mouth daily   potassium chloride SA (KLOR-CON M15) 15 MEQ tablet  Self Yes No Sig: Take 15 mEq by mouth 2 (two) times a day   ranitidine (ZANTAC) 15 mg/mL syrup   No No   Sig: TAKE 20 ML (300 MG TOTAL) BY MOUTH DAILY   repaglinide (PRANDIN) 1 mg tablet  Self Yes No   Sig: Take 1 mg by mouth 3 (three) times a day before meals   zolpidem (AMBIEN) 10 mg tablet  Self Yes No   Sig: Take 10 mg by mouth daily at bedtime as needed for sleep      Facility-Administered Medications: None       Past Medical History:   Diagnosis Date    Angina pectoris ([place] [address] )     recent    Anxiety     Arthritis     Chronic headaches     Diabetes ([place] [address] )     Diabetes mellitus ([place] [address] )     GERD (gastroesophageal reflux disease)     Headache     Hyperlipidemia     Hypertension     Kidney stone     Left bundle branch block     LBBB    MI (myocardial infarction) ([place] [address] )     PTSD (post-traumatic stress disorder)     RLS (restless legs syndrome)     Shortness of breath        Past Surgical History:   Procedure Laterality Date    BREAST SURGERY      CARDIAC CATHETERIZATION  2018     Mid LAD: There was a tubular 40 % stenosis     SECTION      COLONOSCOPY      HYSTERECTOMY      KIDNEY STONE SURGERY      LAPAROSCOPY      MT COLONOSCOPY FLX DX W/COLLJ SPEC WHEN PFRMD N/A 2017    Procedure: EGD AND COLONOSCOPY;  Surgeon: Tal Machuca MD;  Location: [facility]; Service: Gastroenterology    MT ESOPHAGOGASTRODUODENOSCOPY TRANSORAL DIAGNOSTIC N/A 10/10/2018    Procedure: ESOPHAGOGASTRODUODENOSCOPY (EGD); Surgeon: Tal Machuca MD;  Location: [facility];   Service: Gastroenterology    MT LAPAROSCOPY W TOT HYSTERECTUTERUS <=250 Urszual Peek TUBE/OVARY N/A 2018    Procedure: ROBOTIC ASSISTED TOTAL LAPAROSCOPIC HYSTERECTOMY, BILATERAL SALPINGOOPHERECTOMY,;  Surgeon: Keshawn Ramos MD;  Location:  MAIN OR;  Service: Gynecology Oncology    UPPER GASTROINTESTINAL ENDOSCOPY      WRIST SURGERY Right        Family History   Problem Relation Age of Onset    Diabetes Mother     Breast cancer Mother 39    Lung cancer Father 47    Diabetes Sister     Brain cancer Maternal Aunt 67    Breast cancer Other 25     I have reviewed and agree with the history as documented  Social History     Tobacco Use    Smoking status: Current Every Day Smoker     Packs/day: 0 50     Years: 44 00     Pack years: 22 00    Smokeless tobacco: Never Used    Tobacco comment: pt  smoked this am 10/10   Substance Use Topics    Alcohol use: No    Drug use: Yes     Frequency: 7 0 times per week     Types: Marijuana     Comment: medical marijuana        Review of Systems    Physical Exam  Physical Exam   Constitutional: She is oriented to person, place, and time  She appears well-developed and well-nourished  No distress  HENT:   Head: Normocephalic and atraumatic  Mouth/Throat: Oropharynx is clear and moist    Eyes: Pupils are equal, round, and reactive to light  Conjunctivae are normal    Neck: Normal range of motion  No tracheal deviation present  Cardiovascular: Normal rate, regular rhythm, normal heart sounds and intact distal pulses  Pulmonary/Chest: Effort normal and breath sounds normal  No respiratory distress  Abdominal: Soft  Bowel sounds are normal  She exhibits no distension  There is tenderness in the right lower quadrant, suprapubic area and left lower quadrant  There is no rigidity, no rebound, no guarding and no CVA tenderness  Genitourinary: Rectal exam shows tenderness  Rectal exam shows no external hemorrhoid, no internal hemorrhoid and no fissure  Genitourinary Comments: No stool or gross blood on rectal exam   Neurological: She is alert and oriented to person, place, and time  She has normal strength  GCS eye subscore is 4  GCS verbal subscore is 5  GCS motor subscore is 6  Skin: Skin is warm and dry  Psychiatric: She has a normal mood and affect  Her behavior is normal    Nursing note and vitals reviewed        Vital Signs  ED Triage Vitals [10/18/19 1541]   Temperature Pulse Respirations Blood Pressure SpO2   (!) 97 4 °F (36 3 °C) 73 16 145/69 98 %      Temp Source Heart Rate Source Patient Position - Orthostatic VS BP Location FiO2 (%)   Oral Monitor Sitting Left arm --      Pain Score       Worst Possible Pain           Vitals:    10/18/19 1541 10/18/19 1902   BP: 145/69 137/64   Pulse: 73 75   Patient Position - Orthostatic VS: Sitting          Visual Acuity      ED Medications  Medications   morphine injection 2 mg (2 mg Intravenous Given 10/18/19 1856)   ketorolac (TORADOL) injection 15 mg (15 mg Intravenous Given 10/18/19 1857)   iohexol (OMNIPAQUE) 350 MG/ML injection (MULTI-DOSE) 100 mL (100 mL Intravenous Given 10/18/19 1845)       Diagnostic Studies  Results Reviewed     Procedure Component Value Units Date/Time    Urine Microscopic [[id number]]  (Abnormal) Collected:  10/18/19 1739    Lab Status:  Final result Specimen:  Urine, Clean Catch Updated:  10/18/19 1810     RBC, UA 0-1 /hpf      WBC, UA 10-20 /hpf      Epithelial Cells Occasional /hpf      Bacteria, UA Occasional /hpf      OTHER OBSERVATIONS WBCs Clumped     MUCUS THREADS Occasional    Urine culture [[id number]] Collected:  10/18/19 1739    Lab Status:   In process Specimen:  Urine, Clean Catch Updated:  10/18/19 1810    Protime-INR [[id number]]  (Normal) Collected:  10/18/19 1738    Lab Status:  Final result Specimen:  Blood from Arm, Left Updated:  10/18/19 1757     Protime 13 0 seconds      INR 0 98    APTT [[id number]]  (Normal) Collected:  10/18/19 1738    Lab Status:  Final result Specimen:  Blood from Arm, Left Updated:  10/18/19 1757     PTT 35 seconds     Basic metabolic panel [[id number]]  (Abnormal) Collected:  10/18/19 1738    Lab Status:  Final result Specimen:  Blood from Arm, Left Updated:  10/18/19 1756     Sodium 138 mmol/L      Potassium 4 0 mmol/L      Chloride 100 mmol/L      CO2 29 mmol/L      ANION GAP 9 mmol/L      BUN 17 mg/dL      Creatinine 0 83 mg/dL      Glucose 240 mg/dL      Calcium 9 2 mg/dL eGFR 81 ml/min/1 73sq m     Narrative:       [place] guidelines for Chronic Kidney Disease (CKD):     Stage 1 with normal or high GFR (GFR > 90 mL/min/1 73 square meters)    Stage 2 Mild CKD (GFR = 60-89 mL/min/1 73 square meters)    Stage 3A Moderate CKD (GFR = 45-59 mL/min/1 73 square meters)    Stage 3B Moderate CKD (GFR = 30-44 mL/min/1 73 square meters)    Stage 4 Severe CKD (GFR = 15-29 mL/min/1 73 square meters)    Stage 5 End Stage CKD (GFR <15 mL/min/1 73 square meters)  Note: GFR calculation is accurate only with a steady state creatinine    Magnesium [[id number]]  (Normal) Collected:  10/18/19 1738    Lab Status:  Final result Specimen:  Blood from Arm, Left Updated:  10/18/19 1756     Magnesium 1 9 mg/dL     UA w Reflex to Microscopic w Reflex to Culture [[id number]]  (Abnormal) Collected:  10/18/19 1739    Lab Status:  Final result Specimen:  Urine, Clean Catch Updated:  10/18/19 1746     Color, UA Yellow     Clarity, UA Slightly Cloudy     Specific [place], UA 1 020     pH, UA 5 0     Leukocytes, UA Elevated glucose may cause decreased leukocyte values   See urine microscopic for [facility] result/     Nitrite, UA Negative     Protein, UA Negative mg/dl      Glucose, UA >=1000 (1%) mg/dl      Ketones, UA Negative mg/dl      Urobilinogen, UA 0 2 E U /dl      Bilirubin, UA Negative     Blood, UA Trace-Intact    CBC and differential [[id number]]  (Abnormal) Collected:  10/18/19 1738    Lab Status:  Final result Specimen:  Blood from Arm, Left Updated:  10/18/19 1745     WBC 9 56 Thousand/uL      RBC 6 02 Million/uL      Hemoglobin 16 3 g/dL      Hematocrit 50 1 %      MCV 83 fL      MCH 27 1 pg      MCHC 32 5 g/dL      RDW 13 2 %      MPV 9 7 fL      Platelets 017 Thousands/uL      nRBC 0 /100 WBCs      Neutrophils Relative 68 %      Immat GRANS % 0 %      Lymphocytes Relative 26 %      Monocytes Relative 4 %      Eosinophils Relative 1 %      Basophils Relative 1 % Neutrophils Absolute 6 54 Thousands/µL      Immature Grans Absolute 0 02 Thousand/uL      Lymphocytes Absolute 2 48 Thousands/µL      Monocytes Absolute 0 35 Thousand/µL      Eosinophils Absolute 0 12 Thousand/µL      Basophils Absolute 0 05 Thousands/µL                  CT abdomen pelvis with contrast   Final Result by Jaclyn Curtis MD (10/18 1958)      1  Acute uncomplicated colitis of of the mid to distal transverse colon sparing the splenic flexure, favored to be infectious  2   Mild hepatomegaly and hepatic steatosis  Workstation performed: [id number]                    Procedures  Procedures       ED Course                               MDM  Number of Diagnoses or Management Options  Bilateral lower abdominal cramping: new and requires workup  Colitis: new and requires workup  Diarrhea: new and requires workup  Hematochezia: new and requires workup  Diagnosis management comments: This is a 53-year-old female presents here today with a medicates yet  She states she began developing lower abdominal cramping this morning with associated diarrhea  She has had about 20 bowel movements throughout the day  She states around 1300 she noticed a small amount of blood tinge to it  She states it was a small amount of bright red blood that was separate from the stool  She had several additional bowel movements with the same  She denies any dark red blood, recent black stools, blood clots  She was told once previously that she might have a hemorrhoid due to some bright red blood with wiping  She denies any other known problems with hemorrhoids or prior rectal bleeding  She denies any fevers  She has chronic nausea which is not recently worsened or changed  She has been taking her prescribed home pain medications, which have not been helping her pain  She has not taken anything specifically for the diarrhea    She states it was just the diarrhea she would have not come to the hospital, but given several bloody bowel movements came for evaluation  She denies any known sick contacts or bad food exposures  She does take Linzess for chronic constipation but did not take it today because of her diarrhea  She denies any recent antibiotic use  She last had colonoscopy in September 2017 which showed a benign polyp was otherwise unremarkable  She had a hysterectomy due to large ovarian cysts which turned out to be benign  She denies any other abdominal surgeries  She denies any dysuria or changes in her urine  She has not take any blood thinning medications and denies any bleeding dyscrasias  Review of systems:  Otherwise negative unless stated as above    She is well-appearing, in no acute distress  She does have lower abdominal tenderness without peritoneal signs  Exam is otherwise unremarkable  This is most likely hematochezia secondary to rectal mucosal irritation from persistent diarrhea  However, there is also possibility of diverticulitis or colitis contributing to her symptoms  We will check lab work to evaluate for signs of dehydration, electrolyte abnormality from persistent diarrhea, meet anemia from blood loss, coags contributing to increased risk of bleeding, and CT scan to evaluate for underlying abnormalities contributing to her symptoms  CT scan shows mild colitis of the transverse colon but is otherwise unremarkable  She does not have a leukocytosis  She does actually have a mild polycythemia, increased slightly from prior which could reflect mild dehydration  However, creatinine is at baseline, she has no associated electrolyte abnormalities or other markers of dehydration  She has had significant improvement of her pain, and no bowel movement since being here  I discussed with her findings, treatment at home, follow-up, and indications for return, and she expresses understanding with this plan          Amount and/or Complexity of Data Reviewed  Clinical lab tests: ordered and reviewed  Tests in the radiology section of CPT®: ordered and reviewed  Decide to obtain previous medical records or to obtain history from someone other than the patient: yes  Review and summarize past medical records: yes  Independent visualization of images, tracings, or specimens: yes        Disposition  Final diagnoses:   Bilateral lower abdominal cramping   Diarrhea   Hematochezia   Colitis     Time reflects when diagnosis was documented in both MDM as applicable and the Disposition within this note     Time User Action Codes Description Comment    10/18/2019  8:28 PM Constantin Riveraks Add [R10 31,  R10 32] Bilateral lower abdominal cramping     10/18/2019  8:29 PM Constantin Riveraks Add [R19 7] Diarrhea     10/18/2019  8:29 PM Constantin Rievraks Add [K92 1] Hematochezia     10/18/2019  8:29 PM Constantin Riveraks Add [K52 9] Colitis       ED Disposition     ED Disposition Condition Date/Time Comment    Discharge Good Fri Oct 18, 2019  8:28 PM Pablo Robles discharge to home/self care        Follow-up Information     Follow up With Specialties Details Why Contact Info Additional [address],  Family Medicine Schedule an appointment as soon as possible for a visit in 3 days to follow up on your symptoms [address] [place] [address] [facility] Gastroenterology Schedule an appointment as soon as possible for a visit  As needed, for persistent diarrhea or blood in your stool [address]  [place] [address] [facility],[address] [id number]  [address] [facility], [address]  [address], [facility], [place], [address]           Patient's Medications   Discharge Prescriptions    DICYCLOMINE (BENTYL) 20 MG TABLET    Take 1 tablet (20 mg total) by mouth every 6 (six) hours as needed (abdominal pain)       Start Date: 10/18/2019End Date: --       Order Dose: 20 mg       Quantity: 50 tablet    Refills: 0     No discharge procedures on file      ED Provider  Electronically Signed by           Tran Cruz MD  10/19/19 7609

## 2019-10-30 DIAGNOSIS — Z79.899 MEDICAL MARIJUANA USE: ICD-10-CM

## 2019-10-30 DIAGNOSIS — R13.19 ESOPHAGEAL DYSPHAGIA: ICD-10-CM

## 2019-10-30 DIAGNOSIS — K21.00 GASTROESOPHAGEAL REFLUX DISEASE WITH ESOPHAGITIS: ICD-10-CM

## 2019-10-30 DIAGNOSIS — G89.29 OTHER CHRONIC PAIN: ICD-10-CM

## 2019-10-30 DIAGNOSIS — R11.14 BILIOUS VOMITING WITH NAUSEA: ICD-10-CM

## 2019-10-30 RX ORDER — METOCLOPRAMIDE HYDROCHLORIDE 5 MG/5ML
5 SOLUTION ORAL 3 TIMES DAILY
Qty: 445 ML | Refills: 1 | Status: SHIPPED | OUTPATIENT
Start: 2019-10-30 | End: 2019-12-13 | Stop reason: SDUPTHER

## 2019-11-14 ENCOUNTER — OFFICE VISIT (OUTPATIENT)
Dept: CARDIOLOGY CLINIC | Facility: CLINIC | Age: 54
End: 2019-11-14
Payer: COMMERCIAL

## 2019-11-14 VITALS
WEIGHT: 171 LBS | HEIGHT: 61 IN | HEART RATE: 70 BPM | OXYGEN SATURATION: 97 % | SYSTOLIC BLOOD PRESSURE: 134 MMHG | BODY MASS INDEX: 32.28 KG/M2 | DIASTOLIC BLOOD PRESSURE: 78 MMHG

## 2019-11-14 DIAGNOSIS — I10 ESSENTIAL (PRIMARY) HYPERTENSION: Primary | ICD-10-CM

## 2019-11-14 DIAGNOSIS — E78.5 HYPERLIPIDEMIA: ICD-10-CM

## 2019-11-14 DIAGNOSIS — E66.9 OBESITY (BMI 30.0-34.9): ICD-10-CM

## 2019-11-14 DIAGNOSIS — I42.8 NON-ISCHEMIC CARDIOMYOPATHY (HCC): ICD-10-CM

## 2019-11-14 DIAGNOSIS — I44.7 LBBB (LEFT BUNDLE BRANCH BLOCK): ICD-10-CM

## 2019-11-14 PROCEDURE — 99213 OFFICE O/P EST LOW 20 MIN: CPT | Performed by: INTERNAL MEDICINE

## 2019-11-14 RX ORDER — PREGABALIN 75 MG/1
75 CAPSULE ORAL 2 TIMES DAILY
COMMUNITY

## 2019-11-14 RX ORDER — QUETIAPINE FUMARATE 200 MG/1
150 TABLET, FILM COATED ORAL
COMMUNITY

## 2019-11-14 RX ORDER — BISOPROLOL FUMARATE 5 MG/1
5 TABLET ORAL DAILY
Qty: 30 TABLET | Refills: 1 | Status: SHIPPED | OUTPATIENT
Start: 2019-11-14 | End: 2019-12-08 | Stop reason: SDUPTHER

## 2019-11-14 RX ORDER — ARIPIPRAZOLE 2 MG/1
2 TABLET ORAL DAILY
COMMUNITY

## 2019-11-14 RX ORDER — ATORVASTATIN CALCIUM 40 MG/1
40 TABLET, FILM COATED ORAL DAILY
COMMUNITY
End: 2020-08-20 | Stop reason: ALTCHOICE

## 2019-11-14 RX ORDER — LISINOPRIL 10 MG/1
10 TABLET ORAL DAILY
Qty: 30 TABLET | Refills: 1 | Status: SHIPPED | OUTPATIENT
Start: 2019-11-14 | End: 2019-12-06 | Stop reason: SDUPTHER

## 2019-11-14 RX ORDER — MONTELUKAST SODIUM 10 MG/1
10 TABLET ORAL DAILY
Refills: 2 | COMMUNITY
Start: 2019-10-10 | End: 2022-01-19 | Stop reason: ALTCHOICE

## 2019-11-14 NOTE — PROGRESS NOTES
[facility], [address]   [place], [address]   [phone]                                              Cardiology Office Follow up  Tom Fenton, 48 y o  female  YOB: 1965  MRN: [id number] Encounter: [id number]      PCP - Nohemi Men, DO    Assessment and Plan  1  Non-ischemic cardiomyopathy, LVEF 40%  · [facility] LVEF 40-44%, mildly dilated LV, mild diffuse hypokinesis, grade 1 diastolic dysfunction no significant valvular abnormalities  2  Chronic LBBB  3  Non-obstructive CAD, mLAD 40% in 2018  4  Diabetes Mellitus Type 2   5  Hyperlipidemia  6  Nocturnal hypoxia, on overnight O2 therapy  7  Active smoker  · Previously quit with chantix, but resumed smoking due to her son's smoking, and people around her smoking  5  Depression/PTSD  6  Obesity, Body mass index is 32 31 kg/m²  Plan  Non-ischemic cardiomyopathy, LVEF 40%  · has a known reduced EF of 40% previously, but had an echo done recently with guys anger through pulmonology and was found to have an EF of 40-45%, for which she was referred back to see Cardiology  · Mildly reduced LVEF felt to be related to left bundle branch block  · ECG today, normal sinus rhythm with left bundle branch block  · no current heart failure symptoms  · Will optimize heart failure therapy  · Started on bisoprolol 5 mg,  Increase lisinopril from 5 mg to 10 mg    Non-obstructive CAD  · no anginal symptoms, no previous stenting  · On atorvastatin 40 mg    Hyperlipidemia  · on atorvastatin 40 mg   · no lipids available    Obesity, Body mass index is 32 31 kg/m²     · Wt 171 lbs  · Needs to lose about at least 20 lbs to get to BMI<28  · Low carb, low-fat diet  · Increase activity level    History of Present Illness   61-year-old female comes in for annual cardiac follow-up, after being sent  Back by her pulmonologist    She used to see Dr Asha Ozuna last year, but is now being established with me     she has a history of a left bundle branch block,  And had an echo and a nuclear stress test done in May 2018, which revealed mildly reduced LVEF along with  A medium sized anterior defect with some reversibility  As a result she was referred for a cardiac catheterization which showed nonobstructive coronary artery disease with 40% stenosis of mid LAD  She was managed medically and has had no symptoms over the past year  She has been doing fairly well over the past year, and does not have any active complaints  She additionally follows with a pulmonologist at [facility], who had ordered an echocardiogram last month  This revealed an LVEF of 40-44%, and as a result she was asked to follow up again with her cardiologist for further evaluation  Historical Information   Past Medical History:   Diagnosis Date    [facility]     recent    Anxiety     Arthritis     Chronic headaches     [facility]     [facility] mellitus [facility]     GERD (gastroesophageal reflux disease)     Headache     Hyperlipidemia     Hypertension     Kidney stone     Left bundle branch block     LBBB    [facility] infarction[facility]     PTSD (post-traumatic stress disorder)     RLS (restless legs syndrome)     Shortness of breath      Past Surgical History:   Procedure Laterality Date    BREAST SURGERY      CARDIAC CATHETERIZATION  2018     Mid LAD: There was a tubular 40 % stenosis     SECTION      COLONOSCOPY      HYSTERECTOMY      KIDNEY STONE SURGERY      LAPAROSCOPY      ME COLONOSCOPY FLX DX W/COLLJ SPEC WHEN PFRMD N/A 2017    Procedure: EGD AND COLONOSCOPY;  Surgeon: Yonas Olson MD;  Location: [facility]; Service: Gastroenterology    ME ESOPHAGOGASTRODUODENOSCOPY TRANSORAL DIAGNOSTIC N/A 10/10/2018    Procedure: ESOPHAGOGASTRODUODENOSCOPY (EGD); Surgeon: Yonas Olson MD;  Location: [facility];   Service: Gastroenterology    ME LAPAROSCOPY W TOT HYSTERECTUTERUS <=250 GRAM  W TUBE/OVARY N/A 5/22/2018    Procedure: ROBOTIC ASSISTED TOTAL LAPAROSCOPIC HYSTERECTOMY, BILATERAL SALPINGOOPHERECTOMY,;  Surgeon: Savannah Juan MD;  [facility];  Service: Gynecology Oncology    UPPER GASTROINTESTINAL ENDOSCOPY      WRIST SURGERY Right      Family History   Problem Relation Age of Onset    Diabetes Mother    Ignacio Bacon Breast cancer Mother 39    Lung cancer Father 47    Diabetes Sister     Brain cancer Maternal Aunt 67    Breast cancer Other 25     Current Outpatient Medications on File Prior to Visit   Medication Sig Dispense Refill    acetaminophen-codeine (TYLENOL with CODEINE #4) 300-60 MG per tablet Take 1 tablet by mouth every 4 (four) hours as needed for moderate pain      albuterol (PROVENTIL HFA,VENTOLIN HFA) 90 mcg/act inhaler Inhale 2 puffs every 6 (six) hours as needed for wheezing      albuterol (PROVENTIL HFA,VENTOLIN HFA) 90 mcg/act inhaler Inhale 2 puffs every 4 (four) hours as needed for wheezing 1 Inhaler 0    ALPRAZolam (XANAX) 1 mg tablet Take by mouth daily at bedtime as needed for anxiety      aluminum hydroxide-magnesium carbonate (ACID GONE)  mg/15 mL oral suspension Take 15 mL by mouth 4 (four) times daily (after meals and at bedtime) 355 mL 0    ARIPiprazole (ABILIFY) 2 mg tablet Take 2 mg by mouth daily      atorvastatin (LIPITOR) 40 mg tablet Take 40 mg by mouth daily      diclofenac (VOLTAREN) 75 mg EC tablet Take 50 mg by mouth 2 (two) times a day      dicyclomine (BENTYL) 20 mg tablet Take 1 tablet (20 mg total) by mouth every 6 (six) hours as needed (abdominal pain) 50 tablet 0    DULoxetine (CYMBALTA) 60 mg delayed release capsule Take 20 mg by mouth daily      linaCLOtide (LINZESS) 72 MCG CAPS Take 72 mcg by mouth daily 30 capsule 3    Liraglutide 18 MG/3ML SOPN Inject under the skin      lisinopril (ZESTRIL) 5 mg tablet Take 5 mg by mouth daily      methocarbamol (ROBAXIN) 750 mg tablet Take 500 mg by mouth every 6 (six) hours as needed for muscle spasms      metoclopramide (REGLAN) 5 mg/5 mL oral solution TAKE 5 ML (5 MG TOTAL) BY MOUTH 3 (THREE) TIMES A  mL 1    Mirabegron ER 25 MG TB24 Take by mouth      montelukast (SINGULAIR) 10 mg tablet Take 10 mg by mouth daily  2    pantoprazole (PROTONIX) 40 mg tablet Take 1 tablet (40 mg total) by mouth daily 90 tablet 0    potassium chloride SA (KLOR-CON M15) 15 MEQ tablet Take 15 mEq by mouth 2 (two) times a day      pregabalin (LYRICA) 75 mg capsule Take 75 mg by mouth 3 (three) times a day      QUEtiapine (SEROquel) 200 mg tablet Take 200 mg by mouth daily at bedtime      ranitidine (ZANTAC) 15 mg/mL syrup TAKE 20 ML (300 MG TOTAL) BY MOUTH DAILY 300 mL 1    repaglinide (PRANDIN) 1 mg tablet Take 1 mg by mouth 3 (three) times a day before meals      vortioxetine (TRINTELLIX) 10 MG tablet Take 10 mg by mouth daily      zolpidem (AMBIEN) 10 mg tablet Take 10 mg by mouth daily at bedtime as needed for sleep      gabapentin (NEURONTIN) 300 mg capsule Take 400 mg by mouth 2 (two) times a day         No current facility-administered medications on file prior to visit        Allergies   Allergen Reactions    Other Hives     Surgical tape    Prednisone Other (See Comments)     Thrush       Social History     Socioeconomic History    Marital status: /Civil Union     Spouse name: None    Number of children: None    Years of education: None    Highest education level: None   Occupational History    None   Social Needs    Financial resource strain: None    Food insecurity:     Worry: None     Inability: None    Transportation needs:     Medical: None     Non-medical: None   Tobacco Use    Smoking status: Current Every Day Smoker     Packs/day: 0 50     Years: 44 00     Pack years: 22 00    Smokeless tobacco: Never Used    Tobacco comment: pt  smoked this am 10/10   Substance and Sexual Activity    Alcohol use: No    Drug use: Yes     Frequency: 7 0 times per week     Types: Marijuana     Comment: medical marijuana    Sexual activity: None   Lifestyle    Physical activity:     Days per week: None     Minutes per session: None    Stress: None   Relationships    Social connections:     Talks on phone: None     Gets together: None     Attends [employer] service: None     Active member of club or organization: None     Attends meetings of clubs or organizations: None     Relationship status: None    Intimate partner violence:     Fear of current or ex partner: None     Emotionally abused: None     Physically abused: None     Forced sexual activity: None   Other Topics Concern    None   Social History Narrative    None        Review of Systems  No c/o chest pain, No c/o palpitations, No c/o shortness of breath, No c/o dizziness or light-headedness   All other systems negative, except as noted in HPI    Vitals:  Vitals:    11/14/19 1323   BP: 134/78   BP Location: Left arm   Patient Position: Sitting   Cuff Size: Standard   Pulse: 70   SpO2: 97%   Weight: 77 6 kg (171 lb)   Height: 5' 1" (1 549 m)     BMI - Body mass index is 32 31 kg/m²  Wt Readings from Last 7 Encounters:   11/14/19 77 6 kg (171 lb)   10/18/19 79 4 kg (175 lb 0 7 oz)   10/02/19 79 4 kg (175 lb)   03/19/19 81 6 kg (180 lb)   12/21/18 78 5 kg (173 lb)   11/14/18 80 7 kg (178 lb)   11/13/18 80 6 kg (177 lb 9 6 oz)       Physical Exam    Vitals reviewed  Nursing note & chart reviewed  Constitutional:  Macho Pradhan appears well, pleasant and cooperative  Alert and oriented x 3  No acute distress   HEENT:    Normocephalic, atraumatic   Neck:  Supple, no JVD, negative AJR   Lungs:  Respirations unlabored, clear to auscultation bilaterally, no rales, no wheezing     Chest Wall:  No tenderness, no pertinent deformity   Heart[de-identified]  Regular rate, Regular rhythm, S1 and S2 normal, no murmurs, no rubs, no gallops   Abdomen:  Soft, non-tender, non-distended   Neurological: Awake, alert, oriented x3  Non-focal exam    Extremities:  No pedal edema, no calf tenderness       Labs:  CBC:   Lab Results   Component Value Date    WBC 9 56 10/18/2019    RBC 6 02 (H) 10/18/2019    HGB 16 3 (H) 10/18/2019    HCT 50 1 (H) 10/18/2019    MCV 83 10/18/2019     10/18/2019    RDW 13 2 10/18/2019       CMP:   Lab Results   Component Value Date    K 4 0 10/18/2019     10/18/2019    CO2 29 10/18/2019    BUN 17 10/18/2019    CREATININE 0 83 10/18/2019    EGFR 81 10/18/2019    CALCIUM 9 2 10/18/2019    AST 15 04/26/2018    ALT 26 04/26/2018    ALKPHOS 112 04/26/2018       Magnesium:  Lab Results   Component Value Date    MG 1 9 10/18/2019       Lipid Profile:   No results found for: CHOL, HDL, TRIG, LDLCALC    Thyroid Studies: No results found for: NEI6LIELLMBN, T3FREE, FREET4, P4NEAIX, P7UYHMO    No components found for: [employer] [place]    Lab Results   Component Value Date    INR 0 98 10/18/2019    INR 0 89 05/09/2018   5    Imaging: Ct Abdomen Pelvis With Contrast    Result Date: 10/18/2019  Narrative: CT ABDOMEN AND PELVIS WITH IV CONTRAST INDICATION:   lower abd pain, diarrhea  COMPARISON:  None  TECHNIQUE:  CT examination of the abdomen and pelvis was performed  Axial, sagittal, and coronal 2D reformatted images were created from the source data and submitted for interpretation  Radiation dose length product (DLP) for this visit:  500 mGy-cm   This examination, like all CT scans performed in the [facility], was performed utilizing techniques to minimize radiation dose exposure, including the use of iterative reconstruction and automated exposure control  IV Contrast:  100 mL of iohexol (OMNIPAQUE) Enteric Contrast:  Enteric contrast was not administered  FINDINGS: ABDOMEN LOWER CHEST:  No clinically significant abnormality identified in the visualized lower chest  LIVER/BILIARY TREE: The liver is mildly enlarged  Diffusely decreased hepatic attenuation suggesting steatosis    No intra or extrahepatic biliary ductal dilation GALLBLADDER:  No calcified gallstones  No pericholecystic inflammatory change  SPLEEN:  Unremarkable  PANCREAS:  Unremarkable  ADRENAL GLANDS:  Unremarkable  KIDNEYS/URETERS: No hydronephrosis  No suspicious renal lesion  Left renal lower pole focal cortical loss, noting a 3 mm dystrophic calcification  STOMACH AND BOWEL: Circumferential wall thickening of the mid to distal transverse colon sparing the splenic flexure with pericolonic fat stranding in keeping with acute colitis (series 2, images 31-41)  No disproportionate dilation of small or large bowel loops  APPENDIX:  No findings to suggest appendicitis  ABDOMINOPELVIC CAVITY:  No ascites or free intraperitoneal air  No lymphadenopathy  VESSELS:  Unremarkable for patient's age  PELVIS REPRODUCTIVE ORGANS:  Status post hysterectomy  No adnexal mass  URINARY BLADDER:  Unremarkable  ABDOMINAL WALL/INGUINAL REGIONS:  Unremarkable  OSSEOUS STRUCTURES:  No acute fracture or destructive osseous lesion  Impression: 1  Acute uncomplicated colitis of of the mid to distal transverse colon sparing the splenic flexure, favored to be infectious  2   Mild hepatomegaly and hepatic steatosis  Workstation performed: [id number]       Cardiac testing:   Results for orders placed during the hospital encounter of 18   Echo complete with contrast if indicated    Narrative [address]  [phone]    Transthoracic Echocardiogram  2D, M-mode, Doppler, and Color Doppler    Study date:  03-May-2018    Patient: Sarah Mcnally  MR number: [id number]  Account number: [de-identified]  : 1965  Age: 50 years  Gender: Female  Status: Outpatient  [facility]: [facility]  Height: 61 in  Weight: 170 lb  BP: 112/ 78 mmHg    Indications: Dyspnea      Diagnoses: R06 09 - Other forms of dyspnea    Sonographer:  Laird RCS  Interpreting Physician:  Jazmine Braden MD Ayaan  Primary Physician:  Guanako Navarrete DO  Referring Physician:  Gurpreet Hadley MD  Group:  [facility]    SUMMARY    LEFT VENTRICLE:  Ejection fraction was estimated to be 40 %  Dyssynchronous septal motion likely secondary to LBBB  There was mild diffuse hypokinesis  MITRAL VALVE:  There was trace regurgitation  HISTORY: PRIOR HISTORY: LBBB Myocardial infarction  Risk factors: hypertension, oral hypoglycemic-treated diabetes, a history of current cigarette use (within the last month), and a family history of coronary artery disease  PROCEDURE: The study was performed in the [address] [place] [address]  This was a routine study  The transthoracic approach was used  The study included complete 2D imaging, M-mode, complete spectral Doppler, and color Doppler  The  heart rate was 82 bpm, at the start of the study  Image quality was adequate  LEFT VENTRICLE: Size was normal  Ejection fraction was estimated to be 40 %  Dyssynchronous septal motion likely secondary to LBBB  There was mild diffuse hypokinesis  DOPPLER: There was an increased relative contribution of atrial  contraction to ventricular filling  RIGHT VENTRICLE: The size was normal  Systolic function was normal  Wall thickness was normal     LEFT ATRIUM: Size was normal     RIGHT ATRIUM: Size was normal     MITRAL VALVE: Valve structure was normal  There was normal leaflet separation  DOPPLER: The transmitral velocity was within the normal range  There was no evidence for stenosis  There was trace regurgitation  AORTIC VALVE: The valve was not well visualized  DOPPLER: There was no evidence for stenosis  TRICUSPID VALVE: The valve structure was normal  There was normal leaflet separation  DOPPLER: The transtricuspid velocity was within the normal range  There was no evidence for stenosis  There was no regurgitation  PULMONIC VALVE: Not well visualized      PERICARDIUM: There was no pericardial effusion  The pericardium was normal in appearance  AORTA: The root exhibited normal size  SYSTEM MEASUREMENT TABLES    Apical four chamber  4 chamber Left Atrium Volume Index; Planimetry; End Systole; Apical four chamber;: 9 19 cm2  Left Ventricular End Diastolic Volume; Method of Disks, Single Plane; Apical four chamber;: 71 9 ml  Left Ventricular End Systolic Volume; Method of Disks, Single Plane; Apical four chamber;: 50 6 ml  Right Atrium Systolic Area; Planimetry; End Systole; Apical four chamber;: 8 11 cm2  Right Ventricular Internal Diastolic Dimension; End Diastole; Apical four chamber;: 20 6 mm  TAPSE: 27 4 mm    Apical two chamber  Left Ventricular End Diastolic Volume; Method of Disks, Single Plane; Apical two chamber;: 105 3 ml  Left Ventricular End Systolic Volume; Method of Disks, Single Plane; Apical two chamber;: 35 7 ml    Unspecified Scan Mode  Aortic Root Diameter; End Systole;: 29 4 mm  Left atrial diameter; End Diastole;: 29 7 mm  Cardiac Output; Teichholz; Systole;: 5 91 L/min  Interventricular Septum Diastolic Thickness; Teichholz; End Diastole;: 8 5 mm  Left Ventricle Internal End Diastolic Dimension; Teichholz;: 51 8 mm  Left Ventricle Internal Systolic Dimension; Teichholz; End Systole;: 37 7 mm  Left Ventricle Posterior Wall Diastolic Thickness; Teichholz; End Diastole;: 8 mm  Left Ventricular Ejection Fraction; Teichholz;: 52 2 %  Left Ventricular End Diastolic Volume; Teichholz;: 127 2 ml  Left Ventricular End Systolic Volume; Teichholz;: 60 8 ml  Stroke volume; Teichholz; Systole;: 66 4 ml  Mitral Valve Area; Area by Pressure Half-Time; Systole;: 3 49 cm2  Mitral Valve E to A Ratio; Systole;: 0 69    [employer] Accredited Echocardi[facility]    Prepared and electronically signed by    Odessa Macias MD  Signed [id number] 11:54:33       No results found for this or any previous visit  No results found for this or any previous visit    No results found for this or any previous visit

## 2019-11-17 PROBLEM — E66.811 OBESITY (BMI 30.0-34.9): Status: ACTIVE | Noted: 2019-11-17

## 2019-11-17 PROBLEM — E78.5 HYPERLIPIDEMIA: Status: ACTIVE | Noted: 2019-11-17

## 2019-11-17 PROBLEM — I44.7 LBBB (LEFT BUNDLE BRANCH BLOCK): Status: ACTIVE | Noted: 2019-11-17

## 2019-11-17 PROBLEM — E66.9 OBESITY (BMI 30.0-34.9): Status: ACTIVE | Noted: 2019-11-17

## 2019-11-18 PROCEDURE — 93000 ELECTROCARDIOGRAM COMPLETE: CPT | Performed by: INTERNAL MEDICINE

## 2019-12-06 DIAGNOSIS — I10 ESSENTIAL (PRIMARY) HYPERTENSION: ICD-10-CM

## 2019-12-06 DIAGNOSIS — I42.8 NON-ISCHEMIC CARDIOMYOPATHY (HCC): ICD-10-CM

## 2019-12-06 RX ORDER — LISINOPRIL 10 MG/1
TABLET ORAL
Qty: 90 TABLET | Refills: 1 | Status: SHIPPED | OUTPATIENT
Start: 2019-12-06 | End: 2020-02-26 | Stop reason: SINTOL

## 2019-12-08 DIAGNOSIS — I10 ESSENTIAL (PRIMARY) HYPERTENSION: ICD-10-CM

## 2019-12-08 DIAGNOSIS — I42.8 NON-ISCHEMIC CARDIOMYOPATHY (HCC): ICD-10-CM

## 2019-12-10 RX ORDER — BISOPROLOL FUMARATE 5 MG/1
TABLET ORAL
Qty: 30 TABLET | Refills: 1 | Status: SHIPPED | OUTPATIENT
Start: 2019-12-10 | End: 2020-08-20 | Stop reason: SDUPTHER

## 2019-12-13 DIAGNOSIS — K21.00 GASTROESOPHAGEAL REFLUX DISEASE WITH ESOPHAGITIS: ICD-10-CM

## 2019-12-13 DIAGNOSIS — G89.29 OTHER CHRONIC PAIN: ICD-10-CM

## 2019-12-13 DIAGNOSIS — R13.19 ESOPHAGEAL DYSPHAGIA: ICD-10-CM

## 2019-12-13 DIAGNOSIS — Z79.899 MEDICAL MARIJUANA USE: ICD-10-CM

## 2019-12-13 DIAGNOSIS — R11.14 BILIOUS VOMITING WITH NAUSEA: ICD-10-CM

## 2019-12-13 RX ORDER — METOCLOPRAMIDE HYDROCHLORIDE 5 MG/5ML
5 SOLUTION ORAL 3 TIMES DAILY
Qty: 445 ML | Refills: 1 | Status: SHIPPED | OUTPATIENT
Start: 2019-12-13 | End: 2020-02-26 | Stop reason: SDUPTHER

## 2020-02-17 ENCOUNTER — TELEPHONE (OUTPATIENT)
Dept: CARDIOLOGY CLINIC | Facility: CLINIC | Age: 55
End: 2020-02-17

## 2020-02-17 NOTE — TELEPHONE ENCOUNTER
Crescencio Thomas left a message that she has to cancel her appt today and reschedule  I called her this morning and she said that her car got towed and she has to figure out how to get it back or get a ride  I did reschedule her to next month but she said she has medications issues  Her bisoprolol is not covered by insurance so she wants to know what you want to switch her to? Also, her pulmonologist wanted her to get in touch with you regarding stopping her lisinopril and getting on some thing else  He told her its contributing to her cough  She has copd and asthma and replacing it may help with the cough  Please advise

## 2020-02-18 NOTE — TELEPHONE ENCOUNTER
Patient Sebastián Cintron MD  You 5 minutes ago (3:59 PM)      Can switch from lisinopril 10 mg to Losartan 50 mg  Also, can switch from bisoprolol to metoprolol succinate 25 mg  Although, I have never previously had an insurance issue with bisoprolol, its a pretty old generic medication!

## 2020-02-20 NOTE — TELEPHONE ENCOUNTER
Patient called back  She does not want to take metoprolol  Her Bisoprolol does require a prior auth  Should I do the auth or can she try Atenolol? She said she tried metoprolol in the past and does not like the medication  Please advise

## 2020-02-26 ENCOUNTER — TELEPHONE (OUTPATIENT)
Dept: GASTROENTEROLOGY | Facility: CLINIC | Age: 55
End: 2020-02-26

## 2020-02-26 DIAGNOSIS — K21.00 GASTROESOPHAGEAL REFLUX DISEASE WITH ESOPHAGITIS: ICD-10-CM

## 2020-02-26 DIAGNOSIS — E11.43 GASTROPARESIS DUE TO DM (HCC): ICD-10-CM

## 2020-02-26 DIAGNOSIS — K76.0 FATTY LIVER: ICD-10-CM

## 2020-02-26 DIAGNOSIS — Z79.899 MEDICAL MARIJUANA USE: ICD-10-CM

## 2020-02-26 DIAGNOSIS — R11.14 BILIOUS VOMITING WITH NAUSEA: ICD-10-CM

## 2020-02-26 DIAGNOSIS — G89.29 OTHER CHRONIC PAIN: ICD-10-CM

## 2020-02-26 DIAGNOSIS — R74.8 ELEVATED LIVER ENZYMES: ICD-10-CM

## 2020-02-26 DIAGNOSIS — I10 BENIGN HYPERTENSION: Primary | ICD-10-CM

## 2020-02-26 DIAGNOSIS — R13.19 ESOPHAGEAL DYSPHAGIA: ICD-10-CM

## 2020-02-26 DIAGNOSIS — R13.13 PHARYNGEAL DYSPHAGIA: ICD-10-CM

## 2020-02-26 DIAGNOSIS — K31.84 GASTROPARESIS DUE TO DM (HCC): ICD-10-CM

## 2020-02-26 RX ORDER — LOSARTAN POTASSIUM 50 MG/1
50 TABLET ORAL DAILY
Qty: 30 TABLET | Refills: 5 | Status: SHIPPED | OUTPATIENT
Start: 2020-02-26 | End: 2020-06-23

## 2020-02-26 RX ORDER — METOCLOPRAMIDE HYDROCHLORIDE 5 MG/5ML
5 SOLUTION ORAL 3 TIMES DAILY
Qty: 445 ML | Refills: 1 | Status: SHIPPED | OUTPATIENT
Start: 2020-02-26 | End: 2020-04-29

## 2020-02-26 NOTE — TELEPHONE ENCOUNTER
Yes, she was supposed to have the CBC and hepatic panel drawn for her fatty liver from when she was seen in October Order in in the system  If her abdominal pain is persisting, she should schedule a follow up

## 2020-02-26 NOTE — TELEPHONE ENCOUNTER
Patient called to have the Losartan filled since Lisinopril gives her a cough  However, she doesn't know what to do about a beta blocker  Bisoprolol does need prior auth and she refuses metoprolol

## 2020-02-26 NOTE — TELEPHONE ENCOUNTER
Pt called she ate sausage on Sunday and since has had a bad stomach pain, if she curls up it alleviates the pain  She would also like you to mail her a lab order to have done    She mentioned that she like a refill of a medicine called acidgon

## 2020-03-30 ENCOUNTER — TELEMEDICINE (OUTPATIENT)
Dept: CARDIOLOGY CLINIC | Facility: CLINIC | Age: 55
End: 2020-03-30
Payer: COMMERCIAL

## 2020-03-30 VITALS
BODY MASS INDEX: 31.87 KG/M2 | SYSTOLIC BLOOD PRESSURE: 132 MMHG | DIASTOLIC BLOOD PRESSURE: 79 MMHG | HEART RATE: 88 BPM | HEIGHT: 61 IN | WEIGHT: 168.8 LBS

## 2020-03-30 DIAGNOSIS — I42.8 NON-ISCHEMIC CARDIOMYOPATHY (HCC): Primary | ICD-10-CM

## 2020-03-30 DIAGNOSIS — I44.7 LBBB (LEFT BUNDLE BRANCH BLOCK): ICD-10-CM

## 2020-03-30 DIAGNOSIS — R00.2 PALPITATIONS: ICD-10-CM

## 2020-03-30 PROCEDURE — G2012 BRIEF CHECK IN BY MD/QHP: HCPCS | Performed by: INTERNAL MEDICINE

## 2020-03-30 RX ORDER — LISINOPRIL 10 MG/1
10 TABLET ORAL DAILY
COMMUNITY
End: 2020-06-23 | Stop reason: SINTOL

## 2020-03-30 NOTE — PROGRESS NOTES
Virtual Brief Visit    Problem List Items Addressed This Visit        Cardiovascular and Mediastinum    LBBB (left bundle branch block)    Relevant Orders    AMB extended holter monitor      Other Visit Diagnoses     Non-ischemic cardiomyopathy ([facility] )    -  Primary    Relevant Orders    AMB extended holter monitor    Palpitations        Relevant Orders    AMB extended holter monitor                Reason for visit is Palpitations, follow up of cardiomyopathy    Encounter provider Kimmy Canales MD    Provider located at [address] [address]      Recent Visits  No visits were found meeting these conditions  Showing recent visits within past 7 days and meeting all other requirements     Today's Visits  Date Type Provider Dept   03/30/20 Telemedicine Viraj Singleton MD [facility] today's visits and meeting all other requirements     Future Appointments  No visits were found meeting these conditions  Showing future appointments within next 150 days and meeting all other requirements        After connecting through telephone, the patient was identified by name and date of birth  Juan Varner was informed that this is a telemedicine visit and that the visit is being conducted through telephone  My office door was closed  No one else was in the room  She acknowledged consent and understanding of privacy and security of the video platform  The patient has agreed to participate and understands they can discontinue the visit at any time  Patient is aware this is a billable service  Subjective  Juan Varner is a 48 y o  female she has been taking bisoprolol 5 mg without any problems, although does not feel much difference with it    She reports having frequent symptoms of palpitations, occurring almost every 2-3 days, many of which last for several hours   No associated shortness of breath, dizziness or lightheadedness  Plan  Palpitations  · Occurring every other day, at times lasting for hours  · Many times associated with anxiety, and is relieved with Xanax at times, but has a lot of further episodes without anxiety  · Concern for atrial fibrillation  · Will check a 7 day extended Holter monitor / patch monitor to evaluate further  This will need to be mailed out to her home, and office will help coordinate the same  · In the meantime, increase bisoprolol to 10 mg    Non-ischemic cardiomyopathy  · Lisinopril was stopped due to cough  · Will start losartan 25 mg  · Blood pressure at home on the patient's cuff was 129, with with a heart rate or in the 90  · Increase bisoprolol from 5 mg to 10 mg      Past Medical History:   Diagnosis Date    [facility]ina p[facility] )     recent    Anxiety     Arthritis     Carpal tunnel syndrome     Chronic headaches     Diabetes ([facility] )     Diabetes mellitus ([facility] )     GERD (gastroesophageal reflux disease)     Headache     Hyperlipidemia     Hypertension     Kidney stone     Left bundle branch block     LBBB    MI (myocardial infarction) ([facility] )     Neuropathy     PTSD (post-traumatic stress disorder)     RLS (restless legs syndrome)     Shortness of breath        Past Surgical History:   Procedure Laterality Date    BREAST SURGERY  2016    Reduction    CARDIAC CATHETERIZATION  2018     Mid LAD: There was a tubular 40 % stenosis     SECTION      COLONOSCOPY      HYSTERECTOMY  2018    Complete    KIDNEY STONE SURGERY      LAPAROSCOPY      AK COLONOSCOPY FLX DX W/COLLJ SPEC WHEN PFRMD N/A 2017    Procedure: EGD AND COLONOSCOPY;  Surgeon: Chelsea Richmond MD;  [facility]; Service: Gastroenterology    AK ESOPHAGOGASTRODUODENOSCOPY TRANSORAL DIAGNOSTIC N/A 10/10/2018    Procedure: ESOPHAGOGASTRODUODENOSCOPY (EGD);   Surgeon: Chelsea Richmond MD;  [facility]; Service: Gastroenterology    TN LAPAROSCOPY W TOT HYSTERECTUTERUS <=250 [address] TUBE/OVARY N/A 5/22/2018    Procedure: ROBOTIC ASSISTED TOTAL LAPAROSCOPIC HYSTERECTOMY, [facility],;  Surgeon: Jarocho Sheikh MD;  [facility];  Service: Gynecology Oncology    UPPER GASTROINTESTINAL ENDOSCOPY      WRIST SURGERY Right        Current Outpatient Medications   Medication Sig Dispense Refill    albuterol (PROVENTIL HFA,VENTOLIN HFA) 90 mcg/act inhaler Inhale 2 puffs every 6 (six) hours as needed for wheezing      ALPRAZolam (XANAX) 1 mg tablet Take by mouth daily at bedtime as needed for anxiety      aluminum hydroxide-magnesium carbonate (Acid Gone)  mg/15 mL oral suspension Take 15 mL by mouth 4 (four) times daily (after meals and at bedtime) 355 mL 0    ARIPiprazole (ABILIFY) 2 mg tablet Take 2 mg by mouth daily      atorvastatin (LIPITOR) 40 mg tablet Take 40 mg by mouth daily      bisoprolol (ZEBETA) 5 mg tablet TAKE 1 TABLET BY MOUTH EVERY DAY 30 tablet 1    diclofenac (VOLTAREN) 75 mg EC tablet Take 50 mg by mouth 2 (two) times a day      dicyclomine (BENTYL) 20 mg tablet Take 1 tablet (20 mg total) by mouth every 6 (six) hours as needed (abdominal pain) 50 tablet 0    DULoxetine (CYMBALTA) 60 mg delayed release capsule Take 20 mg by mouth daily      gabapentin (NEURONTIN) 300 mg capsule Take 400 mg by mouth 2 (two) times a day        insulin glargine (Basaglar KwikPen) 100 units/mL injection pen Inject 26 Units under the skin daily at bedtime      insulin lispro (HumaLOG) 100 units/mL injection Inject 10 Units under the skin 3 (three) times a day before meals      linaCLOtide (LINZESS) 72 MCG CAPS Take 72 mcg by mouth daily 30 capsule 3    Liraglutide 18 MG/3ML SOPN Inject under the skin      losartan (COZAAR) 50 mg tablet Take 1 tablet (50 mg total) by mouth daily 30 tablet 5    methocarbamol (ROBAXIN) 750 mg tablet Take 500 mg by mouth every 6 (six) hours as needed for muscle spasms      metoclopramide (REGLAN) 5 mg/5 mL oral solution Take 5 mL (5 mg total) by mouth 3 (three) times a day 445 mL 1    Mirabegron ER 25 MG TB24 Take by mouth      montelukast (SINGULAIR) 10 mg tablet Take 10 mg by mouth daily  2    pantoprazole (PROTONIX) 40 mg tablet Take 1 tablet (40 mg total) by mouth daily 90 tablet 0    potassium chloride SA (KLOR-CON M15) 15 MEQ tablet Take 15 mEq by mouth 2 (two) times a day      QUEtiapine (SEROquel) 200 mg tablet Take 150 mg by mouth daily at bedtime       ranitidine (ZANTAC) 15 mg/mL syrup TAKE 20 ML (300 MG TOTAL) BY MOUTH DAILY 300 mL 1    repaglinide (PRANDIN) 1 mg tablet Take 1 mg by mouth 3 (three) times a day before meals      umeclidinium-vilanterol (ANORO ELLIPTA) 62 5-25 MCG/INH inhaler Inhale 1 puff daily      vortioxetine (TRINTELLIX) 10 MG tablet Take 10 mg by mouth daily      zolpidem (AMBIEN) 10 mg tablet Take 10 mg by mouth daily at bedtime as needed for sleep      acetaminophen-codeine (TYLENOL with CODEINE #4) 300-60 MG per tablet Take 1 tablet by mouth every 4 (four) hours as needed for moderate pain      albuterol (PROVENTIL HFA,VENTOLIN HFA) 90 mcg/act inhaler Inhale 2 puffs every 4 (four) hours as needed for wheezing (Patient not taking: Reported on 3/30/2020) 1 Inhaler 0    lisinopril (ZESTRIL) 10 mg tablet Take 10 mg by mouth daily      pregabalin (LYRICA) 75 mg capsule Take 75 mg by mouth 3 (three) times a day       No current facility-administered medications for this visit  Allergies   Allergen Reactions    Lisinopril Cough    Other Hives     Surgical tape    Prednisone Other (See Comments)     Thrush         Review of Systems   All other systems reviewed and are negative  I spent 15 minutes with the patient during this visit

## 2020-03-31 ENCOUNTER — TRANSCRIBE ORDERS (OUTPATIENT)
Dept: NON INVASIVE DIAGNOSTICS | Facility: CLINIC | Age: 55
End: 2020-03-31

## 2020-03-31 ENCOUNTER — HOSPITAL ENCOUNTER (OUTPATIENT)
Dept: NON INVASIVE DIAGNOSTICS | Facility: CLINIC | Age: 55
Discharge: HOME | End: 2020-03-31
Payer: COMMERCIAL

## 2020-03-31 DIAGNOSIS — R00.2 PALPITATIONS: Primary | ICD-10-CM

## 2020-03-31 DIAGNOSIS — R00.2 PALPITATIONS: ICD-10-CM

## 2020-03-31 PROCEDURE — 93226 XTRNL ECG REC<48 HR SCAN A/R: CPT

## 2020-03-31 PROCEDURE — 93225 XTRNL ECG REC<48 HRS REC: CPT

## 2020-04-16 PROCEDURE — 93227 XTRNL ECG REC<48 HR R&I: CPT | Performed by: INTERNAL MEDICINE

## 2020-04-29 DIAGNOSIS — K21.00 GASTROESOPHAGEAL REFLUX DISEASE WITH ESOPHAGITIS: ICD-10-CM

## 2020-04-29 DIAGNOSIS — R13.19 ESOPHAGEAL DYSPHAGIA: ICD-10-CM

## 2020-04-29 DIAGNOSIS — Z79.899 MEDICAL MARIJUANA USE: ICD-10-CM

## 2020-04-29 DIAGNOSIS — R11.14 BILIOUS VOMITING WITH NAUSEA: ICD-10-CM

## 2020-04-29 DIAGNOSIS — G89.29 OTHER CHRONIC PAIN: ICD-10-CM

## 2020-04-29 RX ORDER — METOCLOPRAMIDE HYDROCHLORIDE 5 MG/5ML
5 SOLUTION ORAL 3 TIMES DAILY
Qty: 445 ML | Refills: 1 | Status: SHIPPED | OUTPATIENT
Start: 2020-04-29 | End: 2020-06-29

## 2020-05-01 ENCOUNTER — TRANSCRIBE ORDERS (OUTPATIENT)
Dept: ADMINISTRATIVE | Facility: HOSPITAL | Age: 55
End: 2020-05-01

## 2020-05-01 DIAGNOSIS — M54.9 BACK PAIN, UNSPECIFIED BACK LOCATION, UNSPECIFIED BACK PAIN LATERALITY, UNSPECIFIED CHRONICITY: Primary | ICD-10-CM

## 2020-05-26 ENCOUNTER — HOSPITAL ENCOUNTER (OUTPATIENT)
Dept: MRI IMAGING | Facility: HOSPITAL | Age: 55
Discharge: HOME | End: 2020-05-26
Payer: MEDICARE

## 2020-05-26 DIAGNOSIS — M54.9 BACK PAIN, UNSPECIFIED BACK LOCATION, UNSPECIFIED BACK PAIN LATERALITY, UNSPECIFIED CHRONICITY: ICD-10-CM

## 2020-05-26 PROCEDURE — 72148 MRI LUMBAR SPINE W/O DYE: CPT

## 2020-06-19 DIAGNOSIS — I10 BENIGN HYPERTENSION: ICD-10-CM

## 2020-06-23 RX ORDER — LOSARTAN POTASSIUM 50 MG/1
TABLET ORAL
Qty: 90 TABLET | Refills: 1 | Status: SHIPPED | OUTPATIENT
Start: 2020-06-23 | End: 2020-06-26

## 2020-06-24 ENCOUNTER — TELEPHONE (OUTPATIENT)
Dept: CARDIOLOGY CLINIC | Facility: CLINIC | Age: 55
End: 2020-06-24

## 2020-06-26 DIAGNOSIS — G89.29 OTHER CHRONIC PAIN: ICD-10-CM

## 2020-06-26 DIAGNOSIS — R13.19 ESOPHAGEAL DYSPHAGIA: ICD-10-CM

## 2020-06-26 DIAGNOSIS — I10 ESSENTIAL (PRIMARY) HYPERTENSION: Primary | ICD-10-CM

## 2020-06-26 DIAGNOSIS — R11.14 BILIOUS VOMITING WITH NAUSEA: ICD-10-CM

## 2020-06-26 DIAGNOSIS — K21.00 GASTROESOPHAGEAL REFLUX DISEASE WITH ESOPHAGITIS: ICD-10-CM

## 2020-06-26 DIAGNOSIS — Z79.899 MEDICAL MARIJUANA USE: ICD-10-CM

## 2020-06-26 RX ORDER — IRBESARTAN 150 MG/1
150 TABLET ORAL
Qty: 30 TABLET | Refills: 5 | Status: SHIPPED | OUTPATIENT
Start: 2020-06-26 | End: 2020-07-16 | Stop reason: SINTOL

## 2020-06-26 RX ORDER — CANDESARTAN 8 MG/1
8 TABLET ORAL DAILY
Qty: 30 TABLET | Refills: 5 | Status: SHIPPED | OUTPATIENT
Start: 2020-06-26 | End: 2020-07-16

## 2020-06-29 RX ORDER — METOCLOPRAMIDE HYDROCHLORIDE 5 MG/5ML
5 SOLUTION ORAL 3 TIMES DAILY
Qty: 445 ML | Refills: 1 | Status: SHIPPED | OUTPATIENT
Start: 2020-06-29 | End: 2021-03-16 | Stop reason: SDUPTHER

## 2020-07-16 DIAGNOSIS — I44.7 LBBB (LEFT BUNDLE BRANCH BLOCK): Primary | ICD-10-CM

## 2020-07-16 DIAGNOSIS — I10 ESSENTIAL HYPERTENSION: ICD-10-CM

## 2020-07-16 RX ORDER — LOSARTAN POTASSIUM 50 MG/1
50 TABLET ORAL DAILY
Qty: 30 TABLET | Refills: 5 | OUTPATIENT
Start: 2020-07-16 | End: 2020-08-20 | Stop reason: ALTCHOICE

## 2020-07-16 NOTE — TELEPHONE ENCOUNTER
Patient called  She states that she had an episode of "passing out" witnessed by her , which she states it due to the irbesartan  Wants to try Losartan 50 mg daily again (had been backordered)? Is this okay  She did reschedule a follow-up visit  Please advise

## 2020-08-04 ENCOUNTER — APPOINTMENT (EMERGENCY)
Dept: RADIOLOGY | Facility: HOSPITAL | Age: 55
End: 2020-08-04
Payer: MEDICARE

## 2020-08-04 ENCOUNTER — HOSPITAL ENCOUNTER (EMERGENCY)
Facility: HOSPITAL | Age: 55
Discharge: HOME | End: 2020-08-04
Attending: EMERGENCY MEDICINE | Admitting: EMERGENCY MEDICINE
Payer: MEDICARE

## 2020-08-04 VITALS
OXYGEN SATURATION: 93 % | WEIGHT: 167.55 LBS | RESPIRATION RATE: 16 BRPM | DIASTOLIC BLOOD PRESSURE: 65 MMHG | BODY MASS INDEX: 31.66 KG/M2 | HEART RATE: 82 BPM | TEMPERATURE: 98.2 F | SYSTOLIC BLOOD PRESSURE: 142 MMHG

## 2020-08-04 DIAGNOSIS — S79.911A HIP INJURY, RIGHT, INITIAL ENCOUNTER: ICD-10-CM

## 2020-08-04 DIAGNOSIS — M25.551 RIGHT HIP PAIN: Primary | ICD-10-CM

## 2020-08-04 DIAGNOSIS — T14.8XXA CONTUSION: ICD-10-CM

## 2020-08-04 PROCEDURE — 99283 EMERGENCY DEPT VISIT LOW MDM: CPT

## 2020-08-04 PROCEDURE — 73502 X-RAY EXAM HIP UNI 2-3 VIEWS: CPT

## 2020-08-04 PROCEDURE — 99283 EMERGENCY DEPT VISIT LOW MDM: CPT | Performed by: PHYSICIAN ASSISTANT

## 2020-08-04 PROCEDURE — 96374 THER/PROPH/DIAG INJ IV PUSH: CPT

## 2020-08-04 RX ORDER — KETOROLAC TROMETHAMINE 30 MG/ML
15 INJECTION, SOLUTION INTRAMUSCULAR; INTRAVENOUS ONCE
Status: COMPLETED | OUTPATIENT
Start: 2020-08-04 | End: 2020-08-04

## 2020-08-04 RX ORDER — LIDOCAINE HYDROCHLORIDE 20 MG/ML
JELLY TOPICAL 3 TIMES DAILY
Qty: 30 ML | Refills: 0 | Status: SHIPPED | OUTPATIENT
Start: 2020-08-04 | End: 2020-10-19 | Stop reason: ALTCHOICE

## 2020-08-04 RX ADMIN — KETOROLAC TROMETHAMINE 15 MG: 30 INJECTION, SOLUTION INTRAMUSCULAR at 10:49

## 2020-08-04 NOTE — ED PROVIDER NOTES
History  Chief Complaint   Patient presents with    Hip Pain     Per patient she thinks that her right hip popped out     This is a 29-year-old female patient who sees that she went to get off the chair over the weekend which is approximately 4 days ago and fell landing on her right hip felt a large pop and thought her hip came out of joint  She is able to walk without a limp  But states she has a little discomfort in her posterior hip  Has tried nothing over-the-counter for pain or numbness or tingling no weakness of her left leg  Made worse with walking and standing better when she sits  She keeps stating position and another that she does not know why she has pain and upon exam showed a large contusion hematoma to her posterior hip which she cannot see  I doubt very much the hip is dislocated to have she walks without difficulty  Patient will have x-ray to rule out fracture  Also be given something for discomfort  Dislocation is much less on differential   Also evaluate pelvis          Prior to Admission Medications   Prescriptions Last Dose Informant Patient Reported? Taking?    ALPRAZolam (XANAX) 1 mg tablet  Self Yes No   Sig: Take by mouth daily at bedtime as needed for anxiety   ARIPiprazole (ABILIFY) 2 mg tablet   Yes No   Sig: Take 2 mg by mouth daily   DULoxetine (CYMBALTA) 60 mg delayed release capsule  Self Yes No   Sig: Take 20 mg by mouth daily   Liraglutide 18 MG/3ML SOPN  Self Yes No   Sig: Inject under the skin   Mirabegron ER 25 MG TB24   Yes No   Sig: Take by mouth   QUEtiapine (SEROquel) 200 mg tablet   Yes No   Sig: Take 150 mg by mouth daily at bedtime    acetaminophen-codeine (TYLENOL with CODEINE #4) 300-60 MG per tablet  Self Yes No   Sig: Take 1 tablet by mouth every 4 (four) hours as needed for moderate pain   albuterol (PROVENTIL HFA,VENTOLIN HFA) 90 mcg/act inhaler  Self Yes No   Sig: Inhale 2 puffs every 6 (six) hours as needed for wheezing   aluminum hydroxide-magnesium carbonate (Acid Gone)  mg/15 mL oral suspension   No No   Sig: Take 15 mL by mouth 4 (four) times daily (after meals and at bedtime)   atorvastatin (LIPITOR) 40 mg tablet   Yes No   Sig: Take 40 mg by mouth daily   bisoprolol (ZEBETA) 5 mg tablet   No No   Sig: TAKE 1 TABLET BY MOUTH EVERY DAY   diclofenac (VOLTAREN) 75 mg EC tablet  Self Yes No   Sig: Take 50 mg by mouth 2 (two) times a day   dicyclomine (BENTYL) 20 mg tablet   No No   Sig: Take 1 tablet (20 mg total) by mouth every 6 (six) hours as needed (abdominal pain)   gabapentin (NEURONTIN) 300 mg capsule  Self Yes No   Sig: Take 400 mg by mouth 2 (two) times a day     insulin glargine (Basaglar KwikPen) 100 units/mL injection pen   Yes No   Sig: Inject 26 Units under the skin daily at bedtime   insulin lispro (HumaLOG) 100 units/mL injection   Yes No   Sig: Inject 10 Units under the skin 3 (three) times a day before meals   linaCLOtide (LINZESS) 72 MCG CAPS   No No   Sig: Take 72 mcg by mouth daily   losartan (COZAAR) 50 mg tablet   No No   Sig: Take 1 tablet (50 mg total) by mouth daily   methocarbamol (ROBAXIN) 750 mg tablet  Self Yes No   Sig: Take 500 mg by mouth every 6 (six) hours as needed for muscle spasms   metoclopramide (REGLAN) 5 mg/5 mL oral solution   No No   Sig: TAKE 5 ML (5 MG TOTAL) BY MOUTH 3 (THREE) TIMES A DAY   montelukast (SINGULAIR) 10 mg tablet   Yes No   Sig: Take 10 mg by mouth daily   pantoprazole (PROTONIX) 40 mg tablet   No No   Sig: Take 1 tablet (40 mg total) by mouth daily   potassium chloride SA (KLOR-CON M15) 15 MEQ tablet  Self Yes No   Sig: Take 15 mEq by mouth 2 (two) times a day   pregabalin (LYRICA) 75 mg capsule   Yes No   Sig: Take 75 mg by mouth 3 (three) times a day   ranitidine (ZANTAC) 15 mg/mL syrup   No No   Sig: TAKE 20 ML (300 MG TOTAL) BY MOUTH DAILY   repaglinide (PRANDIN) 1 mg tablet  Self Yes No   Sig: Take 1 mg by mouth 3 (three) times a day before meals   umeclidinium-vilanterol (ANORO ELLIPTA) 62 5-25 MCG/INH inhaler   Yes No   Sig: Inhale 1 puff daily   vortioxetine (TRINTELLIX) 10 MG tablet   Yes No   Sig: Take 10 mg by mouth daily   zolpidem (AMBIEN) 10 mg tablet  Self Yes No   Sig: Take 10 mg by mouth daily at bedtime as needed for sleep      Facility-Administered Medications: None       Past Medical History:   Diagnosis Date    Angina pectoris ([place] [address] )     recent    Anxiety     Arthritis     Carpal tunnel syndrome     Chronic headaches     Diabetes ([place] [address] )     Diabetes mellitus ([place] [address] )     GERD (gastroesophageal reflux disease)     Headache     Hyperlipidemia     Hypertension     Kidney stone     Left bundle branch block     LBBB    MI (myocardial infarction) ([place] [address] )     Neuropathy     PTSD (post-traumatic stress disorder)     RLS (restless legs syndrome)     Shortness of breath        Past Surgical History:   Procedure Laterality Date    BREAST SURGERY  2016    Reduction    CARDIAC CATHETERIZATION  2018     Mid LAD: There was a tubular 40 % stenosis     SECTION      COLONOSCOPY      HYSTERECTOMY  2018    Complete    KIDNEY STONE SURGERY      LAPAROSCOPY      ME COLONOSCOPY FLX DX W/COLLJ SPEC WHEN PFRMD N/A 2017    Procedure: EGD AND COLONOSCOPY;  Surgeon: Lola Montgomery MD;  [facility]; Service: Gastroenterology    ME ESOPHAGOGASTRODUODENOSCOPY TRANSORAL DIAGNOSTIC N/A 10/10/2018    Procedure: ESOPHAGOGASTRODUODENOSCOPY (EGD); Surgeon: Lola Montgomery MD;  [facility];   Service: Gastroenterology    ME LAPAROSCOPY W TOT HYSTERECTUTERUS <=250 [address] TUBE/OVARY N/A 2018    Procedure: ROBOTIC ASSISTED TOTAL LAPAROSCOPIC HYSTERECTOMY, BILATERAL SALPINGOOPHERECTOMY,;  Surgeon: Brooke Keita MD;  [facility];  Service: Gynecology Oncology    UPPER GASTROINTESTINAL ENDOSCOPY      WRIST SURGERY Right        Family History   Problem Relation Age of Onset    Diabetes Mother     Breast cancer Mother 39    Lung cancer Father 47    Diabetes Sister     Brain cancer Maternal Aunt 67    Breast cancer Other 25     I have reviewed and agree with the history as documented  E-Cigarette/Vaping     E-Cigarette/Vaping Substances     Social History     Tobacco Use    Smoking status: Current Every Day Smoker     Packs/day: 0 50     Years: 44 00     Pack years: 22 00    Smokeless tobacco: Never Used    Tobacco comment: pt  smoked this am 10/10   Substance Use Topics    Alcohol use: No     Comment: Rarely consumes alcohol - As per [employer]     Drug use: Yes     Frequency: 7 0 times per week     Types: Marijuana     Comment: medical marijuana       Review of Systems   Constitutional: Negative for diaphoresis, fatigue and fever  HENT: Negative for congestion, ear pain, nosebleeds and sore throat  Eyes: Negative for photophobia, pain, discharge and visual disturbance  Respiratory: Negative for cough, choking, chest tightness, shortness of breath and wheezing  Cardiovascular: Negative for chest pain and palpitations  Gastrointestinal: Negative for abdominal distention, abdominal pain, diarrhea and vomiting  Genitourinary: Negative for dysuria, flank pain and frequency  Musculoskeletal: Negative for back pain, gait problem and joint swelling  Right hip pain   Skin: Negative for color change and rash  Neurological: Negative for dizziness, syncope and headaches  Psychiatric/Behavioral: Negative for behavioral problems and confusion  The patient is not nervous/anxious  All other systems reviewed and are negative  Physical Exam  Physical Exam  Vitals signs and nursing note reviewed  Constitutional:       Appearance: She is well-developed  HENT:      Head: Normocephalic and atraumatic  Right Ear: External ear normal       Left Ear: External ear normal       Nose: Nose normal    Eyes:      Conjunctiva/sclera: Conjunctivae normal       Pupils: Pupils are equal, round, and reactive to light     Neck: Musculoskeletal: Normal range of motion and neck supple  Cardiovascular:      Rate and Rhythm: Normal rate and regular rhythm  Pulmonary:      Effort: Pulmonary effort is normal       Breath sounds: Normal breath sounds  Abdominal:      General: Bowel sounds are normal       Palpations: Abdomen is soft  Tenderness: There is no abdominal tenderness  Musculoskeletal: Normal range of motion  Legs:    Skin:     General: Skin is warm  Neurological:      Mental Status: She is alert  Psychiatric:         Behavior: Behavior normal          Vital Signs  ED Triage Vitals [08/04/20 1024]   Temperature Pulse Respirations Blood Pressure SpO2   98 2 °F (36 8 °C) 82 16 142/65 93 %      Temp src Heart Rate Source Patient Position - Orthostatic VS BP Location FiO2 (%)   -- Monitor -- -- --      Pain Score       9           Vitals:    08/04/20 1024   BP: 142/65   Pulse: 82         Visual Acuity      ED Medications  Medications   ketorolac (TORADOL) injection 15 mg (15 mg Intravenous Given 8/4/20 1049)       Diagnostic Studies  Results Reviewed     None                 XR hip/pelv 2-3 vws right   ED Interpretation by Francine Carmen PA-C (08/04 1102)   No acute fracture dislocation                 Procedures  Procedures         ED Course       US AUDIT      Most Recent Value   Initial Alcohol Screen: US AUDIT-C    1  How often do you have a drink containing alcohol?  0 Filed at: 08/04/2020 1024   2  How many drinks containing alcohol do you have on a typical day you are drinking? 0 Filed at: 08/04/2020 1024   3a  Male UNDER 65: How often do you have five or more drinks on one occasion? 0 Filed at: 08/04/2020 1024   3b  FEMALE Any Age, or MALE 65+: How often do you have 4 or more drinks on one occassion? 0 Filed at: 08/04/2020 1024   Audit-C Score  0 Filed at: 08/04/2020 1024                  RONNY/DAST-10      Most Recent Value   How many times in the past year have you       Used an illegal drug or used a prescription medication for non-medical reasons? Never Filed at: 08/04/2020 1024                                MDM      Disposition  Final diagnoses:   Right hip pain   Hip injury, right, initial encounter   Contusion     Time reflects when diagnosis was documented in both MDM as applicable and the Disposition within this note     Time User Action Codes Description Comment    8/4/2020 11:04 AM Andrzej Davies [M25 [id number]] Right hip pain     8/4/2020 11:04 AM Andrzej Davies [S79 911A] Hip injury, right, initial encounter     8/4/2020 11:04 AM Annalisa Espinal Add [T14  [id number]] Contusion       ED Disposition     ED Disposition Condition Date/Time Comment    Discharge Stable Tue Aug 4, 2020 11:04 AM Jose Ellison discharge to home/self care  Follow-up Information     Follow up With Specialties Details Why [address] [place] [address], [place] [address]  N  [phone]            Patient's Medications   Discharge Prescriptions    LIDOCAINE (XYLOCAINE) 2 % TOPICAL GEL    Apply topically 3 (three) times a day To affected area       Start Date: 8/4/2020  End Date: --       Order Dose: --       Quantity: 30 mL    Refills: 0     No discharge procedures on file      PDMP Review     None          ED Provider  Electronically Signed by           Fernanda oGuld PA-C  08/04/20 5867

## 2020-08-20 ENCOUNTER — OFFICE VISIT (OUTPATIENT)
Dept: CARDIOLOGY CLINIC | Facility: CLINIC | Age: 55
End: 2020-08-20
Payer: MEDICARE

## 2020-08-20 VITALS
SYSTOLIC BLOOD PRESSURE: 130 MMHG | DIASTOLIC BLOOD PRESSURE: 72 MMHG | BODY MASS INDEX: 32.28 KG/M2 | HEIGHT: 61 IN | HEART RATE: 64 BPM | WEIGHT: 171 LBS

## 2020-08-20 DIAGNOSIS — I10 ESSENTIAL (PRIMARY) HYPERTENSION: ICD-10-CM

## 2020-08-20 DIAGNOSIS — R00.2 PALPITATIONS: ICD-10-CM

## 2020-08-20 DIAGNOSIS — I25.10 CAD (CORONARY ARTERY DISEASE): ICD-10-CM

## 2020-08-20 DIAGNOSIS — I44.7 LBBB (LEFT BUNDLE BRANCH BLOCK): ICD-10-CM

## 2020-08-20 DIAGNOSIS — E78.5 HYPERLIPIDEMIA: ICD-10-CM

## 2020-08-20 DIAGNOSIS — I42.8 NON-ISCHEMIC CARDIOMYOPATHY (HCC): Primary | ICD-10-CM

## 2020-08-20 PROCEDURE — 99214 OFFICE O/P EST MOD 30 MIN: CPT | Performed by: INTERNAL MEDICINE

## 2020-08-20 PROCEDURE — 93000 ELECTROCARDIOGRAM COMPLETE: CPT | Performed by: INTERNAL MEDICINE

## 2020-08-20 RX ORDER — BISOPROLOL FUMARATE 5 MG/1
5 TABLET ORAL DAILY
Qty: 90 TABLET | Refills: 1 | Status: SHIPPED | OUTPATIENT
Start: 2020-08-20 | End: 2021-11-22 | Stop reason: SDUPTHER

## 2020-08-20 RX ORDER — BENZONATATE 100 MG/1
CAPSULE ORAL
COMMUNITY
Start: 2020-07-16 | End: 2022-01-19 | Stop reason: ALTCHOICE

## 2020-08-20 RX ORDER — PANTOPRAZOLE SODIUM 20 MG/1
TABLET, DELAYED RELEASE ORAL
COMMUNITY
Start: 2020-06-23 | End: 2021-11-22 | Stop reason: DRUGHIGH

## 2020-08-20 RX ORDER — BISOPROLOL FUMARATE 5 MG/1
5 TABLET ORAL DAILY
Qty: 30 TABLET | Refills: 0 | Status: SHIPPED | OUTPATIENT
Start: 2020-08-20 | End: 2020-08-20 | Stop reason: SDUPTHER

## 2020-08-20 NOTE — PROGRESS NOTES
[facility], [address]   [facility], [address]   [phone]                                              Cardiology Office Follow up  Ana Lilia Bar, 50 y o  female  YOB: 1965  MRN: [id number] Encounter: [id number]      PCP - Siena Holt, DO    Assessment and Plan  1  Non-ischemic cardiomyopathy, LVEF 40%  · [facility] LVEF 40-44%, mildly dilated LV, mild diffuse hypokinesis, grade 1 diastolic dysfunction no significant valvular abnormalities  2  Chronic LBBB  3  Non-obstructive CAD, mLAD 40% in 2018  4  Diabetes Mellitus Type 2   · A1c 11  · Has neuropathy, and possibly autonomic neuropathy as well with some orthostatic hypotension as well  5  Hyperlipidemia  6  Nocturnal hypoxia, on overnight O2 therapy  7  Active smoker  · Previously quit with chantix, but resumed smoking due to her son's smoking, and people around her smoking  5  Depression/PTSD  6  Obesity, Body mass index is 32 31 kg/m²  Plan  Non-ischemic cardiomyopathy, LVEF 40%  · has a known reduced EF of 40% previously, but had an echo done recently through pulmonology and was found to have an EF of 40-45%, for which she was referred back to see Cardiology  · Mildly reduced LVEF felt to be related to left bundle branch block  · ECG - 8/20/20 - normal sinus rhythm with left bundle branch block  · Remains euvolemic  · She was prescribed bisoprolol and losartan, but it is unclear why patient is not on any of those medications, and is on a completely different regimen through [facility] at present  · Recommend restarting bisoprolol at 5 mg  · Reviewed all medications in her own my chart Apple through [facility], and she is on valsartan 80 mg  · Continue valsartan 80 mg    Palpitations  · Holter monitor March 2020 showed rare PACs and PVCs, and she did not have any symptoms during the monitoring    · ?never took bisoprolol  · Start bisoprolol 5 mg    Non-obstructive CAD  · no anginal symptoms, no previous stenting  · Continue rosuvastatin 20 mg    Hypertension  · Blood pressure 130/72, has been mildly elevated  · On valsartan 80 mg  · Start bisoprolol 5 mg    Hyperlipidemia  · Lipid panel - 7/15/20 - , , HDL 40,    · Started on rosuvastatin 20 mg     Obesity, Body mass index is 32 31 kg/m²  · Wt 171 lbs  · Needs to lose about at least 20 lbs to get to BMI<28  · Low carb, low-fat diet  · Increase activity level    Note: Patient showed me her [email or web address] chart, which has completely different medications then in [facility], and we are unable to link the patient's chart  Over 40 minutes was spent with the patient in order to try and clarify her medications, history and obtain additional information through [facility] chart, as we were unable to link up the epic systems for unclear reasons  History of Present Illness   61-year-old female comes in for annual cardiac follow-up, after being sent back by her pulmonologist    She used to see Dr Lesley Quintana last year, but is now being established with me  She has a history of a left bundle branch block,  And had an echo and a nuclear stress test done in May 2018, which revealed mildly reduced LVEF along with  A medium sized anterior defect with some reversibility  As a result she was referred for a cardiac catheterization which showed nonobstructive coronary artery disease with 40% stenosis of mid LAD  She was managed medically and has had no symptoms over the past year  She has been doing fairly well over the past year, and does not have any active complaints  She additionally follows with a pulmonologist at [facility], who had ordered an echocardiogram last month  This revealed an LVEF of 40-44%, and as a result she was asked to follow up again with her cardiologist for further evaluation  Interval history - 8/20/2020  She comes back for follow-up    I previously saw her for a tele visit in March  She has been doing fairly well overall  Patient was unclear about her medications, but was eventually able to show me her my chart from [facility] which showed a completely different list of medications  It appears she is not on bisoprolol, losartan or atorvastatin at present  She reports having symptoms of dizziness on standing up for a few seconds, and a couple of months ago, she was started on irbesartan 150, after which she reportedly passed out  As a result she stopped taking the same  Historical Information   Past Medical History:   Diagnosis Date    [facility]ina p[facility]is [facility]     recent    Anxiety     Arthritis     Carpal tunnel syndrome     Chronic headaches     Diabetes ([facility]     Diabetes mellitus ([facility] )     GERD (gastroesophageal reflux disease)     Headache     Hyperlipidemia     Hypertension     Kidney stone     Left bundle branch block     LBBB    [facility]     Neuropathy     PTSD (post-traumatic stress disorder)     RLS (restless legs syndrome)     Shortness of breath      Past Surgical History:   Procedure Laterality Date    BREAST SURGERY  2016    Reduction    CARDIAC CATHETERIZATION  2018     Mid LAD: There was a tubular 40 % stenosis     SECTION      COLONOSCOPY      HYSTERECTOMY  2018    Complete    KIDNEY STONE SURGERY      LAPAROSCOPY      MS COLONOSCOPY FLX DX W/COLLJ SPEC WHEN PFRMD N/A 2017    Procedure: EGD AND COLONOSCOPY;  Surgeon: Ирина Cortes MD;  Location: [facility]; Service: Gastroenterology    MS ESOPHAGOGASTRODUODENOSCOPY TRANSORAL DIAGNOSTIC N/A 10/10/2018    Procedure: ESOPHAGOGASTRODUODENOSCOPY (EGD); Surgeon: Ирина Cortes MD;  Location: [facility];   Service: Gastroenterology    MS LAPAROSCOPY W TOT HYSTERECTUTERUS <=250 [address] TUBE/OVARY N/A 2018    Procedure: ROBOTIC ASSISTED TOTAL LAPAROSCOPIC HYSTERECTOMY, BILATERAL SALPINGOOPHERECTOMY,;  Surgeon: Anya Broderick Don Mccarty MD;  [facility]: [facility];  Service: Gynecology Oncology    UPPER GASTROINTESTINAL ENDOSCOPY      WRIST SURGERY Right      Family History   Problem Relation Age of Onset    Diabetes Mother    [place] Breast cancer Mother 39    Lung cancer Father 47    Diabetes Sister     Brain cancer Maternal Aunt 67    Breast cancer Other 25     Current Outpatient Medications on File Prior to Visit   Medication Sig Dispense Refill    albuterol (PROVENTIL HFA,VENTOLIN HFA) 90 mcg/act inhaler Inhale 2 puffs every 6 (six) hours as needed for wheezing      ALPRAZolam (XANAX) 1 mg tablet Take by mouth daily at bedtime as needed for anxiety      aluminum hydroxide-magnesium carbonate (Acid Gone)  mg/15 mL oral suspension Take 15 mL by mouth 4 (four) times daily (after meals and at bedtime) 355 mL 0    ARIPiprazole (ABILIFY) 2 mg tablet Take 2 mg by mouth daily      benzonatate (TESSALON PERLES) 100 mg capsule TAKE 1 CAPSULE BY MOUTH 3 TIMES A DAY AS NEEDED FOR COUGH      dicyclomine (BENTYL) 20 mg tablet Take 1 tablet (20 mg total) by mouth every 6 (six) hours as needed (abdominal pain) 50 tablet 0    DULoxetine (CYMBALTA) 60 mg delayed release capsule Take 20 mg by mouth daily      gabapentin (NEURONTIN) 300 mg capsule Take 400 mg by mouth 2 (two) times a day        insulin glargine (Basaglar KwikPen) 100 units/mL injection pen Inject 26 Units under the skin daily at bedtime      insulin lispro (HumaLOG) 100 units/mL injection Inject 10 Units under the skin 3 (three) times a day before meals      lidocaine (XYLOCAINE) 2 % topical gel Apply topically 3 (three) times a day To affected area 30 mL 0    linaCLOtide (LINZESS) 72 MCG CAPS Take 72 mcg by mouth daily 30 capsule 3    Liraglutide 18 MG/3ML SOPN Inject under the skin      methocarbamol (ROBAXIN) 750 mg tablet Take 500 mg by mouth every 6 (six) hours as needed for muscle spasms      metoclopramide (REGLAN) 5 mg/5 mL oral solution TAKE 5 ML (5 MG TOTAL) BY MOUTH 3 (THREE) TIMES A  mL 1    Mirabegron ER 25 MG TB24 Take by mouth      montelukast (SINGULAIR) 10 mg tablet Take 10 mg by mouth daily  2    pantoprazole (PROTONIX) 20 mg tablet TAKE 1 TAB BY MOUTH 2 TIMES A DAY  AS DIRECTED BY PHYSICIAN      pantoprazole (PROTONIX) 40 mg tablet Take 1 tablet (40 mg total) by mouth daily 90 tablet 0    potassium chloride SA (KLOR-CON M15) 15 MEQ tablet Take 15 mEq by mouth 2 (two) times a day      pregabalin (LYRICA) 75 mg capsule Take 75 mg by mouth 3 (three) times a day      QUEtiapine (SEROquel) 200 mg tablet Take 150 mg by mouth daily at bedtime       repaglinide (PRANDIN) 1 mg tablet Take 1 mg by mouth 3 (three) times a day before meals      umeclidinium-vilanterol (ANORO ELLIPTA) 62 5-25 MCG/INH inhaler Inhale 1 puff daily      vortioxetine (TRINTELLIX) 10 MG tablet Take 10 mg by mouth daily      zolpidem (AMBIEN) 10 mg tablet Take 10 mg by mouth daily at bedtime as needed for sleep      [DISCONTINUED] atorvastatin (LIPITOR) 40 mg tablet Take 40 mg by mouth daily      [DISCONTINUED] bisoprolol (ZEBETA) 5 mg tablet TAKE 1 TABLET BY MOUTH EVERY DAY 30 tablet 1    [DISCONTINUED] losartan (COZAAR) 50 mg tablet Take 1 tablet (50 mg total) by mouth daily 30 tablet 5    acetaminophen-codeine (TYLENOL with CODEINE #4) 300-60 MG per tablet Take 1 tablet by mouth every 4 (four) hours as needed for moderate pain      diclofenac (VOLTAREN) 75 mg EC tablet Take 50 mg by mouth 2 (two) times a day      ranitidine (ZANTAC) 15 mg/mL syrup TAKE 20 ML (300 MG TOTAL) BY MOUTH DAILY (Patient not taking: Reported on 8/20/2020) 300 mL 1     No current facility-administered medications on file prior to visit        Allergies   Allergen Reactions    Lisinopril Cough    Losartan Dizziness    Other Hives     Surgical tape    Prednisone Other (See Comments)     Thrush       Social History     Socioeconomic History    Marital status: /Civil Union Spouse name: None    Number of children: None    Years of education: None    Highest education level: None   Occupational History    Occupation: Healthcare provider    Social Needs    Financial resource strain: None    Food insecurity     Worry: None     Inability: None    Transportation needs     Medical: None     Non-medical: None   Tobacco Use    Smoking status: Current Every Day Smoker     Packs/day: 0 50     Years: 44 00     Pack years: 22 00    Smokeless tobacco: Never Used    Tobacco comment: pt  smoked this am 10/10   Substance and Sexual Activity    Alcohol use: No     Comment: Rarely consumes alcohol - As per [employer]     Drug use: Yes     Frequency: 7 0 times per week     Types: Marijuana     Comment: medical marijuana    Sexual activity: None   Lifestyle    Physical activity     Days per week: None     Minutes per session: None    Stress: None   Relationships    Social connections     Talks on phone: None     Gets together: None     Attends Sikhism service: None     Active member of club or organization: None     Attends meetings of clubs or organizations: None     Relationship status: None    Intimate partner violence     Fear of current or ex partner: None     Emotionally abused: None     Physically abused: None     Forced sexual activity: None   Other Topics Concern    None   Social History Narrative    None        Review of Systems   All other systems reviewed and are negative  Vitals:  Vitals:    08/20/20 1541   BP: 130/72   Pulse: 64   Weight: 77 6 kg (171 lb)   Height: 5' 1" (1 549 m)     BMI - Body mass index is 32 31 kg/m²  Wt Readings from Last 7 Encounters:   08/20/20 77 6 kg (171 lb)   08/04/20 76 kg (167 lb 8 8 oz)   03/30/20 76 6 kg (168 lb 12 8 oz)   11/14/19 77 6 kg (171 lb)   10/18/19 79 4 kg (175 lb 0 7 oz)   10/02/19 79 4 kg (175 lb)   03/19/19 81 6 kg (180 lb)       Physical Exam  Vitals signs and nursing note reviewed     Constitutional:       General: She is not in acute distress  Appearance: Normal appearance  She is well-developed  She is not ill-appearing  HENT:      Head: Normocephalic and atraumatic  Nose: No congestion  Eyes:      General: No scleral icterus  Conjunctiva/sclera: Conjunctivae normal    Neck:      Musculoskeletal: Neck supple  Vascular: No carotid bruit or JVD  Cardiovascular:      Rate and Rhythm: Normal rate and regular rhythm  Pulses: Normal pulses  Heart sounds: Normal heart sounds  No murmur  No friction rub  No gallop  Pulmonary:      Effort: Pulmonary effort is normal  No respiratory distress  Breath sounds: Normal breath sounds  No rales  Abdominal:      General: There is no distension  Palpations: Abdomen is soft  Tenderness: There is no abdominal tenderness  Musculoskeletal:         General: No swelling or tenderness  Right lower leg: No edema  Left lower leg: No edema  Skin:     General: Skin is warm  Neurological:      General: No focal deficit present  Mental Status: She is alert and oriented to person, place, and time  Mental status is at baseline  Psychiatric:         Mood and Affect: Mood normal          Behavior: Behavior normal          Thought Content:  Thought content normal        Labs:  CBC:   Lab Results   Component Value Date    WBC 9 56 10/18/2019    RBC 6 02 (H) 10/18/2019    HGB 16 3 (H) 10/18/2019    HCT 50 1 (H) 10/18/2019    MCV 83 10/18/2019     10/18/2019    RDW 13 2 10/18/2019       CMP:   Lab Results   Component Value Date    K 4 0 10/18/2019     10/18/2019    CO2 29 10/18/2019    BUN 17 10/18/2019    CREATININE 0 83 10/18/2019    EGFR 81 10/18/2019    CALCIUM 9 2 10/18/2019    AST 15 04/26/2018    ALT 26 04/26/2018    ALKPHOS 112 04/26/2018       Magnesium:  Lab Results   Component Value Date    MG 1 9 10/18/2019       Lipid Profile:   No results found for: CHOL, HDL, TRIG, LDLCALC    Thyroid Studies: No results found for: Fiona Maya, FREET4, P2MGISV, K8GYBBS    No components found for: [facility] [employer]    Lab Results   Component Value Date    INR 0 98 10/18/2019    INR 0 89 05/09/2018   5    Imaging: Ct Abdomen Pelvis With Contrast    Result Date: 10/18/2019  Narrative: CT ABDOMEN AND PELVIS WITH IV CONTRAST INDICATION:   lower abd pain, diarrhea  COMPARISON:  None  TECHNIQUE:  CT examination of the abdomen and pelvis was performed  Axial, sagittal, and coronal 2D reformatted images were created from the source data and submitted for interpretation  Radiation dose length product (DLP) for this visit:  500 mGy-cm   This examination, like all CT scans performed in the [facility], was performed utilizing techniques to minimize radiation dose exposure, including the use of iterative reconstruction and automated exposure control  IV Contrast:  100 mL of iohexol (OMNIPAQUE) Enteric Contrast:  Enteric contrast was not administered  FINDINGS: ABDOMEN LOWER CHEST:  No clinically significant abnormality identified in the visualized lower chest  LIVER/BILIARY TREE: The liver is mildly enlarged  Diffusely decreased hepatic attenuation suggesting steatosis  No intra or extrahepatic biliary ductal dilation GALLBLADDER:  No calcified gallstones  No pericholecystic inflammatory change  SPLEEN:  Unremarkable  PANCREAS:  Unremarkable  ADRENAL GLANDS:  Unremarkable  KIDNEYS/URETERS: No hydronephrosis  No suspicious renal lesion  Left renal lower pole focal cortical loss, noting a 3 mm dystrophic calcification  STOMACH AND BOWEL: Circumferential wall thickening of the mid to distal transverse colon sparing the splenic flexure with pericolonic fat stranding in keeping with acute colitis (series 2, images 31-41)  No disproportionate dilation of small or large bowel loops  APPENDIX:  No findings to suggest appendicitis  ABDOMINOPELVIC CAVITY:  No ascites or free intraperitoneal air  No lymphadenopathy   VESSELS:  Unremarkable for patient's age  PELVIS REPRODUCTIVE ORGANS:  Status post hysterectomy  No adnexal mass  URINARY BLADDER:  Unremarkable  ABDOMINAL WALL/INGUINAL REGIONS:  Unremarkable  OSSEOUS STRUCTURES:  No acute fracture or destructive osseous lesion  Impression: 1  Acute uncomplicated colitis of of the mid to distal transverse colon sparing the splenic flexure, favored to be infectious  2   Mild hepatomegaly and hepatic steatosis  Workstation performed: [id number]       Cardiac testing:   Results for orders placed during the hospital encounter of 18   Echo complete with contrast if indicated    Narrative [facility], [address] [facility]  [phone]    Transthoracic Echocardiogram  2D, M-mode, Doppler, and Color Doppler    Study date:  03-May-2018    Patient: Itzel Montes  MR number: [id number]  Account number: [de-identified]  : 1965  Age: 50 years  Gender: Female  Status: Outpatient  [facility]  Height: 61 in  Weight: 170 lb  BP: 112/ 78 mmHg    Indications: Dyspnea  Diagnoses: R06 09 - Other forms of dyspnea    Sonographer:  Laguerre RCS  Interpreting Physician:  Harsh Estevez MD  Primary Physician:  Baron Moore DO  Referring Physician:  Harsh Estevez MD  Group:  [facility]    SUMMARY    LEFT VENTRICLE:  Ejection fraction was estimated to be 40 %  Dyssynchronous septal motion likely secondary to LBBB  There was mild diffuse hypokinesis  MITRAL VALVE:  There was trace regurgitation  HISTORY: PRIOR HISTORY: LBBB Myocardial infarction  Risk factors: hypertension, oral hypoglycemic-treated diabetes, a history of current cigarette use (within the last month), and a family history of coronary artery disease  PROCEDURE: The study was performed in the [address] [place] [address]  This was a routine study  The transthoracic approach was used   The study included complete 2D imaging, M-mode, complete spectral Doppler, and color Doppler  The  heart rate was 82 bpm, at the start of the study  Image quality was adequate  LEFT VENTRICLE: Size was normal  Ejection fraction was estimated to be 40 %  Dyssynchronous septal motion likely secondary to LBBB  There was mild diffuse hypokinesis  DOPPLER: There was an increased relative contribution of atrial  contraction to ventricular filling  RIGHT VENTRICLE: The size was normal  Systolic function was normal  Wall thickness was normal     LEFT ATRIUM: Size was normal     RIGHT ATRIUM: Size was normal     MITRAL VALVE: Valve structure was normal  There was normal leaflet separation  DOPPLER: The transmitral velocity was within the normal range  There was no evidence for stenosis  There was trace regurgitation  AORTIC VALVE: The valve was not well visualized  DOPPLER: There was no evidence for stenosis  TRICUSPID VALVE: The valve structure was normal  There was normal leaflet separation  DOPPLER: The transtricuspid velocity was within the normal range  There was no evidence for stenosis  There was no regurgitation  PULMONIC VALVE: Not well visualized  PERICARDIUM: There was no pericardial effusion  The pericardium was normal in appearance  AORTA: The root exhibited normal size  SYSTEM MEASUREMENT TABLES    Apical four chamber  4 chamber Left Atrium Volume Index; Planimetry; End Systole; Apical four chamber;: 9 19 cm2  Left Ventricular End Diastolic Volume; Method of Disks, Single Plane; Apical four chamber;: 71 9 ml  Left Ventricular End Systolic Volume; Method of Disks, Single Plane; Apical four chamber;: 50 6 ml  Right Atrium Systolic Area; Planimetry; End Systole; Apical four chamber;: 8 11 cm2  Right Ventricular Internal Diastolic Dimension; End Diastole; Apical four chamber;: 20 6 mm  TAPSE: 27 4 mm    Apical two chamber  Left Ventricular End Diastolic Volume; Method of Disks, Single Plane;  Apical two chamber;: 105 3 ml  Left Ventricular End Systolic Volume; Method of Disks, Single Plane; Apical two chamber;: 35 7 ml    Unspecified Scan Mode  Aortic Root Diameter; End Systole;: 29 4 mm  Left atrial diameter; End Diastole;: 29 7 mm  Cardiac Output; Teichholz; Systole;: 5 91 L/min  Interventricular Septum Diastolic Thickness; Teichholz; End Diastole;: 8 5 mm  Left Ventricle Internal End Diastolic Dimension; Teichholz;: 51 8 mm  Left Ventricle Internal Systolic Dimension; Teichholz; End Systole;: 37 7 mm  Left Ventricle Posterior Wall Diastolic Thickness; Teichholz; End Diastole;: 8 mm  Left Ventricular Ejection Fraction; Teichholz;: 52 2 %  Left Ventricular End Diastolic Volume; Teichholz;: 127 2 ml  Left Ventricular End Systolic Volume; Teichholz;: 60 8 ml  Stroke volume; Teichholz; Systole;: 66 4 ml  Mitral Valve Area; Area by Pressure Half-Time; Systole;: 3 49 cm2  Mitral Valve E to A Ratio; Systole;: 0 69    [facility] Accredited Echocardiography Laboratory    Prepared and electronically signed by    Pinky Knox MD  Signed [id number] 11:54:33       No results found for this or any previous visit  No results found for this or any previous visit  No results found for this or any previous visit

## 2020-10-01 NOTE — TELEPHONE ENCOUNTER
Radha Zhu, [address] [place] [address] pharmacy  Call back number: [phone]  Patient's doctor: Dr Saeid Wesley    Patient is requesting a refill of moloxicam 15mg walking

## 2020-10-19 ENCOUNTER — OFFICE VISIT (OUTPATIENT)
Dept: OBGYN CLINIC | Facility: MEDICAL CENTER | Age: 55
End: 2020-10-19
Payer: COMMERCIAL

## 2020-10-19 ENCOUNTER — HOSPITAL ENCOUNTER (EMERGENCY)
Facility: HOSPITAL | Age: 55
Discharge: HOME | End: 2020-10-19
Attending: EMERGENCY MEDICINE | Admitting: EMERGENCY MEDICINE
Payer: COMMERCIAL

## 2020-10-19 VITALS
DIASTOLIC BLOOD PRESSURE: 73 MMHG | HEART RATE: 85 BPM | SYSTOLIC BLOOD PRESSURE: 121 MMHG | BODY MASS INDEX: 32.65 KG/M2 | RESPIRATION RATE: 18 BRPM | OXYGEN SATURATION: 97 % | WEIGHT: 172.8 LBS

## 2020-10-19 VITALS
DIASTOLIC BLOOD PRESSURE: 84 MMHG | SYSTOLIC BLOOD PRESSURE: 162 MMHG | BODY MASS INDEX: 32.5 KG/M2 | WEIGHT: 172 LBS | TEMPERATURE: 97.7 F

## 2020-10-19 DIAGNOSIS — B36.9 FUNGAL DERMATITIS: ICD-10-CM

## 2020-10-19 DIAGNOSIS — E11.65 HYPERGLYCEMIA DUE TO DIABETES MELLITUS (HCC): ICD-10-CM

## 2020-10-19 DIAGNOSIS — R73.9 HYPERGLYCEMIA: Primary | ICD-10-CM

## 2020-10-19 DIAGNOSIS — R81 GLUCOSURIA: ICD-10-CM

## 2020-10-19 DIAGNOSIS — N39.3 STRESS INCONTINENCE OF URINE: ICD-10-CM

## 2020-10-19 DIAGNOSIS — N39.0 URINARY TRACT INFECTION WITHOUT HEMATURIA, SITE UNSPECIFIED: ICD-10-CM

## 2020-10-19 DIAGNOSIS — B37.3 VAGINAL YEAST INFECTION: ICD-10-CM

## 2020-10-19 PROBLEM — G43.009 COMMON MIGRAINE WITHOUT AURA: Status: ACTIVE | Noted: 2017-09-13

## 2020-10-19 PROBLEM — J20.9 ACUTE BRONCHITIS: Status: RESOLVED | Noted: 2018-12-21 | Resolved: 2020-10-19

## 2020-10-19 PROBLEM — D49.59 OVARIAN NEOPLASM: Status: RESOLVED | Noted: 2018-04-19 | Resolved: 2020-10-19

## 2020-10-19 PROBLEM — E11.9 TYPE 2 DIABETES MELLITUS (HCC): Status: ACTIVE | Noted: 2017-09-13

## 2020-10-19 PROBLEM — L53.8 PALMAR ERYTHEMA: Status: RESOLVED | Noted: 2019-03-19 | Resolved: 2020-10-19

## 2020-10-19 PROBLEM — R13.10 DYSPHAGIA: Status: RESOLVED | Noted: 2018-09-11 | Resolved: 2020-10-19

## 2020-10-19 PROBLEM — T50.3X5A: Status: RESOLVED | Noted: 2018-09-11 | Resolved: 2020-10-19

## 2020-10-19 PROBLEM — K20.80: Status: RESOLVED | Noted: 2018-09-11 | Resolved: 2020-10-19

## 2020-10-19 LAB
ANION GAP SERPL CALCULATED.3IONS-SCNC: 5 MMOL/L (ref 4–13)
BACTERIA UR QL AUTO: ABNORMAL /HPF
BASE EX.OXY STD BLDV CALC-SCNC: 39.3 % (ref 60–80)
BASE EXCESS BLDV CALC-SCNC: 0.2 MMOL/L
BASOPHILS # BLD AUTO: 0.04 THOUSANDS/ΜL (ref 0–0.1)
BASOPHILS NFR BLD AUTO: 0 % (ref 0–1)
BILIRUB UR QL STRIP: NEGATIVE
BUN SERPL-MCNC: 17 MG/DL (ref 5–25)
CALCIUM SERPL-MCNC: 9.4 MG/DL (ref 8.3–10.1)
CHLORIDE SERPL-SCNC: 99 MMOL/L (ref 100–108)
CLARITY UR: ABNORMAL
CO2 SERPL-SCNC: 33 MMOL/L (ref 21–32)
COLOR UR: YELLOW
CREAT SERPL-MCNC: 0.87 MG/DL (ref 0.6–1.3)
EOSINOPHIL # BLD AUTO: 0.27 THOUSAND/ΜL (ref 0–0.61)
EOSINOPHIL NFR BLD AUTO: 3 % (ref 0–6)
ERYTHROCYTE [DISTWIDTH] IN BLOOD BY AUTOMATED COUNT: 13.3 % (ref 11.6–15.1)
GFR SERPL CREATININE-BSD FRML MDRD: 76 ML/MIN/1.73SQ M
GLUCOSE SERPL-MCNC: 274 MG/DL (ref 65–140)
GLUCOSE UR STRIP-MCNC: ABNORMAL MG/DL
HCO3 BLDV-SCNC: 26.5 MMOL/L (ref 24–30)
HCT VFR BLD AUTO: 48.7 % (ref 34.8–46.1)
HGB BLD-MCNC: 15.9 G/DL (ref 11.5–15.4)
HGB UR QL STRIP.AUTO: ABNORMAL
IMM GRANULOCYTES # BLD AUTO: 0.04 THOUSAND/UL (ref 0–0.2)
IMM GRANULOCYTES NFR BLD AUTO: 0 % (ref 0–2)
KETONES UR STRIP-MCNC: NEGATIVE MG/DL
LEUKOCYTE ESTERASE UR QL STRIP: ABNORMAL
LYMPHOCYTES # BLD AUTO: 2.99 THOUSANDS/ΜL (ref 0.6–4.47)
LYMPHOCYTES NFR BLD AUTO: 32 % (ref 14–44)
MCH RBC QN AUTO: 27.5 PG (ref 26.8–34.3)
MCHC RBC AUTO-ENTMCNC: 32.6 G/DL (ref 31.4–37.4)
MCV RBC AUTO: 84 FL (ref 82–98)
MONOCYTES # BLD AUTO: 0.46 THOUSAND/ΜL (ref 0.17–1.22)
MONOCYTES NFR BLD AUTO: 5 % (ref 4–12)
NEUTROPHILS # BLD AUTO: 5.45 THOUSANDS/ΜL (ref 1.85–7.62)
NEUTS SEG NFR BLD AUTO: 60 % (ref 43–75)
NITRITE UR QL STRIP: NEGATIVE
NON-SQ EPI CELLS URNS QL MICRO: ABNORMAL /HPF
NRBC BLD AUTO-RTO: 0 /100 WBCS
O2 CT BLDV-SCNC: 9.2 ML/DL
PCO2 BLDV: 48.9 MM HG (ref 42–50)
PH BLDV: 7.35 [PH] (ref 7.3–7.4)
PH UR STRIP.AUTO: 6 [PH]
PLATELET # BLD AUTO: 254 THOUSANDS/UL (ref 149–390)
PMV BLD AUTO: 9.7 FL (ref 8.9–12.7)
PO2 BLDV: 21.1 MM HG (ref 35–45)
POTASSIUM SERPL-SCNC: 4 MMOL/L (ref 3.5–5.3)
PROT UR STRIP-MCNC: NEGATIVE MG/DL
RBC # BLD AUTO: 5.79 MILLION/UL (ref 3.81–5.12)
RBC #/AREA URNS AUTO: ABNORMAL /HPF
SODIUM SERPL-SCNC: 137 MMOL/L (ref 136–145)
SP GR UR STRIP.AUTO: 1.02 (ref 1–1.03)
UROBILINOGEN UR QL STRIP.AUTO: 0.2 E.U./DL
WBC # BLD AUTO: 9.25 THOUSAND/UL (ref 4.31–10.16)
WBC #/AREA URNS AUTO: ABNORMAL /HPF

## 2020-10-19 PROCEDURE — 80048 BASIC METABOLIC PNL TOTAL CA: CPT | Performed by: EMERGENCY MEDICINE

## 2020-10-19 PROCEDURE — 99214 OFFICE O/P EST MOD 30 MIN: CPT | Performed by: PHYSICIAN ASSISTANT

## 2020-10-19 PROCEDURE — 96360 HYDRATION IV INFUSION INIT: CPT

## 2020-10-19 PROCEDURE — 99283 EMERGENCY DEPT VISIT LOW MDM: CPT | Performed by: EMERGENCY MEDICINE

## 2020-10-19 PROCEDURE — 82948 REAGENT STRIP/BLOOD GLUCOSE: CPT

## 2020-10-19 PROCEDURE — 36415 COLL VENOUS BLD VENIPUNCTURE: CPT | Performed by: EMERGENCY MEDICINE

## 2020-10-19 PROCEDURE — 82805 BLOOD GASES W/O2 SATURATION: CPT | Performed by: EMERGENCY MEDICINE

## 2020-10-19 PROCEDURE — 81001 URINALYSIS AUTO W/SCOPE: CPT | Performed by: PHYSICIAN ASSISTANT

## 2020-10-19 PROCEDURE — 87086 URINE CULTURE/COLONY COUNT: CPT | Performed by: PHYSICIAN ASSISTANT

## 2020-10-19 PROCEDURE — 99283 EMERGENCY DEPT VISIT LOW MDM: CPT

## 2020-10-19 PROCEDURE — 87086 URINE CULTURE/COLONY COUNT: CPT | Performed by: EMERGENCY MEDICINE

## 2020-10-19 PROCEDURE — 85025 COMPLETE CBC W/AUTO DIFF WBC: CPT | Performed by: EMERGENCY MEDICINE

## 2020-10-19 PROCEDURE — 96361 HYDRATE IV INFUSION ADD-ON: CPT

## 2020-10-19 PROCEDURE — 81001 URINALYSIS AUTO W/SCOPE: CPT | Performed by: EMERGENCY MEDICINE

## 2020-10-19 RX ORDER — CEPHALEXIN 500 MG/1
500 CAPSULE ORAL 2 TIMES DAILY
Qty: 14 CAPSULE | Refills: 0 | Status: SHIPPED | OUTPATIENT
Start: 2020-10-19 | End: 2020-10-26

## 2020-10-19 RX ORDER — FLUCONAZOLE 150 MG/1
TABLET ORAL
Qty: 2 TABLET | Refills: 0 | Status: SHIPPED | OUTPATIENT
Start: 2020-10-19 | End: 2020-10-22

## 2020-10-19 RX ORDER — NYSTATIN 100000 U/G
OINTMENT TOPICAL 2 TIMES DAILY
Qty: 30 G | Refills: 0 | Status: SHIPPED | OUTPATIENT
Start: 2020-10-19 | End: 2022-07-13

## 2020-10-19 RX ADMIN — SODIUM CHLORIDE 1000 ML: 0.9 INJECTION, SOLUTION INTRAVENOUS at 18:41

## 2020-10-20 LAB
BACTERIA UR CULT: NORMAL
BACTERIA UR CULT: NORMAL
BACTERIA UR QL AUTO: ABNORMAL /HPF
BILIRUB UR QL STRIP: NEGATIVE
CLARITY UR: ABNORMAL
COLOR UR: YELLOW
GLUCOSE SERPL-MCNC: 265 MG/DL (ref 65–140)
GLUCOSE UR STRIP-MCNC: ABNORMAL MG/DL
HGB UR QL STRIP.AUTO: ABNORMAL
KETONES UR STRIP-MCNC: NEGATIVE MG/DL
LEUKOCYTE ESTERASE UR QL STRIP: ABNORMAL
MUCOUS THREADS UR QL AUTO: ABNORMAL
NITRITE UR QL STRIP: NEGATIVE
NON-SQ EPI CELLS URNS QL MICRO: ABNORMAL /HPF
PH UR STRIP.AUTO: 7 [PH]
PROT UR STRIP-MCNC: ABNORMAL MG/DL
RBC #/AREA URNS AUTO: ABNORMAL /HPF
SP GR UR STRIP.AUTO: 1.03 (ref 1–1.03)
UROBILINOGEN UR QL STRIP.AUTO: 0.2 E.U./DL
WBC #/AREA URNS AUTO: ABNORMAL /HPF

## 2020-11-23 ENCOUNTER — OFFICE VISIT (OUTPATIENT)
Dept: CARDIOLOGY CLINIC | Facility: CLINIC | Age: 55
End: 2020-11-23
Payer: COMMERCIAL

## 2020-11-23 VITALS
SYSTOLIC BLOOD PRESSURE: 122 MMHG | DIASTOLIC BLOOD PRESSURE: 70 MMHG | BODY MASS INDEX: 31.53 KG/M2 | HEART RATE: 60 BPM | HEIGHT: 61 IN | WEIGHT: 167 LBS

## 2020-11-23 DIAGNOSIS — R00.2 PALPITATIONS: ICD-10-CM

## 2020-11-23 DIAGNOSIS — I42.8 NON-ISCHEMIC CARDIOMYOPATHY (HCC): Primary | ICD-10-CM

## 2020-11-23 DIAGNOSIS — E78.5 HYPERLIPIDEMIA: ICD-10-CM

## 2020-11-23 DIAGNOSIS — I10 ESSENTIAL (PRIMARY) HYPERTENSION: ICD-10-CM

## 2020-11-23 DIAGNOSIS — I44.7 LBBB (LEFT BUNDLE BRANCH BLOCK): ICD-10-CM

## 2020-11-23 DIAGNOSIS — E66.9 OBESITY (BMI 30.0-34.9): ICD-10-CM

## 2020-11-23 DIAGNOSIS — I25.10 CAD (CORONARY ARTERY DISEASE): ICD-10-CM

## 2020-11-23 PROCEDURE — 99213 OFFICE O/P EST LOW 20 MIN: CPT | Performed by: INTERNAL MEDICINE

## 2020-11-23 RX ORDER — VALSARTAN 80 MG/1
80 TABLET ORAL DAILY
COMMUNITY
End: 2022-01-19 | Stop reason: ALTCHOICE

## 2020-11-23 RX ORDER — ROSUVASTATIN CALCIUM 20 MG/1
20 TABLET, COATED ORAL DAILY
COMMUNITY
Start: 2020-11-18

## 2020-11-23 RX ORDER — PRAZOSIN HYDROCHLORIDE 1 MG/1
CAPSULE ORAL
COMMUNITY
Start: 2020-11-18 | End: 2022-01-19 | Stop reason: ALTCHOICE

## 2021-03-11 DIAGNOSIS — R11.0 NAUSEA: Primary | ICD-10-CM

## 2021-03-11 RX ORDER — ONDANSETRON 4 MG/1
4 TABLET, FILM COATED ORAL EVERY 8 HOURS PRN
Qty: 20 TABLET | Refills: 0 | Status: SHIPPED | OUTPATIENT
Start: 2021-03-11 | End: 2021-07-28 | Stop reason: SDUPTHER

## 2021-03-16 ENCOUNTER — OFFICE VISIT (OUTPATIENT)
Dept: GASTROENTEROLOGY | Facility: CLINIC | Age: 56
End: 2021-03-16
Payer: COMMERCIAL

## 2021-03-16 VITALS — RESPIRATION RATE: 18 BRPM | BODY MASS INDEX: 32.85 KG/M2 | WEIGHT: 174 LBS | HEART RATE: 74 BPM | HEIGHT: 61 IN

## 2021-03-16 DIAGNOSIS — Z79.899 MEDICAL MARIJUANA USE: ICD-10-CM

## 2021-03-16 DIAGNOSIS — R11.14 BILIOUS VOMITING WITH NAUSEA: ICD-10-CM

## 2021-03-16 DIAGNOSIS — G89.29 OTHER CHRONIC PAIN: ICD-10-CM

## 2021-03-16 DIAGNOSIS — R13.19 ESOPHAGEAL DYSPHAGIA: ICD-10-CM

## 2021-03-16 DIAGNOSIS — K76.0 FATTY LIVER: ICD-10-CM

## 2021-03-16 DIAGNOSIS — K31.84 GASTROPARESIS DUE TO DM (HCC): Primary | ICD-10-CM

## 2021-03-16 DIAGNOSIS — K21.00 GASTROESOPHAGEAL REFLUX DISEASE WITH ESOPHAGITIS: ICD-10-CM

## 2021-03-16 DIAGNOSIS — E11.43 GASTROPARESIS DUE TO DM (HCC): Primary | ICD-10-CM

## 2021-03-16 PROCEDURE — 99213 OFFICE O/P EST LOW 20 MIN: CPT | Performed by: PHYSICIAN ASSISTANT

## 2021-03-16 RX ORDER — METOCLOPRAMIDE HYDROCHLORIDE 5 MG/5ML
5 SOLUTION ORAL 3 TIMES DAILY
Qty: 445 ML | Refills: 1 | Status: SHIPPED | OUTPATIENT
Start: 2021-03-16 | End: 2022-06-29 | Stop reason: SDUPTHER

## 2021-03-16 NOTE — PROGRESS NOTES
[facility] - Outpatient Follow-up Note  Chance Pescadero 50 y o  female MRN: [id number]  Encounter: [id number]          ASSESSMENT AND PLAN:      1  Gastroesophageal reflux disease  2  Gastroparesis     Patient reports continued issues with N/V, early satiety and epigastric discomfort  EGD in 10/2018 showed a 2cm HH  Esophageal Manometry in 2018 was essentially normal   GES in 2017 showed significant delayed gastric emptying  She is currently on Pantoprazole daily  She previously responded to Reglan  Will restart Reglan 5mg po TID  Discussed risk of tardive dyskinesia  Discussed recommendation for small, low fat meals  She also has very uncontrolled DM with a HGB A1C of 11-12- this is worsening her gastroparesis  Recommend follow up with her PCP  She is on insulin  She is also on Victoza for her DM which delays gastric emptying and can be worsening her symptoms  Recommend she talk to her PCP about this (potentionally discontinuing the Victoza and increasing her insulin regimen)  Will check RUQ US to reevaluate the gallbladder as it has been a number of year (previously in 2017 her gallbladder was normal)  She also is taking medical marijuana which could be worsening her N/V  Recommend ETOH cessation      3  Fatty liver     Patient has a history of a fatty liver  No recent LFTs have been drawn  Up date labs  Recommend weight loss with diet and exercise  Discussed risk of progression to cirrhosis  She has the metabolic syndrome      4  Constipation     She previously was on Linzess 72mcg po daily but reports overall her BMs are regular now and she has not had to utilize this medication  Colonoscopy in 2017 revealed a small, benign polyp    Follow up in 4 weeks  ______________________________________________________________________    SUBJECTIVE: Patient is a 50year old female who presents to the office for follow up    She reports she is struggling with early satiety, epigastric discomfort, and nausea  She also reports occasional vomiting  She has diabetic gastroparesis  She is on Pantoprazole daily  She previously was on Reglan with benefit  She just restarted the medication yesterday due to her symptoms  Her diabetes is very uncontrolled  She is on insulin and Victoza  Victoza delays gastric emptying  She denies blood in the stool  She reports her BMs have been pretty regular  She uses marijuana daily  REVIEW OF SYSTEMS IS OTHERWISE NEGATIVE  Historical Information   Past Medical History:   Diagnosis Date    [facility]ina pectoris ([facility] )     recent    Anxiety     Arthritis     Carpal tunnel syndrome     Chronic headaches     Diabetes ([facility] )     Diabetes mellitus ([facility] )     GERD (gastroesophageal reflux disease)     Headache     Hyperlipidemia     Hypertension     Kidney stone     Left bundle branch block     LBBB    MI (myocardial infarction) ([place] [address] )     Neuropathy     PTSD (post-traumatic stress disorder)     RLS (restless legs syndrome)     Shortness of breath      Past Surgical History:   Procedure Laterality Date    BREAST SURGERY  2016    Reduction    CARDIAC CATHETERIZATION  2018     Mid LAD: There was a tubular 40 % stenosis     SECTION      COLONOSCOPY      HYSTERECTOMY  2018    Complete    KIDNEY STONE SURGERY      LAPAROSCOPY      NM COLONOSCOPY FLX DX W/COLLJ SPEC WHEN PFRMD N/A 2017    Procedure: EGD AND COLONOSCOPY;  Surgeon: Gregory Corbin MD;  Location: [facility]; Service: Gastroenterology    NM ESOPHAGOGASTRODUODENOSCOPY TRANSORAL DIAGNOSTIC N/A 10/10/2018    Procedure: ESOPHAGOGASTRODUODENOSCOPY (EGD); Surgeon: Gregory Corbin MD;  Location: [facility];   Service: Gastroenterology    NM LAPAROSCOPY W TOT HYSTERECTUTERUS <=250 [address] TUBE/OVARY N/A 2018    Procedure: ROBOTIC ASSISTED TOTAL LAPAROSCOPIC HYSTERECTOMY, BILATERAL SALPINGOOPHERECTOMY,;  Surgeon: Riddhi Fuentes MD; [facility]: [facility];  Service: Gynecology Oncology    UPPER GASTROINTESTINAL ENDOSCOPY      WRIST SURGERY Right      Social History   Social History     Substance and Sexual Activity   Alcohol Use No    Comment: Rarely consumes alcohol - As per [employer]      Social History     Substance and Sexual Activity   Drug Use Yes    Frequency: 7 0 times per week    Types: Marijuana    Comment: medical marijuana     Social History     Tobacco Use   Smoking Status Current Every Day Smoker    Packs/day: 1 00    Years: 44 00    Pack years: 44 00   Smokeless Tobacco Never Used   Tobacco Comment    pt  smoked this am 10/10     Family History   Problem Relation Age of Onset    Diabetes Mother     Breast cancer Mother 39    Lung cancer Father 47    Diabetes Sister     Brain cancer Maternal Aunt 67    Breast cancer Other 25       Meds/Allergies       Current Outpatient Medications:     albuterol (PROVENTIL HFA,VENTOLIN HFA) 90 mcg/act inhaler    ALPRAZolam (XANAX) 1 mg tablet    ARIPiprazole (ABILIFY) 2 mg tablet    benzonatate (TESSALON PERLES) 100 mg capsule    bisoprolol (ZEBETA) 5 mg tablet    diclofenac (VOLTAREN) 75 mg EC tablet    dicyclomine (BENTYL) 20 mg tablet    DULoxetine (CYMBALTA) 60 mg delayed release capsule    gabapentin (NEURONTIN) 300 mg capsule    insulin glargine (Basaglar KwikPen) 100 units/mL injection pen    insulin lispro (HumaLOG) 100 units/mL injection    linaCLOtide (LINZESS) 72 MCG CAPS    Liraglutide 18 MG/3ML SOPN    methocarbamol (ROBAXIN) 750 mg tablet    metoclopramide (REGLAN) 5 mg/5 mL oral solution    Mirabegron ER 25 MG TB24    montelukast (SINGULAIR) 10 mg tablet    nystatin (MYCOSTATIN) ointment    ondansetron (ZOFRAN) 4 mg tablet    pantoprazole (PROTONIX) 20 mg tablet    pantoprazole (PROTONIX) 40 mg tablet    potassium chloride SA (KLOR-CON M15) 15 MEQ tablet    prazosin (MINIPRESS) 1 mg capsule    pregabalin (LYRICA) 75 mg capsule    QUEtiapine (SEROquel) 200 mg tablet    repaglinide (PRANDIN) 1 mg tablet    rosuvastatin (CRESTOR) 20 MG tablet    umeclidinium-vilanterol (ANORO ELLIPTA) 62 5-25 MCG/INH inhaler    valsartan (DIOVAN) 80 mg tablet    vortioxetine (TRINTELLIX) 10 MG tablet    zolpidem (AMBIEN) 10 mg tablet    Allergies   Allergen Reactions    Lisinopril Cough    Losartan Dizziness    Other Hives     Surgical tape    Prednisone Other (See Comments)     Thrush             Objective     Pulse 74, resp  rate 18, height 5' 1" (1 549 m), weight 78 9 kg (174 lb)  Body mass index is 32 88 kg/m²  PHYSICAL EXAM:      General Appearance:   Alert, cooperative, no distress   HEENT:   Normocephalic, atraumatic, anicteric     Neck:  Supple, symmetrical, trachea midline   Lungs:   Clear to auscultation bilaterally; no rales, rhonchi or wheezing; respirations unlabored    Heart[de-identified]   Regular rate and rhythm; no murmur, rub, or gallop  Abdomen:   Soft, non-tender, non-distended; normal bowel sounds; no masses, no organomegaly    Genitalia:   Deferred    Rectal:   Deferred    Extremities:  No cyanosis, clubbing or edema    Pulses:  2+ and symmetric    Skin:  No jaundice, rashes, or lesions    Lymph nodes:  No palpable cervical lymphadenopathy        Lab Results:   No visits with results within 1 Day(s) from this visit     Latest known visit with results is:   Admission on 10/19/2020, Discharged on 10/19/2020   Component Date Value    WBC 10/19/2020 9 25     RBC 10/19/2020 5 79*    Hemoglobin 10/19/2020 15 9*    Hematocrit 10/19/2020 48 7*    MCV 10/19/2020 84     MCH 10/19/2020 27 5     MCHC 10/19/2020 32 6     RDW 10/19/2020 13 3     MPV 10/19/2020 9 7     Platelets 96/15/0987 254     nRBC 10/19/2020 0     Neutrophils Relative 10/19/2020 60     Immat GRANS % 10/19/2020 0     Lymphocytes Relative 10/19/2020 32     Monocytes Relative 10/19/2020 5     Eosinophils Relative 10/19/2020 3     Basophils Relative 10/19/2020 0     Neutrophils Absolute 10/19/2020 5 45     Immature Grans Absolute 10/19/2020 0 04     Lymphocytes Absolute 10/19/2020 2 99     Monocytes Absolute 10/19/2020 0 46     Eosinophils Absolute 10/19/2020 0 27     Basophils Absolute 10/19/2020 0 04     Sodium 10/19/2020 137     Potassium 10/19/2020 4 0     Chloride 10/19/2020 99*    CO2 10/19/2020 33*    ANION GAP 10/19/2020 5     BUN 10/19/2020 17     Creatinine 10/19/2020 0 87     Glucose 10/19/2020 274*    Calcium 10/19/2020 9 4     eGFR 10/19/2020 76     pH, Edgardo 10/19/2020 7 352     pCO2, Edgardo 10/19/2020 48 9     pO2, Edgardo 10/19/2020 21 1*    HCO3, Edgardo 10/19/2020 26 5     Base Excess, Edgardo 10/19/2020 0 2     O2 Content, Edgardo 10/19/2020 9 2     O2 HGB, VENOUS 10/19/2020 39 3*    Color, UA 10/19/2020 Yellow     Clarity, UA 10/19/2020 Slightly Cloudy     Specific Gravity, UA 10/19/2020 1 025     pH, UA 10/19/2020 6 0     Leukocytes, UA 10/19/2020 Small*    Nitrite, UA 10/19/2020 Negative     Protein, UA 10/19/2020 Negative     Glucose, UA 10/19/2020 >=1000 (1%)*    Ketones, UA 10/19/2020 Negative     Urobilinogen, UA 10/19/2020 0 2     Bilirubin, UA 10/19/2020 Negative     Blood, UA 10/19/2020 Small*    RBC, UA 10/19/2020 4-10*    WBC, UA 10/19/2020 Innumerable*    Epithelial Cells 10/19/2020 Moderate*    Bacteria, UA 10/19/2020 Moderate*    Urine Culture 10/19/2020 70,000-79,000 cfu/ml      POC Glucose 10/19/2020 265*         Radiology Results:   No results found

## 2021-06-03 ENCOUNTER — HOSPITAL ENCOUNTER (EMERGENCY)
Facility: HOSPITAL | Age: 56
Discharge: LEFT BEFORE DISCHARGE | End: 2021-06-03
Attending: EMERGENCY MEDICINE | Admitting: HOSPITALIST
Payer: COMMERCIAL

## 2021-06-03 ENCOUNTER — APPOINTMENT (EMERGENCY)
Dept: CT IMAGING | Facility: HOSPITAL | Age: 56
End: 2021-06-03
Payer: COMMERCIAL

## 2021-06-03 ENCOUNTER — APPOINTMENT (EMERGENCY)
Dept: RADIOLOGY | Facility: HOSPITAL | Age: 56
End: 2021-06-03
Payer: COMMERCIAL

## 2021-06-03 VITALS
BODY MASS INDEX: 33.26 KG/M2 | SYSTOLIC BLOOD PRESSURE: 147 MMHG | HEART RATE: 73 BPM | HEIGHT: 61 IN | DIASTOLIC BLOOD PRESSURE: 65 MMHG | OXYGEN SATURATION: 96 % | TEMPERATURE: 97.8 F | RESPIRATION RATE: 18 BRPM | WEIGHT: 176.15 LBS

## 2021-06-03 DIAGNOSIS — R07.9 CHEST PAIN: Primary | ICD-10-CM

## 2021-06-03 DIAGNOSIS — R10.13 EPIGASTRIC ABDOMINAL PAIN: ICD-10-CM

## 2021-06-03 LAB
ALBUMIN SERPL BCP-MCNC: 3.3 G/DL (ref 3.5–5)
ALP SERPL-CCNC: 141 U/L (ref 46–116)
ALT SERPL W P-5'-P-CCNC: 40 U/L (ref 12–78)
ANION GAP SERPL CALCULATED.3IONS-SCNC: 8 MMOL/L (ref 4–13)
AST SERPL W P-5'-P-CCNC: 18 U/L (ref 5–45)
ATRIAL RATE: 77 BPM
BASOPHILS # BLD AUTO: 0.06 THOUSANDS/ΜL (ref 0–0.1)
BASOPHILS NFR BLD AUTO: 1 % (ref 0–1)
BILIRUB DIRECT SERPL-MCNC: 0.1 MG/DL (ref 0–0.2)
BILIRUB SERPL-MCNC: 0.47 MG/DL (ref 0.2–1)
BUN SERPL-MCNC: 20 MG/DL (ref 5–25)
CALCIUM SERPL-MCNC: 8.8 MG/DL (ref 8.3–10.1)
CHLORIDE SERPL-SCNC: 97 MMOL/L (ref 100–108)
CO2 SERPL-SCNC: 30 MMOL/L (ref 21–32)
CREAT SERPL-MCNC: 0.89 MG/DL (ref 0.6–1.3)
EOSINOPHIL # BLD AUTO: 0.19 THOUSAND/ΜL (ref 0–0.61)
EOSINOPHIL NFR BLD AUTO: 2 % (ref 0–6)
ERYTHROCYTE [DISTWIDTH] IN BLOOD BY AUTOMATED COUNT: 12.9 % (ref 11.6–15.1)
GFR SERPL CREATININE-BSD FRML MDRD: 74 ML/MIN/1.73SQ M
GLUCOSE SERPL-MCNC: 354 MG/DL (ref 65–140)
HCT VFR BLD AUTO: 48.1 % (ref 34.8–46.1)
HGB BLD-MCNC: 16.2 G/DL (ref 11.5–15.4)
IMM GRANULOCYTES # BLD AUTO: 0.04 THOUSAND/UL (ref 0–0.2)
IMM GRANULOCYTES NFR BLD AUTO: 0 % (ref 0–2)
LACTATE SERPL-SCNC: 0.6 MMOL/L (ref 0.5–2)
LIPASE SERPL-CCNC: 472 U/L (ref 73–393)
LYMPHOCYTES # BLD AUTO: 3.42 THOUSANDS/ΜL (ref 0.6–4.47)
LYMPHOCYTES NFR BLD AUTO: 35 % (ref 14–44)
MCH RBC QN AUTO: 27.4 PG (ref 26.8–34.3)
MCHC RBC AUTO-ENTMCNC: 33.7 G/DL (ref 31.4–37.4)
MCV RBC AUTO: 81 FL (ref 82–98)
MONOCYTES # BLD AUTO: 0.55 THOUSAND/ΜL (ref 0.17–1.22)
MONOCYTES NFR BLD AUTO: 6 % (ref 4–12)
NEUTROPHILS # BLD AUTO: 5.51 THOUSANDS/ΜL (ref 1.85–7.62)
NEUTS SEG NFR BLD AUTO: 56 % (ref 43–75)
NRBC BLD AUTO-RTO: 0 /100 WBCS
NT-PROBNP SERPL-MCNC: 66 PG/ML
P AXIS: 27 DEGREES
PLATELET # BLD AUTO: 283 THOUSANDS/UL (ref 149–390)
PMV BLD AUTO: 9.6 FL (ref 8.9–12.7)
POTASSIUM SERPL-SCNC: 3.8 MMOL/L (ref 3.5–5.3)
PR INTERVAL: 128 MS
PROT SERPL-MCNC: 8.1 G/DL (ref 6.4–8.2)
QRS AXIS: 70 DEGREES
QRSD INTERVAL: 136 MS
QT INTERVAL: 440 MS
QTC INTERVAL: 497 MS
RBC # BLD AUTO: 5.91 MILLION/UL (ref 3.81–5.12)
SODIUM SERPL-SCNC: 135 MMOL/L (ref 136–145)
T WAVE AXIS: 56 DEGREES
TROPONIN I SERPL-MCNC: <0.02 NG/ML
VENTRICULAR RATE: 77 BPM
WBC # BLD AUTO: 9.77 THOUSAND/UL (ref 4.31–10.16)

## 2021-06-03 PROCEDURE — 36415 COLL VENOUS BLD VENIPUNCTURE: CPT | Performed by: EMERGENCY MEDICINE

## 2021-06-03 PROCEDURE — 74177 CT ABD & PELVIS W/CONTRAST: CPT

## 2021-06-03 PROCEDURE — 85025 COMPLETE CBC W/AUTO DIFF WBC: CPT | Performed by: EMERGENCY MEDICINE

## 2021-06-03 PROCEDURE — 80048 BASIC METABOLIC PNL TOTAL CA: CPT | Performed by: EMERGENCY MEDICINE

## 2021-06-03 PROCEDURE — 93010 ELECTROCARDIOGRAM REPORT: CPT | Performed by: INTERNAL MEDICINE

## 2021-06-03 PROCEDURE — 99285 EMERGENCY DEPT VISIT HI MDM: CPT | Performed by: EMERGENCY MEDICINE

## 2021-06-03 PROCEDURE — 83690 ASSAY OF LIPASE: CPT | Performed by: EMERGENCY MEDICINE

## 2021-06-03 PROCEDURE — 71045 X-RAY EXAM CHEST 1 VIEW: CPT

## 2021-06-03 PROCEDURE — 96375 TX/PRO/DX INJ NEW DRUG ADDON: CPT

## 2021-06-03 PROCEDURE — 96365 THER/PROPH/DIAG IV INF INIT: CPT

## 2021-06-03 PROCEDURE — 96366 THER/PROPH/DIAG IV INF ADDON: CPT

## 2021-06-03 PROCEDURE — 84484 ASSAY OF TROPONIN QUANT: CPT | Performed by: EMERGENCY MEDICINE

## 2021-06-03 PROCEDURE — 93005 ELECTROCARDIOGRAM TRACING: CPT

## 2021-06-03 PROCEDURE — 83605 ASSAY OF LACTIC ACID: CPT | Performed by: EMERGENCY MEDICINE

## 2021-06-03 PROCEDURE — 83880 ASSAY OF NATRIURETIC PEPTIDE: CPT | Performed by: EMERGENCY MEDICINE

## 2021-06-03 PROCEDURE — 99284 EMERGENCY DEPT VISIT MOD MDM: CPT

## 2021-06-03 PROCEDURE — 80076 HEPATIC FUNCTION PANEL: CPT | Performed by: EMERGENCY MEDICINE

## 2021-06-03 RX ORDER — METOCLOPRAMIDE HYDROCHLORIDE 5 MG/ML
10 INJECTION INTRAMUSCULAR; INTRAVENOUS ONCE
Status: COMPLETED | OUTPATIENT
Start: 2021-06-03 | End: 2021-06-03

## 2021-06-03 RX ORDER — FAMOTIDINE 20 MG/1
20 TABLET, FILM COATED ORAL ONCE
Status: COMPLETED | OUTPATIENT
Start: 2021-06-03 | End: 2021-06-03

## 2021-06-03 RX ORDER — MAGNESIUM HYDROXIDE/ALUMINUM HYDROXICE/SIMETHICONE 120; 1200; 1200 MG/30ML; MG/30ML; MG/30ML
30 SUSPENSION ORAL ONCE
Status: COMPLETED | OUTPATIENT
Start: 2021-06-03 | End: 2021-06-03

## 2021-06-03 RX ORDER — ONDANSETRON 2 MG/ML
4 INJECTION INTRAMUSCULAR; INTRAVENOUS ONCE
Status: DISCONTINUED | OUTPATIENT
Start: 2021-06-03 | End: 2021-06-03

## 2021-06-03 RX ADMIN — SODIUM CHLORIDE, SODIUM LACTATE, POTASSIUM CHLORIDE, AND CALCIUM CHLORIDE 1000 ML: .6; .31; .03; .02 INJECTION, SOLUTION INTRAVENOUS at 21:27

## 2021-06-03 RX ADMIN — ALUMINA, MAGNESIA, AND SIMETHICONE ORAL SUSPENSION REGULAR STRENGTH 30 ML: 1200; 1200; 120 SUSPENSION ORAL at 21:20

## 2021-06-03 RX ADMIN — METOCLOPRAMIDE HYDROCHLORIDE 10 MG: 5 INJECTION INTRAMUSCULAR; INTRAVENOUS at 21:21

## 2021-06-03 RX ADMIN — IOHEXOL 100 ML: 350 INJECTION, SOLUTION INTRAVENOUS at 22:00

## 2021-06-03 RX ADMIN — FAMOTIDINE 20 MG: 20 TABLET ORAL at 21:19

## 2021-06-03 NOTE — Clinical Note
Case was discussed with UNIQUE and the patient's admission status was agreed to be Admission Status: observation status to the service of Dr Preston Pan

## 2021-06-04 NOTE — DISCHARGE INSTRUCTIONS
Epigastric Pain   WHAT YOU NEED TO KNOW:   Epigastric pain is felt in the middle of the upper abdomen, between the ribs and the bellybutton  The pain may be mild or severe  Pain may spread from or to another part of your body  Epigastric pain may be a sign of a serious health problem that needs to be treated  DISCHARGE INSTRUCTIONS:   Call 911 for any of the following:   · You have any of the following signs of a heart attack:      ? Squeezing, pressure, or pain in your chest    ? You may  also have any of the following:     § Discomfort or pain in your back, neck, jaw, stomach, or arm    § Shortness of breath    § Nausea or vomiting    § Lightheadedness or a sudden cold sweat    · You have severe pain that radiates to your jaw or back  Return to the emergency department if:   · You have severe pain that starts suddenly and quickly gets worse  · You cannot have a bowel movement and are vomiting  · You vomit or cough up blood  · You see blood in your urine or bowel movement  · You feel drowsy and your breathing is slower than usual     Contact your healthcare provider if:   · You have a fever or chills  · You have yellowing of your skin or the whites of your eyes  · You vomit often or several times in a row  · You lose weight without trying  · You have symptoms for longer than 2 weeks  · You have questions or concerns about your condition or care  Medicines:   · Medicines  may be given to treat pain or stop vomiting  You may also need medicines to reduce or control stomach acid, or treat an infection  · Take your medicine as directed  Contact your healthcare provider if you think your medicine is not helping or if you have side effects  Tell him of her if you are allergic to any medicine  Keep a list of the medicines, vitamins, and herbs you take  Include the amounts, and when and why you take them  Bring the list or the pill bottles to follow-up visits   Carry your medicine list with you in case of an emergency  Follow up with your healthcare provider as directed:  Write down your questions so you remember to ask them during your visits  Manage your symptoms:   · Keep a record of your symptoms  Include when the pain starts, how long it lasts, and if it is sharp or dull  Also include any foods you ate or activities you did before the pain started  Keep track of anything that helped the pain  · Eat a variety of healthy foods  Healthy foods include fruits, vegetables, whole-grain breads, low-fat dairy products, beans, lean meats, and fish  Ask if you need to be on a special diet  Certain foods may cause your pain, such as alcohol or foods that are high in fat  You may need to eat smaller meals and to eat more often than usual     · Drink liquids as directed  Ask how much liquid to drink each day and which liquids are best for you  Do not have drinks that contain alcohol or caffeine  © Copyright [address] Information is for End User's use only and may not be sold, redistributed or otherwise used for commercial purposes  All illustrations and images included in CareNotes® are the copyrighted property of A D A M , Inc  or [facility] Марина Jameson   The above information is an  only  It is not intended as medical advice for individual conditions or treatments  Talk to your doctor, nurse or pharmacist before following any medical regimen to see if it is safe and effective for you

## 2021-06-04 NOTE — ED RE-EVALUATION NOTE
Received sign-out from Dr Rufus Townsend at 10:30 p m  Lazaro Mckinney Patient's CT scan of the abdomen and pelvis was pending at that time however plan was to admit patient for a chest pain observation/rule out ACS  CT abdomen pelvis: IMPRESSION: "5 mm nonobstructing stone in the left kidney    No hydronephrosis "    Updated patient and plan to admit, however patient is currently refusing admission  Explained to patient that given her age, cardiac risk factors in the setting of acute chest pain I recommended admission to definitively rule out acute coronary syndrome and discuss the potential risk of missed heart attack, arrhythmia, resulting in permanent disability or death  Patient verbalized her understanding of these risks and is agreeable to signing AMA form  Patient stable at time of discharge  Advised to follow-up with her PCP and cardiologist and to return if any of her symptoms worsen       Sharmila Alatorre DO  06/03/21 [id number]

## 2021-06-04 NOTE — ED PROVIDER NOTES
History  Chief Complaint   Patient presents with    Epigastric Pain     As per EMS "patient c/o on/off epigastric pain radiating towards left breast since yesterday"        History provided by:  Patient   used: No    Chest Pain  Pain location:  L chest and epigastric  Pain quality: aching and sharp    Pain radiates to the back: yes    Pain severity:  Severe  Onset quality:  Gradual  Duration:  1 day  Timing:  Constant  Progression:  Waxing and waning  Chronicity:  New  Relieved by:  Nothing  Exacerbated by: eating  Ineffective treatments:  None tried  Associated symptoms: no abdominal pain, no back pain, no cough, no fever, no palpitations, no shortness of breath and not vomiting        Prior to Admission Medications   Prescriptions Last Dose Informant Patient Reported? Taking?    ALPRAZolam (XANAX) 1 mg tablet  Self Yes No   Sig: Take by mouth daily at bedtime as needed for anxiety   ARIPiprazole (ABILIFY) 2 mg tablet   Yes No   Sig: Take 2 mg by mouth daily   DULoxetine (CYMBALTA) 60 mg delayed release capsule  Self Yes No   Sig: Take 20 mg by mouth daily   Liraglutide 18 MG/3ML SOPN  Self Yes No   Sig: Inject under the skin   Mirabegron ER 25 MG TB24   Yes No   Sig: Take by mouth   QUEtiapine (SEROquel) 200 mg tablet   Yes No   Sig: Take 150 mg by mouth daily at bedtime    albuterol (PROVENTIL HFA,VENTOLIN HFA) 90 mcg/act inhaler  Self Yes No   Sig: Inhale 2 puffs every 6 (six) hours as needed for wheezing   benzonatate (TESSALON PERLES) 100 mg capsule   Yes No   Sig: TAKE 1 CAPSULE BY MOUTH 3 TIMES A DAY AS NEEDED FOR COUGH   bisoprolol (ZEBETA) 5 mg tablet   No No   Sig: Take 1 tablet (5 mg total) by mouth daily   diclofenac (VOLTAREN) 75 mg EC tablet  Self Yes No   Sig: Take 50 mg by mouth 2 (two) times a day   dicyclomine (BENTYL) 20 mg tablet   No No   Sig: Take 1 tablet (20 mg total) by mouth every 6 (six) hours as needed (abdominal pain)   gabapentin (NEURONTIN) 300 mg capsule  Self Yes No   Sig: Take 400 mg by mouth 2 (two) times a day     insulin glargine (Basaglar KwikPen) 100 units/mL injection pen   Yes No   Sig: Inject 26 Units under the skin daily at bedtime   insulin lispro (HumaLOG) 100 units/mL injection   Yes No   Sig: Inject 10 Units under the skin 3 (three) times a day before meals   linaCLOtide (LINZESS) 72 MCG CAPS   No No   Sig: Take 72 mcg by mouth daily   methocarbamol (ROBAXIN) 750 mg tablet  Self Yes No   Sig: Take 500 mg by mouth every 6 (six) hours as needed for muscle spasms   metoclopramide (REGLAN) 5 mg/5 mL oral solution   No No   Sig: Take 5 mL (5 mg total) by mouth 3 (three) times a day   montelukast (SINGULAIR) 10 mg tablet   Yes No   Sig: Take 10 mg by mouth daily   nystatin (MYCOSTATIN) ointment   No No   Sig: Apply topically 2 (two) times a day   ondansetron (ZOFRAN) 4 mg tablet   No No   Sig: Take 1 tablet (4 mg total) by mouth every 8 (eight) hours as needed for nausea or vomiting   pantoprazole (PROTONIX) 20 mg tablet   Yes No   Sig: TAKE 1 TAB BY MOUTH 2 TIMES A DAY   AS DIRECTED BY PHYSICIAN   pantoprazole (PROTONIX) 40 mg tablet   No No   Sig: Take 1 tablet (40 mg total) by mouth daily   Patient taking differently: Take 20 mg by mouth daily    potassium chloride SA (KLOR-CON M15) 15 MEQ tablet  Self Yes No   Sig: Take 15 mEq by mouth 2 (two) times a day   prazosin (MINIPRESS) 1 mg capsule   Yes No   pregabalin (LYRICA) 75 mg capsule   Yes No   Sig: Take 75 mg by mouth 3 (three) times a day   repaglinide (PRANDIN) 1 mg tablet  Self Yes No   Sig: Take 1 mg by mouth 3 (three) times a day before meals   rosuvastatin (CRESTOR) 20 MG tablet   Yes No   Sig: Take 20 mg by mouth daily   umeclidinium-vilanterol (ANORO ELLIPTA) 62 5-25 MCG/INH inhaler   Yes No   Sig: Inhale 1 puff daily   valsartan (DIOVAN) 80 mg tablet   Yes No   Sig: Take 80 mg by mouth daily   vortioxetine (TRINTELLIX) 10 MG tablet   Yes No   Sig: Take 10 mg by mouth daily   zolpidem (AMBIEN) 10 mg tablet  Self Yes No   Sig: Take 10 mg by mouth daily at bedtime as needed for sleep      Facility-Administered Medications: None       Past Medical History:   Diagnosis Date    Angina pectoris ([facility] )     recent    Anxiety     Arthritis     Carpal tunnel syndrome     Chronic headaches     Diabetes ([facility] )     Diabetes mellitus ([facility] )     GERD (gastroesophageal reflux disease)     Headache     Hyperlipidemia     Hypertension     Kidney stone     Left bundle branch block     LBBB    MI (myocardial infarction) ([facility] )     Neuropathy     PTSD (post-traumatic stress disorder)     RLS (restless legs syndrome)     Shortness of breath        Past Surgical History:   Procedure Laterality Date    BREAST SURGERY  2016    Reduction    CARDIAC CATHETERIZATION  2018     Mid LAD: There was a tubular 40 % stenosis     SECTION      COLONOSCOPY      HYSTERECTOMY  2018    Complete    KIDNEY STONE SURGERY      LAPAROSCOPY      MT COLONOSCOPY FLX DX W/COLLJ SPEC WHEN PFRMD N/A 2017    Procedure: EGD AND COLONOSCOPY;  Surgeon: Yamila Arshad MD;  Location: [facility]; Service: Gastroenterology    MT ESOPHAGOGASTRODUODENOSCOPY TRANSORAL DIAGNOSTIC N/A 10/10/2018    Procedure: ESOPHAGOGASTRODUODENOSCOPY (EGD); Surgeon: Yamila Arshad MD;  Location: [facility];   Service: Gastroenterology    MT LAPAROSCOPY W TOT HYSTERECTUTERUS <=250 Particia Notice TUBE/OVARY N/A 2018    Procedure: ROBOTIC ASSISTED TOTAL LAPAROSCOPIC HYSTERECTOMY, BILATERAL SALPINGOOPHERECTOMY,;  Surgeon: Gallito Palafox MD;  Location:  MAIN OR;  Service: Gynecology Oncology    UPPER GASTROINTESTINAL ENDOSCOPY      WRIST SURGERY Right        Family History   Problem Relation Age of Onset    Diabetes Mother     Breast cancer Mother 39    Lung cancer Father 47    Diabetes Sister     Brain cancer Maternal Aunt 67    Breast cancer Other 25     I have reviewed and agree with the history as documented  E-Cigarette/Vaping     E-Cigarette/Vaping Substances     Social History     Tobacco Use    Smoking status: Current Every Day Smoker     Packs/day: 1 00     Years: 44 00     Pack years: 44 00    Smokeless tobacco: Never Used    Tobacco comment: pt  smoked this am 10/10   Substance Use Topics    Alcohol use: No     Comment: Rarely consumes alcohol - As per [facility]     Drug use: Yes     Frequency: 7 0 times per week     Types: Marijuana     Comment: medical marijuana       Review of Systems   Constitutional: Negative for chills and fever  HENT: Negative for ear pain and sore throat  Eyes: Negative for pain and visual disturbance  Respiratory: Negative for cough and shortness of breath  Cardiovascular: Positive for chest pain  Negative for palpitations  Gastrointestinal: Negative for abdominal pain and vomiting  Genitourinary: Negative for dysuria and hematuria  Musculoskeletal: Negative for arthralgias and back pain  Skin: Negative for color change and rash  Neurological: Negative for seizures and syncope  All other systems reviewed and are negative  Physical Exam  Physical Exam  Vitals signs and nursing note reviewed  Constitutional:       General: She is not in acute distress  Appearance: She is well-developed  HENT:      Head: Normocephalic and atraumatic  Eyes:      Conjunctiva/sclera: Conjunctivae normal    Neck:      Musculoskeletal: Neck supple  Cardiovascular:      Rate and Rhythm: Normal rate and regular rhythm  Heart sounds: No murmur  Pulmonary:      Effort: Pulmonary effort is normal  No respiratory distress  Breath sounds: Normal breath sounds  Abdominal:      Palpations: Abdomen is soft  Tenderness: There is no abdominal tenderness  Skin:     General: Skin is warm and dry  Capillary Refill: Capillary refill takes less than 2 seconds  Neurological:      General: No focal deficit present        Mental Status: She is alert and oriented to person, place, and time           Vital Signs  ED Triage Vitals   Temperature Pulse Respirations Blood Pressure SpO2   06/03/21 2047 06/03/21 2047 06/03/21 2047 06/03/21 2047 06/03/21 2047   97 7 °F (36 5 °C) 74 18 120/57 95 %      Temp Source Heart Rate Source Patient Position - Orthostatic VS BP Location FiO2 (%)   06/03/21 2047 06/03/21 2047 06/03/21 2300 06/03/21 2047 --   Oral Monitor Sitting Right arm       Pain Score       06/03/21 2047       3           Vitals:    06/03/21 2047 06/03/21 2300   BP: 120/57 147/65   Pulse: 74 73   Patient Position - Orthostatic VS:  Sitting         Visual Acuity      ED Medications  Medications   lactated ringers bolus 1,000 mL (0 mL Intravenous Stopped 6/3/21 2335)   aluminum-magnesium hydroxide-simethicone (MYLANTA) oral suspension 30 mL (30 mL Oral Given 6/3/21 2120)   famotidine (PEPCID) tablet 20 mg (20 mg Oral Given 6/3/21 2119)   metoclopramide (REGLAN) injection 10 mg (10 mg Intravenous Given 6/3/21 2121)   iohexol (OMNIPAQUE) 350 MG/ML injection (SINGLE-DOSE) 100 mL (100 mL Intravenous Given 6/3/21 2200)       Diagnostic Studies  Results Reviewed     Procedure Component Value Units Date/Time    Basic metabolic panel [[id number]]  (Abnormal) Collected: 06/03/21 2115    Lab Status: Final result Specimen: Blood from Arm, Right Updated: 06/03/21 2147     Sodium 135 mmol/L      Potassium 3 8 mmol/L      Chloride 97 mmol/L      CO2 30 mmol/L      ANION GAP 8 mmol/L      BUN 20 mg/dL      Creatinine 0 89 mg/dL      Glucose 354 mg/dL      Calcium 8 8 mg/dL      eGFR 74 ml/min/1 73sq m     Narrative:      [place] guidelines for Chronic Kidney Disease (CKD):     Stage 1 with normal or high GFR (GFR > 90 mL/min/1 73 square meters)    Stage 2 Mild CKD (GFR = 60-89 mL/min/1 73 square meters)    Stage 3A Moderate CKD (GFR = 45-59 mL/min/1 73 square meters)    Stage 3B Moderate CKD (GFR = 30-44 mL/min/1 73 square meters)    Stage 4 Severe CKD (GFR = 15-29 mL/min/1 73 square meters)    Stage 5 End Stage CKD (GFR <15 mL/min/1 73 square meters)  Note: GFR calculation is accurate only with a steady state creatinine    Hepatic function panel [[id number]]  (Abnormal) Collected: 06/03/21 2115    Lab Status: Final result Specimen: Blood from Arm, Right Updated: 06/03/21 2147     Total Bilirubin 0 47 mg/dL      Bilirubin, Direct 0 10 mg/dL      Alkaline Phosphatase 141 U/L      AST 18 U/L      ALT 40 U/L      Total Protein 8 1 g/dL      Albumin 3 3 g/dL     Lipase [[id number]]  (Abnormal) Collected: 06/03/21 2115    Lab Status: Final result Specimen: Blood from Arm, Right Updated: 06/03/21 2147     Lipase 472 u/L     NT-BNP PRO [[id number]]  (Normal) Collected: 06/03/21 2115    Lab Status: Final result Specimen: Blood from Arm, Right Updated: 06/03/21 2147     NT-proBNP 66 pg/mL     Lactic acid [[id number]]  (Normal) Collected: 06/03/21 2115    Lab Status: Final result Specimen: Blood from Arm, Right Updated: 06/03/21 2139     LACTIC ACID 0 6 mmol/L     Narrative:      Result may be elevated if tourniquet was used during collection      Troponin I [[id number]]  (Normal) Collected: 06/03/21 2115    Lab Status: Final result Specimen: Blood from Arm, Right Updated: 06/03/21 2139     Troponin I <0 02 ng/mL     CBC and differential [[id number]]  (Abnormal) Collected: 06/03/21 2115    Lab Status: Final result Specimen: Blood from Arm, Right Updated: 06/03/21 2123     WBC 9 77 Thousand/uL      RBC 5 91 Million/uL      Hemoglobin 16 2 g/dL      Hematocrit 48 1 %      MCV 81 fL      MCH 27 4 pg      MCHC 33 7 g/dL      RDW 12 9 %      MPV 9 6 fL      Platelets 566 Thousands/uL      nRBC 0 /100 WBCs      Neutrophils Relative 56 %      Immat GRANS % 0 %      Lymphocytes Relative 35 %      Monocytes Relative 6 %      Eosinophils Relative 2 %      Basophils Relative 1 %      Neutrophils Absolute 5 51 Thousands/µL      Immature Grans Absolute 0 04 Thousand/uL      Lymphocytes Absolute 3 42 Thousands/µL      Monocytes Absolute 0 55 Thousand/µL      Eosinophils Absolute 0 19 Thousand/µL      Basophils Absolute 0 06 Thousands/µL                  CT abdomen pelvis with contrast   Final Result by Reece Pichardo MD (06/03 2308)      5 mm nonobstructing stone in the left kidney    No hydronephrosis  Workstation performed: [id number]         XR chest 1 view portable   Final Result by Timmy Buenrostro DO (06/04 1403)      No acute cardiopulmonary disease  Workstation performed: [id number]                    Procedures  Procedures         ED Course  ED Course as of Jun 24 1757   Thu Jun 03, 2021 2211 EKG reviewed by me, normal sinus rhythm at rate of 77, left bundle branch block similar to previous EKG from September of 2017  HEART Risk Score      Most Recent Value   Heart Score Risk Calculator   History  1 Filed at: 06/03/2021 2219   ECG  1 Filed at: 06/03/2021 2219   Age  1 Filed at: 06/03/2021 2219   Risk Factors  2 Filed at: 06/03/2021 2219   Troponin  0 Filed at: 06/03/2021 2219   HEART Score  5 Filed at: 06/03/2021 2219                                    MDM  Number of Diagnoses or Management Options  Chest pain: new and requires workup  Epigastric abdominal pain: new and requires workup  Diagnosis management comments: Signed out to Dr Deanna Reardon for ultimate disposition pending results of CT abd/pelvis  Recommend admission for ACS r/o given CAD risk profile         Amount and/or Complexity of Data Reviewed  Clinical lab tests: reviewed and ordered  Tests in the radiology section of CPT®: reviewed and ordered  Review and summarize past medical records: yes  Independent visualization of images, tracings, or specimens: yes        Disposition  Final diagnoses:   Chest pain   Epigastric abdominal pain     Time reflects when diagnosis was documented in both MDM as applicable and the Disposition within this note     Time User Action Codes Description Comment 6/3/2021 11:25 PM Franco OLSON Add [R07 9] Chest pain     6/3/2021 11:25 PM Franco OLSON Add [R10 13] Epigastric abdominal pain       ED Disposition     ED Disposition Condition Date/Time Comment    ALEXEI  Thsherman Douglas 3, 2021 11:30 PM It was recommended by myself the patient be admitted to rule out acute coronary syndrome however patient would like to sign out against medical advice and be discharged  She was explained the risk of leaving against advice in the setting of chest pa in including heart attack, lethal arrhythmia, heart failure, permanent disability and death  Patient verbalized her understanding of these risks  Patient stable at time of discharge            Follow-up Information     Follow up With Specialties Details Why Contact Info Additional [address],  Family Medicine Schedule an appointment as soon as possible for a visit   [address] [facility] [address] [facility] [address]  [phone]       Cardiologist  Schedule an appointment as soon as possible for a visit        [address] [facility] Emergency Department Emergency Medicine Go to  If symptoms worsen [address] Emergency Department, [address] [place] [address], [phone]          Discharge Medication List as of 6/3/2021 11:31 PM      CONTINUE these medications which have NOT CHANGED    Details   albuterol (PROVENTIL HFA,VENTOLIN HFA) 90 mcg/act inhaler Inhale 2 puffs every 6 (six) hours as needed for wheezing, Historical Med      ALPRAZolam (XANAX) 1 mg tablet Take by mouth daily at bedtime as needed for anxiety, Historical Med      ARIPiprazole (ABILIFY) 2 mg tablet Take 2 mg by mouth daily, Historical Med      benzonatate (TESSALON PERLES) 100 mg capsule TAKE 1 CAPSULE BY MOUTH 3 TIMES A DAY AS NEEDED FOR COUGH, Historical Med      bisoprolol (ZEBETA) 5 mg tablet Take 1 tablet (5 mg total) by mouth daily, Starting Thu 8/20/2020, Normal      diclofenac (VOLTAREN) 75 mg EC tablet Take 50 mg by mouth 2 (two) times a day, Historical Med      dicyclomine (BENTYL) 20 mg tablet Take 1 tablet (20 mg total) by mouth every 6 (six) hours as needed (abdominal pain), Starting Fri 10/18/2019, Print      DULoxetine (CYMBALTA) 60 mg delayed release capsule Take 20 mg by mouth daily, Historical Med      gabapentin (NEURONTIN) 300 mg capsule Take 400 mg by mouth 2 (two) times a day  , Historical Med      insulin glargine (Basaglar KwikPen) 100 units/mL injection pen Inject 26 Units under the skin daily at bedtime, Historical Med      insulin lispro (HumaLOG) 100 units/mL injection Inject 10 Units under the skin 3 (three) times a day before meals, Historical Med      linaCLOtide (LINZESS) 72 MCG CAPS Take 72 mcg by mouth daily, Starting Wed 10/2/2019, Normal      Liraglutide 18 MG/3ML SOPN Inject under the skin, Historical Med      methocarbamol (ROBAXIN) 750 mg tablet Take 500 mg by mouth every 6 (six) hours as needed for muscle spasms, Historical Med      metoclopramide (REGLAN) 5 mg/5 mL oral solution Take 5 mL (5 mg total) by mouth 3 (three) times a day, Starting Tue 3/16/2021, Normal      Mirabegron ER 25 MG TB24 Take by mouth, Historical Med      montelukast (SINGULAIR) 10 mg tablet Take 10 mg by mouth daily, Starting Thu 10/10/2019, Historical Med      nystatin (MYCOSTATIN) ointment Apply topically 2 (two) times a day, Starting Mon 10/19/2020, Normal      ondansetron (ZOFRAN) 4 mg tablet Take 1 tablet (4 mg total) by mouth every 8 (eight) hours as needed for nausea or vomiting, Starting Thu 3/11/2021, Normal      !! pantoprazole (PROTONIX) 20 mg tablet TAKE 1 TAB BY MOUTH 2 TIMES A DAY   AS DIRECTED BY PHYSICIAN, Historical Med      !! pantoprazole (PROTONIX) 40 mg tablet Take 1 tablet (40 mg total) by mouth daily, Starting Tue 3/19/2019, Normal      potassium chloride SA (KLOR-CON M15) 15 MEQ tablet Take 15 mEq by mouth 2 (two) times a day, Historical Med      prazosin (MINIPRESS) 1 mg capsule Starting Wed 11/18/2020, Historical Med      pregabalin (LYRICA) 75 mg capsule Take 75 mg by mouth 3 (three) times a day, Historical Med      QUEtiapine (SEROquel) 200 mg tablet Take 150 mg by mouth daily at bedtime , Historical Med      repaglinide (PRANDIN) 1 mg tablet Take 1 mg by mouth 3 (three) times a day before meals, Historical Med      rosuvastatin (CRESTOR) 20 MG tablet Take 20 mg by mouth daily, Starting Wed 11/18/2020, Historical Med      umeclidinium-vilanterol (ANORO ELLIPTA) 62 5-25 MCG/INH inhaler Inhale 1 puff daily, Historical Med      valsartan (DIOVAN) 80 mg tablet Take 80 mg by mouth daily, Historical Med      vortioxetine (TRINTELLIX) 10 MG tablet Take 10 mg by mouth daily, Historical Med      zolpidem (AMBIEN) 10 mg tablet Take 10 mg by mouth daily at bedtime as needed for sleep, Historical Med       !! - Potential duplicate medications found  Please discuss with provider  No discharge procedures on file      PDMP Review     None          ED Provider  Electronically Signed by           Dena Jacob MD  06/24/21 [id number]

## 2021-07-28 DIAGNOSIS — R11.0 NAUSEA: ICD-10-CM

## 2021-07-28 RX ORDER — ONDANSETRON 4 MG/1
4 TABLET, FILM COATED ORAL EVERY 8 HOURS PRN
Qty: 20 TABLET | Refills: 0 | Status: SHIPPED | OUTPATIENT
Start: 2021-07-28

## 2021-11-22 ENCOUNTER — OFFICE VISIT (OUTPATIENT)
Dept: CARDIOLOGY CLINIC | Facility: CLINIC | Age: 56
End: 2021-11-22
Payer: COMMERCIAL

## 2021-11-22 VITALS
DIASTOLIC BLOOD PRESSURE: 70 MMHG | WEIGHT: 154 LBS | BODY MASS INDEX: 29.07 KG/M2 | HEART RATE: 70 BPM | SYSTOLIC BLOOD PRESSURE: 102 MMHG | HEIGHT: 61 IN

## 2021-11-22 DIAGNOSIS — I10 ESSENTIAL (PRIMARY) HYPERTENSION: ICD-10-CM

## 2021-11-22 DIAGNOSIS — I42.8 NON-ISCHEMIC CARDIOMYOPATHY (HCC): Primary | ICD-10-CM

## 2021-11-22 DIAGNOSIS — K21.9 GASTROESOPHAGEAL REFLUX DISEASE WITHOUT ESOPHAGITIS: ICD-10-CM

## 2021-11-22 DIAGNOSIS — I44.7 LBBB (LEFT BUNDLE BRANCH BLOCK): ICD-10-CM

## 2021-11-22 PROCEDURE — 99214 OFFICE O/P EST MOD 30 MIN: CPT | Performed by: INTERNAL MEDICINE

## 2021-11-22 PROCEDURE — 93000 ELECTROCARDIOGRAM COMPLETE: CPT | Performed by: INTERNAL MEDICINE

## 2021-11-22 RX ORDER — BUPROPION HYDROCHLORIDE 300 MG/1
300 TABLET ORAL EVERY MORNING
COMMUNITY
Start: 2021-11-18

## 2021-11-22 RX ORDER — PANTOPRAZOLE SODIUM 40 MG/1
40 TABLET, DELAYED RELEASE ORAL DAILY PRN
Qty: 30 TABLET | Refills: 2 | Status: SHIPPED | OUTPATIENT
Start: 2021-11-22 | End: 2022-01-20 | Stop reason: SDUPTHER

## 2021-11-22 RX ORDER — INSULIN ASPART 100 [IU]/ML
INJECTION, SOLUTION INTRAVENOUS; SUBCUTANEOUS
COMMUNITY
Start: 2021-11-12

## 2021-11-22 RX ORDER — OXYCODONE HYDROCHLORIDE 5 MG/1
5 TABLET ORAL 3 TIMES DAILY PRN
COMMUNITY
Start: 2021-10-27

## 2021-11-22 RX ORDER — BISOPROLOL FUMARATE 5 MG/1
5 TABLET ORAL DAILY
Qty: 90 TABLET | Refills: 3 | Status: SHIPPED | OUTPATIENT
Start: 2021-11-22 | End: 2022-01-19 | Stop reason: SDUPTHER

## 2021-11-22 RX ORDER — TIOTROPIUM BROMIDE INHALATION SPRAY 3.12 UG/1
SPRAY, METERED RESPIRATORY (INHALATION)
COMMUNITY
Start: 2021-11-10

## 2021-11-29 ENCOUNTER — VBI (OUTPATIENT)
Dept: ADMINISTRATIVE | Facility: OTHER | Age: 56
End: 2021-11-29

## 2021-12-14 ENCOUNTER — TELEPHONE (OUTPATIENT)
Dept: CARDIOLOGY CLINIC | Facility: CLINIC | Age: 56
End: 2021-12-14

## 2021-12-25 ENCOUNTER — HOSPITAL ENCOUNTER (EMERGENCY)
Facility: HOSPITAL | Age: 56
Discharge: HOME | End: 2021-12-25
Attending: EMERGENCY MEDICINE | Admitting: EMERGENCY MEDICINE
Payer: COMMERCIAL

## 2021-12-25 ENCOUNTER — APPOINTMENT (EMERGENCY)
Dept: RADIOLOGY | Facility: HOSPITAL | Age: 56
End: 2021-12-25
Payer: COMMERCIAL

## 2021-12-25 VITALS
WEIGHT: 155 LBS | HEART RATE: 91 BPM | OXYGEN SATURATION: 94 % | RESPIRATION RATE: 18 BRPM | DIASTOLIC BLOOD PRESSURE: 80 MMHG | BODY MASS INDEX: 29.27 KG/M2 | HEIGHT: 61 IN | TEMPERATURE: 97.5 F | SYSTOLIC BLOOD PRESSURE: 167 MMHG

## 2021-12-25 DIAGNOSIS — M54.9 BACK PAIN: Primary | ICD-10-CM

## 2021-12-25 DIAGNOSIS — R07.9 CHEST PAIN: ICD-10-CM

## 2021-12-25 LAB
ANION GAP SERPL CALCULATED.3IONS-SCNC: 9 MMOL/L (ref 4–13)
BASOPHILS # BLD AUTO: 0.05 THOUSANDS/ΜL (ref 0–0.1)
BASOPHILS NFR BLD AUTO: 0 % (ref 0–1)
BUN SERPL-MCNC: 16 MG/DL (ref 5–25)
CALCIUM SERPL-MCNC: 9.9 MG/DL (ref 8.4–10.2)
CARDIAC TROPONIN I PNL SERPL HS: 5 NG/L
CHLORIDE SERPL-SCNC: 96 MMOL/L (ref 96–108)
CO2 SERPL-SCNC: 29 MMOL/L (ref 21–32)
CREAT SERPL-MCNC: 0.85 MG/DL (ref 0.6–1.3)
D DIMER PPP FEU-MCNC: 0.44 UG/ML FEU
EOSINOPHIL # BLD AUTO: 0.13 THOUSAND/ΜL (ref 0–0.61)
EOSINOPHIL NFR BLD AUTO: 1 % (ref 0–6)
ERYTHROCYTE [DISTWIDTH] IN BLOOD BY AUTOMATED COUNT: 13.1 % (ref 11.6–15.1)
GFR SERPL CREATININE-BSD FRML MDRD: 77 ML/MIN/1.73SQ M
GLUCOSE SERPL-MCNC: 352 MG/DL (ref 65–140)
HCT VFR BLD AUTO: 50 % (ref 34.8–46.1)
HGB BLD-MCNC: 16.8 G/DL (ref 11.5–15.4)
IMM GRANULOCYTES # BLD AUTO: 0.04 THOUSAND/UL (ref 0–0.2)
IMM GRANULOCYTES NFR BLD AUTO: 0 % (ref 0–2)
LYMPHOCYTES # BLD AUTO: 3.55 THOUSANDS/ΜL (ref 0.6–4.47)
LYMPHOCYTES NFR BLD AUTO: 29 % (ref 14–44)
MCH RBC QN AUTO: 27.5 PG (ref 26.8–34.3)
MCHC RBC AUTO-ENTMCNC: 33.6 G/DL (ref 31.4–37.4)
MCV RBC AUTO: 82 FL (ref 82–98)
MONOCYTES # BLD AUTO: 0.58 THOUSAND/ΜL (ref 0.17–1.22)
MONOCYTES NFR BLD AUTO: 5 % (ref 4–12)
NEUTROPHILS # BLD AUTO: 7.81 THOUSANDS/ΜL (ref 1.85–7.62)
NEUTS SEG NFR BLD AUTO: 65 % (ref 43–75)
NRBC BLD AUTO-RTO: 0 /100 WBCS
PLATELET # BLD AUTO: 315 THOUSANDS/UL (ref 149–390)
PMV BLD AUTO: 9.7 FL (ref 8.9–12.7)
POTASSIUM SERPL-SCNC: 3.7 MMOL/L (ref 3.5–5.3)
RBC # BLD AUTO: 6.12 MILLION/UL (ref 3.81–5.12)
SODIUM SERPL-SCNC: 134 MMOL/L (ref 135–147)
WBC # BLD AUTO: 12.16 THOUSAND/UL (ref 4.31–10.16)

## 2021-12-25 PROCEDURE — 85379 FIBRIN DEGRADATION QUANT: CPT | Performed by: EMERGENCY MEDICINE

## 2021-12-25 PROCEDURE — 71045 X-RAY EXAM CHEST 1 VIEW: CPT

## 2021-12-25 PROCEDURE — 96374 THER/PROPH/DIAG INJ IV PUSH: CPT

## 2021-12-25 PROCEDURE — 84484 ASSAY OF TROPONIN QUANT: CPT | Performed by: EMERGENCY MEDICINE

## 2021-12-25 PROCEDURE — 99285 EMERGENCY DEPT VISIT HI MDM: CPT | Performed by: EMERGENCY MEDICINE

## 2021-12-25 PROCEDURE — 36415 COLL VENOUS BLD VENIPUNCTURE: CPT | Performed by: EMERGENCY MEDICINE

## 2021-12-25 PROCEDURE — 85025 COMPLETE CBC W/AUTO DIFF WBC: CPT | Performed by: EMERGENCY MEDICINE

## 2021-12-25 PROCEDURE — 80048 BASIC METABOLIC PNL TOTAL CA: CPT | Performed by: EMERGENCY MEDICINE

## 2021-12-25 PROCEDURE — 99284 EMERGENCY DEPT VISIT MOD MDM: CPT

## 2021-12-25 PROCEDURE — 93005 ELECTROCARDIOGRAM TRACING: CPT

## 2021-12-25 RX ORDER — KETOROLAC TROMETHAMINE 30 MG/ML
15 INJECTION, SOLUTION INTRAMUSCULAR; INTRAVENOUS ONCE
Status: COMPLETED | OUTPATIENT
Start: 2021-12-25 | End: 2021-12-25

## 2021-12-25 RX ORDER — SODIUM CHLORIDE 9 MG/ML
3 INJECTION INTRAVENOUS
Status: DISCONTINUED | OUTPATIENT
Start: 2021-12-25 | End: 2021-12-26 | Stop reason: HOSPADM

## 2021-12-25 RX ORDER — NAPROXEN 375 MG/1
375 TABLET ORAL 2 TIMES DAILY WITH MEALS
Qty: 20 TABLET | Refills: 0 | Status: SHIPPED | OUTPATIENT
Start: 2021-12-25

## 2021-12-25 RX ADMIN — KETOROLAC TROMETHAMINE 15 MG: 30 INJECTION, SOLUTION INTRAMUSCULAR; INTRAVENOUS at 21:10

## 2021-12-26 LAB
ATRIAL RATE: 89 BPM
P AXIS: 73 DEGREES
PR INTERVAL: 144 MS
QRS AXIS: -55 DEGREES
QRSD INTERVAL: 128 MS
QT INTERVAL: 398 MS
QTC INTERVAL: 484 MS
T WAVE AXIS: 86 DEGREES
VENTRICULAR RATE: 89 BPM

## 2021-12-26 PROCEDURE — 93010 ELECTROCARDIOGRAM REPORT: CPT | Performed by: INTERNAL MEDICINE

## 2022-01-19 ENCOUNTER — OFFICE VISIT (OUTPATIENT)
Dept: CARDIOLOGY CLINIC | Facility: CLINIC | Age: 57
End: 2022-01-19
Payer: COMMERCIAL

## 2022-01-19 ENCOUNTER — HOSPITAL ENCOUNTER (OUTPATIENT)
Dept: NON INVASIVE DIAGNOSTICS | Facility: CLINIC | Age: 57
Discharge: HOME | End: 2022-01-19
Payer: COMMERCIAL

## 2022-01-19 VITALS
WEIGHT: 155 LBS | SYSTOLIC BLOOD PRESSURE: 136 MMHG | HEART RATE: 80 BPM | BODY MASS INDEX: 29.27 KG/M2 | HEIGHT: 61 IN | DIASTOLIC BLOOD PRESSURE: 82 MMHG

## 2022-01-19 VITALS
BODY MASS INDEX: 29.45 KG/M2 | WEIGHT: 156 LBS | SYSTOLIC BLOOD PRESSURE: 100 MMHG | HEIGHT: 61 IN | DIASTOLIC BLOOD PRESSURE: 70 MMHG | HEART RATE: 70 BPM

## 2022-01-19 DIAGNOSIS — I42.8 NON-ISCHEMIC CARDIOMYOPATHY (HCC): ICD-10-CM

## 2022-01-19 DIAGNOSIS — E78.5 HYPERLIPIDEMIA: ICD-10-CM

## 2022-01-19 DIAGNOSIS — K21.9 GASTROESOPHAGEAL REFLUX DISEASE, UNSPECIFIED WHETHER ESOPHAGITIS PRESENT: ICD-10-CM

## 2022-01-19 DIAGNOSIS — I10 ESSENTIAL (PRIMARY) HYPERTENSION: ICD-10-CM

## 2022-01-19 DIAGNOSIS — I44.7 LBBB (LEFT BUNDLE BRANCH BLOCK): ICD-10-CM

## 2022-01-19 DIAGNOSIS — R07.9 CHEST PAIN: Primary | ICD-10-CM

## 2022-01-19 LAB
AORTIC ROOT: 3 CM
AORTIC VALVE MEAN VELOCITY: 7.8 M/S
APICAL FOUR CHAMBER EJECTION FRACTION: 50 %
AV LVOT MEAN GRADIENT: 1 MMHG
AV LVOT PEAK GRADIENT: 3 MMHG
AV MEAN GRADIENT: 3 MMHG
AV PEAK GRADIENT: 5 MMHG
AV VELOCITY RATIO: 0.8
DOP CALC AO PEAK VEL: 1.1 M/S
DOP CALC AO VTI: 25.54 CM
DOP CALC LVOT PEAK VEL VTI: 21.61 CM
DOP CALC LVOT PEAK VEL: 0.88 M/S
DOP CALC RVOT PEAK VEL: 0.99 M/S
DOP CALC RVOT VTI: 15.11 CM
E WAVE DECELERATION TIME: 295 MS
FRACTIONAL SHORTENING: 35 % (ref 28–44)
INTERVENTRICULAR SEPTUM IN DIASTOLE (PARASTERNAL SHORT AXIS VIEW): 0.9 CM
LAAS-AP4: 7.9 CM2
LEFT ATRIUM SIZE: 3.1 CM
LEFT INTERNAL DIMENSION IN SYSTOLE: 3 CM (ref 2.1–4)
LEFT VENTRICULAR INTERNAL DIMENSION IN DIASTOLE: 4.6 CM (ref 3.99–5.94)
LEFT VENTRICULAR POSTERIOR WALL IN END DIASTOLE: 0.9 CM
LEFT VENTRICULAR STROKE VOLUME: 63 ML
MV E'TISSUE VEL-SEP: 7 CM/S
MV PEAK A VEL: 1.05 M/S
MV PEAK E VEL: 74 CM/S
MV STENOSIS PRESSURE HALF TIME: 0 MS
PV MEAN GRADIENT: 2 MMHG
PV MEAN VELOCITY: 71 CM/S
PV PEAK GRADIENT: 4 MMHG
PV VTI: 21.58 CM
RIGHT VENTRICLE ID DIMENSION: 1.9 CM
SL CV LV EF: 45
SL CV PED ECHO LEFT VENTRICLE DIASTOLIC VOLUME (MOD BIPLANE) 2D: 98 ML
SL CV PED ECHO LEFT VENTRICLE SYSTOLIC VOLUME (MOD BIPLANE) 2D: 34 ML
SL CV RVOT MAX GRADIENT: 4 MMHG
SL CV RVOT MEAN GRADIENT: 2 MMHG
SL CV RVOT VMEAN: 0.67 M/S
Z-SCORE OF LEFT VENTRICULAR DIMENSION IN END SYSTOLE: -0.56

## 2022-01-19 PROCEDURE — 93306 TTE W/DOPPLER COMPLETE: CPT

## 2022-01-19 PROCEDURE — 93306 TTE W/DOPPLER COMPLETE: CPT | Performed by: INTERNAL MEDICINE

## 2022-01-19 PROCEDURE — 99214 OFFICE O/P EST MOD 30 MIN: CPT | Performed by: INTERNAL MEDICINE

## 2022-01-19 RX ORDER — BISOPROLOL FUMARATE 5 MG/1
5 TABLET ORAL DAILY
Qty: 90 TABLET | Refills: 1 | Status: SHIPPED | OUTPATIENT
Start: 2022-01-19

## 2022-01-19 RX ORDER — IBUPROFEN 200 MG
200 TABLET ORAL EVERY 6 HOURS PRN
COMMUNITY

## 2022-01-20 ENCOUNTER — OFFICE VISIT (OUTPATIENT)
Dept: GASTROENTEROLOGY | Facility: CLINIC | Age: 57
End: 2022-01-20
Payer: COMMERCIAL

## 2022-01-20 VITALS
OXYGEN SATURATION: 97 % | DIASTOLIC BLOOD PRESSURE: 70 MMHG | HEIGHT: 61 IN | SYSTOLIC BLOOD PRESSURE: 140 MMHG | HEART RATE: 71 BPM | BODY MASS INDEX: 29.45 KG/M2 | WEIGHT: 156 LBS

## 2022-01-20 DIAGNOSIS — K31.84 GASTROPARESIS DUE TO DM (HCC): ICD-10-CM

## 2022-01-20 DIAGNOSIS — K21.9 GASTROESOPHAGEAL REFLUX DISEASE WITHOUT ESOPHAGITIS: ICD-10-CM

## 2022-01-20 DIAGNOSIS — E11.43 GASTROPARESIS DUE TO DM (HCC): ICD-10-CM

## 2022-01-20 DIAGNOSIS — Z86.010 HISTORY OF COLON POLYPS: ICD-10-CM

## 2022-01-20 DIAGNOSIS — R10.10 PAIN OF UPPER ABDOMEN: Primary | ICD-10-CM

## 2022-01-20 DIAGNOSIS — I42.8 NON-ISCHEMIC CARDIOMYOPATHY (HCC): ICD-10-CM

## 2022-01-20 DIAGNOSIS — K62.5 RECTAL BLEEDING: ICD-10-CM

## 2022-01-20 PROCEDURE — 99214 OFFICE O/P EST MOD 30 MIN: CPT | Performed by: PHYSICIAN ASSISTANT

## 2022-01-20 RX ORDER — PANTOPRAZOLE SODIUM 40 MG/1
40 TABLET, DELAYED RELEASE ORAL 2 TIMES DAILY
Qty: 60 TABLET | Refills: 1 | Status: SHIPPED | OUTPATIENT
Start: 2022-01-20 | End: 2022-05-16

## 2022-01-20 RX ORDER — PEN NEEDLE, DIABETIC 32GX 5/32"
NEEDLE, DISPOSABLE MISCELLANEOUS
COMMUNITY
Start: 2022-01-19

## 2022-01-20 NOTE — PATIENT INSTRUCTIONS
Scheduled date of EGD/colonoscopy (as of today): 2/3  Physician performing EGD/colonoscopy:RYLAN  Location of EGD/colonoscopy:[facility]  Desired bowel prep reviewed with patieMIRALAXnt:  Instructions reviewed with patient by:PAT  Clearances:

## 2022-01-20 NOTE — PROGRESS NOTES
[facility] - Outpatient Follow-up Note  Arlen Lu 49 y o  female MRN: [id number]  Encounter: [id number]          ASSESSMENT AND PLAN:      1  Pain of upper abdomen  2  Gastroesophageal reflux disease  3  Gastroparesis due to DM [facility]    Patient reports struggling with epigastric discomfort radiating to her chest since December  She saw her cardiologist   She has a long history of GERD and gastroparesis  EGD in 10/2018 showed a 2cm HH  Esophageal Manometry in 2018 was essentially normal   GES in 2017 showed significant delayed gastric emptying  CT A/P with contrast in 6/2021 showed a 5mm left kidney stone  She is currently on Pantoprazole daily  She also reports she has been taking frequent NSAIDs (Naproxen and Ibuprofen)  Will plan for EGD to investigate for gastritis, PUD, etc   Will check abdominal ultrasound  Will increase Pantoprazole to 40mg po BID over the next 6 weeks  Stop the NSAIDs  Will also begin a low dose Reglan 5mg BID x 4 weeks (discussed risk of tardive dyskinesia)  Small, frequent low meals recommended  DM control  4  Rectal bleeding  5  History of colon polyps    Patient also reports noticing recurrent rectal bleeding over the past year  Her last colonoscopy was in 2017 and a polyp was removed  Will plan for colonoscopy to investigate  Follow up in 4 weeks, after the above testing   ______________________________________________________________________    SUBJECTIVE:  Patient is a pleasant 49year old female who presents to the office for follow up  She reports she has been struggling with epigastric pain since December  She has a history of GERD and diabetic gastroparesis  She reports she was taking frequent NSAIDs  She reports the discomfort radiates to the chest   She saw her cardiologist to rule out a cardiac etiology  She is on Pantoprazole daily  She takes Reglan occasionally  No nausea or vomiting  She does have early satiety  She also reports rectal bleeding over the past year  She states the blood is bright red  No melena  No constipation or diarrhea  She has a history of colon polyps  No family history of esophageal, stomach, or colon cancer  Her father had bleeding ulcers  REVIEW OF SYSTEMS IS OTHERWISE NEGATIVE  Historical Information   Past Medical History:   Diagnosis Date    Angina pectoris ([place] [address] )     recent    Anxiety     Arthritis     Carpal tunnel syndrome     Chronic headaches     Diabetes ([place] [address] )     Diabetes mellitus ([place] [address] )     GERD (gastroesophageal reflux disease)     Headache     Hyperlipidemia     Hypertension     Kidney stone     Left bundle branch block     LBBB    MI (myocardial infarction) ([place] [address] )     Neuropathy     PTSD (post-traumatic stress disorder)     RLS (restless legs syndrome)     Shortness of breath      Past Surgical History:   Procedure Laterality Date    BREAST SURGERY  2016    Reduction    CARDIAC CATHETERIZATION  2018     Mid LAD: There was a tubular 40 % stenosis     SECTION      COLONOSCOPY      HYSTERECTOMY  2018    Complete    KIDNEY STONE SURGERY      LAPAROSCOPY      WI COLONOSCOPY FLX DX W/COLLJ SPEC WHEN PFRMD N/A 2017    Procedure: EGD AND COLONOSCOPY;  Surgeon: Sonny Monteiro MD;  [facility]; Service: Gastroenterology    WI ESOPHAGOGASTRODUODENOSCOPY TRANSORAL DIAGNOSTIC N/A 10/10/2018    Procedure: ESOPHAGOGASTRODUODENOSCOPY (EGD); Surgeon: Sonny Monteiro MD;  [facility];   Service: Gastroenterology    WI LAPAROSCOPY W TOT HYSTERECTUTERUS <=250 Eleonore Ludmila TUBE/OVARY N/A 2018    Procedure: ROBOTIC ASSISTED TOTAL LAPAROSCOPIC HYSTERECTOMY, [facility],;  Surgeon: Uyen Panda MD;  Location:  MAIN OR;  Service: Gynecology Oncology    UPPER GASTROINTESTINAL ENDOSCOPY      WRIST SURGERY Right      Social History   Social History     Substance and Sexual Activity   Alcohol Use No Comment: Rarely consumes alcohol - As per [employer]      Social History     Substance and Sexual Activity   Drug Use Yes    Frequency: 7 0 times per week    Types: Marijuana    Comment: medical marijuana     Social History     Tobacco Use   Smoking Status Current Every Day Smoker    Packs/day: 1 00    Years: 44 00    Pack years: 44 00   Smokeless Tobacco Never Used   Tobacco Comment    pt  smoked this am 10/10     Family History   Problem Relation Age of Onset    Diabetes Mother     Breast cancer Mother 39    Lung cancer Father 47    Diabetes Sister     Brain cancer Maternal Aunt 67    Breast cancer Other 25       Meds/Allergies       Current Outpatient Medications:     albuterol (PROVENTIL HFA,VENTOLIN HFA) 90 mcg/act inhaler    ARIPiprazole (ABILIFY) 2 mg tablet    BD Pen Needle Dayna 2nd Gen 32G X 4 MM MISC    bisoprolol (ZEBETA) 5 mg tablet    buPROPion (WELLBUTRIN XL) 300 mg 24 hr tablet    diclofenac (VOLTAREN) 75 mg EC tablet    ibuprofen (MOTRIN) 200 mg tablet    insulin glargine (Basaglar KwikPen) 100 units/mL injection pen    insulin lispro (HumaLOG) 100 units/mL injection    methocarbamol (ROBAXIN) 750 mg tablet    metoclopramide (REGLAN) 5 mg/5 mL oral solution    naproxen (NAPROSYN) 375 mg tablet    NovoLOG FlexPen 100 units/mL injection pen    nystatin (MYCOSTATIN) ointment    ondansetron (ZOFRAN) 4 mg tablet    oxyCODONE (ROXICODONE) 5 immediate release tablet    pantoprazole (PROTONIX) 40 mg tablet    potassium chloride SA (KLOR-CON M15) 15 MEQ tablet    pregabalin (LYRICA) 75 mg capsule    QUEtiapine (SEROquel) 200 mg tablet    repaglinide (PRANDIN) 1 mg tablet    rosuvastatin (CRESTOR) 20 MG tablet    Spiriva Respimat 2 5 MCG/ACT AERS inhaler    vortioxetine (TRINTELLIX) 10 MG tablet    zolpidem (AMBIEN) 10 mg tablet    Allergies   Allergen Reactions    Lisinopril Cough    Losartan Dizziness    Other Hives     Surgical tape    Prednisone Other (See Comments) Thrush             Objective     Blood pressure 140/70, pulse 71, height 5' 1" (1 549 m), weight 70 8 kg (156 lb), SpO2 97 %  Body mass index is 29 48 kg/m²  PHYSICAL EXAM:      General Appearance:   Alert, cooperative, no distress   HEENT:   Normocephalic, atraumatic, anicteric     Neck:  Supple, symmetrical, trachea midline   Lungs:   Clear to auscultation bilaterally; no rales, rhonchi or wheezing; respirations unlabored    Heart[de-identified]   Regular rate and rhythm; no murmur, rub, or gallop  Abdomen:   Soft, non-tender, non-distended; normal bowel sounds; no masses, no organomegaly    Genitalia:   Deferred    Rectal:   Deferred    Extremities:  No cyanosis, clubbing or edema    Pulses:  2+ and symmetric    Skin:  No jaundice, rashes, or lesions    Lymph nodes:  No palpable cervical lymphadenopathy        Lab Results:   No visits with results within 1 Day(s) from this visit     Latest known visit with results is:   Hospital Outpatient Visit on 01/19/2022   Component Date Value    A4C EF 01/19/2022 50     LVIDd 01/19/2022 4 60     LVIDS 01/19/2022 3 00     IVSd 01/19/2022 0 90     LVPWd 01/19/2022 0 90     FS 01/19/2022 35     MV E' Tissue Velocity Se* 01/19/2022 7     E wave deceleration time 01/19/2022 295     MV Peak E Siddharth 01/19/2022 74     MV Peak A Siddharth 01/19/2022 1 05     AV LVOT peak gradient 01/19/2022 3     LVOT peak VTI 01/19/2022 21 61     LVOT peak siddharth 01/19/2022 0 88     RVOT peak VTI 01/19/2022 15 11     RVID d 01/19/2022 1 9     RVOT VMEAN 01/19/2022 0 67     RVOT PMEAN 01/19/2022 2     RVOT PMAX 01/19/2022 4     LA size 01/19/2022 3 1     Ao peak siddharth retrograde 01/19/2022 1 1     Ao VTI 01/19/2022 25 54     AV mean gradient 01/19/2022 3     LVOT mn grad 01/19/2022 1 0     AV peak gradient 01/19/2022 5     MV stenosis pressure 1/2* 01/19/2022 0     PV mean gradient antegra* 01/19/2022 2     PV peak gradient antegra* 01/19/2022 4     RVOT peak siddharth 01/19/2022 0 99     Pulmonic Valve Systolic * 05/68/8481 59 96     Ao root 01/19/2022 3 00     PV Mean Siddharth 01/19/2022 71     Aortic valve mean veloci* 01/19/2022 7 80     Left ventricular stroke * 01/19/2022 63 00     LEFT VENTRICLE SYSTOLIC * 15/58/3033 34     LV DIASTOLIC VOLUME (MOD* 91/68/7009 98     Left Atrium Area-systoli* 01/19/2022 7 9     LV EF 01/19/2022 45     AV Velocity Ratio 01/19/2022 0 80     ZLVIDS 01/19/2022 -0 56          Radiology Results:   X-ray chest 1 view portable    Result Date: 12/26/2021  Narrative: CHEST INDICATION:   chest pain  COMPARISON:  Chest radiograph and abdomen CT from 6/3/2021  EXAM PERFORMED/VIEWS:  XR CHEST PORTABLE FINDINGS: Cardiomediastinal silhouette appears unremarkable  The lungs are clear  No pneumothorax or pleural effusion  Osseous structures appear within normal limits for patient age  Impression: No acute cardiopulmonary disease  Workstation performed: [id number]     Echo complete w/ contrast if indicated    Result Date: 1/19/2022  Narrative: Mavis Cousin  Left Ventricle: Left ventricular cavity size is normal  Wall thickness is mildly increased  The left ventricular ejection fraction is 45%  Systolic function is mildly reduced  There is mild global hypokinesis  Diastolic function is mildly abnormal, consistent with grade I (abnormal) relaxation    IVS: There is abnormal septal motion consistent with left bundle branch block

## 2022-01-31 ENCOUNTER — VBI (OUTPATIENT)
Dept: ADMINISTRATIVE | Facility: OTHER | Age: 57
End: 2022-01-31

## 2022-02-01 ENCOUNTER — HOSPITAL ENCOUNTER (OUTPATIENT)
Dept: ULTRASOUND IMAGING | Facility: HOSPITAL | Age: 57
Discharge: HOME | End: 2022-02-01
Payer: COMMERCIAL

## 2022-02-01 DIAGNOSIS — R10.10 PAIN OF UPPER ABDOMEN: ICD-10-CM

## 2022-02-01 PROCEDURE — 76705 ECHO EXAM OF ABDOMEN: CPT

## 2022-02-02 ENCOUNTER — TELEPHONE (OUTPATIENT)
Dept: GASTROENTEROLOGY | Facility: HOSPITAL | Age: 57
End: 2022-02-02

## 2022-02-03 ENCOUNTER — HOSPITAL ENCOUNTER (OUTPATIENT)
Dept: GASTROENTEROLOGY | Facility: HOSPITAL | Age: 57
Setting detail: OUTPATIENT SURGERY
Discharge: HOME | End: 2022-02-03
Attending: INTERNAL MEDICINE

## 2022-02-03 ENCOUNTER — TELEPHONE (OUTPATIENT)
Dept: GASTROENTEROLOGY | Facility: CLINIC | Age: 57
End: 2022-02-03

## 2022-02-03 DIAGNOSIS — R10.10 PAIN OF UPPER ABDOMEN: ICD-10-CM

## 2022-02-03 DIAGNOSIS — E11.43 GASTROPARESIS DUE TO DM (HCC): ICD-10-CM

## 2022-02-03 DIAGNOSIS — K59.03 DRUG-INDUCED CONSTIPATION: Primary | ICD-10-CM

## 2022-02-03 DIAGNOSIS — K21.9 GASTROESOPHAGEAL REFLUX DISEASE WITHOUT ESOPHAGITIS: ICD-10-CM

## 2022-02-03 DIAGNOSIS — K31.84 GASTROPARESIS DUE TO DM (HCC): ICD-10-CM

## 2022-02-03 DIAGNOSIS — K62.5 RECTAL BLEEDING: ICD-10-CM

## 2022-02-03 DIAGNOSIS — Z86.010 HISTORY OF COLON POLYPS: ICD-10-CM

## 2022-02-03 RX ORDER — LINACLOTIDE 145 UG/1
145 CAPSULE, GELATIN COATED ORAL EVERY MORNING
Qty: 30 CAPSULE | Refills: 2 | Status: SHIPPED | OUTPATIENT
Start: 2022-02-03 | End: 2022-07-08

## 2022-02-03 NOTE — TELEPHONE ENCOUNTER
[address] [place] [address]
Dr Jonatan Gross, which linzess do you want her to take? She was previously taking 72 but jerde if in her current situation that is going to work 
more than 8 hrs/night

## 2022-02-07 DIAGNOSIS — K59.03 DRUG-INDUCED CONSTIPATION: Primary | ICD-10-CM

## 2022-02-07 DIAGNOSIS — Z86.010 HISTORY OF COLON POLYPS: ICD-10-CM

## 2022-02-23 ENCOUNTER — TELEPHONE (OUTPATIENT)
Dept: GASTROENTEROLOGY | Facility: HOSPITAL | Age: 57
End: 2022-02-23

## 2022-02-24 ENCOUNTER — ANESTHESIA (OUTPATIENT)
Dept: GASTROENTEROLOGY | Facility: HOSPITAL | Age: 57
End: 2022-02-24

## 2022-02-24 ENCOUNTER — HOSPITAL ENCOUNTER (OUTPATIENT)
Dept: GASTROENTEROLOGY | Facility: HOSPITAL | Age: 57
Setting detail: OUTPATIENT SURGERY
Discharge: HOME | End: 2022-02-24
Attending: INTERNAL MEDICINE | Admitting: INTERNAL MEDICINE
Payer: COMMERCIAL

## 2022-02-24 ENCOUNTER — ANESTHESIA EVENT (OUTPATIENT)
Dept: GASTROENTEROLOGY | Facility: HOSPITAL | Age: 57
End: 2022-02-24

## 2022-02-24 VITALS
RESPIRATION RATE: 16 BRPM | HEIGHT: 61 IN | OXYGEN SATURATION: 97 % | SYSTOLIC BLOOD PRESSURE: 135 MMHG | WEIGHT: 151.9 LBS | BODY MASS INDEX: 28.68 KG/M2 | DIASTOLIC BLOOD PRESSURE: 60 MMHG | TEMPERATURE: 97.6 F | HEART RATE: 63 BPM

## 2022-02-24 DIAGNOSIS — K62.5 RECTAL BLEEDING: ICD-10-CM

## 2022-02-24 DIAGNOSIS — Z86.010 HISTORY OF COLON POLYPS: ICD-10-CM

## 2022-02-24 DIAGNOSIS — K59.03 DRUG-INDUCED CONSTIPATION: ICD-10-CM

## 2022-02-24 DIAGNOSIS — K31.84 GASTROPARESIS DUE TO DM (HCC): ICD-10-CM

## 2022-02-24 DIAGNOSIS — E11.43 GASTROPARESIS DUE TO DM (HCC): ICD-10-CM

## 2022-02-24 DIAGNOSIS — R10.10 PAIN OF UPPER ABDOMEN: ICD-10-CM

## 2022-02-24 DIAGNOSIS — K21.9 GASTROESOPHAGEAL REFLUX DISEASE WITHOUT ESOPHAGITIS: ICD-10-CM

## 2022-02-24 LAB — GLUCOSE SERPL-MCNC: 292 MG/DL (ref 65–140)

## 2022-02-24 PROCEDURE — 88305 TISSUE EXAM BY PATHOLOGIST: CPT | Performed by: PATHOLOGY

## 2022-02-24 PROCEDURE — 43239 EGD BIOPSY SINGLE/MULTIPLE: CPT | Performed by: INTERNAL MEDICINE

## 2022-02-24 PROCEDURE — 82948 REAGENT STRIP/BLOOD GLUCOSE: CPT

## 2022-02-24 PROCEDURE — 45385 COLONOSCOPY W/LESION REMOVAL: CPT | Performed by: INTERNAL MEDICINE

## 2022-02-24 RX ORDER — PROPOFOL 10 MG/ML
INJECTION, EMULSION INTRAVENOUS AS NEEDED
Status: DISCONTINUED | OUTPATIENT
Start: 2022-02-24 | End: 2022-02-24

## 2022-02-24 RX ORDER — EPHEDRINE SULFATE 50 MG/ML
INJECTION INTRAVENOUS AS NEEDED
Status: DISCONTINUED | OUTPATIENT
Start: 2022-02-24 | End: 2022-02-24

## 2022-02-24 RX ORDER — SODIUM CHLORIDE, SODIUM LACTATE, POTASSIUM CHLORIDE, CALCIUM CHLORIDE 600; 310; 30; 20 MG/100ML; MG/100ML; MG/100ML; MG/100ML
INJECTION, SOLUTION INTRAVENOUS CONTINUOUS PRN
Status: DISCONTINUED | OUTPATIENT
Start: 2022-02-24 | End: 2022-02-24

## 2022-02-24 RX ADMIN — PROPOFOL 30 MG: 10 INJECTION, EMULSION INTRAVENOUS at 10:38

## 2022-02-24 RX ADMIN — PROPOFOL 30 MG: 10 INJECTION, EMULSION INTRAVENOUS at 10:45

## 2022-02-24 RX ADMIN — EPHEDRINE SULFATE 10 MG: 50 INJECTION, SOLUTION INTRAVENOUS at 10:46

## 2022-02-24 RX ADMIN — PROPOFOL 20 MG: 10 INJECTION, EMULSION INTRAVENOUS at 10:50

## 2022-02-24 RX ADMIN — PROPOFOL 30 MG: 10 INJECTION, EMULSION INTRAVENOUS at 10:41

## 2022-02-24 RX ADMIN — SODIUM CHLORIDE, SODIUM LACTATE, POTASSIUM CHLORIDE, AND CALCIUM CHLORIDE: .6; .31; .03; .02 INJECTION, SOLUTION INTRAVENOUS at 10:05

## 2022-02-24 RX ADMIN — PROPOFOL 120 MG: 10 INJECTION, EMULSION INTRAVENOUS at 10:35

## 2022-02-24 NOTE — INTERVAL H&P NOTE
H&P reviewed  After examining the patient I find no changes in the patients condition since the H&P had been written      Vitals:    02/24/22 1002   BP: 115/53   Pulse: 60   Resp: 16   Temp: (!) 97 1 °F (36 2 °C)   SpO2: 95%

## 2022-02-24 NOTE — ANESTHESIA PREPROCEDURE EVALUATION
Procedure:  COLONOSCOPY  EGD    Relevant Problems   CARDIO   (+) Common migraine without aura   (+) Hyperlipidemia   (+) LBBB (left bundle branch block)      ENDO   (+) Type 2 diabetes mellitus (HCC)      GI/HEPATIC   (+) Esophageal dysphagia   (+) Fatty liver   (+) Gastroesophageal reflux disease with esophagitis      GYN   (+) Status post robotic assisted total laparoscopic hysterectomy, bilateral salpingo-oophorectomy      MUSCULOSKELETAL   (+) Chronic midline low back pain with bilateral sciatica   (+) Lumbar spondylosis      NEURO/PSYCH   (+) Common migraine without aura   (+) Gastroparesis due to DM (HCC)   (+) Other chronic pain        Physical Exam    Airway    Mallampati score: II  TM Distance: >3 FB  Neck ROM: full     Dental   No notable dental hx     Cardiovascular  Rhythm: regular, Rate: normal,     Pulmonary  Breath sounds clear to auscultation,     Other Findings        Anesthesia Plan  ASA Score- 3     Anesthesia Type- IV sedation with anesthesia with ASA Monitors  Additional Monitors:   Airway Plan:           Plan Factors-Exercise tolerance (METS): >4 METS  Chart reviewed  EKG reviewed  Imaging results reviewed  Existing labs reviewed  Patient summary reviewed  Patient is not a current smoker  Induction- intravenous  Postoperative Plan-     Informed Consent- Anesthetic plan and risks discussed with patient  I personally reviewed this patient with the CRNA  Discussed and agreed on the Anesthesia Plan with the CRNA  Judah Pate

## 2022-02-24 NOTE — H&P
History and Physical - [facility]  Janine Mota 49 y o  female MRN: [id number]                  HPI: Janine Mota is a 49y o  year old female who presents for EGD and colonoscopy for upper abdominal pain, GERD, gastroparesis, rectal bleeding, history of colon polyps  Last colonoscopy 5 years ago      REVIEW OF SYSTEMS: Per the HPI, and otherwise unremarkable  Historical Information   Past Medical History:   Diagnosis Date    Angina pectoris ([place] [address] )     recent    Anxiety     Arthritis     Carpal tunnel syndrome     Chronic headaches     Diabetes ([place] [address] )     Diabetes mellitus ([place] [address] )     GERD (gastroesophageal reflux disease)     Headache     Hyperlipidemia     Hypertension     Kidney stone     Left bundle branch block     LBBB    MI (myocardial infarction) ([place] [address] )     Neuropathy     PTSD (post-traumatic stress disorder)     RLS (restless legs syndrome)     Shortness of breath      Past Surgical History:   Procedure Laterality Date    BREAST SURGERY  2016    Reduction    CARDIAC CATHETERIZATION  2018     Mid LAD: There was a tubular 40 % stenosis     SECTION      COLONOSCOPY      HYSTERECTOMY  2018    Complete    KIDNEY STONE SURGERY      LAPAROSCOPY      ID COLONOSCOPY FLX DX W/COLLJ SPEC WHEN PFRMD N/A 2017    Procedure: EGD AND COLONOSCOPY;  Surgeon: Citlali Hearn MD;  [facility]; Service: Gastroenterology    ID ESOPHAGOGASTRODUODENOSCOPY TRANSORAL DIAGNOSTIC N/A 10/10/2018    Procedure: ESOPHAGOGASTRODUODENOSCOPY (EGD); Surgeon: Citlali Hearn MD;  [facility];   Service: Gastroenterology    ID LAPAROSCOPY W TOT HYSTERECTUTERUS <=250 Mirlande Dayo TUBE/OVARY N/A 2018    Procedure: ROBOTIC ASSISTED TOTAL LAPAROSCOPIC HYSTERECTOMY, BILATERAL SALPINGOOPHERECTOMY,;  Surgeon: Eliceo Mcclain MD;  Location:  MAIN OR;  Service: Gynecology Oncology    UPPER GASTROINTESTINAL ENDOSCOPY      WRIST SURGERY Right      Social History   Social History     Substance and Sexual Activity   Alcohol Use No    Comment: Rarely consumes alcohol - As per [employer]      Social History     Substance and Sexual Activity   Drug Use Yes    Frequency: 7 0 times per week    Types: Marijuana    Comment: medical marijuana     Social History     Tobacco Use   Smoking Status Current Every Day Smoker    Packs/day: 1 00    Years: 44 00    Pack years: 44 00   Smokeless Tobacco Never Used   Tobacco Comment    pt  smoked this am 10/10     Family History   Problem Relation Age of Onset    Diabetes Mother     Breast cancer Mother 39    Lung cancer Father 47    Diabetes Sister     Brain cancer Maternal Aunt 67    Breast cancer Other 25       Meds/Allergies     (Not in a hospital admission)      Allergies   Allergen Reactions    Lisinopril Cough    Losartan Dizziness    Other Hives     Surgical tape    Prednisone Other (See Comments)     Thrush         Objective     There were no vitals taken for this visit        PHYSICAL EXAM    Gen: NAD  CV: RRR  CHEST: Clear  ABD: soft, NT/ND  EXT: no edema  Neuro: AAO      ASSESSMENT/PLAN:  This is a 49y o  year old female here for upper abdominal pain, GERD, gastroparesis, rectal bleeding, history of polyps    PLAN:   Procedure:  EGD and colonoscopy

## 2022-04-05 ENCOUNTER — CONSULT (OUTPATIENT)
Dept: BARIATRICS | Facility: CLINIC | Age: 57
End: 2022-04-05
Payer: COMMERCIAL

## 2022-04-05 VITALS
SYSTOLIC BLOOD PRESSURE: 136 MMHG | TEMPERATURE: 98 F | HEIGHT: 61 IN | WEIGHT: 154.4 LBS | RESPIRATION RATE: 20 BRPM | HEART RATE: 71 BPM | OXYGEN SATURATION: 95 % | DIASTOLIC BLOOD PRESSURE: 70 MMHG | BODY MASS INDEX: 29.15 KG/M2

## 2022-04-05 DIAGNOSIS — R63.5 ABNORMAL WEIGHT GAIN: Primary | ICD-10-CM

## 2022-04-05 DIAGNOSIS — E11.69 DIABETES MELLITUS TYPE 2 IN OBESE (HCC): ICD-10-CM

## 2022-04-05 DIAGNOSIS — K76.0 FATTY LIVER: ICD-10-CM

## 2022-04-05 DIAGNOSIS — E78.5 HYPERLIPIDEMIA: ICD-10-CM

## 2022-04-05 DIAGNOSIS — E66.9 DIABETES MELLITUS TYPE 2 IN OBESE (HCC): ICD-10-CM

## 2022-04-05 DIAGNOSIS — I10 ESSENTIAL (PRIMARY) HYPERTENSION: ICD-10-CM

## 2022-04-05 DIAGNOSIS — I25.10 CORONARY ARTERY DISEASE INVOLVING NATIVE HEART WITHOUT ANGINA PECTORIS, UNSPECIFIED VESSEL OR LESION TYPE: ICD-10-CM

## 2022-04-05 DIAGNOSIS — E66.3 OVERWEIGHT: ICD-10-CM

## 2022-04-05 DIAGNOSIS — K21.00 GASTROESOPHAGEAL REFLUX DISEASE WITH ESOPHAGITIS: ICD-10-CM

## 2022-04-05 DIAGNOSIS — E11.9 TYPE 2 DIABETES MELLITUS (HCC): ICD-10-CM

## 2022-04-05 PROBLEM — J44.9 COPD (CHRONIC OBSTRUCTIVE PULMONARY DISEASE) (HCC): Status: ACTIVE | Noted: 2022-04-05

## 2022-04-05 PROCEDURE — 99204 OFFICE O/P NEW MOD 45 MIN: CPT | Performed by: PHYSICIAN ASSISTANT

## 2022-04-05 NOTE — ASSESSMENT & PLAN NOTE
-HgbA1c 12 4 from 2/2022  -drinks sweetened tea daily  -does not check her blood sugar consistently or take her meal time insulin  -Takes Long acting insulin 24 units HS  -referred to endo

## 2022-04-05 NOTE — ASSESSMENT & PLAN NOTE
-Discussed options of [facility]-Intensive Lifestyle Intervention Program, Very Low Calorie Diet-VLCD and Conservative Program and the role of weight loss medications   -Already on Wellbutrin to help with smoking cessation  -Not a candidate for Topamax or Phentermine  -Initial weight loss goal of 5-10% weight loss for improved health  -Screening labs: reviewed BMP, Lipid panel, HgbA1c from 2/2022  Advise updating TSH  -Patient is interested in pursuing conservative program  -Calorie goals, sample menu, portion size guidelines, and food logging reviewed with the patient

## 2022-04-05 NOTE — PROGRESS NOTES
Assessment/Plan:    Overweight  -Discussed options of [facility]-Intensive Lifestyle Intervention Program, Very Low Calorie Diet-VLCD and Conservative Program and the role of weight loss medications   -Already on Wellbutrin to help with smoking cessation  -Not a candidate for Topamax or Phentermine  -Initial weight loss goal of 5-10% weight loss for improved health  -Screening labs: reviewed BMP, Lipid panel, HgbA1c from 2/2022  Advise updating TSH  -Patient is interested in pursuing conservative program  -Calorie goals, sample menu, portion size guidelines, and food logging reviewed with the patient  Type 2 diabetes mellitus (HCC)  -HgbA1c 12 4 from 2/2022  -drinks sweetened tea daily  -does not check her blood sugar consistently or take her meal time insulin  -Takes Long acting insulin 24 units HS  -referred to endo      Fatty liver  Advise minimum 10% TBW loss    Hyperlipidemia  -On Crestor  -avoid trans fats limit saturated fats and refined carbohydrates    Gastroesophageal reflux disease with esophagitis  -On PPI  -avoid food triggers, large portion sizes  -can improve with weight loss    Goals:    Food log (ie ) www myfitnesspal com,sparkpeople  com,loseit com,calorieking  com,etc    No sugary beverages  At least 64oz of water daily  Increase physical activity by 10 minutes daily  Gradually increase physical activity to a goal of 5 days per week for 30 minutes of MODERATE intensity PLUS 2 days per week of FULL BODY resistance training  2029-1129 calories per day  5-10 servings of fruits and vegetables per day  Cut out sweetened iced tea, increase water, propel, gatorade zero, vitamin water zero    Follow up in approximately 6 weeks with Non-Surgical Physician/Advanced Practitioner  Diagnoses and all orders for this visit:    Abnormal weight gain  Comments:  see plan under overweight  Orders:  -     TSH, 3rd generation with Free T4 reflex;  Future    Overweight  -     Ambulatory referral to Weight Management  -     TSH, 3rd generation with Free T4 reflex; Future    Coronary artery disease involving native heart without angina pectoris, unspecified vessel or lesion type  -     Ambulatory referral to Weight Management    Essential (primary) hypertension  -     Ambulatory referral to Weight Management  -     Ambulatory Referral to Endocrinology; Future    Fatty liver  -     Ambulatory Referral to Endocrinology; Future    Hyperlipidemia  -     Ambulatory Referral to Endocrinology; Future    Diabetes mellitus type 2 in obese [facility])  -     Ambulatory Referral to Endocrinology; Future  -     TSH, 3rd generation with Free T4 reflex; Future    Type 2 diabetes mellitus (HCC)    Gastroesophageal reflux disease with esophagitis          Subjective:   Chief Complaint   Patient presents with    Consult       Patient ID: Nini Louis  is a 49 y o  female with excess weight/obesity here to pursue weight managment      Past Medical History:   Diagnosis Date    Angina [facility] ([facility] [address] )     recent    Anxiety     Arthritis     Asthma     Carpal tunnel syndrome     Chronic headaches     COPD (chronic obstructive pulmonary disease) (HCC)     Diabetes ([facility] [address] )     Diabetes mellitus ([facility] [address] )     GERD (gastroesophageal reflux disease)     Headache     Hyperlipidemia     Hypertension     Kidney stone     Left bundle branch block     LBBB    MI (myocardial infarction) ([facility] [address] )     Myocardial infarction ([facility] [address] ) 2014/2016    Neuropathy     PTSD (post-traumatic stress disorder)     RLS (restless legs syndrome)     Shortness of breath          HPI:  Obesity/Excess Weight:  Severity: Mild  Onset:  Since childhood    Modifiers: exercise, "starving herself"  Contributing factors: Poor Food Choices  Associated symptoms: depression, back pain/leg pain    Goals: 110 lbs  Highest:  220 lbs    Hydration: sweet tea 50-70oz, cup of tea + 2 TBSP of sugar  Exercise: denies; recently joined the gym  Occupation: care taker  ETOH: denies  Smoking: 1 PPD, medical marijuana   DM2 since 8192-5960  Hx Nocturnal hypoxia - on O2 HS    -Reports she is not taking Abilify    Colonoscopy: completed 2022, 5 year recall    The following portions of the patient's history were reviewed and updated as appropriate: allergies, current medications, past family history, past medical history, past social history, past surgical history and problem list     Review of Systems   Constitutional: Negative for chills and fever  HENT: Negative for sore throat  Respiratory: Positive for cough and shortness of breath  Cardiovascular: Positive for chest pain and palpitations  Gastrointestinal: Positive for abdominal pain, nausea and vomiting  Genitourinary: Negative for dysuria  Musculoskeletal: Positive for arthralgias  Skin: Negative for rash  Neurological: Positive for dizziness  Negative for headaches  Psychiatric/Behavioral: Negative for dysphoric mood  The patient is nervous/anxious  Objective:    /70   Pulse 71   Temp 98 °F (36 7 °C)   Resp 20   Ht 5' 1" (1 549 m)   Wt 70 kg (154 lb 6 4 oz)   SpO2 95%   BMI 29 17 kg/m²     Physical Exam  Vitals and nursing note reviewed  Constitutional:       General: She is not in acute distress  Appearance: She is not ill-appearing, toxic-appearing or diaphoretic  HENT:      Head: Normocephalic and atraumatic  Eyes:      General: No scleral icterus  Pulmonary:      Effort: Pulmonary effort is normal  No respiratory distress  Abdominal:      Comments: Obese, protuberant   Musculoskeletal:         General: Normal range of motion  Skin:     General: Skin is dry  Neurological:      Mental Status: She is alert and oriented to person, place, and time     Psychiatric:         Mood and Affect: Mood normal

## 2022-04-05 NOTE — PATIENT INSTRUCTIONS
Goals: Food log (ie ) www myfitnesspal com,sparkpeople  com,[place] [employer] com,calorieking  com,etc    No sugary beverages  At least 64oz of water daily  Increase physical activity by 10 minutes daily   Gradually increase physical activity to a goal of 5 days per week for 30 minutes of MODERATE intensity PLUS 2 days per week of FULL BODY resistance training  7728-2067 calories per day  5-10 servings of fruits and vegetables per day  Cut out sweetened iced tea, increase water, propel, gatorade zero, vitamin water zero

## 2022-05-10 ENCOUNTER — OFFICE VISIT (OUTPATIENT)
Dept: GASTROENTEROLOGY | Facility: CLINIC | Age: 57
End: 2022-05-10
Payer: COMMERCIAL

## 2022-05-10 VITALS
HEIGHT: 61 IN | WEIGHT: 151.8 LBS | SYSTOLIC BLOOD PRESSURE: 120 MMHG | BODY MASS INDEX: 28.66 KG/M2 | DIASTOLIC BLOOD PRESSURE: 68 MMHG

## 2022-05-10 DIAGNOSIS — K76.0 FATTY LIVER: ICD-10-CM

## 2022-05-10 DIAGNOSIS — K22.70 BARRETT'S ESOPHAGUS WITHOUT DYSPLASIA: Primary | ICD-10-CM

## 2022-05-10 DIAGNOSIS — K59.03 DRUG-INDUCED CONSTIPATION: ICD-10-CM

## 2022-05-10 DIAGNOSIS — I42.8 NON-ISCHEMIC CARDIOMYOPATHY (HCC): ICD-10-CM

## 2022-05-10 DIAGNOSIS — K59.09 CHRONIC CONSTIPATION: ICD-10-CM

## 2022-05-10 DIAGNOSIS — K21.9 GASTROESOPHAGEAL REFLUX DISEASE WITHOUT ESOPHAGITIS: ICD-10-CM

## 2022-05-10 PROCEDURE — 99214 OFFICE O/P EST MOD 30 MIN: CPT | Performed by: PHYSICIAN ASSISTANT

## 2022-05-10 RX ORDER — LINACLOTIDE 290 UG/1
290 CAPSULE, GELATIN COATED ORAL DAILY
Qty: 90 CAPSULE | Refills: 3 | Status: SHIPPED | OUTPATIENT
Start: 2022-05-10 | End: 2022-08-08

## 2022-05-16 DIAGNOSIS — I42.8 NON-ISCHEMIC CARDIOMYOPATHY (HCC): ICD-10-CM

## 2022-05-16 DIAGNOSIS — K21.9 GASTROESOPHAGEAL REFLUX DISEASE WITHOUT ESOPHAGITIS: ICD-10-CM

## 2022-05-16 RX ORDER — PANTOPRAZOLE SODIUM 40 MG/1
TABLET, DELAYED RELEASE ORAL
Qty: 60 TABLET | Refills: 1 | Status: SHIPPED | OUTPATIENT
Start: 2022-05-16

## 2022-06-02 ENCOUNTER — TELEPHONE (OUTPATIENT)
Dept: GASTROENTEROLOGY | Facility: CLINIC | Age: 57
End: 2022-06-02

## 2022-06-02 DIAGNOSIS — K76.0 FATTY LIVER: Primary | ICD-10-CM

## 2022-06-02 DIAGNOSIS — R10.10 PAIN OF UPPER ABDOMEN: ICD-10-CM

## 2022-06-02 NOTE — TELEPHONE ENCOUNTER
Results left  Please have her repeat liver functions in 3 months time and get alkaline phosphatase isoenzymes and GGT at that time

## 2022-06-29 ENCOUNTER — APPOINTMENT (EMERGENCY)
Dept: RADIOLOGY | Facility: HOSPITAL | Age: 57
End: 2022-06-29
Payer: COMMERCIAL

## 2022-06-29 ENCOUNTER — HOSPITAL ENCOUNTER (EMERGENCY)
Facility: HOSPITAL | Age: 57
Discharge: HOME | End: 2022-06-29
Attending: EMERGENCY MEDICINE
Payer: COMMERCIAL

## 2022-06-29 VITALS
WEIGHT: 141 LBS | DIASTOLIC BLOOD PRESSURE: 74 MMHG | HEART RATE: 65 BPM | OXYGEN SATURATION: 96 % | RESPIRATION RATE: 20 BRPM | HEIGHT: 61 IN | TEMPERATURE: 97.4 F | BODY MASS INDEX: 26.62 KG/M2 | SYSTOLIC BLOOD PRESSURE: 157 MMHG

## 2022-06-29 DIAGNOSIS — G89.29 OTHER CHRONIC PAIN: ICD-10-CM

## 2022-06-29 DIAGNOSIS — R10.13 EPIGASTRIC PAIN: ICD-10-CM

## 2022-06-29 DIAGNOSIS — R13.19 ESOPHAGEAL DYSPHAGIA: ICD-10-CM

## 2022-06-29 DIAGNOSIS — K29.70 GASTRITIS: ICD-10-CM

## 2022-06-29 DIAGNOSIS — Z79.899 MEDICAL MARIJUANA USE: ICD-10-CM

## 2022-06-29 DIAGNOSIS — R07.9 CHEST PAIN: Primary | ICD-10-CM

## 2022-06-29 DIAGNOSIS — K21.00 GASTROESOPHAGEAL REFLUX DISEASE WITH ESOPHAGITIS: ICD-10-CM

## 2022-06-29 DIAGNOSIS — R11.14 BILIOUS VOMITING WITH NAUSEA: ICD-10-CM

## 2022-06-29 LAB
2HR DELTA HS TROPONIN: 0 NG/L
ALBUMIN SERPL BCP-MCNC: 3.9 G/DL (ref 3.5–5)
ALP SERPL-CCNC: 109 U/L (ref 34–104)
ALT SERPL W P-5'-P-CCNC: 20 U/L (ref 7–52)
ANION GAP SERPL CALCULATED.3IONS-SCNC: 10 MMOL/L (ref 4–13)
AST SERPL W P-5'-P-CCNC: 17 U/L (ref 13–39)
BASOPHILS # BLD AUTO: 0.03 THOUSANDS/ΜL (ref 0–0.1)
BASOPHILS NFR BLD AUTO: 0 % (ref 0–1)
BILIRUB DIRECT SERPL-MCNC: 0.01 MG/DL (ref 0–0.2)
BILIRUB SERPL-MCNC: 0.78 MG/DL (ref 0.2–1)
BUN SERPL-MCNC: 20 MG/DL (ref 5–25)
CALCIUM SERPL-MCNC: 9.6 MG/DL (ref 8.4–10.2)
CARDIAC TROPONIN I PNL SERPL HS: 5 NG/L
CARDIAC TROPONIN I PNL SERPL HS: 5 NG/L
CHLORIDE SERPL-SCNC: 92 MMOL/L (ref 96–108)
CO2 SERPL-SCNC: 30 MMOL/L (ref 21–32)
CREAT SERPL-MCNC: 0.83 MG/DL (ref 0.6–1.3)
EOSINOPHIL # BLD AUTO: 0.13 THOUSAND/ΜL (ref 0–0.61)
EOSINOPHIL NFR BLD AUTO: 1 % (ref 0–6)
ERYTHROCYTE [DISTWIDTH] IN BLOOD BY AUTOMATED COUNT: 13.1 % (ref 11.6–15.1)
GFR SERPL CREATININE-BSD FRML MDRD: 80 ML/MIN/1.73SQ M
GLUCOSE SERPL-MCNC: 501 MG/DL (ref 65–140)
HCT VFR BLD AUTO: 52.3 % (ref 34.8–46.1)
HGB BLD-MCNC: 17.4 G/DL (ref 11.5–15.4)
IMM GRANULOCYTES # BLD AUTO: 0.03 THOUSAND/UL (ref 0–0.2)
IMM GRANULOCYTES NFR BLD AUTO: 0 % (ref 0–2)
LIPASE SERPL-CCNC: 67 U/L (ref 11–82)
LYMPHOCYTES # BLD AUTO: 2.69 THOUSANDS/ΜL (ref 0.6–4.47)
LYMPHOCYTES NFR BLD AUTO: 30 % (ref 14–44)
MAGNESIUM SERPL-MCNC: 1.7 MG/DL (ref 1.9–2.7)
MCH RBC QN AUTO: 28.2 PG (ref 26.8–34.3)
MCHC RBC AUTO-ENTMCNC: 33.3 G/DL (ref 31.4–37.4)
MCV RBC AUTO: 85 FL (ref 82–98)
MONOCYTES # BLD AUTO: 0.44 THOUSAND/ΜL (ref 0.17–1.22)
MONOCYTES NFR BLD AUTO: 5 % (ref 4–12)
NEUTROPHILS # BLD AUTO: 5.67 THOUSANDS/ΜL (ref 1.85–7.62)
NEUTS SEG NFR BLD AUTO: 64 % (ref 43–75)
NRBC BLD AUTO-RTO: 0 /100 WBCS
PLATELET # BLD AUTO: 277 THOUSANDS/UL (ref 149–390)
PMV BLD AUTO: 10 FL (ref 8.9–12.7)
POTASSIUM SERPL-SCNC: 3.9 MMOL/L (ref 3.5–5.3)
PROT SERPL-MCNC: 8.1 G/DL (ref 6.4–8.4)
RBC # BLD AUTO: 6.18 MILLION/UL (ref 3.81–5.12)
SODIUM SERPL-SCNC: 132 MMOL/L (ref 135–147)
WBC # BLD AUTO: 8.99 THOUSAND/UL (ref 4.31–10.16)

## 2022-06-29 PROCEDURE — 93005 ELECTROCARDIOGRAM TRACING: CPT

## 2022-06-29 PROCEDURE — 83735 ASSAY OF MAGNESIUM: CPT | Performed by: EMERGENCY MEDICINE

## 2022-06-29 PROCEDURE — 99284 EMERGENCY DEPT VISIT MOD MDM: CPT

## 2022-06-29 PROCEDURE — 71045 X-RAY EXAM CHEST 1 VIEW: CPT

## 2022-06-29 PROCEDURE — 96361 HYDRATE IV INFUSION ADD-ON: CPT

## 2022-06-29 PROCEDURE — 99284 EMERGENCY DEPT VISIT MOD MDM: CPT | Performed by: EMERGENCY MEDICINE

## 2022-06-29 PROCEDURE — 80048 BASIC METABOLIC PNL TOTAL CA: CPT | Performed by: EMERGENCY MEDICINE

## 2022-06-29 PROCEDURE — 85025 COMPLETE CBC W/AUTO DIFF WBC: CPT | Performed by: EMERGENCY MEDICINE

## 2022-06-29 PROCEDURE — 96372 THER/PROPH/DIAG INJ SC/IM: CPT

## 2022-06-29 PROCEDURE — 83690 ASSAY OF LIPASE: CPT | Performed by: EMERGENCY MEDICINE

## 2022-06-29 PROCEDURE — 36415 COLL VENOUS BLD VENIPUNCTURE: CPT | Performed by: EMERGENCY MEDICINE

## 2022-06-29 PROCEDURE — 80076 HEPATIC FUNCTION PANEL: CPT | Performed by: EMERGENCY MEDICINE

## 2022-06-29 PROCEDURE — 96374 THER/PROPH/DIAG INJ IV PUSH: CPT

## 2022-06-29 PROCEDURE — 84484 ASSAY OF TROPONIN QUANT: CPT | Performed by: EMERGENCY MEDICINE

## 2022-06-29 RX ORDER — SUCRALFATE ORAL 1 G/10ML
1 SUSPENSION ORAL 4 TIMES DAILY
Qty: 414 ML | Refills: 0 | Status: SHIPPED | OUTPATIENT
Start: 2022-06-29

## 2022-06-29 RX ORDER — NICOTINE 21 MG/24HR
21 PATCH, TRANSDERMAL 24 HOURS TRANSDERMAL ONCE
Status: DISCONTINUED | OUTPATIENT
Start: 2022-06-29 | End: 2022-06-29 | Stop reason: HOSPADM

## 2022-06-29 RX ORDER — FAMOTIDINE 10 MG/ML
20 INJECTION, SOLUTION INTRAVENOUS EVERY 12 HOURS SCHEDULED
Status: DISCONTINUED | OUTPATIENT
Start: 2022-06-29 | End: 2022-06-29 | Stop reason: HOSPADM

## 2022-06-29 RX ADMIN — INSULIN HUMAN 10 UNITS: 100 INJECTION, SOLUTION PARENTERAL at 16:05

## 2022-06-29 RX ADMIN — NICOTINE 21 MG: 21 PATCH, EXTENDED RELEASE TRANSDERMAL at 14:03

## 2022-06-29 RX ADMIN — FAMOTIDINE 20 MG: 10 INJECTION INTRAVENOUS at 14:04

## 2022-06-29 RX ADMIN — SODIUM CHLORIDE 1000 ML: 0.9 INJECTION, SOLUTION INTRAVENOUS at 15:12

## 2022-06-29 NOTE — DISCHARGE INSTRUCTIONS
Your blood sugar is very high  We offered medication and treatment in the ER but you declined  Follow up with your PCP and limit your carbohydrate/sugar intake   Elevated sugar can cause kidney failure, heart disease/heart attack, stroke, severe infections, and other significant issues    Follow up with your GI physician, cardiologist, PCP, and endocrinologist

## 2022-06-29 NOTE — ED PROVIDER NOTES
History  Chief Complaint   Patient presents with    Epigastric Pain     Pt reports epigastric/chest pain that radiates around to middle of back that started on Sunday; pt reports using CBD oil with some reflief     63-year-old female presenting with substernal chest pain which has been constant over last 4 days  She the describes epigastric gastric burning sensation which radiates upwards which is consistent with her prior episodes of gastritis  She denies exertional chest pain, diaphoresis nausea or vomiting  Epigastric Pain  Pain location:  Epigastric  Pain quality: dull    Pain severity:  Mild  Onset quality:  Gradual  Duration:  4 days  Timing:  Constant  Progression:  Unchanged  Chronicity:  Recurrent  Associated symptoms: abdominal pain    Associated symptoms: no back pain, no cough, no fever, no nausea, no numbness, not vomiting and no weakness        Prior to Admission Medications   Prescriptions Last Dose Informant Patient Reported? Taking?    ARIPiprazole (ABILIFY) 2 mg tablet  Self Yes No   Sig: Take 2 mg by mouth daily     BD Pen Needle Dayna 2nd Gen 32G X 4 MM MISC  Self Yes No   NovoLOG FlexPen 100 units/mL injection pen  Self Yes No   QUEtiapine (SEROquel) 200 mg tablet  Self Yes No   Sig: Take 150 mg by mouth daily at bedtime     Spiriva Respimat 2 5 MCG/ACT AERS inhaler  Self Yes No   albuterol (PROVENTIL HFA,VENTOLIN HFA) 90 mcg/act inhaler  Self Yes No   Sig: Inhale 2 puffs every 6 (six) hours as needed for wheezing   bisoprolol (ZEBETA) 5 mg tablet  Self No No   Sig: Take 1 tablet (5 mg total) by mouth daily   buPROPion (WELLBUTRIN XL) 300 mg 24 hr tablet  Self Yes No   Sig: Take 300 mg by mouth every morning     diclofenac (VOLTAREN) 75 mg EC tablet  Self Yes No   Sig: Take 75 mg by mouth 2 (two) times a day     ibuprofen (MOTRIN) 200 mg tablet  Self Yes No   Sig: Take 200 mg by mouth every 6 (six) hours as needed for mild pain   insulin glargine (Basaglar KwikPen) 100 units/mL injection pen  Self Yes No   Sig: Inject 26 Units under the skin daily at bedtime   insulin lispro (HumaLOG) 100 units/mL injection  Self Yes No   Sig: Inject 10 Units under the skin 3 (three) times a day before meals   linaCLOtide (Linzess) 145 MCG CAPS   No No   Sig: Take 1 capsule (145 mcg total) by mouth every morning   linaCLOtide (Linzess) 290 MCG CAPS   No No   Sig: Take 1 capsule by mouth in the morning   methocarbamol (ROBAXIN) 750 mg tablet  Self Yes No   Sig: Take 500 mg by mouth every 6 (six) hours as needed for muscle spasms     metoclopramide (REGLAN) 5 mg/5 mL oral solution  Self No No   Sig: Take 5 mL (5 mg total) by mouth 3 (three) times a day   naproxen (NAPROSYN) 375 mg tablet  Self No No   Sig: Take 1 tablet (375 mg total) by mouth 2 (two) times a day with meals   nystatin (MYCOSTATIN) ointment  Self No No   Sig: Apply topically 2 (two) times a day   ondansetron (ZOFRAN) 4 mg tablet  Self No No   Sig: Take 1 tablet (4 mg total) by mouth every 8 (eight) hours as needed for nausea or vomiting   oxyCODONE (ROXICODONE) 5 immediate release tablet  Self Yes No   Sig: Take 5 mg by mouth 3 (three) times a day as needed   pantoprazole (PROTONIX) 40 mg tablet   No No   Sig: take 1 tablet by mouth twice a day   polyethylene glycol (GOLYTELY) 4000 mL solution   No No   Sig: Take 4,000 mL by mouth once for 1 dose   potassium chloride SA (KLOR-CON M15) 15 MEQ tablet  Self Yes No   Sig: Take 15 mEq by mouth 2 (two) times a day     pregabalin (LYRICA) 75 mg capsule  Self Yes No   Sig: Take 75 mg by mouth 2 (two) times a day     repaglinide (PRANDIN) 1 mg tablet  Self Yes No   Sig: Take 1 mg by mouth 3 (three) times a day before meals     rosuvastatin (CRESTOR) 20 MG tablet  Self Yes No   Sig: Take 20 mg by mouth daily     zolpidem (AMBIEN) 10 mg tablet  Self Yes No   Sig: Take 10 mg by mouth daily at bedtime as needed for sleep      Facility-Administered Medications: None       Past Medical History:   Diagnosis Date    Angina pectoris ([place] [address] )     recent    Anxiety     Arthritis     Asthma     Carpal tunnel syndrome     Chronic headaches     [facility]     Diabetes ([place] [address] )     Diabetes mellitus ([place] [address] )     GERD (gastroesophageal reflux disease)     Headache     Hyperlipidemia     Hypertension     Kidney stone     Left bundle branch block     LBBB    MI (myocardial infarction) ([place] [address] )     Myocardial infarction ([place] [address] )     Neuropathy     PTSD (post-traumatic stress disorder)     RLS (restless legs syndrome)     Shortness of breath        Past Surgical History:   Procedure Laterality Date    ANGIOPLASTY      BREAST SURGERY  2016    Reduction    CARDIAC CATHETERIZATION  2018     Mid LAD: There was a tubular 40 % stenosis     SECTION      COLONOSCOPY      HYSTERECTOMY  2018    Complete    KIDNEY STONE SURGERY      LAPAROSCOPY      CT COLONOSCOPY FLX DX W/COLLJ SPEC WHEN PFRMD N/A 2017    Procedure: EGD AND COLONOSCOPY;  Surgeon: Liz Smith MD;  Location: [facility]; Service: Gastroenterology    CT ESOPHAGOGASTRODUODENOSCOPY TRANSORAL DIAGNOSTIC N/A 10/10/2018    Procedure: ESOPHAGOGASTRODUODENOSCOPY (EGD); Surgeon: Liz Smith MD;  [facility]; Service: Gastroenterology    CT LAPAROSCOPY W TOT HYSTERECTUTERUS <=[address] [place] [address] TUBE/OVARY N/A 2018    Procedure: ROBOTIC ASSISTED TOTAL LAPAROSCOPIC HYSTERECTOMY, BILATERAL SALPINGOOPHERECTOMY,;  Surgeon: Westley Moura MD;  [facility];  Service: Gynecology Oncology    UPPER GASTROINTESTINAL ENDOSCOPY      WRIST SURGERY Right        Family History   Problem Relation Age of Onset    Diabetes Mother     Breast cancer Mother 39    Lung cancer Father 47    Diabetes Sister     Brain cancer Maternal Aunt 67    Breast cancer Other 25     I have reviewed and agree with the history as documented      E-Cigarette/Vaping    E-Cigarette Use Never User E-Cigarette/Vaping Substances    Nicotine No     THC No     CBD No     Flavoring No     Other No     Unknown No      Social History     Tobacco Use    Smoking status: Current Every Day Smoker     Packs/day: 1 00     Years: 44 00     Pack years: 44 00    Smokeless tobacco: Never Used    Tobacco comment: pt  smoked this am 10/10   Vaping Use    Vaping Use: Never used   Substance Use Topics    Alcohol use: No     Comment: Rarely consumes alcohol - As per [facility]     Drug use: Yes     Frequency: 7 0 times per week     Types: Marijuana     Comment: medical marijuana  last use over a month       Review of Systems   Constitutional: Negative for chills and fever  HENT: Negative for congestion, nosebleeds, rhinorrhea and sore throat  Eyes: Negative for pain and visual disturbance  Respiratory: Negative for cough and wheezing  Cardiovascular: Positive for chest pain  Negative for leg swelling  Gastrointestinal: Positive for abdominal pain  Negative for abdominal distention, diarrhea, nausea and vomiting  Genitourinary: Negative for dysuria and frequency  Musculoskeletal: Negative for back pain and joint swelling  Skin: Negative for rash and wound  Neurological: Negative for weakness and numbness  Psychiatric/Behavioral: Negative for decreased concentration and suicidal ideas  Physical Exam  Physical Exam  Vitals and nursing note reviewed  Constitutional:       Appearance: She is well-developed  HENT:      Head: Normocephalic and atraumatic  Eyes:      Conjunctiva/sclera: Conjunctivae normal       Pupils: Pupils are equal, round, and reactive to light  Neck:      Trachea: No tracheal deviation  Cardiovascular:      Rate and Rhythm: Normal rate and regular rhythm  Heart sounds: Normal heart sounds  No murmur heard  Pulmonary:      Effort: Pulmonary effort is normal  No respiratory distress  Breath sounds: Normal breath sounds  No wheezing or rales     Abdominal: General: Bowel sounds are normal  There is no distension  Palpations: Abdomen is soft  Tenderness: There is no abdominal tenderness  Musculoskeletal:         General: No deformity  Cervical back: Normal range of motion and neck supple  Skin:     General: Skin is warm and dry  Capillary Refill: Capillary refill takes less than 2 seconds  Neurological:      Mental Status: She is alert and oriented to person, place, and time  Sensory: No sensory deficit     Psychiatric:         Judgment: Judgment normal          Vital Signs  ED Triage Vitals [06/29/22 1340]   Temperature Pulse Respirations Blood Pressure SpO2   (!) 97 4 °F (36 3 °C) 65 20 157/74 96 %      Temp Source Heart Rate Source Patient Position - Orthostatic VS BP Location FiO2 (%)   Oral Monitor Sitting Right arm --      Pain Score       7           Vitals:    06/29/22 1340   BP: 157/74   Pulse: 65   Patient Position - Orthostatic VS: Sitting         Visual Acuity      ED Medications  Medications   sodium chloride 0 9 % bolus 1,000 mL (0 mL Intravenous Stopped 6/29/22 1612)   insulin regular (HumuLIN R,NovoLIN R) injection 10 Units (10 Units Subcutaneous Given 6/29/22 1605)       Diagnostic Studies  Results Reviewed     Procedure Component Value Units Date/Time    HS Troponin I 2hr [[id number]]  (Normal) Collected: 06/29/22 1547    Lab Status: Final result Specimen: Blood from Arm, Right Updated: 06/29/22 1616     hs TnI 2hr 5 ng/L      Delta 2hr hsTnI 0 ng/L     Basic metabolic panel [[id number]]  (Abnormal) Collected: 06/29/22 1354    Lab Status: Final result Specimen: Blood from Arm, Left Updated: 06/29/22 1435     Sodium 132 mmol/L      Potassium 3 9 mmol/L      Chloride 92 mmol/L      CO2 30 mmol/L      ANION GAP 10 mmol/L      BUN 20 mg/dL      Creatinine 0 83 mg/dL      Glucose 501 mg/dL      Calcium 9 6 mg/dL      eGFR 80 ml/min/1 73sq m     Narrative:      [place] guidelines for Chronic Kidney Disease (CKD):     Stage 1 with normal or high GFR (GFR > 90 mL/min/1 73 square meters)    Stage 2 Mild CKD (GFR = 60-89 mL/min/1 73 square meters)    Stage 3A Moderate CKD (GFR = 45-59 mL/min/1 73 square meters)    Stage 3B Moderate CKD (GFR = 30-44 mL/min/1 73 square meters)    Stage 4 Severe CKD (GFR = 15-29 mL/min/1 73 square meters)    Stage 5 End Stage CKD (GFR <15 mL/min/1 73 square meters)  Note: GFR calculation is accurate only with a steady state creatinine    Magnesium [[id number]]  (Abnormal) Collected: 06/29/22 1354    Lab Status: Final result Specimen: Blood from Arm, Left Updated: 06/29/22 1431     Magnesium 1 7 mg/dL     Hepatic function panel [[id number]]  (Abnormal) Collected: 06/29/22 1354    Lab Status: Final result Specimen: Blood from Arm, Left Updated: 06/29/22 1431     Total Bilirubin 0 78 mg/dL      Bilirubin, Direct 0 01 mg/dL      Alkaline Phosphatase 109 U/L      AST 17 U/L      ALT 20 U/L      Total Protein 8 1 g/dL      Albumin 3 9 g/dL     Lipase [[id number]]  (Normal) Collected: 06/29/22 1354    Lab Status: Final result Specimen: Blood from Arm, Left Updated: 06/29/22 1431     Lipase 67 u/L     HS Troponin 0hr (reflex protocol) [[id number]]  (Normal) Collected: 06/29/22 1354    Lab Status: Final result Specimen: Blood from Arm, Left Updated: 06/29/22 1424     hs TnI 0hr 5 ng/L     CBC and differential [[id number]]  (Abnormal) Collected: 06/29/22 1354    Lab Status: Final result Specimen: Blood from Arm, Left Updated: 06/29/22 1402     WBC 8 99 Thousand/uL      RBC 6 18 Million/uL      Hemoglobin 17 4 g/dL      Hematocrit 52 3 %      MCV 85 fL      MCH 28 2 pg      MCHC 33 3 g/dL      RDW 13 1 %      MPV 10 0 fL      Platelets 407 Thousands/uL      nRBC 0 /100 WBCs      Neutrophils Relative 64 %      Immat GRANS % 0 %      Lymphocytes Relative 30 %      Monocytes Relative 5 %      Eosinophils Relative 1 %      Basophils Relative 0 %      Neutrophils Absolute 5 67 Thousands/µL Immature Grans Absolute 0 03 Thousand/uL      Lymphocytes Absolute 2 69 Thousands/µL      Monocytes Absolute 0 44 Thousand/µL      Eosinophils Absolute 0 13 Thousand/µL      Basophils Absolute 0 03 Thousands/µL                  XR chest 1 view portable    (Results Pending)              Procedures  ECG 12 Lead Documentation Only    Date/Time: 6/29/2022 5:35 PM  Performed by: Fred Sanchez DO  Authorized by: Fred Sanchez DO     Indications / Diagnosis:  CP  Patient location:  ED  Previous ECG:     Previous ECG:  Compared to current    Similarity:  No change  Interpretation:     Interpretation: abnormal    Rate:     ECG rate:  56    ECG rate assessment: bradycardic    Rhythm:     Rhythm: sinus bradycardia    Ectopy:     Ectopy: none    Conduction:     Conduction: abnormal      Abnormal conduction: complete LBBB               ED Course  ED Course as of 06/29/22 2154 Wed Jun 29, 2022   1531 Patient refusing to be NPO  Counseled on risks of DKA  She understands but still refusing NPO  I have no legal ground to withold patients food/beverage especially since she brought her own    1623 Patient has been requesting discharge since my initial evaluation  At this point her cardiac workup is reassuring  Would still             HEART Risk Score    Flowsheet Row Most Recent Value   Heart Score Risk Calculator    History 0 Filed at: 06/29/2022 2154   ECG 1 Filed at: 06/29/2022 2154   Age 1 Filed at: 06/29/2022 2154   Risk Factors 2 Filed at: 06/29/2022 2154   Troponin 0 Filed at: 06/29/2022 2154   HEART Score 4 Filed at: 06/29/2022 2154                        SBIRT 20yo+    Flowsheet Row Most Recent Value   SBIRT (23 yo +)    In order to provide better care to our patients, we are screening all of our patients for alcohol and drug use  Would it be okay to ask you these screening questions? Yes Filed at: 06/29/2022 1346   Initial Alcohol Screen: US AUDIT-C     1   How often do you have a drink containing alcohol? 0 Filed at: 06/29/2022 1346   2  How many drinks containing alcohol do you have on a typical day you are drinking? 0 Filed at: 06/29/2022 1346   3a  Male UNDER 65: How often do you have five or more drinks on one occasion? 0 Filed at: 06/29/2022 1346   3b  FEMALE Any Age, or MALE 65+: How often do you have 4 or more drinks on one occassion? 0 Filed at: 06/29/2022 1346   Audit-C Score 0 Filed at: 06/29/2022 1346   RONNY: How many times in the past year have you    Used an illegal drug or used a prescription medication for non-medical reasons? Never Filed at: 06/29/2022 1346                    MDM  Number of Diagnoses or Management Options  Chest pain: new and requires workup  Epigastric pain: new and requires workup  Gastritis: new and requires workup  Diagnosis management comments: Patient with chest pain, will likely gastritis she does have risk factors for cardiac etiology, a history not concerning for such  Her initial troponin is reassuring at 5, repeat troponin unchanged  Her CMP is impressive with a 500 glucose  I counseled patient extensively on the dangers of such an elevated blood sugar  She understood the risks, still declining insulin and treatment in the ED  0 troponins reassuring symptoms improved after Pepcid, will give Carafate  Patient states she has Cardiology and Gastroenterology follow-up in the next 1-2 weeks  She is requesting discharge         Amount and/or Complexity of Data Reviewed  Review and summarize past medical records: yes  Independent visualization of images, tracings, or specimens: yes    Risk of Complications, Morbidity, and/or Mortality  Presenting problems: high  Diagnostic procedures: minimal  Management options: high        Disposition  Final diagnoses:   Chest pain   Epigastric pain   Gastritis     Time reflects when diagnosis was documented in both MDM as applicable and the Disposition within this note     Time User Action Codes Description Comment    6/29/2022  4:10 PM Anabel Fendt Add [R07 9] Chest pain     6/29/2022  4:10 PM Anabel Fendt Add [R10 13] Epigastric pain     6/29/2022  4:10 PM Anabel Fendt Add [K29 70] Gastritis       ED Disposition     ED Disposition   Discharge    Condition   Stable    Date/Time   Wed Jun 29, 2022  4:23 PM    Comment   Nadine Campos discharge to home/self care                 Follow-up Information     Follow up With Specialties Details Why Contact Info    Karely Alva DO Family Medicine Schedule an appointment as soon as possible for a visit   [address] [place] [id number] [facility] [address]  [phone]            Discharge Medication List as of 6/29/2022  4:32 PM      START taking these medications    Details   sucralfate (CARAFATE) 1 g/10 mL suspension Take 10 mL (1 g total) by mouth 4 (four) times a day, Starting Wed 6/29/2022, Normal         CONTINUE these medications which have NOT CHANGED    Details   albuterol (PROVENTIL HFA,VENTOLIN HFA) 90 mcg/act inhaler Inhale 2 puffs every 6 (six) hours as needed for wheezing, Historical Med      ARIPiprazole (ABILIFY) 2 mg tablet Take 2 mg by mouth daily  , Historical Med      BD Pen Needle Dayna 2nd Gen 32G X 4 MM MISC Starting Wed 1/19/2022, Historical Med      bisoprolol (ZEBETA) 5 mg tablet Take 1 tablet (5 mg total) by mouth daily, Starting Wed 1/19/2022, Normal      buPROPion (WELLBUTRIN XL) 300 mg 24 hr tablet Take 300 mg by mouth every morning  , Starting u 11/18/2021, Historical Med      diclofenac (VOLTAREN) 75 mg EC tablet Take 75 mg by mouth 2 (two) times a day  , Historical Med      ibuprofen (MOTRIN) 200 mg tablet Take 200 mg by mouth every 6 (six) hours as needed for mild pain, Historical Med      insulin glargine (Basaglar KwikPen) 100 units/mL injection pen Inject 26 Units under the skin daily at bedtime, Historical Med      insulin lispro (HumaLOG) 100 units/mL injection Inject 10 Units under the skin 3 (three) times a day before meals, Historical Med      linaCLOtide (Linzess) 290 MCG CAPS Take 1 capsule by mouth in the morning, Starting Tue 5/10/2022, Until Mon 8/8/2022, Normal      methocarbamol (ROBAXIN) 750 mg tablet Take 500 mg by mouth every 6 (six) hours as needed for muscle spasms  , Historical Med      metoclopramide (REGLAN) 5 mg/5 mL oral solution Take 5 mL (5 mg total) by mouth 3 (three) times a day, Starting Tue 3/16/2021, Normal      naproxen (NAPROSYN) 375 mg tablet Take 1 tablet (375 mg total) by mouth 2 (two) times a day with meals, Starting Sat 12/25/2021, Normal      NovoLOG FlexPen 100 units/mL injection pen Starting Fri 11/12/2021, Historical Med      nystatin (MYCOSTATIN) ointment Apply topically 2 (two) times a day, Starting Mon 10/19/2020, Normal      ondansetron (ZOFRAN) 4 mg tablet Take 1 tablet (4 mg total) by mouth every 8 (eight) hours as needed for nausea or vomiting, Starting Wed 7/28/2021, Normal      oxyCODONE (ROXICODONE) 5 immediate release tablet Take 5 mg by mouth 3 (three) times a day as needed, Starting Wed 10/27/2021, Historical Med      pantoprazole (PROTONIX) 40 mg tablet take 1 tablet by mouth twice a day, Normal      polyethylene glycol (GOLYTELY) 4000 mL solution Take 4,000 mL by mouth once for 1 dose, Starting Mon 2/7/2022, Normal      potassium chloride SA (KLOR-CON M15) 15 MEQ tablet Take 15 mEq by mouth 2 (two) times a day  , Historical Med      pregabalin (LYRICA) 75 mg capsule Take 75 mg by mouth 2 (two) times a day  , Historical Med      QUEtiapine (SEROquel) 200 mg tablet Take 150 mg by mouth daily at bedtime  , Historical Med      repaglinide (PRANDIN) 1 mg tablet Take 1 mg by mouth 3 (three) times a day before meals  , Historical Med      rosuvastatin (CRESTOR) 20 MG tablet Take 20 mg by mouth daily  , Starting Wed 11/18/2020, Historical Med      Spiriva Respimat 2 5 MCG/ACT AERS inhaler Starting Wed 11/10/2021, Historical Med      zolpidem (AMBIEN) 10 mg tablet Take 10 mg by mouth daily at bedtime as needed for sleep, Historical Med             No discharge procedures on file      PDMP Review     None          ED Provider  Electronically Signed by           Clifford Salgado DO  06/29/22 6551

## 2022-06-30 LAB
ATRIAL RATE: 56 BPM
ATRIAL RATE: 67 BPM
P AXIS: 84 DEGREES
P AXIS: 87 DEGREES
PR INTERVAL: 138 MS
PR INTERVAL: 142 MS
QRS AXIS: 114 DEGREES
QRS AXIS: 128 DEGREES
QRSD INTERVAL: 122 MS
QRSD INTERVAL: 126 MS
QT INTERVAL: 438 MS
QT INTERVAL: 470 MS
QTC INTERVAL: 453 MS
QTC INTERVAL: 462 MS
T WAVE AXIS: 56 DEGREES
T WAVE AXIS: 78 DEGREES
VENTRICULAR RATE: 56 BPM
VENTRICULAR RATE: 67 BPM

## 2022-06-30 PROCEDURE — 93010 ELECTROCARDIOGRAM REPORT: CPT | Performed by: INTERNAL MEDICINE

## 2022-06-30 RX ORDER — METOCLOPRAMIDE HYDROCHLORIDE 5 MG/5ML
5 SOLUTION ORAL 3 TIMES DAILY
Qty: 445 ML | Refills: 1 | Status: SHIPPED | OUTPATIENT
Start: 2022-06-30

## 2022-07-08 ENCOUNTER — CONSULT (OUTPATIENT)
Dept: PULMONOLOGY | Facility: CLINIC | Age: 57
End: 2022-07-08
Payer: COMMERCIAL

## 2022-07-08 ENCOUNTER — OFFICE VISIT (OUTPATIENT)
Dept: CARDIOLOGY CLINIC | Facility: CLINIC | Age: 57
End: 2022-07-08
Payer: COMMERCIAL

## 2022-07-08 VITALS
BODY MASS INDEX: 26.98 KG/M2 | HEART RATE: 68 BPM | SYSTOLIC BLOOD PRESSURE: 118 MMHG | WEIGHT: 142.8 LBS | DIASTOLIC BLOOD PRESSURE: 70 MMHG

## 2022-07-08 VITALS
OXYGEN SATURATION: 97 % | SYSTOLIC BLOOD PRESSURE: 122 MMHG | DIASTOLIC BLOOD PRESSURE: 74 MMHG | WEIGHT: 142.4 LBS | RESPIRATION RATE: 20 BRPM | HEART RATE: 64 BPM | BODY MASS INDEX: 26.88 KG/M2 | HEIGHT: 61 IN | TEMPERATURE: 98.5 F

## 2022-07-08 DIAGNOSIS — R06.02 SHORTNESS OF BREATH: Primary | ICD-10-CM

## 2022-07-08 DIAGNOSIS — I25.10 CORONARY ARTERY DISEASE INVOLVING NATIVE CORONARY ARTERY OF NATIVE HEART, UNSPECIFIED WHETHER ANGINA PRESENT: ICD-10-CM

## 2022-07-08 DIAGNOSIS — R07.9 CHEST PAIN, UNSPECIFIED TYPE: Primary | ICD-10-CM

## 2022-07-08 DIAGNOSIS — K21.9 GASTROESOPHAGEAL REFLUX DISEASE WITHOUT ESOPHAGITIS: ICD-10-CM

## 2022-07-08 DIAGNOSIS — F17.200 TOBACCO USE DISORDER, SEVERE, DEPENDENCE: ICD-10-CM

## 2022-07-08 DIAGNOSIS — E78.5 HYPERLIPIDEMIA, UNSPECIFIED HYPERLIPIDEMIA TYPE: ICD-10-CM

## 2022-07-08 DIAGNOSIS — F17.200 CURRENT SMOKER: ICD-10-CM

## 2022-07-08 DIAGNOSIS — E66.3 OVERWEIGHT: ICD-10-CM

## 2022-07-08 PROCEDURE — 99204 OFFICE O/P NEW MOD 45 MIN: CPT | Performed by: INTERNAL MEDICINE

## 2022-07-08 PROCEDURE — 99214 OFFICE O/P EST MOD 30 MIN: CPT | Performed by: INTERNAL MEDICINE

## 2022-07-08 RX ORDER — NALOXONE HYDROCHLORIDE 4 MG/.1ML
SPRAY NASAL
COMMUNITY
Start: 2022-05-06

## 2022-07-08 RX ORDER — NICOTINE 21 MG/24HR
1 PATCH, TRANSDERMAL 24 HOURS TRANSDERMAL EVERY 24 HOURS
Qty: 28 PATCH | Refills: 0 | Status: SHIPPED | OUTPATIENT
Start: 2022-07-08

## 2022-07-08 RX ORDER — NITROGLYCERIN 0.4 MG/1
0.4 TABLET SUBLINGUAL
Qty: 25 TABLET | Refills: 0 | Status: SHIPPED | OUTPATIENT
Start: 2022-07-08

## 2022-07-08 NOTE — ASSESSMENT & PLAN NOTE
The patient currently smokes 1 PPD  We discussed the deleterious effects of tobacco on the body including risk of MI, PVD, cancer, etc    We discussed the need to quit  At this time, the patient is motivated to quit  We discussed options for quitting including Chantix, nicotine replacement, Wellbutrin, hypnosis  We discussed quit strategies and craving management, habit breaking, coping mechanisms  I provided pt with information for [id number] and written information about quitting  I advised the pt to pick a "quit date"  Will follow up again at next visit  In total, I spent 6 minutes discussing smoking cessation  The patient qualifies for a yearly screening CT scan based on smoking history  I ordered this    I ordered nicotine patches  She also plans on starting chantix which was prescribed by another provider

## 2022-07-08 NOTE — PROGRESS NOTES
Pulmonary Outpatient Note   Sujey Spaulding 49 y o  female MRN: [id number]  7/8/2022      Reason for Consultation:    Chief Complaint   Patient presents with    COPD         Assessment/Plan:    1  Shortness of breath  Assessment & Plan:  Carries a diagnosis of COPD, is a chronic smoker, however prior PFTs with predominantly a restrictive pattern  No history of hospitalizations or exacerbations requiring prednisone    Dyspnea with  meters of walking  +chronic cough  Currently smoking 1 PPD      Plan  Smoking cessation efforts  Trial of Anoro- if it helps her will prescribe this and discontinue Spiriva  Albuterol PRN  Vaccinations- recommended COVID, seasonal influenza- patient does not want these  She reports she had the "pneumonia" vaccine  Update PFTs with 6MW    Orders:  -     Complete PFT with post Bronchodilator and Six Minute walk; Future  -     umeclidinium-vilanterol (Anoro Ellipta) 62 5-25 MCG/INH inhaler; Inhale 1 puff daily for 14 days  -     nicotine (NICODERM CQ) 21 mg/24 hr TD 24 hr patch; Place 1 patch on the skin every 24 hours    2  Tobacco use disorder, severe, dependence  Assessment & Plan:  The patient currently smokes 1 PPD  We discussed the deleterious effects of tobacco on the body including risk of MI, PVD, cancer, etc    We discussed the need to quit  At this time, the patient is motivated to quit  We discussed options for quitting including Chantix, nicotine replacement, Wellbutrin, hypnosis  We discussed quit strategies and craving management, habit breaking, coping mechanisms  I provided pt with information for 1800QUITNOW and written information about quitting  I advised the pt to pick a "quit date"  Will follow up again at next visit  In total, I spent 6 minutes discussing smoking cessation  The patient qualifies for a yearly screening CT scan based on smoking history  I ordered this    I ordered nicotine patches    She also plans on starting chantix which was prescribed by another provider  Orders:  -     Complete PFT with post Bronchodilator and Six Minute walk; Future  -     nicotine (NICODERM CQ) 21 mg/24 hr TD 24 hr patch; Place 1 patch on the skin every 24 hours  -     CT lung screening program; Future; Expected date: 2022        Health Maintenance    There is no immunization history on file for this patient  Report has had Pneumovax before  Does not want COVID vaccine  Does not get influenza vaccine    Return in about 4 months (around 2022)  History of Present Illness   HPI:  Hoda Hein is a 49 y o  female who has a history of COPD, tobacco use disorder, chronic systolic heart failure EF 40-45%, NICMP, non obstructive CAD, Type 2 diabetes, GERD, chronic pain in chest and back on chronic oxycodone, nocturnal hypoxemia (on 2 lpm), hyperlipidemia, depression, anxiety presents to establish pulmonary care with Farshad Wilson  Diagnosed with COPD 4-5 years ago- was following with [facility]  Had PFTs in 2019- primarily showed restrictive pattern  Has been maintained Spiriva 2 5 mcg 2 puffs a day (respimat) and albuterol as needed  Uses rescue inhaler infrequently  Never hospitalized for COPD  Never required prednisone  She is short of breath every day  Can walk around 50 feet before stopping, issues with climbing a flight of stairs  Coughs "all the time"  Sometimes with mucus production- white  No hemoptysis  Has lost weight intentionally- around 28 lbs over the last year or two  Her   last year  She struggles with anxiety and depression  Quit for 4 months in 2018 with chantix and wellbutrin also used vaping  Smokes 1 PPD for 48 years since age 12    She had a sleep study years ago- was told she does not have sleep apnea but does have hypoxemia- on 2 lpm of O2 at night    Has chronic insomnia - uses ambien and melatonin  Does not smoke around oxygen tank    Occasionally gets     Cardiologist recently prescribed     Pulmonary history:    Childhood lung disease/premature birth: No    Family hx of lung disease: Father had lung cancer    # of ED/hospitalizations this year: None    Autoimmune history:    Dysphagia/Reflux: Yes- takes PPI    Leg swelling: No    Exposure history:    Exposure to pets/birds/farm animals: allergic to cats, - has a cat and dog  No birds  Carpets/Mold/Roaches:No carpeting  No pests  No mole      Medications, herbs, supplements: Rare marijuana- medical    Social history:    Smoking: per above    Alcohol, ilicit drugs (inhalational/ IV): No    Worked as:  Caretaker, owns a hot dog cart    Occupational gas/asbestos/silica/chemicals/bio-fuels: No      Review of Systems   Constitutional: Negative for chills and fever  HENT: Negative for ear pain and sore throat  Eyes: Negative for pain and visual disturbance  Respiratory: Positive for cough and shortness of breath  Negative for chest tightness and wheezing  Cardiovascular: Negative for chest pain and palpitations  Gastrointestinal: Negative for abdominal pain and vomiting  Genitourinary: Negative for dysuria and hematuria  Musculoskeletal: Negative for arthralgias and back pain  Skin: Negative for color change and rash  Neurological: Negative for seizures and syncope  All other systems reviewed and are negative            Historical Information   Past Medical History:   Diagnosis Date    Angina pectoris ([place] [id number] )     recent    Anxiety     Arthritis     Asthma     Carpal tunnel syndrome     Chronic headaches     [facility]     Diabetes [facility] )     Diabetes mellitus ([facility] )     GERD (gastroesophageal reflux disease)     Headache     Hyperlipidemia     Hypertension     Kidney stone     Left bundle branch block     LBBB    MI (myocardial infarction) ([facility] )     Myocardial infarction ([facility] ) 2014/2016    Neuropathy     PTSD (post-traumatic stress disorder)     RLS (restless legs syndrome)     Shortness of breath      Past Surgical History:   Procedure Laterality Date    ANGIOPLASTY      BREAST SURGERY  2016    Reduction    CARDIAC CATHETERIZATION  2018     Mid LAD: There was a tubular 40 % stenosis     SECTION      COLONOSCOPY      HYSTERECTOMY  2018    Complete    KIDNEY STONE SURGERY      LAPAROSCOPY      OK COLONOSCOPY FLX DX W/COLLJ SPEC WHEN PFRMD N/A 2017    Procedure: EGD AND COLONOSCOPY;  Surgeon: Micah Treviño MD;  Location: [facility]; Service: Gastroenterology    OK ESOPHAGOGASTRODUODENOSCOPY TRANSORAL DIAGNOSTIC N/A 10/10/2018    Procedure: ESOPHAGOGASTRODUODENOSCOPY (EGD); Surgeon: Micah Treviño MD;  Location: [facility];   Service: Gastroenterology    OK LAPAROSCOPY W TOT HYSTERECTUTERUS <=250 Gerri Shaffer TUBE/OVARY N/A 2018    Procedure: ROBOTIC ASSISTED TOTAL LAPAROSCOPIC HYSTERECTOMY, BILATERAL SALPINGOOPHERECTOMY,;  Surgeon: Jeanella Peabody, MD;  [facility];  Service: Gynecology Oncology    UPPER GASTROINTESTINAL ENDOSCOPY      WRIST SURGERY Right      Family History   Problem Relation Age of Onset    Diabetes Mother     Breast cancer Mother 39    Lung cancer Father 47    Diabetes Sister     Brain cancer Maternal Aunt 67    Breast cancer Other 25             Meds/Allergies     Current Outpatient Medications:     albuterol (PROVENTIL HFA,VENTOLIN HFA) 90 mcg/act inhaler, Inhale 2 puffs every 6 (six) hours as needed for wheezing, Disp: , Rfl:     ARIPiprazole (ABILIFY) 2 mg tablet, Take 2 mg by mouth daily  , Disp: , Rfl:     BD Pen Needle Dayna 2nd Gen 32G X 4 MM MISC, , Disp: , Rfl:     bisoprolol (ZEBETA) 5 mg tablet, Take 1 tablet (5 mg total) by mouth daily, Disp: 90 tablet, Rfl: 1    buPROPion (WELLBUTRIN XL) 300 mg 24 hr tablet, Take 300 mg by mouth every morning  , Disp: , Rfl:     diclofenac (VOLTAREN) 75 mg EC tablet, Take 75 mg by mouth 2 (two) times a day  , Disp: , Rfl:     ibuprofen (MOTRIN) 200 mg tablet, Take 200 mg by mouth every 6 (six) hours as needed for mild pain, Disp: , Rfl:     insulin glargine (LANTUS SOLOSTAR) 100 units/mL injection pen, Inject 26 Units under the skin daily at bedtime, Disp: , Rfl:     insulin lispro (HumaLOG) 100 units/mL injection, Inject 10 Units under the skin 3 (three) times a day before meals, Disp: , Rfl:     linaCLOtide (Linzess) 290 MCG CAPS, Take 1 capsule by mouth in the morning, Disp: 90 capsule, Rfl: 3    methocarbamol (ROBAXIN) 750 mg tablet, Take 500 mg by mouth every 6 (six) hours as needed for muscle spasms  , Disp: , Rfl:     metoclopramide (REGLAN) 5 mg/5 mL oral solution, Take 5 mL (5 mg total) by mouth 3 (three) times a day, Disp: 445 mL, Rfl: 1    naproxen (NAPROSYN) 375 mg tablet, Take 1 tablet (375 mg total) by mouth 2 (two) times a day with meals, Disp: 20 tablet, Rfl: 0    nicotine (NICODERM CQ) 21 mg/24 hr TD 24 hr patch, Place 1 patch on the skin every 24 hours, Disp: 28 patch, Rfl: 0    nitroglycerin (NITROSTAT) 0 4 mg SL tablet, Place 1 tablet (0 4 mg total) under the tongue every 5 (five) minutes as needed for chest pain, Disp: 25 tablet, Rfl: 0    NovoLOG FlexPen 100 units/mL injection pen, , Disp: , Rfl:     ondansetron (ZOFRAN) 4 mg tablet, Take 1 tablet (4 mg total) by mouth every 8 (eight) hours as needed for nausea or vomiting, Disp: 20 tablet, Rfl: 0    oxyCODONE (ROXICODONE) 5 immediate release tablet, Take 5 mg by mouth 3 (three) times a day as needed, Disp: , Rfl:     pantoprazole (PROTONIX) 40 mg tablet, take 1 tablet by mouth twice a day, Disp: 60 tablet, Rfl: 1    polyethylene glycol (GOLYTELY) 4000 mL solution, Take 4,000 mL by mouth once for 1 dose, Disp: 4000 mL, Rfl: 0    potassium chloride SA (KLOR-CON M15) 15 MEQ tablet, Take 15 mEq by mouth 2 (two) times a day  , Disp: , Rfl:     pregabalin (LYRICA) 75 mg capsule, Take 75 mg by mouth 2 (two) times a day  , Disp: , Rfl:     repaglinide (PRANDIN) 1 mg tablet, Take 1 mg by mouth 3 (three) times a day before meals  , Disp: , Rfl:     rosuvastatin (CRESTOR) 20 MG tablet, Take 20 mg by mouth daily  , Disp: , Rfl:     Spiriva Respimat 2 5 MCG/ACT AERS inhaler, , Disp: , Rfl:     sucralfate (CARAFATE) 1 g/10 mL suspension, Take 10 mL (1 g total) by mouth 4 (four) times a day, Disp: 414 mL, Rfl: 0    umeclidinium-vilanterol (Anoro Ellipta) 62 5-25 MCG/INH inhaler, Inhale 1 puff daily for 14 days, Disp: 28 blister, Rfl: 0    varenicline (CHANTIX SALEEM) 0 5 MG X 11 & 1 MG X 42 tablet, Take one 0 5 mg tablet by mouth once daily for 3 days, then one 0 5 mg tablet by mouth twice daily for 4 days, then one 1 mg tablet by mouth twice daily  , Disp: 53 tablet, Rfl: 0    zolpidem (AMBIEN) 10 mg tablet, Take 10 mg by mouth daily at bedtime as needed for sleep, Disp: , Rfl:     naloxone (NARCAN) 4 mg/0 1 mL nasal spray, ADMINISTER A SINGLE SPRAY OF NARCAN IN ONE NOSTRIL REPEAT AFTER 3 MINUTES IF NO OR MINIMAL RESPONSE (Patient not taking: No sig reported), Disp: , Rfl:     nystatin (MYCOSTATIN) ointment, Apply topically 2 (two) times a day (Patient not taking: No sig reported), Disp: 30 g, Rfl: 0    QUEtiapine (SEROquel) 200 mg tablet, Take 150 mg by mouth daily at bedtime   (Patient not taking: No sig reported), Disp: , Rfl:   Allergies   Allergen Reactions    Lisinopril Cough    Losartan Dizziness    Other Hives     Surgical tape    Prednisone Other (See Comments)     Thrush         Vitals: Blood pressure 122/74, pulse 64, temperature 98 5 °F (36 9 °C), temperature source Tympanic, resp  rate 20, height 5' 1" (1 549 m), weight 64 6 kg (142 lb 6 4 oz), SpO2 97 %  Body mass index is 26 91 kg/m²  Oxygen Therapy  SpO2: 97 %      Physical Exam  Physical Exam  Vitals and nursing note reviewed  Constitutional:       General: She is not in acute distress  Appearance: She is well-developed  HENT:      Head: Normocephalic and atraumatic     Eyes:      Conjunctiva/sclera: Conjunctivae normal    Cardiovascular:      Rate and Rhythm: Normal rate and regular rhythm  Heart sounds: No murmur heard  Pulmonary:      Effort: Pulmonary effort is normal  No respiratory distress  Breath sounds: Normal breath sounds  Abdominal:      Palpations: Abdomen is soft  Tenderness: There is no abdominal tenderness  Musculoskeletal:         General: No swelling  Cervical back: Neck supple  Right lower leg: No edema  Left lower leg: No edema  Skin:     General: Skin is warm and dry  Neurological:      General: No focal deficit present  Mental Status: She is alert and oriented to person, place, and time  Psychiatric:         Mood and Affect: Mood normal          Behavior: Behavior normal          Labs: I have personally reviewed pertinent lab results  ABG: No results found for: PHART, LVN0OAY, PO2ART, ECB7EFN, M9BTQXXA, BEART, SOURCE,   BNP: No results found for: BNP,   CBC:  Lab Results   Component Value Date    WBC 8 99 06/29/2022    HGB 17 4 (H) 06/29/2022    HCT 52 3 (H) 06/29/2022    MCV 85 06/29/2022     06/29/2022    EOSPCT 1 06/29/2022    EOSABS 0 13 06/29/2022    NEUTOPHILPCT 64 06/29/2022    LYMPHOPCT 30 06/29/2022   ,   CMP:   Lab Results   Component Value Date    SODIUM 132 (L) 06/29/2022    K 3 9 06/29/2022    CL 92 (L) 06/29/2022    CO2 30 06/29/2022    BUN 20 06/29/2022    CREATININE 0 83 06/29/2022    CALCIUM 9 6 06/29/2022    AST 17 06/29/2022    ALT 20 06/29/2022    ALKPHOS 109 (H) 06/29/2022    EGFR 80 06/29/2022   ,   PT/INR:   Lab Results   Component Value Date    INR 0 98 10/18/2019   ,   Troponin:   Lab Results   Component Value Date    TROPONINI <0 02 06/03/2021         Imaging and other studies: I have personally reviewed pertinent reports     and I have personally reviewed pertinent films in PACS    CXR 6/29/22- lungs clear    CT A/P 6/2021- LOWER CHEST:  No clinically significant abnormality identified in the visualized lower chest     Pulmonary function testing:   Pulmonary Functions Testing Results:    PFT 2019  Results:  FEV1/FVC Ratio: 89 %  Forced Vital Capacity:  1 65 L, 53% predicted  FEV1: 1 23 L, 48 % predicted  After administration of bronchodilator FEV1: 1 52 L, 61% predicted, with an appreciable response to the bronchodilator     Lung volumes by  nitrogen wash out : Total Lung Capacity 81 % predicted Residual volume  108% predicted     DLCO corrected for patients hemoglobin level:73  %     Interpretation:     Patient had a full lung function testing with spirometry lung volumes and DLCO  Patient gave a good effort  Results meet the ATS standards for acceptability and repeatability  The flow volume curve predominantly demonstrates a restrictive pattern  Spirometry predominantly demonstrates a restrictive pattern with decreased FVC and FEV1 and a normal FEV1 over FVC ratio  Spirometry also demonstrating moderate obstructive ventilatory limitation with an appreciable response to the bronchodilator  The lung volumes are mildly decreased  The residual volume is increased consistent with hyperinflation and air trapping  The DLCO is mildly decreased        EKG, Pathology, and Other Studies: I have personally reviewed pertinent reports  TTE 1/19/22    Left Ventricle: Left ventricular cavity size is normal  Wall thickness is mildly increased  The left ventricular ejection fraction is 45%  Systolic function is mildly reduced  There is mild global hypokinesis  Diastolic function is mildly abnormal, consistent with grade I (abnormal) relaxation    IVS: There is abnormal septal motion consistent with left bundle branch block  FREDY Quach    [facility]

## 2022-07-08 NOTE — ASSESSMENT & PLAN NOTE
Carries a diagnosis of COPD, is a chronic smoker, however prior PFTs with predominantly a restrictive pattern  No history of hospitalizations or exacerbations requiring prednisone    Dyspnea with  meters of walking  +chronic cough  Currently smoking 1 PPD      Plan  Smoking cessation efforts  Trial of Anoro- if it helps her will prescribe this and discontinue Spiriva  Albuterol PRN  Vaccinations- recommended COVID, seasonal influenza- patient does not want these  She reports she had the "pneumonia" vaccine    Update PFTs with 6MW

## 2022-07-12 NOTE — PROGRESS NOTES
New Patient Progress Note      Chief Complaint   Patient presents with    Diabetes Type 2     Dm 2 dx 2015         History of Present Illness:   Minnie Magallon is a 49 y o  female with HTN, HLD, and T2DM presenting today to establish care  Past medical history significant for GERD, gastroparesis, COPD, myocardial infarction, neuropathy, and tobacco abuse  Current regimen:   Lantus 26 units  Humalog 10-10-10 (unsure if using Humalog or NovoLog)  Prandin 1 mg three times daily before meals    Type: T2DM    Onset: approximately 2015 through routine blood work    Family history: + T2DM mother, sister, brother    Previous Medications: Prandin, Victoza, glyburide, Humulin R, Metformin- constipation/diarrhea    Blood Sugars:  Does not check consistently    HgA1C is 13%    Home blood glucose readings:     Current   Last night 274    Meter: Accuchek guide    Diet- "very picky eater"  Typically only eating daily  Reports frequent constipation and diarrhea  Is following with GI  Exercise:  Sedentary; too much chronic pain    Influenza vaccine: Refuses    Pneumovax: Refuses    COVID-19:  Refuses    Ophthalmology: September 2022- denies     Podiatry/Foot care: + neuropathy    Dentist: [email or web address]    Diabetes education/nutrition: referral given    Hypertension, she is taking 5 mg bisoprolol and 1 mg prazosin daily  She denies headache and orthostatic hypotension  For hyperlipidemia, she is taking 20 mg of rosuvastatin nightly  She denies statin induced myalgias    Patient Active Problem List   Diagnosis    Facet arthropathy, lumbosacral    Lumbar spondylosis    Chronic midline low back pain with bilateral sciatica    Status post robotic assisted total laparoscopic hysterectomy, bilateral salpingo-oophorectomy    Medical marijuana use    Other chronic pain    Gastroesophageal reflux disease with esophagitis    Bilious vomiting with nausea    Esophageal dysphagia    Gastroparesis due to DM ([facility] [address] )  Fatty liver    Drug-induced constipation    Elevated liver enzymes    Hoarseness    LBBB (left bundle branch block)    Hyperlipidemia    Obesity (BMI 30 0-34  9)    Common migraine without aura    Type 2 diabetes mellitus ([place] [address] )    Hyperglycemia due to diabetes mellitus ([place] [address] )    Glucosuria    Overweight    COPD [facility] obstructive [facility] disease) [facility]    Tobacco use disorder, severe, dependence      Past Medical History:   Diagnosis Date    Angina pectoris ([facility])     recent    Anxiety     Arthritis     Asthma     Carpal tunnel syndrome     Chronic headaches     COPD (chronic obstructive pulmonary disease) ([facility])     Diabetes ([place] [address] )     Diabetes mellitus ([place] [address] )     GERD (gastroesophageal reflux disease)     Headache     Hyperlipidemia     Hypertension     Kidney stone     Left bundle branch block     LBBB    MI (myocardial infarction) ([place] [address] )     Myocardial infarction ([place] [address] )     Neuropathy     PTSD (post-traumatic stress disorder)     RLS (restless legs syndrome)     Shortness of breath       Past Surgical History:   Procedure Laterality Date    ANGIOPLASTY      BREAST SURGERY  2016    Reduction    CARDIAC CATHETERIZATION  2018     Mid LAD: There was a tubular 40 % stenosis     SECTION      COLONOSCOPY      HYSTERECTOMY  2018    Complete    KIDNEY STONE SURGERY      LAPAROSCOPY      IA COLONOSCOPY FLX DX W/COLLJ SPEC WHEN PFRMD N/A 2017    Procedure: EGD AND COLONOSCOPY;  Surgeon: Alma Martinez MD;  Location: [facility]; Service: Gastroenterology    IA ESOPHAGOGASTRODUODENOSCOPY TRANSORAL DIAGNOSTIC N/A 10/10/2018    Procedure: ESOPHAGOGASTRODUODENOSCOPY (EGD); Surgeon: Alma Martinez MD;  Location: [facility];   Service: Gastroenterology    IA LAPAROSCOPY W TOT HYSTERECTUTERUS <=[address] TUBE/OVARY N/A 2018    Procedure: ROBOTIC ASSISTED TOTAL LAPAROSCOPIC HYSTERECTOMY, BILATERAL SALPINGOOPHERECTOMY,;  Surgeon: Lorrie Jennings Eliot Magaña MD;  [facility];  Service: Gynecology Oncology    UPPER GASTROINTESTINAL ENDOSCOPY      WRIST SURGERY Right       Family History   Problem Relation Age of Onset    Diabetes Mother    Lilli Blancas Breast cancer Mother 39    Lung cancer Father 47    Diabetes Sister     Brain cancer Maternal Aunt 67    Breast cancer Other 25     Social History     Tobacco Use    Smoking status: Current Every Day Smoker     Packs/day: 1 00     Years: 48 00     Pack years: 48 00     Start date: 1/1/1973    Smokeless tobacco: Never Used    Tobacco comment: pt  smoked this am 10/10   Substance Use Topics    Alcohol use: No     Comment: Rarely consumes alcohol - As per [facility]      Allergies   Allergen Reactions    Lisinopril Cough    Losartan Dizziness    Other Hives     Surgical tape    Prednisone Other (See Comments)     Thrush           Current Outpatient Medications:     albuterol (PROVENTIL HFA,VENTOLIN HFA) 90 mcg/act inhaler, Inhale 2 puffs every 6 (six) hours as needed for wheezing, Disp: , Rfl:     ARIPiprazole (ABILIFY) 2 mg tablet, Take 2 mg by mouth daily  , Disp: , Rfl:     BD Pen Needle Dayna 2nd Gen 32G X 4 MM MISC, , Disp: , Rfl:     bisoprolol (ZEBETA) 5 mg tablet, Take 1 tablet (5 mg total) by mouth daily, Disp: 90 tablet, Rfl: 1    buPROPion (WELLBUTRIN XL) 300 mg 24 hr tablet, Take 300 mg by mouth every morning  , Disp: , Rfl:     diclofenac (VOLTAREN) 75 mg EC tablet, Take 75 mg by mouth 2 (two) times a day  , Disp: , Rfl:     ibuprofen (MOTRIN) 200 mg tablet, Take 200 mg by mouth every 6 (six) hours as needed for mild pain, Disp: , Rfl:     insulin glargine (LANTUS SOLOSTAR) 100 units/mL injection pen, Inject 30 Units under the skin daily at bedtime, Disp: 15 mL, Rfl: 1    linaCLOtide (Linzess) 290 MCG CAPS, Take 1 capsule by mouth in the morning, Disp: 90 capsule, Rfl: 3    metFORMIN (GLUCOPHAGE-XR) 500 mg 24 hr tablet, Take 1 tablet (500 mg total) by mouth 2 (two) times a day with meals, Disp: 360 tablet, Rfl: 0    methocarbamol (ROBAXIN) 750 mg tablet, Take 500 mg by mouth every 6 (six) hours as needed for muscle spasms  , Disp: , Rfl:     metoclopramide (REGLAN) 5 mg/5 mL oral solution, Take 5 mL (5 mg total) by mouth 3 (three) times a day, Disp: 445 mL, Rfl: 1    naloxone (NARCAN) 4 mg/0 1 mL nasal spray, ADMINISTER A SINGLE SPRAY OF NARCAN IN ONE NOSTRIL REPEAT AFTER 3 MINUTES IF NO OR MINIMAL RESPONSE, Disp: , Rfl:     naproxen (NAPROSYN) 375 mg tablet, Take 1 tablet (375 mg total) by mouth 2 (two) times a day with meals, Disp: 20 tablet, Rfl: 0    nitroglycerin (NITROSTAT) 0 4 mg SL tablet, Place 1 tablet (0 4 mg total) under the tongue every 5 (five) minutes as needed for chest pain, Disp: 25 tablet, Rfl: 0    ondansetron (ZOFRAN) 4 mg tablet, Take 1 tablet (4 mg total) by mouth every 8 (eight) hours as needed for nausea or vomiting, Disp: 20 tablet, Rfl: 0    oxyCODONE (ROXICODONE) 5 immediate release tablet, Take 5 mg by mouth 3 (three) times a day as needed, Disp: , Rfl:     pantoprazole (PROTONIX) 40 mg tablet, take 1 tablet by mouth twice a day, Disp: 60 tablet, Rfl: 1    potassium chloride SA (KLOR-CON M15) 15 MEQ tablet, Take 15 mEq by mouth 2 (two) times a day  , Disp: , Rfl:     prazosin (MINIPRESS) 1 mg capsule, , Disp: , Rfl:     pregabalin (LYRICA) 75 mg capsule, Take 75 mg by mouth 2 (two) times a day  , Disp: , Rfl:     rosuvastatin (CRESTOR) 20 MG tablet, Take 20 mg by mouth daily  , Disp: , Rfl:     Spiriva Respimat 2 5 MCG/ACT AERS inhaler, , Disp: , Rfl:     sucralfate (CARAFATE) 1 g/10 mL suspension, Take 10 mL (1 g total) by mouth 4 (four) times a day, Disp: 414 mL, Rfl: 0    umeclidinium-vilanterol (Anoro Ellipta) 62 5-25 MCG/INH inhaler, Inhale 1 puff daily for 14 days, Disp: 28 blister, Rfl: 0    varenicline (CHANTIX SALEEM) 0 5 MG X 11 & 1 MG X 42 tablet, Take one 0 5 mg tablet by mouth once daily for 3 days, then one 0 5 mg tablet by mouth twice daily for 4 days, then one 1 mg tablet by mouth twice daily  , Disp: 53 tablet, Rfl: 0    zolpidem (AMBIEN) 10 mg tablet, Take 10 mg by mouth daily at bedtime as needed for sleep, Disp: , Rfl:     insulin lispro (HumaLOG) 100 units/mL injection, Inject 10 Units under the skin 3 (three) times a day before meals (Patient not taking: Reported on 7/13/2022), Disp: , Rfl:     nicotine (NICODERM CQ) 21 mg/24 hr TD 24 hr patch, Place 1 patch on the skin every 24 hours (Patient not taking: Reported on 7/13/2022), Disp: 28 patch, Rfl: 0    NovoLOG FlexPen 100 units/mL injection pen, , Disp: , Rfl:     polyethylene glycol (GOLYTELY) 4000 mL solution, Take 4,000 mL by mouth once for 1 dose, Disp: 4000 mL, Rfl: 0    QUEtiapine (SEROquel) 200 mg tablet, Take 150 mg by mouth daily at bedtime   (Patient not taking: No sig reported), Disp: , Rfl:     Review of Systems   Constitutional: Positive for fatigue  Negative for activity change, appetite change and unexpected weight change  HENT: Negative for dental problem, sore throat, trouble swallowing and voice change  Eyes: Positive for visual disturbance  Respiratory: Negative for cough, chest tightness and shortness of breath  Cardiovascular: Negative for chest pain, palpitations and leg swelling  Gastrointestinal: Negative for constipation, diarrhea, nausea and vomiting  Endocrine: Positive for polydipsia, polyphagia and polyuria  Genitourinary: Negative for frequency  Musculoskeletal: Positive for arthralgias, back pain, gait problem and myalgias  Skin: Negative for wound  Allergic/Immunologic: Positive for environmental allergies  Negative for food allergies  Neurological: Positive for numbness  Negative for dizziness, weakness, light-headedness and headaches  Hematological: Does not bruise/bleed easily  Psychiatric/Behavioral: Positive for sleep disturbance  Negative for decreased concentration and dysphoric mood   The patient is not nervous/anxious  Reports poor memory  Physical Exam:  Body mass index is 26 68 kg/m²  /78   Pulse 68   Resp 20   Ht 5' 1" (1 549 m)   Wt 64 kg (141 lb 3 2 oz)   SpO2 98%   BMI 26 68 kg/m²    Wt Readings from Last 3 Encounters:   07/13/22 64 kg (141 lb 3 2 oz)   07/08/22 64 6 kg (142 lb 6 4 oz)   07/08/22 64 8 kg (142 lb 12 8 oz)       Physical Exam  Vitals reviewed  Constitutional:       General: She is not in acute distress  Appearance: She is well-developed  She is not ill-appearing  HENT:      Head: Normocephalic and atraumatic  Eyes:      Pupils: Pupils are equal, round, and reactive to light  Neck:      Thyroid: No thyromegaly  Cardiovascular:      Rate and Rhythm: Normal rate and regular rhythm  Pulses: Normal pulses  Heart sounds: Normal heart sounds  Pulmonary:      Effort: Pulmonary effort is normal       Breath sounds: Normal breath sounds  Abdominal:      General: Bowel sounds are normal  There is no distension  Palpations: Abdomen is soft  Tenderness: There is no abdominal tenderness  Musculoskeletal:      Cervical back: Normal range of motion and neck supple  Right lower leg: No edema  Left lower leg: No edema  Lymphadenopathy:      Cervical: No cervical adenopathy  Skin:     General: Skin is warm and dry  Capillary Refill: Capillary refill takes less than 2 seconds  Neurological:      Mental Status: She is alert and oriented to person, place, and time  Gait: Gait normal    Psychiatric:         Mood and Affect: Mood normal          Behavior: Behavior normal       Comments: Poor insight into diagnosis             Labs:   Lab Results   Component Value Date    HGBA1C 13 1 (A) 07/13/2022    HGBA1C 8 0 (H) 04/26/2018     Lab Results   Component Value Date    CREATININE 0 83 06/29/2022    CREATININE 0 85 12/25/2021    CREATININE 0 89 06/03/2021    BUN 20 06/29/2022    K 3 9 06/29/2022    CL 92 (L) 06/29/2022    CO2 30 06/29/2022     eGFR   Date Value Ref Range Status   06/29/2022 80 ml/min/1 73sq m Final     No results found for: CHOL, HDL, LDL, TRIG, CHOLHDL  Lab Results   Component Value Date    ALT 20 06/29/2022    AST 17 06/29/2022    ALKPHOS 109 (H) 06/29/2022     No results found for: UWS8MYRTONYG  No results found for: FREET4, TSI    Impression & Plan:    Problem List Items Addressed This Visit        Digestive    Fatty liver       Endocrine    Type 2 diabetes mellitus ([facility] [address] ) - Primary     Patient is completely uncontrolled  Counseled on pathophysiology of diabetes  Counseled on negative, long-term effects associated with uncontrolled diabetes including neuropathy, nephropathy, retinopathy, heart attack, and stroke  Instructed patient to test blood sugar 3-4x daily  Will work on acquiring her a CGM  The patient is a candidate for CGM use: Indications: Diabetes Type 2  The patient is treated with 3 or more injections of insulin daily  SMBG data is being used to make adjustments to the insulin regimen    Resume Metformin  Take 500 mg XR BID  If patient tolerates this, will increase to 1,000 mg BID  Increase Lantus to 30 units nightly  Counseled on mechanism of action  Strongly encouraged patient to inject 10 units of humalog prior to meals  Instructed her to inject 15 minutes prior to starting to eat  Patient's diet presents a significant barrier to glycemic control  Referral given for MNT  Encouraged patient to increase physical activity as tolerated  Repeat labs prior to next appointment 3 months    Lab Results   Component Value Date    HGBA1C 13 1 (A) 07/13/2022              Relevant Medications    metFORMIN (GLUCOPHAGE-XR) 500 mg 24 hr tablet    insulin glargine (LANTUS SOLOSTAR) 100 units/mL injection pen    Other Relevant Orders    Ambulatory referral to Diabetic Education    Hemoglobin A1C    Comprehensive metabolic panel    Lipid Panel with Direct LDL reflex    Microalbumin / creatinine urine ratio    POCT hemoglobin A1c (Completed)       Other    Hyperlipidemia     Check fasting lipid panel  Continue statin  Other Visit Diagnoses     Essential (primary) hypertension        Relevant Medications    prazosin (MINIPRESS) 1 mg capsule    Diabetes mellitus type 2 in obese (HCC)        Relevant Medications    metFORMIN (GLUCOPHAGE-XR) 500 mg 24 hr tablet    insulin glargine (LANTUS SOLOSTAR) 100 units/mL injection pen          Orders Placed This Encounter   Procedures    Hemoglobin A1C     Standing Status:   Future     Standing Expiration Date:   7/13/2023    Comprehensive metabolic panel     This is a patient instruction: Patient fasting for 8 hours or longer recommended  Standing Status:   Future     Standing Expiration Date:   7/13/2023    Lipid Panel with Direct LDL reflex     This is a patient instruction: This test requires patient fasting for 10-12 hours or longer  Drinking of black coffee or black tea is acceptable  Standing Status:   Future     Standing Expiration Date:   7/13/2023    Microalbumin / creatinine urine ratio     Standing Status:   Future     Standing Expiration Date:   7/13/2023    Ambulatory referral to Diabetic Education     Standing Status:   Future     Standing Expiration Date:   7/13/2023     Referral Priority:   Routine     Referral Type:   Consult - AMB     Referral Reason:   Specialty Services Required     Requested Specialty:   Diabetes Services     Number of Visits Requested:   1     Expiration Date:   7/13/2023    POCT hemoglobin A1c       Patient Instructions   1  Restart metformin  Take 500 mg twice daily  Better to take with food  2  Increase Lantus to 30 units nightly  3  Always take the humalog before a meal  Inject 10 units of humalog approximately 15 minutes prior to eating  4  We will work on getting you a continuous glucose monitoring   5  Schedule an appointment with diabetes educator     6  Call us when you get home to let us know if you are taking humalog or novolog  7  Get labs done prior to next appointment  Discussed with the patient and all questioned fully answered  She will call me if any problems arise  Follow-up appointment in 3 months       Counseled patient on diagnostic results, prognosis, risk and benefit of treatment options, instruction for management, importance of treatment compliance, Risk  factor reduction and impressions    NEELAM Romano

## 2022-07-13 ENCOUNTER — CONSULT (OUTPATIENT)
Dept: ENDOCRINOLOGY | Facility: CLINIC | Age: 57
End: 2022-07-13
Payer: COMMERCIAL

## 2022-07-13 VITALS
BODY MASS INDEX: 26.66 KG/M2 | DIASTOLIC BLOOD PRESSURE: 78 MMHG | WEIGHT: 141.2 LBS | SYSTOLIC BLOOD PRESSURE: 126 MMHG | HEIGHT: 61 IN | OXYGEN SATURATION: 98 % | HEART RATE: 68 BPM | RESPIRATION RATE: 20 BRPM

## 2022-07-13 DIAGNOSIS — Z79.4 TYPE 2 DIABETES MELLITUS WITH HYPERGLYCEMIA, WITH LONG-TERM CURRENT USE OF INSULIN (HCC): Primary | ICD-10-CM

## 2022-07-13 DIAGNOSIS — K76.0 FATTY LIVER: ICD-10-CM

## 2022-07-13 DIAGNOSIS — E11.69 DIABETES MELLITUS TYPE 2 IN OBESE (HCC): ICD-10-CM

## 2022-07-13 DIAGNOSIS — E11.65 TYPE 2 DIABETES MELLITUS WITH HYPERGLYCEMIA, WITH LONG-TERM CURRENT USE OF INSULIN (HCC): Primary | ICD-10-CM

## 2022-07-13 DIAGNOSIS — E66.9 DIABETES MELLITUS TYPE 2 IN OBESE (HCC): ICD-10-CM

## 2022-07-13 DIAGNOSIS — E78.5 HYPERLIPIDEMIA: ICD-10-CM

## 2022-07-13 DIAGNOSIS — I10 ESSENTIAL (PRIMARY) HYPERTENSION: ICD-10-CM

## 2022-07-13 LAB — SL AMB POCT HEMOGLOBIN AIC: 13.1 (ref ?–6.5)

## 2022-07-13 PROCEDURE — 83036 HEMOGLOBIN GLYCOSYLATED A1C: CPT | Performed by: NURSE PRACTITIONER

## 2022-07-13 PROCEDURE — 99203 OFFICE O/P NEW LOW 30 MIN: CPT | Performed by: NURSE PRACTITIONER

## 2022-07-13 RX ORDER — PRAZOSIN HYDROCHLORIDE 1 MG/1
1 CAPSULE ORAL
COMMUNITY
Start: 2022-07-13

## 2022-07-13 RX ORDER — METFORMIN HYDROCHLORIDE 500 MG/1
500 TABLET, EXTENDED RELEASE ORAL 2 TIMES DAILY WITH MEALS
Qty: 360 TABLET | Refills: 0 | Status: SHIPPED | OUTPATIENT
Start: 2022-07-13

## 2022-07-13 NOTE — PATIENT INSTRUCTIONS
Restart metformin  Take 500 mg twice daily  Better to take with food  Increase Lantus to 30 units nightly  Always take the humalog before a meal  Inject 10 units of humalog approximately 15 minutes prior to eating  We will work on getting you a continuous glucose monitoring   Schedule an appointment with diabetes educator  Call us when you get home to let us know if you are taking humalog or novolog  Get labs done prior to next appointment

## 2022-07-13 NOTE — ASSESSMENT & PLAN NOTE
Patient is completely uncontrolled  Counseled on pathophysiology of diabetes  Counseled on negative, long-term effects associated with uncontrolled diabetes including neuropathy, nephropathy, retinopathy, heart attack, and stroke  Instructed patient to test blood sugar 3-4x daily  Will work on acquiring her a CGM  The patient is a candidate for CGM use: Indications: Diabetes Type 2  The patient is treated with 3 or more injections of insulin daily  SMBG data is being used to make adjustments to the insulin regimen    Resume Metformin  Take 500 mg XR BID  If patient tolerates this, will increase to 1,000 mg BID  Increase Lantus to 30 units nightly  Counseled on mechanism of action  Strongly encouraged patient to inject 10 units of humalog prior to meals  Instructed her to inject 15 minutes prior to starting to eat  Patient's diet presents a significant barrier to glycemic control  Referral given for MNT  Encouraged patient to increase physical activity as tolerated  Repeat labs prior to next appointment 3 months    Lab Results   Component Value Date    HGBA1C 13 1 (A) 07/13/2022

## 2022-08-03 ENCOUNTER — HOSPITAL ENCOUNTER (OUTPATIENT)
Dept: NUCLEAR MEDICINE | Facility: HOSPITAL | Age: 57
Discharge: HOME | End: 2022-08-03
Attending: INTERNAL MEDICINE
Payer: COMMERCIAL

## 2022-08-03 DIAGNOSIS — R07.9 CHEST PAIN, UNSPECIFIED TYPE: ICD-10-CM

## 2022-08-03 DIAGNOSIS — I25.10 CORONARY ARTERY DISEASE INVOLVING NATIVE CORONARY ARTERY OF NATIVE HEART, UNSPECIFIED WHETHER ANGINA PRESENT: ICD-10-CM

## 2022-08-03 LAB
MAX HR: 85 BPM
NUC STRESS EJECTION FRACTION: 61 %
RATE PRESSURE PRODUCT: NORMAL
SL CV REST NUCLEAR ISOTOPE DOSE: 10.7 MCI
SL CV STRESS NUCLEAR ISOTOPE DOSE: 32.9 MCI
STRESS ANGINA INDEX: 0
STRESS BASELINE HR: 50 BPM
STRESS PEAK HR: 85 BPM
STRESS POST PEAK BP: 120 MMHG
STRESS ST DEPRESSION: 0 MM
STRESS/REST PERFUSION RATIO: 0.95

## 2022-08-03 PROCEDURE — 78452 HT MUSCLE IMAGE SPECT MULT: CPT

## 2022-08-03 PROCEDURE — G1004 CDSM NDSC: HCPCS

## 2022-08-03 PROCEDURE — 78452 HT MUSCLE IMAGE SPECT MULT: CPT | Performed by: INTERNAL MEDICINE

## 2022-08-03 PROCEDURE — 93016 CV STRESS TEST SUPVJ ONLY: CPT | Performed by: INTERNAL MEDICINE

## 2022-08-03 PROCEDURE — A9502 TC99M TETROFOSMIN: HCPCS

## 2022-08-03 PROCEDURE — 93017 CV STRESS TEST TRACING ONLY: CPT

## 2022-08-03 PROCEDURE — 93018 CV STRESS TEST I&R ONLY: CPT | Performed by: INTERNAL MEDICINE

## 2022-08-03 RX ADMIN — REGADENOSON 0.4 MG: 0.08 INJECTION, SOLUTION INTRAVENOUS at 09:41

## 2022-08-09 LAB
CHEST PAIN STATEMENT: NORMAL
MAX DIASTOLIC BP: 64 MMHG
MAX HEART RATE: 85 BPM
MAX PREDICTED HEART RATE: 165 BPM
MAX. SYSTOLIC BP: 120 MMHG
PROTOCOL NAME: NORMAL
REASON FOR TERMINATION: NORMAL
TARGET HR FORMULA: NORMAL
TEST INDICATION: NORMAL
TIME IN EXERCISE PHASE: NORMAL

## 2022-08-16 ENCOUNTER — HOSPITAL ENCOUNTER (OUTPATIENT)
Dept: CT IMAGING | Facility: HOSPITAL | Age: 57
Discharge: HOME | End: 2022-08-16
Attending: INTERNAL MEDICINE
Payer: COMMERCIAL

## 2022-08-16 ENCOUNTER — HOSPITAL ENCOUNTER (OUTPATIENT)
Dept: PULMONOLOGY | Facility: HOSPITAL | Age: 57
Discharge: HOME | End: 2022-08-16
Attending: INTERNAL MEDICINE
Payer: COMMERCIAL

## 2022-08-16 DIAGNOSIS — F17.200 TOBACCO USE DISORDER, SEVERE, DEPENDENCE: ICD-10-CM

## 2022-08-16 DIAGNOSIS — R06.02 SHORTNESS OF BREATH: ICD-10-CM

## 2022-08-16 PROCEDURE — 94618 PULMONARY STRESS TESTING: CPT | Performed by: INTERNAL MEDICINE

## 2022-08-16 PROCEDURE — 94729 DIFFUSING CAPACITY: CPT

## 2022-08-16 PROCEDURE — 94729 DIFFUSING CAPACITY: CPT | Performed by: INTERNAL MEDICINE

## 2022-08-16 PROCEDURE — 94060 EVALUATION OF WHEEZING: CPT | Performed by: INTERNAL MEDICINE

## 2022-08-16 PROCEDURE — 71271 CT THORAX LUNG CANCER SCR C-: CPT

## 2022-08-16 PROCEDURE — 94761 N-INVAS EAR/PLS OXIMETRY MLT: CPT

## 2022-08-16 PROCEDURE — 94060 EVALUATION OF WHEEZING: CPT

## 2022-08-16 PROCEDURE — 94726 PLETHYSMOGRAPHY LUNG VOLUMES: CPT | Performed by: INTERNAL MEDICINE

## 2022-08-16 PROCEDURE — 94726 PLETHYSMOGRAPHY LUNG VOLUMES: CPT

## 2022-08-16 RX ORDER — ALBUTEROL SULFATE 2.5 MG/3ML
2.5 SOLUTION RESPIRATORY (INHALATION) ONCE AS NEEDED
Status: COMPLETED | OUTPATIENT
Start: 2022-08-16 | End: 2022-08-16

## 2022-08-16 RX ADMIN — ALBUTEROL SULFATE 2.5 MG: 2.5 SOLUTION RESPIRATORY (INHALATION) at 10:41

## 2022-08-17 LAB
DME PARACHUTE DELIVERY DATE ACTUAL: NORMAL
DME PARACHUTE DELIVERY DATE REQUESTED: NORMAL
DME PARACHUTE ITEM DESCRIPTION: NORMAL
DME PARACHUTE ITEM DESCRIPTION: NORMAL
DME PARACHUTE ORDER STATUS: NORMAL
DME PARACHUTE SUPPLIER NAME: NORMAL
DME PARACHUTE SUPPLIER PHONE: NORMAL

## 2022-08-30 ENCOUNTER — TELEPHONE (OUTPATIENT)
Dept: PULMONOLOGY | Facility: MEDICAL CENTER | Age: 57
End: 2022-08-30

## 2022-08-30 NOTE — TELEPHONE ENCOUNTER
Shanon Mesa had called this patient before and asked for a call back to give results  Patient since called and this was routed to me  I called patient and she again did not   I left a brief message stating that there was nothing to worry about to reassure her and asked her to call back for more results as I did not want to leave message on phone  Shanon Mesa- at some point later can you call her again and relay this message? "Please let patient know her CT showed no evidence of lung cancer  She had some mild abnormalities in her lungs likely related to smoking which she should make all efforts to quit    She has some evidence of mild heart disease and should see her primary care doctor and again needs to quit smoking "

## 2022-10-17 ENCOUNTER — OFFICE VISIT (OUTPATIENT)
Dept: CARDIOLOGY CLINIC | Facility: CLINIC | Age: 57
End: 2022-10-17
Payer: COMMERCIAL

## 2022-10-17 VITALS
WEIGHT: 147 LBS | BODY MASS INDEX: 27.75 KG/M2 | DIASTOLIC BLOOD PRESSURE: 72 MMHG | HEIGHT: 61 IN | SYSTOLIC BLOOD PRESSURE: 104 MMHG

## 2022-10-17 DIAGNOSIS — I10 ESSENTIAL (PRIMARY) HYPERTENSION: ICD-10-CM

## 2022-10-17 DIAGNOSIS — R06.02 SHORTNESS OF BREATH: ICD-10-CM

## 2022-10-17 DIAGNOSIS — I42.8 NON-ISCHEMIC CARDIOMYOPATHY (HCC): Primary | ICD-10-CM

## 2022-10-17 DIAGNOSIS — I44.7 LBBB (LEFT BUNDLE BRANCH BLOCK): ICD-10-CM

## 2022-10-17 DIAGNOSIS — E78.5 HYPERLIPIDEMIA, UNSPECIFIED HYPERLIPIDEMIA TYPE: ICD-10-CM

## 2022-10-17 PROCEDURE — 99214 OFFICE O/P EST MOD 30 MIN: CPT | Performed by: INTERNAL MEDICINE

## 2022-10-17 RX ORDER — ROSUVASTATIN CALCIUM 20 MG/1
20 TABLET, COATED ORAL DAILY
Qty: 90 TABLET | Refills: 3 | Status: SHIPPED | OUTPATIENT
Start: 2022-10-17

## 2022-10-17 RX ORDER — BISOPROLOL FUMARATE 5 MG/1
5 TABLET, FILM COATED ORAL DAILY
Qty: 90 TABLET | Refills: 3 | Status: SHIPPED | OUTPATIENT
Start: 2022-10-17

## 2022-10-17 NOTE — PATIENT INSTRUCTIONS
How to Stop Smoking   WHAT YOU NEED TO KNOW:   You will improve your health and the health of others around you if you stop smoking  Your risk for heart and lung disease, cancer, stroke, heart attack, and vision problems will also decrease  Your adolescent can help prevent or stop harm to his or her brain or body  This will help him or her become a healthy adult  You can benefit from quitting no matter how long you have smoked  DISCHARGE INSTRUCTIONS:   Prepare to stop smoking:  Nicotine is a highly addictive drug found in cigarettes  Withdrawal symptoms can happen when you stop smoking and make it hard to quit  These include anxiety, depression, irritability, trouble sleeping, and increased appetite  You increase your chances of success if you prepare to quit  Set a quit date  Catalina Brown a date that is within the next 2 weeks  Do not pick a day that you think may be stressful or busy  Write down the day or [place] it on your calender  Tell friends and family that you plan to quit  Explain that you may have withdrawal symptoms when you try to quit  Ask them to support you  They may be able to encourage you and help reduce your stress to make it easier for you to quit  Make a list of your reasons for quitting  Put the list somewhere you will see it every day, such as your refrigerator  You can look at the list when you have a craving  Remove all tobacco and nicotine products from your home, car, and workplace  Also, remove anything else that will tempt you to smoke, such as lighters, matches, or ashtrays  Clean your car, home, and places at work that smell like smoke  The smell of smoke can trigger a craving  Identify triggers that make you want to smoke  This may include activities, feelings, or people  Also write down 1 way you can deal with each of your triggers  For example, if you want to smoke as soon as you wake up, plan another activity during this time, such as exercise      Make a plan for how you will quit  Learn about the tools that can help you quit, such as medicine, counseling, or nicotine replacement therapy  Choose at least 2 options to help you quit  Help your adolescent make a plan to quit  The plan will be more successful if your adolescent makes his or her own decisions  Do not try to pressure him or her to quit immediately or in a certain way  Be supportive and offer help if needed  Tools to help you stop smoking:   Counseling  from a trained healthcare provider can provide you with support and skills to quit smoking  The provider will also teach you to manage your withdrawal symptoms and cravings  You may receive counseling from one counselor, in group therapy, or through phone therapy called a quit line  Nicotine replacement therapy (NRT)  such as nicotine patches, gum, or lozenges may help reduce your nicotine cravings  You may get these without a doctor's order  Do not use e-cigarettes or smokeless tobacco in place of cigarettes or to help you quit  They still contain nicotine  Prescription medicines  such as nasal sprays or nicotine inhalers may help reduce your withdrawal symptoms  Other medicines may also be used to reduce your urge to smoke  Ask your healthcare provider about these medicines  You may need to start certain medicines 2 weeks before your quit date for them to work well  Hypnosis  is a practice that helps guide you through thoughts and feelings  Hypnosis may help decrease your cravings and make you more willing to quit  Acupuncture therapy  uses very thin needles to balance energy channels in the body  This is thought to help decrease cravings and symptoms of nicotine withdrawal     Support groups  let you talk to others who are trying to quit or have already quit  It may be helpful to speak with others about how they quit  Manage your cravings:   Avoid situations, people, and places that tempt you to smoke    Go to nonsmoking places, such as libraries or restaurants  Understand what tempts you and try to avoid these things  Keep your hands busy  Hold things such as a stress ball or pen  Put candy or toothpicks in your mouth  Keep lollipops, sugarless gum, or toothpicks with you at all times  Do not have alcohol or caffeine  These drinks may tempt you to smoke  Drink healthy liquids such as water or juice instead  Reward yourself when you resist your cravings  Rewards will motivate you and help you stay positive  Do an activity that distracts you from your craving  Examples include cleaning, creating art, or gardening  Prevent weight gain after you quit:  You may gain a few pounds after you quit smoking  It is healthier for you to gain a few pounds than to continue to smoke  The following can help you prevent weight gain:  Eat a variety of healthy foods  Healthy foods include fruits, vegetables, whole-grain breads, low-fat dairy products, beans, lean meats, and fish  Eat healthy snacks, such as low-fat yogurt, if you get hungry between meals  Drink water before, during, and between meals  This will make your stomach feel full and help prevent you from overeating  Ask your healthcare provider how much liquid to drink each day and which liquids are best for you  Be physically active  Activity may help reduce your cravings and reduce stress  Take a walk or do some kind of physical activity every day  Ask your healthcare provider which activities are right for you  For support and more information:   [employer]  [facility]  [address]  Phone: [facility] [address]  Phone: 9- 400 - [phone]  Web Address: Mena Stacy  org    Smokefree  gov  Phone: 1- 167 - 601-5586  Web Address: [employer] smoke[employer]  [address] [place] [address] 2022 Information is for End User's use only and may not be sold, redistributed or otherwise used for commercial purposes   All illustrations and images included in CareNotes® are the copyrighted property of [employer]  or Marsha Jameson   The above information is an  only  It is not intended as medical advice for individual conditions or treatments  Talk to your doctor, nurse or pharmacist before following any medical regimen to see if it is safe and effective for you

## 2022-10-17 NOTE — PROGRESS NOTES
[facility] DARENHaver[facility] Health HospitalSHAINA Mcfadden, [address] [facility] [address], [address] [place]   [phone]                                              Cardiology Office Follow up  Ajay Thorne, 56 y o  female  YOB: 1965  MRN: [id number] Encounter: [id number]      PCP - Aftab Hilario, DO    Assessment and Plan  Non-ischemic cardiomyopathy, LVEF 40-45%  Echo - 1/19/22 - DM 1%, grade 1 DD  Echo - 9/12/19 ([place] report)- LVEF 40-44%, mildly dilated LV, mild diffuse hypokinesis, grade 1 diastolic dysfunction no significant valvular abnormalities  LHC - 5/2018 - mid LAD 40%, otherwise mild atherosclerosis  Nuclear Rx stress - 8/3/22 -  LVEF 61%, no significant perfusion defects, mild fixed defect related to breast attenuation artifact   Chronic LBBB  Non-obstructive CAD  mLAD 40% in 2018  GERD  Diabetes Mellitus Type 2   Has neuropathy, and possibly autonomic neuropathy as well with some orthostatic hypotension as well  A1c 12 4% on 2/21/22  Hyperlipidemia  Nocturnal hypoxia, on overnight O2 therapy  COPD / Chronic bronchitis  Active smoker  1 PPD  Previously quit with chantix, but resumed smoking due to her son's smoking, and people around her smoking  Depression/PTSD  Overweight, Body mass index is 27 78 kg/m²       Plan  Chest /abdominal pain    She was reporting recurrent chest /abdominal pain, and as a result underwent nuclear stress test earlier this year, which did not reveal any ischemia   She has undergone multiple GI procedures as well without any clear diagnosis  He now reports that after initiating on metformin, the symptoms have improved  Follow up with PCP    Non-ischemic cardiomyopathy, LVEF 40-45%  Stable, euvolemic  Continue Bisoprolol 5 mg daily  Not on ACE/ARB due to low BP  Previously had passed out with same  Not on diuretics and remains euvolemic    CAD, Non-obstructive  Catheterization in 2018 after some abnormalities on nuclear stress test had revealed 40% lad stenosis and she has been treated medically  Nuclear stress test in August 2022 was again negative for ischemia  Her chest/abdominal pain has apparently improved after addition of metformin  Continue Bisoprolol 5, rosuvastatin 20 mg    Hyperlipidemia  Reviewed prior lipid panel on patient's phone through [place] [employer] (unclear why unable to connect same in Epic)  Lipid panel - 7/15/20 - , , HDL 40,   Lipid panel - 2/21/22 - , , HDL 37,    Continue rosuvastatin 20 mg daily  Counseled regarding need to increase walking        No results found for this visit on 10/17/22  No orders of the defined types were placed in this encounter  Return in about 1 year (around 10/17/2023), or if symptoms worsen or fail to improve  History of Present Illness   56 y o  female comes in for annual cardiac follow-up, after being sent back by her pulmonologist    She used to see Dr Jose Antonio Cote last year, but is now being established with me  She has a history of a left bundle branch block,  And had an echo and a nuclear stress test done in May 2018, which revealed mildly reduced LVEF along with  A medium sized anterior defect with some reversibility  As a result she was referred for a cardiac catheterization which showed nonobstructive coronary artery disease with 40% stenosis of mid LAD  She was managed medically and has had no symptoms over the past year  She has been doing fairly well over the past year, and does not have any active complaints  She additionally follows with a pulmonologist at [facility], who had ordered an echocardiogram last month  This revealed an LVEF of 40-44%, and as a result she was asked to follow up again with her cardiologist for further evaluation  Interval history - 8/20/2020  She comes back for follow-up  I previously saw her for a tele visit in March  She has been doing fairly well overall    Patient was unclear about her medications, but was eventually able to show me her my chart from [facility] which showed a completely different list of medications  It appears she is not on bisoprolol, losartan or atorvastatin at present  She reports having symptoms of dizziness on standing up for a few seconds, and a couple of months ago, she was started on irbesartan 150, after which she reportedly passed out  As a result she stopped taking the same  Interval history - 11/23/2020  He comes back for follow-up after 3 months  She has been doing well overall and does not report any active complains of chest pain, shortness of breath, palpitations  She has not had any further episodes of dizziness or passing out since being on her current antihypertensive regimen  Interval history - 11/22/2021  She comes back for follow-up after 1 year  She has been doing well overall and continues to be free of chest pain  She does report mild a patent palpitations, but no persistent symptoms  No acute complains of shortness of breath, and overall doing well and remains active  No further complains of dizziness, near syncope or syncope  She does report some heartburn-like chest discomfort earlier this year, but states that she has run out of her PPI  Interval history - 1/19/2022  She comes back for follow-up for an earlier visit within 2 months  She has been having ongoing complains of epigastric discomfort with radiation to the center of her chest   It also radiates on both sides on the lower side of chest   It seems to occurr at rest and is not worsened with exertion  She has been taking mylanta, without much help, but PPIs did provide some relief  No current complains of shortness of breath, palpitations, dizziness  She continues to have musculoskeletal and neuropathic pain, for which she takes multiple pain medications including diclofenac, ibuprofen and naproxen  Interval history - 7/8/2022  She comes back for follow-up after about 6 months    She followed up with GI and completed EGD and colonoscopy which did not reveal any major findings  She has been treated for GERD, but has continued to have symptoms of chest pain, and ended up again in the ED for the same  She was again ruled out for ACS and is now back to see Cardiology for further evaluation  Most of her symptoms continue to be while at rest and without any clear triggers  No complains of nausea, vomiting or diaphoresis  No complains of palpitations, dizziness, near-syncope or syncope    Interval history - 10/17/2022  She comes back for follow-up after about 4 months  She completed the nuclear stress test, which did not reveal any significant ischemia  She does report to me that the abdominal pain that she had been having seem to improved after addition of metformin  No current palpitations, dizziness or lightheadedness  He remains fairly sedentary    Shortness of breath remains stable/mild        Historical Information   Past Medical History:   Diagnosis Date   • Angina pectoris ([facility] )     recent   • Anxiety    • Arthritis    • Asthma    • Carpal tunnel syndrome    • Chronic headaches    • COPD (chronic obstructive pulmonary disease) ([facility])    • Diabetes ([facility] )    • Diabetes mellitus ([facility] )    • GERD (gastroesophageal reflux disease)    • Headache    • Hyperlipidemia    • Hypertension    • Kidney stone    • Left bundle branch block     LBBB   • [facility] )    • My[facility] infarction ([facility] )    • Neuropathy    • PTSD (post-traumatic stress disorder)    • RLS (restless legs syndrome)    • Shortness of breath      Past Surgical History:   Procedure Laterality Date   • ANGIOPLASTY     • BREAST SURGERY  2016    Reduction   • CARDIAC CATHETERIZATION  2018     Mid LAD: There was a tubular 40 % stenosis   •  SECTION     • COLONOSCOPY     • HYSTERECTOMY  2018    Complete   • KIDNEY STONE SURGERY     • LAPAROSCOPY     • PA COLONOSCOPY FLX DX W/COLLJ SPEC WHEN PFRMD N/A 9/26/2017    Procedure: EGD AND COLONOSCOPY;  Surgeon: Jose Augustin MD;  Location: [facility]; Service: Gastroenterology   • AK ESOPHAGOGASTRODUODENOSCOPY TRANSORAL DIAGNOSTIC N/A 10/10/2018    Procedure: ESOPHAGOGASTRODUODENOSCOPY (EGD); Surgeon: Jose Augustin MD;  Location: [facility];   Service: Gastroenterology   • AK LAPAROSCOPY W TOT HYSTERECTUTERUS <=250 GRAM  W TUBE/OVARY N/A 5/22/2018    Procedure: ROBOTIC ASSISTED TOTAL LAPAROSCOPIC HYSTERECTOMY, BILATERAL SALPINGOOPHERECTOMY,;  Surgeon: Ascencion Oneil MD;  Location:  MAIN OR;  Service: Gynecology Oncology   • UPPER GASTROINTESTINAL ENDOSCOPY     • WRIST SURGERY Right      Family History   Problem Relation Age of Onset   • Diabetes Mother    • Breast cancer Mother 39   • Lung cancer Father 47   • Diabetes Sister    • Brain cancer Maternal Aunt 72   • Breast cancer Other 25     Current Outpatient Medications on File Prior to Visit   Medication Sig Dispense Refill   • insulin glargine (LANTUS SOLOSTAR) 100 units/mL injection pen Inject 30 Units under the skin daily at bedtime 15 mL 1   • insulin lispro (HumaLOG) 100 units/mL injection Inject 10 Units under the skin 3 (three) times a day before meals     • linaCLOtide (Linzess) 290 MCG CAPS Take 1 capsule by mouth in the morning (Patient taking differently: Take 290 mcg by mouth daily at bedtime) 90 capsule 3   • metFORMIN (GLUCOPHAGE-XR) 500 mg 24 hr tablet Take 1 tablet (500 mg total) by mouth 2 (two) times a day with meals 360 tablet 0   • metoclopramide (REGLAN) 5 mg/5 mL oral solution Take 5 mL (5 mg total) by mouth 3 (three) times a day 445 mL 1   • pantoprazole (PROTONIX) 40 mg tablet take 1 tablet by mouth twice a day 60 tablet 1   • prazosin (MINIPRESS) 1 mg capsule Take 1 mg by mouth daily at bedtime     • sucralfate (CARAFATE) 1 g/10 mL suspension Take 10 mL (1 g total) by mouth 4 (four) times a day 414 mL 0   • umeclidinium-vilanterol (Anoro Ellipta) 62 5-25 MCG/INH inhaler Inhale 1 puff daily for 14 days 28 blister 0   • varenicline (CHANTIX SALEEM) 0 5 MG X 11 & 1 MG X 42 tablet Take one 0 5 mg tablet by mouth once daily for 3 days, then one 0 5 mg tablet by mouth twice daily for 4 days, then one 1 mg tablet by mouth twice daily   53 tablet 0   • albuterol (PROVENTIL HFA,VENTOLIN HFA) 90 mcg/act inhaler Inhale 2 puffs every 6 (six) hours as needed for wheezing     • ARIPiprazole (ABILIFY) 2 mg tablet Take 2 mg by mouth daily       • BD Pen Needle Dayna 2nd Gen 32G X 4 MM MISC      • buPROPion (WELLBUTRIN XL) 300 mg 24 hr tablet Take 300 mg by mouth every morning       • diclofenac (VOLTAREN) 75 mg EC tablet Take 75 mg by mouth 2 (two) times a day       • ibuprofen (MOTRIN) 200 mg tablet Take 200 mg by mouth every 6 (six) hours as needed for mild pain     • methocarbamol (ROBAXIN) 750 mg tablet Take 500 mg by mouth every 6 (six) hours as needed for muscle spasms       • naloxone (NARCAN) 4 mg/0 1 mL nasal spray ADMINISTER A SINGLE SPRAY OF NARCAN IN ONE NOSTRIL REPEAT AFTER 3 MINUTES IF NO OR MINIMAL RESPONSE     • naproxen (NAPROSYN) 375 mg tablet Take 1 tablet (375 mg total) by mouth 2 (two) times a day with meals 20 tablet 0   • nicotine (NICODERM CQ) 21 mg/24 hr TD 24 hr patch Place 1 patch on the skin every 24 hours 28 patch 0   • nitroglycerin (NITROSTAT) 0 4 mg SL tablet Place 1 tablet (0 4 mg total) under the tongue every 5 (five) minutes as needed for chest pain 25 tablet 0   • NovoLOG FlexPen 100 units/mL injection pen      • ondansetron (ZOFRAN) 4 mg tablet Take 1 tablet (4 mg total) by mouth every 8 (eight) hours as needed for nausea or vomiting 20 tablet 0   • oxyCODONE (ROXICODONE) 5 immediate release tablet Take 5 mg by mouth 3 (three) times a day as needed     • polyethylene glycol (GOLYTELY) 4000 mL solution Take 4,000 mL by mouth once for 1 dose 4000 mL 0   • potassium chloride SA (KLOR-CON M15) 15 MEQ tablet Take 15 mEq by mouth 2 (two) times a day       • pregabalin (LYRICA) 75 mg capsule Take 75 mg by mouth 2 (two) times a day       • QUEtiapine (SEROquel) 200 mg tablet Take 150 mg by mouth daily at bedtime     • Spiriva Respimat 2 5 MCG/ACT AERS inhaler      • zolpidem (AMBIEN) 10 mg tablet Take 10 mg by mouth daily at bedtime as needed for sleep     • [DISCONTINUED] bisoprolol (ZEBETA) 5 mg tablet Take 1 tablet (5 mg total) by mouth daily 90 tablet 1   • [DISCONTINUED] rosuvastatin (CRESTOR) 20 MG tablet Take 20 mg by mouth daily         No current facility-administered medications on file prior to visit       Allergies   Allergen Reactions   • Lisinopril Cough   • Losartan Dizziness   • Other Hives     Surgical tape   • Prednisone Other (See Comments)     Thrush       Social History     Socioeconomic History   • Marital status: /Civil Union     Spouse name: None   • Number of children: None   • Years of education: None   • Highest education level: None   Occupational History   • Occupation: Healthcare provider    Tobacco Use   • Smoking status: Current Every Day Smoker     Packs/day: 1 00     Years: 48 00     Pack years: 48 00     Start date: 1/1/1973   • Smokeless tobacco: Never Used   • Tobacco comment: pt  smoked this am 10/10   Vaping Use   • Vaping Use: Never used   Substance and Sexual Activity   • Alcohol use: No     Comment: Rarely consumes alcohol - As per [employer]    • Drug use: Yes     Frequency: 7 0 times per week     Types: Marijuana     Comment: medical marijuana  last use over a month   • Sexual activity: Not Currently   Other Topics Concern   • None   Social History Narrative   • None     Social Determinants of Health     Financial Resource Strain: Not on file   Food Insecurity: Not on file   Transportation Needs: Not on file   Physical Activity: Not on file   Stress: Not on file   Social Connections: Not on file   Intimate Partner Violence: Not on file   Housing Stability: Not on file        Review of Systems   All other systems reviewed and are negative  Vitals:  Vitals:    10/17/22 1322   BP: 104/72   Weight: 66 7 kg (147 lb)   Height: 5' 1" (1 549 m)     BMI - Body mass index is 27 78 kg/m²  Wt Readings from Last 7 Encounters:   10/17/22 66 7 kg (147 lb)   07/13/22 64 kg (141 lb 3 2 oz)   07/08/22 64 6 kg (142 lb 6 4 oz)   07/08/22 64 8 kg (142 lb 12 8 oz)   06/29/22 64 kg (141 lb)   05/10/22 68 9 kg (151 lb 12 8 oz)   04/05/22 70 kg (154 lb 6 4 oz)       Physical Exam  Vitals and nursing note reviewed  Constitutional:       General: She is not in acute distress  Appearance: Normal appearance  She is well-developed  She is not ill-appearing  HENT:      Head: Normocephalic and atraumatic  Nose: No congestion  Eyes:      General: No scleral icterus  Conjunctiva/sclera: Conjunctivae normal    Neck:      Vascular: No carotid bruit or JVD  Cardiovascular:      Rate and Rhythm: Normal rate and regular rhythm  Pulses: Normal pulses  Heart sounds: Normal heart sounds  No murmur heard  No friction rub  No gallop  Pulmonary:      Effort: Pulmonary effort is normal  No respiratory distress  Breath sounds: Normal breath sounds  No rales  Abdominal:      General: There is no distension  Palpations: Abdomen is soft  Tenderness: There is no abdominal tenderness  Musculoskeletal:         General: No swelling or tenderness  Cervical back: Neck supple  Right lower leg: No edema  Left lower leg: No edema  Skin:     General: Skin is warm  Neurological:      General: No focal deficit present  Mental Status: She is alert and oriented to person, place, and time  Mental status is at baseline  Psychiatric:         Mood and Affect: Mood normal          Behavior: Behavior normal          Thought Content:  Thought content normal        Labs:  CBC:   Lab Results   Component Value Date    WBC 8 99 06/29/2022    RBC 6 18 (H) 06/29/2022    HGB 17 4 (H) 06/29/2022    HCT 52 3 (H) 06/29/2022    MCV 85 06/29/2022     06/29/2022    RDW 13 1 06/29/2022       CMP:   Lab Results   Component Value Date    K 3 9 06/29/2022    CL 92 (L) 06/29/2022    CO2 30 06/29/2022    BUN 20 06/29/2022    CREATININE 0 83 06/29/2022    EGFR 80 06/29/2022    CALCIUM 9 6 06/29/2022    AST 17 06/29/2022    ALT 20 06/29/2022    ALKPHOS 109 (H) 06/29/2022       Magnesium:  Lab Results   Component Value Date    MG 1 7 (L) 06/29/2022       Lipid Profile:   No results found for: CHOL, HDL, TRIG, LDLCALC    Thyroid Studies: No results found for: [id number], T3FREE, FREET4, O7TUZYO, G0PACYJ    No components found for: [facility]    Lab Results   Component Value Date    INR 0 98 10/18/2019    INR 0 89 05/09/2018   5    Imaging: Ct Abdomen Pelvis With Contrast    Result Date: 10/18/2019  Narrative: CT ABDOMEN AND PELVIS WITH IV CONTRAST INDICATION:   lower abd pain, diarrhea  COMPARISON:  None  TECHNIQUE:  CT examination of the abdomen and pelvis was performed  Axial, sagittal, and coronal 2D reformatted images were created from the source data and submitted for interpretation  Radiation dose length product (DLP) for this visit:  500 mGy-cm   This examination, like all CT scans performed in the [facility], was performed utilizing techniques to minimize radiation dose exposure, including the use of iterative reconstruction and automated exposure control  IV Contrast:  100 mL of iohexol (OMNIPAQUE) Enteric Contrast:  Enteric contrast was not administered  FINDINGS: ABDOMEN LOWER CHEST:  No clinically significant abnormality identified in the visualized lower chest  LIVER/BILIARY TREE: The liver is mildly enlarged  Diffusely decreased hepatic attenuation suggesting steatosis  No intra or extrahepatic biliary ductal dilation GALLBLADDER:  No calcified gallstones  No pericholecystic inflammatory change  SPLEEN:  Unremarkable  PANCREAS:  Unremarkable  ADRENAL GLANDS:  Unremarkable  KIDNEYS/URETERS: No hydronephrosis    No suspicious renal lesion  Left renal lower pole focal cortical loss, noting a 3 mm dystrophic calcification  STOMACH AND BOWEL: Circumferential wall thickening of the mid to distal transverse colon sparing the splenic flexure with pericolonic fat stranding in keeping with acute colitis (series 2, images 31-41)  No disproportionate dilation of small or large bowel loops  APPENDIX:  No findings to suggest appendicitis  ABDOMINOPELVIC CAVITY:  No ascites or free intraperitoneal air  No lymphadenopathy  VESSELS:  Unremarkable for patient's age  PELVIS REPRODUCTIVE ORGANS:  Status post hysterectomy  No adnexal mass  URINARY BLADDER:  Unremarkable  ABDOMINAL WALL/INGUINAL REGIONS:  Unremarkable  OSSEOUS STRUCTURES:  No acute fracture or destructive osseous lesion  Impression: 1  Acute uncomplicated colitis of of the mid to distal transverse colon sparing the splenic flexure, favored to be infectious  2   Mild hepatomegaly and hepatic steatosis  Workstation performed: [id number]       Cardiac testing:   Results for orders placed during the hospital encounter of 18   Echo complete with contrast if indicated    Narrative [facility], [address] [facility]  [phone]    Transthoracic Echocardiogram  2D, M-mode, Doppler, and Color Doppler    Study date:  03-May-2018    Patient: Jamaica Castillo  MR number: [id number]  Account number: [de-identified]  : 1965  Age: 50 years  Gender: Female  Status: Outpatient  Location: [facility]  Height: 61 in  Weight: 170 lb  BP: 112/ 78 mmHg    Indications: Dyspnea  Diagnoses: R06 09 - Other forms of dyspnea    Sonographer:  Phyliss Hammans,, RCS  Interpreting Physician:  Massimo Sosa MD  Primary Physician:  Vicki Swartz DO  Referring Physician:  Massimo Sosa MD  Group:  [facility]    SUMMARY    LEFT VENTRICLE:  Ejection fraction was estimated to be 40 %   Dyssynchronous septal motion likely secondary to LBBB  There was mild diffuse hypokinesis  MITRAL VALVE:  There was trace regurgitation  HISTORY: PRIOR HISTORY: LBBB Myocardial infarction  Risk factors: hypertension, oral hypoglycemic-treated diabetes, a history of current cigarette use (within the last month), and a family history of coronary artery disease  PROCEDURE: The study was performed in the [address] [place] [address]  This was a routine study  The transthoracic approach was used  The study included complete 2D imaging, M-mode, complete spectral Doppler, and color Doppler  The  heart rate was 82 bpm, at the start of the study  Image quality was adequate  LEFT VENTRICLE: Size was normal  Ejection fraction was estimated to be 40 %  Dyssynchronous septal motion likely secondary to LBBB  There was mild diffuse hypokinesis  DOPPLER: There was an increased relative contribution of atrial  contraction to ventricular filling  RIGHT VENTRICLE: The size was normal  Systolic function was normal  Wall thickness was normal     LEFT ATRIUM: Size was normal     RIGHT ATRIUM: Size was normal     MITRAL VALVE: Valve structure was normal  There was normal leaflet separation  DOPPLER: The transmitral velocity was within the normal range  There was no evidence for stenosis  There was trace regurgitation  AORTIC VALVE: The valve was not well visualized  DOPPLER: There was no evidence for stenosis  TRICUSPID VALVE: The valve structure was normal  There was normal leaflet separation  DOPPLER: The transtricuspid velocity was within the normal range  There was no evidence for stenosis  There was no regurgitation  PULMONIC VALVE: Not well visualized  PERICARDIUM: There was no pericardial effusion  The pericardium was normal in appearance  AORTA: The root exhibited normal size  SYSTEM MEASUREMENT TABLES    Apical four chamber  4 chamber Left Atrium Volume Index; Planimetry; End Systole;  Apical four chamber;: 9 19 cm2  Left Ventricular End Diastolic Volume; Method of Disks, Single Plane; Apical four chamber;: 71 9 ml  Left Ventricular End Systolic Volume; Method of Disks, Single Plane; Apical four chamber;: 50 6 ml  Right Atrium Systolic Area; Planimetry; End Systole; Apical four chamber;: 8 11 cm2  Right Ventricular Internal Diastolic Dimension; End Diastole; Apical four chamber;: 20 6 mm  TAPSE: 27 4 mm    Apical two chamber  Left Ventricular End Diastolic Volume; Method of Disks, Single Plane; Apical two chamber;: 105 3 ml  Left Ventricular End Systolic Volume; Method of Disks, Single Plane; Apical two chamber;: 35 7 ml    Unspecified Scan Mode  Aortic Root Diameter; End Systole;: 29 4 mm  Left atrial diameter; End Diastole;: 29 7 mm  Cardiac Output; Teichholz; Systole;: 5 91 L/min  Interventricular Septum Diastolic Thickness; Teichholz; End Diastole;: 8 5 mm  Left Ventricle Internal End Diastolic Dimension; Teichholz;: 51 8 mm  Left Ventricle Internal Systolic Dimension; Teichholz; End Systole;: 37 7 mm  Left Ventricle Posterior Wall Diastolic Thickness; Teichholz; End Diastole;: 8 mm  Left Ventricular Ejection Fraction; Teichholz;: 52 2 %  Left Ventricular End Diastolic Volume; Teichholz;: 127 2 ml  Left Ventricular End Systolic Volume; Teichholz;: 60 8 ml  Stroke volume; Teichholz; Systole;: 66 4 ml  Mitral Valve Area; Area by Pressure Half-Time; Systole;: 3 49 cm2  Mitral Valve E to A Ratio; Systole;: 0 69    [facility]    Prepared and electronically signed by    Nandini Hughes MD  Signed [id number] 11:54:33       No results found for this or any previous visit  No results found for this or any previous visit  No results found for this or any previous visit

## 2022-11-02 ENCOUNTER — APPOINTMENT (OUTPATIENT)
Dept: LAB | Facility: CLINIC | Age: 57
End: 2022-11-02

## 2022-11-02 DIAGNOSIS — R10.10 PAIN OF UPPER ABDOMEN: ICD-10-CM

## 2022-11-02 DIAGNOSIS — E11.65 TYPE 2 DIABETES MELLITUS WITH HYPERGLYCEMIA, WITH LONG-TERM CURRENT USE OF INSULIN (HCC): ICD-10-CM

## 2022-11-02 DIAGNOSIS — K76.0 FATTY LIVER: ICD-10-CM

## 2022-11-02 DIAGNOSIS — E66.9 DIABETES MELLITUS TYPE 2 IN OBESE (HCC): ICD-10-CM

## 2022-11-02 DIAGNOSIS — E11.69 DIABETES MELLITUS TYPE 2 IN OBESE (HCC): ICD-10-CM

## 2022-11-02 DIAGNOSIS — E78.5 HYPERLIPIDEMIA, UNSPECIFIED HYPERLIPIDEMIA TYPE: ICD-10-CM

## 2022-11-02 DIAGNOSIS — Z79.4 TYPE 2 DIABETES MELLITUS WITH HYPERGLYCEMIA, WITH LONG-TERM CURRENT USE OF INSULIN (HCC): ICD-10-CM

## 2022-11-02 DIAGNOSIS — R63.5 ABNORMAL WEIGHT GAIN: ICD-10-CM

## 2022-11-02 DIAGNOSIS — E66.3 OVERWEIGHT: ICD-10-CM

## 2022-11-02 LAB
ALBUMIN SERPL BCP-MCNC: 2.8 G/DL (ref 3.5–5)
ALP SERPL-CCNC: 116 U/L (ref 46–116)
ALT SERPL W P-5'-P-CCNC: 24 U/L (ref 12–78)
ANION GAP SERPL CALCULATED.3IONS-SCNC: 4 MMOL/L (ref 4–13)
AST SERPL W P-5'-P-CCNC: 14 U/L (ref 5–45)
BILIRUB DIRECT SERPL-MCNC: 0.06 MG/DL (ref 0–0.2)
BILIRUB SERPL-MCNC: 0.32 MG/DL (ref 0.2–1)
BUN SERPL-MCNC: 14 MG/DL (ref 5–25)
CALCIUM ALBUM COR SERPL-MCNC: 10.1 MG/DL (ref 8.3–10.1)
CALCIUM SERPL-MCNC: 9.1 MG/DL (ref 8.3–10.1)
CHLORIDE SERPL-SCNC: 106 MMOL/L (ref 96–108)
CHOLEST SERPL-MCNC: 141 MG/DL
CO2 SERPL-SCNC: 27 MMOL/L (ref 21–32)
CREAT SERPL-MCNC: 0.76 MG/DL (ref 0.6–1.3)
CREAT UR-MCNC: 54.3 MG/DL
EST. AVERAGE GLUCOSE BLD GHB EST-MCNC: 283 MG/DL
GFR SERPL CREATININE-BSD FRML MDRD: 88 ML/MIN/1.73SQ M
GGT SERPL-CCNC: 18 U/L (ref 5–85)
GLUCOSE P FAST SERPL-MCNC: 328 MG/DL (ref 65–99)
HBA1C MFR BLD: 11.5 %
HDLC SERPL-MCNC: 36 MG/DL
LDLC SERPL CALC-MCNC: 61 MG/DL (ref 0–100)
MICROALBUMIN UR-MCNC: 77 MG/L (ref 0–20)
MICROALBUMIN/CREAT 24H UR: 142 MG/G CREATININE (ref 0–30)
POTASSIUM SERPL-SCNC: 4.4 MMOL/L (ref 3.5–5.3)
PROT SERPL-MCNC: 7.4 G/DL (ref 6.4–8.4)
SODIUM SERPL-SCNC: 137 MMOL/L (ref 135–147)
TRIGL SERPL-MCNC: 221 MG/DL
TSH SERPL DL<=0.05 MIU/L-ACNC: 1.45 UIU/ML (ref 0.45–4.5)

## 2022-11-05 LAB
ALP BONE CFR SERPL: 30 % (ref 14–68)
ALP INTEST CFR SERPL: 8 % (ref 0–18)
ALP LIVER CFR SERPL: 62 % (ref 18–85)
ALP SERPL-CCNC: 119 IU/L (ref 44–121)

## 2022-11-10 ENCOUNTER — OFFICE VISIT (OUTPATIENT)
Dept: OBGYN CLINIC | Facility: CLINIC | Age: 57
End: 2022-11-10

## 2022-11-10 VITALS
BODY MASS INDEX: 27.9 KG/M2 | SYSTOLIC BLOOD PRESSURE: 112 MMHG | WEIGHT: 147.8 LBS | HEIGHT: 61 IN | DIASTOLIC BLOOD PRESSURE: 70 MMHG

## 2022-11-10 DIAGNOSIS — B37.0 THRUSH: ICD-10-CM

## 2022-11-10 DIAGNOSIS — R30.0 DYSURIA: ICD-10-CM

## 2022-11-10 DIAGNOSIS — B37.31 VULVOVAGINAL CANDIDIASIS: Primary | ICD-10-CM

## 2022-11-10 DIAGNOSIS — N90.89 VULVAR IRRITATION: ICD-10-CM

## 2022-11-10 LAB
BV WHIFF TEST VAG QL: NEGATIVE
CLUE CELLS SPEC QL WET PREP: NEGATIVE
SL AMB POCT WET MOUNT: ABNORMAL
T VAGINALIS VAG QL WET PREP: NEGATIVE
YEAST VAG QL WET PREP: POSITIVE

## 2022-11-10 RX ORDER — CLOTRIMAZOLE AND BETAMETHASONE DIPROPIONATE 10; .64 MG/G; MG/G
CREAM TOPICAL 2 TIMES DAILY
Qty: 30 G | Refills: 0 | Status: SHIPPED | OUTPATIENT
Start: 2022-11-10

## 2022-11-10 RX ORDER — FLUCONAZOLE 150 MG/1
150 TABLET ORAL
Qty: 3 TABLET | Refills: 0 | Status: SHIPPED | OUTPATIENT
Start: 2022-11-10 | End: 2022-11-17

## 2022-11-10 NOTE — PROGRESS NOTES
Assessment:  56 y o  postmenopausal woman with thrush and vaginal yeast  Discussed role of DM in these conditions  UTI not fully excluded  Plan:  Diagnoses and all orders for this visit:    Vulvovaginal candidiasis  -     fluconazole (DIFLUCAN) 150 mg tablet; Take 1 tablet (150 mg total) by mouth every 3 (three) days for 3 doses  -     clotrimazole-betamethasone (LOTRISONE) 1-0 05 % cream; Apply topically 2 (two) times a day    Thrush  -     nystatin (MYCOSTATIN) 500,000 units/5 mL suspension; Apply 5 mL (500,000 Units total) to the mouth or throat 3 (three) times a day for 7 days    Dysuria  -     Urine culture    Vulvar irritation  -     POCT wet mount        __________________________________________________________________    Subjective   Darion Ibrahim is a 56 y o  postmenopausal woman who presents with concern for infection  Patient reports itching and burning externally for >1mo  Rash noted externally with breaks in skin near itchiest areas  Getting worse  Tried antifungals, vaginal creams  Briefly tried probiotic but has since stopped  Nothing seems to help  Does note a hx of thrush prior and thinks she has it now too  No changes to precipitate, but notes DM is not adequately controlled  Associated with dysuria, urinary frequency  Denies discharge  Last month, had some bleeding and a feeling like razor burn  Maybe a pimple down there  Also a stable skin lump that's not painful (present >2yrs)  Reports she does not douche, but has used a baking soda wash externally monthly  Feels better after using  Hx hyst 2018, which she notes was her last GYN visit   Cysts noted and was unsure if cancer, benign post-hyst        The following portions of the patient's history were reviewed and updated as appropriate: allergies, current medications, past medical history, past social history, past surgical history and problem list     Review of Systems  Review of Systems   Constitutional: Negative for chills, fatigue and fever    HENT: Positive for mouth sores (tongue )  Negative for facial swelling, sore throat and trouble swallowing  Cardiovascular: Negative for palpitations and leg swelling  Gastrointestinal: Negative for abdominal distention, abdominal pain and nausea  Genitourinary: Positive for dysuria, frequency, genital sores and vaginal pain  Negative for hematuria, menstrual problem, pelvic pain, vaginal bleeding and vaginal discharge  Skin: Positive for rash and wound  Neurological: Negative for dizziness and light-headedness  Objective  /70   Ht 5' 1" (1 549 m)   Wt 67 kg (147 lb 12 8 oz)   BMI 27 93 kg/m²      Physical Exam:  Physical Exam  Constitutional:       General: She is not in acute distress  Appearance: Normal appearance  She is not ill-appearing, toxic-appearing or diaphoretic  HENT:      Mouth/Throat:      Lips: Pink  Dentition: Abnormal dentition  Tongue: Lesions (white plaque) present  Pharynx: No posterior oropharyngeal erythema  Eyes:      General: No scleral icterus  Right eye: No discharge  Left eye: No discharge  Conjunctiva/sclera: Conjunctivae normal    Cardiovascular:      Rate and Rhythm: Normal rate  Pulmonary:      Effort: Pulmonary effort is normal  No respiratory distress  Abdominal:      General: There is no distension  Palpations: There is no mass  Tenderness: There is no abdominal tenderness  There is no guarding or rebound  Hernia: No hernia is present  Genitourinary:     Exam position: Lithotomy position  Labia:         Right: Rash (pale, swollen labia majora and minora with focal excoriation superiorly and around clitorus), tenderness and lesion present  Left: Rash, tenderness and lesion present  Urethra: No prolapse, urethral swelling or urethral lesion  Vagina: No signs of injury  No vaginal discharge, erythema, tenderness or bleeding     Musculoskeletal: General: No swelling  Skin:     General: Skin is warm and dry  Coloration: Skin is not jaundiced or pale  Findings: No bruising or erythema  Neurological:      Mental Status: She is alert  Psychiatric:         Mood and Affect: Mood normal          Behavior: Behavior normal          Thought Content:  Thought content normal          Judgment: Judgment normal          Microscopy consistent with yeast  Moderate free floating bacteria      Lab Review  A1c trend

## 2022-11-12 LAB — BACTERIA UR CULT: NORMAL

## 2022-11-22 ENCOUNTER — OFFICE VISIT (OUTPATIENT)
Dept: ENDOCRINOLOGY | Facility: CLINIC | Age: 57
End: 2022-11-22

## 2022-11-22 VITALS
OXYGEN SATURATION: 98 % | WEIGHT: 147 LBS | BODY MASS INDEX: 27.75 KG/M2 | HEART RATE: 60 BPM | SYSTOLIC BLOOD PRESSURE: 126 MMHG | DIASTOLIC BLOOD PRESSURE: 68 MMHG | HEIGHT: 61 IN

## 2022-11-22 DIAGNOSIS — E78.5 HYPERLIPIDEMIA, UNSPECIFIED HYPERLIPIDEMIA TYPE: ICD-10-CM

## 2022-11-22 DIAGNOSIS — E11.65 TYPE 2 DIABETES MELLITUS WITH HYPERGLYCEMIA, WITHOUT LONG-TERM CURRENT USE OF INSULIN (HCC): Primary | ICD-10-CM

## 2022-11-22 DIAGNOSIS — R80.9 MICROALBUMINURIA: ICD-10-CM

## 2022-11-22 DIAGNOSIS — K31.84 GASTROPARESIS DUE TO DM (HCC): ICD-10-CM

## 2022-11-22 DIAGNOSIS — E11.43 GASTROPARESIS DUE TO DM (HCC): ICD-10-CM

## 2022-11-22 NOTE — PATIENT INSTRUCTIONS
Start taking Lantus 20 units at bedtime  Take Humalog 8 units 15 minutes before your each meal   Take metformin 2 at night 1 in the morning after 1 week if you do not have diarrhea take 2 in the morning 2 at night  A scan your sensor at least every 8 hours the more you do better  With dominant nodule reported in 2 weeks and will call you for any further adjustment    Substituted your sweetened iced tea with sugar free one, preferably water,

## 2022-11-22 NOTE — PROGRESS NOTES
Established patient Progress Note      Cc: diabetes    Referring Provider  Dameon Gilmore Do  [address] [facility],  [address] [place] [address]  [address]     History of Present Illness:   Lluvia Viveros is a 56 y o  female with a history of type 2 diabetes with long term use of insulin since 2015 who presented for follow up  Last seen in the office in July 2022 by Anahi Vallejo  Patient has history of non-ischemic cardiomyopathy  She has hx of vulvovaginal candidiasis  Reports complications of Gastroparesis, Nephropathy  Denies recent illness or hospitalizations  Denies recent severe hypoglycemic or severe hyperglycemic episodes  Denies any issues with her current regimen  home glucose monitoring via CGM      Current regimen:   Metformin 1000 mg at night  She is not taking Humalog and Lantus for a while       Cristian Meter use       Indication     Type 2 Diabetes    More than 72 hours of data was reviewed  Report to be scanned to chart  Date Range: 11/09 - 11/22    Analysis of data:   Average Glucose: 343 mg/dl  Coefficient of Variation: 15 9%   SD : x   Time in Target Range: 0%   Time Above Range: 100%   Time Below Range: 0     Interpretation of data:   Time CGM was active is 34% of the time and she did not scanned the sensor enough    Hypoglycemic episodes:   H/o of hypoglycemia causing hospitalization or Intervention such as glucagon injection  or ambulance call : no   Hypoglycemia symptoms:shaky  Treatment of hypoglycemia: none       Diabetes education: No  Diet: once per day, 1-2 snacks per day  Timing of meals: variable   Physically active: No          She drink sweetened icetea 2 container a day     Opthamology: UTD; no retinopathy, no macular edema  Podiatry: no      Has hypertension: followed by PCP; Bisoprolol   Has hyperlipidemia: followed by PCP; on statin - tolerating well, no myalgias      Thyroid disorders: no  History of pancreatitis: no    Patient Active Problem List   Diagnosis   • Facet arthropathy, lumbosacral   • Lumbar spondylosis   • Chronic midline low back pain with bilateral sciatica   • Status post robotic assisted total laparoscopic hysterectomy, bilateral salpingo-oophorectomy   • Medical marijuana use   • Other chronic pain   • Gastroesophageal reflux disease with esophagitis   • Bilious vomiting with nausea   • Esophageal dysphagia   • Gastroparesis due to DM ([facility])   • Fatty liver   • Drug-induced constipation   • Elevated liver enzymes   • Hoarseness   • LBBB (left bundle branch block)   • Hyperlipidemia   • Obesity (BMI 30 0-34  9)   • Common migraine without aura   • Type 2 diabetes mellitus with hyperglycemia, without long-term current use of insulin ([facility])   • Hyperglycemia due to diabetes mellitus ([facility])   • Glucosuria   • Overweight   • COPD (chronic obstructive pulmonary disease) ([facility])   • Tobacco use disorder, severe, dependence   • Shortness of breath      Past Medical History:   Diagnosis Date   • Angina pectoris ([facility])     recent   • Anxiety    • Arthritis    • Asthma    • Carpal tunnel syndrome    • Chronic headaches    • COPD (chronic obstructive pulmonary disease) ([facility])    • Diabetes ([facility] [address] )    • Diabetes mellitus ([facility] [address] )    • GERD (gastroesophageal reflux disease)    • Headache    • Hyperlipidemia    • Hypertension    • Kidney stone    • Left bundle branch block     LBBB   • MI (myocardial infarction) ([facility] [address] )    • Myocardial infarction ([facility] [address] )    • Neuropathy    • PTSD (post-traumatic stress disorder)    • RLS (restless legs syndrome)    • Shortness of breath       Past Surgical History:   Procedure Laterality Date   • ANGIOPLASTY     • BREAST SURGERY  2016    Reduction   • CARDIAC CATHETERIZATION  2018     Mid LAD: There was a tubular 40 % stenosis   •  SECTION     • COLONOSCOPY     • HYSTERECTOMY  2018    Complete   • KIDNEY STONE SURGERY     • LAPAROSCOPY     • OK COLONOSCOPY FLX DX W/COLLJ SPEC WHEN PFRMD N/A 2017    Procedure: EGD AND COLONOSCOPY;  Surgeon: Joaquin Gutierrez MD;  Location: [facility]; Service: Gastroenterology   • WA ESOPHAGOGASTRODUODENOSCOPY TRANSORAL DIAGNOSTIC N/A 10/10/2018    Procedure: ESOPHAGOGASTRODUODENOSCOPY (EGD); Surgeon: Joaquin Gutierrez MD;  Location: [facility];   Service: Gastroenterology   • WA LAPAROSCOPY W TOT HYSTERECTUTERUS <=250 GRAM  W TUBE/OVARY N/A 5/22/2018    Procedure: ROBOTIC ASSISTED TOTAL LAPAROSCOPIC HYSTERECTOMY, BILATERAL SALPINGOOPHERECTOMY,;  Surgeon: Ilan Young MD;  Location:  MAIN OR;  Service: Gynecology Oncology   • UPPER GASTROINTESTINAL ENDOSCOPY     • WRIST SURGERY Right       Family History   Problem Relation Age of Onset   • Diabetes Mother    • Breast cancer Mother 39   • Lung cancer Father 47   • Diabetes Sister    • Brain cancer Maternal Aunt 72   • Breast cancer Other 25     Social History     Tobacco Use   • Smoking status: Every Day     Packs/day: 1 00     Years: 48 00     Pack years: 48 00     Types: Cigarettes     Start date: 1/1/1973   • Smokeless tobacco: Never   • Tobacco comments:     pt  smoked this am 10/10   Substance Use Topics   • Alcohol use: No     Comment: Rarely consumes alcohol - As per [facility]      Allergies   Allergen Reactions   • Lisinopril Cough   • Losartan Dizziness   • Other Hives     Surgical tape   • Prednisone Other (See Comments)     Thrush           Current Outpatient Medications:   •  BD Pen Needle Dayna 2nd Gen 32G X 4 MM MISC, , Disp: , Rfl:   •  bisoprolol (ZEBETA) 5 mg tablet, Take 1 tablet (5 mg total) by mouth daily, Disp: 90 tablet, Rfl: 3  •  buPROPion (WELLBUTRIN XL) 300 mg 24 hr tablet, Take 300 mg by mouth every morning, Disp: , Rfl:   •  clotrimazole-betamethasone (LOTRISONE) 1-0 05 % cream, Apply topically 2 (two) times a day, Disp: 30 g, Rfl: 0  •  ibuprofen (MOTRIN) 200 mg tablet, Take 200 mg by mouth every 6 (six) hours as needed for mild pain, Disp: , Rfl:   •  linaCLOtide (Linzess) 290 MCG CAPS, Take 1 capsule by mouth in the morning (Patient taking differently: Take 290 mcg by mouth daily at bedtime), Disp: 90 capsule, Rfl: 3  •  metFORMIN (GLUCOPHAGE-XR) 500 mg 24 hr tablet, Take 1 tablet (500 mg total) by mouth 2 (two) times a day with meals, Disp: 360 tablet, Rfl: 0  •  naloxone (NARCAN) 4 mg/0 1 mL nasal spray, ADMINISTER A SINGLE SPRAY OF NARCAN IN ONE NOSTRIL REPEAT AFTER 3 MINUTES IF NO OR MINIMAL RESPONSE, Disp: , Rfl:   •  naproxen (NAPROSYN) 375 mg tablet, Take 1 tablet (375 mg total) by mouth 2 (two) times a day with meals, Disp: 20 tablet, Rfl: 0  •  nitroglycerin (NITROSTAT) 0 4 mg SL tablet, Place 1 tablet (0 4 mg total) under the tongue every 5 (five) minutes as needed for chest pain, Disp: 25 tablet, Rfl: 0  •  oxyCODONE (ROXICODONE) 5 immediate release tablet, Take 5 mg by mouth 3 (three) times a day as needed, Disp: , Rfl:   •  pantoprazole (PROTONIX) 40 mg tablet, take 1 tablet by mouth twice a day, Disp: 60 tablet, Rfl: 1  •  pregabalin (LYRICA) 75 mg capsule, Take 75 mg by mouth 2 (two) times a day, Disp: , Rfl:   •  rosuvastatin (CRESTOR) 20 MG tablet, Take 1 tablet (20 mg total) by mouth daily, Disp: 90 tablet, Rfl: 3  •  Spiriva Respimat 2 5 MCG/ACT AERS inhaler, , Disp: , Rfl:   •  varenicline (CHANTIX SALEEM) 0 5 MG X 11 & 1 MG X 42 tablet, Take one 0 5 mg tablet by mouth once daily for 3 days, then one 0 5 mg tablet by mouth twice daily for 4 days, then one 1 mg tablet by mouth twice daily  , Disp: 53 tablet, Rfl: 0  •  zolpidem (AMBIEN) 10 mg tablet, Take 10 mg by mouth daily at bedtime as needed for sleep, Disp: , Rfl:   •  albuterol (PROVENTIL HFA,VENTOLIN HFA) 90 mcg/act inhaler, Inhale 2 puffs every 6 (six) hours as needed for wheezing (Patient not taking: Reported on 11/10/2022), Disp: , Rfl:   •  ARIPiprazole (ABILIFY) 2 mg tablet, Take 2 mg by mouth daily   (Patient not taking: Reported on 11/10/2022), Disp: , Rfl:   •  diclofenac (VOLTAREN) 75 mg EC tablet, Take 75 mg by mouth 2 (two) times a day   (Patient not taking: Reported on 11/10/2022), Disp: , Rfl:   •  insulin glargine (LANTUS SOLOSTAR) 100 units/mL injection pen, Inject 30 Units under the skin daily at bedtime (Patient not taking: Reported on 11/22/2022), Disp: 15 mL, Rfl: 1  •  insulin lispro (HumaLOG) 100 units/mL injection, Inject 10 Units under the skin 3 (three) times a day before meals (Patient not taking: Reported on 11/22/2022), Disp: , Rfl:   •  methocarbamol (ROBAXIN) 750 mg tablet, Take 500 mg by mouth every 6 (six) hours as needed for muscle spasms   (Patient not taking: Reported on 11/10/2022), Disp: , Rfl:   •  metoclopramide (REGLAN) 5 mg/5 mL oral solution, Take 5 mL (5 mg total) by mouth 3 (three) times a day (Patient not taking: Reported on 11/10/2022), Disp: 445 mL, Rfl: 1  •  nicotine (NICODERM CQ) 21 mg/24 hr TD 24 hr patch, Place 1 patch on the skin every 24 hours (Patient not taking: Reported on 11/10/2022), Disp: 28 patch, Rfl: 0  •  NovoLOG FlexPen 100 units/mL injection pen, , Disp: , Rfl:   •  ondansetron (ZOFRAN) 4 mg tablet, Take 1 tablet (4 mg total) by mouth every 8 (eight) hours as needed for nausea or vomiting (Patient not taking: Reported on 11/10/2022), Disp: 20 tablet, Rfl: 0  •  polyethylene glycol (GOLYTELY) 4000 mL solution, Take 4,000 mL by mouth once for 1 dose (Patient not taking: Reported on 11/10/2022), Disp: 4000 mL, Rfl: 0  •  potassium chloride SA (KLOR-CON M15) 15 MEQ tablet, Take 15 mEq by mouth 2 (two) times a day   (Patient not taking: Reported on 11/10/2022), Disp: , Rfl:   •  prazosin (MINIPRESS) 1 mg capsule, Take 1 mg by mouth daily at bedtime (Patient not taking: Reported on 11/10/2022), Disp: , Rfl:   •  QUEtiapine (SEROquel) 200 mg tablet, Take 150 mg by mouth daily at bedtime (Patient not taking: Reported on 11/10/2022), Disp: , Rfl:   •  sucralfate (CARAFATE) 1 g/10 mL suspension, Take 10 mL (1 g total) by mouth 4 (four) times a day (Patient not taking: Reported on 11/10/2022), Disp: 414 mL, Rfl: 0  • umeclidinium-vilanterol (Anoro Ellipta) 62 5-25 MCG/INH inhaler, Inhale 1 puff daily for 14 days (Patient not taking: Reported on 11/10/2022), Disp: 28 blister, Rfl: 0  Review of Systems   Constitutional: Positive for appetite change and unexpected weight change  Negative for activity change, chills, diaphoresis, fatigue and fever  HENT: Negative for congestion, drooling, ear discharge, ear pain, trouble swallowing and voice change  Eyes: Negative for photophobia, pain, discharge, redness, itching and visual disturbance  Respiratory: Negative for cough, chest tightness, shortness of breath and wheezing  Cardiovascular: Negative for chest pain, palpitations and leg swelling  Gastrointestinal: Negative for abdominal distention, abdominal pain, blood in stool, diarrhea, nausea and vomiting  Endocrine: Positive for polydipsia, polyphagia and polyuria  Negative for cold intolerance and heat intolerance  Genitourinary: Negative for dysuria, flank pain, frequency and hematuria  Musculoskeletal: Negative for back pain, gait problem, joint swelling, myalgias, neck pain and neck stiffness  Skin: Negative for color change, pallor, rash and wound  Neurological: Negative for dizziness, tremors, seizures, syncope, speech difficulty, weakness, numbness and headaches  Psychiatric/Behavioral: Positive for sleep disturbance  Negative for agitation  All other systems reviewed and are negative  Physical Exam:  Body mass index is 27 78 kg/m²    /68   Pulse 60   Ht 5' 1" (1 549 m)   Wt 66 7 kg (147 lb)   SpO2 98%   BMI 27 78 kg/m²    Wt Readings from Last 3 Encounters:   11/22/22 66 7 kg (147 lb)   11/10/22 67 kg (147 lb 12 8 oz)   10/17/22 66 7 kg (147 lb)       GEN: NAD  E/n/m nl facies, hearing intact bilat, tongue midline, lips nl  Eyes: no stare or proptosis, nl lids and conjunctiva, EOMI  Neck: trachea midline, thyroid NT to palpation, nl in size, no nodules or neck masses noted, no cervical LAD  CV; heart reg rate s1s2 nl, no m/r/g appreciated, no ELIZABETH  Resp: CTAB, good effort  Ab+BS  Neuro: no tremor, 2+ DTRs in BUE  MS: no c/c in digits, moves all 4 ext, nl muscle bulk, gait nl  Skin: warm and dry, no palmar erythema  Ext: no c/c in digits, no edema bilaterally, 2+ DP and PT pulses bilat, no breaks in skin/ulcers on feet, sensation intact to monofilament testing on plantar surfaces bilat  Psych: nl mood and affect, no gross lapses in memory  Patient's shoes and socks removed  Right Foot/Ankle   Right Foot Inspection  Skin Exam: skin normal  Skin not intact, no dry skin, no warmth, no callus, no erythema, no maceration, no abnormal color, no pre-ulcer, no ulcer and no callus  Toe Exam: No swelling, no tenderness, erythema and  no right toe deformity    Sensory   Vibration: intact  Proprioception: intact  Monofilament testing: intact    Vascular  Capillary refills: < 3 seconds  The right DP pulse is 2+  The right PT pulse is 2+  Left Foot/Ankle  Left Foot Inspection  Skin Exam: skin normal  Skin not intact, no dry skin, no warmth, no erythema, no maceration, normal color, no pre-ulcer, no ulcer and no callus  Toe Exam: No swelling, no tenderness, no erythema and no left toe deformity  Sensory   Vibration: intact  Proprioception: intact  Monofilament testing: intact    Vascular  Capillary refills: < 3 seconds  The left DP pulse is 2+  The left PT pulse is 2+       Assign Risk Category  No deformity present  No loss of protective sensation  No weak pulses  Risk: 0      Labs:   No components found for: HA1C  No components found for: GLU    Lab Results   Component Value Date    CREATININE 0 76 11/02/2022    CREATININE 0 83 06/29/2022    CREATININE 0 85 12/25/2021    BUN 14 11/02/2022    K 4 4 11/02/2022     11/02/2022    CO2 27 11/02/2022     eGFR   Date Value Ref Range Status   11/02/2022 88 ml/min/1 73sq m Final     No components found for: [facility]    Lab Results   Component Value Date    HDL 36 (L) 11/02/2022    TRIG 221 (H) 11/02/2022       Lab Results   Component Value Date    ALT 24 11/02/2022    AST 14 11/02/2022    GGT 18 11/02/2022    ALKPHOS 116 11/02/2022     Component      Latest Ref Rng & Units 11/2/2022   Sodium      135 - 147 mmol/L 137   Potassium      3 5 - 5 3 mmol/L 4 4   Chloride      96 - 108 mmol/L 106   CO2      21 - 32 mmol/L 27   Anion Gap      4 - 13 mmol/L 4   BUN      5 - 25 mg/dL 14   Creatinine      0 60 - 1 30 mg/dL 0 76   GLUCOSE FASTING      65 - 99 mg/dL 328 (H)   Calcium      8 3 - 10 1 mg/dL 9 1   CORRECTED CALCIUM      8 3 - 10 1 mg/dL 10 1   AST      5 - 45 U/L 14   ALT      12 - 78 U/L 24   Alkaline Phosphatase      46 - 116 U/L 116   Total Protein      6 4 - 8 4 g/dL 7 4   Albumin      3 5 - 5 0 g/dL 2 8 (L)   TOTAL BILIRUBIN      0 20 - 1 00 mg/dL 0 32   eGFR      ml/min/1 73sq m 88   Cholesterol      See Comment mg/dL 141   Triglycerides      See Comment mg/dL 221 (H)   HDL      >=50 mg/dL 36 (L)   LDL Calculated      0 - 100 mg/dL 61   EXT Creatinine Urine      mg/dL 54 3   MICROALBUM ,U,RANDOM      0 0 - 20 0 mg/L 77 0 (H)   MICROALBUMIN/CREATININE RATIO      0 - 30 mg/g creatinine 142 (H)   Hemoglobin A1C      Normal 3 8-5 6%; PreDiabetic 5 7-6 4%; Diabetic >=6 5%; Glycemic control for adults with diabetes <7 0% % 11 5 (H)   eAG, EST AVG Glucose      mg/dl 283       No results found for: TSH, FREET4, TSI    Impression:  1  Type 2 diabetes mellitus with hyperglycemia, without long-term current use of insulin ([facility] [address] )    2  Hyperlipidemia, unspecified hyperlipidemia type    3  Microalbuminuria    4  Gastroparesis due to DM [facility])           Plan:    Imtiaz Armenta was seen today for diabetes type 2      Diagnoses and all orders for this visit:    Type 2 diabetes mellitus with hyperglycemia, without long-term current use of insulin ([address] ):  Poorly controlled diabetes with A1c of 11 5%, the goal is less than 7%   She is poorly adherent with the regimen, stopped taking insulin, basal and bolus,  just taking metformin 1000 mg once daily and using her continuous glucose monitoring freestyle Jason most of the time  I made following changes:  I started Lantus 20 units at bedtime, Humalog 8 units 15 minutes prior to each meal, I asked the patient to increase metformin to 1500 daily and he tolerates without any diarrhea increase it to 2000 mg daily  She was instructed to scan freestyle Jason sensor at least every 8 hours in order to have information of her blood sugar  I discussed with her about pathophysiology of diabetes and long-term complications including retinopathy, neuropathy, nephropathy, CAD and stroke  She came to the office treating sweetened iced tea which she is taking 2 container every day  She will benefit adding SGL 2 inhibitors given her nonischemic cardiomyopathy however currently she is taking medication for vulvovaginal candidiasis, we will consider when she recovered from infection  We provided information on basic nutrition for diabetics  Extended counseling on disease management  Emphasized importance of daily exercise, weight loss, caloric reduction, small meals, consumption of multiple servings of fruits and vegetables and avoidance of concentrated sweets such as juice and soda  Reviewed concepts of diabetes self-management stressing the primary role of the patient in monitoring and maintaining control of diabetes  Return back in 8 weeks   Get labs before next visit     Hyperlipidemia, unspecified hyperlipidemia type:    Currently on Crestor 20 mg once daily  She is taking it regularly and tolerating well   Check lipid profile before next visit  Microalbuminuria:  She will benefit from low-dose ACE inhibitors and adding SGL 2 inhibitors  We will repeat microalbuminuria and will discuss adding these 2 medications next visit  Gastroparesis due to DM [facility])        Discussed with the patient and all questioned fully answered   She will call me if any problems arise      Counseled patient on diagnostic results, prognosis, risk and benefit of treatment options, instruction for management, importance of treatment compliance, Risk  factor reduction and impressions      Den Man MD

## 2023-01-23 ENCOUNTER — OFFICE VISIT (OUTPATIENT)
Dept: ENDOCRINOLOGY | Facility: CLINIC | Age: 58
End: 2023-01-23

## 2023-01-23 ENCOUNTER — TELEPHONE (OUTPATIENT)
Dept: ENDOCRINOLOGY | Facility: CLINIC | Age: 58
End: 2023-01-23

## 2023-01-23 VITALS
HEART RATE: 70 BPM | DIASTOLIC BLOOD PRESSURE: 66 MMHG | TEMPERATURE: 98.1 F | SYSTOLIC BLOOD PRESSURE: 118 MMHG | RESPIRATION RATE: 18 BRPM | OXYGEN SATURATION: 96 % | BODY MASS INDEX: 28.05 KG/M2 | WEIGHT: 148.6 LBS | HEIGHT: 61 IN

## 2023-01-23 DIAGNOSIS — E11.65 TYPE 2 DIABETES MELLITUS WITH HYPERGLYCEMIA, WITH LONG-TERM CURRENT USE OF INSULIN (HCC): ICD-10-CM

## 2023-01-23 DIAGNOSIS — E11.65 TYPE 2 DIABETES MELLITUS WITH HYPERGLYCEMIA, WITHOUT LONG-TERM CURRENT USE OF INSULIN (HCC): Primary | ICD-10-CM

## 2023-01-23 DIAGNOSIS — Z79.4 TYPE 2 DIABETES MELLITUS WITH HYPERGLYCEMIA, WITH LONG-TERM CURRENT USE OF INSULIN (HCC): ICD-10-CM

## 2023-01-23 DIAGNOSIS — R80.9 MICROALBUMINURIA: ICD-10-CM

## 2023-01-23 DIAGNOSIS — E78.5 HYPERLIPIDEMIA, UNSPECIFIED HYPERLIPIDEMIA TYPE: ICD-10-CM

## 2023-01-23 PROBLEM — I10 HYPERTENSION: Status: ACTIVE | Noted: 2023-01-23

## 2023-01-23 PROBLEM — I10 HYPERTENSION: Status: RESOLVED | Noted: 2023-01-23 | Resolved: 2023-01-23

## 2023-01-23 RX ORDER — PEN NEEDLE, DIABETIC 32GX 5/32"
NEEDLE, DISPOSABLE MISCELLANEOUS
Qty: 200 EACH | Refills: 2 | Status: SHIPPED | OUTPATIENT
Start: 2023-01-23

## 2023-01-23 RX ORDER — METFORMIN HYDROCHLORIDE 500 MG/1
1000 TABLET, EXTENDED RELEASE ORAL 2 TIMES DAILY WITH MEALS
Qty: 360 TABLET | Refills: 0 | Status: SHIPPED | OUTPATIENT
Start: 2023-01-23

## 2023-01-23 NOTE — PROGRESS NOTES
Established murtaza Progress Note      Cc: diabetes    Referring Provider  No referring provider defined for this encounter  History of Present Illness:   Phuong Henley is a 56 y o  female with a history of type 2 diabetes with long term use of insulin since 2015 who presented for follow up  Last seen in the office in November 2022  Reports complications of Gastroparesis and Nephropathy  Denies recent illness or hospitalizations  Denies recent severe hypoglycemic or severe hyperglycemic episodes  Denies any issues with her current regimen  home glucose monitoring via CGM, however she is not using it as the senor is keep falling  Current regimen:   She is not taking insulin basal / bolus,   Just on metformin 1500 mg daily at night    She checks her blood sugars daily and they ranging in 250 - 350 mg/dl range,         Hypoglycemic episodes:   H/o of hypoglycemia causing hospitalization or Intervention such as glucagon injection  or ambulance call : no  Hypoglycemia symptoms: none  Treatment of hypoglycemia: none     Medic alert tag: recommended:    Diabetes education: NO              Opthamology:UTD  Podiatry: no      Has hypertension: followed by PCP; on Bisoprolol  Has hyperlipidemia: followed by PCP; on statin - tolerating well, no myalgias      Thyroid disorders: no  History of pancreatitis: no    Patient Active Problem List   Diagnosis   • Facet arthropathy, lumbosacral   • Lumbar spondylosis   • Chronic midline low back pain with bilateral sciatica   • Status post robotic assisted total laparoscopic hysterectomy, bilateral salpingo-oophorectomy   • Medical marijuana use   • Other chronic pain   • Gastroesophageal reflux disease with esophagitis   • Bilious vomiting with nausea   • Esophageal dysphagia   • Gastroparesis due to DM (HCC)   • Fatty liver   • Drug-induced constipation   • Elevated liver enzymes   • Hoarseness   • LBBB (left bundle branch block)   • Hyperlipidemia   • Obesity (BMI 30 0-34  9)   • Common migraine without aura   • Type 2 diabetes mellitus with hyperglycemia, without long-term current use of insulin ([facility])   • Hyperglycemia due to diabetes mellitus ([facility])   • Glucosuria   • Overweight   • COPD (chronic obstructive pulmonary disease) ([facility])   • Tobacco use disorder, severe, dependence   • Shortness of breath      Past Medical History:   Diagnosis Date   • Angina pectoris ([facility])     recent   • Anxiety    • Arthritis    • Asthma    • Carpal tunnel syndrome    • Chronic headaches    • COPD (chronic obstructive pulmonary disease) ([facility])    • Diabetes ([facility])    • Diabetes mellitus ([facility])    • GERD (gastroesophageal reflux disease)    • Headache    • Hyperlipidemia    • Hypertension    • Kidney stone    • Left bundle branch block     LBBB   • MI (myocardial infarction) ([facility] )    • Myocardial infarction ([facility] )    • Neuropathy    • PTSD (post-traumatic stress disorder)    • RLS (restless legs syndrome)    • Shortness of breath       Past Surgical History:   Procedure Laterality Date   • ANGIOPLASTY     • BREAST SURGERY  2016    Reduction   • CARDIAC CATHETERIZATION  2018     Mid LAD: There was a tubular 40 % stenosis   •  SECTION     • COLONOSCOPY     • HYSTERECTOMY  2018    Complete   • KIDNEY STONE SURGERY     • LAPAROSCOPY     • NC COLONOSCOPY FLX DX W/COLLJ SPEC WHEN PFRMD N/A 2017    Procedure: EGD AND COLONOSCOPY;  Surgeon: Janie Wood MD;  Location: [facility]; Service: Gastroenterology   • NC ESOPHAGOGASTRODUODENOSCOPY TRANSORAL DIAGNOSTIC N/A 10/10/2018    Procedure: ESOPHAGOGASTRODUODENOSCOPY (EGD); Surgeon: Janei Wood MD;  Location: [facility];   Service: Gastroenterology   • NC LAPAROSCOPY W TOT HYSTERECTUTERUS <=250 GRAM  W TUBE/OVARY N/A 2018    Procedure: ROBOTIC ASSISTED TOTAL LAPAROSCOPIC HYSTERECTOMY, BILATERAL SALPINGOOPHERECTOMY,;  Surgeon: Shamir Henriquez MD;  Location:  MAIN OR;  Service: Gynecology Oncology   • UPPER GASTROINTESTINAL ENDOSCOPY     • WRIST SURGERY Right       Family History   Problem Relation Age of Onset   • Diabetes Mother    • Breast cancer Mother 39   • Lung cancer Father 47   • Diabetes Sister    • Brain cancer Maternal Aunt 67   • Breast cancer Other 25     Social History     Tobacco Use   • Smoking status: Every Day     Packs/day: 1 00     Years: 48 00     Pack years: 48 00     Types: Cigarettes     Start date: 1/1/1973   • Smokeless tobacco: Never   • Tobacco comments:     pt  smoked this am 10/10   Substance Use Topics   • Alcohol use: No     Comment: Rarely consumes alcohol - As per [facility]      Allergies   Allergen Reactions   • Lisinopril Cough   • Losartan Dizziness   • Other Hives     Surgical tape   • Prednisone Other (See Comments)     Thrush           Current Outpatient Medications:   •  albuterol (PROVENTIL HFA,VENTOLIN HFA) 90 mcg/act inhaler, Inhale 2 puffs every 6 (six) hours as needed for wheezing (Patient not taking: Reported on 11/10/2022), Disp: , Rfl:   •  ARIPiprazole (ABILIFY) 2 mg tablet, Take 2 mg by mouth daily   (Patient not taking: Reported on 11/10/2022), Disp: , Rfl:   •  BD Pen Needle Dayna 2nd Gen 32G X 4 MM MISC, , Disp: , Rfl:   •  bisoprolol (ZEBETA) 5 mg tablet, Take 1 tablet (5 mg total) by mouth daily, Disp: 90 tablet, Rfl: 3  •  buPROPion (WELLBUTRIN XL) 300 mg 24 hr tablet, Take 300 mg by mouth every morning, Disp: , Rfl:   •  clotrimazole-betamethasone (LOTRISONE) 1-0 05 % cream, Apply topically 2 (two) times a day, Disp: 30 g, Rfl: 0  •  diclofenac (VOLTAREN) 75 mg EC tablet, Take 75 mg by mouth 2 (two) times a day   (Patient not taking: Reported on 11/10/2022), Disp: , Rfl:   •  ibuprofen (MOTRIN) 200 mg tablet, Take 200 mg by mouth every 6 (six) hours as needed for mild pain, Disp: , Rfl:   •  insulin glargine (LANTUS SOLOSTAR) 100 units/mL injection pen, Inject 30 Units under the skin daily at bedtime (Patient not taking: Reported on 11/22/2022), Disp: 15 mL, Rfl: 1  •  insulin lispro (HumaLOG) 100 units/mL injection, Inject 10 Units under the skin 3 (three) times a day before meals (Patient not taking: Reported on 11/22/2022), Disp: , Rfl:   •  linaCLOtide (Linzess) 290 MCG CAPS, Take 1 capsule by mouth in the morning (Patient taking differently: Take 290 mcg by mouth daily at bedtime), Disp: 90 capsule, Rfl: 3  •  metFORMIN (GLUCOPHAGE-XR) 500 mg 24 hr tablet, Take 1 tablet (500 mg total) by mouth 2 (two) times a day with meals, Disp: 360 tablet, Rfl: 0  •  methocarbamol (ROBAXIN) 750 mg tablet, Take 500 mg by mouth every 6 (six) hours as needed for muscle spasms   (Patient not taking: Reported on 11/10/2022), Disp: , Rfl:   •  metoclopramide (REGLAN) 5 mg/5 mL oral solution, Take 5 mL (5 mg total) by mouth 3 (three) times a day (Patient not taking: Reported on 11/10/2022), Disp: 445 mL, Rfl: 1  •  naloxone (NARCAN) 4 mg/0 1 mL nasal spray, ADMINISTER A SINGLE SPRAY OF NARCAN IN ONE NOSTRIL REPEAT AFTER 3 MINUTES IF NO OR MINIMAL RESPONSE, Disp: , Rfl:   •  naproxen (NAPROSYN) 375 mg tablet, Take 1 tablet (375 mg total) by mouth 2 (two) times a day with meals, Disp: 20 tablet, Rfl: 0  •  nicotine (NICODERM CQ) 21 mg/24 hr TD 24 hr patch, Place 1 patch on the skin every 24 hours (Patient not taking: Reported on 11/10/2022), Disp: 28 patch, Rfl: 0  •  nitroglycerin (NITROSTAT) 0 4 mg SL tablet, Place 1 tablet (0 4 mg total) under the tongue every 5 (five) minutes as needed for chest pain, Disp: 25 tablet, Rfl: 0  •  NovoLOG FlexPen 100 units/mL injection pen, , Disp: , Rfl:   •  ondansetron (ZOFRAN) 4 mg tablet, Take 1 tablet (4 mg total) by mouth every 8 (eight) hours as needed for nausea or vomiting (Patient not taking: Reported on 11/10/2022), Disp: 20 tablet, Rfl: 0  •  oxyCODONE (ROXICODONE) 5 immediate release tablet, Take 5 mg by mouth 3 (three) times a day as needed, Disp: , Rfl:   •  pantoprazole (PROTONIX) 40 mg tablet, take 1 tablet by mouth twice a day, Disp: 60 tablet, Rfl: 1  •  polyethylene glycol (GOLYTELY) 4000 mL solution, Take 4,000 mL by mouth once for 1 dose (Patient not taking: Reported on 11/10/2022), Disp: 4000 mL, Rfl: 0  •  potassium chloride SA (KLOR-CON M15) 15 MEQ tablet, Take 15 mEq by mouth 2 (two) times a day   (Patient not taking: Reported on 11/10/2022), Disp: , Rfl:   •  prazosin (MINIPRESS) 1 mg capsule, Take 1 mg by mouth daily at bedtime (Patient not taking: Reported on 11/10/2022), Disp: , Rfl:   •  pregabalin (LYRICA) 75 mg capsule, Take 75 mg by mouth 2 (two) times a day, Disp: , Rfl:   •  QUEtiapine (SEROquel) 200 mg tablet, Take 150 mg by mouth daily at bedtime (Patient not taking: Reported on 11/10/2022), Disp: , Rfl:   •  rosuvastatin (CRESTOR) 20 MG tablet, Take 1 tablet (20 mg total) by mouth daily, Disp: 90 tablet, Rfl: 3  •  Spiriva Respimat 2 5 MCG/ACT AERS inhaler, , Disp: , Rfl:   •  sucralfate (CARAFATE) 1 g/10 mL suspension, Take 10 mL (1 g total) by mouth 4 (four) times a day (Patient not taking: Reported on 11/10/2022), Disp: 414 mL, Rfl: 0  •  umeclidinium-vilanterol (Anoro Ellipta) 62 5-25 MCG/INH inhaler, Inhale 1 puff daily for 14 days (Patient not taking: Reported on 11/10/2022), Disp: 28 blister, Rfl: 0  •  varenicline (CHANTIX SALEEM) 0 5 MG X 11 & 1 MG X 42 tablet, Take one 0 5 mg tablet by mouth once daily for 3 days, then one 0 5 mg tablet by mouth twice daily for 4 days, then one 1 mg tablet by mouth twice daily  , Disp: 53 tablet, Rfl: 0  •  zolpidem (AMBIEN) 10 mg tablet, Take 10 mg by mouth daily at bedtime as needed for sleep, Disp: , Rfl:   Review of Systems   Constitutional: Positive for fatigue  Negative for activity change, appetite change, chills, diaphoresis, fever and unexpected weight change  HENT: Negative for congestion, drooling, ear discharge, ear pain, trouble swallowing and voice change  Eyes: Negative for photophobia, pain, discharge, redness, itching and visual disturbance     Respiratory: Negative for cough, chest tightness, shortness of breath and wheezing  Cardiovascular: Negative for chest pain, palpitations and leg swelling  Gastrointestinal: Negative for abdominal distention, abdominal pain, blood in stool, diarrhea, nausea and vomiting  Endocrine: Negative for cold intolerance, heat intolerance, polydipsia, polyphagia and polyuria  Genitourinary: Negative for dysuria, flank pain, frequency and hematuria  Musculoskeletal: Negative for back pain, gait problem, joint swelling, myalgias, neck pain and neck stiffness  Skin: Negative for color change, pallor, rash and wound  Neurological: Negative for dizziness, tremors, seizures, syncope, speech difficulty, weakness, numbness and headaches  Psychiatric/Behavioral: Positive for sleep disturbance  Negative for agitation  All other systems reviewed and are negative  Physical Exam:  There is no height or weight on file to calculate BMI  There were no vitals taken for this visit  Wt Readings from Last 3 Encounters:   11/22/22 66 7 kg (147 lb)   11/10/22 67 kg (147 lb 12 8 oz)   10/17/22 66 7 kg (147 lb)       GEN: NAD  E/n/m nl facies, hearing intact bilat, tongue midline, lips nl  Eyes: no stare or proptosis, nl lids and conjunctiva, EOMI  Neck: trachea midline, thyroid NT to palpation, nl in size, no nodules or neck masses noted, no cervical LAD  CV; heart reg rate s1s2 nl, no m/r/g appreciated, no ELIZABETH  Resp: CTAB, good effort  Ab+BS  Neuro: no tremor, 2+ DTRs in BUE  MS: no c/c in digits, moves all 4 ext, nl muscle bulk, gait nl  Skin: warm and dry, no palmar erythema  Psych: nl mood and affect, no gross lapses in memory  Patient's shoes and socks removed  Right Foot/Ankle   Right Foot Inspection  Skin Exam: skin normal  Skin not intact, no dry skin, no warmth, no callus, no erythema, no maceration, no abnormal color, no pre-ulcer, no ulcer and no callus       Toe Exam: No swelling, no tenderness, erythema and  no right toe deformity    Sensory   Vibration: intact  Proprioception: intact  Monofilament testing: intact    Vascular  Capillary refills: < 3 seconds  The right DP pulse is 2+  The right PT pulse is 2+  Left Foot/Ankle  Left Foot Inspection  Skin Exam: skin normal  Skin not intact, no dry skin, no warmth, no erythema, no maceration, normal color, no pre-ulcer, no ulcer and no callus  Toe Exam: No swelling, no tenderness, no erythema and no left toe deformity  Sensory   Vibration: intact  Proprioception: intact  Monofilament testing: intact    Vascular  Capillary refills: < 3 seconds  The left DP pulse is 2+  The left PT pulse is 2+  Assign Risk Category  No deformity present  No loss of protective sensation  No weak pulses  Risk: 0       Labs:   No components found for: HA1C  No components found for: GLU    Lab Results   Component Value Date    CREATININE 0 76 11/02/2022    CREATININE 0 83 06/29/2022    CREATININE 0 85 12/25/2021    BUN 14 11/02/2022    K 4 4 11/02/2022     11/02/2022    CO2 27 11/02/2022     eGFR   Date Value Ref Range Status   11/02/2022 88 ml/min/1 73sq m Final     No components found for: [facility]    Lab Results   Component Value Date    HDL 36 (L) 11/02/2022    TRIG 221 (H) 11/02/2022       Lab Results   Component Value Date    ALT 24 11/02/2022    AST 14 11/02/2022    GGT 18 11/02/2022    ALKPHOS 116 11/02/2022       No results found for: TSH, FREET4, TSI    Impression:  1  Type 2 diabetes mellitus with hyperglycemia, without long-term current use of insulin ([address] )    2  Type 2 diabetes mellitus with hyperglycemia, with long-term current use of insulin ([facility])    3  Hyperlipidemia, unspecified hyperlipidemia type    4  Microalbuminuria           Plan:    Sofia Mace was seen today for diabetes type 2      Diagnoses and all orders for this visit:    Type 2 diabetes mellitus with hyperglycemia, without long-term current use of insulin ([address] ):  Non-compliant,   Poorly controlled with A1c of 11 5%, in her last visit we started basal bolus insulin however she did not start negative, patient mentioned that she did not receive it from pharmacy, I asked patient to follow-up with her pharmacy as order rwas placed in her last visit, and let us know for any further assistance, she is just taking metformin 1500 mg once daily, not using her continuous glucose monitoring/Jason as the sensor keeps falling, she was advised to get Jason adhesive tape to keep her sensor on,   I asked the patient to take Lantus 25 units at bedtime and 10 units Humalog before dinner,(she is keeping breakfast and lunch), she is not interested in seeing CDE for MNT,   She drink 2 cans of regular ice tea daily, we advised her to cut down on sweetened beverages  We provided information on basic nutrition for diabetics  Extended counseling on disease management  Emphasized importance of daily exercise, weight loss, caloric reduction, small meals, consumption of multiple servings of fruits and vegetables and avoidance of concentrated sweets such as juice and soda  Reviewed concepts of diabetes self-management stressing the primary role of the patient in monitoring and maintaining control of diabetes  Return back in 3 months  Labs as ordered  -     insulin glargine (LANTUS SOLOSTAR) 100 units/mL injection pen; Inject 30 Units under the skin daily at bedtime  -     insulin lispro (HumaLOG) 100 units/mL injection; Inject 10 Units under the skin 3 (three) times a day with meals  -     BD Pen Needle Dayna 2nd Gen 32G X 4 MM MISC; 4 time a day  -     Hemoglobin A1C; Future  -     Comprehensive metabolic panel; Future  -     Lipid Panel with Direct LDL reflex; Future  -     Vitamin D 25 hydroxy; Future  -     Microalbumin / creatinine urine ratio; Future      Hyperlipidemia, unspecified hyperlipidemia type: On Crestor 20 mg once daily  She is tolerating well    -     Lipid Panel with Direct LDL reflex;  Future    Microalbuminuria:  She is a good candidate for SGLT-2 inhibitors given microalbuminuria and history of cardiomyopathy however given active yeast infection I do defer to restart for now  I did emphasize importance of glycemic control in order to event further damage to kidneys and prevent further microvascular or macrovascular complications    -     Microalbumin / creatinine urine ratio; Future        Discussed with the patient and all questioned fully answered  She will call me if any problems arise      Counseled patient on diagnostic results, prognosis, risk and benefit of treatment options, instruction for management, importance of treatment compliance, Risk  factor reduction and impressions      Genesis Person MD

## 2023-01-23 NOTE — PATIENT INSTRUCTIONS
Take Lantus 25 units before you go to bed  Take Humalog 10 units before dinner  Order the adhesive tape for your [place] sensor,   Check your blood sugars at bedtime and in the morning when you wake up  Call me if you blood sugars are consistently below 80 or above 80      Tegoderm, also work to keep your sensor

## 2023-01-27 ENCOUNTER — TELEPHONE (OUTPATIENT)
Dept: OBGYN CLINIC | Facility: MEDICAL CENTER | Age: 58
End: 2023-01-27

## 2023-01-27 DIAGNOSIS — B37.31 VULVOVAGINAL CANDIDIASIS: ICD-10-CM

## 2023-01-30 RX ORDER — INSULIN ASPART 100 [IU]/ML
10 INJECTION, SOLUTION INTRAVENOUS; SUBCUTANEOUS
Qty: 9 ML | Refills: 0 | Status: SHIPPED | OUTPATIENT
Start: 2023-01-30 | End: 2023-04-30

## 2023-01-30 NOTE — TELEPHONE ENCOUNTER
Pt's insurance will cover the Novolog flex pen but not the Humalog if we could re send this to [address] [place] [address] in [place]

## 2023-01-31 ENCOUNTER — OFFICE VISIT (OUTPATIENT)
Dept: DERMATOLOGY | Facility: CLINIC | Age: 58
End: 2023-01-31

## 2023-01-31 VITALS — TEMPERATURE: 97.8 F | HEIGHT: 61 IN | WEIGHT: 150 LBS | BODY MASS INDEX: 28.32 KG/M2

## 2023-01-31 DIAGNOSIS — L82.1 SEBORRHEIC KERATOSIS: ICD-10-CM

## 2023-01-31 DIAGNOSIS — L85.3 XEROSIS OF SKIN: ICD-10-CM

## 2023-01-31 DIAGNOSIS — L72.0 MILIUM: Primary | ICD-10-CM

## 2023-01-31 DIAGNOSIS — D18.01 CHERRY ANGIOMA: ICD-10-CM

## 2023-01-31 DIAGNOSIS — D22.9 MULTIPLE MELANOCYTIC NEVI: ICD-10-CM

## 2023-01-31 DIAGNOSIS — L81.4 LENTIGO: ICD-10-CM

## 2023-01-31 RX ORDER — FLUCONAZOLE 150 MG/1
150 TABLET ORAL
Qty: 3 TABLET | Refills: 0 | Status: SHIPPED | OUTPATIENT
Start: 2023-01-31 | End: 2023-02-07

## 2023-01-31 RX ORDER — CLOTRIMAZOLE AND BETAMETHASONE DIPROPIONATE 10; .64 MG/G; MG/G
CREAM TOPICAL 2 TIMES DAILY
Qty: 30 G | Refills: 0 | Status: SHIPPED | OUTPATIENT
Start: 2023-01-31

## 2023-01-31 NOTE — PROGRESS NOTES
[facility] Note     Patient Name: Phuong Henley  Encounter Date: 01/31/2023    Have you been cared for by a [place] [id number] Dermatologist in the last 3 years and, if so, which description applies to you? NO  I am considered a "new" patient and must complete all patient intake questions  I am FEMALE/of child-bearing potential     REVIEW OF SYSTEMS:  Have you recently had or currently have any of the following? · Recent fever or chills? No  · Any non-healing wound? No  · Are you pregnant or planning to become pregnant? No  · Are you currently or planning to be nursing or breast feeding? No   PAST MEDICAL HISTORY:  Have you personally ever had or currently have any of the following? If "YES," then please provide more detail  · Skin cancer (such as Melanoma, Basal Cell Carcinoma, Squamous Cell Carcinoma? No  · Tuberculosis, HIV/AIDS, Hepatitis B or C: No  · Systemic Immunosuppression such as Diabetes, Biologic or Immunotherapy, Chemotherapy, Organ Transplantation, Bone Marrow Transplantation YES, diabetes   · Radiation Treatment No   FAMILY HISTORY:  Any "first degree relatives" (parent, brother, sister, or child) with the following? • Skin Cancer, Pancreatic or Other Cancer? No   PATIENT EXPERIENCE:    • Do you want the Dermatologist to perform a COMPLETE skin exam today including a clinical examination under the "bra and underwear" areas? Yes  • If necessary, do we have your permission to call and leave a detailed message on your Preferred Phone number that includes your specific medical information?   Yes      Allergies   Allergen Reactions   • Lisinopril Cough   • Losartan Dizziness   • Other Hives     Surgical tape   • Prednisone Other (See Comments)     Thrush        Current Outpatient Medications:   •  BD Pen Needle Dayna 2nd Gen 32G X 4 MM MISC, 4 time a day, Disp: 200 each, Rfl: 2  •  buPROPion (WELLBUTRIN XL) 300 mg 24 hr tablet, Take 300 mg by mouth every morning, Disp: , Rfl:   • clotrimazole-betamethasone (LOTRISONE) 1-0 05 % cream, Apply topically 2 (two) times a day, Disp: 30 g, Rfl: 0  •  fluconazole (DIFLUCAN) 150 mg tablet, Take 1 tablet (150 mg total) by mouth every 3 (three) days for 3 doses, Disp: 3 tablet, Rfl: 0  •  ibuprofen (MOTRIN) 200 mg tablet, Take 200 mg by mouth every 6 (six) hours as needed for mild pain, Disp: , Rfl:   •  insulin glargine (LANTUS SOLOSTAR) 100 units/mL injection pen, Inject 30 Units under the skin daily at bedtime, Disp: 15 mL, Rfl: 1  •  insulin lispro (HumaLOG) 100 units/mL injection, Inject 10 Units under the skin 3 (three) times a day with meals, Disp: 9 mL, Rfl: 2  •  metFORMIN (GLUCOPHAGE-XR) 500 mg 24 hr tablet, Take 2 tablets (1,000 mg total) by mouth 2 (two) times a day with meals, Disp: 360 tablet, Rfl: 0  •  methocarbamol (ROBAXIN) 750 mg tablet, Take 500 mg by mouth every 6 (six) hours as needed for muscle spasms, Disp: , Rfl:   •  naloxone (NARCAN) 4 mg/0 1 mL nasal spray, ADMINISTER A SINGLE SPRAY OF NARCAN IN ONE NOSTRIL REPEAT AFTER 3 MINUTES IF NO OR MINIMAL RESPONSE, Disp: , Rfl:   •  naproxen (NAPROSYN) 375 mg tablet, Take 1 tablet (375 mg total) by mouth 2 (two) times a day with meals, Disp: 20 tablet, Rfl: 0  •  nitroglycerin (NITROSTAT) 0 4 mg SL tablet, Place 1 tablet (0 4 mg total) under the tongue every 5 (five) minutes as needed for chest pain, Disp: 25 tablet, Rfl: 0  •  NovoLOG FlexPen 100 units/mL injection pen, Inject 10 Units under the skin daily before dinner, Disp: 9 mL, Rfl: 0  •  oxyCODONE (ROXICODONE) 5 immediate release tablet, Take 5 mg by mouth 3 (three) times a day as needed, Disp: , Rfl:   •  pantoprazole (PROTONIX) 40 mg tablet, take 1 tablet by mouth twice a day, Disp: 60 tablet, Rfl: 1  •  pregabalin (LYRICA) 75 mg capsule, Take 75 mg by mouth 2 (two) times a day, Disp: , Rfl:   •  rosuvastatin (CRESTOR) 20 MG tablet, Take 1 tablet (20 mg total) by mouth daily, Disp: 90 tablet, Rfl: 3  •  Spiriva Respimat 2 5 MCG/ACT AERS inhaler, , Disp: , Rfl:   •  varenicline (CHANTIX SALEEM) 0 5 MG X 11 & 1 MG X 42 tablet, Take one 0 5 mg tablet by mouth once daily for 3 days, then one 0 5 mg tablet by mouth twice daily for 4 days, then one 1 mg tablet by mouth twice daily  , Disp: 53 tablet, Rfl: 0  •  zolpidem (AMBIEN) 10 mg tablet, Take 10 mg by mouth daily at bedtime as needed for sleep, Disp: , Rfl:   •  albuterol (PROVENTIL HFA,VENTOLIN HFA) 90 mcg/act inhaler, Inhale 2 puffs every 6 (six) hours as needed for wheezing (Patient not taking: Reported on 11/10/2022), Disp: , Rfl:   •  ARIPiprazole (ABILIFY) 2 mg tablet, Take 2 mg by mouth daily   (Patient not taking: Reported on 11/10/2022), Disp: , Rfl:   •  bisoprolol (ZEBETA) 5 mg tablet, Take 1 tablet (5 mg total) by mouth daily (Patient not taking: Reported on 1/23/2023), Disp: 90 tablet, Rfl: 3  •  diclofenac (VOLTAREN) 75 mg EC tablet, Take 75 mg by mouth 2 (two) times a day   (Patient not taking: Reported on 11/10/2022), Disp: , Rfl:   •  linaCLOtide (Linzess) 290 MCG CAPS, Take 1 capsule by mouth in the morning (Patient not taking: Reported on 1/23/2023), Disp: 90 capsule, Rfl: 3  •  metoclopramide (REGLAN) 5 mg/5 mL oral solution, Take 5 mL (5 mg total) by mouth 3 (three) times a day (Patient not taking: Reported on 11/10/2022), Disp: 445 mL, Rfl: 1  •  nicotine (NICODERM CQ) 21 mg/24 hr TD 24 hr patch, Place 1 patch on the skin every 24 hours (Patient not taking: Reported on 11/10/2022), Disp: 28 patch, Rfl: 0  •  ondansetron (ZOFRAN) 4 mg tablet, Take 1 tablet (4 mg total) by mouth every 8 (eight) hours as needed for nausea or vomiting (Patient not taking: Reported on 11/10/2022), Disp: 20 tablet, Rfl: 0  •  polyethylene glycol (GOLYTELY) 4000 mL solution, Take 4,000 mL by mouth once for 1 dose (Patient not taking: Reported on 11/10/2022), Disp: 4000 mL, Rfl: 0  •  potassium chloride SA (KLOR-CON M15) 15 MEQ tablet, Take 15 mEq by mouth 2 (two) times a day   (Patient not taking: Reported on 11/10/2022), Disp: , Rfl:   •  prazosin (MINIPRESS) 1 mg capsule, Take 1 mg by mouth daily at bedtime (Patient not taking: Reported on 11/10/2022), Disp: , Rfl:   •  QUEtiapine (SEROquel) 200 mg tablet, Take 150 mg by mouth daily at bedtime (Patient not taking: Reported on 11/10/2022), Disp: , Rfl:   •  sucralfate (CARAFATE) 1 g/10 mL suspension, Take 10 mL (1 g total) by mouth 4 (four) times a day (Patient not taking: Reported on 11/10/2022), Disp: 414 mL, Rfl: 0  •  umeclidinium-vilanterol (Anoro Ellipta) 62 5-25 MCG/INH inhaler, Inhale 1 puff daily for 14 days (Patient not taking: Reported on 11/10/2022), Disp: 28 blister, Rfl: 0          • Whom besides the patient is providing clinical information about today's encounter?   o NO ADDITIONAL HISTORIAN (patient alone provided history)    Physical Exam and Assessment/Plan by Diagnosis:    MILIUM     Physical Exam:  • Anatomic Location Affected:  Left medial canthus   • Morphological Description:  Cyst   • Pertinent Positives:  • Pertinent Negatives: Additional History of Present Condition:  patient here for spot of concern on her eye that does obstruct her vision  Assessment and Plan:  Based on a thorough discussion of this condition and the management approach to it (including a comprehensive discussion of the known risks, side effects and potential benefits of treatment), the patient (family) agrees to implement the following specific plan:  Extraction attempted no charge     Assessment and Plan  A milium is a small cyst containing keratin (the skin protein); they are usually multiple and are then known as milia  These harmless cysts present as tiny pearly-white bumps just under the surface of the skin  Milia are common in all ages and both sexes  They most often arise on the face and are particularly prominent on the eyelids and cheeks, but they may occur elsewhere  There are various kinds of milia    •  milia: Affect 40-50% of  babies, few to numerous lesions, often seen on the nose, but may also arise inside the mouth on the mucosa (Maryann pearls) or palate (Bernabe nodules) or more widely on the scalp, face and upper trunk, Heal spontaneously within a few weeks of birth  • Primary milia in children and adults: found around eyelids, cheeks, forehead and genitalia, in young children, a row of milia may appear along the nasal crease, may clear in a few weeks or persist for months or longer  • Juvenile milia: associated with Rombo syndrome, basal cell naevus syndrome, Qwwyz-Vquqs-Wgargoki syndrome, pachyonychia congenita, Vargas syndrome and other genetic disorders, may be congenital (present at birth) or appear later in life  • Milia en plaque: multiple milia appear on within an inflamed plaque up to several centimeters in diameter, usually found on an eyelid, behind the ear, on a cheek or jaw  3  Affect children and adults, especially middle-aged women  4  Sometimes associated with another skin disease including pseudoxanthoma elasticum, discoid lupus erythematosus, lichen planus  • Multiple eruptive milia: crops of numerous milia appear over a few weeks to months, lesions may be asymptomatic or itchy, most often affect the face, upper arms and upper trunk  • Traumatic milia: occur at the site of injury as skin heals, arise from eccrine sweat ducts, examples include thermal burns, dermabrasion, blistering rashes such as bullous pemphigoid, often seen on the back of hands and fingers in porphyria cutanea tarda, a milia-like calcified nodule may develop after  heel stick blood test    • Milia associated with drugs: may rarely follow the use of topical medication, such as phenols, hydroquinone, 5-fluorouracil cream, and a corticosteroid  Milia have a characteristic appearance  However, on occasion, a skin biopsy may be performed  This shows a small epidermoid cyst coming from a vellus hair follicle    Milia should be distinguished from other types of cyst, comedones, xanthelasma and syringomas  Colloid milia are palacios coloured bumps on cheeks and temples associated with excessive exposure to sunlight  They should also be distinguished from milia-like cysts noted on dermoscopy in seborrhoeic keratoses, papillomatous moles and some basal cell carcinomas  Milia do not need to be treated unless they are a cause for concern for the patient  They often clear up by themselves within a few months  Where possible, further trauma should be minimised to reduce the development of new lesions  • The lesion may be de-roofed using a sterile needle or blade and the contents squeezed or pricked out  • They may be destroyed using diathermy and curettage, or cryotherapy  • For widespread lesions, topical retinoids may be helpful  • Chemical peels, dermabrasion and laser ablation have been reported to be effective when used for very extensive milia  • Milia en plaque may improve with minocycline (a tetracycline antibiotic)  MELANOCYTIC NEVI ("Moles")    Physical Exam:  • Anatomic Location Affected:   Mostly on sun-exposed areas of the trunk and extremities  • Morphological Description:  Scattered, 1-4mm round to ovoid, symmetrical-appearing, even bordered, skin colored to dark brown macules/papules, mostly in sun-exposed areas  • Pertinent Positives:  • Pertinent Negatives:     Additional History of Present Condition:      Assessment and Plan:  Based on a thorough discussion of this condition and the management approach to it (including a comprehensive discussion of the known risks, side effects and potential benefits of treatment), the patient (family) agrees to implement the following specific plan:  • When outside we recommend using a wide brim hat, sunglasses, long sleeve and pants, sunscreen with SPF 87+ with reapplication every 2 hours, or SPF specific clothing   • Benign, reassured  • Annual skin check     Melanocytic Nevi  Melanocytic nevi ("moles") are tan or brown, raised or flat areas of the skin which have an increased number of melanocytes  Melanocytes are the cells in our body which make pigment and account for skin color  Some moles are present at birth (I e , "congenital nevi"), while others come up later in life (i e , "acquired nevi")  The sun can stimulate the body to make more moles  Sunburns are not the only thing that triggers more moles  Chronic sun exposure can do it too  Clinically distinguishing a healthy mole from melanoma may be difficult, even for experienced dermatologists  The "ABCDE's" of moles have been suggested as a means of helping to alert a person to a suspicious mole and the possible increased risk of melanoma  The suggestions for raising alert are as follows:    Asymmetry: Healthy moles tend to be symmetric, while melanomas are often asymmetric  Asymmetry means if you draw a line through the mole, the two halves do not match in color, size, shape, or surface texture  Asymmetry can be a result of rapid enlargement of a mole, the development of a raised area on a previously flat lesion, scaling, ulceration, bleeding or scabbing within the mole  Any mole that starts to demonstrate "asymmetry" should be examined promptly by a board certified dermatologist      Border: Healthy moles tend to have discrete, even borders  The border of a melanoma often blends into the normal skin and does not sharply delineate the mole from normal skin  Any mole that starts to demonstrate "uneven borders" should be examined promptly by a board certified dermatologist      Color: Healthy moles tend to be one color throughout  Melanomas tend to be made up of different colors ranging from dark black, blue, white, or red    Any mole that demonstrates a color change should be examined promptly by a board certified dermatologist      Diameter: Healthy moles tend to be smaller than 0 6 cm in size; an exception are "congenital nevi" that can be larger  Melanomas tend to grow and can often be greater than 0 6 cm (1/4 of an inch, or the size of a pencil eraser)  This is only a guideline, and many normal moles may be larger than 0 6 cm without being unhealthy  Any mole that starts to change in size (small to bigger or bigger to smaller) should be examined promptly by a board certified dermatologist      Evolving: Healthy moles tend to "stay the same "  Melanomas may often show signs of change or evolution such as a change in size, shape, color, or elevation  Any mole that starts to itch, bleed, crust, burn, hurt, or ulcerate or demonstrate a change or evolution should be examined promptly by a board certified dermatologist         LENTIGO    Physical Exam:  • Anatomic Location Affected:  Trunk and arms   • Morphological Description:  Light brown macules  • Pertinent Positives:  • Pertinent Negatives: Additional History of Present Condition:      Assessment and Plan:  Based on a thorough discussion of this condition and the management approach to it (including a comprehensive discussion of the known risks, side effects and potential benefits of treatment), the patient (family) agrees to implement the following specific plan:  • When outside we recommend using a wide brim hat, sunglasses, long sleeve and pants, sunscreen with SPF 61+ with reapplication every 2 hours, or SPF specific clothing       What is a lentigo? A lentigo is a pigmented flat or slightly raised lesion with a clearly defined edge  Unlike an ephelis (freckle), it does not fade in the winter months  There are several kinds of lentigo  The name lentigo originally referred to its appearance resembling a small lentil  The plural of lentigo is lentigines, although “lentigos” is also in common use  Who gets lentigines? Lentigines can affect males and females of all ages and races  Solar lentigines are especially prevalent in fair skinned adults   Lentigines associated with syndromes are present at birth or arise during childhood  What causes lentigines? Common forms of lentigo are due to exposure to ultraviolet radiation:  • Sun damage including sunburn   • Indoor tanning   • Phototherapy, especially photochemotherapy (PUVA)    Ionizing radiation, eg radiation therapy, can also cause lentigines  Several familial syndromes associated with widespread lentigines originate from mutations in Bayron-MAP kinase, mTOR signaling and PTEN pathways  What is the treatment for lentigines? Most lentigines are left alone  Attempts to lighten them may not be successful  The following approaches are used:  • SPF 50+ broad-spectrum sunscreen   • Hydroquinone bleaching cream   • Alpha hydroxy acids   • Vitamin C   • Retinoids   • Azelaic acid   • Chemical peels  Individual lesions can be permanently removed using:  • Cryotherapy   • Intense pulsed light   • Pigment lasers    How can lentigines be prevented? Lentigines associated with exposure ultraviolet radiation can be prevented by very careful sun protection  Clothing is more successful at preventing new lentigines than are sunscreens  What is the outlook for lentigines? Lentigines usually persist  They may increase in number with age and sun exposure  Some in sun-protected sites may fade and disappear  HAWLEY ANGIOMAS    Physical Exam:  • Anatomic Location Affected:  trunk  • Morphological Description:  Scattered cherry red, 1-4 mm papules  • Pertinent Positives:  • Pertinent Negatives:     Additional History of Present Condition:      Assessment and Plan:  Based on a thorough discussion of this condition and the management approach to it (including a comprehensive discussion of the known risks, side effects and potential benefits of treatment), the patient (family) agrees to implement the following specific plan:  • Monitor for changes  • Benign, reassured  •     Assessment and Plan:    Cherry angioma, also known as Eng Darius de Tj spots, are benign vascular skin lesions  A "cherry angioma" is a firm red, blue or purple papule, 0 1-1 cm in diameter  When thrombosed, they can appear black in colour until evaluated with a dermatoscope when the red or purple colour is more easily seen  Cherry angioma may develop on any part of the body but most often appear on the scalp, face, lips and trunk  An angioma is due to proliferating endothelial cells; these are the cells that line the inside of a blood vessel  Angiomas can arise in early life or later in life; the most common type of angioma is a cherry angioma  Cherry angiomas are very common in males and females of any age or race  They are more noticeable in white skin than in skin of colour  They markedly increase in number from about the age of 36  There may be a family history of similar lesions  Eruptive cherry angiomas have been rarely reported to be associated with internal malignancy  The cause of angiomas is unknown  Genetic analysis of cherry angiomas has shown that they frequently carry specific somatic missense mutations in the GNAQ and GNA11 (Q209H) genes, which are involved in other vascular and melanocytic proliferations  SEBORRHEIC KERATOSIS; NON-INFLAMED    Physical Exam:  • Anatomic Location Affected:  trunk  • Morphological Description:  Flat and raised, waxy, smooth to warty textured, yellow to brownish-grey to dark brown to blackish, discrete, "stuck-on" appearing papules  • Pertinent Positives:  • Pertinent Negatives:     Additional History of Present Condition:      Assessment and Plan:  Based on a thorough discussion of this condition and the management approach to it (including a comprehensive discussion of the known risks, side effects and potential benefits of treatment), the patient (family) agrees to implement the following specific plan:  • Monitor for changes  • Benign, reassured  •     Seborrheic Keratosis  A seborrheic keratosis is a harmless warty spot that appears during adult life as a common sign of skin aging  Seborrheic keratoses can arise on any area of skin, covered or uncovered, with the usual exception of the palms and soles  They do not arise from mucous membranes  Seborrheic keratoses can have highly variable appearance  Seborrheic keratoses are extremely common  It has been estimated that over 90% of adults over the age of 61 years have one or more of them  They occur in males and females of all races, typically beginning to erupt in the 35s or 45s  They are uncommon under the age of 21 years  The precise cause of seborrhoeic keratoses is not known  Seborrhoeic keratoses are considered degenerative in nature  As time goes by, seborrheic keratoses tend to become more numerous  Some people inherit a tendency to develop a very large number of them; some people may have hundreds of them  There is no easy way to remove multiple lesions on a single occasion  Unless a specific lesion is "inflamed" and is causing pain or stinging/burning or is bleeding, most insurance companies do not authorize treatment  XEROSIS ("DRY SKIN")    Physical Exam:  • Anatomic Location Affected:  diffuse  • Morphological Description:  xerosis  • Pertinent Positives:  • Pertinent Negatives: Additional History of Present Condition:      Assessment and Plan:  Based on a thorough discussion of this condition and the management approach to it (including a comprehensive discussion of the known risks, side effects and potential benefits of treatment), the patient (family) agrees to implement the following specific plan:  • Use moisturizer like Eucerin,Cerave or Aveeno Cream 3 times a day for the dry skin   •   •    •     Dry skin refers to skin that feels dry to touch  Dry skin has a dull surface with a rough, scaly quality  The skin is less pliable and cracked  When dryness is severe, the skin may become inflamed and fissured    Although any body site can be dry, dry skin tends to affect the shins more than any other site  Dry skin is lacking moisture in the outer horny cell layer (stratum corneum) and this results in cracks in the skin surface  Dry skin is also called xerosis, xeroderma or asteatosis (lack of fat)  It can affect males and females of all ages  There is some racial variability in water and lipid content of the skin  • Dry skin that starts in early childhood may be one of about 20 types of ichthyosis (fish-scale skin)  There is often a family history of dry skin  • Dry skin is commonly seen in people with atopic dermatitis  • Nearly everyone > 60 years has dry skin  Dry skin that begins later may be seen in people with certain diseases and conditions  • Postmenopausal women  • Hypothyroidism  • Chronic renal disease   • Malnutrition and weight loss   • Subclinical dermatitis   • Treatment with certain drugs such as oral retinoids, diuretics and epidermal growth factor receptor inhibitors      What is the treatment for dry skin? The mainstay of treatment of dry skin and ichthyosis is moisturisers/emollients  They should be applied liberally and often enough to:  • Reduce itch   • Improve the barrier function   • Prevent entry of irritants, bacteria   • Reduce transepidermal water loss  How can dry skin be prevented? Eliminate aggravating factors:  • Reduce the frequency of bathing  • A humidifier in winter and air conditioner in summer   • Compare having a short shower with a prolonged soak in a bath  • Use lukewarm, not hot, water  • Replace standard soap with a substitute such as a synthetic detergent cleanser, water-miscible emollient, bath oil, anti-pruritic tar oil, colloidal oatmeal etc    • Apply an emollient liberally and often, particularly shortly after bathing, and when itchy  The drier the skin, the thicker this should be, especially on the hands  What is the outlook for dry skin?   A tendency to dry skin may persist life-long, or it may improve once contributing factors are controlled    Scribe Attestation    I,:  Natividad Patel MA am acting as a scribe while in the presence of the attending physician :       I,:  Amelia Velázquez MD personally performed the services described in this documentation    as scribed in my presence :

## 2023-01-31 NOTE — PATIENT INSTRUCTIONS
Assessment and Plan:  Based on a thorough discussion of this condition and the management approach to it (including a comprehensive discussion of the known risks, side effects and potential benefits of treatment), the patient (family) agrees to implement the following specific plan:  Extraction attempted no charge     Assessment and Plan  A milium is a small cyst containing keratin (the skin protein); they are usually multiple and are then known as milia  These harmless cysts present as tiny pearly-white bumps just under the surface of the skin  Milia are common in all ages and both sexes  They most often arise on the face and are particularly prominent on the eyelids and cheeks, but they may occur elsewhere  There are various kinds of milia   milia: Affect 40-50% of  babies, few to numerous lesions, often seen on the nose, but may also arise inside the mouth on the mucosa (Maryann pearls) or palate (Bernabe nodules) or more widely on the scalp, face and upper trunk, Heal spontaneously within a few weeks of birth  Primary milia in children and adults: found around eyelids, cheeks, forehead and genitalia, in young children, a row of milia may appear along the nasal crease, may clear in a few weeks or persist for months or longer  Juvenile milia: associated with Rombo syndrome, basal cell naevus syndrome, Anbqa-Bldpb-Fqeesblh syndrome, pachyonychia congenita, Vargas syndrome and other genetic disorders, may be congenital (present at birth) or appear later in life  Milia en plaque: multiple milia appear on within an inflamed plaque up to several centimeters in diameter, usually found on an eyelid, behind the ear, on a cheek or jaw  3  Affect children and adults, especially middle-aged women  4  Sometimes associated with another skin disease including pseudoxanthoma elasticum, discoid lupus erythematosus, lichen planus    Multiple eruptive milia: crops of numerous milia appear over a few weeks to Intubation    Date/Time: 8/9/2022 10:53 AM  Performed by: Sarah Anderson CRNA  Authorized by: Gordy Reveles MD     Intubation:     Induction:  Intravenous    Intubated:  Postinduction    Mask Ventilation:  Easy mask    Attempts:  1    Attempted By:  CRNA    Method of Intubation:  Video laryngoscopy    Blade:  Mcnair 3    Laryngeal View Grade: Grade I - full view of cords      Difficult Airway Encountered?: No      Complications:  None    Airway Device:  Oral endotracheal tube    Airway Device Size:  8.5    Style/Cuff Inflation:  Cuffed (inflated to minimal occlusive pressure)    Tube secured:  22    Secured at:  The lips    Placement Verified By:  Capnometry    Complicating Factors:  None    Findings Post-Intubation:  BS equal bilateral and atraumatic/condition of teeth unchanged     months, lesions may be asymptomatic or itchy, most often affect the face, upper arms and upper trunk  Traumatic milia: occur at the site of injury as skin heals, arise from eccrine sweat ducts, examples include thermal burns, dermabrasion, blistering rashes such as bullous pemphigoid, often seen on the back of hands and fingers in porphyria cutanea tarda, a milia-like calcified nodule may develop after  heel stick blood test    Milia associated with drugs: may rarely follow the use of topical medication, such as phenols, hydroquinone, 5-fluorouracil cream, and a corticosteroid  Milia have a characteristic appearance  However, on occasion, a skin biopsy may be performed  This shows a small epidermoid cyst coming from a vellus hair follicle  Milia should be distinguished from other types of cyst, comedones, xanthelasma and syringomas  Colloid milia are palacios coloured bumps on cheeks and temples associated with excessive exposure to sunlight  They should also be distinguished from milia-like cysts noted on dermoscopy in seborrhoeic keratoses, papillomatous moles and some basal cell carcinomas  Milia do not need to be treated unless they are a cause for concern for the patient  They often clear up by themselves within a few months  Where possible, further trauma should be minimised to reduce the development of new lesions  The lesion may be de-roofed using a sterile needle or blade and the contents squeezed or pricked out  They may be destroyed using diathermy and curettage, or cryotherapy  For widespread lesions, topical retinoids may be helpful  Chemical peels, dermabrasion and laser ablation have been reported to be effective when used for very extensive milia  Milia en plaque may improve with minocycline (a tetracycline antibiotic)      Assessment and Plan:  Based on a thorough discussion of this condition and the management approach to it (including a comprehensive discussion of the known risks, side effects and potential benefits of treatment), the patient (family) agrees to implement the following specific plan:  When outside we recommend using a wide brim hat, sunglasses, long sleeve and pants, sunscreen with SPF 46+ with reapplication every 2 hours, or SPF specific clothing   Benign, reassured  Annual skin check     Melanocytic Nevi  Melanocytic nevi ("moles") are tan or brown, raised or flat areas of the skin which have an increased number of melanocytes  Melanocytes are the cells in our body which make pigment and account for skin color  Some moles are present at birth (I e , "congenital nevi"), while others come up later in life (i e , "acquired nevi")  The sun can stimulate the body to make more moles  Sunburns are not the only thing that triggers more moles  Chronic sun exposure can do it too  Clinically distinguishing a healthy mole from melanoma may be difficult, even for experienced dermatologists  The "ABCDE's" of moles have been suggested as a means of helping to alert a person to a suspicious mole and the possible increased risk of melanoma  The suggestions for raising alert are as follows:    Asymmetry: Healthy moles tend to be symmetric, while melanomas are often asymmetric  Asymmetry means if you draw a line through the mole, the two halves do not match in color, size, shape, or surface texture  Asymmetry can be a result of rapid enlargement of a mole, the development of a raised area on a previously flat lesion, scaling, ulceration, bleeding or scabbing within the mole  Any mole that starts to demonstrate "asymmetry" should be examined promptly by a board certified dermatologist      Border: Healthy moles tend to have discrete, even borders  The border of a melanoma often blends into the normal skin and does not sharply delineate the mole from normal skin    Any mole that starts to demonstrate "uneven borders" should be examined promptly by a board certified dermatologist  Color: Healthy moles tend to be one color throughout  Melanomas tend to be made up of different colors ranging from dark black, blue, white, or red  Any mole that demonstrates a color change should be examined promptly by a board certified dermatologist      Diameter: Healthy moles tend to be smaller than 0 6 cm in size; an exception are "congenital nevi" that can be larger  Melanomas tend to grow and can often be greater than 0 6 cm (1/4 of an inch, or the size of a pencil eraser)  This is only a guideline, and many normal moles may be larger than 0 6 cm without being unhealthy  Any mole that starts to change in size (small to bigger or bigger to smaller) should be examined promptly by a board certified dermatologist      Evolving: Healthy moles tend to "stay the same "  Melanomas may often show signs of change or evolution such as a change in size, shape, color, or elevation  Any mole that starts to itch, bleed, crust, burn, hurt, or ulcerate or demonstrate a change or evolution should be examined promptly by a board certified dermatologist       Assessment and Plan:  Based on a thorough discussion of this condition and the management approach to it (including a comprehensive discussion of the known risks, side effects and potential benefits of treatment), the patient (family) agrees to implement the following specific plan:  When outside we recommend using a wide brim hat, sunglasses, long sleeve and pants, sunscreen with SPF 38+ with reapplication every 2 hours, or SPF specific clothing       What is a lentigo? A lentigo is a pigmented flat or slightly raised lesion with a clearly defined edge  Unlike an ephelis (freckle), it does not fade in the winter months  There are several kinds of lentigo  The name lentigo originally referred to its appearance resembling a small lentil  The plural of lentigo is lentigines, although “lentigos” is also in common use  Who gets lentigines?   Lentigines can affect males and females of all ages and races  Solar lentigines are especially prevalent in fair skinned adults  Lentigines associated with syndromes are present at birth or arise during childhood  What causes lentigines? Common forms of lentigo are due to exposure to ultraviolet radiation:  Sun damage including sunburn   Indoor tanning   Phototherapy, especially photochemotherapy (PUVA)    Ionizing radiation, eg radiation therapy, can also cause lentigines  Several familial syndromes associated with widespread lentigines originate from mutations in Bayron-MAP kinase, mTOR signaling and PTEN pathways  What is the treatment for lentigines? Most lentigines are left alone  Attempts to lighten them may not be successful  The following approaches are used:  SPF 50+ broad-spectrum sunscreen   Hydroquinone bleaching cream   Alpha hydroxy acids   Vitamin C   Retinoids   Azelaic acid   Chemical peels  Individual lesions can be permanently removed using:  Cryotherapy   Intense pulsed light   Pigment lasers    How can lentigines be prevented? Lentigines associated with exposure ultraviolet radiation can be prevented by very careful sun protection  Clothing is more successful at preventing new lentigines than are sunscreens  What is the outlook for lentigines? Lentigines usually persist  They may increase in number with age and sun exposure  Some in sun-protected sites may fade and disappear  Assessment and Plan:  Based on a thorough discussion of this condition and the management approach to it (including a comprehensive discussion of the known risks, side effects and potential benefits of treatment), the patient (family) agrees to implement the following specific plan:  Monitor for changes  Benign, reassured      Assessment and Plan:    Cherry angioma, also known as [employer] spots, are benign vascular skin lesions  A "cherry angioma" is a firm red, blue or purple papule, 0 1-1 cm in diameter   When thrombosed, they can appear black in colour until evaluated with a dermatoscope when the red or purple colour is more easily seen  Cherry angioma may develop on any part of the body but most often appear on the scalp, face, lips and trunk  An angioma is due to proliferating endothelial cells; these are the cells that line the inside of a blood vessel  Angiomas can arise in early life or later in life; the most common type of angioma is a cherry angioma  Cherry angiomas are very common in males and females of any age or race  They are more noticeable in white skin than in skin of colour  They markedly increase in number from about the age of 36  There may be a family history of similar lesions  Eruptive cherry angiomas have been rarely reported to be associated with internal malignancy  The cause of angiomas is unknown  Genetic analysis of cherry angiomas has shown that they frequently carry specific somatic missense mutations in the GNAQ and GNA11 (Q209H) genes, which are involved in other vascular and melanocytic proliferations  Assessment and Plan:  Based on a thorough discussion of this condition and the management approach to it (including a comprehensive discussion of the known risks, side effects and potential benefits of treatment), the patient (family) agrees to implement the following specific plan:  Monitor for changes  Benign, reassured      Seborrheic Keratosis  A seborrheic keratosis is a harmless warty spot that appears during adult life as a common sign of skin aging  Seborrheic keratoses can arise on any area of skin, covered or uncovered, with the usual exception of the palms and soles  They do not arise from mucous membranes  Seborrheic keratoses can have highly variable appearance  Seborrheic keratoses are extremely common  It has been estimated that over 90% of adults over the age of 61 years have one or more of them   They occur in males and females of all races, typically beginning to erupt in the 35s or 45s  They are uncommon under the age of 21 years  The precise cause of seborrhoeic keratoses is not known  Seborrhoeic keratoses are considered degenerative in nature  As time goes by, seborrheic keratoses tend to become more numerous  Some people inherit a tendency to develop a very large number of them; some people may have hundreds of them  There is no easy way to remove multiple lesions on a single occasion  Unless a specific lesion is "inflamed" and is causing pain or stinging/burning or is bleeding, most insurance companies do not authorize treatment  Assessment and Plan:  Based on a thorough discussion of this condition and the management approach to it (including a comprehensive discussion of the known risks, side effects and potential benefits of treatment), the patient (family) agrees to implement the following specific plan:  Use moisturizer like Eucerin,Cerave or Aveeno Cream 3 times a day for the dry skin            Dry skin refers to skin that feels dry to touch  Dry skin has a dull surface with a rough, scaly quality  The skin is less pliable and cracked  When dryness is severe, the skin may become inflamed and fissured  Although any body site can be dry, dry skin tends to affect the shins more than any other site  Dry skin is lacking moisture in the outer horny cell layer (stratum corneum) and this results in cracks in the skin surface  Dry skin is also called xerosis, xeroderma or asteatosis (lack of fat)  It can affect males and females of all ages  There is some racial variability in water and lipid content of the skin  Dry skin that starts in early childhood may be one of about 20 types of ichthyosis (fish-scale skin)  There is often a family history of dry skin  Dry skin is commonly seen in people with atopic dermatitis  Nearly everyone > 60 years has dry skin      Dry skin that begins later may be seen in people with certain diseases and conditions  Postmenopausal women  Hypothyroidism  Chronic renal disease   Malnutrition and weight loss   Subclinical dermatitis   Treatment with certain drugs such as oral retinoids, diuretics and epidermal growth factor receptor inhibitors      What is the treatment for dry skin? The mainstay of treatment of dry skin and ichthyosis is moisturisers/emollients  They should be applied liberally and often enough to:  Reduce itch   Improve the barrier function   Prevent entry of irritants, bacteria   Reduce transepidermal water loss  How can dry skin be prevented? Eliminate aggravating factors:  Reduce the frequency of bathing  A humidifier in winter and air conditioner in summer   Compare having a short shower with a prolonged soak in a bath  Use lukewarm, not hot, water  Replace standard soap with a substitute such as a synthetic detergent cleanser, water-miscible emollient, bath oil, anti-pruritic tar oil, colloidal oatmeal etc    Apply an emollient liberally and often, particularly shortly after bathing, and when itchy  The drier the skin, the thicker this should be, especially on the hands  What is the outlook for dry skin? A tendency to dry skin may persist life-long, or it may improve once contributing factors are controlled

## 2023-03-03 ENCOUNTER — HOSPITAL ENCOUNTER (EMERGENCY)
Facility: HOSPITAL | Age: 58
Discharge: HOME | End: 2023-03-03
Attending: EMERGENCY MEDICINE

## 2023-03-03 ENCOUNTER — APPOINTMENT (EMERGENCY)
Dept: RADIOLOGY | Facility: HOSPITAL | Age: 58
End: 2023-03-03

## 2023-03-03 ENCOUNTER — APPOINTMENT (EMERGENCY)
Dept: CT IMAGING | Facility: HOSPITAL | Age: 58
End: 2023-03-03

## 2023-03-03 VITALS
WEIGHT: 149.91 LBS | RESPIRATION RATE: 19 BRPM | SYSTOLIC BLOOD PRESSURE: 157 MMHG | HEIGHT: 61 IN | BODY MASS INDEX: 28.3 KG/M2 | OXYGEN SATURATION: 94 % | TEMPERATURE: 97.8 F | DIASTOLIC BLOOD PRESSURE: 67 MMHG | HEART RATE: 58 BPM
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
## 2023-03-03 DIAGNOSIS — R31.9 HEMATURIA: Primary | ICD-10-CM

## 2023-03-03 LAB
ABO GROUP BLD: NORMAL
ALBUMIN SERPL BCP-MCNC: 3.8 G/DL (ref 3.5–5)
ALP SERPL-CCNC: 96 U/L (ref 34–104)
ALT SERPL W P-5'-P-CCNC: 18 U/L (ref 7–52)
ANION GAP SERPL CALCULATED.3IONS-SCNC: 7 MMOL/L (ref 4–13)
AST SERPL W P-5'-P-CCNC: 14 U/L (ref 13–39)
BACTERIA UR QL AUTO: ABNORMAL /HPF
BASOPHILS # BLD AUTO: 0.03 THOUSANDS/ÂΜL (ref 0–0.1)
BASOPHILS NFR BLD AUTO: 0 % (ref 0–1)
BILIRUB SERPL-MCNC: 0.4 MG/DL (ref 0.2–1)
BILIRUB UR QL STRIP: NEGATIVE
BLD GP AB SCN SERPL QL: NEGATIVE
BUN SERPL-MCNC: 19 MG/DL (ref 5–25)
CALCIUM SERPL-MCNC: 9.3 MG/DL (ref 8.4–10.2)
CHLORIDE SERPL-SCNC: 95 MMOL/L (ref 96–108)
CLARITY UR: ABNORMAL
CO2 SERPL-SCNC: 30 MMOL/L (ref 21–32)
COLOR UR: ABNORMAL
CREAT SERPL-MCNC: 0.74 MG/DL (ref 0.6–1.3)
EOSINOPHIL # BLD AUTO: 0.15 THOUSAND/ÂΜL (ref 0–0.61)
EOSINOPHIL NFR BLD AUTO: 2 % (ref 0–6)
ERYTHROCYTE [DISTWIDTH] IN BLOOD BY AUTOMATED COUNT: 13 % (ref 11.6–15.1)
GFR SERPL CREATININE-BSD FRML MDRD: 91 ML/MIN/1.73SQ M
GLUCOSE SERPL-MCNC: 476 MG/DL (ref 65–140)
GLUCOSE UR STRIP-MCNC: ABNORMAL MG/DL
HCT VFR BLD AUTO: 45.4 % (ref 34.8–46.1)
HGB BLD-MCNC: 15 G/DL (ref 11.5–15.4)
HGB UR QL STRIP.AUTO: ABNORMAL
IMM GRANULOCYTES # BLD AUTO: 0.03 THOUSAND/UL (ref 0–0.2)
IMM GRANULOCYTES NFR BLD AUTO: 0 % (ref 0–2)
INR PPP: 0.92 (ref 0.84–1.19)
KETONES UR STRIP-MCNC: NEGATIVE MG/DL
LEUKOCYTE ESTERASE UR QL STRIP: NEGATIVE
LYMPHOCYTES # BLD AUTO: 3 THOUSANDS/ÂΜL (ref 0.6–4.47)
LYMPHOCYTES NFR BLD AUTO: 38 % (ref 14–44)
MCH RBC QN AUTO: 28.5 PG (ref 26.8–34.3)
MCHC RBC AUTO-ENTMCNC: 33 G/DL (ref 31.4–37.4)
MCV RBC AUTO: 86 FL (ref 82–98)
MONOCYTES # BLD AUTO: 0.45 THOUSAND/ÂΜL (ref 0.17–1.22)
MONOCYTES NFR BLD AUTO: 6 % (ref 4–12)
NEUTROPHILS # BLD AUTO: 4.21 THOUSANDS/ÂΜL (ref 1.85–7.62)
NEUTS SEG NFR BLD AUTO: 54 % (ref 43–75)
NITRITE UR QL STRIP: NEGATIVE
NON-SQ EPI CELLS URNS QL MICRO: ABNORMAL /HPF
NRBC BLD AUTO-RTO: 0 /100 WBCS
PH UR STRIP.AUTO: 5.5 [PH]
PLATELET # BLD AUTO: 213 THOUSANDS/UL (ref 149–390)
PMV BLD AUTO: 9.7 FL (ref 8.9–12.7)
POTASSIUM SERPL-SCNC: 3.7 MMOL/L (ref 3.5–5.3)
PROT SERPL-MCNC: 6.9 G/DL (ref 6.4–8.4)
PROT UR STRIP-MCNC: NEGATIVE MG/DL
PROTHROMBIN TIME: 12.4 SECONDS (ref 11.6–14.5)
RBC # BLD AUTO: 5.27 MILLION/UL (ref 3.81–5.12)
RBC #/AREA URNS AUTO: ABNORMAL /HPF
RH BLD: POSITIVE
SODIUM SERPL-SCNC: 132 MMOL/L (ref 135–147)
SP GR UR STRIP.AUTO: 1.01
SPECIMEN EXPIRATION DATE: NORMAL
UROBILINOGEN UR QL STRIP.AUTO: 0.2 E.U./DL
WBC # BLD AUTO: 7.87 THOUSAND/UL (ref 4.31–10.16)
WBC #/AREA URNS AUTO: ABNORMAL /HPF

## 2023-03-03 RX ADMIN — IOHEXOL 100 ML: 350 INJECTION, SOLUTION INTRAVENOUS at 14:40

## 2023-03-08 NOTE — ED PROVIDER NOTES
Emergency Department Trauma Note  Sven Oliveros 56 y o  female MRN: [id number]  Unit/Bed#: ED 23/ED 23 Encounter: [id number]      Trauma Alert: Trauma Acuity: Trauma Evaluation  Model of Arrival: Mode of Arrival: Direct from scene via    Trauma Team: Current Providers  Attending Provider: Miley Palacio MD  Registered Nurse: Salazar Salvador, RN  Graduate Nurse: Lorri Francis  Consultants:     None      History of Present Illness     Chief Complaint:   Chief Complaint   Patient presents with   • Fall     Fall  Patient states she fell off her deck (ground level) last Friday and on Monday she began urinating blood  States "I hit really hard on my back"     HPI:  Sven Oliveros is a 56 y o  female who presents with   Mechanism:Details of Incident: slid off deck, landed on middle back, pt reports blood in urine now          Patient is a 61-year-old female history of poorly controlled diabetes mellitus presents for evaluation of blood in her urine  Patient says that 1 week ago she had a mechanical fall when she slipped and fell on her lumbar back on her deck  It was a ground-level fall and back pain quickly subsided  However a couple days later, starting on Monday she began having some blood noted in her urine  She has had persistent dark red/brownish urine since this time  Patient otherwise denies any complaints  She denies headache nausea vomiting altered mental status or focal neurologic deficit  Denies chest pain dyspnea abdominal pain  She has been able to eat and drink normally  Patient is not on any blood thinners  Review of Systems   Constitutional: Negative for fever  HENT: Negative for sore throat  Respiratory: Negative for shortness of breath  Cardiovascular: Negative for chest pain  Gastrointestinal: Negative for abdominal pain  Genitourinary: Positive for hematuria  Negative for dysuria  Musculoskeletal: Negative for back pain  Skin: Negative for rash     Neurological: Negative for light-headedness  Psychiatric/Behavioral: Negative for agitation  All other systems reviewed and are negative  Historical Information     Immunizations:   Immunization History   Administered Date(s) Administered   • Hep B, adult 2014, 2015   • INFLUENZA 2015   • Pneumococcal Polysaccharide PPV23 2014   • Tdap 2014   • Tuberculin Skin Test-PPD Intradermal 2017       Past Medical History:   Diagnosis Date   • [facility] p[facility] )     recent   • Anxiety    • Arthritis    • Asthma    • Carpal tunnel syndrome    • Chronic headaches    • COPD (chronic obstructive pulmonary disease) ([facility] )    • Diabetes ([facility] )    • Diabetes mellitus (HCC)    • GERD (gastroesophageal reflux disease)    • Headache    • Hyperlipidemia    • Hypertension    • Kidney stone    • Left bundle branch block     LBBB   • MI (myocardial infarction) ([facility] )    • Myocardial infarction ([facility] )    • Neuropathy    • PTSD (post-traumatic stress disorder)    • RLS (restless legs syndrome)    • Shortness of breath      Family History   Problem Relation Age of Onset   • Diabetes Mother    • Breast cancer Mother 39   • Lung cancer Father 47   • Diabetes Sister    • Brain cancer Maternal Aunt 67   • Breast cancer Other 25     Past Surgical History:   Procedure Laterality Date   • ANGIOPLASTY     • BREAST SURGERY  2016    Reduction   • CARDIAC CATHETERIZATION  2018     Mid LAD: There was a tubular 40 % stenosis   •  SECTION     • COLONOSCOPY     • HYSTERECTOMY  2018    Complete   • KIDNEY STONE SURGERY     • LAPAROSCOPY     • AK COLONOSCOPY FLX DX W/COLLJ SPEC WHEN PFRMD N/A 2017    Procedure: EGD AND COLONOSCOPY;  Surgeon: Juan Francisco Kelly MD;  Location: [facility]; Service: Gastroenterology   • AK ESOPHAGOGASTRODUODENOSCOPY TRANSORAL DIAGNOSTIC N/A 10/10/2018    Procedure: ESOPHAGOGASTRODUODENOSCOPY (EGD); Surgeon: Juan Francisco Kelly MD;  Location: [facility];   Service: Gastroenterology   • CO LAPS TOTAL HYSTERECT 250 GM/< W/RMVL TUBE/OVARY N/A 2018    Procedure: ROBOTIC ASSISTED TOTAL LAPAROSCOPIC HYSTERECTOMY, BILATERAL SALPINGOOPHERECTOMY,;  Surgeon: Nikolai Zamora MD;  Location: [facility];  Service: Gynecology Oncology   • UPPER GASTROINTESTINAL ENDOSCOPY     • WRIST SURGERY Right      Social History     Tobacco Use   • Smoking status: Every Day     Packs/day: 1 00     Years: 48 00     Pack years: 48 00     Types: Cigarettes     Start date: 1973   • Smokeless tobacco: Never   • Tobacco comments:     pt  smoked this am 10/10   Vaping Use   • Vaping Use: Never used   Substance Use Topics   • Alcohol use: No     Comment: Rarely consumes alcohol - As per [employer]    • Drug use: Yes     Frequency: 7 0 times per week     Types: Marijuana     Comment: medical marijuana  last use over a month     E-Cigarette/Vaping   • E-Cigarette Use Never User      E-Cigarette/Vaping Substances   • Nicotine No    • THC No    • CBD No    • Flavoring No    • Other No    • Unknown No        Family History: non-contributory    Meds/Allergies   Prior to Admission Medications   Prescriptions Last Dose Informant Patient Reported? Taking?    ARIPiprazole (ABILIFY) 2 mg tablet   Yes No   Sig: Take 2 mg by mouth daily     Patient not taking: Reported on 11/10/2022   BD Pen Needle Dayna 2nd Gen 32G X 4 MM MISC   No No   Si time a day   NovoLOG FlexPen 100 units/mL injection pen   No No   Sig: Inject 10 Units under the skin daily before dinner   QUEtiapine (SEROquel) 200 mg tablet   Yes No   Sig: Take 150 mg by mouth daily at bedtime   Patient not taking: Reported on 11/10/2022   Spiriva Respimat 2 5 MCG/ACT AERS inhaler   Yes No   albuterol (PROVENTIL HFA,VENTOLIN HFA) 90 mcg/act inhaler   Yes No   Sig: Inhale 2 puffs every 6 (six) hours as needed for wheezing   Patient not taking: Reported on 11/10/2022   bisoprolol (ZEBETA) 5 mg tablet   No No   Sig: Take 1 tablet (5 mg total) by mouth daily Patient not taking: Reported on 1/23/2023   buPROPion (WELLBUTRIN XL) 300 mg 24 hr tablet   Yes No   Sig: Take 450 mg by mouth every morning   clotrimazole-betamethasone (LOTRISONE) 1-0 05 % cream   No No   Sig: Apply topically 2 (two) times a day   diclofenac (VOLTAREN) 75 mg EC tablet   Yes No   Sig: Take 75 mg by mouth 2 (two) times a day     Patient not taking: Reported on 11/10/2022   ibuprofen (MOTRIN) 200 mg tablet   Yes No   Sig: Take 200 mg by mouth every 6 (six) hours as needed for mild pain   insulin glargine (LANTUS SOLOSTAR) 100 units/mL injection pen   No No   Sig: Inject 30 Units under the skin daily at bedtime   insulin lispro (HumaLOG) 100 units/mL injection   No No   Sig: Inject 10 Units under the skin 3 (three) times a day with meals   linaCLOtide (Linzess) 290 MCG CAPS   No No   Sig: Take 1 capsule by mouth in the morning   Patient not taking: Reported on 1/23/2023   metFORMIN (GLUCOPHAGE-XR) 500 mg 24 hr tablet   No No   Sig: Take 2 tablets (1,000 mg total) by mouth 2 (two) times a day with meals   methocarbamol (ROBAXIN) 750 mg tablet   Yes No   Sig: Take 500 mg by mouth every 6 (six) hours as needed for muscle spasms   metoclopramide (REGLAN) 5 mg/5 mL oral solution   No No   Sig: Take 5 mL (5 mg total) by mouth 3 (three) times a day   Patient not taking: Reported on 11/10/2022   naloxone (NARCAN) 4 mg/0 1 mL nasal spray   Yes No   Sig: ADMINISTER A SINGLE SPRAY OF NARCAN IN ONE NOSTRIL REPEAT AFTER 3 MINUTES IF NO OR MINIMAL RESPONSE   naproxen (NAPROSYN) 375 mg tablet   No No   Sig: Take 1 tablet (375 mg total) by mouth 2 (two) times a day with meals   nicotine (NICODERM CQ) 21 mg/24 hr TD 24 hr patch   No No   Sig: Place 1 patch on the skin every 24 hours   Patient not taking: Reported on 11/10/2022   nitroglycerin (NITROSTAT) 0 4 mg SL tablet   No No   Sig: Place 1 tablet (0 4 mg total) under the tongue every 5 (five) minutes as needed for chest pain   ondansetron (ZOFRAN) 4 mg tablet No No   Sig: Take 1 tablet (4 mg total) by mouth every 8 (eight) hours as needed for nausea or vomiting   Patient not taking: Reported on 11/10/2022   oxyCODONE (ROXICODONE) 5 immediate release tablet   Yes No   Sig: Take 5 mg by mouth 3 (three) times a day as needed   pantoprazole (PROTONIX) 40 mg tablet   No No   Sig: take 1 tablet by mouth twice a day   polyethylene glycol (GOLYTELY) 4000 mL solution   No No   Sig: Take 4,000 mL by mouth once for 1 dose   Patient not taking: Reported on 11/10/2022   potassium chloride SA (KLOR-CON M15) 15 MEQ tablet   Yes No   Sig: Take 15 mEq by mouth 2 (two) times a day     Patient not taking: Reported on 11/10/2022   prazosin (MINIPRESS) 1 mg capsule   Yes No   Sig: Take 1 mg by mouth daily at bedtime   Patient not taking: Reported on 11/10/2022   pregabalin (LYRICA) 75 mg capsule   Yes No   Sig: Take 75 mg by mouth 2 (two) times a day   rosuvastatin (CRESTOR) 20 MG tablet   No No   Sig: Take 1 tablet (20 mg total) by mouth daily   sucralfate (CARAFATE) 1 g/10 mL suspension   No No   Sig: Take 10 mL (1 g total) by mouth 4 (four) times a day   Patient not taking: Reported on 11/10/2022   umeclidinium-vilanterol (Anoro Ellipta) 62 5-25 MCG/INH inhaler   No No   Sig: Inhale 1 puff daily for 14 days   Patient not taking: Reported on 11/10/2022   varenicline (CHANTIX SALEEM) 0 5 MG X 11 & 1 MG X 42 tablet   No No   Sig: Take one 0 5 mg tablet by mouth once daily for 3 days, then one 0 5 mg tablet by mouth twice daily for 4 days, then one 1 mg tablet by mouth twice daily     zolpidem (AMBIEN) 10 mg tablet   Yes No   Sig: Take 10 mg by mouth daily at bedtime as needed for sleep      Facility-Administered Medications: None       Allergies   Allergen Reactions   • Lisinopril Cough   • Losartan Dizziness   • Other Hives     Surgical tape   • Prednisone Other (See Comments)     Thrush         PHYSICAL EXAM    PE limited by: NA    Objective   Vitals:   First set: Temperature: 97 8 °F (36 6 °C) (03/03/23 1358)  Pulse: 66 (03/03/23 1358)  Respirations: 20 (03/03/23 1358)  Blood Pressure: 164/88 (03/03/23 1358)  SpO2: 95 % (03/03/23 1358)    Primary Survey:   (A) Airway: Intact  (B) Breathing: Bilateral breath sounds  (C) Circulation: Pulses:   normal  (D) Disabliity:  GCS Total:  15  (E) Expose:  Completed    Secondary Survey: (Click on Physical Exam tab above)  Physical Exam  Vitals reviewed  Constitutional:       General: She is not in acute distress  Appearance: She is well-developed  HENT:      Head: Normocephalic  Eyes:      Pupils: Pupils are equal, round, and reactive to light  Cardiovascular:      Rate and Rhythm: Normal rate and regular rhythm  Heart sounds: Normal heart sounds  Pulmonary:      Effort: Pulmonary effort is normal       Breath sounds: Normal breath sounds  Abdominal:      General: Bowel sounds are normal  There is no distension  Palpations: Abdomen is soft  Tenderness: There is no abdominal tenderness  There is no guarding  Comments: No abdominal tenderness   Musculoskeletal:         General: No tenderness or deformity  Normal range of motion  Cervical back: Normal range of motion and neck supple  Comments: No CVA tenderness or tenderness to thoracic and lumbar spine   Skin:     General: Skin is warm and dry  Capillary Refill: Capillary refill takes less than 2 seconds  Neurological:      Mental Status: She is alert and oriented to person, place, and time  Cranial Nerves: No cranial nerve deficit  Sensory: No sensory deficit  Psychiatric:         Behavior: Behavior normal          Thought Content: Thought content normal          Judgment: Judgment normal          Cervical spine cleared by clinical criteria?  No (imaging required)      Invasive Devices     None                 Lab Results:   Results Reviewed     Procedure Component Value Units Date/Time    Comprehensive metabolic panel [[id number]]  (Abnormal) Collected: 03/03/23 1424    Lab Status: Final result Specimen: Blood from Arm, Left Updated: 03/03/23 1450     Sodium 132 mmol/L      Potassium 3 7 mmol/L      Chloride 95 mmol/L      CO2 30 mmol/L      ANION GAP 7 mmol/L      BUN 19 mg/dL      Creatinine 0 74 mg/dL      Glucose 476 mg/dL      Calcium 9 3 mg/dL      AST 14 U/L      ALT 18 U/L      Alkaline Phosphatase 96 U/L      Total Protein 6 9 g/dL      Albumin 3 8 g/dL      Total Bilirubin 0 40 mg/dL      eGFR 91 ml/min/1 73sq m     Narrative:      [facility] guidelines for Chronic Kidney Disease (CKD):   •  Stage 1 with normal or high GFR (GFR > 90 mL/min/1 73 square meters)  •  Stage 2 Mild CKD (GFR = 60-89 mL/min/1 73 square meters)  •  Stage 3A Moderate CKD (GFR = 45-59 mL/min/1 73 square meters)  •  Stage 3B Moderate CKD (GFR = 30-44 mL/min/1 73 square meters)  •  Stage 4 Severe CKD (GFR = 15-29 mL/min/1 73 square meters)  •  Stage 5 End Stage CKD (GFR <15 mL/min/1 73 square meters)  Note: GFR calculation is accurate only with a steady state creatinine    Protime-INR [[id number]]  (Normal) Collected: 03/03/23 1424    Lab Status: Final result Specimen: Blood from Arm, Left Updated: 03/03/23 1446     Protime 12 4 seconds      INR 0 92    Urine Microscopic [[id number]]  (Abnormal) Collected: 03/03/23 1424    Lab Status: Final result Specimen: Urine, Clean Catch Updated: 03/03/23 1442     RBC, UA 20-30 /hpf      WBC, UA 2-4 /hpf      Epithelial Cells Occasional /hpf      Bacteria, UA Occasional /hpf     UA w Reflex to Microscopic w Reflex to Culture [[id number]]  (Abnormal) Collected: 03/03/23 1424    Lab Status: Final result Specimen: Urine, Clean Catch Updated: 03/03/23 1433     Color, UA Brown     Clarity, UA Cloudy     Specific Gravity, UA 1 015     pH, UA 5 5     Leukocytes, UA Negative     Nitrite, UA Negative     Protein, UA Negative mg/dl      Glucose, UA 3+ mg/dl      Ketones, UA Negative mg/dl      Urobilinogen, UA 0 2 E U /dl Bilirubin, UA Negative     Occult Blood, UA 3+    CBC and differential [[id number]]  (Abnormal) Collected: 03/03/23 1424    Lab Status: Final result Specimen: Blood from Arm, Left Updated: 03/03/23 1432     WBC 7 87 Thousand/uL      RBC 5 27 Million/uL      Hemoglobin 15 0 g/dL      Hematocrit 45 4 %      MCV 86 fL      MCH 28 5 pg      MCHC 33 0 g/dL      RDW 13 0 %      MPV 9 7 fL      Platelets 393 Thousands/uL      nRBC 0 /100 WBCs      Neutrophils Relative 54 %      Immat GRANS % 0 %      Lymphocytes Relative 38 %      Monocytes Relative 6 %      Eosinophils Relative 2 %      Basophils Relative 0 %      Neutrophils Absolute 4 21 Thousands/µL      Immature Grans Absolute 0 03 Thousand/uL      Lymphocytes Absolute 3 00 Thousands/µL      Monocytes Absolute 0 45 Thousand/µL      Eosinophils Absolute 0 15 Thousand/µL      Basophils Absolute 0 03 Thousands/µL                  Imaging Studies:   Direct to CT: No  TRAUMA - CT chest abdomen pelvis w contrast   Final Result by Silverio Michel MD (03/03 1503)      No acute thoracic or abdominopelvic injury  I personally discussed this study with Alondra Molina on 3/3/2023 at 3:03 PM                   Workstation performed: [id number]         XR Trauma chest portable   Final Result by Silverio Michel MD (03/03 1441)      Probable linear atelectasis in the left lower lung  Otherwise no new findings since June 29, 2022                    Workstation performed: [id number]               Procedures  POC FAST US    Date/Time: 3/3/2023 9:47 PM  Performed by: Lesa Pickard MD  Authorized by: Lesa Pickard MD     Patient location:  ED  Other Assisting Provider: No    Procedure details:     Exam Type:  Diagnostic    Indications: blunt abdominal trauma      Assess for:  Intra-abdominal fluid and pericardial effusion    Technique: FAST      Views obtained:  Heart - Pericardial sac, RUQ - Chavira's Pouch, LUQ - Splenorenal space and Suprapubic - Pouch of Sal    Image quality: diagnostic      Image availability:  Not saved  FAST Findings:     RUQ (Hepatorenal) free fluid: absent      LUQ (Splenorenal) free fluid: absent      Suprapubic free fluid: absent      Cardiac wall motion: identified      Pericardial effusion: absent    Interpretation:     Impressions: negative               ED Course           Medical Decision Making  Patient is a 72-year-old female presents for evaluation of isolated hematuria after trauma  Initial primary concern was for a kidney injury  For this reason, trauma evaluation was called  Imaging however negative for any evidence of kidney injury  Patient was noted to have hematuria of unclear etiology  She does have nephrolithiasis but it is not obstructing and not associated with the ureter at this time so I do not believe this is causing her hematuria  Some concern for an underlying urologic process  Patient was given very strict follow-up instructions to urology and was made aware that if she does not follow-up we could be missing a potentially life-threatening diagnosis  Patient also was noted to be severely hyperglycemic with blood sugar in the 400s  She has not been taking her insulin and does not want to stay for to be corrected  She is not currently acidotic and not currently in DKA  I also had a very proctor conversation with the patient regarding her diabetes mellitus management and explained that she has had extraordinarily high risk for CVA, ACS, infection etc  secondary to her uncontrolled diabetes mellitus  Patient did communicate understanding but did not seem particularly concerned  Advised return precautions  Hematuria: complicated acute illness or injury  Amount and/or Complexity of Data Reviewed  Labs: ordered  Radiology: ordered and independent interpretation performed  Details: Initial chest x-ray without any obvious acute abnormality      Risk  Prescription drug management  Disposition  [facility] Transfer: No  Final diagnoses:   Hematuria   Fall, initial encounter     Time reflects when diagnosis was documented in both MDM as applicable and the Disposition within this note     Time User Action Codes Description Comment    3/3/2023  3:20 PM Sarah Guaman [R31 9] Hematuria     3/3/2023  3:20 PM Bradly Hanna Caro Add [V16  Danwagner Doran, initial encounter       ED Disposition     ED Disposition   Discharge    Condition   Stable    Date/Time   Fri Mar 3, 2023  3:20 PM    Comment   Phuong Crumb discharge to home/self care                 Follow-up Information     Follow up With Specialties Details Why [address] Emergency Department Emergency Medicine  If symptoms worsen [address] Asim Burgos [id number] [phone] Critical access hospital Emergency Department, [address] [place] [address], [facility]    Ronel Steven MD Urology Schedule an appointment as soon as possible for a visit   [address] [place] [address] [facility]  [phone]           Discharge Medication List as of 3/3/2023  3:21 PM      CONTINUE these medications which have NOT CHANGED    Details   albuterol (PROVENTIL HFA,VENTOLIN HFA) 90 mcg/act inhaler Inhale 2 puffs every 6 (six) hours as needed for wheezing, Historical Med      ARIPiprazole (ABILIFY) 2 mg tablet Take 2 mg by mouth daily  , Historical Med      BD Pen Needle Dayna 2nd Gen 32G X 4 MM MISC 4 time a day, Normal      bisoprolol (ZEBETA) 5 mg tablet Take 1 tablet (5 mg total) by mouth daily, Starting Mon 10/17/2022, Normal      buPROPion (WELLBUTRIN XL) 300 mg 24 hr tablet Take 450 mg by mouth every morning, Starting Thu 11/18/2021, Historical Med      clotrimazole-betamethasone (LOTRISONE) 1-0 05 % cream Apply topically 2 (two) times a day, Starting Tue 1/31/2023, Normal      diclofenac (VOLTAREN) 75 mg EC tablet Take 75 mg by mouth 2 (two) times a day  , Historical Med      ibuprofen (MOTRIN) 200 mg tablet Take 200 mg by mouth every 6 (six) hours as needed for mild pain, Historical Med      insulin glargine (LANTUS SOLOSTAR) 100 units/mL injection pen Inject 30 Units under the skin daily at bedtime, Starting Mon 1/23/2023, Normal      insulin lispro (HumaLOG) 100 units/mL injection Inject 10 Units under the skin 3 (three) times a day with meals, Starting Mon 1/23/2023, Until Sun 4/23/2023, Normal      linaCLOtide (Linzess) 290 MCG CAPS Take 1 capsule by mouth in the morning, Starting Tue 5/10/2022, Normal      metFORMIN (GLUCOPHAGE-XR) 500 mg 24 hr tablet Take 2 tablets (1,000 mg total) by mouth 2 (two) times a day with meals, Starting Mon 1/23/2023, Normal      methocarbamol (ROBAXIN) 750 mg tablet Take 500 mg by mouth every 6 (six) hours as needed for muscle spasms, Historical Med      metoclopramide (REGLAN) 5 mg/5 mL oral solution Take 5 mL (5 mg total) by mouth 3 (three) times a day, Starting Thu 6/30/2022, Normal      naloxone (NARCAN) 4 mg/0 1 mL nasal spray ADMINISTER A SINGLE SPRAY OF NARCAN IN ONE NOSTRIL REPEAT AFTER 3 MINUTES IF NO OR MINIMAL RESPONSE, Historical Med      naproxen (NAPROSYN) 375 mg tablet Take 1 tablet (375 mg total) by mouth 2 (two) times a day with meals, Starting Sat 12/25/2021, Normal      nicotine (NICODERM CQ) 21 mg/24 hr TD 24 hr patch Place 1 patch on the skin every 24 hours, Starting Fri 7/8/2022, Normal      nitroglycerin (NITROSTAT) 0 4 mg SL tablet Place 1 tablet (0 4 mg total) under the tongue every 5 (five) minutes as needed for chest pain, Starting Fri 7/8/2022, Normal      NovoLOG FlexPen 100 units/mL injection pen Inject 10 Units under the skin daily before dinner, Starting Mon 1/30/2023, Until Sun 4/30/2023, Normal      ondansetron (ZOFRAN) 4 mg tablet Take 1 tablet (4 mg total) by mouth every 8 (eight) hours as needed for nausea or vomiting, Starting Wed 7/28/2021, Normal      oxyCODONE (ROXICODONE) 5 immediate release tablet Take 5 mg by mouth 3 (three) times a day as needed, Starting Wed 10/27/2021, Historical Med      pantoprazole (PROTONIX) 40 mg tablet take 1 tablet by mouth twice a day, Normal      polyethylene glycol (GOLYTELY) 4000 mL solution Take 4,000 mL by mouth once for 1 dose, Starting Mon 2/7/2022, Normal      potassium chloride SA (KLOR-CON M15) 15 MEQ tablet Take 15 mEq by mouth 2 (two) times a day  , Historical Med      prazosin (MINIPRESS) 1 mg capsule Take 1 mg by mouth daily at bedtime, Starting Wed 7/13/2022, Historical Med      pregabalin (LYRICA) 75 mg capsule Take 75 mg by mouth 2 (two) times a day, Historical Med      QUEtiapine (SEROquel) 200 mg tablet Take 150 mg by mouth daily at bedtime, Historical Med      rosuvastatin (CRESTOR) 20 MG tablet Take 1 tablet (20 mg total) by mouth daily, Starting Mon 10/17/2022, Normal      Spiriva Respimat 2 5 MCG/ACT AERS inhaler Starting Wed 11/10/2021, Historical Med      sucralfate (CARAFATE) 1 g/10 mL suspension Take 10 mL (1 g total) by mouth 4 (four) times a day, Starting Wed 6/29/2022, Normal      umeclidinium-vilanterol (Anoro Ellipta) 62 5-25 MCG/INH inhaler Inhale 1 puff daily for 14 days, Starting Fri 7/8/2022, Sample      varenicline (CHANTIX SALEEM) 0 5 MG X 11 & 1 MG X 42 tablet Take one 0 5 mg tablet by mouth once daily for 3 days, then one 0 5 mg tablet by mouth twice daily for 4 days, then one 1 mg tablet by mouth twice daily  , Normal      zolpidem (AMBIEN) 10 mg tablet Take 10 mg by mouth daily at bedtime as needed for sleep, Historical Med               PDMP Review     None          ED Provider  Electronically Signed by         Regis Monroe MD  03/07/23 9404

## 2023-03-16 ENCOUNTER — APPOINTMENT (OUTPATIENT)
Dept: LAB | Facility: CLINIC | Age: 58
End: 2023-03-16

## 2023-03-16 DIAGNOSIS — F90.2 ATTENTION DEFICIT HYPERACTIVITY DISORDER, COMBINED TYPE: ICD-10-CM

## 2023-03-27 DIAGNOSIS — E11.65 TYPE 2 DIABETES MELLITUS WITH HYPERGLYCEMIA, WITH LONG-TERM CURRENT USE OF INSULIN (HCC): ICD-10-CM

## 2023-03-27 DIAGNOSIS — Z79.4 TYPE 2 DIABETES MELLITUS WITH HYPERGLYCEMIA, WITH LONG-TERM CURRENT USE OF INSULIN (HCC): ICD-10-CM

## 2023-03-28 RX ORDER — METFORMIN HYDROCHLORIDE 500 MG/1
TABLET, EXTENDED RELEASE ORAL
Qty: 360 TABLET | Refills: 0 | Status: SHIPPED | OUTPATIENT
Start: 2023-03-28

## 2023-03-29 LAB — MISCELLANEOUS LAB TEST RESULT: NORMAL

## 2023-04-24 DIAGNOSIS — E11.65 TYPE 2 DIABETES MELLITUS WITH HYPERGLYCEMIA, WITH LONG-TERM CURRENT USE OF INSULIN (HCC): ICD-10-CM

## 2023-04-24 DIAGNOSIS — Z79.4 TYPE 2 DIABETES MELLITUS WITH HYPERGLYCEMIA, WITH LONG-TERM CURRENT USE OF INSULIN (HCC): ICD-10-CM

## 2023-04-24 RX ORDER — METFORMIN HYDROCHLORIDE 500 MG/1
TABLET, EXTENDED RELEASE ORAL
Qty: 360 TABLET | Refills: 0 | Status: SHIPPED | OUTPATIENT
Start: 2023-04-24

## 2023-06-07 ENCOUNTER — VBI (OUTPATIENT)
Dept: ADMINISTRATIVE | Facility: OTHER | Age: 58
End: 2023-06-07

## 2023-06-08 ENCOUNTER — VBI (OUTPATIENT)
Dept: ADMINISTRATIVE | Facility: OTHER | Age: 58
End: 2023-06-08

## 2023-08-28 ENCOUNTER — OFFICE VISIT (OUTPATIENT)
Dept: OBGYN CLINIC | Facility: CLINIC | Age: 58
End: 2023-08-28
Payer: COMMERCIAL

## 2023-08-28 VITALS
WEIGHT: 148.6 LBS | HEIGHT: 61 IN | DIASTOLIC BLOOD PRESSURE: 90 MMHG | SYSTOLIC BLOOD PRESSURE: 145 MMHG | BODY MASS INDEX: 28.05 KG/M2

## 2023-08-28 DIAGNOSIS — B37.31 VULVOVAGINITIS DUE TO YEAST: Primary | ICD-10-CM

## 2023-08-28 PROCEDURE — 99213 OFFICE O/P EST LOW 20 MIN: CPT | Performed by: OBSTETRICS & GYNECOLOGY

## 2023-08-28 RX ORDER — PRAZOSIN HYDROCHLORIDE 5 MG/1
CAPSULE ORAL
COMMUNITY
Start: 2023-08-22

## 2023-08-28 RX ORDER — NYSTATIN AND TRIAMCINOLONE ACETONIDE 100000; 1 [USP'U]/G; MG/G
OINTMENT TOPICAL 2 TIMES DAILY
Qty: 30 G | Refills: 5 | Status: SHIPPED | OUTPATIENT
Start: 2023-08-28

## 2023-08-28 RX ORDER — BLOOD SUGAR DIAGNOSTIC
STRIP MISCELLANEOUS
COMMUNITY
Start: 2023-06-02

## 2023-08-28 RX ORDER — VARENICLINE TARTRATE 1 MG/1
TABLET, FILM COATED ORAL
COMMUNITY
Start: 2023-06-08

## 2023-08-28 RX ORDER — HYDROXYZINE PAMOATE 25 MG/1
CAPSULE ORAL
COMMUNITY
Start: 2023-06-08

## 2023-08-28 RX ORDER — METHYLPHENIDATE HYDROCHLORIDE 20 MG/1
20 TABLET ORAL EVERY MORNING
COMMUNITY
Start: 2023-08-08

## 2023-08-28 RX ORDER — DULOXETIN HYDROCHLORIDE 60 MG/1
CAPSULE, DELAYED RELEASE ORAL
COMMUNITY
Start: 2023-06-09

## 2023-08-28 RX ORDER — GLYCOPYRROLATE AND FORMOTEROL FUMARATE 9; 4.8 UG/1; UG/1
AEROSOL, METERED RESPIRATORY (INHALATION)
COMMUNITY
Start: 2023-06-19

## 2023-08-28 NOTE — PROGRESS NOTES
Assessment:   Gabrielle Cleveland was seen today for vaginitis. Diagnoses and all orders for this visit:    Vulvovaginitis due to yeast  -     terconazole (TERAZOL 7) 0.4 % vaginal cream; Insert 1 applicator into the vagina daily at bedtime  -     nystatin-triamcinolone (MYCOLOG-II) ointment; Apply topically 2 (two) times a day        Plan:  Wet prep was obtained; + for yeast.  Patient to start Terazol 7. Prescription sent to patient's pharmacy and instructions reviewed. Patient also to start Mycolog-II ointment. Patient reports if she has oral prednisone she gets thrush. She will contact us if she has any sort of reaction however has used betamethasone cream in the past without issue. Mycolog-II ointment sent to patient's pharmacy. Patient to follow-up in the next 2 months for routine yearly. Patient also recommended follow-up in the office if she continues to be symptomatic. Subjective:   Kamar Rocha is a 66 y.o. yo female who presents for itching. No odor or VD. H/o hyster. Her symptoms started 1 year ago. Pt reports she used OTC Monistat and probiotics. Pt reports she can scratch and cause bleeding. Feels like she has cut herself. Doesn't think anything makes it better or worse. Also feels like her insides are coming out. Has difficulty with her BM"s. Is SA. Sometimes feels burning on the outside with urination. Patient reports nothing makes it better or worse. ROS:   She denies hematuria, occ dysuria, +constipation, no diarrhea, fevers, chills, nausea or emesis.     Patient Active Problem List   Diagnosis   • Facet arthropathy, lumbosacral   • Lumbar spondylosis   • Chronic midline low back pain with bilateral sciatica   • Status post robotic assisted total laparoscopic hysterectomy, bilateral salpingo-oophorectomy   • Medical marijuana use   • Other chronic pain   • Gastroesophageal reflux disease with esophagitis   • Bilious vomiting with nausea   • Esophageal dysphagia   • Gastroparesis due to DM [facility])   • Fatty liver   • Drug-induced constipation   • Elevated liver enzymes   • Hoarseness   • LBBB (left bundle branch block)   • Hyperlipidemia   • Obesity (BMI 30.0-34. 9)   • Common migraine without aura   • Type 2 diabetes mellitus with hyperglycemia, without long-term current use of insulin ([facility])   • Hyperglycemia due to diabetes mellitus ([facility])   • Glucosuria   • Overweight   • COPD (chronic obstructive pulmonary disease) ([facility])   • Tobacco use disorder, severe, dependence   • Shortness of breath   • Microalbuminuria       Past Medical History:   Diagnosis Date   • Angina pectoris ([facility])     recent   • Anxiety    • Arthritis    • Asthma    • Carpal tunnel syndrome    • Chronic headaches    • COPD (chronic obstructive pulmonary disease) ([facility])    • Diabetes ([address])    • Diabetes mellitus ([address])    • GERD (gastroesophageal reflux disease)    • Headache    • Hyperlipidemia    • Hypertension    • Kidney stone    • Left bundle branch block     LBBB   • MI (myocardial infarction) ([address])    • Myocardial infarction ([address]) 2014/2016   • Neuropathy    • PTSD (post-traumatic stress disorder)    • RLS (restless legs syndrome)    • Shortness of breath        Social History     Socioeconomic History   • Marital status:       Spouse name: Not on file   • Number of children: Not on file   • Years of education: Not on file   • Highest education level: Not on file   Occupational History   • Occupation: Healthcare provider    Tobacco Use   • Smoking status: Every Day     Packs/day: 1.00     Years: 48.00     Total pack years: 48.00     Types: Cigarettes     Start date: 1/1/1973   • Smokeless tobacco: Never   • Tobacco comments:     pt. smoked this am 10/10   Vaping Use   • Vaping Use: Never used   Substance and Sexual Activity   • Alcohol use: No     Comment: Rarely consumes alcohol - As per [employer]    • Drug use: Yes     Frequency: 7.0 times per week     Types: Marijuana     Comment: medical marijuana  last use over a month   • Sexual activity: Yes   Other Topics Concern   • Not on file   Social History Narrative   • Not on file     Social Determinants of Health     Financial Resource Strain: Not on file   Food Insecurity: Not on file   Transportation Needs: Not on file   Physical Activity: Not on file   Stress: Not on file   Social Connections: Not on file   Intimate Partner Violence: Not on file   Housing Stability: Not on file         Current Outpatient Medications:   •  Accu-Chek Guide test strip, use to CHECK BLOOD GLUCOSE four times a day, Disp: , Rfl:   •  BD Pen Needle Dayna 2nd Gen 32G X 4 MM MISC, 4 time a day, Disp: 200 each, Rfl: 2  •  Bevespi Aerosphere 9-4.8 MCG/ACT inhaler, inhale 2 puffs by mouth every morning and 2 puffs at bedtime, Disp: , Rfl:   •  buPROPion (WELLBUTRIN XL) 300 mg 24 hr tablet, Take 450 mg by mouth every morning, Disp: , Rfl:   •  clotrimazole-betamethasone (LOTRISONE) 1-0.05 % cream, Apply topically 2 (two) times a day, Disp: 30 g, Rfl: 0  •  DULoxetine (CYMBALTA) 60 mg delayed release capsule, take 1 capsule by mouth every morning DO NOT CRUSH, CHEW, AND/OR DIVIDE, Disp: , Rfl:   •  hydrOXYzine pamoate (VISTARIL) 25 mg capsule, take 1 capsule by mouth daily if needed for anxiety, Disp: , Rfl:   •  ibuprofen (MOTRIN) 200 mg tablet, Take 200 mg by mouth every 6 (six) hours as needed for mild pain, Disp: , Rfl:   •  insulin glargine (LANTUS SOLOSTAR) 100 units/mL injection pen, Inject 30 Units under the skin daily at bedtime, Disp: 15 mL, Rfl: 1  •  metFORMIN (GLUCOPHAGE-XR) 500 mg 24 hr tablet, take 2 tablets by mouth twice a day with meals, Disp: 360 tablet, Rfl: 0  •  methocarbamol (ROBAXIN) 750 mg tablet, Take 500 mg by mouth every 6 (six) hours as needed for muscle spasms, Disp: , Rfl:   •  methylphenidate (RITALIN) 20 MG tablet, Take 20 mg by mouth every morning, Disp: , Rfl:   •  mupirocin (BACTROBAN) 2 % ointment, apply topically to affected area three times a day for 14 days, Disp: , Rfl:   •  naloxone (NARCAN) 4 mg/0.1 mL nasal spray, ADMINISTER A SINGLE SPRAY OF NARCAN IN ONE NOSTRIL REPEAT AFTER 3 MINUTES IF NO OR MINIMAL RESPONSE, Disp: , Rfl:   •  naproxen (NAPROSYN) 375 mg tablet, Take 1 tablet (375 mg total) by mouth 2 (two) times a day with meals, Disp: 20 tablet, Rfl: 0  •  nitroglycerin (NITROSTAT) 0.4 mg SL tablet, Place 1 tablet (0.4 mg total) under the tongue every 5 (five) minutes as needed for chest pain, Disp: 25 tablet, Rfl: 0  •  NovoLOG FlexPen 100 units/mL injection pen, Inject 10 Units under the skin daily before dinner, Disp: 9 mL, Rfl: 0  •  nystatin-triamcinolone (MYCOLOG-II) ointment, Apply topically 2 (two) times a day, Disp: 30 g, Rfl: 5  •  oxyCODONE (ROXICODONE) 5 immediate release tablet, Take 5 mg by mouth 3 (three) times a day as needed, Disp: , Rfl:   •  pantoprazole (PROTONIX) 40 mg tablet, take 1 tablet by mouth twice a day, Disp: 60 tablet, Rfl: 1  •  prazosin (MINIPRESS) 5 mg capsule, , Disp: , Rfl:   •  pregabalin (LYRICA) 75 mg capsule, Take 75 mg by mouth 2 (two) times a day, Disp: , Rfl:   •  rosuvastatin (CRESTOR) 20 MG tablet, Take 1 tablet (20 mg total) by mouth daily, Disp: 90 tablet, Rfl: 3  •  Spiriva Respimat 2.5 MCG/ACT AERS inhaler, , Disp: , Rfl:   •  terconazole (TERAZOL 7) 0.4 % vaginal cream, Insert 1 applicator into the vagina daily at bedtime, Disp: 45 g, Rfl: 0  •  varenicline (CHANTIX SALEEM) 0.5 MG X 11 & 1 MG X 42 tablet, Take one 0.5 mg tablet by mouth once daily for 3 days, then one 0.5 mg tablet by mouth twice daily for 4 days, then one 1 mg tablet by mouth twice daily. , Disp: 53 tablet, Rfl: 0  •  varenicline (CHANTIX) 1 mg tablet, take 1 tablet by mouth every morning and 1 tablet at bedtime, Disp: , Rfl:   •  zolpidem (AMBIEN) 10 mg tablet, Take 10 mg by mouth daily at bedtime as needed for sleep, Disp: , Rfl:   •  albuterol (PROVENTIL HFA,VENTOLIN HFA) 90 mcg/act inhaler, Inhale 2 puffs every 6 (six) hours as needed for wheezing (Patient not taking: Reported on 11/10/2022), Disp: , Rfl:   •  insulin lispro (HumaLOG) 100 units/mL injection, Inject 10 Units under the skin 3 (three) times a day with meals, Disp: 9 mL, Rfl: 2  •  potassium chloride SA (KLOR-CON M15) 15 MEQ tablet, Take 15 mEq by mouth 2 (two) times a day   (Patient not taking: Reported on 11/10/2022), Disp: , Rfl:     Allergies   Allergen Reactions   • Lisinopril Cough   • Losartan Dizziness   • Other Hives     Surgical tape   • Prednisone Other (See Comments)     Thrush         /90   Ht 5' 1" (1.549 m)   Wt 67.4 kg (148 lb 9.6 oz)   BMI 28.08 kg/m²     GEN: The patient was alert and oriented x3, pleasant well-appearing female in no acute distress. Pelvic: Swollen labia bilaterally with erythema and scattered breaks in skin. normal vaginal epithelium, NAappearing cervix. negative discharge noted.   SSE: no cystocele or rectocele      Wet Prep: Clue cells negative, Hyphae positive, Trichomonas negative

## 2023-08-28 NOTE — PATIENT INSTRUCTIONS
Yeast Infection   WHAT YOU NEED TO KNOW:   A yeast infection, or vaginal candidiasis, is a common vaginal infection. A yeast infection is caused by a fungus, or yeast-like germ. Fungi are normally found in your vagina. Too many fungi can cause an infection. DISCHARGE INSTRUCTIONS:   Call your doctor or gynecologist if:   You have a fever and chills. You develop abdominal or pelvic pain. Your discharge is bloody and it is not your monthly period. Your signs and symptoms get worse, even after treatment. You have questions or concerns about your condition or care. Medicines:   Medicines  help treat the fungal infection and decrease inflammation. The medicine may be a pill, cream, ointment, or vaginal tablet or suppository. Take your medicine as directed. Contact your healthcare provider if you think your medicine is not helping or if you have side effects. Tell your provider if you are allergic to any medicine. Keep a list of the medicines, vitamins, and herbs you take. Include the amounts, and when and why you take them. Bring the list or the pill bottles to follow-up visits. Carry your medicine list with you in case of an emergency. Keep your vagina healthy:   Clean your genital area with mild soap and warm water each day. Do not get soap inside your vagina. Gently dry the area after washing. Do not use hot tubs. The heat and moisture from hot tubs can increase your risk for another yeast infection. Always wipe from front to back  after you use the toilet. This prevents spreading bacteria from your rectal area into your vagina. Do not wear tight-fitting clothes or undergarments  for long periods of time. Wear cotton underwear during the day. Cotton helps keep your genital area dry and does not hold in warmth or moisture. Do not wear underwear at night. Do not douche  or use feminine hygiene sprays or bubble bath.  Do not use pads or tampons that are scented, or colored or perfumed toilet paper. Do not have sex until your symptoms go away. Have your partner wear a condom until you complete your course of medication. Ask your healthcare provider about birth control options if necessary. Condoms have latex and diaphragms have gel that kills sperm. Both of these may irritate your genital area. Follow up with your doctor or gynecologist as directed:  Write down your questions so you remember to ask them during your visits. © Copyright [employer] 2022 Information is for End User's use only and may not be sold, redistributed or otherwise used for commercial purposes. The above information is an  only. It is not intended as medical advice for individual conditions or treatments. Talk to your doctor, nurse or pharmacist before following any medical regimen to see if it is safe and effective for you.

## 2023-09-18 DIAGNOSIS — I42.8 NON-ISCHEMIC CARDIOMYOPATHY (HCC): Primary | ICD-10-CM

## 2023-09-18 RX ORDER — BISOPROLOL FUMARATE 5 MG/1
TABLET, FILM COATED ORAL
COMMUNITY
Start: 2023-08-28 | End: 2023-09-18 | Stop reason: SDUPTHER

## 2023-09-18 RX ORDER — BISOPROLOL FUMARATE 5 MG/1
5 TABLET, FILM COATED ORAL DAILY
Qty: 90 TABLET | Refills: 3 | Status: SHIPPED | OUTPATIENT
Start: 2023-09-18

## 2023-09-27 ENCOUNTER — APPOINTMENT (EMERGENCY)
Dept: CT IMAGING | Facility: HOSPITAL | Age: 58
End: 2023-09-27
Payer: COMMERCIAL

## 2023-09-27 ENCOUNTER — HOSPITAL ENCOUNTER (EMERGENCY)
Facility: HOSPITAL | Age: 58
Discharge: HOME | End: 2023-09-27
Attending: EMERGENCY MEDICINE
Payer: COMMERCIAL

## 2023-09-27 VITALS
SYSTOLIC BLOOD PRESSURE: 119 MMHG | RESPIRATION RATE: 18 BRPM | DIASTOLIC BLOOD PRESSURE: 58 MMHG | TEMPERATURE: 98.1 F | HEART RATE: 59 BPM | OXYGEN SATURATION: 96 %

## 2023-09-27 DIAGNOSIS — R10.13 EPIGASTRIC ABDOMINAL PAIN: Primary | ICD-10-CM

## 2023-09-27 DIAGNOSIS — N30.90 CYSTITIS: ICD-10-CM

## 2023-09-27 LAB
ALBUMIN SERPL BCP-MCNC: 4 G/DL (ref 3.5–5)
ALP SERPL-CCNC: 100 U/L (ref 34–104)
ALT SERPL W P-5'-P-CCNC: 15 U/L (ref 7–52)
ANION GAP SERPL CALCULATED.3IONS-SCNC: 6 MMOL/L
APTT PPP: 35 SECONDS (ref 23–37)
AST SERPL W P-5'-P-CCNC: 13 U/L (ref 13–39)
ATRIAL RATE: 57 BPM
BASOPHILS # BLD AUTO: 0.04 THOUSANDS/ÂΜL (ref 0–0.1)
BASOPHILS NFR BLD AUTO: 1 % (ref 0–1)
BILIRUB SERPL-MCNC: 0.36 MG/DL (ref 0.2–1)
BILIRUB UR QL STRIP: NEGATIVE
BUN SERPL-MCNC: 18 MG/DL (ref 5–25)
CALCIUM SERPL-MCNC: 9.3 MG/DL (ref 8.4–10.2)
CARDIAC TROPONIN I PNL SERPL HS: 4 NG/L
CHLORIDE SERPL-SCNC: 99 MMOL/L (ref 96–108)
CLARITY UR: CLEAR
CO2 SERPL-SCNC: 31 MMOL/L (ref 21–32)
COLOR UR: YELLOW
CREAT SERPL-MCNC: 0.65 MG/DL (ref 0.6–1.3)
EOSINOPHIL # BLD AUTO: 0.11 THOUSAND/ÂΜL (ref 0–0.61)
EOSINOPHIL NFR BLD AUTO: 2 % (ref 0–6)
ERYTHROCYTE [DISTWIDTH] IN BLOOD BY AUTOMATED COUNT: 12.5 % (ref 11.6–15.1)
GFR SERPL CREATININE-BSD FRML MDRD: 99 ML/MIN/1.73SQ M
GLUCOSE SERPL-MCNC: 340 MG/DL (ref 65–140)
GLUCOSE UR STRIP-MCNC: ABNORMAL MG/DL
HCT VFR BLD AUTO: 49.6 % (ref 34.8–46.1)
HGB BLD-MCNC: 16.6 G/DL (ref 11.5–15.4)
HGB UR QL STRIP.AUTO: NEGATIVE
IMM GRANULOCYTES # BLD AUTO: 0.02 THOUSAND/UL (ref 0–0.2)
IMM GRANULOCYTES NFR BLD AUTO: 0 % (ref 0–2)
INR PPP: 0.94 (ref 0.84–1.19)
KETONES UR STRIP-MCNC: NEGATIVE MG/DL
LEUKOCYTE ESTERASE UR QL STRIP: NEGATIVE
LIPASE SERPL-CCNC: 76 U/L (ref 11–82)
LYMPHOCYTES # BLD AUTO: 2.65 THOUSANDS/ÂΜL (ref 0.6–4.47)
LYMPHOCYTES NFR BLD AUTO: 35 % (ref 14–44)
MCH RBC QN AUTO: 27.9 PG (ref 26.8–34.3)
MCHC RBC AUTO-ENTMCNC: 33.5 G/DL (ref 31.4–37.4)
MCV RBC AUTO: 83 FL (ref 82–98)
MONOCYTES # BLD AUTO: 0.37 THOUSAND/ÂΜL (ref 0.17–1.22)
MONOCYTES NFR BLD AUTO: 5 % (ref 4–12)
NEUTROPHILS # BLD AUTO: 4.29 THOUSANDS/ÂΜL (ref 1.85–7.62)
NEUTS SEG NFR BLD AUTO: 57 % (ref 43–75)
NITRITE UR QL STRIP: NEGATIVE
NRBC BLD AUTO-RTO: 0 /100 WBCS
P AXIS: 66 DEGREES
PH UR STRIP.AUTO: 7 [PH]
PLATELET # BLD AUTO: 256 THOUSANDS/UL (ref 149–390)
PMV BLD AUTO: 10 FL (ref 8.9–12.7)
POTASSIUM SERPL-SCNC: 4.1 MMOL/L (ref 3.5–5.3)
PR INTERVAL: 142 MS
PROT SERPL-MCNC: 7.2 G/DL (ref 6.4–8.4)
PROT UR STRIP-MCNC: NEGATIVE MG/DL
PROTHROMBIN TIME: 12.7 SECONDS (ref 11.6–14.5)
QRS AXIS: 82 DEGREES
QRSD INTERVAL: 132 MS
QT INTERVAL: 474 MS
QTC INTERVAL: 461 MS
RBC # BLD AUTO: 5.96 MILLION/UL (ref 3.81–5.12)
SODIUM SERPL-SCNC: 136 MMOL/L (ref 135–147)
SP GR UR STRIP.AUTO: <=1.005
T WAVE AXIS: 39 DEGREES
UROBILINOGEN UR QL STRIP.AUTO: 0.2 E.U./DL
VENTRICULAR RATE: 57 BPM
WBC # BLD AUTO: 7.48 THOUSAND/UL (ref 4.31–10.16)

## 2023-09-27 PROCEDURE — 85610 PROTHROMBIN TIME: CPT | Performed by: EMERGENCY MEDICINE

## 2023-09-27 PROCEDURE — 85025 COMPLETE CBC W/AUTO DIFF WBC: CPT | Performed by: EMERGENCY MEDICINE

## 2023-09-27 PROCEDURE — 93005 ELECTROCARDIOGRAM TRACING: CPT

## 2023-09-27 PROCEDURE — 96360 HYDRATION IV INFUSION INIT: CPT

## 2023-09-27 PROCEDURE — 96361 HYDRATE IV INFUSION ADD-ON: CPT

## 2023-09-27 PROCEDURE — 74177 CT ABD & PELVIS W/CONTRAST: CPT

## 2023-09-27 PROCEDURE — 93010 ELECTROCARDIOGRAM REPORT: CPT | Performed by: INTERNAL MEDICINE

## 2023-09-27 PROCEDURE — 83690 ASSAY OF LIPASE: CPT | Performed by: EMERGENCY MEDICINE

## 2023-09-27 PROCEDURE — 85730 THROMBOPLASTIN TIME PARTIAL: CPT | Performed by: EMERGENCY MEDICINE

## 2023-09-27 PROCEDURE — G1004 CDSM NDSC: HCPCS

## 2023-09-27 PROCEDURE — 84484 ASSAY OF TROPONIN QUANT: CPT | Performed by: EMERGENCY MEDICINE

## 2023-09-27 PROCEDURE — 99284 EMERGENCY DEPT VISIT MOD MDM: CPT | Performed by: EMERGENCY MEDICINE

## 2023-09-27 PROCEDURE — 80053 COMPREHEN METABOLIC PANEL: CPT | Performed by: EMERGENCY MEDICINE

## 2023-09-27 PROCEDURE — 99284 EMERGENCY DEPT VISIT MOD MDM: CPT

## 2023-09-27 PROCEDURE — 81003 URINALYSIS AUTO W/O SCOPE: CPT | Performed by: EMERGENCY MEDICINE

## 2023-09-27 PROCEDURE — 36415 COLL VENOUS BLD VENIPUNCTURE: CPT | Performed by: EMERGENCY MEDICINE

## 2023-09-27 PROCEDURE — 87086 URINE CULTURE/COLONY COUNT: CPT | Performed by: EMERGENCY MEDICINE

## 2023-09-27 RX ORDER — MAGNESIUM HYDROXIDE/ALUMINUM HYDROXICE/SIMETHICONE 120; 1200; 1200 MG/30ML; MG/30ML; MG/30ML
30 SUSPENSION ORAL ONCE
Status: COMPLETED | OUTPATIENT
Start: 2023-09-27 | End: 2023-09-27

## 2023-09-27 RX ORDER — CEFUROXIME AXETIL 500 MG/1
500 TABLET ORAL EVERY 12 HOURS SCHEDULED
Qty: 14 TABLET | Refills: 0 | Status: SHIPPED | OUTPATIENT
Start: 2023-09-27 | End: 2023-09-27 | Stop reason: CLARIF

## 2023-09-27 RX ORDER — CEFUROXIME AXETIL 250 MG/1
500 TABLET ORAL EVERY 12 HOURS SCHEDULED
Status: DISCONTINUED | OUTPATIENT
Start: 2023-09-27 | End: 2023-09-27

## 2023-09-27 RX ORDER — SUCRALFATE ORAL 1 G/10ML
1 SUSPENSION ORAL 3 TIMES DAILY PRN
Qty: 414 ML | Refills: 0 | Status: SHIPPED | OUTPATIENT
Start: 2023-09-27

## 2023-09-27 RX ADMIN — SODIUM CHLORIDE 1000 ML: 0.9 INJECTION, SOLUTION INTRAVENOUS at 13:12

## 2023-09-27 RX ADMIN — IOHEXOL 100 ML: 350 INJECTION, SOLUTION INTRAVENOUS at 14:00

## 2023-09-27 RX ADMIN — ALUMINUM HYDROXIDE, MAGNESIUM HYDROXIDE, AND DIMETHICONE 30 ML: 200; 20; 200 SUSPENSION ORAL at 13:10

## 2023-09-27 NOTE — ED PROVIDER NOTES
History  Chief Complaint   Patient presents with   • Back Pain     Upper back pain since sat . Tylenol and oxycodone w/o  relief     Patient is a 72-year-old female who presents for evaluation of epigastric right upper quadrant abdominal pain and mid back pain. Patient says the symptoms have been intermittent since Saturday. She says she feels like the symptoms are made worse with eating. She says that it is a sharp pain in the epigastric and right upper quadrant. She says that the pain radiates into the middle of her back. She denies any chest pain, shortness of breath, lightheadedness, nausea or vomiting. She denies any lower abdominal pain, loose stools, urinary symptoms. She has a history of a total hysterectomy but denies any history of cholecystectomy, appendectomy. Prior to Admission Medications   Prescriptions Last Dose Informant Patient Reported? Taking?    Accu-Chek Guide test strip   Yes No   Sig: use to CHECK BLOOD GLUCOSE four times a day   BD Pen Needle Dayna 2nd Gen 32G X 4 MM MISC   No No   Si time a day   Bevespi Aerosphere 9-4.8 MCG/ACT inhaler   Yes No   Sig: inhale 2 puffs by mouth every morning and 2 puffs at bedtime   DULoxetine (CYMBALTA) 60 mg delayed release capsule   Yes No   Sig: take 1 capsule by mouth every morning DO NOT CRUSH, CHEW, AND/OR DIVIDE   NovoLOG FlexPen 100 units/mL injection pen   No No   Sig: Inject 10 Units under the skin daily before dinner   Spiriva Respimat 2.5 MCG/ACT AERS inhaler  Self Yes No   albuterol (PROVENTIL HFA,VENTOLIN HFA) 90 mcg/act inhaler  Self Yes No   Sig: Inhale 2 puffs every 6 (six) hours as needed for wheezing   Patient not taking: Reported on 11/10/2022   bisoprolol (ZEBETA) 5 mg tablet   No No   Sig: Take 1 tablet (5 mg total) by mouth daily   buPROPion (WELLBUTRIN XL) 300 mg 24 hr tablet  Self Yes No   Sig: Take 450 mg by mouth every morning   clotrimazole-betamethasone (LOTRISONE) 1-0.05 % cream   No No   Sig: Apply topically 2 (two) times a day   hydrOXYzine pamoate (VISTARIL) 25 mg capsule   Yes No   Sig: take 1 capsule by mouth daily if needed for anxiety   ibuprofen (MOTRIN) 200 mg tablet  Self Yes No   Sig: Take 200 mg by mouth every 6 (six) hours as needed for mild pain   insulin glargine (LANTUS SOLOSTAR) 100 units/mL injection pen   No No   Sig: Inject 30 Units under the skin daily at bedtime   insulin lispro (HumaLOG) 100 units/mL injection   No No   Sig: Inject 10 Units under the skin 3 (three) times a day with meals   metFORMIN (GLUCOPHAGE-XR) 500 mg 24 hr tablet   No No   Sig: take 2 tablets by mouth twice a day with meals   methocarbamol (ROBAXIN) 750 mg tablet  Self Yes No   Sig: Take 500 mg by mouth every 6 (six) hours as needed for muscle spasms   methylphenidate (RITALIN) 20 MG tablet   Yes No   Sig: Take 20 mg by mouth every morning   mupirocin (BACTROBAN) 2 % ointment   Yes No   Sig: apply topically to affected area three times a day for 14 days   naloxone (NARCAN) 4 mg/0.1 mL nasal spray  Self Yes No   Sig: ADMINISTER A SINGLE SPRAY OF NARCAN IN ONE NOSTRIL REPEAT AFTER 3 MINUTES IF NO OR MINIMAL RESPONSE   naproxen (NAPROSYN) 375 mg tablet  Self No No   Sig: Take 1 tablet (375 mg total) by mouth 2 (two) times a day with meals   nitroglycerin (NITROSTAT) 0.4 mg SL tablet  Self No No   Sig: Place 1 tablet (0.4 mg total) under the tongue every 5 (five) minutes as needed for chest pain   nystatin-triamcinolone (MYCOLOG-II) ointment   No No   Sig: Apply topically 2 (two) times a day   oxyCODONE (ROXICODONE) 5 immediate release tablet  Self Yes No   Sig: Take 5 mg by mouth 3 (three) times a day as needed   pantoprazole (PROTONIX) 40 mg tablet  Self No No   Sig: take 1 tablet by mouth twice a day   potassium chloride SA (KLOR-CON M15) 15 MEQ tablet  Self Yes No   Sig: Take 15 mEq by mouth 2 (two) times a day     Patient not taking: Reported on 11/10/2022   prazosin (MINIPRESS) 5 mg capsule   Yes No   pregabalin (LYRICA) 75 mg capsule  Self Yes No   Sig: Take 75 mg by mouth 2 (two) times a day   rosuvastatin (CRESTOR) 20 MG tablet  Self No No   Sig: Take 1 tablet (20 mg total) by mouth daily   terconazole (TERAZOL 7) 0.4 % vaginal cream   No No   Sig: Insert 1 applicator into the vagina daily at bedtime   varenicline (CHANTIX SALEEM) 0.5 MG X 11 & 1 MG X 42 tablet  Self No No   Sig: Take one 0.5 mg tablet by mouth once daily for 3 days, then one 0.5 mg tablet by mouth twice daily for 4 days, then one 1 mg tablet by mouth twice daily. varenicline (CHANTIX) 1 mg tablet   Yes No   Sig: take 1 tablet by mouth every morning and 1 tablet at bedtime   zolpidem (AMBIEN) 10 mg tablet  Self Yes No   Sig: Take 10 mg by mouth daily at bedtime as needed for sleep      Facility-Administered Medications: None       Past Medical History:   Diagnosis Date   • Angina pectoris ([facility])     recent   • Anxiety    • Arthritis    • Asthma    • Carpal tunnel syndrome    • Chronic headaches    • COPD (chronic obstructive pulmonary disease) ([facility])    • Diabetes ([facility])    • Diabetes mellitus ([facility])    • GERD (gastroesophageal reflux disease)    • Headache    • Hyperlipidemia    • Hypertension    • Kidney stone    • Left bundle branch block     LBBB   • MI (myocardial infarction) ([address])    • Myocardial infarction ([address])    • Neuropathy    • PTSD (post-traumatic stress disorder)    • RLS (restless legs syndrome)    • Shortness of breath        Past Surgical History:   Procedure Laterality Date   • ANGIOPLASTY     • BREAST SURGERY  2016    Reduction   • CARDIAC CATHETERIZATION  2018     Mid LAD: There was a tubular 40 % stenosis   •  SECTION     • COLONOSCOPY     • HYSTERECTOMY  2018    Complete   • KIDNEY STONE SURGERY     • LAPAROSCOPY     • DE COLONOSCOPY FLX DX W/COLLJ SPEC WHEN PFRMD N/A 2017    Procedure: EGD AND COLONOSCOPY;  Surgeon: Tatiana Martines MD;  Location: [facility];   Service: Gastroenterology   • DE ESOPHAGOGASTRODUODENOSCOPY TRANSORAL DIAGNOSTIC N/A 10/10/2018    Procedure: ESOPHAGOGASTRODUODENOSCOPY (EGD); Surgeon: Elvira Siddiqi MD;  Location: [facility]; Service: Gastroenterology   • MT LAPS TOTAL HYSTERECT 250 GM/< W/RMVL TUBE/OVARY N/A 5/22/2018    Procedure: ROBOTIC ASSISTED TOTAL LAPAROSCOPIC HYSTERECTOMY, BILATERAL SALPINGOOPHERECTOMY,;  Surgeon: Jamie Vicente MD;  Location:  MAIN OR;  Service: Gynecology Oncology   • UPPER GASTROINTESTINAL ENDOSCOPY     • WRIST SURGERY Right        Family History   Problem Relation Age of Onset   • Diabetes Mother    • Breast cancer Mother 39   • Lung cancer Father 47   • Diabetes Sister    • Brain cancer Maternal Aunt 67   • Breast cancer Other 25     I have reviewed and agree with the history as documented. E-Cigarette/Vaping   • E-Cigarette Use Never User      E-Cigarette/Vaping Substances   • Nicotine No    • THC No    • CBD No    • Flavoring No    • Other No    • Unknown No      Social History     Tobacco Use   • Smoking status: Every Day     Packs/day: 1.00     Years: 48.00     Total pack years: 48.00     Types: Cigarettes     Start date: 1/1/1973   • Smokeless tobacco: Never   • Tobacco comments:     pt. smoked this am 10/10   Vaping Use   • Vaping Use: Never used   Substance Use Topics   • Alcohol use: No     Comment: Rarely consumes alcohol - As per [facility]    • Drug use: Yes     Frequency: 7.0 times per week     Types: Marijuana     Comment: medical marijuana  last use over a month       Review of Systems   Constitutional: Negative for fever and unexpected weight change. HENT: Negative for congestion, ear pain, sore throat and trouble swallowing. Eyes: Negative for pain and redness. Respiratory: Negative for cough, chest tightness and shortness of breath. Cardiovascular: Negative for chest pain and leg swelling. Gastrointestinal: Positive for abdominal pain (epigastric/RUQ). Negative for abdominal distention, diarrhea and vomiting. Endocrine: Negative for polyuria. Genitourinary: Negative for dysuria, hematuria, pelvic pain and vaginal bleeding. Musculoskeletal: Negative for back pain and myalgias. Skin: Negative for rash. Neurological: Negative for dizziness, syncope, weakness, light-headedness and headaches. Physical Exam  Physical Exam  Vitals and nursing note reviewed. Constitutional:       General: She is not in acute distress. Appearance: She is well-developed. HENT:      Head: Normocephalic and atraumatic. Right Ear: External ear normal.      Left Ear: External ear normal.      Nose: Nose normal.      Mouth/Throat:      Mouth: Mucous membranes are moist.      Pharynx: No oropharyngeal exudate. Eyes:      Conjunctiva/sclera: Conjunctivae normal.      Pupils: Pupils are equal, round, and reactive to light. Cardiovascular:      Rate and Rhythm: Normal rate and regular rhythm. Heart sounds: Normal heart sounds. No murmur heard. No friction rub. No gallop. Pulmonary:      Effort: Pulmonary effort is normal. No respiratory distress. Breath sounds: Normal breath sounds. No wheezing or rales. Abdominal:      General: There is no distension. Palpations: Abdomen is soft. Tenderness: There is abdominal tenderness (epigastric/RUQ). There is no guarding. Musculoskeletal:         General: No swelling, tenderness or deformity. Normal range of motion. Cervical back: Normal range of motion and neck supple. Lymphadenopathy:      Cervical: No cervical adenopathy. Skin:     General: Skin is warm and dry. Neurological:      General: No focal deficit present. Mental Status: She is alert and oriented to person, place, and time. Mental status is at baseline. Cranial Nerves: No cranial nerve deficit. Sensory: No sensory deficit. Motor: No weakness or abnormal muscle tone.       Coordination: Coordination normal.         Vital Signs  ED Triage Vitals   Temperature Pulse Respirations Blood Pressure SpO2   09/27/23 1235 09/27/23 1235 09/27/23 1235 09/27/23 1235 09/27/23 1235   98.1 °F (36.7 °C) 64 20 119/58 96 %      Temp src Heart Rate Source Patient Position - Orthostatic VS BP Location FiO2 (%)   -- 09/27/23 1315 09/27/23 1315 09/27/23 1315 --    Monitor Sitting Right arm       Pain Score       09/27/23 1235       5           Vitals:    09/27/23 1235 09/27/23 1315   BP: 119/58 119/58   Pulse: 64 59   Patient Position - Orthostatic VS:  Sitting         Visual Acuity      ED Medications  Medications   sodium chloride 0.9 % bolus 1,000 mL (0 mL Intravenous Stopped 9/27/23 1536)   aluminum-magnesium hydroxide-simethicone (MAALOX) oral suspension 30 mL (30 mL Oral Given 9/27/23 1310)   iohexol (OMNIPAQUE) 350 MG/ML injection (MULTI-DOSE) 100 mL (100 mL Intravenous Given 9/27/23 1400)       Diagnostic Studies  Results Reviewed     Procedure Component Value Units Date/Time    Urine culture [[id number]] Collected: 09/27/23 1517    Lab Status:  In process Specimen: Urine, Clean Catch Updated: 09/27/23 1542    UA (URINE) with reflex to Scope [[id number]]  (Abnormal) Collected: 09/27/23 1517    Lab Status: Final result Specimen: Urine, Clean Catch Updated: 09/27/23 1522     Color, UA Yellow     Clarity, UA Clear     Specific Gravity, UA <=1.005     pH, UA 7.0     Leukocytes, UA Negative     Nitrite, UA Negative     Protein, UA Negative mg/dl      Glucose, UA 3+ mg/dl      Ketones, UA Negative mg/dl      Urobilinogen, UA 0.2 E.U./dl      Bilirubin, UA Negative     Occult Blood, UA Negative    HS Troponin 0hr (reflex protocol) [[id number]]  (Normal) Collected: 09/27/23 1310    Lab Status: Final result Specimen: Blood from Arm, Right Updated: 09/27/23 1339     hs TnI 0hr 4 ng/L     Comprehensive metabolic panel [[id number]]  (Abnormal) Collected: 09/27/23 1310    Lab Status: Final result Specimen: Blood from Arm, Right Updated: 09/27/23 1331     Sodium 136 mmol/L      Potassium 4.1 mmol/L Chloride 99 mmol/L      CO2 31 mmol/L      ANION GAP 6 mmol/L      BUN 18 mg/dL      Creatinine 0.65 mg/dL      Glucose 340 mg/dL      Calcium 9.3 mg/dL      AST 13 U/L      ALT 15 U/L      Alkaline Phosphatase 100 U/L      Total Protein 7.2 g/dL      Albumin 4.0 g/dL      Total Bilirubin 0.36 mg/dL      eGFR 99 ml/min/1.73sq m     Narrative:      National Kidney Disease Foundation guidelines for Chronic Kidney Disease (CKD):   •  Stage 1 with normal or high GFR (GFR > 90 mL/min/1.73 square meters)  •  Stage 2 Mild CKD (GFR = 60-89 mL/min/1.73 square meters)  •  Stage 3A Moderate CKD (GFR = 45-59 mL/min/1.73 square meters)  •  Stage 3B Moderate CKD (GFR = 30-44 mL/min/1.73 square meters)  •  Stage 4 Severe CKD (GFR = 15-29 mL/min/1.73 square meters)  •  Stage 5 End Stage CKD (GFR <15 mL/min/1.73 square meters)  Note: GFR calculation is accurate only with a steady state creatinine    Lipase [[id number]]  (Normal) Collected: 09/27/23 1310    Lab Status: Final result Specimen: Blood from Arm, Right Updated: 09/27/23 1331     Lipase 76 u/L     Protime-INR [[id number]]  (Normal) Collected: 09/27/23 1310    Lab Status: Final result Specimen: Blood from Arm, Right Updated: 09/27/23 1326     Protime 12.7 seconds      INR 0.94    APTT [[id number]]  (Normal) Collected: 09/27/23 1310    Lab Status: Final result Specimen: Blood from Arm, Right Updated: 09/27/23 1326     PTT 35 seconds     CBC and differential [[id number]]  (Abnormal) Collected: 09/27/23 1310    Lab Status: Final result Specimen: Blood from Arm, Right Updated: 09/27/23 1317     WBC 7.48 Thousand/uL      RBC 5.96 Million/uL      Hemoglobin 16.6 g/dL      Hematocrit 49.6 %      MCV 83 fL      MCH 27.9 pg      MCHC 33.5 g/dL      RDW 12.5 %      MPV 10.0 fL      Platelets 660 Thousands/uL      nRBC 0 /100 WBCs      Neutrophils Relative 57 %      Immat GRANS % 0 %      Lymphocytes Relative 35 %      Monocytes Relative 5 %      Eosinophils Relative 2 %      Basophils Relative 1 %      Neutrophils Absolute 4.29 Thousands/µL      Immature Grans Absolute 0.02 Thousand/uL      Lymphocytes Absolute 2.65 Thousands/µL      Monocytes Absolute 0.37 Thousand/µL      Eosinophils Absolute 0.11 Thousand/µL      Basophils Absolute 0.04 Thousands/µL                  CT abdomen pelvis with contrast   Final Result by Nasra Simpson MD (09/27 1501)      Diffuse bladder wall thickening consistent with cystitis. Enhancement of the left ureteral lining suggesting ascending infection. Stable nonobstructing 4 mm left lower pole intrarenal calculus. Workstation performed: [id number]                    Procedures  Procedures         ED Course  ED Course as of 09/28/23 1615   Wed Sep 27, 2023   1239 Patient able to ambulate into the department without any difficulty   1514 Patient says that her pain has improved. 200 Patient's UA shows no signs of infection. Patient admits to chronic burning with urination this is nothing new. Denies any fevers. Will send urine culture. We will hold off on antibiotics at this time. SBIRT 20yo+    Flowsheet Row Most Recent Value   Initial Alcohol Screen: US AUDIT-C     1. How often do you have a drink containing alcohol? 0 Filed at: 09/27/2023 1248   2. How many drinks containing alcohol do you have on a typical day you are drinking? 0 Filed at: 09/27/2023 1248   3b. FEMALE Any Age, or MALE 65+: How often do you have 4 or more drinks on one occassion? 0 Filed at: 09/27/2023 1248   Audit-C Score 0 Filed at: 09/27/2023 1248   RONNY: How many times in the past year have you. .. Used an illegal drug or used a prescription medication for non-medical reasons? Never Filed at: 09/27/2023 1248                    Medical Decision Making  61-year-old female with history of CAD presenting for evaluation of epigastric right upper quadrant abdominal pain, mid back pain. Symptoms intermittent since Saturday. Worse with eating. Has epigastric and right upper quadrant tenderness on exam.  No chest pain, shortness, headedness. Vitals within normal limits  Differential includes cholecystitis, gastritis, gastric ulcer, pancreatitis, ACS  Obtain belly labs. Will obtain cardiac work-up. Will obtain CT abdomen pelvis with contrast.  Will treat initially with GI cocktail for possible gastritis. Cardiac workup wnl. EKG shows no ischemic changes. CT shows cystitis  And inflammation of ureter. UA shows no signs of infection, will send urine culture before starting abx. Given referral for  Urology and GI. Patient discharged homd    Cystitis: acute illness or injury  Epigastric abdominal pain: acute illness or injury  Amount and/or Complexity of Data Reviewed  Labs: ordered. Radiology: ordered. Risk  OTC drugs. Prescription drug management.           Disposition  Final diagnoses:   Epigastric abdominal pain   Cystitis     Time reflects when diagnosis was documented in both MDM as applicable and the Disposition within this note     Time User Action Codes Description Comment    9/27/2023  3:18 PM [place] Bad Add [R10.9] Abdominal pain     9/27/2023  3:18 PM [place] Add [K29.70] Gastritis     9/27/2023  3:18 PM [place] Add [N39.0] UTI (urinary tract infection)     9/27/2023  3:19 PM [place] Remove [K29.70] Gastritis     9/27/2023  3:19 PM [place] Add [R10.13] Epigastric abdominal pain     9/27/2023  3:19 PM [place] Modify [N39.0] UTI (urinary tract infection)     9/27/2023  3:19 PM [place] Remove [R10.9] Abdominal pain     9/27/2023  3:19 PM [place] Modify [N39.0] UTI (urinary tract infection)     9/27/2023  3:19 PM [place] Modify [R10.13] Epigastric abdominal pain     9/27/2023  3:27 PM [place] Modify [R10.13] Epigastric abdominal pain     9/27/2023  3:27 PM [place] Remove [N39.0] UTI (urinary tract infection)     9/27/2023  3:27 PM [place] Add [N30.90] Cystitis       ED Disposition     ED Disposition   Discharge    Condition   Stable    Date/Time   Wed Sep 27, 2023  3:18 PM    Comment   Darlin Field discharge to home/self care.                Follow-up Information     Follow up With Specialties Details Why Contact Info Additional Tariq Lara DO Family Medicine Schedule an appointment as soon as possible for a visit  for follow up of symptoms [id number] [id number]  [address] [facility] Schedule an appointment as soon as possible for a visit  For follow up of abdominal pain [address] [facility] [id number] [address] [facility], [address], [address] [place], [place], [address], [phone]          Discharge Medication List as of 9/27/2023  3:28 PM      START taking these medications    Details   sucralfate (CARAFATE) 1 g/10 mL suspension Take 10 mL (1 g total) by mouth 3 (three) times a day as needed (EPIGASTRIC PAIN), Starting Wed 9/27/2023, Normal         CONTINUE these medications which have NOT CHANGED    Details   Accu-Chek Guide test strip use to CHECK BLOOD GLUCOSE four times a day, Historical Med      albuterol (PROVENTIL HFA,VENTOLIN HFA) 90 mcg/act inhaler Inhale 2 puffs every 6 (six) hours as needed for wheezing, Historical Med      BD Pen Needle Dayna 2nd Gen 32G X 4 MM MISC 4 time a day, Normal      Bevespi Aerosphere 9-4.8 MCG/ACT inhaler inhale 2 puffs by mouth every morning and 2 puffs at bedtime, Historical Med      bisoprolol (ZEBETA) 5 mg tablet Take 1 tablet (5 mg total) by mouth daily, Starting Mon 9/18/2023, Normal      buPROPion (WELLBUTRIN XL) 300 mg 24 hr tablet Take 450 mg by mouth every morning, Starting Thu 11/18/2021, Historical Med      clotrimazole-betamethasone (LOTRISONE) 1-0.05 % cream Apply topically 2 (two) times a day, Starting Tue 1/31/2023, Normal      DULoxetine (CYMBALTA) 60 mg delayed release capsule take 1 capsule by mouth every morning DO NOT CRUSH, CHEW, AND/OR DIVIDE, Historical Med      hydrOXYzine pamoate (VISTARIL) 25 mg capsule take 1 capsule by mouth daily if needed for anxiety, Historical Med      ibuprofen (MOTRIN) 200 mg tablet Take 200 mg by mouth every 6 (six) hours as needed for mild pain, Historical Med      insulin glargine (LANTUS SOLOSTAR) 100 units/mL injection pen Inject 30 Units under the skin daily at bedtime, Starting Mon 1/23/2023, Normal      insulin lispro (HumaLOG) 100 units/mL injection Inject 10 Units under the skin 3 (three) times a day with meals, Starting Mon 1/23/2023, Until Sun 4/23/2023, Normal      metFORMIN (GLUCOPHAGE-XR) 500 mg 24 hr tablet take 2 tablets by mouth twice a day with meals, Normal      methocarbamol (ROBAXIN) 750 mg tablet Take 500 mg by mouth every 6 (six) hours as needed for muscle spasms, Historical Med      methylphenidate (RITALIN) 20 MG tablet Take 20 mg by mouth every morning, Starting Tue 8/8/2023, Historical Med      mupirocin (BACTROBAN) 2 % ointment apply topically to affected area three times a day for 14 days, Historical Med      naloxone (NARCAN) 4 mg/0.1 mL nasal spray ADMINISTER A SINGLE SPRAY OF NARCAN IN ONE NOSTRIL REPEAT AFTER 3 MINUTES IF NO OR MINIMAL RESPONSE, Historical Med      naproxen (NAPROSYN) 375 mg tablet Take 1 tablet (375 mg total) by mouth 2 (two) times a day with meals, Starting Sat 12/25/2021, Normal      nitroglycerin (NITROSTAT) 0.4 mg SL tablet Place 1 tablet (0.4 mg total) under the tongue every 5 (five) minutes as needed for chest pain, Starting Fri 7/8/2022, Normal      NovoLOG FlexPen 100 units/mL injection pen Inject 10 Units under the skin daily before dinner, Starting Mon 1/30/2023, Until Mon 8/28/2023, Normal      nystatin-triamcinolone (MYCOLOG-II) ointment Apply topically 2 (two) times a day, Starting Mon 8/28/2023, Normal      oxyCODONE (ROXICODONE) 5 immediate release tablet Take 5 mg by mouth 3 (three) times a day as needed, Starting Wed 10/27/2021, Historical Med      pantoprazole (PROTONIX) 40 mg tablet take 1 tablet by mouth twice a day, Normal      potassium chloride SA (KLOR-CON M15) 15 MEQ tablet Take 15 mEq by mouth 2 (two) times a day  , Historical Med      prazosin (MINIPRESS) 5 mg capsule Starting Tue 8/22/2023, Historical Med      pregabalin (LYRICA) 75 mg capsule Take 75 mg by mouth 2 (two) times a day, Historical Med      rosuvastatin (CRESTOR) 20 MG tablet Take 1 tablet (20 mg total) by mouth daily, Starting Mon 10/17/2022, Normal      Spiriva Respimat 2.5 MCG/ACT AERS inhaler Starting Wed 11/10/2021, Historical Med      terconazole (TERAZOL 7) 0.4 % vaginal cream Insert 1 applicator into the vagina daily at bedtime, Starting Mon 8/28/2023, Normal      varenicline (CHANTIX SALEEM) 0.5 MG X 11 & 1 MG X 42 tablet Take one 0.5 mg tablet by mouth once daily for 3 days, then one 0.5 mg tablet by mouth twice daily for 4 days, then one 1 mg tablet by mouth twice daily. , Normal      varenicline (CHANTIX) 1 mg tablet take 1 tablet by mouth every morning and 1 tablet at bedtime, Historical Med      zolpidem (AMBIEN) 10 mg tablet Take 10 mg by mouth daily at bedtime as needed for sleep, Historical Med                 PDMP Review     None          ED Provider  Electronically Signed by           Allyn Ga DO  09/28/23 5988

## 2023-09-29 LAB — BACTERIA UR CULT: NORMAL

## 2023-10-02 DIAGNOSIS — E11.65 TYPE 2 DIABETES MELLITUS WITH HYPERGLYCEMIA, WITH LONG-TERM CURRENT USE OF INSULIN (HCC): ICD-10-CM

## 2023-10-02 DIAGNOSIS — Z79.4 TYPE 2 DIABETES MELLITUS WITH HYPERGLYCEMIA, WITH LONG-TERM CURRENT USE OF INSULIN (HCC): ICD-10-CM

## 2023-10-02 RX ORDER — METFORMIN HYDROCHLORIDE 500 MG/1
TABLET, EXTENDED RELEASE ORAL
Qty: 360 TABLET | Refills: 0 | Status: SHIPPED | OUTPATIENT
Start: 2023-10-02

## 2023-10-26 ENCOUNTER — OFFICE VISIT (OUTPATIENT)
Dept: CARDIOLOGY CLINIC | Facility: CLINIC | Age: 58
End: 2023-10-26
Payer: COMMERCIAL

## 2023-10-26 VITALS
HEIGHT: 61 IN | BODY MASS INDEX: 26.32 KG/M2 | SYSTOLIC BLOOD PRESSURE: 122 MMHG | WEIGHT: 139.4 LBS | DIASTOLIC BLOOD PRESSURE: 72 MMHG | HEART RATE: 60 BPM

## 2023-10-26 DIAGNOSIS — E78.5 HYPERLIPIDEMIA, UNSPECIFIED HYPERLIPIDEMIA TYPE: ICD-10-CM

## 2023-10-26 DIAGNOSIS — I42.8 NON-ISCHEMIC CARDIOMYOPATHY (HCC): Primary | ICD-10-CM

## 2023-10-26 DIAGNOSIS — I10 ESSENTIAL (PRIMARY) HYPERTENSION: ICD-10-CM

## 2023-10-26 DIAGNOSIS — I44.7 LBBB (LEFT BUNDLE BRANCH BLOCK): ICD-10-CM

## 2023-10-26 PROCEDURE — 99214 OFFICE O/P EST MOD 30 MIN: CPT | Performed by: INTERNAL MEDICINE

## 2023-10-26 RX ORDER — DICLOFENAC SODIUM 75 MG/1
75 TABLET, DELAYED RELEASE ORAL 2 TIMES DAILY PRN
COMMUNITY
Start: 2023-09-25

## 2023-10-26 RX ORDER — ROSUVASTATIN CALCIUM 20 MG/1
20 TABLET, COATED ORAL DAILY
Qty: 90 TABLET | Refills: 3 | Status: SHIPPED | OUTPATIENT
Start: 2023-10-26

## 2023-10-26 NOTE — PATIENT INSTRUCTIONS
Diet for Stomach Ulcers and Gastritis   WHAT YOU NEED TO KNOW:   A diet for stomach ulcers and gastritis is a meal plan that limits foods that irritate your stomach. Certain foods may worsen symptoms such as stomach pain, bloating, heartburn, or indigestion. DISCHARGE INSTRUCTIONS:   Foods to limit or avoid:  You may need to avoid acidic, spicy, or high-fat foods. Not all foods affect everyone the same way. You will need to learn which foods worsen your symptoms and limit those foods. The following are some foods that may worsen ulcer or gastritis symptoms:  Beverages:      Whole milk and chocolate milk    Hot cocoa and cola    Any beverage with caffeine    Regular and decaffeinated coffee    Peppermint and spearmint tea    Green and black tea, with or without caffeine    Orange and grapefruit juices    Drinks that contain alcohol    Spices and seasonings:      Black and red pepper    Chili powder    Mustard seed and nutmeg    Other foods:      Dairy foods made from whole milk or cream    Chocolate    Spicy or strongly flavored cheeses, such as jalapeno or black pepper    Highly seasoned, high-fat meats, such as sausage, salami, lane, ham, and cold cuts    Hot chiles and peppers    Tomato products, such as tomato paste, tomato sauce, or tomato juice    Foods to include:  Eat a variety of healthy foods from all the food groups. Eat fruits, vegetables, whole grains, and fat-free or low-fat dairy foods. Whole grains include whole-wheat breads, cereals, pasta, and brown rice. Choose lean meats, poultry (chicken and turkey), fish, beans, eggs, and nuts. A healthy meal plan is low in unhealthy fats, salt, and added sugar. Healthy fats include olive oil and canola oil. Ask your dietitian for more information about a healthy diet. Other helpful guidelines:   Do not eat right before bedtime. Stop eating at least 2 hours before bedtime. Eat small, frequent meals.   Your stomach may tolerate small, frequent meals better than large meals. © Copyright [facility] 2023 Information is for End User's use only and may not be sold, redistributed or otherwise used for commercial purposes. The above information is an  only. It is not intended as medical advice for individual conditions or treatments. Talk to your doctor, nurse or pharmacist before following any medical regimen to see if it is safe and effective for you. How to Stop Smoking   WHAT YOU NEED TO KNOW:   You will improve your health and the health of others around you if you stop smoking. Your risk for heart and lung disease, cancer, stroke, heart attack, and vision problems will also decrease. Your adolescent can help prevent or stop harm to his or her brain or body. This will help him or her become a healthy adult. You can benefit from quitting no matter how long you have smoked. DISCHARGE INSTRUCTIONS:   Prepare to stop smoking:  Nicotine is a highly addictive drug found in cigarettes. Withdrawal symptoms can happen when you stop smoking and make it hard to quit. These include anxiety, depression, irritability, trouble sleeping, and increased appetite. You increase your chances of success if you prepare to quit. Set a quit date. [place] a date that is within the next 2 weeks. Do not pick a day that you think may be stressful or busy. Write down the day or [place] it on your calender. Tell friends and family that you plan to quit. Explain that you may have withdrawal symptoms when you try to quit. Ask them to support you. They may be able to encourage you and help reduce your stress to make it easier for you to quit. Make a list of your reasons for quitting. Put the list somewhere you will see it every day, such as your refrigerator. You can look at the list when you have a craving. Remove all tobacco and nicotine products from your home, car, and workplace.   Also, remove anything else that will tempt you to smoke, such as lighters, matches, or ashtrays. Clean your car, home, and places at work that smell like smoke. The smell of smoke can trigger a craving. Identify triggers that make you want to smoke. This may include activities, feelings, or people. Also write down 1 way you can deal with each of your triggers. For example, if you want to smoke as soon as you wake up, plan another activity during this time, such as exercise. Make a plan for how you will quit. Learn about the tools that can help you quit, such as medicine, counseling, or nicotine replacement therapy. Choose at least 2 options to help you quit. Help your adolescent make a plan to quit. The plan will be more successful if your adolescent makes his or her own decisions. Do not try to pressure him or her to quit immediately or in a certain way. Be supportive and offer help if needed. Tools to help you stop smoking:   Counseling  from a trained healthcare provider can provide you with support and skills to quit smoking. The provider will also teach you to manage your withdrawal symptoms and cravings. You may receive counseling from one counselor, in group therapy, or through phone therapy called a quit line. Nicotine replacement therapy (NRT)  such as nicotine patches, gum, or lozenges may help reduce your nicotine cravings. You may get these without a doctor's order. Do not use e-cigarettes or smokeless tobacco in place of cigarettes or to help you quit. They still contain nicotine. Prescription medicines  such as nasal sprays or nicotine inhalers may help reduce your withdrawal symptoms. Other medicines may also be used to reduce your urge to smoke. Ask your healthcare provider about these medicines. You may need to start certain medicines 2 weeks before your quit date for them to work well. Hypnosis  is a practice that helps guide you through thoughts and feelings.  Hypnosis may help decrease your cravings and make you more willing to quit.    Acupuncture therapy  uses very thin needles to balance energy channels in the body. This is thought to help decrease cravings and symptoms of nicotine withdrawal.    Support groups  let you talk to others who are trying to quit or have already quit. It may be helpful to speak with others about how they quit. Manage your cravings:   Avoid situations, people, and places that tempt you to smoke. Go to nonsmoking places, such as libraries or restaurants. Understand what tempts you and try to avoid these things. Keep your hands busy. Hold things such as a stress ball or pen. Put candy or toothpicks in your mouth. Keep lollipops, sugarless gum, or toothpicks with you at all times. Do not have alcohol or caffeine. These drinks may tempt you to smoke. Drink healthy liquids such as water or juice instead. Reward yourself when you resist your cravings. Rewards will motivate you and help you stay positive. Do an activity that distracts you from your craving. Examples include cleaning, creating art, or gardening. Prevent weight gain after you quit:  You may gain a few pounds after you quit smoking. It is healthier for you to gain a few pounds than to continue to smoke. The following can help you prevent weight gain:  Eat a variety of healthy foods. Healthy foods include fruits, vegetables, whole-grain breads, low-fat dairy products, beans, lean meats, and fish. Eat healthy snacks, such as low-fat yogurt, if you get hungry between meals. Drink water before, during, and between meals. This will make your stomach feel full and help prevent you from overeating. Ask your healthcare provider how much liquid to drink each day and which liquids are best for you. Be physically active. Activity may help reduce your cravings and reduce stress. Take a walk or do some kind of physical activity every day. Ask your healthcare provider which activities are right for you.        For support and more information:   [employer]  [address] [facility]  Phone: [address]  Phone: 1- 431 - 187-6375  Web Address: [employer]    Smoke[employer] gov  Phone: 0- 283 - 052-6309  Web Address: www.smokefree. gov  © Copyright [facility] 2023 Information is for End User's use only and may not be sold, redistributed or otherwise used for commercial purposes. The above information is an  only. It is not intended as medical advice for individual conditions or treatments. Talk to your doctor, nurse or pharmacist before following any medical regimen to see if it is safe and effective for you.

## 2023-10-26 NOTE — PROGRESS NOTES
[facility] [address], [address]   [facility], [address] [facility]   [phone]                                              Cardiology Office Follow up  Sarah Noland, 66 y.o. female  YOB: 1965  MRN: [id number] Encounter: [id number]      PCP - Ceci Berger, DO    Assessment and Plan  Non-ischemic cardiomyopathy, LVEF 40-45%  Echo - 1/19/22 - DM 1%, grade 1 DD  Echo - 9/12/19 ([place] report)- LVEF 40-44%, mildly dilated LV, mild diffuse hypokinesis, grade 1 diastolic dysfunction no significant valvular abnormalities  LHC - 5/2018 - mid LAD 40%, otherwise mild atherosclerosis  Nuclear Rx stress - 8/3/22 -  LVEF 61%, no significant perfusion defects, mild fixed defect related to breast attenuation artifact   Chronic LBBB  Non-obstructive CAD  mLAD 40% in 2018  GERD  Diabetes Mellitus Type 2   Has neuropathy, and possibly autonomic neuropathy as well with some orthostatic hypotension as well  A1c 12.4% on 2/21/22 --> 11.5% (11/2022)  Hyperlipidemia  Nocturnal hypoxia, on overnight O2 therapy  COPD / Chronic bronchitis  Active smoker  1 PPD  Previously quit with chantix, but resumed smoking due to her son's smoking, and people around her smoking  Depression/PTSD  Overweight, Body mass index is 26.34 kg/m². Plan  Chest /abdominal pain, GERD/PUD  Overview  10/2022: She was reporting recurrent chest /abdominal pain, and as a result underwent nuclear stress test earlier this year, which did not reveal any ischemia   She has undergone multiple GI procedures as well without any clear diagnosis  After initiating on metformin, the symptoms have improved  10/2023: no chest pain. Gets occasional abdominal/epigastric pain, non-exertional. No shortness of breath. Family members made her go to the ED 1 open for this. She was ruled out for ACS and then prescribed sucralfate, which has been helping her with the symptoms.   Impression  Non-ischemic epigastric discomfort  Plan  I have counseled her regarding features of ischemic chest pain and have asked her to monitor for exertional symptoms  She will let me know if she has any such issues  Monitor clinically and follow-up with PCP regarding epigastric pain    Non-ischemic cardiomyopathy, LVEF 40-45%, LBBB  Remains stable, euvolemic  Mildly reduced EF mainly related to BB  Blood pressure too low to add additional therapy  Continue bisoprolol 5 mg daily   Not on ACE/ARB due to low BP  Previously had passed out with same  Not on diuretics and remains euvolemic    CAD, Non-obstructive  Overview  2018 - Nuclear Rx abnormal --> sent for [facility] - LAD - 40% stenosis   Medically manged  8/2022: Nuclear stress - negative for ischemia  9/27/2023: ECG reviewed - sinus bradycardia, LBBB  10/2023: free of chest pain or exertional symptoms  Plan  Continue Bisoprolol 5, rosuvastatin 20 mg    Hyperlipidemia  Reviewed prior lipid panel on patient's phone through [employer] [employer] (unclear why unable to connect same in Epic)  Lipid panel - 7/15/20 - , , HDL 40,   Lipid panel - 2/21/22 - , , HDL 37,      LDL has improved to less than 70, triglycerides remain more elevated  Dietary modifications, weight loss recommended  Continue thousand and 20 mg daily       No results found for this visit on 10/26/23. No orders of the defined types were placed in this encounter. Return in about 1 year (around 10/26/2024), or if symptoms worsen or fail to improve. History of Present Illness   66 y.o. female comes in for annual cardiac follow-up, after being sent back by her pulmonologist.   She used to see Dr. Kingston Merlin last year, but is now being established with me. She has a history of a left bundle branch block,  And had an echo and a nuclear stress test done in May 2018, which revealed mildly reduced LVEF along with  A medium sized anterior defect with some reversibility.   As a result she was referred for a cardiac catheterization which showed nonobstructive coronary artery disease with 40% stenosis of mid LAD. She was managed medically and has had no symptoms over the past year. She has been doing fairly well over the past year, and does not have any active complaints. She additionally follows with a pulmonologist at [facility], who had ordered an echocardiogram last month. This revealed an LVEF of 40-44%, and as a result she was asked to follow up again with her cardiologist for further evaluation. Interval history - 8/20/2020  She comes back for follow-up. I previously saw her for a tele visit in March. She has been doing fairly well overall. Patient was unclear about her medications, but was eventually able to show me her my chart from [facility] which showed a completely different list of medications. It appears she is not on bisoprolol, losartan or atorvastatin at present. She reports having symptoms of dizziness on standing up for a few seconds, and a couple of months ago, she was started on irbesartan 150, after which she reportedly passed out. As a result she stopped taking the same. Interval history - 11/23/2020  He comes back for follow-up after 3 months. She has been doing well overall and does not report any active complains of chest pain, shortness of breath, palpitations. She has not had any further episodes of dizziness or passing out since being on her current antihypertensive regimen. Interval history - 11/22/2021  She comes back for follow-up after 1 year. She has been doing well overall and continues to be free of chest pain. She does report mild a patent palpitations, but no persistent symptoms. No acute complains of shortness of breath, and overall doing well and remains active. No further complains of dizziness, near syncope or syncope. She does report some heartburn-like chest discomfort earlier this year, but states that she has run out of her PPI.     Interval history - 1/19/2022  She comes back for follow-up for an earlier visit within 2 months. She has been having ongoing complains of epigastric discomfort with radiation to the center of her chest.  It also radiates on both sides on the lower side of chest.  It seems to occurr at rest and is not worsened with exertion. She has been taking mylanta, without much help, but PPIs did provide some relief. No current complains of shortness of breath, palpitations, dizziness. She continues to have musculoskeletal and neuropathic pain, for which she takes multiple pain medications including diclofenac, ibuprofen and naproxen. Interval history - 7/8/2022  She comes back for follow-up after about 6 months. She followed up with GI and completed EGD and colonoscopy which did not reveal any major findings. She has been treated for GERD, but has continued to have symptoms of chest pain, and ended up again in the ED for the same. She was again ruled out for ACS and is now back to see Cardiology for further evaluation. Most of her symptoms continue to be while at rest and without any clear triggers. No complains of nausea, vomiting or diaphoresis. No complains of palpitations, dizziness, near-syncope or syncope    Interval history - 10/17/2022  She comes back for follow-up after about 4 months. She completed the nuclear stress test, which did not reveal any significant ischemia. She does report to me that the abdominal pain that she had been having seem to improved after addition of metformin. No current palpitations, dizziness or lightheadedness. She remains fairly sedentary. Shortness of breath remains stable/mild    Interval history - 10/26/2023  She will follow-up after about 1 year. In the interim she continues to do well and has not had any chest pain or major shortness of breath. She does on and off get epigastric/abdominal discomfort, which occurs at rest and not changed with activity or exercise.  She remains compliant with medical therapy      Historical Information   Past Medical History:   Diagnosis Date   • Angina pectoris ([address])     recent   • Anxiety    • Arthritis    • Asthma    • Carpal tunnel syndrome    • Chronic headaches    • COPD (chronic obstructive pulmonary disease) (HCC)    • Diabetes ([address])    • Diabetes mellitus ([address])    • GERD (gastroesophageal reflux disease)    • Headache    • Hyperlipidemia    • Hypertension    • Kidney stone    • Left bundle branch block     LBBB   • MI (myocardial infarction) ([address])    • Myocardial infarction ([address])    • Neuropathy    • PTSD (post-traumatic stress disorder)    • RLS (restless legs syndrome)    • Shortness of breath      Past Surgical History:   Procedure Laterality Date   • ANGIOPLASTY     • BREAST SURGERY  2016    Reduction   • CARDIAC CATHETERIZATION  2018     Mid LAD: There was a tubular 40 % stenosis   •  SECTION     • COLONOSCOPY     • HYSTERECTOMY  2018    Complete   • KIDNEY STONE SURGERY     • LAPAROSCOPY     • PA COLONOSCOPY FLX DX W/COLLJ SPEC WHEN PFRMD N/A 2017    Procedure: EGD AND COLONOSCOPY;  Surgeon: Sandra Syed MD;  Location: [facility]; Service: Gastroenterology   • PA ESOPHAGOGASTRODUODENOSCOPY TRANSORAL DIAGNOSTIC N/A 10/10/2018    Procedure: ESOPHAGOGASTRODUODENOSCOPY (EGD); Surgeon: Sandra Syed MD;  Location: [facility];   Service: Gastroenterology   • PA LAPS TOTAL HYSTERECT 250 GM/< W/RMVL TUBE/OVARY N/A 2018    Procedure: ROBOTIC ASSISTED TOTAL LAPAROSCOPIC HYSTERECTOMY, BILATERAL SALPINGOOPHERECTOMY,;  Surgeon: Monie Perez MD;  Location:  MAIN OR;  Service: Gynecology Oncology   • UPPER GASTROINTESTINAL ENDOSCOPY     • WRIST SURGERY Right      Family History   Problem Relation Age of Onset   • Diabetes Mother    • Breast cancer Mother 39   • Lung cancer Father 47   • Diabetes Sister    • Brain cancer Maternal Aunt 72   • Breast cancer Other 25     Current Outpatient Medications on File Prior to Visit   Medication Sig Dispense Refill   • Accu-Chek Guide test strip use to CHECK BLOOD GLUCOSE four times a day     • albuterol (PROVENTIL HFA,VENTOLIN HFA) 90 mcg/act inhaler Inhale 2 puffs every 6 (six) hours as needed for wheezing     • BD Pen Needle Dayna 2nd Gen 32G X 4 MM MISC 4 time a day 200 each 2   • Bevespi Aerosphere 9-4.8 MCG/ACT inhaler inhale 2 puffs by mouth every morning and 2 puffs at bedtime     • bisoprolol (ZEBETA) 5 mg tablet Take 1 tablet (5 mg total) by mouth daily 90 tablet 3   • clotrimazole-betamethasone (LOTRISONE) 1-0.05 % cream Apply topically 2 (two) times a day 30 g 0   • DULoxetine (CYMBALTA) 60 mg delayed release capsule take 1 capsule by mouth every morning DO NOT CRUSH, CHEW, AND/OR DIVIDE     • ibuprofen (MOTRIN) 200 mg tablet Take 200 mg by mouth every 6 (six) hours as needed for mild pain     • insulin glargine (LANTUS SOLOSTAR) 100 units/mL injection pen Inject 30 Units under the skin daily at bedtime 15 mL 1   • insulin lispro (HumaLOG) 100 units/mL injection Inject 10 Units under the skin 3 (three) times a day with meals 9 mL 2   • metFORMIN (GLUCOPHAGE-XR) 500 mg 24 hr tablet take 2 tablets by mouth twice a day with meals 360 tablet 0   • mupirocin (BACTROBAN) 2 % ointment apply topically to affected area three times a day for 14 days     • naloxone (NARCAN) 4 mg/0.1 mL nasal spray ADMINISTER A SINGLE SPRAY OF NARCAN IN ONE NOSTRIL REPEAT AFTER 3 MINUTES IF NO OR MINIMAL RESPONSE     • naproxen (NAPROSYN) 375 mg tablet Take 1 tablet (375 mg total) by mouth 2 (two) times a day with meals 20 tablet 0   • nitroglycerin (NITROSTAT) 0.4 mg SL tablet Place 1 tablet (0.4 mg total) under the tongue every 5 (five) minutes as needed for chest pain 25 tablet 0   • NovoLOG FlexPen 100 units/mL injection pen Inject 10 Units under the skin daily before dinner 9 mL 0   • nystatin-triamcinolone (MYCOLOG-II) ointment Apply topically 2 (two) times a day 30 g 5 • oxyCODONE (ROXICODONE) 5 immediate release tablet Take 5 mg by mouth 3 (three) times a day as needed     • potassium chloride SA (KLOR-CON M15) 15 MEQ tablet Take 15 mEq by mouth 2 (two) times a day     • pregabalin (LYRICA) 75 mg capsule Take 75 mg by mouth 2 (two) times a day     • Spiriva Respimat 2.5 MCG/ACT AERS inhaler      • sucralfate (CARAFATE) 1 g/10 mL suspension Take 10 mL (1 g total) by mouth 3 (three) times a day as needed (EPIGASTRIC PAIN) 414 mL 0   • terconazole (TERAZOL 7) 0.4 % vaginal cream Insert 1 applicator into the vagina daily at bedtime 45 g 0   • varenicline (CHANTIX) 1 mg tablet take 1 tablet by mouth every morning and 1 tablet at bedtime     • zolpidem (AMBIEN) 10 mg tablet Take 10 mg by mouth daily at bedtime as needed for sleep     • buPROPion (WELLBUTRIN XL) 300 mg 24 hr tablet Take 450 mg by mouth every morning     • diclofenac (VOLTAREN) 75 mg EC tablet Take 75 mg by mouth 2 (two) times a day as needed     • hydrOXYzine pamoate (VISTARIL) 25 mg capsule take 1 capsule by mouth daily if needed for anxiety     • methocarbamol (ROBAXIN) 750 mg tablet Take 500 mg by mouth every 6 (six) hours as needed for muscle spasms     • methylphenidate (RITALIN) 20 MG tablet Take 20 mg by mouth every morning     • pantoprazole (PROTONIX) 40 mg tablet take 1 tablet by mouth twice a day 60 tablet 1   • prazosin (MINIPRESS) 5 mg capsule      • varenicline (CHANTIX SALEEM) 0.5 MG X 11 & 1 MG X 42 tablet Take one 0.5 mg tablet by mouth once daily for 3 days, then one 0.5 mg tablet by mouth twice daily for 4 days, then one 1 mg tablet by mouth twice daily. (Patient not taking: Reported on 10/26/2023) 53 tablet 0   • [DISCONTINUED] rosuvastatin (CRESTOR) 20 MG tablet Take 1 tablet (20 mg total) by mouth daily 90 tablet 3     No current facility-administered medications on file prior to visit.      Allergies   Allergen Reactions   • Lisinopril Cough   • Losartan Dizziness   • Other Hives     Surgical tape • Prednisone Other (See Comments)     Thrush       Social History     Socioeconomic History   • Marital status:      Spouse name: None   • Number of children: None   • Years of education: None   • Highest education level: None   Occupational History   • Occupation: Healthcare provider    Tobacco Use   • Smoking status: Every Day     Packs/day: 1.00     Years: 48.00     Total pack years: 48.00     Types: Cigarettes     Start date: 1/1/1973   • Smokeless tobacco: Never   • Tobacco comments:     pt. smoked this am 10/10   Vaping Use   • Vaping Use: Never used   Substance and Sexual Activity   • Alcohol use: No     Comment: Rarely consumes alcohol - As per [facility]    • Drug use: Yes     Frequency: 7.0 times per week     Types: Marijuana     Comment: medical marijuana  last use over a month   • Sexual activity: Yes   Other Topics Concern   • None   Social History Narrative   • None     Social Determinants of Health     Financial Resource Strain: Not on file   Food Insecurity: Not on file   Transportation Needs: Not on file   Physical Activity: Not on file   Stress: Not on file   Social Connections: Not on file   Intimate Partner Violence: Not on file   Housing Stability: Not on file        Review of Systems   All other systems reviewed and are negative. Vitals:  Vitals:    10/26/23 1301   BP: 122/72   Pulse: 60   Weight: 63.2 kg (139 lb 6.4 oz)   Height: 5' 1" (1.549 m)     BMI - Body mass index is 26.34 kg/m². Wt Readings from Last 7 Encounters:   10/26/23 63.2 kg (139 lb 6.4 oz)   08/28/23 67.4 kg (148 lb 9.6 oz)   03/03/23 68 kg (149 lb 14.6 oz)   01/31/23 68 kg (150 lb)   01/23/23 67.4 kg (148 lb 9.6 oz)   11/22/22 66.7 kg (147 lb)   11/10/22 67 kg (147 lb 12.8 oz)       Physical Exam  Vitals and nursing note reviewed. Constitutional:       General: She is not in acute distress. Appearance: Normal appearance. She is well-developed. She is not ill-appearing.    HENT:      Head: Normocephalic and atraumatic. Nose: No congestion. Eyes:      General: No scleral icterus. Conjunctiva/sclera: Conjunctivae normal.   Neck:      Vascular: No carotid bruit or JVD. Cardiovascular:      Rate and Rhythm: Normal rate and regular rhythm. Pulses: Normal pulses. Heart sounds: Normal heart sounds. No murmur heard. No friction rub. No gallop. Pulmonary:      Effort: Pulmonary effort is normal. No respiratory distress. Breath sounds: Normal breath sounds. No rales. Abdominal:      General: There is no distension. Palpations: Abdomen is soft. Tenderness: There is no abdominal tenderness. Musculoskeletal:         General: No swelling or tenderness. Cervical back: Neck supple. Right lower leg: No edema. Left lower leg: No edema. Skin:     General: Skin is warm. Neurological:      General: No focal deficit present. Mental Status: She is alert and oriented to person, place, and time. Mental status is at baseline. Psychiatric:         Mood and Affect: Mood normal.         Behavior: Behavior normal.         Thought Content:  Thought content normal.       Labs:  CBC:   Lab Results   Component Value Date    WBC 7.48 09/27/2023    RBC 5.96 (H) 09/27/2023    HGB 16.6 (H) 09/27/2023    HCT 49.6 (H) 09/27/2023    MCV 83 09/27/2023     09/27/2023    RDW 12.5 09/27/2023       CMP:   Lab Results   Component Value Date    K 4.1 09/27/2023    CL 99 09/27/2023    CO2 31 09/27/2023    BUN 18 09/27/2023    CREATININE 0.65 09/27/2023    EGFR 99 09/27/2023    CALCIUM 9.3 09/27/2023    AST 13 09/27/2023    ALT 15 09/27/2023    ALKPHOS 100 09/27/2023       Magnesium:  Lab Results   Component Value Date    MG 1.7 (L) 06/29/2022       Lipid Profile:   Lab Results   Component Value Date    HDL 36 (L) 11/02/2022    TRIG 221 (H) 11/02/2022    LDLCALC 61 11/02/2022       Thyroid Studies:   Lab Results   Component Value Date    [id number] 1.450 11/02/2022       No components found for: "HGA1C"    Lab Results   Component Value Date    INR 0.94 09/27/2023    INR 0.92 03/03/2023    INR 0.98 10/18/2019   5    Imaging: Ct Abdomen Pelvis With Contrast    Result Date: 10/18/2019  Narrative: CT ABDOMEN AND PELVIS WITH IV CONTRAST INDICATION:   lower abd pain, diarrhea. COMPARISON:  None. TECHNIQUE:  CT examination of the abdomen and pelvis was performed. Axial, sagittal, and coronal 2D reformatted images were created from the source data and submitted for interpretation. Radiation dose length product (DLP) for this visit:  500 mGy-cm . This examination, like all CT scans performed in the [facility], was performed utilizing techniques to minimize radiation dose exposure, including the use of iterative reconstruction and automated exposure control. IV Contrast:  100 mL of iohexol (OMNIPAQUE) Enteric Contrast:  Enteric contrast was not administered. FINDINGS: ABDOMEN LOWER CHEST:  No clinically significant abnormality identified in the visualized lower chest. LIVER/BILIARY TREE: The liver is mildly enlarged. Diffusely decreased hepatic attenuation suggesting steatosis. No intra or extrahepatic biliary ductal dilation GALLBLADDER:  No calcified gallstones. No pericholecystic inflammatory change. SPLEEN:  Unremarkable. PANCREAS:  Unremarkable. ADRENAL GLANDS:  Unremarkable. KIDNEYS/URETERS: No hydronephrosis. No suspicious renal lesion. Left renal lower pole focal cortical loss, noting a 3 mm dystrophic calcification. STOMACH AND BOWEL: Circumferential wall thickening of the mid to distal transverse colon sparing the splenic flexure with pericolonic fat stranding in keeping with acute colitis (series 2, images 31-41). No disproportionate dilation of small or large bowel loops. APPENDIX:  No findings to suggest appendicitis. ABDOMINOPELVIC CAVITY:  No ascites or free intraperitoneal air. No lymphadenopathy. VESSELS:  Unremarkable for patient's age.  PELVIS REPRODUCTIVE ORGANS: Status post hysterectomy. No adnexal mass. URINARY BLADDER:  Unremarkable. ABDOMINAL WALL/INGUINAL REGIONS:  Unremarkable. OSSEOUS STRUCTURES:  No acute fracture or destructive osseous lesion. Impression: 1. Acute uncomplicated colitis of of the mid to distal transverse colon sparing the splenic flexure, favored to be infectious. 2.  Mild hepatomegaly and hepatic steatosis. Workstation performed: [id number]       Cardiac testing:   Results for orders placed during the hospital encounter of 18   Echo complete with contrast if indicated    Narrative [address] [place] [address], [address] [place] [address]  [phone]    Transthoracic Echocardiogram  2D, M-mode, Doppler, and Color Doppler    Study date:  03-May-2018    Patient: Alejandra Caceres  MR number: [id number]  Account number: [de-identified]  : 1965  Age: 50 years  Gender: Female  Status: Outpatient  Location: [facility]  Height: 61 in  Weight: 170 lb  BP: 112/ 78 mmHg    Indications: Dyspnea. Diagnoses: R06.09 - Other forms of dyspnea    Sonographer:  [facility]  Interpreting Physician:  Marcial Rankin MD  Primary Physician:  Elizabeth Martin DO  Referring Physician:  Marcial Rankin MD  Group:  [facility]    SUMMARY    LEFT VENTRICLE:  Ejection fraction was estimated to be 40 %. Dyssynchronous septal motion likely secondary to LBBB. There was mild diffuse hypokinesis. MITRAL VALVE:  There was trace regurgitation. HISTORY: PRIOR HISTORY: LBBB Myocardial infarction. Risk factors: hypertension, oral hypoglycemic-treated diabetes, a history of current cigarette use (within the last month), and a family history of coronary artery disease. PROCEDURE: The study was performed in the [facility]. This was a routine study. The transthoracic approach was used. The study included complete 2D imaging, M-mode, complete spectral Doppler, and color Doppler. The  heart rate was 82 bpm, at the start of the study. Image quality was adequate. LEFT VENTRICLE: Size was normal. Ejection fraction was estimated to be 40 %. Dyssynchronous septal motion likely secondary to LBBB. There was mild diffuse hypokinesis. DOPPLER: There was an increased relative contribution of atrial  contraction to ventricular filling. RIGHT VENTRICLE: The size was normal. Systolic function was normal. Wall thickness was normal.    LEFT ATRIUM: Size was normal.    RIGHT ATRIUM: Size was normal.    MITRAL VALVE: Valve structure was normal. There was normal leaflet separation. DOPPLER: The transmitral velocity was within the normal range. There was no evidence for stenosis. There was trace regurgitation. AORTIC VALVE: The valve was not well visualized. DOPPLER: There was no evidence for stenosis. TRICUSPID VALVE: The valve structure was normal. There was normal leaflet separation. DOPPLER: The transtricuspid velocity was within the normal range. There was no evidence for stenosis. There was no regurgitation. PULMONIC VALVE: Not well visualized. PERICARDIUM: There was no pericardial effusion. The pericardium was normal in appearance. AORTA: The root exhibited normal size. SYSTEM MEASUREMENT TABLES    Apical four chamber  4 chamber Left Atrium Volume Index; Planimetry; End Systole; Apical four chamber;: 9.19 cm2  Left Ventricular End Diastolic Volume; Method of Disks, Single Plane; Apical four chamber;: 71.9 ml  Left Ventricular End Systolic Volume; Method of Disks, Single Plane; Apical four chamber;: 50.6 ml  Right Atrium Systolic Area; Planimetry; End Systole; Apical four chamber;: 8.11 cm2  Right Ventricular Internal Diastolic Dimension; End Diastole; Apical four chamber;: 20.6 mm  TAPSE: 27.4 mm    Apical two chamber  Left Ventricular End Diastolic Volume; Method of Disks, Single Plane;  Apical two chamber;: 105.3 ml  Left Ventricular End Systolic Volume; Method of Disks, Single Plane; Apical two chamber;: 35.7 ml    Unspecified Scan Mode  Aortic Root Diameter; End Systole;: 29.4 mm  Left atrial diameter; End Diastole;: 29.7 mm  Cardiac Output; Teichholz; Systole;: 5.91 L/min  Interventricular Septum Diastolic Thickness; Teichholz; End Diastole;: 8.5 mm  Left Ventricle Internal End Diastolic Dimension; Teichholz;: 51.8 mm  Left Ventricle Internal Systolic Dimension; Teichholz; End Systole;: 37.7 mm  Left Ventricle Posterior Wall Diastolic Thickness; Teichholz; End Diastole;: 8 mm  Left Ventricular Ejection Fraction; Teichholz;: 52.2 %  Left Ventricular End Diastolic Volume; Teichholz;: 127.2 ml  Left Ventricular End Systolic Volume; Teichholz;: 60.8 ml  Stroke volume; Teichholz; Systole;: 66.4 ml  Mitral Valve Area; Area by Pressure Half-Time; Systole;: 3.49 cm2  Mitral Valve E to A Ratio; Systole;: 0.69    [facility]    Prepared and electronically signed by    David Hong MD  Signed [id number] 11:54:33       No results found for this or any previous visit. No results found for this or any previous visit. No results found for this or any previous visit.

## 2023-11-08 ENCOUNTER — CONSULT (OUTPATIENT)
Dept: GASTROENTEROLOGY | Facility: CLINIC | Age: 58
End: 2023-11-08
Payer: COMMERCIAL

## 2023-11-08 ENCOUNTER — TELEPHONE (OUTPATIENT)
Age: 58
End: 2023-11-08

## 2023-11-08 VITALS
OXYGEN SATURATION: 96 % | HEART RATE: 68 BPM | SYSTOLIC BLOOD PRESSURE: 118 MMHG | TEMPERATURE: 97.6 F | WEIGHT: 141.2 LBS | BODY MASS INDEX: 26.66 KG/M2 | DIASTOLIC BLOOD PRESSURE: 62 MMHG | HEIGHT: 61 IN

## 2023-11-08 DIAGNOSIS — R22.1 LOCALIZED SWELLING, MASS AND LUMP, NECK: ICD-10-CM

## 2023-11-08 DIAGNOSIS — K31.84 GASTROPARESIS DUE TO DM: ICD-10-CM

## 2023-11-08 DIAGNOSIS — K59.09 CHRONIC CONSTIPATION: ICD-10-CM

## 2023-11-08 DIAGNOSIS — K22.70 BARRETT'S ESOPHAGUS WITHOUT DYSPLASIA: Primary | ICD-10-CM

## 2023-11-08 DIAGNOSIS — K76.0 FATTY LIVER: ICD-10-CM

## 2023-11-08 DIAGNOSIS — R10.13 EPIGASTRIC ABDOMINAL PAIN: ICD-10-CM

## 2023-11-08 DIAGNOSIS — Z86.010 PERSONAL HISTORY OF COLONIC POLYPS: ICD-10-CM

## 2023-11-08 DIAGNOSIS — E11.43 GASTROPARESIS DUE TO DM: ICD-10-CM

## 2023-11-08 PROCEDURE — 99214 OFFICE O/P EST MOD 30 MIN: CPT | Performed by: PHYSICIAN ASSISTANT

## 2023-11-08 RX ORDER — ONDANSETRON 4 MG/1
4 TABLET, FILM COATED ORAL EVERY 8 HOURS PRN
Qty: 20 TABLET | Refills: 0 | Status: SHIPPED | OUTPATIENT
Start: 2023-11-08

## 2023-11-08 RX ORDER — PANTOPRAZOLE SODIUM 40 MG/1
40 TABLET, DELAYED RELEASE ORAL 2 TIMES DAILY
Qty: 60 TABLET | Refills: 5 | Status: SHIPPED | OUTPATIENT
Start: 2023-11-08

## 2023-11-08 RX ORDER — POLYETHYLENE GLYCOL 3350 17 G/17G
17 POWDER, FOR SOLUTION ORAL 2 TIMES DAILY
Qty: 765 G | Refills: 5 | Status: SHIPPED | OUTPATIENT
Start: 2023-11-08

## 2023-11-08 NOTE — PROGRESS NOTES
[facility] - Outpatient Follow-up Note  He Gleason 66 y.o. female MRN: [id number]  Encounter: [id number]    ASSESSMENT AND PLAN:      1. 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 Epigastric abdominal pain    We reviewed diagnosis of Menendez's esophagus with pt today. Last EGD in 02/2022 with Menendez's without dysplasia. Manometry from 2018 was wnl. She does have hx of heartburn, and it is unclear if she is taking PPI thus sent in new script and encouraged her to take regularly. Reviewed recommendation for diet and lifestyle modifications for GERD. Recommend repeat EGD in 02/2025 for Menendez's surveillance. Reviewed recommendation to continue working on smoking cessation. No sig isolated epi pain at this time, though if this should recur consider HIDA scan, US from 02/2022 was unremarkable for biliary pathology. - pantoprazole (PROTONIX) 40 mg tablet; Take 1 tablet (40 mg total) by mouth 2 (two) times a day  Dispense: 60 tablet; Refill: 5  - Ambulatory Referral to Gastroenterology    3. Gastroparesis due to DM     Pt with uncontrolled DM with  GES w/ T 1/2 511 minutes. She also takes narcotic analgesia and there are additional pharmacologics on med list that may be contributing to delayed emptying. Reviewed importance of improving glycemic control. Reviewed once again gastroparesis diet. Sent in script for anti-emetics as needed for severe symptoms, would limit reglan usage at this time due to SE profile (historically noted that pt took for flare ups though she is not certain as to whether she is currently taking). - ondansetron (ZOFRAN) 4 mg tablet; Take 1 tablet (4 mg total) by mouth every 8 (eight) hours as needed for nausea or vomiting  Dispense: 20 tablet; Refill: 0    4. Chronic constipation    Noted, historically on linaclotide 290mcg though pt shared SE of extreme abd cramping and chest pain thus she d/c.  She feels that fiber has been helpful for her and doesn't seem to exacerbate her above diagnosis thus recommend initiation of daily fiber supplementation. Recommend daily miralax. Will trial Motegrity at this time as this may be helpful for her given her dx of gastroparesis. Continue to monitor stool output. - Prucalopride Succinate 2 MG TABS; Take 2 mg by mouth in the morning  Dispense: 30 tablet; Refill: 5  - polyethylene glycol (GLYCOLAX) 17 GM/SCOOP powder; Take 17 g by mouth 2 (two) times a day  Dispense: 765 g; Refill: 5    5. Personal history of colonic polyps    Last colonoscopy in 02/2022 with one benign polyp removed. Endoscopist recommended recall in 5 years given personal history of colon polyps, which would make her due in 02/2027.     6. Localized swelling, mass and lump, neck    Noted during physical examination. Seems superficial, not sure if lipoma (?). She showed me US on phone from [facility] in 2023 which demonstrated small thyroid nodule only. Will obtain CT neck at this time.     - CT soft tissue neck w contrast; Future    7. Fatty liver     Not reviewed in detail today, suspect NAFLD. She is understanding of diagnosis and possible sequelae of disease. Review control of comorbidities and slow sustained weight loss. Will calculate fibrosis score in f/u OV and determine whether she would benefit from elastography. We will follow up in 3 months to reassess her symptoms. ______________________________________________________________________    SUBJECTIVE: Patient is a 66 y.o. female who presents today for follow-up regarding gastroparesis. Pmhx sig for Menendez's esophagus, gastroparesis, CAD, HTN, HLD, DM2, COPD, anxiety/depression, tobacco use disorder. Pt is new to me. She was last evaluated by GI in 05/2022. She has hx of heartburn and is taking PPI BID. Thinks she may have run out of medication. Notes excess coughing, extreme to the point of having post-tussive emesis.  Still having symptoms of early satiety, fullness, thinks she lost approx 10 lbs over past year. Eats about once daily. Regarding her swallow, feels intermittent issue with swallowing because she feels she "forgets to swallow", denies sensation of solids/liquids/pills getting stuck. Pt still dealing with constipation. Was given linzess 290mcg to try. Notes she developed abdominal cramping, and pain that radiated up into her chest with linzess use thus she stopped. Taking stool softeners now, having bowel movement about every other day. Needs to strain and manually disimpact at times. With excess straining, has seen some BRBPR on the wipe. No large volume hematochezia or melenic stools. No nocturnal Bms or diarrhea. Tobacco: 1 PPD   Etoh: none   Cannabis: nightly   NSAIDs: none     05/2023: A1C: 13.4   09/2023: BUN 16, Cr 0.65, AST 13, ALT 15, , albumin 4.0, t bili 0.36, Hb 16.6, MCV 83, Plt 256    09/2023: CT A/P: Diffuse bladder wall thickening consistent with cystitis. Enhancement of the left ureteral lining suggesting ascending infection. Stable nonobstructing 4 mm left lower pole intrarenal calculus. 02/2022: RUQ US: hepatomegaly and hepatic steatosis     Endoscopic history:   EGD: 02/2022: Menendez's tongue 34 cm; Mild inflammation of the antrum and incisura  A. Stomach, antrum, biopsy:  Chronic inactive antral gastritis. Negative for Helicobacter pylori, by H&E stain. Negative for atrophy, intestinal metaplasia, dysplasia or carcinoma. B. Stomach, body, biopsy: Chronic inactive oxyntic gastritis. Negative for Helicobacter pylori, by H&E stain. Negative for atrophy, intestinal metaplasia, dysplasia or carcinoma  C. Stomach, fundus, biopsy: Chronic inactive oxyntic gastritis. Negative for Helicobacter pylori, by H&E stain. Negative for atrophy, intestinal metaplasia, dysplasia or carcinoma  D. Esophagus, 34 cm, biopsy: Gastroesophageal junction mucosa with intestinal metaplasia, compatible with Menendez's esophagus in the correct clinical setting.  Negative for dysplasia or carcinoma. E. Esophagus, proximal, biopsy: Benign squamous mucosa without significant histopathologic abnormality. Intraepithelial eosinophils are not increased, negative for features of eosinophilic esophagitis. Negative for dysplasia or carcinoma  Colon: 2022: One sessile polyp measuring smaller than 5 mm in the distal rectum; otherwise normal   F. Colon, rectal polyp, biopsy: Inflammatory polyp with hyperplastic/reactive surface epithelial change, negative for dysplasia or carcinoma   Esophageal manometry 2018: normal motility     Review of Systems   Otherwise Per HPI    Historical Information   Past Medical History:   Diagnosis Date    Angina pectoris ([address])     recent    Anxiety     Arthritis     Asthma     Carpal tunnel syndrome     Chronic headaches     COPD (chronic obstructive pulmonary disease) (HCC)     Diabetes ([address])     Diabetes mellitus ([address])     GERD (gastroesophageal reflux disease)     Headache     Hyperlipidemia     Hypertension     Kidney stone     Left bundle branch block     LBBB    MI (myocardial infarction) ([address])     Myocardial infarction ([address])     Neuropathy     PTSD (post-traumatic stress disorder)     RLS (restless legs syndrome)     Shortness of breath      Past Surgical History:   Procedure Laterality Date    ANGIOPLASTY      BREAST SURGERY  2016    Reduction    CARDIAC CATHETERIZATION  2018     Mid LAD: There was a tubular 40 % stenosis     SECTION      COLONOSCOPY      HYSTERECTOMY  2018    Complete    KIDNEY STONE SURGERY      LAPAROSCOPY      [place] COLONOSCOPY FLX DX W/COLLJ SPEC WHEN PFRMD N/A 2017    Procedure: EGD AND COLONOSCOPY;  Surgeon: Linda Garcia MD;  Location: [facility]; Service: Gastroenterology    [place] ESOPHAGOGASTRODUODENOSCOPY TRANSORAL DIAGNOSTIC N/A 10/10/2018    Procedure: ESOPHAGOGASTRODUODENOSCOPY (EGD); Surgeon: Linda Garcia MD;  Location: [facility];   Service: Gastroenterology    [place] LAPS TOTAL HYSTERECT 250 GM/< W/RMVL TUBE/OVARY N/A 5/22/2018    Procedure: ROBOTIC ASSISTED TOTAL LAPAROSCOPIC HYSTERECTOMY, BILATERAL SALPINGOOPHERECTOMY,;  Surgeon: Franny Goldberg MD;  Location: [facility];  Service: Gynecology Oncology    UPPER GASTROINTESTINAL ENDOSCOPY      WRIST SURGERY Right      Social History   Social History     Substance and Sexual Activity   Alcohol Use No    Comment: Rarely consumes alcohol - As per [facility]      Social History     Substance and Sexual Activity   Drug Use Yes    Frequency: 7.0 times per week    Types: Marijuana    Comment: medical marijuana  last use over a month     Social History     Tobacco Use   Smoking Status Every Day    Packs/day: 1.00    Years: 48.00    Total pack years: 48.00    Types: Cigarettes    Start date: 1/1/1973   Smokeless Tobacco Never   Tobacco Comments    pt. smoked this am 10/10     Family History   Problem Relation Age of Onset    Diabetes Mother     Breast cancer Mother 39    Lung cancer Father 47    Diabetes Sister     Brain cancer Maternal Aunt 72    Breast cancer Other 25     Meds/Allergies       Current Outpatient Medications:     Accu-Chek Guide test strip    albuterol (PROVENTIL HFA,VENTOLIN HFA) 90 mcg/act inhaler    BD Pen Needle Dayna 2nd Gen 32G X 4 MM MISC    Bevespi Aerosphere 9-4.8 MCG/ACT inhaler    bisoprolol (ZEBETA) 5 mg tablet    buPROPion (WELLBUTRIN XL) 300 mg 24 hr tablet    clotrimazole-betamethasone (LOTRISONE) 1-0.05 % cream    diclofenac (VOLTAREN) 75 mg EC tablet    DULoxetine (CYMBALTA) 60 mg delayed release capsule    hydrOXYzine pamoate (VISTARIL) 25 mg capsule    ibuprofen (MOTRIN) 200 mg tablet    insulin glargine (LANTUS SOLOSTAR) 100 units/mL injection pen    metFORMIN (GLUCOPHAGE-XR) 500 mg 24 hr tablet    methocarbamol (ROBAXIN) 750 mg tablet    methylphenidate (RITALIN) 20 MG tablet    mupirocin (BACTROBAN) 2 % ointment    naloxone (NARCAN) 4 mg/0.1 mL nasal spray    naproxen (NAPROSYN) 375 mg tablet    nitroglycerin (NITROSTAT) 0.4 mg SL tablet    nystatin-triamcinolone (MYCOLOG-II) ointment    ondansetron (ZOFRAN) 4 mg tablet    oxyCODONE (ROXICODONE) 5 immediate release tablet    pantoprazole (PROTONIX) 40 mg tablet    polyethylene glycol (GLYCOLAX) 17 GM/SCOOP powder    potassium chloride SA (KLOR-CON M15) 15 MEQ tablet    prazosin (MINIPRESS) 5 mg capsule    pregabalin (LYRICA) 75 mg capsule    Prucalopride Succinate 2 MG TABS    terconazole (TERAZOL 7) 0.4 % vaginal cream    zolpidem (AMBIEN) 10 mg tablet    insulin lispro (HumaLOG) 100 units/mL injection    NovoLOG FlexPen 100 units/mL injection pen    rosuvastatin (CRESTOR) 20 MG tablet    Spiriva Respimat 2.5 MCG/ACT AERS inhaler    varenicline (CHANTIX SALEEM) 0.5 MG X 11 & 1 MG X 42 tablet    Allergies   Allergen Reactions    Lisinopril Cough    Losartan Dizziness    Other Hives     Surgical tape    Prednisone Other (See Comments)     Thrush       Objective     Blood pressure 118/62, pulse 68, temperature 97.6 °F (36.4 °C), temperature source Temporal, height 5' 1" (1.549 m), weight 64 kg (141 lb 3.2 oz), SpO2 96 %. Body mass index is 26.68 kg/m². Physical Exam  Vitals and nursing note reviewed. Constitutional:       General: She is not in acute distress. Appearance: She is not toxic-appearing. Comments: Appears older than stated age    HENT:      Head: Normocephalic and atraumatic. Eyes:      General: No scleral icterus. Conjunctiva/sclera: Conjunctivae normal.   Neck:      Comments: Soft tissue fullness on anterior neck   Cardiovascular:      Rate and Rhythm: Normal rate. Pulmonary:      Effort: Pulmonary effort is normal. No respiratory distress. Breath sounds: Rhonchi present. Abdominal:      General: Bowel sounds are normal. There is no distension. Tenderness: There is no abdominal tenderness. There is no guarding or rebound. Skin:     General: Skin is dry. Neurological:      General: No focal deficit present.       Mental Status: She is alert and oriented to person, place, and time. Psychiatric:         Behavior: Behavior normal.       Lab Results:   No visits with results within 1 Day(s) from this visit.    Latest known visit with results is:   Admission on 09/27/2023, Discharged on 09/27/2023   Component Date Value    WBC 09/27/2023 7.48     RBC 09/27/2023 5.96 (H)     Hemoglobin 09/27/2023 16.6 (H)     Hematocrit 09/27/2023 49.6 (H)     MCV 09/27/2023 83     MCH 09/27/2023 27.9     MCHC 09/27/2023 33.5     RDW 09/27/2023 12.5     MPV 09/27/2023 10.0     Platelets 35/86/0600 256     nRBC 09/27/2023 0     Neutrophils Relative 09/27/2023 57     Immat GRANS % 09/27/2023 0     Lymphocytes Relative 09/27/2023 35     Monocytes Relative 09/27/2023 5     Eosinophils Relative 09/27/2023 2     Basophils Relative 09/27/2023 1     Neutrophils Absolute 09/27/2023 4.29     Immature Grans Absolute 09/27/2023 0.02     Lymphocytes Absolute 09/27/2023 2.65     Monocytes Absolute 09/27/2023 0.37     Eosinophils Absolute 09/27/2023 0.11     Basophils Absolute 09/27/2023 0.04     Sodium 09/27/2023 136     Potassium 09/27/2023 4.1     Chloride 09/27/2023 99     CO2 09/27/2023 31     ANION GAP 09/27/2023 6     BUN 09/27/2023 18     Creatinine 09/27/2023 0.65     Glucose 09/27/2023 340 (H)     Calcium 09/27/2023 9.3     AST 09/27/2023 13     ALT 09/27/2023 15     Alkaline Phosphatase 09/27/2023 100     Total Protein 09/27/2023 7.2     Albumin 09/27/2023 4.0     Total Bilirubin 09/27/2023 0.36     eGFR 09/27/2023 99     Protime 09/27/2023 12.7     INR 09/27/2023 0.94     PTT 09/27/2023 35     Lipase 09/27/2023 76     hs TnI 0hr 09/27/2023 4     Ventricular Rate 09/27/2023 57     Atrial Rate 09/27/2023 57     UT Interval 09/27/2023 142     QRSD Interval 09/27/2023 132     QT Interval 09/27/2023 474     QTC Interval 09/27/2023 461     P Axis 09/27/2023 66     QRS Plover 09/27/2023 82     T Wave Plover 09/27/2023 39     Color, UA 09/27/2023 Yellow     Clarity, UA 09/27/2023 Clear Specific Gravity, UA 09/27/2023 <=1.005 (L)     pH, UA 09/27/2023 7.0     Leukocytes, UA 09/27/2023 Negative     Nitrite, UA 09/27/2023 Negative     Protein, UA 09/27/2023 Negative     Glucose, UA 09/27/2023 3+ (A)     Ketones, UA 09/27/2023 Negative     Urobilinogen, UA 09/27/2023 0.2     Bilirubin, UA 09/27/2023 Negative     Occult Blood, UA 09/27/2023 Negative     Urine Culture 09/27/2023 50,000-59,000 cfu/ml      Radiology Results:   No results found. Pablo Gamboa PA-C    **Please note:  Dictation voice to text software may have been used in the creation of this record. Occasional wrong word or “sound alike” substitutions may have occurred due to the inherent limitations of voice recognition software. Read the chart carefully and recognize, using context, where substitutions have occurred. **

## 2023-11-08 NOTE — PATIENT INSTRUCTIONS
You have a diagnosis of Menendez's esophagus and your stoma esophagus, which is a change to the bottom of your esophagus from inflammation, acid reflux, and smoking. If this is not controlled this can increase your risk of esophagus cancer. For this reason we want to keep you on pantoprazole twice daily. This pill is taken before breakfast and before dinner and to decrease how much acid you are making. You have gastroparesis as well, which means your stomach is sluggish to empty. This is likely primarily from your poorly controlled diabetes but also perhaps her medications you take. You want to have a gastroparesis diet which is small frequent meals throughout the day, low in saturated fats, and low and difficult to digest raw fibrous foods if they bother you. For your constipation, take the fiber powder every day as it seems to do okay and does not give you abdominal bloating or pain. Add in MiraLAX, 1 capful twice daily. He will mix this into 8 ounces of a liquid of your choice. I also sent in a medication called Motegrity or Prucalopride. This is a laxative type medication but it works in patients who have sluggish motility or movement of the bowels. It tends to help patients who also have a diagnosis of gastroparesis or delayed stomach emptying.

## 2023-11-08 NOTE — TELEPHONE ENCOUNTER
Patients GI provider:  SHWETA Raines    Number to return call: [phone]    Reason for call: Mic from [facility] aid calling stating the Motegrity 2 mg tablet needs a prior authorization.      Scheduled procedure/appointment date if applicable: Apt 6/8/87 Physical Therapy Visit    Visit Type: Daily Treatment Note  Visit: 4  Referring Provider: Gerry Santoro MD  Medical Diagnosis (from order): Diagnosis Information    Diagnosis  780.4 (ICD-9-CM) - R42 (ICD-10-CM) - Vertigo       Chart reviewed at time of initial evaluation (relevant co-morbidities, allergies, tests and medications listed):   - Diagnostic tests reviewed: Carotid and CT Scan  Per M.D. note, \"Bilateral carotid stenosis: -Previous duplex in oct 2022 only showed 40% bilat stenosis -recent duplex evaluated by surgeon and it revealed stenosis without clinically significant velocity elevation.\" and \"By velocity criteria on duplex in Oct 22 and this week, the bilateral stenoses do not appears to be hemodynamically significant. He is asymptomatic and neither lesion appears >70% visually. Will f/up as o/p with repeat duplex in 6 months.\"    Per inpatient PT note, \"Negative positional testing noted during session, but he does have occasionally listing to the left side.  I am suspicious he has a vestibular hypofunction at baseline which is exacerbated at this time.  Patient will benefit from OP physical therapy for balance on DC.\"    Past Medical History:  No date: Arthritis  No date: Essential (primary) hypertension  No date: High cholesterol  No date: Myocardial infarction (CMD)  No date: Sleep apnea     Past Surgical History:  No date: Back surgery  No date: Cardiac catherization  No date: Knee surgery; Left     Current Outpatient Medications:  atorvastatin (LIPITOR) 40 MG tablet, Take 2 tablets by mouth at bedtime.  losartan (COZAAR) 25 MG tablet, Take 2 tablets by mouth daily. Do not start before April 6, 2023.  meclizine (ANTIVERT) 25 MG tablet, Take 1 tablet by mouth 3 times daily as needed for Dizziness.  ondansetron (ZOFRAN ODT) 4 MG disintegrating tablet, Place 1 tablet onto the tongue every 8 hours as needed for Nausea.  acetaminophen (TYLENOL) 500 MG tablet, Take 1,000 mg by mouth every 6 hours as  needed for Pain.  buPROPion (WELLBUTRIN SR) 200 MG 12 hr tablet, Take 200 mg by mouth daily.  fenofibrate (TRICOR) 48 MG tablet, Take 48 mg by mouth daily.  aspirin (ECOTRIN) 81 MG EC tablet, Take 81 mg by mouth daily.  VITAMIN D PO, Take 1 tablet by mouth daily.  Multiple Vitamins-Minerals (Multivitamin Adults) Tab, Take 1 tablet by mouth daily.  metoPROLOL succinate (TOPROL-XL) 50 MG 24 hr tablet, Take 1 tablet by mouth at bedtime.  desvenlafaxine (PRISTIQ) 100 MG 24 hr tablet, Take 100 mg by mouth daily.  guanFACINE (INTUNIV) 3 MG TABLET SR 24 HR, Take 3 mg by mouth at bedtime.  metFORMIN (GLUCOPHAGE-XR) 500 MG 24 hr tablet, Take 500 mg by mouth daily (with breakfast).    No current facility-administered medications for this visit.      SUBJECTIVE                                                                                                               5/2/23: Patient reports his balance is much better this past week however his left knee is more painful this morning despite taking Tylenol last night. HEP is going well.    4/24/23: Patient reports feeling some stiffness over his knee, however no pain. Updated exercises are going well.    4/18/23: Patient reports exercises are going well but he still feels quite unsteady with walking; states his left knee is bothering him today after working in the yard this afternoon. Patient also notes feeling more unsteady when walking over the grass and uneven terrain in the yard.     INITIAL EVALUATION: The patient is a 68 year old male presenting to physical therapy with diagnosis of Vertigo (R42).     Since discharge from [facility], symptoms have improved every day and dizziness has pretty much resolved. He is quite concerned about falling due to poor balance; difficulty getting up from a chair. Has lost a lot of upper body strength since bypass in October and also struggles to get up from lower surfaces due to left knee pain. Some unsteadiness when negotiating stairs in his  home, especially when carrying something. Leans against the wall for support. Uses grocery cart for support when shopping.     Pain / Symptoms  - Quality / Description:      • Dizziness symptoms: disequilibrium and imbalance     OBJECTIVE                                                                                                                           Standing Balance  (NAVNEET = base of support)  Romberg: Standard      - On Foam, Eyes Closed (sec): 12             Treatment     Therapeutic Exercise  Review of relevant anatomy, plan of care    Huong DOMINGO x5 min L4    Neuromuscular Re-Education  Romberg Stance Airex EC*  Tandem stance Airex EO with horizontal head turns  Tandem gait level ground - fwd/bwd at ballet bar (added horizontal/vertical head turns)*  Alt FSO onto Airex*  Alt Slow March on Airex (limit range below pain threshold for L knee)*    Not Performed:  Gait with head turns level ground  Side stepping air ex beam    Skilled input: verbal instruction/cues and tactile instruction/cues    Writer verbally educated and received verbal consent for hand placement, positioning of patient, and techniques to be performed today from patient for clothing adjustments for techniques, hand placement and palpation for techniques and therapist position for techniques as described above and how they are pertinent to the patient's plan of care.    Home Exercise Program  Access Code: [email or web address]  URL: [email or web address]  Date: 05/02/2023  Prepared by: Chana Treviño    Exercises  - Standing Near Stance in Corner with Eyes Closed  - 3 x daily - 7 x weekly - 1 sets - 3 reps - 15-30 hold  - Tandem Stance in Corner  - 3 x daily - 7 x weekly - 1 sets - 3 reps - 15-30 hold  - Sit to Stand  - 3 x daily - 7 x weekly - 1 sets - 5-10 reps  - Standing Marching  - 3 x daily - 7 x weekly - 1 sets - 10 reps  - Walking Tandem Stance  - 1 x daily - 7 x weekly - 1 sets - 5 reps  - Forward Step Up  - 1 x daily - 7  x weekly - 3 sets - 10 reps      ASSESSMENT                                                                                                            5/2/23: Patient with increased ease taking initial steps following sit to stand transfer in the clinic; improved gait stability observed and less unsteadiness ambulating from waiting area into PT gym. Favorable response to progressed balance activities while modifying movements to account for left knee discomfort. Upgraded/modified HEP. Continues to benefit from skilled therapy to work on stability and balance activities for safety with navigating community distances, varied surfaces such as grass and uneven terrain.    4/18/23: Patient with observable gait deviations on compliant surface > level ground. Following cues and training on foam, patient was able to self correct posterior weight shift and find neutral stance in static standing and while ambulating. Increased fatigue without exacerbation of knee pain throughout session. Patient tolerated well overall and upgraded HEP.    4/11/23: INITIAL EVALUATION: The patient presents to physical therapy with complaint(s) of imbalance following episode of vertigo. Patient's symptoms are consistent with referring diagnosis. Clinical findings are suggestive of potential fall risk based upon functional outcomes tool and balance testing. Patient demonstrates deficits in: ambulating community distances, including change in direction and pivoting/turning, difficulty transferring from lower surfaces and difficulty negotiating stairs in his home. As a result of these deficits the patient's level of function has also been impaired. The patient will benefit from skilled PT intervention to address noted deficits and to progress toward PLOF. Failure to intervene may lead to permanent function loss and could increase chronicity of this disorder, impeding upon pt's overall function and quality of life.  Education:   - Present and ready  to learn: patient  - Results of above outlined education: Verbalizes understanding    PLAN                                                                                                                           Suggestions for next session as indicated: Progress per plan of care      Goals  Improve gait/balance    The above improvements in impairments to assist in obtaining goals listed below  Long Term Goals: to be met by end of plan of care  1. Patient will demonstrate ability to negotiate level and unlevel surfaces at variable velocities, including change of direction without increased pain or instability to return to age appropriate and community activities at prior level of function  2. Patient's Timed up and go will be </= 11 seconds to indicate improved mobility and a decreased fall risk.  3. Dizziness Handicap Inventory: Patient will complete form to reflect an improved score to less than or equal to 13 to indicate patient reported improvement in function/disability/impairment (minimal detectable change: 17 points).  4. Patient will reduce time of 5 times sit to stand test to 15s or less to reduce fall risk with transfers.      Therapy procedure time and total treatment time can be found documented on the Time Entry flowsheet

## 2023-11-09 NOTE — TELEPHONE ENCOUNTER
PA for motegrity submitted via [facility].  KEY: [id number]. Clinical questions answered. Office notes sent. Awaiting determination.

## 2023-11-10 NOTE — TELEPHONE ENCOUNTER
Pharmacy calling in for updates for motegrity prior auth, advised we are awaiting determination. She understood, no further questions.

## 2023-11-10 NOTE — TELEPHONE ENCOUNTER
PA for Motegrity sent through [facility] with [employer] form  Key: [id number]   Sent clinicals  Awaiting determination

## 2023-11-10 NOTE — TELEPHONE ENCOUNTER
PA was requested with [address] fax PA cannot be processed.    Need to use Medicare plan:  [place] [facility]

## 2023-11-11 DIAGNOSIS — K59.09 CHRONIC CONSTIPATION: Primary | ICD-10-CM

## 2023-11-11 RX ORDER — LACTULOSE 20 G/30ML
20 SOLUTION ORAL 2 TIMES DAILY
Qty: 946 ML | Refills: 1 | Status: SHIPPED | OUTPATIENT
Start: 2023-11-11

## 2023-11-11 NOTE — PROGRESS NOTES
Kye Griffin has been denied by insurance. Insurance requesting pt trial lactulose first. I will send this in for her.

## 2023-11-14 ENCOUNTER — OFFICE VISIT (OUTPATIENT)
Dept: OBGYN CLINIC | Facility: CLINIC | Age: 58
End: 2023-11-14
Payer: COMMERCIAL

## 2023-11-14 VITALS
DIASTOLIC BLOOD PRESSURE: 74 MMHG | HEIGHT: 61 IN | SYSTOLIC BLOOD PRESSURE: 120 MMHG | WEIGHT: 143 LBS | BODY MASS INDEX: 27 KG/M2

## 2023-11-14 DIAGNOSIS — B37.31 VULVOVAGINITIS DUE TO YEAST: ICD-10-CM

## 2023-11-14 DIAGNOSIS — R31.0 GROSS HEMATURIA: ICD-10-CM

## 2023-11-14 DIAGNOSIS — F17.200 TOBACCO USE DISORDER, SEVERE, DEPENDENCE: ICD-10-CM

## 2023-11-14 DIAGNOSIS — N90.89 VULVAR IRRITATION: ICD-10-CM

## 2023-11-14 DIAGNOSIS — N39.46 MIXED STRESS AND URGE URINARY INCONTINENCE: Primary | ICD-10-CM

## 2023-11-14 DIAGNOSIS — Z72.0 TOBACCO USE: ICD-10-CM

## 2023-11-14 LAB
BACTERIA UR QL AUTO: ABNORMAL /HPF
BILIRUB UR QL STRIP: NEGATIVE
BUDDING YEAST: PRESENT
CLARITY UR: ABNORMAL
COLOR UR: ABNORMAL
GLUCOSE UR STRIP-MCNC: ABNORMAL MG/DL
HGB UR QL STRIP.AUTO: ABNORMAL
KETONES UR STRIP-MCNC: NEGATIVE MG/DL
LEUKOCYTE ESTERASE UR QL STRIP: ABNORMAL
NITRITE UR QL STRIP: NEGATIVE
NON-SQ EPI CELLS URNS QL MICRO: ABNORMAL /HPF
PH UR STRIP.AUTO: 6 [PH]
PROT UR STRIP-MCNC: ABNORMAL MG/DL
RBC #/AREA URNS AUTO: ABNORMAL /HPF
SP GR UR STRIP.AUTO: 1.04 (ref 1–1.03)
UROBILINOGEN UR STRIP-ACNC: <2 MG/DL
WBC #/AREA URNS AUTO: ABNORMAL /HPF

## 2023-11-14 PROCEDURE — 87086 URINE CULTURE/COLONY COUNT: CPT | Performed by: STUDENT IN AN ORGANIZED HEALTH CARE EDUCATION/TRAINING PROGRAM

## 2023-11-14 PROCEDURE — 81001 URINALYSIS AUTO W/SCOPE: CPT | Performed by: STUDENT IN AN ORGANIZED HEALTH CARE EDUCATION/TRAINING PROGRAM

## 2023-11-14 PROCEDURE — 87077 CULTURE AEROBIC IDENTIFY: CPT | Performed by: STUDENT IN AN ORGANIZED HEALTH CARE EDUCATION/TRAINING PROGRAM

## 2023-11-14 PROCEDURE — 87186 SC STD MICRODIL/AGAR DIL: CPT | Performed by: STUDENT IN AN ORGANIZED HEALTH CARE EDUCATION/TRAINING PROGRAM

## 2023-11-14 PROCEDURE — 99214 OFFICE O/P EST MOD 30 MIN: CPT | Performed by: STUDENT IN AN ORGANIZED HEALTH CARE EDUCATION/TRAINING PROGRAM

## 2023-11-14 RX ORDER — NYSTATIN AND TRIAMCINOLONE ACETONIDE 100000; 1 [USP'U]/G; MG/G
OINTMENT TOPICAL 2 TIMES DAILY
Qty: 30 G | Refills: 5 | Status: SHIPPED | OUTPATIENT
Start: 2023-11-14

## 2023-11-14 RX ORDER — FLUCONAZOLE 150 MG/1
150 TABLET ORAL
Qty: 3 TABLET | Refills: 0 | Status: SHIPPED | OUTPATIENT
Start: 2023-11-14 | End: 2023-11-21

## 2023-11-14 NOTE — ASSESSMENT & PLAN NOTE
- Discussed tobacco cessation  - Discussed her leakage with cough/laugh/sneeze, and urgency. Sometimes large volumes. Suspect possible ISD, mixed continence.   - Discussed referral to urogynecology for further evaluation and management

## 2023-11-14 NOTE — ASSESSMENT & PLAN NOTE
- Treat with oral fluconazole and topical mycolog II ointment- may also need a barrier cream like A&D / Desitin given moisture from frequent urinary incontinence

## 2023-11-14 NOTE — PROGRESS NOTES
[facility] [address] [place] [address]    Assessment/Plan:  Leisa Beck is a 66 y.o.  who presents primarily with vulvar irritation due to vulvar candidiasis and mixed urinary incontinence. Vulvovaginitis due to yeast  - Treat with oral fluconazole and topical mycolog II ointment- may also need a barrier cream like A&D / Desitin given moisture from frequent urinary incontinence    Mixed stress and urge urinary incontinence  - Discussed tobacco cessation  - Discussed her leakage with cough/laugh/sneeze, and urgency. Sometimes large volumes. Suspect possible ISD, mixed continence. - Discussed referral to urogynecology for further evaluation and management    Diagnoses and all orders for this visit:    Mixed stress and urge urinary incontinence  -     Ambulatory Referral to Urogynecology; Future    Vulvovaginitis due to yeast  -     nystatin-triamcinolone (MYCOLOG-II) ointment; Apply topically 2 (two) times a day  -     fluconazole (DIFLUCAN) 150 mg tablet; Take 1 tablet (150 mg total) by mouth every third day for 3 doses    Tobacco use    Gross hematuria  -     Urinalysis with microscopic  -     Urine culture; Future    Vulvar irritation    Tobacco use disorder, severe, dependence          Subjective:   Leisa Beck is a 66 y.o.  female. CC: yeast infection    HPI: Melody Negrete presents with "yeast infection". She has vulvar irritation that is not improving with previously prescribed mycolog II. She also reports longstanding urinary incontinence with urgency and stress, leaking large volumes. She smokes tobacco.    She also notes blood in her urine. ROS: Review of Systems   Constitutional:  Negative for chills and fever. Respiratory:  Negative for cough and shortness of breath. Cardiovascular:  Negative for chest pain and leg swelling. Gastrointestinal:  Negative for abdominal pain, nausea and vomiting.    Genitourinary:  Negative for dysuria, frequency and urgency. Neurological:  Negative for dizziness, light-headedness and headaches. PFSH: The following portions of the patient's history were reviewed and updated as appropriate: allergies, current medications, past family history, past medical history, obstetric history, gynecologic history, past social history, past surgical history and problem list.       Objective:  /74   Ht 5' 1" (1.549 m)   Wt 64.9 kg (143 lb)   BMI 27.02 kg/m²    Physical Exam  Constitutional:       Appearance: Normal appearance. HENT:      Head: Normocephalic and atraumatic. Cardiovascular:      Rate and Rhythm: Normal rate. Pulmonary:      Effort: Pulmonary effort is normal.   Abdominal:      General: There is no distension. Tenderness: There is no abdominal tenderness. There is no guarding. Genitourinary:     Exam position: Lithotomy position. Pubic Area: No rash. Labia:         Right: Rash present. No tenderness or lesion. Left: Rash present. No tenderness or lesion. Urethra: No prolapse, urethral swelling or urethral lesion. Vagina: No vaginal discharge, erythema, tenderness, bleeding or lesions. Cervix: No cervical motion tenderness, discharge, friability or erythema. Comments: Diffuse erythematous vulvar rash  Lymphadenopathy:      Lower Body: No right inguinal adenopathy. No left inguinal adenopathy. Neurological:      Mental Status: She is alert.            Eulalia Johnson [place]  [id number] [address] [facility]  11/14/2023 8:13 AM

## 2023-11-16 ENCOUNTER — HOSPITAL ENCOUNTER (OUTPATIENT)
Dept: CT IMAGING | Facility: HOSPITAL | Age: 58
End: 2023-11-16
Payer: COMMERCIAL

## 2023-11-16 DIAGNOSIS — N30.01 ACUTE CYSTITIS WITH HEMATURIA: Primary | ICD-10-CM

## 2023-11-16 DIAGNOSIS — R22.1 LOCALIZED SWELLING, MASS AND LUMP, NECK: ICD-10-CM

## 2023-11-16 PROCEDURE — 70491 CT SOFT TISSUE NECK W/DYE: CPT

## 2023-11-16 PROCEDURE — G1004 CDSM NDSC: HCPCS

## 2023-11-16 RX ORDER — SULFAMETHOXAZOLE AND TRIMETHOPRIM 800; 160 MG/1; MG/1
1 TABLET ORAL EVERY 12 HOURS SCHEDULED
Qty: 6 TABLET | Refills: 0 | Status: SHIPPED | OUTPATIENT
Start: 2023-11-16 | End: 2023-11-19

## 2023-11-16 RX ADMIN — IOHEXOL 85 ML: 350 INJECTION, SOLUTION INTRAVENOUS at 14:02

## 2023-11-17 LAB
BACTERIA UR CULT: ABNORMAL
BACTERIA UR CULT: ABNORMAL

## 2023-12-06 ENCOUNTER — OFFICE VISIT (OUTPATIENT)
Dept: FAMILY MEDICINE CLINIC | Facility: CLINIC | Age: 58
End: 2023-12-06
Payer: COMMERCIAL

## 2023-12-06 ENCOUNTER — APPOINTMENT (OUTPATIENT)
Dept: LAB | Facility: CLINIC | Age: 58
End: 2023-12-06
Payer: COMMERCIAL

## 2023-12-06 VITALS
TEMPERATURE: 96 F | HEART RATE: 69 BPM | WEIGHT: 144 LBS | OXYGEN SATURATION: 94 % | HEIGHT: 61 IN | BODY MASS INDEX: 27.19 KG/M2 | DIASTOLIC BLOOD PRESSURE: 62 MMHG | SYSTOLIC BLOOD PRESSURE: 104 MMHG

## 2023-12-06 DIAGNOSIS — E78.5 HYPERLIPIDEMIA, UNSPECIFIED HYPERLIPIDEMIA TYPE: ICD-10-CM

## 2023-12-06 DIAGNOSIS — J44.9 CHRONIC OBSTRUCTIVE PULMONARY DISEASE, UNSPECIFIED COPD TYPE (HCC): ICD-10-CM

## 2023-12-06 DIAGNOSIS — Z13.220 SCREENING FOR LIPID DISORDERS: ICD-10-CM

## 2023-12-06 DIAGNOSIS — Z13.31 DEPRESSION SCREENING NEGATIVE: ICD-10-CM

## 2023-12-06 DIAGNOSIS — Z13.29 SCREENING FOR THYROID DISORDER: ICD-10-CM

## 2023-12-06 DIAGNOSIS — E11.65 HYPERGLYCEMIA DUE TO DIABETES MELLITUS (HCC): ICD-10-CM

## 2023-12-06 DIAGNOSIS — E11.65 TYPE 2 DIABETES MELLITUS WITH HYPERGLYCEMIA, WITHOUT LONG-TERM CURRENT USE OF INSULIN (HCC): ICD-10-CM

## 2023-12-06 DIAGNOSIS — Z13.0 SCREENING FOR DEFICIENCY ANEMIA: ICD-10-CM

## 2023-12-06 DIAGNOSIS — Z00.00 ENCOUNTER FOR MEDICARE ANNUAL WELLNESS EXAM: ICD-10-CM

## 2023-12-06 DIAGNOSIS — Z76.89 ENCOUNTER TO ESTABLISH CARE: Primary | ICD-10-CM

## 2023-12-06 DIAGNOSIS — Z11.59 NEED FOR HEPATITIS C SCREENING TEST: ICD-10-CM

## 2023-12-06 DIAGNOSIS — R80.9 MICROALBUMINURIA: ICD-10-CM

## 2023-12-06 DIAGNOSIS — K59.09 CHRONIC CONSTIPATION: ICD-10-CM

## 2023-12-06 DIAGNOSIS — K21.00 GASTROESOPHAGEAL REFLUX DISEASE WITH ESOPHAGITIS WITHOUT HEMORRHAGE: ICD-10-CM

## 2023-12-06 PROBLEM — E66.9 OBESITY (BMI 30.0-34.9): Status: RESOLVED | Noted: 2019-11-17 | Resolved: 2023-12-06

## 2023-12-06 PROBLEM — E66.811 OBESITY (BMI 30.0-34.9): Status: RESOLVED | Noted: 2019-11-17 | Resolved: 2023-12-06

## 2023-12-06 LAB
25(OH)D3 SERPL-MCNC: 26.8 NG/ML (ref 30–100)
ALBUMIN SERPL BCP-MCNC: 3.8 G/DL (ref 3.5–5)
ALP SERPL-CCNC: 94 U/L (ref 34–104)
ALT SERPL W P-5'-P-CCNC: 16 U/L (ref 7–52)
ANION GAP SERPL CALCULATED.3IONS-SCNC: 6 MMOL/L
AST SERPL W P-5'-P-CCNC: 15 U/L (ref 13–39)
BASOPHILS # BLD AUTO: 0.06 THOUSANDS/ÂΜL (ref 0–0.1)
BASOPHILS NFR BLD AUTO: 1 % (ref 0–1)
BILIRUB SERPL-MCNC: 0.38 MG/DL (ref 0.2–1)
BUN SERPL-MCNC: 19 MG/DL (ref 5–25)
CALCIUM SERPL-MCNC: 9.1 MG/DL (ref 8.4–10.2)
CHLORIDE SERPL-SCNC: 97 MMOL/L (ref 96–108)
CHOLEST SERPL-MCNC: 134 MG/DL
CO2 SERPL-SCNC: 33 MMOL/L (ref 21–32)
CREAT SERPL-MCNC: 0.63 MG/DL (ref 0.6–1.3)
CREAT UR-MCNC: 41.1 MG/DL
EOSINOPHIL # BLD AUTO: 0.28 THOUSAND/ÂΜL (ref 0–0.61)
EOSINOPHIL NFR BLD AUTO: 3 % (ref 0–6)
ERYTHROCYTE [DISTWIDTH] IN BLOOD BY AUTOMATED COUNT: 13.6 % (ref 11.6–15.1)
EST. AVERAGE GLUCOSE BLD GHB EST-MCNC: 332 MG/DL
GFR SERPL CREATININE-BSD FRML MDRD: 100 ML/MIN/1.73SQ M
GLUCOSE P FAST SERPL-MCNC: 340 MG/DL (ref 65–99)
HBA1C MFR BLD: 13.2 %
HCT VFR BLD AUTO: 49.2 % (ref 34.8–46.1)
HCV AB SER QL: NORMAL
HDLC SERPL-MCNC: 39 MG/DL
HGB BLD-MCNC: 15.8 G/DL (ref 11.5–15.4)
IMM GRANULOCYTES # BLD AUTO: 0.02 THOUSAND/UL (ref 0–0.2)
IMM GRANULOCYTES NFR BLD AUTO: 0 % (ref 0–2)
LDLC SERPL CALC-MCNC: 50 MG/DL (ref 0–100)
LYMPHOCYTES # BLD AUTO: 2.58 THOUSANDS/ÂΜL (ref 0.6–4.47)
LYMPHOCYTES NFR BLD AUTO: 29 % (ref 14–44)
MCH RBC QN AUTO: 27.8 PG (ref 26.8–34.3)
MCHC RBC AUTO-ENTMCNC: 32.1 G/DL (ref 31.4–37.4)
MCV RBC AUTO: 87 FL (ref 82–98)
MICROALBUMIN UR-MCNC: 58.2 MG/L
MICROALBUMIN/CREAT 24H UR: 142 MG/G CREATININE (ref 0–30)
MONOCYTES # BLD AUTO: 0.59 THOUSAND/ÂΜL (ref 0.17–1.22)
MONOCYTES NFR BLD AUTO: 7 % (ref 4–12)
NEUTROPHILS # BLD AUTO: 5.31 THOUSANDS/ÂΜL (ref 1.85–7.62)
NEUTS SEG NFR BLD AUTO: 60 % (ref 43–75)
NRBC BLD AUTO-RTO: 0 /100 WBCS
PLATELET # BLD AUTO: 255 THOUSANDS/UL (ref 149–390)
PMV BLD AUTO: 10.6 FL (ref 8.9–12.7)
POTASSIUM SERPL-SCNC: 4.4 MMOL/L (ref 3.5–5.3)
PROT SERPL-MCNC: 6.8 G/DL (ref 6.4–8.4)
RBC # BLD AUTO: 5.68 MILLION/UL (ref 3.81–5.12)
SL AMB POCT HEMOGLOBIN AIC: 12.9 (ref ?–6.5)
SODIUM SERPL-SCNC: 136 MMOL/L (ref 135–147)
TRIGL SERPL-MCNC: 227 MG/DL
TSH SERPL DL<=0.05 MIU/L-ACNC: 3.03 UIU/ML (ref 0.45–4.5)
WBC # BLD AUTO: 8.84 THOUSAND/UL (ref 4.31–10.16)

## 2023-12-06 PROCEDURE — G0438 PPPS, INITIAL VISIT: HCPCS | Performed by: NURSE PRACTITIONER

## 2023-12-06 PROCEDURE — 82043 UR ALBUMIN QUANTITATIVE: CPT

## 2023-12-06 PROCEDURE — 82570 ASSAY OF URINE CREATININE: CPT

## 2023-12-06 PROCEDURE — 82306 VITAMIN D 25 HYDROXY: CPT

## 2023-12-06 PROCEDURE — 36415 COLL VENOUS BLD VENIPUNCTURE: CPT

## 2023-12-06 PROCEDURE — 99204 OFFICE O/P NEW MOD 45 MIN: CPT | Performed by: NURSE PRACTITIONER

## 2023-12-06 PROCEDURE — 83036 HEMOGLOBIN GLYCOSYLATED A1C: CPT | Performed by: NURSE PRACTITIONER

## 2023-12-06 PROCEDURE — 86803 HEPATITIS C AB TEST: CPT

## 2023-12-06 PROCEDURE — 80061 LIPID PANEL: CPT

## 2023-12-06 PROCEDURE — 83036 HEMOGLOBIN GLYCOSYLATED A1C: CPT

## 2023-12-06 PROCEDURE — 84443 ASSAY THYROID STIM HORMONE: CPT

## 2023-12-06 PROCEDURE — 80053 COMPREHEN METABOLIC PANEL: CPT

## 2023-12-06 PROCEDURE — 85025 COMPLETE CBC W/AUTO DIFF WBC: CPT

## 2023-12-06 RX ORDER — LANCETS 33 GAUGE
EACH MISCELLANEOUS
Qty: 200 EACH | Refills: 3 | Status: SHIPPED | OUTPATIENT
Start: 2023-12-06

## 2023-12-06 RX ORDER — BLOOD-GLUCOSE METER
KIT MISCELLANEOUS
Qty: 1 KIT | Refills: 0 | Status: SHIPPED | OUTPATIENT
Start: 2023-12-06

## 2023-12-06 RX ORDER — BLOOD SUGAR DIAGNOSTIC
STRIP MISCELLANEOUS
Qty: 200 EACH | Refills: 3 | Status: SHIPPED | OUTPATIENT
Start: 2023-12-06

## 2023-12-06 RX ORDER — LACTULOSE 10 G/15ML
30 SOLUTION ORAL 2 TIMES DAILY
Qty: 946 ML | Refills: 1 | Status: SHIPPED | OUTPATIENT
Start: 2023-12-06

## 2023-12-06 NOTE — PROGRESS NOTES
Assessment and Plan:     Problem List Items Addressed This Visit     Gastroesophageal reflux disease with esophagitis    Hyperlipidemia    Relevant Orders    Lipid Panel with Direct LDL reflex    Type 2 diabetes mellitus with hyperglycemia, without long-term current use of insulin (HCC)    Relevant Medications    Blood Glucose Monitoring Suppl (OneTouch Verio Reflect) w/Device KIT    glucose blood (OneTouch Verio) test strip    OneTouch Delica Lancets 88J MISC    Other Relevant Orders    Ambulatory Referral to Endocrinology    POCT hemoglobin A1c (Completed)    Comprehensive metabolic panel    HEMOGLOBIN A1C W/ EAG ESTIMATION    Albumin / creatinine urine ratio    Hyperglycemia due to diabetes mellitus (HCC)    Relevant Medications    Blood Glucose Monitoring Suppl (OneTouch Verio Reflect) w/Device KIT    glucose blood (OneTouch Verio) test strip    OneTouch Delica Lancets 76O MISC    Other Relevant Orders    Ambulatory Referral to Endocrinology    POCT hemoglobin A1c (Completed)    Comprehensive metabolic panel    HEMOGLOBIN A1C W/ EAG ESTIMATION    COPD (chronic obstructive pulmonary disease) (HCC)    Microalbuminuria    Relevant Orders    Ambulatory Referral to Endocrinology   Other Visit Diagnoses     Encounter to establish care    -  Primary    Need for hepatitis C screening test        Relevant Orders    Hepatitis C Antibody    Screening for thyroid disorder        Relevant Orders    TSH, 3rd generation with Free T4 reflex    Screening for deficiency anemia        Relevant Orders    CBC and differential    Screening for lipid disorders        Depression screening negative        Encounter for Medicare annual wellness exam              Depression Screening and Follow-up Plan: Patient was screened for depression during today's encounter. They screened negative with a PHQ-2 score of 1.     Preventive health issues were discussed with patient, and age appropriate screening tests were ordered as noted in patient's After Visit Summary. Personalized health advice and appropriate referrals for health education or preventive services given if needed, as noted in patient's After Visit Summary. History of Present Illness:     Patient presents for a Medicare Wellness Visit    NP with PMHx HTN, HLD, lumbar spondylosis with chronic lower back pain and bilateral sciatica, marijuana use, GERD, COPD, and tobacco dependence presents to establish care. Pt has an established Endocrinologist, she states she was not able to get to the 11/27 appt. .    Hypertension  This is a chronic problem. The problem is controlled. Pertinent negatives include no anxiety, blurred vision, chest pain, headaches, malaise/fatigue, neck pain, orthopnea, palpitations, peripheral edema, PND, shortness of breath or sweats. There are no associated agents to hypertension. Risk factors for coronary artery disease include diabetes mellitus and post-menopausal state. Past treatments include beta blockers. The current treatment provides significant improvement. There are no compliance problems. There is no history of angina, kidney disease or CAD/MI. There is no history of chronic renal disease. Diabetes  She presents for her follow-up diabetic visit. She has type 2 diabetes mellitus. Her disease course has been worsening. There are no hypoglycemic associated symptoms. Pertinent negatives for hypoglycemia include no headaches or sweats. There are no diabetic associated symptoms. Pertinent negatives for diabetes include no blurred vision, no chest pain, no fatigue and no weight loss. There are no hypoglycemic complications. There are no diabetic complications. Risk factors for coronary artery disease include hypertension, sedentary lifestyle, diabetes mellitus and dyslipidemia. Current diabetic treatment includes intensive insulin program, oral agent (monotherapy) and diet. She is compliant with treatment most of the time.  She is following a generally healthy diet. She never participates in exercise. (Glucometer broken - ordered new) An ACE inhibitor/angiotensin II receptor blocker is being taken. Eye exam is current. Heartburn  She reports no chest pain. This is a chronic problem. The problem occurs occasionally. Nothing aggravates the symptoms. Pertinent negatives include no anemia, fatigue, melena, muscle weakness, orthopnea or weight loss. Risk factors include lack of exercise. She has tried a PPI for the symptoms. The treatment provided significant relief. A1c noted   Lab Results   Component Value Date    HGBA1C 12.9 (A) 12/06/2023        Recommend lifestyle and dietary changes which include plant based low glycemic diet. Counseled at length on the importance of diet and exercise regarding the control of their diabetes. Will monitor in 3  months. Patient Care Team:  Macho Rodriguez DO as PCP - General (Family Medicine)  Karly Grimm MD as PCP - [facility]  Bertha Cheek MD as PCP - Endocrinology (Endocrinology)  Elvira Siddiqi MD as Endoscopist  NEELAM Fallon as Nurse Practitioner (Endocrinology)  Bertha Cheek MD (Endocrinology)  Luma Gorman PA-C as Physician Assistant (Physician Assistant)     Review of Systems:     Review of Systems   Constitutional: Negative. Negative for fatigue, malaise/fatigue and weight loss. HENT: Negative. Eyes: Negative. Negative for blurred vision. Respiratory: Negative. Negative for shortness of breath. Cardiovascular: Negative. Negative for chest pain, palpitations, orthopnea and PND. Gastrointestinal: Negative. Negative for melena. Endocrine: Negative. Genitourinary: Negative. Musculoskeletal: Negative. Negative for muscle weakness and neck pain. Skin: Negative. Allergic/Immunologic: Negative. Neurological: Negative. Negative for headaches. Hematological: Negative. Psychiatric/Behavioral: Negative.           Problem List:     Patient Active Problem List   Diagnosis   • Facet arthropathy, lumbosacral   • Lumbar spondylosis   • Chronic midline low back pain with bilateral sciatica   • Status post robotic assisted total laparoscopic hysterectomy, bilateral salpingo-oophorectomy   • Medical marijuana use   • Other chronic pain   • Gastroesophageal reflux disease with esophagitis   • Bilious vomiting with nausea   • Esophageal dysphagia   • Gastroparesis due to DM    • Fatty liver   • Drug-induced constipation   • Elevated liver enzymes   • Hoarseness   • LBBB (left bundle branch block)   • Hyperlipidemia   • Common migraine without aura   • Type 2 diabetes mellitus with hyperglycemia, without long-term current use of insulin ([facility])   • Hyperglycemia due to diabetes mellitus ([facility])   • Glucosuria   • Overweight   • COPD (chronic obstructive pulmonary disease) ([facility])   • Tobacco use disorder, severe, dependence   • Shortness of breath   • Microalbuminuria   • Vulvovaginitis due to yeast   • Mixed stress and urge urinary incontinence      Past Medical and Surgical History:     Past Medical History:   Diagnosis Date   • Angina pectoris ([address])     recent   • Anxiety    • Arthritis    • Asthma    • Carpal tunnel syndrome    • Chronic headaches    • COPD (chronic obstructive pulmonary disease) ([facility])    • Diabetes ([facility])    • Diabetes mellitus ([address])    • GERD (gastroesophageal reflux disease)    • Headache    • Hyperlipidemia    • Hypertension    • Kidney stone    • Left bundle branch block     LBBB   • MI (myocardial infarction) ([address])    • Myocardial infarction ([address])    • Neuropathy    • PTSD (post-traumatic stress disorder)    • RLS (restless legs syndrome)    • Shortness of breath      Past Surgical History:   Procedure Laterality Date   • ANGIOPLASTY     • BREAST SURGERY  2016    Reduction   • CARDIAC CATHETERIZATION  2018     Mid LAD: There was a tubular 40 % stenosis   •  SECTION     • COLONOSCOPY     • HYSTERECTOMY  2018 Complete   • KIDNEY STONE SURGERY     • LAPAROSCOPY     • PA COLONOSCOPY FLX DX W/COLLJ SPEC WHEN PFRMD N/A 9/26/2017    Procedure: EGD AND COLONOSCOPY;  Surgeon: Bri Epstein MD;  Location: [facility]; Service: Gastroenterology   • PA ESOPHAGOGASTRODUODENOSCOPY TRANSORAL DIAGNOSTIC N/A 10/10/2018    Procedure: ESOPHAGOGASTRODUODENOSCOPY (EGD); Surgeon: Bri Epstein MD;  Location: [facility]; Service: Gastroenterology   • PA LAPS TOTAL HYSTERECT 250 GM/< W/RMVL TUBE/OVARY N/A 5/22/2018    Procedure: ROBOTIC ASSISTED TOTAL LAPAROSCOPIC HYSTERECTOMY, BILATERAL SALPINGOOPHERECTOMY,;  Surgeon: Frannie Giraldo MD;  Location:  MAIN OR;  Service: Gynecology Oncology   • UPPER GASTROINTESTINAL ENDOSCOPY     • WRIST SURGERY Right       Family History:     Family History   Problem Relation Age of Onset   • Diabetes Mother    • Breast cancer Mother 39   • Lung cancer Father 47   • Diabetes Sister    • Brain cancer Maternal Aunt 67   • Breast cancer Other 25      Social History:     Social History     Socioeconomic History   • Marital status:       Spouse name: None   • Number of children: None   • Years of education: None   • Highest education level: None   Occupational History   • Occupation: Healthcare provider    Tobacco Use   • Smoking status: Every Day     Packs/day: 1.00     Years: 48.00     Total pack years: 48.00     Types: Cigarettes     Start date: 1/1/1973   • Smokeless tobacco: Never   • Tobacco comments:     pt. smoked this am 10/10   Vaping Use   • Vaping Use: Never used   Substance and Sexual Activity   • Alcohol use: No     Comment: Rarely consumes alcohol - As per [employer]    • Drug use: Yes     Frequency: 7.0 times per week     Types: Marijuana     Comment: medical marijuana  last use over a month   • Sexual activity: Yes   Other Topics Concern   • None   Social History Narrative   • None     Social Determinants of Health     Financial Resource Strain: Unknown (12/6/2023)    Overall Financial Resource Strain (CARDIA)    • Difficulty of Paying Living Expenses: Patient refused   Food Insecurity: Not on file   Transportation Needs: Unknown (12/6/2023)    PRAPARE - Transportation    • Lack of Transportation (Medical): Patient refused    • Lack of Transportation (Non-Medical): Patient refused   Physical Activity: Not on file   Stress: Not on file   Social Connections: Not on file   Intimate Partner Violence: Not on file   Housing Stability: Not on file      Medications and Allergies:     Current Outpatient Medications   Medication Sig Dispense Refill   • Blood Glucose Monitoring Suppl (OneTouch Verio Reflect) w/Device KIT Check blood sugars twice daily. Please substitute with appropriate alternative as covered by patient's insurance. Dx: E11.65 1 kit 0   • glucose blood (OneTouch Verio) test strip Check blood sugars twice daily. Please substitute with appropriate alternative as covered by patient's insurance. Dx: E11.65 200 each 3   • OneTouch Delica Lancets 38T MISC Check blood sugars twice daily. Please substitute with appropriate alternative as covered by patient's insurance.  Dx: E11.65 200 each 3   • Accu-Chek Guide test strip use to CHECK BLOOD GLUCOSE four times a day     • albuterol (PROVENTIL HFA,VENTOLIN HFA) 90 mcg/act inhaler Inhale 2 puffs every 6 (six) hours as needed for wheezing     • BD Pen Needle Dayna 2nd Gen 32G X 4 MM MISC 4 time a day 200 each 2   • Bevespi Aerosphere 9-4.8 MCG/ACT inhaler inhale 2 puffs by mouth every morning and 2 puffs at bedtime     • bisoprolol (ZEBETA) 5 mg tablet Take 1 tablet (5 mg total) by mouth daily 90 tablet 3   • buPROPion (WELLBUTRIN XL) 300 mg 24 hr tablet Take 450 mg by mouth every morning     • clotrimazole-betamethasone (LOTRISONE) 1-0.05 % cream Apply topically 2 (two) times a day 30 g 0   • diclofenac (VOLTAREN) 75 mg EC tablet Take 75 mg by mouth 2 (two) times a day as needed     • DULoxetine (CYMBALTA) 60 mg delayed release capsule take 1 capsule by mouth every morning DO NOT CRUSH, CHEW, AND/OR DIVIDE     • hydrOXYzine pamoate (VISTARIL) 25 mg capsule take 1 capsule by mouth daily if needed for anxiety     • ibuprofen (MOTRIN) 200 mg tablet Take 200 mg by mouth every 6 (six) hours as needed for mild pain     • insulin glargine (LANTUS SOLOSTAR) 100 units/mL injection pen Inject 30 Units under the skin daily at bedtime 15 mL 1   • insulin lispro (HumaLOG) 100 units/mL injection Inject 10 Units under the skin 3 (three) times a day with meals 9 mL 2   • lactulose 20 g/30 mL Take 30 mL (20 g total) by mouth 2 (two) times a day 946 mL 1   • metFORMIN (GLUCOPHAGE-XR) 500 mg 24 hr tablet take 2 tablets by mouth twice a day with meals 360 tablet 0   • methocarbamol (ROBAXIN) 750 mg tablet Take 500 mg by mouth every 6 (six) hours as needed for muscle spasms     • methylphenidate (RITALIN) 20 MG tablet Take 20 mg by mouth every morning     • mupirocin (BACTROBAN) 2 % ointment apply topically to affected area three times a day for 14 days     • naloxone (NARCAN) 4 mg/0.1 mL nasal spray ADMINISTER A SINGLE SPRAY OF NARCAN IN ONE NOSTRIL REPEAT AFTER 3 MINUTES IF NO OR MINIMAL RESPONSE (Patient not taking: Reported on 11/14/2023)     • naproxen (NAPROSYN) 375 mg tablet Take 1 tablet (375 mg total) by mouth 2 (two) times a day with meals 20 tablet 0   • nitroglycerin (NITROSTAT) 0.4 mg SL tablet Place 1 tablet (0.4 mg total) under the tongue every 5 (five) minutes as needed for chest pain 25 tablet 0   • NovoLOG FlexPen 100 units/mL injection pen Inject 10 Units under the skin daily before dinner 9 mL 0   • nystatin-triamcinolone (MYCOLOG-II) ointment Apply topically 2 (two) times a day 30 g 5   • ondansetron (ZOFRAN) 4 mg tablet Take 1 tablet (4 mg total) by mouth every 8 (eight) hours as needed for nausea or vomiting 20 tablet 0   • oxyCODONE (ROXICODONE) 5 immediate release tablet Take 5 mg by mouth 3 (three) times a day as needed     • pantoprazole (PROTONIX) 40 mg tablet Take 1 tablet (40 mg total) by mouth 2 (two) times a day 60 tablet 5   • polyethylene glycol (GLYCOLAX) 17 GM/SCOOP powder Take 17 g by mouth 2 (two) times a day 765 g 5   • potassium chloride SA (KLOR-CON M15) 15 MEQ tablet Take 15 mEq by mouth 2 (two) times a day     • prazosin (MINIPRESS) 5 mg capsule      • pregabalin (LYRICA) 75 mg capsule Take 75 mg by mouth 2 (two) times a day     • rosuvastatin (CRESTOR) 20 MG tablet Take 1 tablet (20 mg total) by mouth daily (Patient not taking: Reported on 11/8/2023) 90 tablet 3   • Spiriva Respimat 2.5 MCG/ACT AERS inhaler  (Patient not taking: Reported on 11/8/2023)     • terconazole (TERAZOL 7) 0.4 % vaginal cream Insert 1 applicator into the vagina daily at bedtime 45 g 0   • varenicline (CHANTIX SALEEM) 0.5 MG X 11 & 1 MG X 42 tablet Take one 0.5 mg tablet by mouth once daily for 3 days, then one 0.5 mg tablet by mouth twice daily for 4 days, then one 1 mg tablet by mouth twice daily. (Patient not taking: Reported on 10/26/2023) 53 tablet 0   • zolpidem (AMBIEN) 10 mg tablet Take 10 mg by mouth daily at bedtime as needed for sleep       No current facility-administered medications for this visit.      Allergies   Allergen Reactions   • Lisinopril Cough   • Losartan Dizziness   • Other Hives     Surgical tape   • Prednisone Other (See Comments)     Thrush        Immunizations:     Immunization History   Administered Date(s) Administered   • Hep B, adult 05/20/2014, 04/01/2015   • INFLUENZA 09/29/2015   • Pneumococcal Polysaccharide PPV23 05/20/2014   • Tdap 05/20/2014   • Tuberculin Skin Test-PPD Intradermal 03/24/2017      Health Maintenance:         Topic Date Due   • Hepatitis C Screening  Never done   • HIV Screening  Never done   • Breast Cancer Screening: Mammogram  Never done   • Lung Cancer Screening  03/03/2024   • Colorectal Cancer Screening  02/23/2027         Topic Date Due   • COVID-19 Vaccine (1) Never done   • Hepatitis A Vaccine (1 of 2 - Risk 2-dose series) Never done   • Pneumococcal Vaccine: Pediatrics (0 to 5 Years) and At-Risk Patients (6 to 59 Years) (2 - PCV) 05/20/2015   • Hepatitis B Vaccine (3 of 3 - Risk 3-dose series) 05/27/2015   • Influenza Vaccine (1) 09/01/2023      Medicare Screening Tests and Risk Assessments:     Nirmala Rodríguez is here for her Initial Wellness visit. Health Risk Assessment:   Patient rates overall health as good. Patient feels that their physical health rating is same. Patient is satisfied with their life. Eyesight was rated as same. Hearing was rated as same. Patient feels that their emotional and mental health rating is same. Patients states they are sometimes angry. Patient states they are always unusually tired/fatigued. Pain experienced in the last 7 days has been a lot. Patient's pain rating has been 10/10. Patient states that she has experienced no weight loss or gain in last 6 months. Back pain and legs    Depression Screening:   PHQ-2 Score: 1      Fall Risk Screening: In the past year, patient has experienced: history of falling in past year    Number of falls: 1  Injured during fall?: Yes    Feels unsteady when standing or walking?: No    Worried about falling?: Yes      Urinary Incontinence Screening:   Patient has leaked urine accidently in the last six months. Home Safety:  Patient has trouble with stairs inside or outside of their home. Patient has working smoke alarms and has working carbon monoxide detector. Nutrition:   Current diet is Regular. Medications:   Patient is not currently taking any over-the-counter supplements. Patient is able to manage medications. Activities of Daily Living (ADLs)/Instrumental Activities of Daily Living (IADLs):   Walk and transfer into and out of bed and chair?: Yes  Dress and groom yourself?: Yes    Bathe or shower yourself?: Yes    Feed yourself?  Yes  Do your laundry/housekeeping?: Yes  Manage your money, pay your bills and track your expenses?: Yes  Make your own meals?: Yes    Do your own shopping?: Yes    Previous Hospitalizations:   Any hospitalizations or ED visits within the last 12 months?: Yes    How many hospitalizations have you had in the last year?: 1-2    Advance Care Planning:   Living will: Yes    Durable POA for healthcare: Yes    Advanced directive: Yes      Cognitive Screening:   Provider or family/friend/caregiver concerned regarding cognition?: No    PREVENTIVE SCREENINGS      Cardiovascular Screening:    General: Screening Not Indicated and History Lipid Disorder      Diabetes Screening:     General: Screening Not Indicated and History Diabetes      Colorectal Cancer Screening:     General: Screening Current      Breast Cancer Screening:     General: Screening Current      Cervical Cancer Screening:    General: Screening Not Indicated      Abdominal Aortic Aneurysm (AAA) Screening:        General: Screening Not Indicated      Lung Cancer Screening:     General: Screening Current      Hepatitis C Screening:    General: Screening Current    Screening, Brief Intervention, and Referral to Treatment (SBIRT)    Screening  Typical number of drinks in a day: 0  Typical number of drinks in a week: 0  Interpretation: Low risk drinking behavior. Review of Current Opioid Use    Opioid Risk Tool (ORT) Interpretation: Complete Opioid Risk Tool (ORT)    Other Counseling Topics:   Car/seat belt/driving safety, skin self-exam, sunscreen and regular weightbearing exercise and calcium and vitamin D intake. No results found. Physical Exam:     /62 (BP Location: Left arm, Patient Position: Sitting, Cuff Size: Adult)   Pulse 69   Temp (!) 96 °F (35.6 °C)   Ht 5' 1" (1.549 m)   Wt 65.3 kg (144 lb)   SpO2 94%   BMI 27.21 kg/m²     Physical Exam  Vitals and nursing note reviewed. Constitutional:       Appearance: Normal appearance. She is well-developed. HENT:      Head: Normocephalic and atraumatic.       Right Ear: Tympanic membrane, ear canal and external ear normal.      Left Ear: Tympanic membrane, ear canal and external ear normal.      Nose: Nose normal.      Mouth/Throat:      Mouth: Mucous membranes are moist.   Eyes:      General: Lids are normal. Vision grossly intact. Conjunctiva/sclera: Conjunctivae normal.      Pupils: Pupils are equal, round, and reactive to light. Cardiovascular:      Rate and Rhythm: Normal rate and regular rhythm. Pulses: Normal pulses. Heart sounds: Normal heart sounds, S1 normal and S2 normal.      No friction rub. No gallop. No S3 sounds. Pulmonary:      Effort: Pulmonary effort is normal.      Breath sounds: Normal breath sounds. Abdominal:      General: Bowel sounds are normal.      Palpations: Abdomen is soft. Tenderness: There is no abdominal tenderness. Genitourinary:     Comments: Deferred  Musculoskeletal:         General: Normal range of motion. Cervical back: Normal range of motion and neck supple. Right lower leg: No edema. Left lower leg: No edema. Lymphadenopathy:      Cervical: No cervical adenopathy. Skin:     General: Skin is warm and dry. Capillary Refill: Capillary refill takes less than 2 seconds. Neurological:      General: No focal deficit present. Mental Status: She is alert and oriented to person, place, and time. Motor: Motor function is intact. Gait: Gait is intact. Psychiatric:         Attention and Perception: Attention and perception normal.         Mood and Affect: Mood and affect normal.         Speech: Speech normal.         Behavior: Behavior normal. Behavior is cooperative. Thought Content:  Thought content normal.         Cognition and Memory: Cognition and memory normal.         Judgment: Judgment normal.        NEELAM Le

## 2023-12-06 NOTE — PATIENT INSTRUCTIONS
Medicare Preventive Visit Patient Instructions  Thank you for completing your Welcome to Medicare Visit or Medicare Annual Wellness Visit today. Your next wellness visit will be due in one year (12/6/2024). The screening/preventive services that you may require over the next 5-10 years are detailed below. Some tests may not apply to you based off risk factors and/or age. Screening tests ordered at today's visit but not completed yet may show as past due. Also, please note that scanned in results may not display below. Preventive Screenings:  Service Recommendations Previous Testing/Comments   Colorectal Cancer Screening  * Colonoscopy    * Fecal Occult Blood Test (FOBT)/Fecal Immunochemical Test (FIT)  * Fecal DNA/Cologuard Test  * Flexible Sigmoidoscopy Age: 43-73 years old   Colonoscopy: every 10 years (may be performed more frequently if at higher risk)  OR  FOBT/FIT: every 1 year  OR  Cologuard: every 3 years  OR  Sigmoidoscopy: every 5 years  Screening may be recommended earlier than age 39 if at higher risk for colorectal cancer. Also, an individualized decision between you and your healthcare provider will decide whether screening between the ages of 77-80 would be appropriate. Colonoscopy: 02/24/2022  FOBT/FIT: Not on file  Cologuard: Not on file  Sigmoidoscopy: Not on file    Screening Current     Breast Cancer Screening Age: 36 years old  Frequency: every 1-2 years  Not required if history of left and right mastectomy Mammogram: Not on file        Cervical Cancer Screening Between the ages of 21-29, pap smear recommended once every 3 years. Between the ages of 32-69, can perform pap smear with HPV co-testing every 5 years.    Recommendations may differ for women with a history of total hysterectomy, cervical cancer, or abnormal pap smears in past. Pap Smear: Not on file    Screening Not Indicated   Hepatitis C Screening Once for adults born between 1945 and 1965  More frequently in patients at high risk for Hepatitis C Hep C Antibody: Not on file        Diabetes Screening 1-2 times per year if you're at risk for diabetes or have pre-diabetes Fasting glucose: 328 mg/dL (11/2/2022)  A1C: 11.5 % (11/2/2022)  Screening Not Indicated  History Diabetes   Cholesterol Screening Once every 5 years if you don't have a lipid disorder. May order more often based on risk factors. Lipid panel: 11/02/2022    Screening Not Indicated  History Lipid Disorder     Other Preventive Screenings Covered by Medicare:  Abdominal Aortic Aneurysm (AAA) Screening: covered once if your at risk. You're considered to be at risk if you have a family history of AAA. Lung Cancer Screening: covers low dose CT scan once per year if you meet all of the following conditions: (1) Age 48-67; (2) No signs or symptoms of lung cancer; (3) Current smoker or have quit smoking within the last 15 years; (4) You have a tobacco smoking history of at least 20 pack years (packs per day multiplied by number of years you smoked); (5) You get a written order from a healthcare provider. Glaucoma Screening: covered annually if you're considered high risk: (1) You have diabetes OR (2) Family history of glaucoma OR (3)  aged 48 and older OR (3)  American aged 72 and older  Osteoporosis Screening: covered every 2 years if you meet one of the following conditions: (1) You're estrogen deficient and at risk for osteoporosis based off medical history and other findings; (2) Have a vertebral abnormality; (3) On glucocorticoid therapy for more than 3 months; (4) Have primary hyperparathyroidism; (5) On osteoporosis medications and need to assess response to drug therapy. Last bone density test (DXA Scan): Not on file. HIV Screening: covered annually if you're between the age of 14-79. Also covered annually if you are younger than 13 and older than 72 with risk factors for HIV infection.  For pregnant patients, it is covered up to 3 times per pregnancy. Immunizations:  Immunization Recommendations   Influenza Vaccine Annual influenza vaccination during flu season is recommended for all persons aged >= 6 months who do not have contraindications   Pneumococcal Vaccine   * Pneumococcal conjugate vaccine = PCV13 (Prevnar 13), PCV15 (Vaxneuvance), PCV20 (Prevnar 20)  * Pneumococcal polysaccharide vaccine = PPSV23 (Pneumovax) Adults 67-66 yo with certain risk factors or if 69+ yo  If never received any pneumonia vaccine: recommend Prevnar 20 (PCV20)  Give PCV20 if previously received 1 dose of PCV13 or PPSV23   Hepatitis B Vaccine 3 dose series if at intermediate or high risk (ex: diabetes, end stage renal disease, liver disease)   Respiratory syncytial virus (RSV) Vaccine - COVERED BY MEDICARE PART D  * RSVPreF3 (Arexvy) CDC recommends that adults 61years of age and older may receive a single dose of RSV vaccine using shared clinical decision-making (SCDM)   Tetanus (Td) Vaccine - COST NOT COVERED BY MEDICARE PART B Following completion of primary series, a booster dose should be given every 10 years to maintain immunity against tetanus. Td may also be given as tetanus wound prophylaxis. Tdap Vaccine - COST NOT COVERED BY MEDICARE PART B Recommended at least once for all adults. For pregnant patients, recommended with each pregnancy.    Shingles Vaccine (Shingrix) - COST NOT COVERED BY MEDICARE PART B  2 shot series recommended in those 19 years and older who have or will have weakened immune systems or those 50 years and older     Health Maintenance Due:      Topic Date Due   • Hepatitis C Screening  Never done   • HIV Screening  Never done   • Breast Cancer Screening: Mammogram  Never done   • Lung Cancer Screening  03/03/2024   • Colorectal Cancer Screening  02/23/2027     Immunizations Due:      Topic Date Due   • COVID-19 Vaccine (1) Never done   • Hepatitis A Vaccine (1 of 2 - Risk 2-dose series) Never done   • Pneumococcal Vaccine: Pediatrics (0 to 5 Years) and At-Risk Patients (6 to 59 Years) (2 - PCV) 05/20/2015   • Hepatitis B Vaccine (3 of 3 - Risk 3-dose series) 05/27/2015   • Influenza Vaccine (1) 09/01/2023     Advance Directives   What are advance directives? Advance directives are legal documents that state your wishes and plans for medical care. These plans are made ahead of time in case you lose your ability to make decisions for yourself. Advance directives can apply to any medical decision, such as the treatments you want, and if you want to donate organs. What are the types of advance directives? There are many types of advance directives, and each state has rules about how to use them. You may choose a combination of any of the following:  Living will: This is a written record of the treatment you want. You can also choose which treatments you do not want, which to limit, and which to stop at a certain time. This includes surgery, medicine, IV fluid, and tube feedings. Durable power of  for healthcare [facility]): This is a written record that states who you want to make healthcare choices for you when you are unable to make them for yourself. This person, called a proxy, is usually a family member or a friend. You may choose more than 1 proxy. Do not resuscitate (DNR) order:  A DNR order is used in case your heart stops beating or you stop breathing. It is a request not to have certain forms of treatment, such as CPR. A DNR order may be included in other types of advance directives. Medical directive: This covers the care that you want if you are in a coma, near death, or unable to make decisions for yourself. You can list the treatments you want for each condition. Treatment may include pain medicine, surgery, blood transfusions, dialysis, IV or tube feedings, and a ventilator (breathing machine). Values history: This document has questions about your views, beliefs, and how you feel and think about life.  This information can help others choose the care that you would choose. Why are advance directives important? An advance directive helps you control your care. Although spoken wishes may be used, it is better to have your wishes written down. Spoken wishes can be misunderstood, or not followed. Treatments may be given even if you do not want them. An advance directive may make it easier for your family to make difficult choices about your care. Urinary Incontinence   Urinary incontinence (UI)  is when you lose control of your bladder. UI develops because your bladder cannot store or empty urine properly. The 3 most common types of UI are stress incontinence, urge incontinence, or both. Medicines:   May be given to help strengthen your bladder control. Report any side effects of medication to your healthcare provider. Do pelvic muscle exercises often:  Your pelvic muscles help you stop urinating. Squeeze these muscles tight for 5 seconds, then relax for 5 seconds. Gradually work up to squeezing for 10 seconds. Do 3 sets of 15 repetitions a day, or as directed. This will help strengthen your pelvic muscles and improve bladder control. Train your bladder:  Go to the bathroom at set times, such as every 2 hours, even if you do not feel the urge to go. You can also try to hold your urine when you feel the urge to go. For example, hold your urine for 5 minutes when you feel the urge to go. As that becomes easier, hold your urine for 10 minutes. Self-care:   Keep a UI record. Write down how often you leak urine and how much you leak. Make a note of what you were doing when you leaked urine. Drink liquids as directed. You may need to limit the amount of liquid you drink to help control your urine leakage. Do not drink any liquid right before you go to bed. Limit or do not have drinks that contain caffeine or alcohol. Prevent constipation. Eat a variety of high-fiber foods.  Good examples are high-fiber cereals, beans, vegetables, and whole-grain breads. Walking is the best way to trigger your intestines to have a bowel movement. Exercise regularly and maintain a healthy weight. Weight loss and exercise will decrease pressure on your bladder and help you control your leakage. Use a catheter as directed  to help empty your bladder. A catheter is a tiny, plastic tube that is put into your bladder to drain your urine. Go to behavior therapy as directed. Behavior therapy may be used to help you learn to control your urge to urinate. Cigarette Smoking and Your Health   Risks to your health if you smoke:  Nicotine and other chemicals found in tobacco damage every cell in your body. Even if you are a light smoker, you have an increased risk for cancer, heart disease, and lung disease. If you are pregnant or have diabetes, smoking increases your risk for complications. Benefits to your health if you stop smoking: You decrease respiratory symptoms such as coughing, wheezing, and shortness of breath. You reduce your risk for cancers of the lung, mouth, throat, kidney, bladder, pancreas, stomach, and cervix. If you already have cancer, you increase the benefits of chemotherapy. You also reduce your risk for cancer returning or a second cancer from developing. You reduce your risk for heart disease, blood clots, heart attack, and stroke. You reduce your risk for lung infections, and diseases such as pneumonia, asthma, chronic bronchitis, and emphysema. Your circulation improves. More oxygen can be delivered to your body. If you have diabetes, you lower your risk for complications, such as kidney, artery, and eye diseases. You also lower your risk for nerve damage. Nerve damage can lead to amputations, poor vision, and blindness. You improve your body's ability to heal and to fight infections. For more information and support to stop smoking:   Smokefree. gov  Phone: 1- [place]  Web Address: www.smokefree. gov  Weight Management   Why it is important to manage your weight:  Being overweight increases your risk of health conditions such as heart disease, high blood pressure, type 2 diabetes, and certain types of cancer. It can also increase your risk for osteoarthritis, sleep apnea, and other respiratory problems. Aim for a slow, steady weight loss. Even a small amount of weight loss can lower your risk of health problems. How to lose weight safely:  A safe and healthy way to lose weight is to eat fewer calories and get regular exercise. You can lose up about 1 pound a week by decreasing the number of calories you eat by 500 calories each day. Healthy meal plan for weight management:  A healthy meal plan includes a variety of foods, contains fewer calories, and helps you stay healthy. A healthy meal plan includes the following:  Eat whole-grain foods more often. A healthy meal plan should contain fiber. Fiber is the part of grains, fruits, and vegetables that is not broken down by your body. Whole-grain foods are healthy and provide extra fiber in your diet. Some examples of whole-grain foods are whole-wheat breads and pastas, oatmeal, brown rice, and bulgur. Eat a variety of vegetables every day. Include dark, leafy greens such as spinach, kale, debra greens, and mustard greens. Eat yellow and orange vegetables such as carrots, sweet potatoes, and winter squash. Eat a variety of fruits every day. Choose fresh or canned fruit (canned in its own juice or light syrup) instead of juice. Fruit juice has very little or no fiber. Eat low-fat dairy foods. Drink fat-free (skim) milk or 1% milk. Eat fat-free yogurt and low-fat cottage cheese. Try low-fat cheeses such as mozzarella and other reduced-fat cheeses. Choose meat and other protein foods that are low in fat. Choose beans or other legumes such as split peas or lentils.  Choose fish, skinless poultry (chicken or turkey), or lean cuts of red meat (beef or pork). Before you cook meat or poultry, cut off any visible fat. Use less fat and oil. Try baking foods instead of frying them. Add less fat, such as margarine, sour cream, regular salad dressing and mayonnaise to foods. Eat fewer high-fat foods. Some examples of high-fat foods include french fries, doughnuts, ice cream, and cakes. Eat fewer sweets. Limit foods and drinks that are high in sugar. This includes candy, cookies, regular soda, and sweetened drinks. Exercise:  Exercise at least 30 minutes per day on most days of the week. Some examples of exercise include walking, biking, dancing, and swimming. You can also fit in more physical activity by taking the stairs instead of the elevator or parking farther away from stores. Ask your healthcare provider about the best exercise plan for you. Narcotic (Opioid) Safety    Use narcotics safely:  Take prescribed narcotics exactly as directed  Do not give narcotics to others or take narcotics that belong to someone else  Do not mix narcotics without medicines or alcohol  Do not drive or operate heavy machinery after you take the narcotic  Monitor for side effects and notify your healthcare provider if you experienced side effects such as nausea, sleepiness, itching, or trouble thinking clearly. Manage constipation:    Constipation is the most common side effect of narcotic medicine. Constipation is when you have hard, dry bowel movements, or you go longer than usual between bowel movements. Tell your healthcare provider about all changes in your bowel movements while you are taking narcotics. He or she may recommend laxative medicine to help you have a bowel movement. He or she may also change the kind of narcotic you are taking, or change when you take it. The following are more ways you can prevent or relieve constipation:    Drink liquids as directed. You may need to drink extra liquids to help soften and move your bowels.  Ask how much liquid to drink each day and which liquids are best for you. Eat high-fiber foods. This may help decrease constipation by adding bulk to your bowel movements. High-fiber foods include fruits, vegetables, whole-grain breads and cereals, and beans. Your healthcare provider or dietitian can help you create a high-fiber meal plan. Your provider may also recommend a fiber supplement if you cannot get enough fiber from food. Exercise regularly. Regular physical activity can help stimulate your intestines. Walking is a good exercise to prevent or relieve constipation. Ask which exercises are best for you. Schedule a time each day to have a bowel movement. This may help train your body to have regular bowel movements. Bend forward while you are on the toilet to help move the bowel movement out. Sit on the toilet for at least 10 minutes, even if you do not have a bowel movement. Store narcotics safely:   Store narcotics where others cannot easily get them. Keep them in a locked cabinet or secure area. Do not  keep them in a purse or other bag you carry with you. A person may be looking for something else and find the narcotics. Make sure narcotics are stored out of the reach of children. A child can easily overdose on narcotics. Narcotics may look like candy to a small child. The best way to dispose of narcotics: The laws vary by country and area. In the [facility], the best way is to return the narcotics through a take-back program. This program is offered by the [employer]). The following are options for using the program:  Take the narcotics to a [employer] collection site. The site is often a law enforcement center. Call your local law enforcement center for scheduled take-back days in your area. You will be given information on where to go if the collection site is in a different location.   Take the narcotics to an approved pharmacy or hospital.  A pharmacy or hospital may be set up as a collection site. You will need to ask if it is a [employer] collection site if you were not directed there. A pharmacy or doctor's office may not be able to take back narcotics unless it is a ALINE site. Use a mail-back system. This means you are given containers to put the narcotics into. You will then mail them in the containers. Use a take-back drop box. This is a place to leave the narcotics at any time. People and animals will not be able to get into the box. Your local law enforcement agency can tell you where to find a drop box in your area. Other ways to manage pain:   Ask your healthcare provider about non-narcotic medicines to control pain. Nonprescription medicines include NSAIDs (such as ibuprofen) and acetaminophen. Prescription medicines include muscle relaxers, antidepressants, and steroids. Pain may be managed without any medicines. Some ways to relieve pain include massage, aromatherapy, or meditation. Physical or occupational therapy may also help. For more information:   Drug Enforcement Administration  [address]  Phone: 7- 303 - 531-5199  Web Address: [facility].. [employer].gov/drug_disposal/    [address] [place] [address]  Phone: 4- 978 - 814-5342  Web Address: http://[facility]/     © Copyright [employer] 2018 Information is for End User's use only and may not be sold, redistributed or otherwise used for commercial purposes.  All illustrations and images included in CareNotes® are the copyrighted property of [employer].A[employer] Inc. or [address] [place] [address]

## 2023-12-07 ENCOUNTER — TELEPHONE (OUTPATIENT)
Dept: ADMINISTRATIVE | Facility: OTHER | Age: 58
End: 2023-12-07

## 2023-12-07 ENCOUNTER — TELEPHONE (OUTPATIENT)
Dept: ENDOCRINOLOGY | Facility: CLINIC | Age: 58
End: 2023-12-07

## 2023-12-07 NOTE — TELEPHONE ENCOUNTER
Upon review of the In Basket request and the patient's chart, initial outreach has been made via fax to facility. Please see Contacts section for details.      Thank you  Daisha Tomlinson

## 2023-12-07 NOTE — TELEPHONE ENCOUNTER
----- Message from Jania Hermosillo  sent at 12/6/2023  9:37 AM EST -----  Regarding: Mammogram  12/06/23 9:38 AM    Hello, our patient Roberto Figueroa has had Mammogram completed/performed. Please assist in updating the patient chart by making an External outreach to [facility] located in [place], [place]. The date of service is 10/30/2023.     Thank you,  Dk Chun  [facility] PRIMARY CARE -PCR positive  -Defer management to primary care services

## 2023-12-07 NOTE — LETTER
Procedure Request Form: Mammogram      Date Requested: 23  Patient: Darlin Field  Patient : [id number]   Referring Provider: Durward Pi, DO        Date of Procedure ______________________________       The above patient has informed us that they have completed their   most recent Mammogram at your facility. Please complete   this form and attach all corresponding procedure reports/results. Comments __________________________________________________________  ____________________________________________________________________  ____________________________________________________________________  ____________________________________________________________________    Facility Completing Procedure _________________________________________    Form Completed By (print name) _______________________________________      Signature __________________________________________________________      These reports are needed for  compliance. Please fax this completed form and a copy of the procedure report to our office located at [address] [place] [address] as soon as possible to Fax [phone] attention Ni Villarreal: Phone [phone]    We thank you for your assistance in treating our mutual patient.

## 2023-12-11 ENCOUNTER — VBI (OUTPATIENT)
Dept: ADMINISTRATIVE | Facility: OTHER | Age: 58
End: 2023-12-11

## 2023-12-11 NOTE — TELEPHONE ENCOUNTER
Upon review of the In Basket request we were able to locate, review, and update the patient chart as requested for Mammogram.    Any additional questions or concerns should be emailed to the Practice Liaisons via the appropriate education email address, please do not reply via In Basket.     Thank you  Jamal Arreaga

## 2023-12-12 ENCOUNTER — OFFICE VISIT (OUTPATIENT)
Dept: OBGYN CLINIC | Facility: CLINIC | Age: 58
End: 2023-12-12
Payer: COMMERCIAL

## 2023-12-12 VITALS
BODY MASS INDEX: 27.38 KG/M2 | WEIGHT: 145 LBS | DIASTOLIC BLOOD PRESSURE: 68 MMHG | SYSTOLIC BLOOD PRESSURE: 108 MMHG | HEIGHT: 61 IN

## 2023-12-12 DIAGNOSIS — R21 VULVAR RASH: Primary | ICD-10-CM

## 2023-12-12 PROCEDURE — 99213 OFFICE O/P EST LOW 20 MIN: CPT | Performed by: STUDENT IN AN ORGANIZED HEALTH CARE EDUCATION/TRAINING PROGRAM

## 2023-12-12 NOTE — ASSESSMENT & PLAN NOTE
Km Mills follows up today because she was diagnosed with a very inflamed vulvar yeast infection, and I recommended repeat examination and possible vulvar biopsy after treatment for yeast.  - Today on examination after treatment for yeast, her vulvar examination is completely normal with no suspicious lesions. Vulvar biopsy is not indicated today. - She should call or return to the office if her symptoms return.

## 2023-12-12 NOTE — PROGRESS NOTES
[facility] [address] [place] [address]    Assessment/Plan:  Chance Armendariz is a 66 y.o.  who follows up for a vulvar rash that has resolved since treatment of candidiasis. Vulvar rash  - Jhony Avelar follows up today because she was diagnosed with a very inflamed vulvar yeast infection, and I recommended repeat examination and possible vulvar biopsy after treatment for yeast.  - Today on examination after treatment for yeast, her vulvar examination is completely normal with no suspicious lesions. Vulvar biopsy is not indicated today. - She should call or return to the office if her symptoms return. Diagnoses and all orders for this visit:    Vulvar rash          Subjective:   Chance Armendariz is a 66 y.o.  female. CC: follow up    HPI: Jhony Avelar follows up after treatment of vulvar candidiasis. The rash appeared very inflammatory at the time so she was recommended to follow up for possible biopsy if the rash didn't clear up with antifungal.  She notes significant improvement. ROS: Review of Systems   Constitutional:  Negative for chills and fever. Respiratory:  Negative for cough and shortness of breath. Cardiovascular:  Negative for chest pain and leg swelling. Gastrointestinal:  Negative for abdominal pain, nausea and vomiting. Genitourinary:  Negative for dysuria, frequency and urgency. Neurological:  Negative for dizziness, light-headedness and headaches. PFSH: The following portions of the patient's history were reviewed and updated as appropriate: allergies, current medications, past family history, past medical history, obstetric history, gynecologic history, past social history, past surgical history and problem list.       Objective:  /68   Ht 5' 1" (1.549 m)   Wt 65.8 kg (145 lb)   BMI 27.40 kg/m²    Physical Exam  Constitutional:       Appearance: Normal appearance. HENT:      Head: Normocephalic and atraumatic.    Cardiovascular: Rate and Rhythm: Normal rate. Pulmonary:      Effort: Pulmonary effort is normal.   Abdominal:      General: There is no distension. Tenderness: There is no abdominal tenderness. There is no guarding. Genitourinary:     Exam position: Lithotomy position. Pubic Area: No rash. Labia:         Right: No rash, tenderness or lesion. Left: No rash, tenderness or lesion. Urethra: No prolapse, urethral swelling or urethral lesion. Vagina: No vaginal discharge, erythema, tenderness, bleeding or lesions. Cervix: No cervical motion tenderness, discharge, friability or erythema. Lymphadenopathy:      Lower Body: No right inguinal adenopathy. No left inguinal adenopathy. Neurological:      Mental Status: She is alert.            Preethi Fischer MD  [address] [address] [facility]  12/12/2023 8:57 AM

## 2023-12-18 LAB
LEFT EYE DIABETIC RETINOPATHY: NORMAL
RIGHT EYE DIABETIC RETINOPATHY: NORMAL
SEVERITY (EYE EXAM): NORMAL

## 2023-12-19 ENCOUNTER — EVALUATION (OUTPATIENT)
Dept: PHYSICAL THERAPY | Age: 58
End: 2023-12-19
Payer: COMMERCIAL

## 2023-12-19 DIAGNOSIS — M47.897 OTHER SPONDYLOSIS, LUMBOSACRAL REGION: Primary | ICD-10-CM

## 2023-12-19 DIAGNOSIS — M54.17 RADICULOPATHY, LUMBOSACRAL REGION: ICD-10-CM

## 2023-12-19 PROCEDURE — 97140 MANUAL THERAPY 1/> REGIONS: CPT | Performed by: PHYSICAL THERAPIST

## 2023-12-19 PROCEDURE — 97110 THERAPEUTIC EXERCISES: CPT | Performed by: PHYSICAL THERAPIST

## 2023-12-19 PROCEDURE — 97162 PT EVAL MOD COMPLEX 30 MIN: CPT | Performed by: PHYSICAL THERAPIST

## 2023-12-19 NOTE — LETTER
2023    Xenia Bo PA-C  [address] [id number]    Patient: Helena Newton   YOB: 1965   Date of Visit: 2023     Encounter Diagnosis     ICD-10-CM    1. Other spondylosis, lumbosacral region  M47.897       2. Radiculopathy, lumbosacral region  M54.17           Dear Dr. Bo:    Thank you for your recent referral of Helena Newton. Please review the attached evaluation summary from Helena's recent visit.     Please verify that you agree with the plan of care by signing the attached order.     If you have any questions or concerns, please do not hesitate to call.     I sincerely appreciate the opportunity to share in the care of one of your patients and hope to have another opportunity to work with you in the near future.       Sincerely,    Nicole Amado, PT      Referring Provider:      I certify that I have read the below Plan of Care and certify the need for these services furnished under this plan of treatment while under my care.                    Xenia Bo PA-C  [address] [address]  Via Fax: [phone]          PT Evaluation     Today's date: 2023  Patient name: Helena Newton  : 1965  MRN: [id number]  Referring provider: Xenia Bo PA-C  Dx:   Encounter Diagnosis     ICD-10-CM    1. Other spondylosis, lumbosacral region  M47.897       2. Radiculopathy, lumbosacral region  M54.17           Start Time: 1535  Stop Time: 1632  Total time in clinic (min): 57 minutes    Assessment  Assessment details: Helena Newton is a 58 y.o. female who presents with pain, decreased strength, and decreased ROM. Due to these impairments, Patient has difficulty performing a/iadls and recreational activities. Patient's clinical presentation is consistent with their referring diagnosis of chronic LBP, radiculapathy. Patient would benefit from skilled physical therapy to address their  aforementioned impairments, improve their level of function and to improve their overall quality of life.  Impairments: abnormal or restricted ROM, activity intolerance, impaired physical strength, lacks appropriate home exercise program, pain with function, poor posture  and poor body mechanics    Symptom irritability: moderateUnderstanding of Dx/Px/POC: good   Prognosis: good    Goals  ST-4 WEEKS  1.  Decrease LB pain < 7/10 on VAS at its worst.  2.  Increase ROM by > 5 deg in all deficients planes.  3.  Increase core strength by 1/2 MMT grade.  4. Increase tolerance to activity and pool therapy > 45 min.     LT-6 WEEKS  1. Patient to be independent with a/iadls.  2. Increase functional activities for leisure and home activities to previous LOF.  3. Independent with HEP and/or fitness program.  4. Improve ambulation with least AD on level surface for community and work distances.     Plan  Patient would benefit from: skilled physical therapy  Planned modality interventions: cryotherapy, thermotherapy: hydrocollator packs and unattended electrical stimulation  Planned therapy interventions: activity modification, behavior modification, body mechanics training, aquatic therapy, flexibility, functional ROM exercises, home exercise program, IADL retraining, joint mobilization, manual therapy, neuromuscular re-education, patient education, postural training, strengthening, stretching, therapeutic activities, therapeutic exercise and abdominal trunk stabilization  Frequency: 2-3x week.  Duration in weeks: 12  Plan of Care beginning date: 2023  Plan of Care expiration date: 3/19/2024  Treatment plan discussed with: patient        Subjective Evaluation    History of Present Illness  Mechanism of injury: Patient reports years of back pain and down BLE's. She states she tried land PT years ago without results and also had injections. She is referred for OPT. She states she did go into a pool recently and felt  better. She c/o burning in left buttocks today. L>R LE is worse.           Recurrent probem    Patient Goals  Patient goals for therapy: decreased pain, increased motion and increased strength  Patient goal: walk better  Pain  Current pain ratin  At worst pain ratin  Location: LB, LE's  Quality: sharp and radiating  Relieving factors: change in position  Progression: no change    Social Support  Steps to enter house: yes  1  Lives in: one-story house  Lives with: adult children and young children    Employment status: not working ([employer])    Diagnostic Tests    FCE comments: No recent tests. She is to have MRI in February/.      Objective     Static Posture     Head  Forward.    Shoulders  Rounded.    Thoracic Spine  Hyperkyphosis.    Comments  BP: 122/62  HR; 66    Postural Observations  Seated posture: fair  Standing posture: fair      Palpation   Left   Hypertonic in the erector spinae and lumbar paraspinals.     Right   Hypertonic in the erector spinae and lumbar paraspinals.     Tenderness     Lumbar Spine  Tenderness in the spinous process (L3-S1) and facet joint (L3-S1).     Left Hip   Tenderness in the PSIS.     Right Hip   Tenderness in the PSIS.     Neurological Testing     Sensation     Lumbar   Left   Intact: light touch    Right   Intact: light touch    Active Range of Motion     Lumbar   Flexion: Active lumbar flexion: tips to mid shin.  with pain Restriction level: minimal  Extension: 8 degrees  with pain Restriction level: minimal  Left lateral flexion:  WFL  Right lateral flexion:  WFL    Strength/Myotome Testing     Left Hip   Planes of Motion   Flexion: 4-  Extension: 4-  Abduction: 4  Adduction: 4+    Right Hip   Planes of Motion   Flexion: 4  Extension: 4  Abduction: 4  Adduction: 4+    Left Knee   Flexion: 4+  Extension: 5    Right Knee   Flexion: 4+  Extension: 5    Left Ankle/Foot   Dorsiflexion: 4+  Plantar flexion: 4+    Right Ankle/Foot   Dorsiflexion: 4+  Plantar flexion:  4+    Tests     Lumbar   Negative sacroiliac distraction.     Left   Negative passive SLR and slump test.     Right   Negative passive SLR and slump test.     Left Hip   Negative CASSIA and FADIR.     Right Hip   Negative CASSIA and FADIR.     Ambulation   Weight-Bearing Status   Assistive device used: none    Additional Weight-Bearing Status Details  Patient states she was using rollator but it broke.    Ambulation: Level Surfaces   Ambulation without assistive device: independent    Ambulation: Stairs   Pattern: non-reciprocal  Railings: two rails  Pattern: non-reciprocal  Railings: two rails    Observational Gait   Gait: antalgic   Decreased walking speed.     General Comments:    Lower quarter screen   Hips: unremarkable  Knees: unremarkable  Foot/ankle: unremarkable    Knee Comments  Patient c/o pain in knees and general LE's      Flowsheet Rows      Flowsheet Row Most Recent Value   PT/OT G-Codes    Current Score 31   Projected Score 46               POC expires Unit limit Auth Expiration date PT/OT + Visit Limit?   3/19/24 na 12/31/23 24 [facility]   3/19/24 na 2/29/24 10 [facility]                     Visit/Unit Tracking  AUTH Status:  Date 12/19              Pending needed after 1st Used 1              Needed after 10th visit Remaining  9               FOTO 31/46                   Precautions: DM, HTN, COPD, PTSD, MI 2016, RLS    Daily Treatment Diary:  Manuals 12/19            LB/DAREN's 8                                                   Neuro Re-Ed                                                                                                        Ther Ex             B heel slides 20x            Hip abduction 30x BTB            Hip adduction 30x ball                                                                             Ther Activity                                       Gait Training                                       Modalities

## 2023-12-19 NOTE — PROGRESS NOTES
PT Evaluation     Today's date: 2023  Patient name: Helena Newton  : 1965  MRN: [id number]  Referring provider: Xenia Bo PA-C  Dx:   Encounter Diagnosis     ICD-10-CM    1. Other spondylosis, lumbosacral region  M47.897       2. Radiculopathy, lumbosacral region  M54.17           Start Time: 1535  Stop Time: 1632  Total time in clinic (min): 57 minutes    Assessment  Assessment details: Helena Newton is a 58 y.o. female who presents with pain, decreased strength, and decreased ROM. Due to these impairments, Patient has difficulty performing a/iadls and recreational activities. Patient's clinical presentation is consistent with their referring diagnosis of chronic LBP, radiculapathy. Patient would benefit from skilled physical therapy to address their aforementioned impairments, improve their level of function and to improve their overall quality of life.  Impairments: abnormal or restricted ROM, activity intolerance, impaired physical strength, lacks appropriate home exercise program, pain with function, poor posture  and poor body mechanics    Symptom irritability: moderateUnderstanding of Dx/Px/POC: good   Prognosis: good    Goals  ST-4 WEEKS  1.  Decrease LB pain < 7/10 on VAS at its worst.  2.  Increase ROM by > 5 deg in all deficients planes.  3.  Increase core strength by 1/2 MMT grade.  4. Increase tolerance to activity and pool therapy > 45 min.     LT-6 WEEKS  1. Patient to be independent with a/iadls.  2. Increase functional activities for leisure and home activities to previous LOF.  3. Independent with HEP and/or fitness program.  4. Improve ambulation with least AD on level surface for community and work distances.     Plan  Patient would benefit from: skilled physical therapy  Planned modality interventions: cryotherapy, thermotherapy: hydrocollator packs and unattended electrical stimulation  Planned therapy interventions: activity modification, behavior modification,  body mechanics training, aquatic therapy, flexibility, functional ROM exercises, home exercise program, IADL retraining, joint mobilization, manual therapy, neuromuscular re-education, patient education, postural training, strengthening, stretching, therapeutic activities, therapeutic exercise and abdominal trunk stabilization  Frequency: 2-3x week.  Duration in weeks: 12  Plan of Care beginning date: 2023  Plan of Care expiration date: 3/19/2024  Treatment plan discussed with: patient        Subjective Evaluation    History of Present Illness  Mechanism of injury: Patient reports years of back pain and down BLE's. She states she tried land PT years ago without results and also had injections. She is referred for OPT. She states she did go into a pool recently and felt better. She c/o burning in left buttocks today. L>R LE is worse.           Recurrent probem    Patient Goals  Patient goals for therapy: decreased pain, increased motion and increased strength  Patient goal: walk better  Pain  Current pain ratin  At worst pain ratin  Location: LB, LE's  Quality: sharp and radiating  Relieving factors: change in position  Progression: no change    Social Support  Steps to enter house: yes  1  Lives in: one-story house  Lives with: adult children and young children    Employment status: not working ([employer])    Diagnostic Tests    FCE comments: No recent tests. She is to have MRI in .      Objective     Static Posture     Head  Forward.    Shoulders  Rounded.    Thoracic Spine  Hyperkyphosis.    Comments  BP: 122/62  HR; 66    Postural Observations  Seated posture: fair  Standing posture: fair      Palpation   Left   Hypertonic in the erector spinae and lumbar paraspinals.     Right   Hypertonic in the erector spinae and lumbar paraspinals.     Tenderness     Lumbar Spine  Tenderness in the spinous process (L3-S1) and facet joint (L3-S1).     Left Hip   Tenderness in the PSIS.     Right Hip    Tenderness in the PSIS.     Neurological Testing     Sensation     Lumbar   Left   Intact: light touch    Right   Intact: light touch    Active Range of Motion     Lumbar   Flexion: Active lumbar flexion: tips to mid shin.  with pain Restriction level: minimal  Extension: 8 degrees  with pain Restriction level: minimal  Left lateral flexion:  WFL  Right lateral flexion:  WFL    Strength/Myotome Testing     Left Hip   Planes of Motion   Flexion: 4-  Extension: 4-  Abduction: 4  Adduction: 4+    Right Hip   Planes of Motion   Flexion: 4  Extension: 4  Abduction: 4  Adduction: 4+    Left Knee   Flexion: 4+  Extension: 5    Right Knee   Flexion: 4+  Extension: 5    Left Ankle/Foot   Dorsiflexion: 4+  Plantar flexion: 4+    Right Ankle/Foot   Dorsiflexion: 4+  Plantar flexion: 4+    Tests     Lumbar   Negative sacroiliac distraction.     Left   Negative passive SLR and slump test.     Right   Negative passive SLR and slump test.     Left Hip   Negative CASSIA and FADIR.     Right Hip   Negative CASSIA and FADIR.     Ambulation   Weight-Bearing Status   Assistive device used: none    Additional Weight-Bearing Status Details  Patient states she was using rollator but it broke.    Ambulation: Level Surfaces   Ambulation without assistive device: independent    Ambulation: Stairs   Pattern: non-reciprocal  Railings: two rails  Pattern: non-reciprocal  Railings: two rails    Observational Gait   Gait: antalgic   Decreased walking speed.     General Comments:    Lower quarter screen   Hips: unremarkable  Knees: unremarkable  Foot/ankle: unremarkable    Knee Comments  Patient c/o pain in knees and general LE's      Flowsheet Rows      Flowsheet Row Most Recent Value   PT/OT G-Codes    Current Score 31   Projected Score 46               POC expires Unit limit Auth Expiration date PT/OT + Visit Limit?   3/19/24 na 12/31/23 24 [facility]   3/19/24 na 2/29/24 10 [facility]                     Visit/Unit Tracking  AUTH Status:  Date  12/19              Pending needed after 1st Used 1              Needed after 10th visit Remaining  9               FOTO 31/46                   Precautions: DM, HTN, COPD, PTSD, MI 2016, RLS    Daily Treatment Diary:  Manuals 12/19            RANJANA/Kofi 8                                                   Neuro Re-Ed                                                                                                        Ther Ex             B heel slides 20x            Hip abduction 30x BTB            Hip adduction 30x ball                                                                             Ther Activity                                       Gait Training                                       Modalities

## 2023-12-21 ENCOUNTER — OFFICE VISIT (OUTPATIENT)
Dept: PHYSICAL THERAPY | Age: 58
End: 2023-12-21
Payer: COMMERCIAL

## 2023-12-21 DIAGNOSIS — M47.897 OTHER SPONDYLOSIS, LUMBOSACRAL REGION: Primary | ICD-10-CM

## 2023-12-21 DIAGNOSIS — M54.17 RADICULOPATHY, LUMBOSACRAL REGION: ICD-10-CM

## 2023-12-21 PROCEDURE — 97113 AQUATIC THERAPY/EXERCISES: CPT

## 2023-12-21 NOTE — PROGRESS NOTES
Daily Note     Today's date: 2023  Patient name: Helena Newton  : 1965  MRN: [id number]  Referring provider: Xenia Bo PA-C  Dx:   Encounter Diagnosis     ICD-10-CM    1. Other spondylosis, lumbosacral region  M47.897       2. Radiculopathy, lumbosacral region  M54.17           Start Time: 1100  Stop Time: 1200  Total time in clinic (min): 60 minutes    Subjective: Patient reports that she is having increased pain in her LB/LE, c/o 9/10 pain this morning.       Objective: See treatment diary below      Assessment: Tolerated treatment fair with light ex program today, kept patient mainly in DW today due to increased pain in her LE/LB. Some relief with DWT and gabriella. Patient required verbal cueing throughout prescribed exercises for proper execution and dosage.     Patient demonstrated fatigue post treatment and would benefit from continued PT      Plan: Continue per plan of care. Progress as tolerated.      POC expires Unit limit Auth Expiration date PT/OT + Visit Limit?   3/19/24 na 23 24 [facility]   3/19/24 na 24 [facility]                     Visit/Unit Tracking  AUTH Status:  Date              Pending needed after 1st Used 1 2             Needed after 10th visit Remaining  9 8              FOTO                    Precautions: DM, HTN, COPD, PTSD, MI 2016, RLS    Daily Treatment Diary:    Precautions:  Daily Treatment Diary     Date           Patient education 10          Water Walk (FW, BW, side) x10’  5' fwd          LE work at wall               Hip FF/ext swing              Toe/heel  1            Hip ABD/ADD              Knee flexion & extension 1            Squats 1          UE noodle x3             Push/pull              Rotate              Row forward & back              OH side bend            UE/Core (AROM, paddles, MB)              ABD/ADD              Horizontal Pec Fly              Alt. arm swing (shld flex/ext)              Push  "pull              Single paddle rotation           SLS (EO, EC, ball toss)            Aqua Step (FW, lat, eccentric)            Core work:              MF press (red, blue, blk)             Kickboard press (blue, red)            KATHERINE 10x          Seated on bench             ankle DF/PF              March              ABD/ADD              Knee flexion/extension            DW TX - hang in the deep water  10' 10'            DW ABD/ADD              DW Bicycle  5'            DW Scissor hip flexion/extension              DW DKTC  10x          Stretches              HS at step              Wall stretches  10\"x4            Calf stretch at wall              Other:            Hot Tub - 10 minutes only  8'               "

## 2023-12-27 ENCOUNTER — APPOINTMENT (OUTPATIENT)
Dept: PHYSICAL THERAPY | Age: 58
End: 2023-12-27
Payer: COMMERCIAL

## 2023-12-28 ENCOUNTER — OFFICE VISIT (OUTPATIENT)
Dept: PHYSICAL THERAPY | Age: 58
End: 2023-12-28
Payer: COMMERCIAL

## 2023-12-28 DIAGNOSIS — M47.897 OTHER SPONDYLOSIS, LUMBOSACRAL REGION: Primary | ICD-10-CM

## 2023-12-28 DIAGNOSIS — M54.17 RADICULOPATHY, LUMBOSACRAL REGION: ICD-10-CM

## 2023-12-28 PROCEDURE — 97113 AQUATIC THERAPY/EXERCISES: CPT

## 2023-12-28 NOTE — PROGRESS NOTES
Daily Note     Today's date: 2023  Patient name: Helena Newton  : 1965  MRN: [id number]  Referring provider: Xenia Bo PA-C  Dx:   Encounter Diagnosis     ICD-10-CM    1. Other spondylosis, lumbosacral region  M47.897       2. Radiculopathy, lumbosacral region  M54.17           Start Time: 1500  Stop Time: 1600  Total time in clinic (min): 60 minutes    Subjective: patient reports she was sore after her last pool session, c/o increased LE/calf pain today.       Objective: See treatment diary below      Assessment: Tolerated treatment fairly well, some pain and discomfort in her LE throughout session. Able to tolerate more today, added noodle ex with a small noodle. Cues for ex technique.  Patient exhibited good technique with therapeutic exercises and would benefit from continued PT      Plan: Continue per plan of care.      POC expires Unit limit Auth Expiration date PT/OT + Visit Limit?   3/19/24 na 23 24 [facility]   3/19/24 na 24 10 [facility]                     Visit/Unit Tracking  AUTH Status:  Date             Pending needed after 1st Used 1 2 3            Needed after 10th visit Remaining  9 8 7             FOTO                    Precautions: DM, HTN, COPD, PTSD, MI 2016, RLS    Daily Treatment Diary:    Precautions:  Daily Treatment Diary     Date          Patient education 10 5         Water Walk (FW, BW, side) x10’  5' fwd 5' F/ 3' ss         LE work at wall               Hip FF/ext swing   2           Toe/heel  1 1           Hip ABD/ADD  1  1           Knee flexion & extension 1 1           Squats 1 1         UE noodle x3             Push/pull   1           Rotate   1           Row forward & back   2           OH side bend            UE/Core (AROM, paddles, MB)              ABD/ADD              Horizontal Pec Fly              Alt. arm swing (shld flex/ext)              Push pull              Single paddle rotation          "  SLS (EO, EC, ball toss)            Aqua Step (FW, lat, eccentric)            Core work:              MF press (red, blue, blk)             Kickboard press (blue, red)            KATHERINE 10x 10x         Seated on bench             ankle DF/PF              March              ABD/ADD              Knee flexion/extension            DW TX - hang in the deep water  10' 10' 10' 5'           DW ABD/ADD              DW Bicycle  5' 5'           DW Scissor hip flexion/extension              DW DKTC  10x 10x         Stretches              HS at step              Wall stretches  10\"x4 10\"x4           Calf stretch at wall              Other:            Hot Tub - 10 minutes only  8' 5'                   "

## 2023-12-29 ENCOUNTER — APPOINTMENT (OUTPATIENT)
Dept: PHYSICAL THERAPY | Age: 58
End: 2023-12-29
Payer: COMMERCIAL

## 2024-01-03 ENCOUNTER — APPOINTMENT (OUTPATIENT)
Dept: PHYSICAL THERAPY | Age: 59
End: 2024-01-03
Payer: COMMERCIAL

## 2024-01-03 DIAGNOSIS — K59.09 CHRONIC CONSTIPATION: ICD-10-CM

## 2024-01-03 RX ORDER — LACTULOSE 10 G/15ML
30 SOLUTION ORAL 2 TIMES DAILY
Qty: 946 ML | Refills: 1 | Status: SHIPPED | OUTPATIENT
Start: 2024-01-03

## 2024-01-03 NOTE — PROGRESS NOTES
Daily Note     Today's date: 1/3/2024  Patient name: Helena Newton  : 1965  MRN: [id number]  Referring provider: Xenia Bo PA-C  Dx:   Encounter Diagnosis     ICD-10-CM    1. Other spondylosis, lumbosacral region  M47.897       2. Radiculopathy, lumbosacral region  M54.17                      Subjective: ***      Objective: See treatment diary below      Assessment: Tolerated treatment {Tolerated treatment :2202956691}. Patient {assessment:3230865833}      Plan: {PLAN:9654252646}     POC expires Unit limit Auth Expiration date PT/OT + Visit Limit?   3/19/24 na 23 24 [facility]   3/19/24 na 24 10 [facility]                     Visit/Unit Tracking  AUTH Status:  Date             Pending needed after 1st Used 1 2 3            Needed after 10th visit Remaining  9 8 7             FOTO                    Precautions: DM, HTN, COPD, PTSD, MI 2016, RLS    Daily Treatment Diary:    Precautions:  Daily Treatment Diary     Date          Patient education 10 5         Water Walk (FW, BW, side) x10’  5' fwd 5' F/ 3' ss         LE work at wall               Hip FF/ext swing   2           Toe/heel  1 1           Hip ABD/ADD  1  1           Knee flexion & extension 1 1           Squats 1 1         UE noodle x3             Push/pull   1           Rotate   1           Row forward & back   2           OH side bend            UE/Core (AROM, paddles, MB)              ABD/ADD              Horizontal Pec Fly              Alt. arm swing (shld flex/ext)              Push pull              Single paddle rotation           SLS (EO, EC, ball toss)            Aqua Step (FW, lat, eccentric)            Core work:              MF press (red, blue, blk)             Kickboard press (blue, red)            KATHERINE 10x 10x         Seated on bench             ankle DF/PF              March              ABD/ADD              Knee flexion/extension            DW TX - hang in the deep  "water  10' 10' 10' 5'           DW ABD/ADD              DW Bicycle  5' 5'           DW Scissor hip flexion/extension              DW DKTC  10x 10x         Stretches              HS at step              Wall stretches  10\"x4 10\"x4           Calf stretch at wall              Other:            Hot Tub - 10 minutes only  8' 5'                     "

## 2024-01-08 ENCOUNTER — APPOINTMENT (OUTPATIENT)
Dept: PHYSICAL THERAPY | Age: 59
End: 2024-01-08
Payer: COMMERCIAL

## 2024-01-08 ENCOUNTER — OFFICE VISIT (OUTPATIENT)
Dept: ENDOCRINOLOGY | Facility: CLINIC | Age: 59
End: 2024-01-08
Payer: COMMERCIAL

## 2024-01-08 VITALS
HEART RATE: 67 BPM | BODY MASS INDEX: 26.24 KG/M2 | HEIGHT: 61 IN | OXYGEN SATURATION: 98 % | TEMPERATURE: 98.7 F | DIASTOLIC BLOOD PRESSURE: 60 MMHG | WEIGHT: 139 LBS | SYSTOLIC BLOOD PRESSURE: 90 MMHG

## 2024-01-08 DIAGNOSIS — K31.84 GASTROPARESIS DUE TO DM: ICD-10-CM

## 2024-01-08 DIAGNOSIS — E11.65 TYPE 2 DIABETES MELLITUS WITH HYPERGLYCEMIA, WITHOUT LONG-TERM CURRENT USE OF INSULIN (HCC): Primary | ICD-10-CM

## 2024-01-08 DIAGNOSIS — E11.65 HYPERGLYCEMIA DUE TO DIABETES MELLITUS (HCC): ICD-10-CM

## 2024-01-08 DIAGNOSIS — R80.9 MICROALBUMINURIA: ICD-10-CM

## 2024-01-08 DIAGNOSIS — E11.43 GASTROPARESIS DUE TO DM: ICD-10-CM

## 2024-01-08 DIAGNOSIS — E78.5 HYPERLIPIDEMIA, UNSPECIFIED HYPERLIPIDEMIA TYPE: ICD-10-CM

## 2024-01-08 DIAGNOSIS — I42.8 NON-ISCHEMIC CARDIOMYOPATHY (HCC): ICD-10-CM

## 2024-01-08 PROCEDURE — 99214 OFFICE O/P EST MOD 30 MIN: CPT | Performed by: STUDENT IN AN ORGANIZED HEALTH CARE EDUCATION/TRAINING PROGRAM

## 2024-01-08 RX ORDER — INSULIN ASPART 100 [IU]/ML
12 INJECTION, SOLUTION INTRAVENOUS; SUBCUTANEOUS
Qty: 33 ML | Refills: 0 | Status: SHIPPED | OUTPATIENT
Start: 2024-01-08 | End: 2024-04-07

## 2024-01-08 NOTE — PROGRESS NOTES
Established patient Progress Note      Cc: diabetes    Referring Provider  Dandy Ventura  [address]  [place] [address]     History of Present Illness:   Helena Newton is a 58 y.o. female with a history of type 2 diabetes with long term use of insulin who presented for f or a late follow up.  Last seen in the office in January 2023.       Reports complications of nephropathy and gastroparesis. Denies recent illness or hospitalizations. Denies recent severe hypoglycemic or severe hyperglycemic episodes. Denies any issues with her current regimen.     Current regimen:   Metformin 500 mg daily  Lantus 30 units occasionally   Not taking Novolog       Home blood glucose readings:   Before breakfast: in 200s  Before lunch: x  Before dinner: x  Bedtime: x     Hypoglycemic episodes:   H/o of hypoglycemia causing hospitalization or Intervention such as glucagon injection  or ambulance call : no  Has awareness: yes      Opthamology:  UTD;   Podiatry: no    Has hypertension: followed by PCP; not on ACE inhibitor/ARB, }  Has hyperlipidemia: followed by PCP; Crestor 20 mg daily tolerating well, no myalgias.    Thyroid disorders: no  History of pancreatitis: no    Patient Active Problem List   Diagnosis    Facet arthropathy, lumbosacral    Lumbar spondylosis    Chronic midline low back pain with bilateral sciatica    Status post robotic assisted total laparoscopic hysterectomy, bilateral salpingo-oophorectomy    Medical marijuana use    Other chronic pain    Gastroesophageal reflux disease with esophagitis    Bilious vomiting with nausea    Esophageal dysphagia    Gastroparesis due to DM     Fatty liver    Drug-induced constipation    Elevated liver enzymes    Hoarseness    LBBB (left bundle branch block)    Hyperlipidemia    Common migraine without aura    Type 2 diabetes mellitus with  hyperglycemia, without long-term current use of insulin ([facility])    Hyperglycemia due to diabetes mellitus ([facility])    Glucosuria    Overweight    COPD (chronic obstructive pulmonary disease) ([facility])    Tobacco use disorder, severe, dependence    Shortness of breath    Microalbuminuria    Vulvovaginitis due to yeast    Mixed stress and urge urinary incontinence    Vulvar rash      Past Medical History:   Diagnosis Date    Angina pectoris ([facility])     recent    Anxiety     Arthritis     Asthma     Carpal tunnel syndrome     Chronic headaches     COPD (chronic obstructive pulmonary disease) ([facility])     Diabetes ([facility])     Diabetes mellitus ([facility])     GERD (gastroesophageal reflux disease)     Headache     Hyperlipidemia     Hypertension     Kidney stone     Left bundle branch block     LBBB    MI (myocardial infarction) ([facility])     Myocardial infarction ([facility])     Neuropathy     PTSD (post-traumatic stress disorder)     RLS (restless legs syndrome)     Shortness of breath       Past Surgical History:   Procedure Laterality Date    ANGIOPLASTY      BREAST SURGERY  2016    Reduction    CARDIAC CATHETERIZATION  2018     Mid LAD: There was a tubular 40 % stenosis     SECTION      COLONOSCOPY      HYSTERECTOMY  2018    Complete    KIDNEY STONE SURGERY      LAPAROSCOPY      GA COLONOSCOPY FLX DX W/COLLJ SPEC WHEN PFRMD N/A 2017    Procedure: EGD AND COLONOSCOPY;  Surgeon: Wilberto Galeano MD;  Location: [facility];  Service: Gastroenterology    GA ESOPHAGOGASTRODUODENOSCOPY TRANSORAL DIAGNOSTIC N/A 10/10/2018    Procedure: ESOPHAGOGASTRODUODENOSCOPY (EGD);  Surgeon: Wilberto Galeano MD;  Location: [facility];  Service: Gastroenterology    GA LAPS TOTAL HYSTERECT 250 GM/< W/RMVL TUBE/OVARY N/A 2018    Procedure: ROBOTIC ASSISTED TOTAL LAPAROSCOPIC HYSTERECTOMY, BILATERAL SALPINGOOPHERECTOMY,;  Surgeon: Daron Valdez MD;  Location: [facility];  Service: Gynecology Oncology    UPPER GASTROINTESTINAL  ENDOSCOPY      WRIST SURGERY Right       Family History   Problem Relation Age of Onset    Diabetes Mother     Breast cancer Mother 45    Lung cancer Father 54    Diabetes Sister     Brain cancer Maternal Aunt 72    Breast cancer Other 25     Social History     Tobacco Use    Smoking status: Every Day     Current packs/day: 1.00     Average packs/day: 1 pack/day for 51.0 years (51.0 ttl pk-yrs)     Types: Cigarettes     Start date: 1/1/1973    Smokeless tobacco: Never    Tobacco comments:     pt. smoked this am 10/10   Substance Use Topics    Alcohol use: No     Comment: Rarely consumes alcohol - As per [facility]      Allergies   Allergen Reactions    Lisinopril Cough    Losartan Dizziness    Other Hives     Surgical tape    Prednisone Other (See Comments)     Thrush           Current Outpatient Medications:     Accu-Chek Guide test strip, use to CHECK BLOOD GLUCOSE four times a day, Disp: , Rfl:     albuterol (PROVENTIL HFA,VENTOLIN HFA) 90 mcg/act inhaler, Inhale 2 puffs every 6 (six) hours as needed for wheezing, Disp: , Rfl:     BD Pen Needle Dayna 2nd Gen 32G X 4 MM MISC, 4 time a day, Disp: 200 each, Rfl: 2    Bevespi Aerosphere 9-4.8 MCG/ACT inhaler, inhale 2 puffs by mouth every morning and 2 puffs at bedtime, Disp: , Rfl:     bisoprolol (ZEBETA) 5 mg tablet, Take 1 tablet (5 mg total) by mouth daily, Disp: 90 tablet, Rfl: 3    Blood Glucose Monitoring Suppl (OneTouch Verio Reflect) w/Device KIT, Check blood sugars twice daily. Please substitute with appropriate alternative as covered by patient's insurance. Dx: E11.65, Disp: 1 kit, Rfl: 0    buPROPion (WELLBUTRIN XL) 300 mg 24 hr tablet, Take 450 mg by mouth every morning, Disp: , Rfl:     clotrimazole-betamethasone (LOTRISONE) 1-0.05 % cream, Apply topically 2 (two) times a day, Disp: 30 g, Rfl: 0    Constulose 10 GM/15ML solution, take 30 milliliters by mouth twice a day, Disp: 946 mL, Rfl: 1    diclofenac (VOLTAREN) 75 mg EC tablet, Take 75 mg by mouth 2  (two) times a day as needed, Disp: , Rfl:     DULoxetine (CYMBALTA) 60 mg delayed release capsule, take 1 capsule by mouth every morning DO NOT CRUSH, CHEW, AND/OR DIVIDE, Disp: , Rfl:     glucose blood (OneTouch Verio) test strip, Check blood sugars twice daily. Please substitute with appropriate alternative as covered by patient's insurance. Dx: E11.65, Disp: 200 each, Rfl: 3    hydrOXYzine pamoate (VISTARIL) 25 mg capsule, take 1 capsule by mouth daily if needed for anxiety, Disp: , Rfl:     ibuprofen (MOTRIN) 200 mg tablet, Take 200 mg by mouth every 6 (six) hours as needed for mild pain, Disp: , Rfl:     insulin glargine (LANTUS SOLOSTAR) 100 units/mL injection pen, Inject 30 Units under the skin daily at bedtime, Disp: 15 mL, Rfl: 1    insulin lispro (HumaLOG) 100 units/mL injection, Inject 10 Units under the skin 3 (three) times a day with meals, Disp: 9 mL, Rfl: 2    metFORMIN (GLUCOPHAGE-XR) 500 mg 24 hr tablet, take 2 tablets by mouth twice a day with meals, Disp: 360 tablet, Rfl: 0    methocarbamol (ROBAXIN) 750 mg tablet, Take 500 mg by mouth every 6 (six) hours as needed for muscle spasms, Disp: , Rfl:     methylphenidate (RITALIN) 20 MG tablet, Take 20 mg by mouth every morning, Disp: , Rfl:     naproxen (NAPROSYN) 375 mg tablet, Take 1 tablet (375 mg total) by mouth 2 (two) times a day with meals, Disp: 20 tablet, Rfl: 0    nystatin-triamcinolone (MYCOLOG-II) ointment, Apply topically 2 (two) times a day, Disp: 30 g, Rfl: 5    ondansetron (ZOFRAN) 4 mg tablet, Take 1 tablet (4 mg total) by mouth every 8 (eight) hours as needed for nausea or vomiting, Disp: 20 tablet, Rfl: 0    OneTouch Delica Lancets 33G MISC, Check blood sugars twice daily. Please substitute with appropriate alternative as covered by patient's insurance. Dx: E11.65, Disp: 200 each, Rfl: 3    oxyCODONE (ROXICODONE) 5 immediate release tablet, Take 5 mg by mouth 3 (three) times a day as needed, Disp: , Rfl:     pantoprazole (PROTONIX)  40 mg tablet, Take 1 tablet (40 mg total) by mouth 2 (two) times a day, Disp: 60 tablet, Rfl: 5    polyethylene glycol (GLYCOLAX) 17 GM/SCOOP powder, Take 17 g by mouth 2 (two) times a day, Disp: 765 g, Rfl: 5    potassium chloride SA (KLOR-CON M15) 15 MEQ tablet, Take 15 mEq by mouth 2 (two) times a day, Disp: , Rfl:     prazosin (MINIPRESS) 5 mg capsule, , Disp: , Rfl:     pregabalin (LYRICA) 75 mg capsule, Take 75 mg by mouth 2 (two) times a day, Disp: , Rfl:     terconazole (TERAZOL 7) 0.4 % vaginal cream, Insert 1 applicator into the vagina daily at bedtime, Disp: 45 g, Rfl: 0    zolpidem (AMBIEN) 10 mg tablet, Take 10 mg by mouth daily at bedtime as needed for sleep, Disp: , Rfl:     mupirocin (BACTROBAN) 2 % ointment, apply topically to affected area three times a day for 14 days, Disp: , Rfl:     naloxone (NARCAN) 4 mg/0.1 mL nasal spray, ADMINISTER A SINGLE SPRAY OF NARCAN IN ONE NOSTRIL REPEAT AFTER 3 MINUTES IF NO OR MINIMAL RESPONSE (Patient not taking: Reported on 1/8/2024), Disp: , Rfl:     nitroglycerin (NITROSTAT) 0.4 mg SL tablet, Place 1 tablet (0.4 mg total) under the tongue every 5 (five) minutes as needed for chest pain (Patient not taking: Reported on 1/8/2024), Disp: 25 tablet, Rfl: 0    NovoLOG FlexPen 100 units/mL injection pen, Inject 10 Units under the skin daily before dinner (Patient not taking: Reported on 1/8/2024), Disp: 9 mL, Rfl: 0    rosuvastatin (CRESTOR) 20 MG tablet, Take 1 tablet (20 mg total) by mouth daily (Patient not taking: Reported on 11/8/2023), Disp: 90 tablet, Rfl: 3    Spiriva Respimat 2.5 MCG/ACT AERS inhaler, , Disp: , Rfl:     varenicline (CHANTIX SALEEM) 0.5 MG X 11 & 1 MG X 42 tablet, Take one 0.5 mg tablet by mouth once daily for 3 days, then one 0.5 mg tablet by mouth twice daily for 4 days, then one 1 mg tablet by mouth twice daily. (Patient not taking: Reported on 10/26/2023), Disp: 53 tablet, Rfl: 0  Review of Systems   Constitutional:  Negative for appetite  "change, fatigue and unexpected weight change.   HENT:  Negative for trouble swallowing and voice change.    Eyes:  Negative for visual disturbance.   Respiratory:  Negative for cough, shortness of breath and wheezing.    Cardiovascular:  Negative for palpitations and leg swelling.   Gastrointestinal:  Negative for abdominal pain, constipation, diarrhea, nausea and vomiting.   Endocrine: Negative for cold intolerance, heat intolerance, polyphagia and polyuria.   Musculoskeletal:  Negative for arthralgias.   Skin:  Negative for color change, rash and wound.   Neurological:  Negative for dizziness, tremors, weakness, light-headedness, numbness and headaches.   Psychiatric/Behavioral:  Negative for agitation and sleep disturbance. The patient is not nervous/anxious.        Physical Exam:  Body mass index is 27.4 kg/m².  Ht 5' 1\" (1.549 m)   BMI 27.40 kg/m²    Wt Readings from Last 3 Encounters:   12/12/23 65.8 kg (145 lb)   12/06/23 65.3 kg (144 lb)   11/14/23 64.9 kg (143 lb)       GEN: NAD  E/n/m nl facies,   Eyes: no stare or proptosis,   Neck: trachea midline, thyroid NT to palpation, nl in size, no nodules or neck masses noted,   CV; heart reg rate s1s2 nl, No Murmur   Resp: CTAB, good effort  Ab+BS  Neuro: no tremor,   Skin: warm and dry, no palmar erythema  Ext:  no edema bilaterally,   Psych: nl mood and affect, no gross lapses in memory      Labs:   No components found for: \"HA1C\"  No components found for: \"GLU\"    Lab Results   Component Value Date    CREATININE 0.63 12/06/2023    CREATININE 0.65 09/27/2023    CREATININE 0.74 03/03/2023    BUN 19 12/06/2023    K 4.4 12/06/2023    CL 97 12/06/2023    CO2 33 (H) 12/06/2023     eGFR   Date Value Ref Range Status   12/06/2023 100 ml/min/1.73sq m Final     No components found for: \"MALBCRER\"    Lab Results   Component Value Date    HDL 39 (L) 12/06/2023    TRIG 227 (H) 12/06/2023       Lab Results   Component Value Date    ALT 16 12/06/2023    AST 15 12/06/2023    " GGT 18 11/02/2022    ALKPHOS 94 12/06/2023     Component      Latest Ref Rng 12/6/2023   WBC      4.31 - 10.16 Thousand/uL 8.84    Red Blood Cell Count      3.81 - 5.12 Million/uL 5.68 (H)    Hemoglobin      11.5 - 15.4 g/dL 15.8 (H)    HCT      34.8 - 46.1 % 49.2 (H)    MCV      82 - 98 fL 87    MCH      26.8 - 34.3 pg 27.8    MCHC      31.4 - 37.4 g/dL 32.1    RDW      11.6 - 15.1 % 13.6    MPV      8.9 - 12.7 fL 10.6    Platelet Count      149 - 390 Thousands/uL 255    nRBC      /100 WBCs 0    Neutrophils %      43 - 75 % 60    Immat GRANS %      0 - 2 % 0    Lymphocytes Relative      14 - 44 % 29    Monocytes Relative      4 - 12 % 7    Eosinophils      0 - 6 % 3    Basophils Relative      0 - 1 % 1    Absolute Neutrophils      1.85 - 7.62 Thousands/µL 5.31    Immature Grans Absolute      0.00 - 0.20 Thousand/uL 0.02    Lymphocytes Absolute      0.60 - 4.47 Thousands/µL 2.58    Absolute Monocytes      0.17 - 1.22 Thousand/µL 0.59    Absolute Eosinophils      0.00 - 0.61 Thousand/µL 0.28    Basophils Absolute      0.00 - 0.10 Thousands/µL 0.06    Sodium      135 - 147 mmol/L 136    Potassium      3.5 - 5.3 mmol/L 4.4    Chloride      96 - 108 mmol/L 97    CO2      21 - 32 mmol/L 33 (H)    Anion Gap      mmol/L 6    BUN      5 - 25 mg/dL 19    Creatinine      0.60 - 1.30 mg/dL 0.63    GLUCOSE FASTING      65 - 99 mg/dL 340 (H)    Calcium      8.4 - 10.2 mg/dL 9.1    AST      13 - 39 U/L 15    ALT      7 - 52 U/L 16    Alkaline Phosphatase      34 - 104 U/L 94    Total Protein      6.4 - 8.4 g/dL 6.8    Albumin      3.5 - 5.0 g/dL 3.8    TOTAL BILIRUBIN      0.20 - 1.00 mg/dL 0.38    eGFR      ml/min/1.73sq m 100    Cholesterol      See Comment mg/dL 134    Triglycerides      See Comment mg/dL 227 (H)    HDL      >=50 mg/dL 39 (L)    LDL Calculated      0 - 100 mg/dL 50    EXT Creatinine Urine      Reference range not established. mg/dL 41.1    Albumin,U,Random      <20.0 mg/L 58.2 (H)    Albumin Creat Ratio      0  "- 30 mg/g creatinine 142 (H)    Hemoglobin A1C      Normal 4.0-5.6%; PreDiabetic 5.7-6.4%; Diabetic >=6.5%; Glycemic control for adults with diabetes <7.0% % 13.2 (H)    eAG, EST AVG Glucose      mg/dl 332    Vit D, 25-Hydroxy      30.0 - 100.0 ng/mL 26.8 (L)    HEPATITIS C ANTIBODY      Non-Reactive  Non-reactive    TSH 3RD GENERATON      0.450 - 4.500 uIU/mL 3.027       Legend:  (H) High  (L) Low    No results found for: \"TSH\", \"FREET4\", \"TSI\"    Impression:  1. Type 2 diabetes mellitus with hyperglycemia, without long-term current use of insulin (HCC)    2. Hyperglycemia due to diabetes mellitus (HCC)    3. Microalbuminuria           Plan:    Helena was seen today for diabetes type 2.    Diagnoses and all orders for this visit:    Type 2 diabetes mellitus with hyperglycemia, without long-term current use of insulin (HCC):  Poorly controlled. With A1C > 13%,  We discussed pathophysiology of type 2 diabetes and importance of glycemic control to avoid complications,   I had honest conversation with Helena if glycemic control remains poorly controlled, complications will develop.  Diet indiscretion and medication non complication are plying role.  Take Lantus 30 units at bedtime  Take NovoLog 15 units with carb heavy meals, and 12 units with low carb meals.  Take metformin 500 mg twice a day.  Counseled on adverse side effects of SGLT-2 inhibitors therapy including increased urinary frequency, risk of urinary tract infection, risk of yeast infection.  she understood these risks and wished to start therapy.  Jardiance 25 mg started.  I ordered avelina 3.  Return back in 3 months   Lab prior to next visit.     -     Ambulatory Referral to Endocrinology  -     insulin glargine (LANTUS SOLOSTAR) 100 units/mL injection pen; Inject 30 Units under the skin daily at bedtime  -     Empagliflozin 25 MG TABS; Take 1 tablet (25 mg total) by mouth every morning  -     NovoLOG FlexPen 100 units/mL injection pen; Inject 12 Units under " the skin 3 (three) times a day with meals    Hyperglycemia due to diabetes mellitus (HCC):  See plan for diabetes.    -     Ambulatory Referral to Endocrinology    Microalbuminuria:  Jardiance 25 mg daily.  -     Ambulatory Referral to Endocrinology    Hyperlipidemia, unspecified hyperlipidemia type  Continue Crestor.    Non-ischemic cardiomyopathy (HCC):  Jardiance 25 mg daily.  Cardiology follow up.    Discussed with the patient and all questioned fully answered. She will call me if any problems arise.    Counseled patient on diagnostic results, prognosis, risk and benefit of treatment options, instruction for management, importance of treatment compliance, Risk  factor reduction and impressions      Mary Bruce MD

## 2024-01-08 NOTE — PATIENT INSTRUCTIONS
Take Lantus 30 units at bedtime  Take NovoLog 15 units with carb heavy meals, and 12 units with low carb meals.  Take metformin 500 mg twice a day  We have started Jardiance 25 mg daily.  We are ordering avelina 3.

## 2024-01-08 NOTE — PRE-PROCEDURE INSTRUCTIONS
Pre-Surgery Instructions:   Medication Instructions    albuterol (PROVENTIL HFA,VENTOLIN HFA) 90 mcg/act inhaler Uses PRN- OK to take day of surgery    Bevespi Aerosphere 9-4.8 MCG/ACT inhaler Take day of surgery.    bisoprolol (ZEBETA) 5 mg tablet Take night before surgery    buPROPion (WELLBUTRIN XL) 300 mg 24 hr tablet Take night before surgery    clotrimazole-betamethasone (LOTRISONE) 1-0.05 % cream Uses PRN- OK to take day of surgery    Constulose 10 GM/15ML solution Uses PRN- DO NOT take day of surgery    diclofenac (VOLTAREN) 75 mg EC tablet Uses PRN- OK to take day of surgery    DULoxetine (CYMBALTA) 60 mg delayed release capsule Take night before surgery    hydrOXYzine pamoate (VISTARIL) 25 mg capsule Uses PRN- OK to take day of surgery    ibuprofen (MOTRIN) 200 mg tablet Uses PRN- OK to take day of surgery    insulin glargine (LANTUS SOLOSTAR) 100 units/mL injection pen Take 15 units at bedtime    insulin lispro (HumaLOG) 100 units/mL injection Hold day of surgery.    metFORMIN (GLUCOPHAGE-XR) 500 mg 24 hr tablet Hold day of surgery.    methocarbamol (ROBAXIN) 750 mg tablet Uses PRN- OK to take day of surgery    methylphenidate (RITALIN) 20 MG tablet Take night before surgery    mupirocin (BACTROBAN) 2 % ointment Uses PRN- OK to take day of surgery    naproxen (NAPROSYN) 375 mg tablet Uses PRN- OK to take day of surgery    nystatin-triamcinolone (MYCOLOG-II) ointment Uses PRN- OK to take day of surgery    ondansetron (ZOFRAN) 4 mg tablet Uses PRN- OK to take day of surgery    oxyCODONE (ROXICODONE) 5 immediate release tablet Uses PRN- OK to take day of surgery    pantoprazole (PROTONIX) 40 mg tablet Take night before surgery    polyethylene glycol (GLYCOLAX) 17 GM/SCOOP powder Uses PRN- DO NOT take day of surgery    potassium chloride SA (KLOR-CON M15) 15 MEQ tablet Hold day of surgery.    prazosin (MINIPRESS) 5 mg capsule Take night before surgery    pregabalin (LYRICA) 75 mg capsule Take night before  surgery    zolpidem (AMBIEN) 10 mg tablet Take night before surgery      The patient should have nothing to eat or drink after 11 pm the night before the MRI. The patient may take their medications with a sip of water at least 2 hours prior to their arrival time.  You will receive a call the evening before your MRI appointment with additional instructions.      Please leave your jewelry and valuables at home, wedding rings are the exception.   Please bring your physician order, insurance cards, a form of photo ID and a list of your medications with you. Arrive 15 minutes prior to your appointment time in order to register. Please bring any prior CT or MRI studies of this area that were not performed at a [facility].

## 2024-01-08 NOTE — PROGRESS NOTES
Daily Note     Today's date: 2024  Patient name: Helena Newton  : 1965  MRN: [id number]  Referring provider: Xenia Bo PA-C  Dx:   Encounter Diagnosis     ICD-10-CM    1. Other spondylosis, lumbosacral region  M47.897       2. Radiculopathy, lumbosacral region  M54.17                      Subjective: ***      Objective: See treatment diary below      Assessment: Tolerated treatment {Tolerated treatment :5608723626}. Patient {assessment:5656337363}      Plan: {PLAN:7245911018}     POC expires Unit limit Auth Expiration date PT/OT + Visit Limit?   3/19/24 na 23 24 [facility]   3/19/24 na 24 10 [facility]                     Visit/Unit Tracking  AUTH Status:  Date             Pending needed after 1st Used 1 2 3            Needed after 10th visit Remaining  9 8 7             FOTO                    Precautions: DM, HTN, COPD, PTSD, MI 2016, RLS    Daily Treatment Diary:    Precautions:  Daily Treatment Diary     Date          Patient education 10 5         Water Walk (FW, BW, side) x10’  5' fwd 5' F/ 3' ss         LE work at wall               Hip FF/ext swing   2           Toe/heel  1 1           Hip ABD/ADD  1  1           Knee flexion & extension 1 1           Squats 1 1         UE noodle x3             Push/pull   1           Rotate   1           Row forward & back   2           OH side bend            UE/Core (AROM, paddles, MB)              ABD/ADD              Horizontal Pec Fly              Alt. arm swing (shld flex/ext)              Push pull              Single paddle rotation           SLS (EO, EC, ball toss)            Aqua Step (FW, lat, eccentric)            Core work:              MF press (red, blue, blk)             Kickboard press (blue, red)            KATHERINE 10x 10x         Seated on bench             ankle DF/PF              March              ABD/ADD              Knee flexion/extension            DW TX - hang in the deep  "water  10' 10' 10' 5'           DW ABD/ADD              DW Bicycle  5' 5'           DW Scissor hip flexion/extension              DW DKTC  10x 10x         Stretches              HS at step              Wall stretches  10\"x4 10\"x4           Calf stretch at wall              Other:            Hot Tub - 10 minutes only  8' 5'                       "

## 2024-01-15 ENCOUNTER — APPOINTMENT (OUTPATIENT)
Dept: PHYSICAL THERAPY | Age: 59
End: 2024-01-15
Payer: COMMERCIAL

## 2024-01-22 ENCOUNTER — APPOINTMENT (OUTPATIENT)
Dept: PHYSICAL THERAPY | Age: 59
End: 2024-01-22
Payer: COMMERCIAL

## 2024-01-25 ENCOUNTER — APPOINTMENT (OUTPATIENT)
Dept: PHYSICAL THERAPY | Age: 59
End: 2024-01-25
Payer: COMMERCIAL

## 2024-01-30 ENCOUNTER — RA CDI HCC (OUTPATIENT)
Dept: OTHER | Facility: HOSPITAL | Age: 59
End: 2024-01-30

## 2024-02-01 ENCOUNTER — EVALUATION (OUTPATIENT)
Dept: PHYSICAL THERAPY | Age: 59
End: 2024-02-01
Payer: COMMERCIAL

## 2024-02-01 DIAGNOSIS — M47.897 OTHER OSTEOARTHRITIS OF SPINE, LUMBOSACRAL REGION: Primary | ICD-10-CM

## 2024-02-01 DIAGNOSIS — M54.17 LUMBOSACRAL RADICULOPATHY: ICD-10-CM

## 2024-02-01 PROCEDURE — 97140 MANUAL THERAPY 1/> REGIONS: CPT | Performed by: PHYSICAL THERAPIST

## 2024-02-01 PROCEDURE — 97164 PT RE-EVAL EST PLAN CARE: CPT | Performed by: PHYSICAL THERAPIST

## 2024-02-01 PROCEDURE — 97110 THERAPEUTIC EXERCISES: CPT | Performed by: PHYSICAL THERAPIST

## 2024-02-01 NOTE — PROGRESS NOTES
PT Re-Evaluation     Today's date: 2024  Patient name: Helena Newton  : 1965  MRN: [id number]  Referring provider: Xenia Bo PA-C  Dx:   Encounter Diagnosis     ICD-10-CM    1. Other osteoarthritis of spine, lumbosacral region  M47.897       2. Lumbosacral radiculopathy  M54.17           Start Time: 1620  Stop Time: 1705  Total time in clinic (min): 45 minutes    Assessment  Assessment details: Helena Newton is a 58 y.o. female who presents with pain, decreased strength, and decreased ROM. Due to these impairments, Patient has difficulty performing a/iadls and recreational activities. Patient's clinical presentation is consistent with their referring diagnosis of chronic LBP, radiculapathy. Patient returns to PT today after a month away from PT she states due to being sick and holidays. She c/o increased pain with burning in LE's. Recent injection in left hip without results yet. Patient would benefit from skilled physical therapy to address their aforementioned impairments, improve their level of function and to improve their overall quality of life.  Impairments: abnormal or restricted ROM, activity intolerance, impaired physical strength, lacks appropriate home exercise program, pain with function, poor posture  and poor body mechanics    Symptom irritability: moderateUnderstanding of Dx/Px/POC: good   Prognosis: good    Goals  ST-4 WEEKS  1.  Decrease LB pain < 7/10 on VAS at its worst. Goal not met. Ongoing goal.   2.  Increase ROM by > 5 deg in all deficients planes. Goal not met. Ongoing goal.   3.  Increase core strength by 1/2 MMT grade. Goal not met. Ongoing goal.   4. Increase tolerance to activity and pool therapy > 45 min. Progressing towards. Ongoing goal.     LT-6 WEEKS  1. Patient to be independent with a/iadls. Ongoing goal.   2. Increase functional activities for leisure and home activities to previous LOF. Ongoing goal.   3. Independent with HEP and/or fitness program.  Ongoing goal.   4. Improve ambulation with least AD on level surface for community and work distances. Goal not met. Ongoing goal.     Plan  Patient would benefit from: skilled physical therapy  Planned modality interventions: cryotherapy, thermotherapy: hydrocollator packs and unattended electrical stimulation  Planned therapy interventions: activity modification, behavior modification, body mechanics training, aquatic therapy, flexibility, functional ROM exercises, home exercise program, IADL retraining, joint mobilization, manual therapy, neuromuscular re-education, patient education, postural training, strengthening, stretching, therapeutic activities, therapeutic exercise and abdominal trunk stabilization  Frequency: 2-3x week.  Duration in weeks: 6  Plan of Care beginning date: 2023  Plan of Care expiration date: 3/19/2024  Treatment plan discussed with: patient      Subjective Evaluation    History of Present Illness  Mechanism of injury: Patient reports years of back pain and down BLE's. She states she tried land PT years ago without results and also had injections. She is referred for OPT. She states she did go into a pool recently and felt better. She c/o burning in left buttocks today. L>R LE is worse.   24: Patient reports she was sick and didn't come to PT. Then the holidays and she is busy helping others. She comes back to PT in hopes to return into the pool. Patient states she saw MD last week and got an injection in left hip and didn't help. She c/o burning in both calf's and feet, L>R. Buckling in left knee lately also.           Recurrent probem    Patient Goals  Patient goals for therapy: decreased pain, increased motion and increased strength  Patient goal: walk better  Pain  Current pain ratin  At best pain ratin  At worst pain rating: 10  Location: LB, LE's  Quality: sharp and radiating  Relieving factors: change in position  Progression: no change    Social Support  Steps to  enter house: yes  1  Lives in: one-story house  Lives with: adult children and young children    Employment status: not working ([employer])    Diagnostic Tests    FCE comments: No recent tests. She is to have MRI in February/pt.      Objective     Static Posture     Head  Forward.    Shoulders  Rounded.    Thoracic Spine  Hyperkyphosis.    Comments  BP: 122/62  HR; 66    Postural Observations  Seated posture: fair  Standing posture: fair      Palpation   Left   Hypertonic in the erector spinae and lumbar paraspinals.   Tenderness of the erector spinae and lumbar paraspinals.     Right   Hypertonic in the erector spinae and lumbar paraspinals.   Tenderness of the erector spinae and lumbar paraspinals.     Tenderness     Lumbar Spine  Tenderness in the spinous process (L3-S1) and facet joint (L3-S1).     Left Hip   Tenderness in the PSIS.     Right Hip   Tenderness in the PSIS.     Neurological Testing     Sensation     Lumbar   Left   Intact: light touch    Right   Intact: light touch    Active Range of Motion     Lumbar   Flexion: Active lumbar flexion: tips to mid shin.  with pain Restriction level: minimal  Extension: 8 degrees  with pain Restriction level: minimal  Left lateral flexion:  WFL  Right lateral flexion:  WFL    Strength/Myotome Testing     Left Hip   Planes of Motion   Flexion: 4-  Extension: 4-  Abduction: 4  Adduction: 4+    Right Hip   Planes of Motion   Flexion: 4  Extension: 4  Abduction: 4  Adduction: 4+    Left Knee   Flexion: 4+  Extension: 5    Right Knee   Flexion: 4+  Extension: 5    Left Ankle/Foot   Dorsiflexion: 4+  Plantar flexion: 4+    Right Ankle/Foot   Dorsiflexion: 4+  Plantar flexion: 4+    Tests     Lumbar   Negative sacroiliac distraction.     Left   Positive passive SLR.   Negative slump test.     Right   Positive passive SLR.   Negative slump test.     Left Hip   Negative CASSIA and FADIR.     Right Hip   Positive CASSIA.   Negative FADIR.     Additional Tests Details  Pain in left  hip with testing and ROM today.    Ambulation   Weight-Bearing Status   Assistive device used: none    Additional Weight-Bearing Status Details  Patient states she was using rollator but it broke.    Ambulation: Level Surfaces   Ambulation without assistive device: independent    Ambulation: Stairs   Pattern: non-reciprocal  Railings: two rails  Pattern: non-reciprocal  Railings: two rails    Observational Gait   Gait: antalgic   Decreased walking speed.     General Comments:    Lower quarter screen   Hips: unremarkable  Knees: unremarkable  Foot/ankle: unremarkable    Hip Comments   Pain left hip with ROM today but WFL    Knee Comments  Patient c/o pain in knees and general LE's but WFL             POC expires Unit limit Auth Expiration date PT/OT + Visit Limit?   3/19/24 na 12/31/23 24 [email or web address]   3/19/24 na 2/29/24 [facility]                     Visit/Unit Tracking  AUTH Status:  Date 12/19 2/21 12/28 2/1            Used 1 2 3 4           Needed after 10th visit Remaining  9 8 7 6            FOTO 31/46                   Precautions: DM, HTN, COPD, PTSD, MI 2016, RLS    Daily Treatment Diary:  Manuals 12/19 2/1           LB/LE's 8 8                                                  Neuro Re-Ed                                                                                                        Ther Ex             B heel slides 20x 30x           Hip abduction 30x BTB 30x BTB           Hip adduction 30x ball 30x ball                                                                             Ther Activity                                       Gait Training                                       Modalities

## 2024-02-01 NOTE — LETTER
2024    Xenia Bo PA-C  [address] [id number]    Patient: Helena Newton   YOB: 1965   Date of Visit: 2024     Encounter Diagnosis     ICD-10-CM    1. Other osteoarthritis of spine, lumbosacral region  M47.897       2. Lumbosacral radiculopathy  M54.17           Dear Dr. Bo:    Thank you for your recent referral of Helena Newton. Please review the attached evaluation summary from Helena's recent visit.     Please verify that you agree with the plan of care by signing the attached order.     If you have any questions or concerns, please do not hesitate to call.     I sincerely appreciate the opportunity to share in the care of one of your patients and hope to have another opportunity to work with you in the near future.       Sincerely,    Nicole Amado, PT      Referring Provider:      I certify that I have read the below Plan of Care and certify the need for these services furnished under this plan of treatment while under my care.                    Xenia Bo PA-C  [address] [address]  Via Fax: [phone]          PT Re-Evaluation     Today's date: 2024  Patient name: Helena Newton  : 1965  MRN: [id number]  Referring provider: Xenia Bo PA-C  Dx:   Encounter Diagnosis     ICD-10-CM    1. Other osteoarthritis of spine, lumbosacral region  M47.897       2. Lumbosacral radiculopathy  M54.17           Start Time: 1620  Stop Time: 1705  Total time in clinic (min): 45 minutes    Assessment  Assessment details: Helena Newton is a 58 y.o. female who presents with pain, decreased strength, and decreased ROM. Due to these impairments, Patient has difficulty performing a/iadls and recreational activities. Patient's clinical presentation is consistent with their referring diagnosis of chronic LBP, radiculapathy. Patient returns to PT today after a month away from PT she states due to  being sick and holidays. She c/o increased pain with burning in LE's. Recent injection in left hip without results yet. Patient would benefit from skilled physical therapy to address their aforementioned impairments, improve their level of function and to improve their overall quality of life.  Impairments: abnormal or restricted ROM, activity intolerance, impaired physical strength, lacks appropriate home exercise program, pain with function, poor posture  and poor body mechanics    Symptom irritability: moderateUnderstanding of Dx/Px/POC: good   Prognosis: good    Goals  ST-4 WEEKS  1.  Decrease LB pain < 7/10 on VAS at its worst. Goal not met. Ongoing goal.   2.  Increase ROM by > 5 deg in all deficients planes. Goal not met. Ongoing goal.   3.  Increase core strength by 1/2 MMT grade. Goal not met. Ongoing goal.   4. Increase tolerance to activity and pool therapy > 45 min. Progressing towards. Ongoing goal.     LT-6 WEEKS  1. Patient to be independent with a/iadls. Ongoing goal.   2. Increase functional activities for leisure and home activities to previous LOF. Ongoing goal.   3. Independent with HEP and/or fitness program. Ongoing goal.   4. Improve ambulation with least AD on level surface for community and work distances. Goal not met. Ongoing goal.     Plan  Patient would benefit from: skilled physical therapy  Planned modality interventions: cryotherapy, thermotherapy: hydrocollator packs and unattended electrical stimulation  Planned therapy interventions: activity modification, behavior modification, body mechanics training, aquatic therapy, flexibility, functional ROM exercises, home exercise program, IADL retraining, joint mobilization, manual therapy, neuromuscular re-education, patient education, postural training, strengthening, stretching, therapeutic activities, therapeutic exercise and abdominal trunk stabilization  Frequency: 2-3x week.  Duration in weeks: 6  Plan of Care beginning  date: 2023  Plan of Care expiration date: 3/19/2024  Treatment plan discussed with: patient      Subjective Evaluation    History of Present Illness  Mechanism of injury: Patient reports years of back pain and down BLE's. She states she tried land PT years ago without results and also had injections. She is referred for OPT. She states she did go into a pool recently and felt better. She c/o burning in left buttocks today. L>R LE is worse.   24: Patient reports she was sick and didn't come to PT. Then the holidays and she is busy helping others. She comes back to PT in hopes to return into the pool. Patient states she saw MD last week and got an injection in left hip and didn't help. She c/o burning in both calf's and feet, L>R. Buckling in left knee lately also.           Recurrent probem    Patient Goals  Patient goals for therapy: decreased pain, increased motion and increased strength  Patient goal: walk better  Pain  Current pain ratin  At best pain ratin  At worst pain rating: 10  Location: LB, LE's  Quality: sharp and radiating  Relieving factors: change in position  Progression: no change    Social Support  Steps to enter house: yes  1  Lives in: one-story house  Lives with: adult children and young children    Employment status: not working ([employer])    Diagnostic Tests    FCE comments: No recent tests. She is to have MRI in .      Objective     Static Posture     Head  Forward.    Shoulders  Rounded.    Thoracic Spine  Hyperkyphosis.    Comments  BP: 122/62  HR; 66    Postural Observations  Seated posture: fair  Standing posture: fair      Palpation   Left   Hypertonic in the erector spinae and lumbar paraspinals.   Tenderness of the erector spinae and lumbar paraspinals.     Right   Hypertonic in the erector spinae and lumbar paraspinals.   Tenderness of the erector spinae and lumbar paraspinals.     Tenderness     Lumbar Spine  Tenderness in the spinous process (L3-S1) and facet  joint (L3-S1).     Left Hip   Tenderness in the PSIS.     Right Hip   Tenderness in the PSIS.     Neurological Testing     Sensation     Lumbar   Left   Intact: light touch    Right   Intact: light touch    Active Range of Motion     Lumbar   Flexion: Active lumbar flexion: tips to mid shin.  with pain Restriction level: minimal  Extension: 8 degrees  with pain Restriction level: minimal  Left lateral flexion:  WFL  Right lateral flexion:  WFL    Strength/Myotome Testing     Left Hip   Planes of Motion   Flexion: 4-  Extension: 4-  Abduction: 4  Adduction: 4+    Right Hip   Planes of Motion   Flexion: 4  Extension: 4  Abduction: 4  Adduction: 4+    Left Knee   Flexion: 4+  Extension: 5    Right Knee   Flexion: 4+  Extension: 5    Left Ankle/Foot   Dorsiflexion: 4+  Plantar flexion: 4+    Right Ankle/Foot   Dorsiflexion: 4+  Plantar flexion: 4+    Tests     Lumbar   Negative sacroiliac distraction.     Left   Positive passive SLR.   Negative slump test.     Right   Positive passive SLR.   Negative slump test.     Left Hip   Negative CASSIA and FADIR.     Right Hip   Positive CASSIA.   Negative FADIR.     Additional Tests Details  Pain in left hip with testing and ROM today.    Ambulation   Weight-Bearing Status   Assistive device used: none    Additional Weight-Bearing Status Details  Patient states she was using rollator but it broke.    Ambulation: Level Surfaces   Ambulation without assistive device: independent    Ambulation: Stairs   Pattern: non-reciprocal  Railings: two rails  Pattern: non-reciprocal  Railings: two rails    Observational Gait   Gait: antalgic   Decreased walking speed.     General Comments:    Lower quarter screen   Hips: unremarkable  Knees: unremarkable  Foot/ankle: unremarkable    Hip Comments   Pain left hip with ROM today but WFL    Knee Comments  Patient c/o pain in knees and general LE's but WFL             POC expires Unit limit Auth Expiration date PT/OT + Visit Limit?   3/19/24 na  12/31/23 24 [email or web address]   3/19/24 na 2/29/24 [facility]                     Visit/Unit Tracking  AUTH Status:  Date 12/19 2/21 12/28 2/1            Used 1 2 3 4           Needed after 10th visit Remaining  9 8 7 6            FOTO 31/46                   Precautions: DM, HTN, COPD, PTSD, MI 2016, RLS    Daily Treatment Diary:  Manuals 12/19 2/1           LB/LE's 8 8                                                  Neuro Re-Ed                                                                                                        Ther Ex             B heel slides 20x 30x           Hip abduction 30x BTB 30x BTB           Hip adduction 30x ball 30x ball                                                                             Ther Activity                                       Gait Training                                       Modalities

## 2024-02-05 ENCOUNTER — TELEPHONE (OUTPATIENT)
Dept: ADMINISTRATIVE | Facility: OTHER | Age: 59
End: 2024-02-05

## 2024-02-05 ENCOUNTER — APPOINTMENT (OUTPATIENT)
Dept: RADIOLOGY | Facility: CLINIC | Age: 59
End: 2024-02-05
Payer: COMMERCIAL

## 2024-02-05 ENCOUNTER — APPOINTMENT (OUTPATIENT)
Dept: PHYSICAL THERAPY | Age: 59
End: 2024-02-05
Payer: COMMERCIAL

## 2024-02-05 ENCOUNTER — CONSULT (OUTPATIENT)
Dept: FAMILY MEDICINE CLINIC | Facility: CLINIC | Age: 59
End: 2024-02-05
Payer: COMMERCIAL

## 2024-02-05 VITALS
HEART RATE: 56 BPM | TEMPERATURE: 97.9 F | DIASTOLIC BLOOD PRESSURE: 64 MMHG | BODY MASS INDEX: 27.6 KG/M2 | OXYGEN SATURATION: 95 % | HEIGHT: 61 IN | WEIGHT: 146.2 LBS | SYSTOLIC BLOOD PRESSURE: 110 MMHG

## 2024-02-05 DIAGNOSIS — M54.42 CHRONIC MIDLINE LOW BACK PAIN WITH BILATERAL SCIATICA: ICD-10-CM

## 2024-02-05 DIAGNOSIS — M54.41 CHRONIC MIDLINE LOW BACK PAIN WITH BILATERAL SCIATICA: ICD-10-CM

## 2024-02-05 DIAGNOSIS — E11.65 TYPE 2 DIABETES MELLITUS WITH HYPERGLYCEMIA, WITHOUT LONG-TERM CURRENT USE OF INSULIN (HCC): ICD-10-CM

## 2024-02-05 DIAGNOSIS — J44.9 CHRONIC OBSTRUCTIVE PULMONARY DISEASE, UNSPECIFIED COPD TYPE (HCC): ICD-10-CM

## 2024-02-05 DIAGNOSIS — M47.816 LUMBAR SPONDYLOSIS: ICD-10-CM

## 2024-02-05 DIAGNOSIS — I42.8 NON-ISCHEMIC CARDIOMYOPATHY (HCC): ICD-10-CM

## 2024-02-05 DIAGNOSIS — Z01.818 PRE-OP EXAM: ICD-10-CM

## 2024-02-05 DIAGNOSIS — Z01.818 PRE-OP EXAM: Primary | ICD-10-CM

## 2024-02-05 DIAGNOSIS — G89.29 CHRONIC MIDLINE LOW BACK PAIN WITH BILATERAL SCIATICA: ICD-10-CM

## 2024-02-05 PROCEDURE — 93000 ELECTROCARDIOGRAM COMPLETE: CPT | Performed by: NURSE PRACTITIONER

## 2024-02-05 PROCEDURE — 71046 X-RAY EXAM CHEST 2 VIEWS: CPT

## 2024-02-05 PROCEDURE — 99213 OFFICE O/P EST LOW 20 MIN: CPT | Performed by: NURSE PRACTITIONER

## 2024-02-05 NOTE — TELEPHONE ENCOUNTER
----- Message from Lorraine Ochoa sent at 2/5/2024  9:28 AM EST -----  Regarding: DM eye exam  02/05/24 9:28 AM    Hello, our patient Helena Newton has had Diabetic Eye Exam completed/performed. Please assist in updating the patient chart by making an External outreach to [facility]. The date of service is 12/2023.    Thank you,  Lorraine Ochoa  [facility] PRIMARY [facility]

## 2024-02-05 NOTE — PROGRESS NOTES
Subjective:     Helena Newton is a 58 y.o. female who presents to the office today for a preoperative consultation at the request of surgeon Xenia Bo PA-C who plans on performing lumbar MRI on . Pt was not able to participate in a MRI  panic, therefore Pt will need to be sedated for procedure. This consultation is requested for the specific conditions prompting preoperative evaluation (i.e. because of potential affect on operative risk): infection, bleeding, death. Planned anesthesia: general. The patient has the following known anesthesia issues:  none .     The following portions of the patient's history were reviewed and updated as appropriate:   She has a past medical history of Angina pectoris ([facility]), Anxiety, Arthritis, Asthma, Carpal tunnel syndrome, Chronic headaches, COPD (chronic obstructive pulmonary disease) ([facility]), Diabetes (HCC), Diabetes mellitus (HCC), GERD (gastroesophageal reflux disease), Headache, Hyperlipidemia, Hypertension, Kidney stone, Left bundle branch block, MI (myocardial infarction) ([facility]), Myocardial infarction (HCC) (), Neuropathy, PTSD (post-traumatic stress disorder), RLS (restless legs syndrome), and Shortness of breath.,  does not have any pertinent problems on file.,   has a past surgical history that includes Kidney stone surgery;  section; Wrist surgery (Right); LAPAROSCOPY; pr colonoscopy flx dx w/collj spec when pfrmd (N/A, 2017); Colonoscopy; Upper gastrointestinal endoscopy; pr laps total hysterect 250 gm/< w/rmvl tube/ovary (N/A, 2018); pr esophagogastroduodenoscopy transoral diagnostic (N/A, 10/10/2018); Cardiac catheterization (2018); Hysterectomy (2018); Breast surgery (2016); and Angioplasty.,  family history includes Brain cancer (age of onset: 72) in her maternal aunt; Breast cancer (age of onset: 25) in her other; Breast cancer (age of onset: 45) in her mother; Diabetes in her mother and sister; Lung cancer  (age of onset: 54) in her father.,   reports that she has been smoking cigarettes. She started smoking about 51 years ago. She has a 51.1 pack-year smoking history. She has never used smokeless tobacco. She reports current drug use. Frequency: 7.00 times per week. Drug: Marijuana. She reports that she does not drink alcohol.,  is allergic to lisinopril, losartan, other, and prednisone..  Current Outpatient Medications   Medication Sig Dispense Refill   • Accu-Chek Guide test strip use to CHECK BLOOD GLUCOSE four times a day     • albuterol (PROVENTIL HFA,VENTOLIN HFA) 90 mcg/act inhaler Inhale 2 puffs every 6 (six) hours as needed for wheezing     • BD Pen Needle Dayna 2nd Gen 32G X 4 MM MISC 4 time a day 200 each 2   • Bevespi Aerosphere 9-4.8 MCG/ACT inhaler inhale 2 puffs by mouth every morning and 2 puffs at bedtime     • bisoprolol (ZEBETA) 5 mg tablet Take 1 tablet (5 mg total) by mouth daily 90 tablet 3   • Blood Glucose Monitoring Suppl (OneTouch Verio Reflect) w/Device KIT Check blood sugars twice daily. Please substitute with appropriate alternative as covered by patient's insurance. Dx: E11.65 1 kit 0   • buPROPion (WELLBUTRIN XL) 300 mg 24 hr tablet Take 450 mg by mouth every morning     • clotrimazole-betamethasone (LOTRISONE) 1-0.05 % cream Apply topically 2 (two) times a day 30 g 0   • Constulose 10 GM/15ML solution take 30 milliliters by mouth twice a day 946 mL 1   • diclofenac (VOLTAREN) 75 mg EC tablet Take 75 mg by mouth 2 (two) times a day as needed     • DULoxetine (CYMBALTA) 60 mg delayed release capsule take 1 capsule by mouth every morning DO NOT CRUSH, CHEW, AND/OR DIVIDE     • Empagliflozin 25 MG TABS Take 1 tablet (25 mg total) by mouth every morning 90 tablet 0   • glucose blood (OneTouch Verio) test strip Check blood sugars twice daily. Please substitute with appropriate alternative as covered by patient's insurance. Dx: E11.65 200 each 3   • hydrOXYzine pamoate (VISTARIL) 25 mg capsule  take 1 capsule by mouth daily if needed for anxiety     • ibuprofen (MOTRIN) 200 mg tablet Take 200 mg by mouth every 6 (six) hours as needed for mild pain     • insulin glargine (LANTUS SOLOSTAR) 100 units/mL injection pen Inject 30 Units under the skin daily at bedtime 27 mL 0   • metFORMIN (GLUCOPHAGE-XR) 500 mg 24 hr tablet take 2 tablets by mouth twice a day with meals 360 tablet 0   • methocarbamol (ROBAXIN) 750 mg tablet Take 500 mg by mouth every 6 (six) hours as needed for muscle spasms     • methylphenidate (RITALIN) 20 MG tablet Take 20 mg by mouth every morning     • mupirocin (BACTROBAN) 2 % ointment apply topically to affected area three times a day for 14 days     • naproxen (NAPROSYN) 375 mg tablet Take 1 tablet (375 mg total) by mouth 2 (two) times a day with meals 20 tablet 0   • NovoLOG FlexPen 100 units/mL injection pen Inject 12 Units under the skin 3 (three) times a day with meals 33 mL 0   • nystatin-triamcinolone (MYCOLOG-II) ointment Apply topically 2 (two) times a day 30 g 5   • ondansetron (ZOFRAN) 4 mg tablet Take 1 tablet (4 mg total) by mouth every 8 (eight) hours as needed for nausea or vomiting 20 tablet 0   • OneTouch Delica Lancets 33G MISC Check blood sugars twice daily. Please substitute with appropriate alternative as covered by patient's insurance. Dx: E11.65 200 each 3   • oxyCODONE (ROXICODONE) 5 immediate release tablet Take 5 mg by mouth 3 (three) times a day as needed     • pantoprazole (PROTONIX) 40 mg tablet Take 1 tablet (40 mg total) by mouth 2 (two) times a day 60 tablet 5   • polyethylene glycol (GLYCOLAX) 17 GM/SCOOP powder Take 17 g by mouth 2 (two) times a day 765 g 5   • potassium chloride SA (KLOR-CON M15) 15 MEQ tablet Take 15 mEq by mouth 2 (two) times a day     • prazosin (MINIPRESS) 5 mg capsule      • pregabalin (LYRICA) 75 mg capsule Take 75 mg by mouth 2 (two) times a day     • terconazole (TERAZOL 7) 0.4 % vaginal cream Insert 1 applicator into the vagina  daily at bedtime 45 g 0   • zolpidem (AMBIEN) 10 mg tablet Take 10 mg by mouth daily at bedtime as needed for sleep     • naloxone (NARCAN) 4 mg/0.1 mL nasal spray ADMINISTER A SINGLE SPRAY OF NARCAN IN ONE NOSTRIL REPEAT AFTER 3 MINUTES IF NO OR MINIMAL RESPONSE (Patient not taking: Reported on 1/8/2024)     • nitroglycerin (NITROSTAT) 0.4 mg SL tablet Place 1 tablet (0.4 mg total) under the tongue every 5 (five) minutes as needed for chest pain (Patient not taking: Reported on 1/8/2024) 25 tablet 0   • rosuvastatin (CRESTOR) 20 MG tablet Take 1 tablet (20 mg total) by mouth daily (Patient not taking: Reported on 11/8/2023) 90 tablet 3   • Spiriva Respimat 2.5 MCG/ACT AERS inhaler  (Patient not taking: Reported on 11/8/2023)     • varenicline (CHANTIX SALEEM) 0.5 MG X 11 & 1 MG X 42 tablet Take one 0.5 mg tablet by mouth once daily for 3 days, then one 0.5 mg tablet by mouth twice daily for 4 days, then one 1 mg tablet by mouth twice daily. (Patient not taking: Reported on 10/26/2023) 53 tablet 0     No current facility-administered medications for this visit.       Review of Systems  Review of Systems   All other systems reviewed and are negative.      Vitals:    02/05/24 0844   BP: 110/64   Pulse: 56   Temp: 97.9 °F (36.6 °C)   SpO2: 95%     Body mass index is 27.62 kg/m².    Objective:    Physical Exam  Vitals and nursing note reviewed.   Constitutional:       Appearance: She is well-developed. She is obese.   HENT:      Head: Normocephalic and atraumatic.      Right Ear: Tympanic membrane, ear canal and external ear normal.      Left Ear: Tympanic membrane, ear canal and external ear normal.      Nose: Nose normal.      Mouth/Throat:      Mouth: Mucous membranes are moist.   Eyes:      General: Lids are normal. Vision grossly intact.      Conjunctiva/sclera: Conjunctivae normal.      Pupils: Pupils are equal, round, and reactive to light.   Cardiovascular:      Rate and Rhythm: Normal rate and regular rhythm.       Pulses: Normal pulses.      Heart sounds: Normal heart sounds, S1 normal and S2 normal.      No friction rub. No gallop. No S3 sounds.   Pulmonary:      Effort: Pulmonary effort is normal.      Breath sounds: Normal breath sounds.   Abdominal:      General: Bowel sounds are normal.      Palpations: Abdomen is soft.      Tenderness: There is no abdominal tenderness.   Genitourinary:     Comments: Deferred  Musculoskeletal:         General: Normal range of motion.      Cervical back: Normal range of motion and neck supple.      Right lower leg: No edema.      Left lower leg: No edema.   Lymphadenopathy:      Cervical: No cervical adenopathy.   Skin:     General: Skin is warm and dry.      Capillary Refill: Capillary refill takes less than 2 seconds.   Neurological:      General: No focal deficit present.      Mental Status: She is alert and oriented to person, place, and time.      Motor: Motor function is intact.      Gait: Gait is intact.   Psychiatric:         Attention and Perception: Attention and perception normal.         Mood and Affect: Mood and affect normal.         Speech: Speech normal.         Behavior: Behavior normal. Behavior is cooperative.         Thought Content: Thought content normal.         Cognition and Memory: Cognition and memory normal.         Judgment: Judgment normal.           Predictors of intubation difficulty:   Morbid obesity? no   Anatomically abnormal facies? no   Prominent incisors? no   Receding mandible? no   Short, thick neck? no   Neck range of motion: normal   Mallampati score: I (soft palate, uvula, fauces, and tonsillar pillars visible)   Thyromental distance: < 6cm   Mouth openin.5 cm   Dentition: No chipped, loose, or missing teeth.    Cardiographics  ECG: rhythm: sinus bradycardia, rate=53 and note LBBB, rate=53 bpm, eq=106 ms, dhn=874 ms, qu=503 ms, axis=68/-14/68 degrees  Echocardiogram: not done    Imaging  Chest x-ray: normal     Lab Review   Appointment on  12/06/2023   Component Date Value   • Hemoglobin A1C 12/06/2023 13.2 (H)    • EAG 12/06/2023 332    • Sodium 12/06/2023 136    • Potassium 12/06/2023 4.4    • Chloride 12/06/2023 97    • CO2 12/06/2023 33 (H)    • ANION GAP 12/06/2023 6    • BUN 12/06/2023 19    • Creatinine 12/06/2023 0.63    • Glucose, Fasting 12/06/2023 340 (H)    • Calcium 12/06/2023 9.1    • AST 12/06/2023 15    • ALT 12/06/2023 16    • Alkaline Phosphatase 12/06/2023 94    • Total Protein 12/06/2023 6.8    • Albumin 12/06/2023 3.8    • Total Bilirubin 12/06/2023 0.38    • eGFR 12/06/2023 100    • Cholesterol 12/06/2023 134    • Triglycerides 12/06/2023 227 (H)    • HDL, Direct 12/06/2023 39 (L)    • LDL Calculated 12/06/2023 50    • Vit D, 25-Hydroxy 12/06/2023 26.8 (L)    • Creatinine, Ur 12/06/2023 41.1    • Albumin,U,Random 12/06/2023 58.2 (H)    • Albumin Creat Ratio 12/06/2023 142 (H)    • Hepatitis C Ab 12/06/2023 Non-reactive    • WBC 12/06/2023 8.84    • RBC 12/06/2023 5.68 (H)    • Hemoglobin 12/06/2023 15.8 (H)    • Hematocrit 12/06/2023 49.2 (H)    • MCV 12/06/2023 87    • MCH 12/06/2023 27.8    • MCHC 12/06/2023 32.1    • RDW 12/06/2023 13.6    • MPV 12/06/2023 10.6    • Platelets 12/06/2023 255    • nRBC 12/06/2023 0    • Neutrophils Relative 12/06/2023 60    • Immat GRANS % 12/06/2023 0    • Lymphocytes Relative 12/06/2023 29    • Monocytes Relative 12/06/2023 7    • Eosinophils Relative 12/06/2023 3    • Basophils Relative 12/06/2023 1    • Neutrophils Absolute 12/06/2023 5.31    • Immature Grans Absolute 12/06/2023 0.02    • Lymphocytes Absolute 12/06/2023 2.58    • Monocytes Absolute 12/06/2023 0.59    • Eosinophils Absolute 12/06/2023 0.28    • Basophils Absolute 12/06/2023 0.06    • TSH 3RD GENERATON 12/06/2023 3.027    Office Visit on 12/06/2023   Component Date Value   • Hemoglobin A1C 12/06/2023 12.9 (A)         Assessment:    58 y.o. female  with planned surgery as above.    Known risk factors for perioperative  complications: Chronic pulmonary disease  Diabetes mellitus    Difficulty with intubation is not anticipated.    Cardiac Risk Estimation: low    Current medications which may produce withdrawal symptoms if withheld perioperatively: none     Plan:    1. Preoperative workup as follows chest x-ray, ECG.  2. Change in medication regimen before surgery: none, continue medication regimen including morning of surgery, with sip of water.  3. Prophylaxis for cardiac events with perioperative beta-blockers: not indicated.  4. Invasive hemodynamic monitoring perioperatively: not indicated.  5. Deep vein thrombosis prophylaxis postoperatively:intermittent pneumatic compression boots.  6. Surveillance for postoperative MI with ECG immediately postoperatively and on postoperative days 1 and 2 AND troponin levels 24 hours postoperatively and on day 4 or hospital discharge (whichever comes first): not indicated.  7. Helena Newton surgical risk is low and is cleared for surgical procedure.

## 2024-02-05 NOTE — TELEPHONE ENCOUNTER
Upon review of the In Basket request we have noted that leadership is working with this provider,     Because of this we are requesting that you forward this request/concern to the appropriate education email address. The Quality team members assigned to this email will be more than happy to assist you.    Any additional questions or concerns should be emailed to the Practice Liaisons via the appropriate education email address, please do not reply via In Basket.    Thank you  Rosmery Stiles

## 2024-02-05 NOTE — TELEPHONE ENCOUNTER
Upon review of the In Basket request we are working with Leadership and the external facility found within the In Basket message/ Encounter. We will require additional time to complete this request.     Any additional questions or concerns should be emailed to the Practice Liaisons via the appropriate education email address, please do not reply via In Basket.    Thank you  Rosmery Stiles

## 2024-02-07 ENCOUNTER — OFFICE VISIT (OUTPATIENT)
Dept: PHYSICAL THERAPY | Age: 59
End: 2024-02-07
Payer: COMMERCIAL

## 2024-02-07 DIAGNOSIS — M54.17 LUMBOSACRAL RADICULOPATHY: ICD-10-CM

## 2024-02-07 DIAGNOSIS — M47.897 OTHER OSTEOARTHRITIS OF SPINE, LUMBOSACRAL REGION: Primary | ICD-10-CM

## 2024-02-07 PROCEDURE — 97113 AQUATIC THERAPY/EXERCISES: CPT

## 2024-02-07 NOTE — PROGRESS NOTES
Daily Note     Today's date: 2024  Patient name: Helena Newton  : 1965  MRN: [id number]  Referring provider: Xenia Bo PA-C  Dx:   Encounter Diagnosis     ICD-10-CM    1. Other osteoarthritis of spine, lumbosacral region  M47.897       2. Lumbosacral radiculopathy  M54.17           Start Time: 1700  Stop Time: 1800  Total time in clinic (min): 60 minutes    Subjective: Patient reports that her back is hurting today. C/o pain LB/LE.       Objective: See treatment diary below      Assessment: Tolerated treatment fairly well, stiff and guarded, able to tolerate more exercises today, ambulated longer with noodle for balance and posture awareness, and added seated TE. Progress as tolerated.  Patient demonstrated fatigue post treatment and would benefit from continued PT      Plan: Continue per plan of care.      POC expires Unit limit Auth Expiration date PT/OT + Visit Limit?   3/19/24 na 23 24 [email or web address]   3/19/24 na 24 10 [facility]                     Visit/Unit Tracking  AUTH Status:  Date            Used 1 2 3 4 5          Needed after 10th visit Remaining  9 8 7 6 5           FOTO                    Precautions: DM, HTN, COPD, PTSD, MI 2016, RLS    Daily Treatment Diary:      Date  2        Patient education 10 5         Water Walk (FW, BW, side) x10’  5' fwd 5' F/ 3' ss 10' w/noodle        LE work at wall               Hip FF/ext swing   2 2          Toe/heel  1 1 1          Hip ABD/ADD  1 1 1          March 1 1 1          Knee flexion & extension 1 1 1          Squats 1 1 1        UE noodle x3             Push/pull   1 1          Rotate   1 1          Row forward & back   2 2          OH side bend            UE/Core (AROM, paddles, MB)              ABD/ADD              Horizontal Pec Fly              Alt. arm swing (shld flex/ext)              Push pull              Single paddle rotation           SLS (EO, EC, ball toss)            Aqua Step  "(FW, lat, eccentric)            Core work:              MF press (red, blue, blk)             Kickboard press (blue, red)            KATHERINE 10x 10x 10x        Seated on bench             ankle DF/PF    1          March 1          ABD/ADD    1          Knee flexion/extension    1        DW TX - hang in the deep water  10' 10' 10' 5' 10'          DW ABD/ADD              DW Bicycle  5' 5' 5'          DW Scissor hip flexion/extension              DW DKTC  10x 10x 1'        Stretches              HS at step    nv          Wall stretches  10\"x4 10\"x4 nv          Calf stretch at wall              Other:            Hot Tub - 10 minutes only  8' 5' 10'                        Manuals 12/19 2/1           LB/LE's 8 8                                                  Neuro Re-Ed                                                                                                        Ther Ex             B heel slides 20x 30x           Hip abduction 30x BTB 30x BTB           Hip adduction 30x ball 30x ball                                                                             Ther Activity                                       Gait Training                                       Modalities                                                 "

## 2024-02-08 ENCOUNTER — OFFICE VISIT (OUTPATIENT)
Dept: GASTROENTEROLOGY | Facility: CLINIC | Age: 59
End: 2024-02-08
Payer: COMMERCIAL

## 2024-02-08 ENCOUNTER — VBI (OUTPATIENT)
Dept: ADMINISTRATIVE | Facility: OTHER | Age: 59
End: 2024-02-08

## 2024-02-08 VITALS
RESPIRATION RATE: 18 BRPM | HEIGHT: 61 IN | WEIGHT: 146 LBS | BODY MASS INDEX: 27.56 KG/M2 | OXYGEN SATURATION: 96 % | HEART RATE: 59 BPM | SYSTOLIC BLOOD PRESSURE: 98 MMHG | DIASTOLIC BLOOD PRESSURE: 56 MMHG

## 2024-02-08 DIAGNOSIS — R10.13 EPIGASTRIC ABDOMINAL PAIN: ICD-10-CM

## 2024-02-08 DIAGNOSIS — K76.0 FATTY LIVER: ICD-10-CM

## 2024-02-08 DIAGNOSIS — R13.19 ESOPHAGEAL DYSPHAGIA: ICD-10-CM

## 2024-02-08 DIAGNOSIS — E11.43 GASTROPARESIS DUE TO DM: ICD-10-CM

## 2024-02-08 DIAGNOSIS — K59.09 CHRONIC CONSTIPATION: ICD-10-CM

## 2024-02-08 DIAGNOSIS — Z86.010 PERSONAL HISTORY OF COLONIC POLYPS: ICD-10-CM

## 2024-02-08 DIAGNOSIS — K22.70 BARRETT'S ESOPHAGUS WITHOUT DYSPLASIA: Primary | ICD-10-CM

## 2024-02-08 DIAGNOSIS — K31.84 GASTROPARESIS DUE TO DM: ICD-10-CM

## 2024-02-08 PROCEDURE — 99214 OFFICE O/P EST MOD 30 MIN: CPT | Performed by: PHYSICIAN ASSISTANT

## 2024-02-08 NOTE — PROGRESS NOTES
[facility] - Outpatient Follow-up Note  Helena Newton 58 y.o. female MRN: [id number]  Encounter: [id number]    ASSESSMENT AND PLAN:      1. 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 Epigastric abdominal pain    Pt with hx of GERD, hx of Menendez's esophagus noted on last EGD from 02/2022, no dysplasia identified. Her symptoms have markedly improved with restarting PPI BID.     Continue with diet and lifestyle modifications for GERD.   Due for repeat EGD for surveillance purposes in 02/2025.   Encourage smoking cessation.     3. Esophageal dysphagia    History of such, EGD from 02/2022 with no esophageal abnormalities noted. She did have manometry completed in 2018 which was unremarkable. She is edentulous, we did review additional etiol for dysphagia may be oropharyngeal source. Reviewed additional investigation at this time, consider video swallow with SLP. Pt is interested in pursuing additional work up at this time, study has been ordered.     Continue with dysphagia precautions, chewing thoroughly, sitting upright during and after meals, alternating between solids and sips of liquids, etc.      - FL barium swallow video w speech; Future    4. Gastroparesis due to DM     Pt with uncontrolled DM2. Previous GES with T 1/2 of 511 minutes. Also taking narcotic analgesia. Once again reviewed recommendation for small frequent meals, avoiding foods high in saturated fats, difficult to digest raw fibrous foods. Reviewed importance of improving glycemic control. Okay for as needed zofran for symptom relief.     5. Chronic constipation    Pt with hx of chronic constipation. Previously tried OTC laxatives with inadequate control. Previously tried linaclotide 290 mcg with inadequate effect. Prescribed Motegrity, though this was not covered. She was given lactulose to trial, feels that this is effective. Reviewed with Endocrinology, okay to continue lactulose per medical review there is no sig  "effect on glycemic control. If pt does not tolerate lactulose in the future or if this medication becomes ineffective, could send in Motegrity to see if it would be covered.     6. Personal history of colonic polyps    Last colonoscopy in 02/2022 with hyperplastic polyp only. Endoscopist recommended recall in 5 years given personal history of colon polyps, which would make her due in 02/2027.     7. Fatty liver    Reviewed diagnosis with pt today. Suspect NAFLD/metabolic etiol. Reviewed potential sequelae of disease. Fibrosis score -0.96, indeterminate score. Recommend elastography now.     Discussed recommendations in regards to fatty liver including:   Strict control of contributing comorbidities (obesity, prediabetes/diabetes, hypertension, and hypertriglyceridemia).  Weight loss of approx 10-15% of patient's current body weight over a period of 6-12 months through low fat diet and cardiovascular exercise as tolerated.  Limiting alcohol consumption, preferably complete abstinence.  Monitor hepatic function every 6 months with routine labs.     - US elastography/UGAP; Future    We will follow up to reassess symptoms in 3-6 months.   ______________________________________________________________________    SUBJECTIVE: Patient is a 58 y.o. female who presents today for follow-up regarding GERD and nausea. Pmhx sig for Menendez's esophagus, gastroparesis, CAD, HTN, HLD, DM2, COPD, anxiety/depression, tobacco use disorder.     Pt was last evaluated in 11/2023. At that time, she was complaining of excess heartburn symptoms, had ran out of PPI. Was recommended she restart PPI daily. Was having some chronic swallowing issues, attributed this to \"forgetting to swallow\" at times. Was also dealing with constipation, previously tried OTC laxatives and linaclotide up to 290mcg without adequate results/SE. Recommended to trial Motegrity given her hx of gastroparesis. This was not covered by insurance and thus lactulose was " "prescribed for pt.     02/08/24:     Pt is here today with her son. Notes that her heartburn is much better controlled since restarting PPI. Rare nausea, no emesis. She continues to have issues with swallowing, attributes this to \"forgetting to swallow\". Is edentulous. No unintentional weight loss over past 6 months, has been having difficulty with weight gain more recently.     Pertaining to bowels, she feels that the lactulose is helpful. Not taking daily as concerned it would result in diarrhea. Notes rare fresh blood on wipe, on large volume hematochezia, no melenic stools. No nocturnal BM. No steatorrhea. No sig abd pain or rectal pain related to defecation.     NSAIDs: prn    Etoh: none   Tobacco: 1 PPD   Cannabis: nightly     05/2023: A1C: 13.4   09/2023: BUN 16, Cr 0.65, AST 13, ALT 15, , albumin 4.0, t bili 0.36, Hb 16.6, MCV 83, Plt 256  12/2023: Hb 15.8, MCV 87, Plt 255, A1C 13.2, BUN 19, Cr 0.63, AST 15, ALT 16, ALP 94, t bli 0.38, albumin 3.8     11/2023: CT neck: No cervical mass lesion or pathologic adenopathy. Heterogeneous thyroid gland with subcentimeter nodules for which no additional work-up is recommended at this time  09/2023: CT A/P: Diffuse bladder wall thickening consistent with cystitis. Enhancement of the left ureteral lining suggesting ascending infection. Stable nonobstructing 4 mm left lower pole intrarenal calculus.  02/2022: RUQ US: hepatomegaly and hepatic steatosis      Endoscopic history:   EGD: 02/2022: Menendez's tongue 34 cm; Mild inflammation of the antrum and incisura  A. Stomach, antrum, biopsy:  Chronic inactive antral gastritis. Negative for Helicobacter pylori, by H&E stain. Negative for atrophy, intestinal metaplasia, dysplasia or carcinoma.  B. Stomach, body, biopsy: Chronic inactive oxyntic gastritis. Negative for Helicobacter pylori, by H&E stain. Negative for atrophy, intestinal metaplasia, dysplasia or carcinoma  C. Stomach, fundus, biopsy: Chronic inactive " oxyntic gastritis. Negative for Helicobacter pylori, by H&E stain. Negative for atrophy, intestinal metaplasia, dysplasia or carcinoma  D. Esophagus, 34 cm, biopsy: Gastroesophageal junction mucosa with intestinal metaplasia, compatible with Menendez's esophagus in the correct clinical setting. Negative for dysplasia or carcinoma.  E. Esophagus, proximal, biopsy: Benign squamous mucosa without significant histopathologic abnormality.Intraepithelial eosinophils are not increased, negative for features of eosinophilic esophagitis. Negative for dysplasia or carcinoma  Colon: 2022: One sessile polyp measuring smaller than 5 mm in the distal rectum; otherwise normal   F. Colon, rectal polyp, biopsy: Inflammatory polyp with hyperplastic/reactive surface epithelial change, negative for dysplasia or carcinoma   Esophageal manometry 2018: normal motility     Review of Systems   Otherwise Per HPI    Historical Information   Past Medical History:   Diagnosis Date    Angina pectoris (HCC)     recent    Anxiety     Arthritis     Asthma     Carpal tunnel syndrome     Chronic headaches     COPD (chronic obstructive pulmonary disease) (HCC)     Diabetes (HCC)     Diabetes mellitus (HCC)     GERD (gastroesophageal reflux disease)     Headache     Hyperlipidemia     Hypertension     Kidney stone     Left bundle branch block     LBBB    MI (myocardial infarction) (HCC)     Myocardial infarction (HCC)     Neuropathy     PTSD (post-traumatic stress disorder)     RLS (restless legs syndrome)     Shortness of breath      Past Surgical History:   Procedure Laterality Date    ANGIOPLASTY      BREAST SURGERY  2016    Reduction    CARDIAC CATHETERIZATION  2018     Mid LAD: There was a tubular 40 % stenosis     SECTION      COLONOSCOPY      HYSTERECTOMY  2018    Complete    KIDNEY STONE SURGERY      LAPAROSCOPY      [place] COLONOSCOPY FLX DX W/COLLJ SPEC WHEN PFRMD N/A 2017    Procedure: EGD AND COLONOSCOPY;   Surgeon: Wilberto Galeano MD;  Location: [facility];  Service: Gastroenterology    DE ESOPHAGOGASTRODUODENOSCOPY TRANSORAL DIAGNOSTIC N/A 10/10/2018    Procedure: ESOPHAGOGASTRODUODENOSCOPY (EGD);  Surgeon: Wilberto Galeano MD;  Location: [facility];  Service: Gastroenterology    DE LAPS TOTAL HYSTERECT 250 GM/< W/RMVL TUBE/OVARY N/A 5/22/2018    Procedure: ROBOTIC ASSISTED TOTAL LAPAROSCOPIC HYSTERECTOMY, BILATERAL SALPINGOOPHERECTOMY,;  Surgeon: Daron Valdez MD;  Location:  MAIN OR;  Service: Gynecology Oncology    UPPER GASTROINTESTINAL ENDOSCOPY      WRIST SURGERY Right      Social History   Social History     Substance and Sexual Activity   Alcohol Use No    Comment: Rarely consumes alcohol - As per [employer]      Social History     Substance and Sexual Activity   Drug Use Yes    Frequency: 7.0 times per week    Types: Marijuana    Comment: medical marijuana  last use over a month     Social History     Tobacco Use   Smoking Status Every Day    Current packs/day: 1.00    Average packs/day: 1 pack/day for 51.1 years (51.1 ttl pk-yrs)    Types: Cigarettes    Start date: 1/1/1973   Smokeless Tobacco Never   Tobacco Comments    pt. smoked this am 10/10     Family History   Problem Relation Age of Onset    Diabetes Mother     Breast cancer Mother 45    Lung cancer Father 54    Diabetes Sister     Brain cancer Maternal Aunt 72    Breast cancer Other 25     Meds/Allergies       Current Outpatient Medications:     Accu-Chek Guide test strip    albuterol (PROVENTIL HFA,VENTOLIN HFA) 90 mcg/act inhaler    BD Pen Needle Dayna 2nd Gen 32G X 4 MM MISC    Bevespi Aerosphere 9-4.8 MCG/ACT inhaler    bisoprolol (ZEBETA) 5 mg tablet    Blood Glucose Monitoring Suppl (OneTouch Verio Reflect) w/Device KIT    buPROPion (WELLBUTRIN XL) 300 mg 24 hr tablet    clotrimazole-betamethasone (LOTRISONE) 1-0.05 % cream    Constulose 10 GM/15ML solution    diclofenac (VOLTAREN) 75 mg EC tablet    DULoxetine (CYMBALTA) 60 mg delayed  "release capsule    Empagliflozin 25 MG TABS    glucose blood (OneTouch Verio) test strip    hydrOXYzine pamoate (VISTARIL) 25 mg capsule    ibuprofen (MOTRIN) 200 mg tablet    insulin glargine (LANTUS SOLOSTAR) 100 units/mL injection pen    metFORMIN (GLUCOPHAGE-XR) 500 mg 24 hr tablet    methocarbamol (ROBAXIN) 750 mg tablet    methylphenidate (RITALIN) 20 MG tablet    mupirocin (BACTROBAN) 2 % ointment    naproxen (NAPROSYN) 375 mg tablet    NovoLOG FlexPen 100 units/mL injection pen    nystatin-triamcinolone (MYCOLOG-II) ointment    ondansetron (ZOFRAN) 4 mg tablet    OneTouch Delica Lancets 33G MISC    oxyCODONE (ROXICODONE) 5 immediate release tablet    pantoprazole (PROTONIX) 40 mg tablet    polyethylene glycol (GLYCOLAX) 17 GM/SCOOP powder    potassium chloride SA (KLOR-CON M15) 15 MEQ tablet    prazosin (MINIPRESS) 5 mg capsule    pregabalin (LYRICA) 75 mg capsule    terconazole (TERAZOL 7) 0.4 % vaginal cream    zolpidem (AMBIEN) 10 mg tablet    naloxone (NARCAN) 4 mg/0.1 mL nasal spray    nitroglycerin (NITROSTAT) 0.4 mg SL tablet    rosuvastatin (CRESTOR) 20 MG tablet    Spiriva Respimat 2.5 MCG/ACT AERS inhaler    varenicline (CHANTIX SALEEM) 0.5 MG X 11 & 1 MG X 42 tablet    Allergies   Allergen Reactions    Lisinopril Cough    Losartan Dizziness    Other Hives     Surgical tape    Prednisone Other (See Comments)     Thrush       Objective     Blood pressure 98/56, pulse 59, resp. rate 18, height 5' 1\" (1.549 m), weight 66.2 kg (146 lb), SpO2 96%. Body mass index is 27.59 kg/m².    Physical Exam  Vitals and nursing note reviewed.   Constitutional:       General: She is not in acute distress.     Appearance: She is well-developed.   HENT:      Head: Normocephalic and atraumatic.   Eyes:      General: No scleral icterus.     Conjunctiva/sclera: Conjunctivae normal.   Cardiovascular:      Rate and Rhythm: Normal rate and regular rhythm.      Heart sounds: No murmur heard.  Pulmonary:      Effort: Pulmonary " effort is normal. No respiratory distress.   Abdominal:      General: There is no distension.      Palpations: Abdomen is soft.      Tenderness: There is no abdominal tenderness. There is no guarding or rebound.   Musculoskeletal:         General: No swelling.   Skin:     General: Skin is warm and dry.      Coloration: Skin is not jaundiced.   Neurological:      General: No focal deficit present.      Mental Status: She is alert.   Psychiatric:         Mood and Affect: Mood normal.       Lab Results:   No visits with results within 1 Day(s) from this visit.   Latest known visit with results is:   Appointment on 12/06/2023   Component Date Value    Hemoglobin A1C 12/06/2023 13.2 (H)     EAG 12/06/2023 332     Sodium 12/06/2023 136     Potassium 12/06/2023 4.4     Chloride 12/06/2023 97     CO2 12/06/2023 33 (H)     ANION GAP 12/06/2023 6     BUN 12/06/2023 19     Creatinine 12/06/2023 0.63     Glucose, Fasting 12/06/2023 340 (H)     Calcium 12/06/2023 9.1     AST 12/06/2023 15     ALT 12/06/2023 16     Alkaline Phosphatase 12/06/2023 94     Total Protein 12/06/2023 6.8     Albumin 12/06/2023 3.8     Total Bilirubin 12/06/2023 0.38     eGFR 12/06/2023 100     Cholesterol 12/06/2023 134     Triglycerides 12/06/2023 227 (H)     HDL, Direct 12/06/2023 39 (L)     LDL Calculated 12/06/2023 50     Vit D, 25-Hydroxy 12/06/2023 26.8 (L)     Creatinine, Ur 12/06/2023 41.1     Albumin,U,Random 12/06/2023 58.2 (H)     Albumin Creat Ratio 12/06/2023 142 (H)     Hepatitis C Ab 12/06/2023 Non-reactive     WBC 12/06/2023 8.84     RBC 12/06/2023 5.68 (H)     Hemoglobin 12/06/2023 15.8 (H)     Hematocrit 12/06/2023 49.2 (H)     MCV 12/06/2023 87     MCH 12/06/2023 27.8     MCHC 12/06/2023 32.1     RDW 12/06/2023 13.6     MPV 12/06/2023 10.6     Platelets 12/06/2023 255     nRBC 12/06/2023 0     Neutrophils Relative 12/06/2023 60     Immat GRANS % 12/06/2023 0     Lymphocytes Relative 12/06/2023 29     Monocytes Relative 12/06/2023 7      Eosinophils Relative 12/06/2023 3     Basophils Relative 12/06/2023 1     Neutrophils Absolute 12/06/2023 5.31     Immature Grans Absolute 12/06/2023 0.02     Lymphocytes Absolute 12/06/2023 2.58     Monocytes Absolute 12/06/2023 0.59     Eosinophils Absolute 12/06/2023 0.28     Basophils Absolute 12/06/2023 0.06     TSH 3RD GENERATON 12/06/2023 3.027      Radiology Results:   XR chest pa & lateral    Result Date: 2/5/2024  Narrative: CHEST INDICATION:   Encounter for other preprocedural examination. COMPARISON:  Comparison made with previous examination(s) dated (CT) 27-Sep-2023,(DX) 03-Mar-2023,(CT) 03-Mar-2023,(CT) 16-Aug-2022,(DX) 29-Jun-2022. EXAM PERFORMED/VIEWS:  XR CHEST PA & LATERAL FINDINGS: Cardiomediastinal silhouette appears unremarkable. The lungs are clear.  No pneumothorax or pleural effusion. Osseous structures appear within normal limits for patient age.     Impression: No acute cardiopulmonary disease. No significant change from 5/3/2023 Electronically signed: 02/05/2024 02:39 PM MD Sol Neri PA-C    **Please note:  Dictation voice to text software may have been used in the creation of this record.  Occasional wrong word or “sound alike” substitutions may have occurred due to the inherent limitations of voice recognition software.  Read the chart carefully and recognize, using context, where substitutions have occurred.**

## 2024-02-08 NOTE — PATIENT INSTRUCTIONS
Continue on pantoprazole twice daily.   Continue with small frequent meals, a gastroparesis like diet.   Okay for zofran as needed.     Okay for lactulose, though check to make sure it doesn't worsen your blood sugar.     Work on smoking cessation.

## 2024-02-09 ENCOUNTER — HOSPITAL ENCOUNTER (OUTPATIENT)
Dept: MRI IMAGING | Facility: HOSPITAL | Age: 59
End: 2024-02-09
Payer: COMMERCIAL

## 2024-02-09 VITALS
WEIGHT: 146 LBS | BODY MASS INDEX: 27.56 KG/M2 | DIASTOLIC BLOOD PRESSURE: 53 MMHG | OXYGEN SATURATION: 98 % | SYSTOLIC BLOOD PRESSURE: 122 MMHG | HEART RATE: 55 BPM | HEIGHT: 61 IN | RESPIRATION RATE: 18 BRPM | TEMPERATURE: 97.6 F

## 2024-02-09 DIAGNOSIS — M54.17 RADICULOPATHY, LUMBOSACRAL REGION: ICD-10-CM

## 2024-02-09 DIAGNOSIS — M47.897 OTHER SPONDYLOSIS, LUMBOSACRAL REGION: ICD-10-CM

## 2024-02-09 LAB — GLUCOSE SERPL-MCNC: 79 MG/DL (ref 65–140)

## 2024-02-09 PROCEDURE — G1004 CDSM NDSC: HCPCS

## 2024-02-09 PROCEDURE — 82948 REAGENT STRIP/BLOOD GLUCOSE: CPT

## 2024-02-09 PROCEDURE — 72148 MRI LUMBAR SPINE W/O DYE: CPT

## 2024-02-12 ENCOUNTER — APPOINTMENT (OUTPATIENT)
Dept: PHYSICAL THERAPY | Age: 59
End: 2024-02-12
Payer: COMMERCIAL

## 2024-02-12 DIAGNOSIS — M54.17 LUMBOSACRAL RADICULOPATHY: ICD-10-CM

## 2024-02-12 DIAGNOSIS — M47.897 OTHER OSTEOARTHRITIS OF SPINE, LUMBOSACRAL REGION: Primary | ICD-10-CM

## 2024-02-12 NOTE — PROGRESS NOTES
Daily Note     Today's date: 2024  Patient name: Helena Newton  : 1965  MRN: [id number]  Referring provider: Xenia Bo PA-C  Dx:   Encounter Diagnosis     ICD-10-CM    1. Other osteoarthritis of spine, lumbosacral region  M47.897       2. Lumbosacral radiculopathy  M54.17                      Subjective: ***      Objective: See treatment diary below      Assessment: Tolerated treatment {Tolerated treatment :9088402230}. Patient {assessment:1067012785}      Plan: {PLAN:9566282278}     POC expires Unit limit Auth Expiration date PT/OT + Visit Limit?   3/19/24 na 23 24 [email or web address]   3/19/24 na 24 10 [facility]                     Visit/Unit Tracking  AUTH Status:  Date            Used 1 2 3 4 5          Needed after 10th visit Remaining  9 8 7 6 5           FOTO                    Precautions: DM, HTN, COPD, PTSD, MI 2016, RLS    Daily Treatment Diary:      Date  2        Patient education 10 5         Water Walk (FW, BW, side) x10’  5' fwd 5' F/ 3' ss 10' w/noodle        LE work at wall               Hip FF/ext swing   2 2          Toe/heel  1 1 1          Hip ABD/ADD  1 1 1          March 1 1 1          Knee flexion & extension 1 1 1          Squats 1 1 1        UE noodle x3             Push/pull   1 1          Rotate   1 1          Row forward & back   2 2          OH side bend            UE/Core (AROM, paddles, MB)              ABD/ADD              Horizontal Pec Fly              Alt. arm swing (shld flex/ext)              Push pull              Single paddle rotation           SLS (EO, EC, ball toss)            Aqua Step (FW, lat, eccentric)            Core work:              MF press (red, blue, blk)             Kickboard press (blue, red)            KATHERINE 10x 10x 10x        Seated on bench             ankle DF/PF              ABD/ADD    1          Knee flexion/extension    1        DW TX - hang in the deep water  10' 10'  "10' 5' 10'          DW ABD/ADD              DW Bicycle  5' 5' 5'          DW Scissor hip flexion/extension              DW DKTC  10x 10x 1'        Stretches              HS at step    nv          Wall stretches  10\"x4 10\"x4 nv          Calf stretch at wall              Other:            Hot Tub - 10 minutes only  8' 5' 10'                        Manuals 12/19 2/1           LB/LE's 8 8                                                  Neuro Re-Ed                                                                                                        Ther Ex             B heel slides 20x 30x           Hip abduction 30x BTB 30x BTB           Hip adduction 30x ball 30x ball                                                                             Ther Activity                                       Gait Training                                       Modalities                                                   "

## 2024-02-14 ENCOUNTER — APPOINTMENT (OUTPATIENT)
Dept: PHYSICAL THERAPY | Age: 59
End: 2024-02-14
Payer: COMMERCIAL

## 2024-02-21 ENCOUNTER — APPOINTMENT (OUTPATIENT)
Dept: PHYSICAL THERAPY | Age: 59
End: 2024-02-21
Payer: COMMERCIAL

## 2024-02-27 ENCOUNTER — HOSPITAL ENCOUNTER (OUTPATIENT)
Dept: ULTRASOUND IMAGING | Facility: HOSPITAL | Age: 59
Discharge: HOME | End: 2024-02-27
Payer: COMMERCIAL

## 2024-02-27 ENCOUNTER — HOSPITAL ENCOUNTER (OUTPATIENT)
Dept: RADIOLOGY | Facility: HOSPITAL | Age: 59
Discharge: HOME | End: 2024-02-27
Payer: COMMERCIAL

## 2024-02-27 DIAGNOSIS — M16.12 PRIMARY OSTEOARTHRITIS OF LEFT HIP: ICD-10-CM

## 2024-02-27 DIAGNOSIS — K76.0 FATTY LIVER: ICD-10-CM

## 2024-02-27 PROCEDURE — 76981 USE PARENCHYMA: CPT

## 2024-02-27 PROCEDURE — 73502 X-RAY EXAM HIP UNI 2-3 VIEWS: CPT

## 2024-03-05 DIAGNOSIS — Z11.59 SCREENING FOR VIRAL DISEASE: ICD-10-CM

## 2024-03-05 DIAGNOSIS — K74.00 LIVER FIBROSIS: Primary | ICD-10-CM

## 2024-03-07 ENCOUNTER — OFFICE VISIT (OUTPATIENT)
Dept: ENDOCRINOLOGY | Facility: CLINIC | Age: 59
End: 2024-03-07
Payer: COMMERCIAL

## 2024-03-07 VITALS
TEMPERATURE: 98.1 F | OXYGEN SATURATION: 97 % | HEIGHT: 61 IN | HEART RATE: 73 BPM | WEIGHT: 143.4 LBS | SYSTOLIC BLOOD PRESSURE: 120 MMHG | BODY MASS INDEX: 27.08 KG/M2 | DIASTOLIC BLOOD PRESSURE: 62 MMHG

## 2024-03-07 DIAGNOSIS — E78.5 HYPERLIPIDEMIA, UNSPECIFIED HYPERLIPIDEMIA TYPE: ICD-10-CM

## 2024-03-07 DIAGNOSIS — E11.65 TYPE 2 DIABETES MELLITUS WITH HYPERGLYCEMIA, WITHOUT LONG-TERM CURRENT USE OF INSULIN (HCC): Primary | ICD-10-CM

## 2024-03-07 DIAGNOSIS — E55.9 VITAMIN D DEFICIENCY: ICD-10-CM

## 2024-03-07 DIAGNOSIS — E11.65 TYPE 2 DIABETES MELLITUS WITH HYPERGLYCEMIA, WITHOUT LONG-TERM CURRENT USE OF INSULIN (HCC): ICD-10-CM

## 2024-03-07 DIAGNOSIS — Z79.4 TYPE 2 DIABETES MELLITUS WITH HYPERGLYCEMIA, WITH LONG-TERM CURRENT USE OF INSULIN (HCC): ICD-10-CM

## 2024-03-07 DIAGNOSIS — R80.9 MICROALBUMINURIA: ICD-10-CM

## 2024-03-07 DIAGNOSIS — E11.65 TYPE 2 DIABETES MELLITUS WITH HYPERGLYCEMIA, WITH LONG-TERM CURRENT USE OF INSULIN (HCC): ICD-10-CM

## 2024-03-07 LAB — SL AMB POCT HEMOGLOBIN AIC: 9.1 (ref ?–6.5)

## 2024-03-07 PROCEDURE — 83036 HEMOGLOBIN GLYCOSYLATED A1C: CPT | Performed by: STUDENT IN AN ORGANIZED HEALTH CARE EDUCATION/TRAINING PROGRAM

## 2024-03-07 PROCEDURE — 99214 OFFICE O/P EST MOD 30 MIN: CPT | Performed by: STUDENT IN AN ORGANIZED HEALTH CARE EDUCATION/TRAINING PROGRAM

## 2024-03-07 RX ORDER — METFORMIN HYDROCHLORIDE 500 MG/1
TABLET, EXTENDED RELEASE ORAL
Qty: 180 TABLET | Refills: 0 | Status: SHIPPED | OUTPATIENT
Start: 2024-03-07

## 2024-03-07 RX ORDER — METFORMIN HYDROCHLORIDE 500 MG/1
TABLET, EXTENDED RELEASE ORAL
Qty: 360 TABLET | Refills: 0 | Status: SHIPPED | OUTPATIENT
Start: 2024-03-07 | End: 2024-03-07 | Stop reason: SDUPTHER

## 2024-03-07 RX ORDER — INSULIN ASPART 100 [IU]/ML
INJECTION, SOLUTION INTRAVENOUS; SUBCUTANEOUS
Qty: 33 ML | Refills: 0 | Status: SHIPPED | OUTPATIENT
Start: 2024-03-07 | End: 2024-03-08 | Stop reason: SDUPTHER

## 2024-03-07 NOTE — ASSESSMENT & PLAN NOTE
Lab Results   Component Value Date    HGBA1C 9.1 (A) 03/07/2024   Glycemic control has improved significantly although still above goal.  She has tolerated current regimen which will be continued.  She is interested in CGM which will be ordered through [employer] system.  In the meantime she was instructed to continue checking her blood sugar 3-4 times a day and send her sugar log in few weeks to review.  Return back in 3 months.  Labs prior to next visit.

## 2024-03-07 NOTE — PROGRESS NOTES
Established patient Progress Note      Cc: diabetes    Referring Provider  No referring provider defined for this encounter.     History of Present Illness:   Helena Newton is a 58 y.o. female with a history of type 2 diabetes with long term use of insulin who presented for follow up.        Reports complications of nephropathy and gastroparesis . Denies recent illness or hospitalizations. Denies recent severe hypoglycemic or severe hyperglycemic episodes. Denies any issues with her current regimen. home glucose monitoring: are performed regularly      Current regimen:     Metformin 500 mg daily  Lantus 30 units occasionally   Novolog 15 units before meals,  Jardiance 25 mg/dl     Home blood glucose readings:   Before breakfast:100 -120   Before lunch: x  Before dinner: 150 -200   Bedtime: x    Hypoglycemic episodes:   H/o of hypoglycemia causing hospitalization or Intervention such as glucagon injection  or ambulance call : no  Has awareness: yes       Opthamology:  UTD;   Podiatry: no     Has hypertension: followed by PCP; not on ACE inhibitor/ARB, }  Has hyperlipidemia: followed by PCP; Crestor 20 mg daily tolerating well, no myalgias.     Thyroid disorders: no  History of pancreatitis: no    Patient Active Problem List   Diagnosis    Facet arthropathy, lumbosacral    Lumbar spondylosis    Chronic midline low back pain with bilateral sciatica    Status post robotic assisted total laparoscopic hysterectomy, bilateral salpingo-oophorectomy    Medical marijuana use    Other chronic pain    Gastroesophageal reflux disease with esophagitis    Bilious vomiting with nausea    Esophageal dysphagia    Gastroparesis due to DM     Fatty liver    Drug-induced constipation    Elevated liver enzymes    Hoarseness    LBBB (left bundle branch block)    Hyperlipidemia    Common migraine without aura    Type 2  diabetes mellitus with hyperglycemia, without long-term current use of insulin ([facility])    Hyperglycemia due to diabetes mellitus ([facility])    Glucosuria    Overweight    COPD (chronic obstructive pulmonary disease) ([facility])    Tobacco use disorder, severe, dependence    Shortness of breath    Microalbuminuria    Vulvovaginitis due to yeast    Mixed stress and urge urinary incontinence    Vulvar rash    Non-ischemic cardiomyopathy ([facility])      Past Medical History:   Diagnosis Date    Angina pectoris ([facility])     recent    Anxiety     Arthritis     Asthma     Carpal tunnel syndrome     Chronic headaches     COPD (chronic obstructive pulmonary disease) ([facility])     Diabetes ([facility])     Diabetes mellitus ([facility])     GERD (gastroesophageal reflux disease)     Headache     Hyperlipidemia     Hypertension     Kidney stone     Left bundle branch block     LBBB    MI (myocardial infarction) ([facility])     Myocardial infarction ([facility])     Neuropathy     PTSD (post-traumatic stress disorder)     RLS (restless legs syndrome)     Shortness of breath       Past Surgical History:   Procedure Laterality Date    ANGIOPLASTY      BREAST SURGERY  2016    Reduction    CARDIAC CATHETERIZATION  2018     Mid LAD: There was a tubular 40 % stenosis     SECTION      COLONOSCOPY      HYSTERECTOMY  2018    Complete    KIDNEY STONE SURGERY      LAPAROSCOPY      SC COLONOSCOPY FLX DX W/COLLJ SPEC WHEN PFRMD N/A 2017    Procedure: EGD AND COLONOSCOPY;  Surgeon: Wilberto Galeano MD;  Location: [facility];  Service: Gastroenterology    SC ESOPHAGOGASTRODUODENOSCOPY TRANSORAL DIAGNOSTIC N/A 10/10/2018    Procedure: ESOPHAGOGASTRODUODENOSCOPY (EGD);  Surgeon: Wilberto Galeano MD;  Location: [facility];  Service: Gastroenterology    SC LAPS TOTAL HYSTERECT 250 GM/< W/RMVL TUBE/OVARY N/A 2018    Procedure: ROBOTIC ASSISTED TOTAL LAPAROSCOPIC HYSTERECTOMY, BILATERAL SALPINGOOPHERECTOMY,;  Surgeon: Daron Valdez MD;  Location: [facility]  OR;  Service: Gynecology Oncology    UPPER GASTROINTESTINAL ENDOSCOPY      WRIST SURGERY Right       Family History   Problem Relation Age of Onset    Diabetes Mother     Breast cancer Mother 45    Lung cancer Father 54    Diabetes Sister     Brain cancer Maternal Aunt 72    Breast cancer Other 25     Social History     Tobacco Use    Smoking status: Every Day     Current packs/day: 1.00     Average packs/day: 1 pack/day for 51.2 years (51.2 ttl pk-yrs)     Types: Cigarettes     Start date: 1/1/1973    Smokeless tobacco: Never    Tobacco comments:     pt. smoked this am 10/10   Substance Use Topics    Alcohol use: No     Comment: Rarely consumes alcohol - As per [facility]      Allergies   Allergen Reactions    Lisinopril Cough    Losartan Dizziness    Other Hives     Surgical tape    Prednisone Other (See Comments)     Thrush      Cat Hair Extract Rash         Current Outpatient Medications:     Accu-Chek Guide test strip, use to CHECK BLOOD GLUCOSE four times a day, Disp: , Rfl:     albuterol (PROVENTIL HFA,VENTOLIN HFA) 90 mcg/act inhaler, Inhale 2 puffs every 6 (six) hours as needed for wheezing, Disp: , Rfl:     BD Pen Needle Dayna 2nd Gen 32G X 4 MM MISC, 4 time a day, Disp: 200 each, Rfl: 2    Bevespi Aerosphere 9-4.8 MCG/ACT inhaler, inhale 2 puffs by mouth every morning and 2 puffs at bedtime, Disp: , Rfl:     bisoprolol (ZEBETA) 5 mg tablet, Take 1 tablet (5 mg total) by mouth daily, Disp: 90 tablet, Rfl: 3    Blood Glucose Monitoring Suppl (OneTouch Verio Reflect) w/Device KIT, Check blood sugars twice daily. Please substitute with appropriate alternative as covered by patient's insurance. Dx: E11.65, Disp: 1 kit, Rfl: 0    buPROPion (WELLBUTRIN XL) 300 mg 24 hr tablet, Take 450 mg by mouth every morning, Disp: , Rfl:     clotrimazole-betamethasone (LOTRISONE) 1-0.05 % cream, Apply topically 2 (two) times a day, Disp: 30 g, Rfl: 0    Constulose 10 GM/15ML solution, take 30 milliliters by mouth twice a day,  Disp: 946 mL, Rfl: 1    DULoxetine (CYMBALTA) 60 mg delayed release capsule, take 1 capsule by mouth every morning DO NOT CRUSH, CHEW, AND/OR DIVIDE, Disp: , Rfl:     Empagliflozin 25 MG TABS, Take 1 tablet (25 mg total) by mouth every morning, Disp: 90 tablet, Rfl: 0    glucose blood (OneTouch Verio) test strip, Check blood sugars twice daily. Please substitute with appropriate alternative as covered by patient's insurance. Dx: E11.65, Disp: 200 each, Rfl: 3    hydrOXYzine pamoate (VISTARIL) 25 mg capsule, take 1 capsule by mouth daily if needed for anxiety, Disp: , Rfl:     ibuprofen (MOTRIN) 200 mg tablet, Take 200 mg by mouth every 6 (six) hours as needed for mild pain, Disp: , Rfl:     insulin glargine (LANTUS SOLOSTAR) 100 units/mL injection pen, Inject 30 Units under the skin daily at bedtime, Disp: 27 mL, Rfl: 0    metFORMIN (GLUCOPHAGE-XR) 500 mg 24 hr tablet, take 2 tablets by mouth twice a day with meals, Disp: 360 tablet, Rfl: 0    methocarbamol (ROBAXIN) 750 mg tablet, Take 500 mg by mouth every 6 (six) hours as needed for muscle spasms, Disp: , Rfl:     methylphenidate (RITALIN) 20 MG tablet, Take 20 mg by mouth every morning, Disp: , Rfl:     mupirocin (BACTROBAN) 2 % ointment, apply topically to affected area three times a day for 14 days, Disp: , Rfl:     naproxen (NAPROSYN) 375 mg tablet, Take 1 tablet (375 mg total) by mouth 2 (two) times a day with meals, Disp: 20 tablet, Rfl: 0    NovoLOG FlexPen 100 units/mL injection pen, Inject 12 Units under the skin 3 (three) times a day with meals (Patient taking differently: Inject 12 Units under the skin 3 (three) times a day with meals Take 15 units in the afternoon and 30 units at bedtime daily), Disp: 33 mL, Rfl: 0    nystatin-triamcinolone (MYCOLOG-II) ointment, Apply topically 2 (two) times a day, Disp: 30 g, Rfl: 5    ondansetron (ZOFRAN) 4 mg tablet, Take 1 tablet (4 mg total) by mouth every 8 (eight) hours as needed for nausea or vomiting, Disp:  20 tablet, Rfl: 0    OneTouch Delica Lancets 33G MISC, Check blood sugars twice daily. Please substitute with appropriate alternative as covered by patient's insurance. Dx: E11.65, Disp: 200 each, Rfl: 3    oxyCODONE (ROXICODONE) 5 immediate release tablet, Take 5 mg by mouth 3 (three) times a day as needed, Disp: , Rfl:     pantoprazole (PROTONIX) 40 mg tablet, Take 1 tablet (40 mg total) by mouth 2 (two) times a day, Disp: 60 tablet, Rfl: 5    potassium chloride SA (KLOR-CON M15) 15 MEQ tablet, Take 15 mEq by mouth 2 (two) times a day, Disp: , Rfl:     prazosin (MINIPRESS) 5 mg capsule, , Disp: , Rfl:     pregabalin (LYRICA) 75 mg capsule, Take 75 mg by mouth 2 (two) times a day, Disp: , Rfl:     rosuvastatin (CRESTOR) 20 MG tablet, Take 1 tablet (20 mg total) by mouth daily, Disp: 90 tablet, Rfl: 3    terconazole (TERAZOL 7) 0.4 % vaginal cream, Insert 1 applicator into the vagina daily at bedtime, Disp: 45 g, Rfl: 0    zolpidem (AMBIEN) 10 mg tablet, Take 10 mg by mouth daily at bedtime as needed for sleep, Disp: , Rfl:     naloxone (NARCAN) 4 mg/0.1 mL nasal spray, ADMINISTER A SINGLE SPRAY OF NARCAN IN ONE NOSTRIL REPEAT AFTER 3 MINUTES IF NO OR MINIMAL RESPONSE (Patient not taking: Reported on 1/8/2024), Disp: , Rfl:     nitroglycerin (NITROSTAT) 0.4 mg SL tablet, Place 1 tablet (0.4 mg total) under the tongue every 5 (five) minutes as needed for chest pain (Patient not taking: Reported on 1/8/2024), Disp: 25 tablet, Rfl: 0    Spiriva Respimat 2.5 MCG/ACT AERS inhaler, , Disp: , Rfl:   Review of Systems   Constitutional:  Negative for appetite change, fatigue and unexpected weight change.   HENT:  Negative for trouble swallowing and voice change.    Eyes:  Negative for visual disturbance.   Respiratory:  Negative for cough, shortness of breath and wheezing.    Cardiovascular:  Negative for palpitations and leg swelling.   Gastrointestinal:  Negative for abdominal pain, constipation, diarrhea, nausea and  "vomiting.   Endocrine: Negative for cold intolerance, heat intolerance, polyphagia and polyuria.   Musculoskeletal:  Negative for arthralgias.   Skin:  Negative for color change, rash and wound.   Neurological:  Negative for dizziness, tremors, weakness, light-headedness, numbness and headaches.   Psychiatric/Behavioral:  Negative for agitation and sleep disturbance. The patient is not nervous/anxious.        Physical Exam:  Body mass index is 27.1 kg/m².  /62 (BP Location: Right arm, Patient Position: Sitting, Cuff Size: Standard)   Pulse 73   Temp 98.1 °F (36.7 °C)   Ht 5' 1\" (1.549 m)   Wt 65 kg (143 lb 6.4 oz)   SpO2 97%   BMI 27.10 kg/m²    Wt Readings from Last 3 Encounters:   03/07/24 65 kg (143 lb 6.4 oz)   02/09/24 66.2 kg (146 lb)   02/08/24 66.2 kg (146 lb)       GEN: NAD  E/n/m nl facies,   Eyes: no stare or proptosis,   CV; heart reg rate s1s2 nl, no  Murmur   Resp: CTAB, good effort  Ab+BS  Neuro: no tremor,   Skin: warm and dry,   Ext:  no edema bilaterally,   Psych: nl mood and affect, no gross lapses in memory      Labs:   No components found for: \"HA1C\"  No components found for: \"GLU\"    Lab Results   Component Value Date    CREATININE 0.63 12/06/2023    CREATININE 0.65 09/27/2023    CREATININE 0.74 03/03/2023    BUN 19 12/06/2023    K 4.4 12/06/2023    CL 97 12/06/2023    CO2 33 (H) 12/06/2023     eGFR   Date Value Ref Range Status   12/06/2023 100 ml/min/1.73sq m Final     No components found for: \"MALBCRER\"    Lab Results   Component Value Date    HDL 39 (L) 12/06/2023    TRIG 227 (H) 12/06/2023       Lab Results   Component Value Date    ALT 16 12/06/2023    AST 15 12/06/2023    GGT 18 11/02/2022    ALKPHOS 94 12/06/2023       No results found for: \"TSH\", \"FREET4\", \"TSI\"    Impression:  1. Type 2 diabetes mellitus with hyperglycemia, without long-term current use of insulin (HCC)    2. Type 2 diabetes mellitus with hyperglycemia, with long-term current use of insulin (HCC)    3. " Hyperlipidemia, unspecified hyperlipidemia type    4. Microalbuminuria    5. Vitamin D deficiency           Plan:    Problem List Items Addressed This Visit          Endocrine    Type 2 diabetes mellitus with hyperglycemia, without long-term current use of insulin ([facility]) - Primary       Lab Results   Component Value Date    HGBA1C 9.1 (A) 03/07/2024   Glycemic control has improved significantly although still above goal.  She has tolerated current regimen which will be continued.  She is interested in CGM which will be ordered through [facility] system.  In the meantime she was instructed to continue checking her blood sugar 3-4 times a day and send her sugar log in few weeks to review.  Return back in 3 months.  Labs prior to next visit.           Relevant Medications    metFORMIN (GLUCOPHAGE-XR) 500 mg 24 hr tablet    NovoLOG FlexPen 100 units/mL injection pen    insulin glargine (LANTUS SOLOSTAR) 100 units/mL injection pen    Other Relevant Orders    POCT hemoglobin A1c (Completed)    Hemoglobin A1C    Comprehensive metabolic panel    Lipid Panel with Direct LDL reflex       Other    Hyperlipidemia     Continue Crestor 20 mg daily         Relevant Orders    Lipid Panel with Direct LDL reflex    Microalbuminuria     Continue Jardiance.           Relevant Orders    Comprehensive metabolic panel    Albumin / creatinine urine ratio     Other Visit Diagnoses       Type 2 diabetes mellitus with hyperglycemia, with long-term current use of insulin ([facility])        Relevant Medications    metFORMIN (GLUCOPHAGE-XR) 500 mg 24 hr tablet    NovoLOG FlexPen 100 units/mL injection pen    insulin glargine (LANTUS SOLOSTAR) 100 units/mL injection pen    Vitamin D deficiency        Relevant Orders    Vitamin D 25 hydroxy                  Discussed with the patient and all questioned fully answered. She will call me if any problems arise.    Counseled patient on diagnostic results, prognosis, risk and benefit of treatment options,  instruction for management, importance of treatment compliance, Risk  factor reduction and impressions      Mary Bruce MD

## 2024-03-09 RX ORDER — INSULIN ASPART 100 [IU]/ML
INJECTION, SOLUTION INTRAVENOUS; SUBCUTANEOUS
Qty: 33 ML | Refills: 0 | Status: SHIPPED | OUTPATIENT
Start: 2024-03-09

## 2024-03-11 NOTE — TELEPHONE ENCOUNTER
[facility] [phone] is requesting clarification on instructions for Max daily dose on Novolog.

## 2024-03-14 ENCOUNTER — HOSPITAL ENCOUNTER (OUTPATIENT)
Dept: RADIOLOGY | Facility: HOSPITAL | Age: 59
End: 2024-03-14
Payer: COMMERCIAL

## 2024-03-14 DIAGNOSIS — R13.19 ESOPHAGEAL DYSPHAGIA: ICD-10-CM

## 2024-03-14 PROCEDURE — 92611 MOTION FLUOROSCOPY/SWALLOW: CPT

## 2024-03-14 PROCEDURE — 74230 X-RAY XM SWLNG FUNCJ C+: CPT

## 2024-03-14 NOTE — PROCEDURES
Video Swallow Study      Patient Name: Helena Newton  Today's Date: 3/14/2024        Past Medical History  Past Medical History:   Diagnosis Date    Angina pectoris (HCC)     recent    Anxiety     Arthritis     Asthma     Carpal tunnel syndrome     Chronic headaches     COPD (chronic obstructive pulmonary disease) (HCC)     Diabetes (HCC)     Diabetes mellitus (HCC)     GERD (gastroesophageal reflux disease)     Headache     Hyperlipidemia     Hypertension     Kidney stone     Left bundle branch block     LBBB    MI (myocardial infarction) (HCC)     Myocardial infarction (HCC)     Neuropathy     PTSD (post-traumatic stress disorder)     RLS (restless legs syndrome)     Shortness of breath         Past Surgical History  Past Surgical History:   Procedure Laterality Date    ANGIOPLASTY      BREAST SURGERY  2016    Reduction    CARDIAC CATHETERIZATION  2018     Mid LAD: There was a tubular 40 % stenosis     SECTION      COLONOSCOPY      HYSTERECTOMY  2018    Complete    KIDNEY STONE SURGERY      LAPAROSCOPY      HI COLONOSCOPY FLX DX W/COLLJ SPEC WHEN PFRMD N/A 2017    Procedure: EGD AND COLONOSCOPY;  Surgeon: Wilberto Galeano MD;  Location: [facility];  Service: Gastroenterology    HI ESOPHAGOGASTRODUODENOSCOPY TRANSORAL DIAGNOSTIC N/A 10/10/2018    Procedure: ESOPHAGOGASTRODUODENOSCOPY (EGD);  Surgeon: Wilberto Galeano MD;  Location: [facility];  Service: Gastroenterology    HI LAPS TOTAL HYSTERECT 250 GM/< W/RMVL TUBE/OVARY N/A 2018    Procedure: ROBOTIC ASSISTED TOTAL LAPAROSCOPIC HYSTERECTOMY, BILATERAL SALPINGOOPHERECTOMY,;  Surgeon: Daron Valdez MD;  [facility]: [facility];  Service: Gynecology Oncology    UPPER GASTROINTESTINAL ENDOSCOPY      WRIST SURGERY Right        Summary:  Images are on PACS for review.     Oral Phase : Pt presented with mild oral dysphagia. Mastication was mildly prolonged as pt edentulous. Pt did expectorate  "part of hard cookie trial due to increased difficulties with mastication.  Bolus control and formation were WNL. Transfer was piecemeal however WNL.     Pharyngeal Phase : Pt presented with pharyngeal stage of swallow WNL. Swallow initiation was prompt. Hyoid elevation, laryngeal excursion, and pharyngeal constriction were WNL. Trace vallecular retention occurred with solids. Brief pill stasis occurred at the valleculae. No pyriform retention observed with trials. No aspiration or penetration occurred throughout study.    Per Esophageal screen   Pt had stasis throughout the esophagus with solids. Able to clear with liquid wash. 13 mm pill cleared adequately. No residue at completion of study.     Recommendations:  Diet: Regular soft solids   Liquids: Thin   Meds:As tolerated  Strategies:slow rate, alternate liquids with solids and swallow prior to additional po  Upright position  F/u ST tx:no  Aspiration Precautions  Reflux Precautions  Consider consult with: continue f/u with GI  Results reviewed with: pt, family  If a dedicated assessment of the esophagus is desired, consider esophagram/barium swallow or EGD.    Pt is a 58 year old female who was referred by Sol Flores. Reported she occasionally forgets to swallow. Denied that occurs during certain times of the day or with certain foods.She also reported occasional globus sensation at meals.     H&P/Admit info, pertinent provider notes/procedures/studies/PMH:(copied and placed in this report)  Patient is a 58 y.o. female who presents today for follow-up regarding GERD and nausea. Pmhx sig for Menendez's esophagus, gastroparesis, CAD, HTN, HLD, DM2, COPD, anxiety/depression, tobacco use disorder.      Pt was last evaluated in 11/2023. At that time, she was complaining of excess heartburn symptoms, had ran out of PPI. Was recommended she restart PPI daily. Was having some chronic swallowing issues, attributed this to \"forgetting to swallow\" at times. Was " also dealing with constipation, previously tried OTC laxatives and linaclotide up to 290mcg without adequate results/SE. Recommended to trial M[employer] given her hx of gastroparesis. This was not covered by insurance and thus lactulose was prescribed for pt.     Special Studies   Endoscopic history:   EGD: 02/2022: Menendez's tongue 34 cm; Mild inflammation of the antrum and incisura  A. Stomach, antrum, biopsy:  Chronic inactive antral gastritis. Negative for Helicobacter pylori, by H&E stain. Negative for atrophy, intestinal metaplasia, dysplasia or carcinoma.  B. Stomach, body, biopsy: Chronic inactive oxyntic gastritis. Negative for Helicobacter pylori, by H&E stain. Negative for atrophy, intestinal metaplasia, dysplasia or carcinoma  C. Stomach, fundus, biopsy: Chronic inactive oxyntic gastritis. Negative for Helicobacter pylori, by H&E stain. Negative for atrophy, intestinal metaplasia, dysplasia or carcinoma  D. Esophagus, 34 cm, biopsy: Gastroesophageal junction mucosa with intestinal metaplasia, compatible with Menendez's esophagus in the correct clinical setting. Negative for dysplasia or carcinoma.  E. Esophagus, proximal, biopsy: Benign squamous mucosa without significant histopathologic abnormality.Intraepithelial eosinophils are not increased, negative for features of eosinophilic esophagitis. Negative for dysplasia or carcinoma  Colon: 02/2022: One sessile polyp measuring smaller than 5 mm in the distal rectum; otherwise normal   F. Colon, rectal polyp, biopsy: Inflammatory polyp with hyperplastic/reactive surface epithelial change, negative for dysplasia or carcinoma   Esophageal manometry 12/2018: normal motility     Previous VBS: none     Precautions  n/a       Food allergies:  No known    Current diet:  Regular and thin     Premorbid diet:  Regular and thin    Dentition  Edentulous    Oral Mech  WNL   O2 requirements:  Room Air    Vocal Quality/Speech WNL   Cognitive status:  Alert         Consistencies administered: Barium laden applesauce,nutri grain bar, hard cookie,sandwich bite,   thin liquids, and 13mm barium pill. Liquids were administered by cup and straw.     Pt was seated laterally at 90 degrees.   Pt  stood to be viewed in AP position

## 2024-03-14 NOTE — TELEPHONE ENCOUNTER
Mic calling from [facility] [place], states that they need clarification for the Novolog script.  He said they need a max daily dose for the patient as they need to provide this information to the insurance.  Please advise.  Mic said staff can just call back with a verbal, or resend the script electronically.

## 2024-04-05 DIAGNOSIS — E11.65 TYPE 2 DIABETES MELLITUS WITH HYPERGLYCEMIA, WITHOUT LONG-TERM CURRENT USE OF INSULIN (HCC): ICD-10-CM

## 2024-04-05 RX ORDER — EMPAGLIFLOZIN 25 MG/1
25 TABLET, FILM COATED ORAL EVERY MORNING
Qty: 90 TABLET | Refills: 1 | Status: SHIPPED | OUTPATIENT
Start: 2024-04-05

## 2024-06-10 ENCOUNTER — OFFICE VISIT (OUTPATIENT)
Dept: FAMILY MEDICINE CLINIC | Facility: CLINIC | Age: 59
End: 2024-06-10
Payer: COMMERCIAL

## 2024-06-10 VITALS
WEIGHT: 142 LBS | BODY MASS INDEX: 26.81 KG/M2 | DIASTOLIC BLOOD PRESSURE: 58 MMHG | HEART RATE: 60 BPM | TEMPERATURE: 96.9 F | SYSTOLIC BLOOD PRESSURE: 92 MMHG | HEIGHT: 61 IN | OXYGEN SATURATION: 92 %

## 2024-06-10 DIAGNOSIS — Z13.29 SCREENING FOR THYROID DISORDER: ICD-10-CM

## 2024-06-10 DIAGNOSIS — E11.65 TYPE 2 DIABETES MELLITUS WITH HYPERGLYCEMIA, WITHOUT LONG-TERM CURRENT USE OF INSULIN (HCC): ICD-10-CM

## 2024-06-10 DIAGNOSIS — E78.5 HYPERLIPIDEMIA, UNSPECIFIED HYPERLIPIDEMIA TYPE: ICD-10-CM

## 2024-06-10 DIAGNOSIS — Z13.0 SCREENING FOR DEFICIENCY ANEMIA: ICD-10-CM

## 2024-06-10 DIAGNOSIS — R74.8 ELEVATED LIVER ENZYMES: ICD-10-CM

## 2024-06-10 DIAGNOSIS — F17.210 SMOKING GREATER THAN 20 PACK YEARS: ICD-10-CM

## 2024-06-10 DIAGNOSIS — Z13.220 SCREENING FOR LIPID DISORDERS: ICD-10-CM

## 2024-06-10 DIAGNOSIS — Z23 ENCOUNTER FOR IMMUNIZATION: Primary | ICD-10-CM

## 2024-06-10 DIAGNOSIS — R80.9 MICROALBUMINURIA: ICD-10-CM

## 2024-06-10 DIAGNOSIS — K76.0 FATTY LIVER: ICD-10-CM

## 2024-06-10 DIAGNOSIS — Z13.5 ENCOUNTER FOR SCREENING FOR DIABETIC RETINOPATHY: ICD-10-CM

## 2024-06-10 PROCEDURE — G2211 COMPLEX E/M VISIT ADD ON: HCPCS | Performed by: NURSE PRACTITIONER

## 2024-06-10 PROCEDURE — 99214 OFFICE O/P EST MOD 30 MIN: CPT | Performed by: NURSE PRACTITIONER

## 2024-06-10 NOTE — PROGRESS NOTES
Assessment/Plan:    Problem List Items Addressed This Visit     Fatty liver    Relevant Orders    Comprehensive metabolic panel    Elevated liver enzymes    Relevant Orders    Comprehensive metabolic panel    Hyperlipidemia    Relevant Orders    Lipid Panel with Direct LDL reflex    Type 2 diabetes mellitus with hyperglycemia, without long-term current use of insulin (HCC)    Relevant Orders    Comprehensive metabolic panel    Hemoglobin A1C    Albumin / creatinine urine ratio    Microalbuminuria    Relevant Orders    Albumin / creatinine urine ratio   Other Visit Diagnoses     Encounter for immunization    -  Primary    Encounter for screening for diabetic retinopathy        Relevant Orders    Ambulatory Referral to Ophthalmology    Screening for thyroid disorder        Relevant Orders    TSH, 3rd generation with Free T4 reflex    Screening for deficiency anemia        Relevant Orders    CBC and differential    Screening for lipid disorders        Smoking greater than 20 pack years        Relevant Orders    CT lung screening program           Diagnoses and all orders for this visit:    Encounter for immunization    Encounter for screening for diabetic retinopathy  -     Ambulatory Referral to Ophthalmology; Future    Screening for thyroid disorder  -     TSH, 3rd generation with Free T4 reflex; Future    Screening for deficiency anemia  -     CBC and differential; Future    Screening for lipid disorders    Type 2 diabetes mellitus with hyperglycemia, without long-term current use of insulin (HCC)  -     Comprehensive metabolic panel; Future  -     Hemoglobin A1C; Future  -     Albumin / creatinine urine ratio; Future    Hyperlipidemia, unspecified hyperlipidemia type  -     Lipid Panel with Direct LDL reflex; Future    Elevated liver enzymes  -     Comprehensive metabolic panel; Future    Smoking greater than 20 pack years  -     CT lung screening program; Future    Microalbuminuria  -     Albumin / creatinine  urine ratio; Future    Fatty liver  -     Comprehensive metabolic panel; Future        No problem-specific Assessment & Plan notes found for this encounter.        Subjective:      Patient ID: Helena Newton is a 58 y.o. female.    Presents for a routine 6 month DM visit.    Patient presents with:  Follow-up: 6 month f/u-due for foot exam-Pt seeing Endo 6/19  Tickle causing Bad cough, runny nose.Been taking OTC meds (benedryl/mucinex) nothing helping.      Exam reveals s/s above are consistent with seasonal allergy/high pollen count.     Diabetes  She presents for her follow-up diabetic visit. She has type 2 diabetes mellitus. Her disease course has been improving. There are no hypoglycemic associated symptoms. There are no diabetic associated symptoms. Pertinent negatives for diabetes include no fatigue and no weight loss. There are no hypoglycemic complications. Symptoms are stable. There are no diabetic complications. Risk factors for coronary artery disease include dyslipidemia, diabetes mellitus, obesity and post-menopausal. Current diabetic treatment includes diet, insulin injections and oral agent (dual therapy). She is compliant with treatment all of the time. Her weight is stable. She is following a generally healthy diet. Meal planning includes avoidance of concentrated sweets. An ACE inhibitor/angiotensin II receptor blocker is being taken. Eye exam is current.   Breathing Problem  She complains of cough. This is a chronic problem. The problem has been unchanged. Associated symptoms include postnasal drip and rhinorrhea. Pertinent negatives include no orthopnea or weight loss. Her symptoms are aggravated by exposure to smoke. Her symptoms are alleviated by steroid inhaler and beta-agonist. She reports significant improvement on treatment. Her past medical history is significant for COPD.   Heartburn  She complains of coughing. This is a chronic problem. Pertinent negatives include no anemia, fatigue,  melena, muscle weakness, orthopnea or weight loss. Risk factors include obesity. She has tried a PPI and a diet change for the symptoms. The treatment provided moderate relief.       A1c noted   Lab Results   Component Value Date    HGBA1C 9.1 (A) 2024        Recommend lifestyle and dietary changes which include plant based low glycemic diet.    Will monitor in 6  months.       The following portions of the patient's history were reviewed and updated as appropriate:   She has a past medical history of Angina pectoris (HCC), Anxiety, Arthritis, Asthma, Carpal tunnel syndrome, Chronic headaches, COPD (chronic obstructive pulmonary disease) (HCC), Diabetes (HCC), Diabetes mellitus (HCC), GERD (gastroesophageal reflux disease), Headache, Hyperlipidemia, Hypertension, Kidney stone, Left bundle branch block, MI (myocardial infarction) ([facility]), Myocardial infarction (HCC) (), Neuropathy, PTSD (post-traumatic stress disorder), RLS (restless legs syndrome), and Shortness of breath.,  does not have any pertinent problems on file.,   has a past surgical history that includes Kidney stone surgery;  section; Wrist surgery (Right); LAPAROSCOPY; pr colonoscopy flx dx w/collj spec when pfrmd (N/A, 2017); Colonoscopy; Upper gastrointestinal endoscopy; pr laps total hysterect 250 gm/< w/rmvl tube/ovary (N/A, 2018); pr esophagogastroduodenoscopy transoral diagnostic (N/A, 10/10/2018); Cardiac catheterization (2018); Hysterectomy (2018); Breast surgery (2016); and Angioplasty.,  family history includes Brain cancer (age of onset: 72) in her maternal aunt; Breast cancer (age of onset: 25) in her other; Breast cancer (age of onset: 45) in her mother; Diabetes in her mother and sister; Lung cancer (age of onset: 54) in her father.,   reports that she has been smoking cigarettes. She started smoking about 51 years ago. She has a 51.4 pack-year smoking history. She has never used smokeless tobacco.  She reports current drug use. Frequency: 7.00 times per week. Drug: Marijuana. She reports that she does not drink alcohol.,  is allergic to lisinopril, losartan, other, prednisone, and cat hair extract..  Current Outpatient Medications   Medication Sig Dispense Refill   • Accu-Chek Guide test strip use to CHECK BLOOD GLUCOSE four times a day     • albuterol (PROVENTIL HFA,VENTOLIN HFA) 90 mcg/act inhaler Inhale 2 puffs every 6 (six) hours as needed for wheezing     • BD Pen Needle Dayna 2nd Gen 32G X 4 MM MISC 4 time a day 200 each 2   • Bevespi Aerosphere 9-4.8 MCG/ACT inhaler inhale 2 puffs by mouth every morning and 2 puffs at bedtime     • bisoprolol (ZEBETA) 5 mg tablet Take 1 tablet (5 mg total) by mouth daily 90 tablet 3   • Blood Glucose Monitoring Suppl (OneTouch Verio Reflect) w/Device KIT Check blood sugars twice daily. Please substitute with appropriate alternative as covered by patient's insurance. Dx: E11.65 1 kit 0   • buPROPion (WELLBUTRIN XL) 300 mg 24 hr tablet Take 450 mg by mouth every morning     • clotrimazole-betamethasone (LOTRISONE) 1-0.05 % cream Apply topically 2 (two) times a day 30 g 0   • Constulose 10 GM/15ML solution take 30 milliliters by mouth twice a day 946 mL 1   • DULoxetine (CYMBALTA) 60 mg delayed release capsule take 1 capsule by mouth every morning DO NOT CRUSH, CHEW, AND/OR DIVIDE     • Empagliflozin (Jardiance) 25 MG TABS take 1 tablet by mouth every morning 90 tablet 1   • glucose blood (OneTouch Verio) test strip Check blood sugars twice daily. Please substitute with appropriate alternative as covered by patient's insurance. Dx: E11.65 200 each 3   • hydrOXYzine pamoate (VISTARIL) 25 mg capsule take 1 capsule by mouth daily if needed for anxiety     • ibuprofen (MOTRIN) 200 mg tablet Take 200 mg by mouth every 6 (six) hours as needed for mild pain     • metFORMIN (GLUCOPHAGE-XR) 500 mg 24 hr tablet 2 tablets at bedtime 180 tablet 0   • methocarbamol (ROBAXIN) 750 mg  tablet Take 500 mg by mouth every 6 (six) hours as needed for muscle spasms     • mupirocin (BACTROBAN) 2 % ointment apply topically to affected area three times a day for 14 days     • naproxen (NAPROSYN) 375 mg tablet Take 1 tablet (375 mg total) by mouth 2 (two) times a day with meals 20 tablet 0   • NovoLOG FlexPen 100 units/mL injection pen 12 units for low carb meals and 15 units for high carb meals. 33 mL 0   • nystatin-triamcinolone (MYCOLOG-II) ointment Apply topically 2 (two) times a day 30 g 5   • ondansetron (ZOFRAN) 4 mg tablet Take 1 tablet (4 mg total) by mouth every 8 (eight) hours as needed for nausea or vomiting 20 tablet 0   • OneTouch Delica Lancets 33G MISC Check blood sugars twice daily. Please substitute with appropriate alternative as covered by patient's insurance. Dx: E11.65 200 each 3   • oxyCODONE (ROXICODONE) 5 immediate release tablet Take 5 mg by mouth 3 (three) times a day as needed     • pantoprazole (PROTONIX) 40 mg tablet Take 1 tablet (40 mg total) by mouth 2 (two) times a day 60 tablet 5   • potassium chloride SA (KLOR-CON M15) 15 MEQ tablet Take 15 mEq by mouth 2 (two) times a day     • prazosin (MINIPRESS) 5 mg capsule      • pregabalin (LYRICA) 75 mg capsule Take 75 mg by mouth 2 (two) times a day     • rosuvastatin (CRESTOR) 20 MG tablet Take 1 tablet (20 mg total) by mouth daily 90 tablet 3   • terconazole (TERAZOL 7) 0.4 % vaginal cream Insert 1 applicator into the vagina daily at bedtime 45 g 0   • zolpidem (AMBIEN) 10 mg tablet Take 10 mg by mouth daily at bedtime as needed for sleep     • insulin glargine (LANTUS SOLOSTAR) 100 units/mL injection pen Inject 30 Units under the skin daily at bedtime 27 mL 0   • methylphenidate (RITALIN) 20 MG tablet Take 20 mg by mouth every morning (Patient not taking: Reported on 6/10/2024)     • naloxone (NARCAN) 4 mg/0.1 mL nasal spray ADMINISTER A SINGLE SPRAY OF NARCAN IN ONE NOSTRIL REPEAT AFTER 3 MINUTES IF NO OR MINIMAL RESPONSE  "(Patient not taking: Reported on 1/8/2024)     • nitroglycerin (NITROSTAT) 0.4 mg SL tablet Place 1 tablet (0.4 mg total) under the tongue every 5 (five) minutes as needed for chest pain (Patient not taking: Reported on 1/8/2024) 25 tablet 0   • Spiriva Respimat 2.5 MCG/ACT AERS inhaler  (Patient not taking: Reported on 11/8/2023)       No current facility-administered medications for this visit.           Review of Systems   Constitutional:  Negative for fatigue and weight loss.   HENT:  Positive for postnasal drip and rhinorrhea.    Respiratory:  Positive for cough.    Gastrointestinal:  Negative for melena.   Musculoskeletal:  Negative for muscle weakness.   All other systems reviewed and are negative.        Objective:  Vitals:    06/10/24 1307   BP: 92/58   Pulse: 60   Temp: (!) 96.9 °F (36.1 °C)   TempSrc: Tympanic   SpO2: 92%   Weight: 64.4 kg (142 lb)   Height: 5' 1\" (1.549 m)     Body mass index is 26.83 kg/m².          Physical Exam  Vitals and nursing note reviewed.   Constitutional:       Appearance: She is well-developed. She is obese.   HENT:      Head: Normocephalic and atraumatic.      Right Ear: Tympanic membrane, ear canal and external ear normal.      Left Ear: Tympanic membrane, ear canal and external ear normal.      Nose: Nose normal.      Mouth/Throat:      Mouth: Mucous membranes are moist.      Pharynx: Uvula midline.   Eyes:      General: Lids are normal.      Conjunctiva/sclera: Conjunctivae normal.      Pupils: Pupils are equal, round, and reactive to light.   Neck:      Thyroid: No thyroid mass or thyromegaly.      Vascular: No JVD.      Trachea: Trachea and phonation normal.   Cardiovascular:      Rate and Rhythm: Normal rate and regular rhythm.      Pulses: Normal pulses. no weak pulses.           Dorsalis pedis pulses are 2+ on the right side and 2+ on the left side.        Posterior tibial pulses are 2+ on the right side and 2+ on the left side.      Heart sounds: Normal heart " sounds, S1 normal and S2 normal. No murmur heard.     No friction rub. No gallop.   Pulmonary:      Effort: Pulmonary effort is normal.      Breath sounds: Normal breath sounds.   Abdominal:      General: Bowel sounds are normal.      Palpations: Abdomen is soft.      Tenderness: There is no abdominal tenderness.   Genitourinary:     Comments: Deferred   Musculoskeletal:         General: Normal range of motion.      Cervical back: Full passive range of motion without pain, normal range of motion and neck supple.      Right lower leg: No edema.      Left lower leg: No edema.   Feet:      Right foot:      Skin integrity: Warmth and dry skin present. No ulcer, skin breakdown, erythema or callus.      Left foot:      Skin integrity: Warmth and dry skin present. No ulcer, skin breakdown, erythema or callus.   Lymphadenopathy:      Head:      Right side of head: No submental, submandibular, tonsillar, preauricular, posterior auricular or occipital adenopathy.      Left side of head: No submental, submandibular, tonsillar, preauricular, posterior auricular or occipital adenopathy.      Cervical: No cervical adenopathy.   Skin:     General: Skin is warm and dry.      Capillary Refill: Capillary refill takes less than 2 seconds.   Neurological:      General: No focal deficit present.      Mental Status: She is alert and oriented to person, place, and time.      Cranial Nerves: No cranial nerve deficit.      Sensory: Sensation is intact.      Motor: Motor function is intact.      Coordination: Coordination is intact.      Gait: Gait is intact.      Deep Tendon Reflexes: Reflexes are normal and symmetric.   Psychiatric:         Attention and Perception: Attention and perception normal.         Mood and Affect: Mood and affect normal.         Speech: Speech normal.         Behavior: Behavior normal. Behavior is cooperative.         Thought Content: Thought content normal.         Cognition and Memory: Cognition normal.          "Judgment: Judgment normal.           Patient's shoes and socks removed.    Right Foot/Ankle   Right Foot Inspection  Skin Exam: skin normal, skin intact, dry skin and warmth. No callus, no erythema, no maceration, no abnormal color, no pre-ulcer, no ulcer and no callus.     Toe Exam: ROM and strength within normal limits. No swelling, no tenderness, erythema and  no right toe deformity    Sensory   Vibration: intact  Proprioception: intact  Monofilament testing: intact    Vascular  Capillary refills: < 3 seconds  The right DP pulse is 2+. The right PT pulse is 2+.     Right Toe  - Comprehensive Exam  Ecchymosis: none  Arch: normal  Hammertoes: absent  Claw Toes: absent  Swelling: none   Tenderness: none       Left Foot/Ankle  Left Foot Inspection  Skin Exam: skin normal, skin intact, dry skin and warmth. No erythema, no maceration, normal color, no pre-ulcer, no ulcer and no callus.     Toe Exam: ROM and strength within normal limits. No swelling, no tenderness, no erythema and no left toe deformity.     Sensory   Vibration: intact  Proprioception: intact  Monofilament testing: intact    Vascular  Capillary refills: < 3 seconds  The left DP pulse is 2+. The left PT pulse is 2+.     Left Toe  - Comprehensive Exam  Ecchymosis: none  Arch: normal  Hammertoes: absent  Claw toes: absent  Swelling: none   Tenderness: none       Assign Risk Category  No deformity present  No loss of protective sensation  No weak pulses  Risk: 0              Portions of the record may have been created with voice recognition software. Occasional wrong word or \"sound a like\" substitutions may have occurred due to the inherent limitations of voice recognition software. Read the chart carefully and recognize, using context, where substitutions have occurred. Contact me with any questions.  "

## 2024-06-11 ENCOUNTER — TELEPHONE (OUTPATIENT)
Dept: ADMINISTRATIVE | Facility: OTHER | Age: 59
End: 2024-06-11

## 2024-06-11 NOTE — LETTER
Diabetic Eye Exam Form    Date Requested: 24  Patient: Helena Newton  Patient : 1965   Referring Provider: NEELAM Ventura      DIABETIC Eye Exam Date _______________________________      Type of Exam MUST be documented for Diabetic Eye Exams. Please CHECK ONE.     Retinal Exam       Dilated Retinal Exam       OCT       Optomap-Iris Exam      Fundus Photography       Left Eye - Please check Retinopathy or No Retinopathy        Exam did show retinopathy    Exam did not show retinopathy       Right Eye - Please check Retinopathy or No Retinopathy       Exam did show retinopathy    Exam did not show retinopathy       Comments __________________________________________________________    Practice Providing Exam ______________________________________________    Exam Performed By (print name) _______________________________________      Provider Signature ___________________________________________________      These reports are needed for  compliance.  Please fax this completed form and a copy of the Diabetic Eye Exam report to our office located at [address] [place] [address] as soon as possible via Fax [phone] attention Keily: Phone [phone]  We thank you for your assistance in treating our mutual patient.

## 2024-06-11 NOTE — LETTER
Diabetic Eye Exam Form    Date Requested: 24  Patient: Helena Newton  Patient : 1965   Referring Provider: NEELAM Ventura      DIABETIC Eye Exam Date _______________________________      Type of Exam MUST be documented for Diabetic Eye Exams. Please CHECK ONE.     Retinal Exam       Dilated Retinal Exam       OCT       Optomap-Iris Exam      Fundus Photography       Left Eye - Please check Retinopathy or No Retinopathy        Exam did show retinopathy    Exam did not show retinopathy       Right Eye - Please check Retinopathy or No Retinopathy       Exam did show retinopathy    Exam did not show retinopathy       Comments __________________________________________________________    Practice Providing Exam ______________________________________________    Exam Performed By (print name) _______________________________________      Provider Signature ___________________________________________________      These reports are needed for  compliance.  Please fax this completed form and a copy of the Diabetic Eye Exam report to our office located at [address] [place] [address] as soon as possible via Fax [phone] attention Keily: Phone [phone]  We thank you for your assistance in treating our mutual patient.

## 2024-06-11 NOTE — TELEPHONE ENCOUNTER
Upon review of the In Basket request and the patient's chart, initial outreach has been made via fax to facility. Please see Contacts section for details.     Thank you  Keily Chanel MA

## 2024-06-11 NOTE — TELEPHONE ENCOUNTER
----- Message from Rosalia OSORIO sent at 6/10/2024  3:22 PM EDT -----  Regarding: Care Gap Request  06/10/24 3:22 PM    Hello, our patient above has had Diabetic Eye Exam completed/performed. Please assist in updating the patient chart by making an External outreach to [facility] facility located in [address] [place] [address]. The date of service is unknown.    Thank you,  Rosalia Robles  [facility]

## 2024-06-18 NOTE — TELEPHONE ENCOUNTER
As a follow-up, a second attempt has been made for outreach via fax to facility. Please see Contacts section for details.    Thank you  Keily Chanel MA

## 2024-06-19 ENCOUNTER — OFFICE VISIT (OUTPATIENT)
Dept: ENDOCRINOLOGY | Facility: CLINIC | Age: 59
End: 2024-06-19
Payer: COMMERCIAL

## 2024-06-19 VITALS
OXYGEN SATURATION: 98 % | SYSTOLIC BLOOD PRESSURE: 118 MMHG | HEART RATE: 78 BPM | WEIGHT: 142.2 LBS | BODY MASS INDEX: 26.87 KG/M2 | DIASTOLIC BLOOD PRESSURE: 64 MMHG | TEMPERATURE: 98.6 F

## 2024-06-19 DIAGNOSIS — E78.5 HYPERLIPIDEMIA, UNSPECIFIED HYPERLIPIDEMIA TYPE: ICD-10-CM

## 2024-06-19 DIAGNOSIS — E11.65 TYPE 2 DIABETES MELLITUS WITH HYPERGLYCEMIA, WITHOUT LONG-TERM CURRENT USE OF INSULIN (HCC): Primary | ICD-10-CM

## 2024-06-19 LAB — SL AMB POCT HEMOGLOBIN AIC: 10.3 (ref ?–6.5)

## 2024-06-19 PROCEDURE — 99214 OFFICE O/P EST MOD 30 MIN: CPT | Performed by: STUDENT IN AN ORGANIZED HEALTH CARE EDUCATION/TRAINING PROGRAM

## 2024-06-19 PROCEDURE — 83036 HEMOGLOBIN GLYCOSYLATED A1C: CPT | Performed by: STUDENT IN AN ORGANIZED HEALTH CARE EDUCATION/TRAINING PROGRAM

## 2024-06-19 PROCEDURE — 95251 CONT GLUC MNTR ANALYSIS I&R: CPT | Performed by: STUDENT IN AN ORGANIZED HEALTH CARE EDUCATION/TRAINING PROGRAM

## 2024-06-19 NOTE — ASSESSMENT & PLAN NOTE
Lab Results   Component Value Date    HGBA1C 10.3 (A) 06/19/2024   Diabetes remains uncontrolled, due to medication noncompliance, she reports not being consistent with insulin therapy.  I instructed her to take Lantus 30 units at bedtime and NovoLog 10 units before dinner and will continue metformin and Jardiance.  Her son who accompanied her at her visit, will reinforce her to be compliant with antihyperglycemic medications,  Microvascular and microvascular complications of uncontrolled diabetes were discussed.  Return back in 3 months with  Labs prior to next visit.

## 2024-06-19 NOTE — PROGRESS NOTES
"                                                                                                                    Established patient Progress Note      Cc: diabetes    Referring Provider  No referring provider defined for this encounter.     History of Present Illness:   Helena Newton is a 58 y.o. female with a history of type 2 diabetes with long term use of insulin who presented for follow up.        Reports complications of nephropathy and gastroparesis . Denies recent illness or hospitalizations.    Current regimen:   Metformin 500 mg daily  Lantus 30 units \" sometimes\"   Novolog 15 units before meals, not taking   Jardiance 25 mg/dl      Helena Newton   Device used,2  Home use       Indication   Type 2 Diabetes      More than 72 hours of data was reviewed. Report to be scanned to chart.     Date Range: June 6 - 19th    Analysis of data:   Average Glucose: 241 mg/dl  Coefficient of Variation: 26.4%   SD : x   Time in Target Range: 17%   Time Above Range: 83%   Time Below Range: 0     Interpretation of data:         Opthamology:  UTD;   Podiatry: no     Has hypertension: followed by PCP; not on ACE inhibitor/ARB, }  Has hyperlipidemia: followed by PCP; Crestor 20 mg daily tolerating well, no myalgias.     Thyroid disorders: no  History of pancreatitis: no    Patient Active Problem List   Diagnosis    Facet arthropathy, lumbosacral    Lumbar spondylosis    Chronic midline low back pain with bilateral sciatica    Status post robotic assisted total laparoscopic hysterectomy, bilateral salpingo-oophorectomy    Medical marijuana use    Other chronic pain    Gastroesophageal reflux disease with esophagitis    Bilious vomiting with nausea    Esophageal dysphagia    Gastroparesis due to DM  (HCC)    Fatty liver    Drug-induced constipation    Elevated liver enzymes    Hoarseness    LBBB (left bundle branch block)    Hyperlipidemia    Common migraine without aura    Type 2 diabetes mellitus with hyperglycemia, without " long-term current use of insulin (HCC)    Hyperglycemia due to diabetes mellitus (HCC)    Glucosuria    Overweight    COPD (chronic obstructive pulmonary disease) (Formerly [facility])    Tobacco use disorder, severe, dependence    Shortness of breath    Microalbuminuria    Vulvovaginitis due to yeast    Mixed stress and urge urinary incontinence    Vulvar rash    Non-ischemic cardiomyopathy (Formerly [facility])      Past Medical History:   Diagnosis Date    Angina pectoris (HCC)     recent    Anxiety     Arthritis     Asthma     Carpal tunnel syndrome     Chronic headaches     COPD (chronic obstructive pulmonary disease) (Formerly [facility])     Diabetes (HCC)     Diabetes mellitus (HCC)     GERD (gastroesophageal reflux disease)     Headache     Hyperlipidemia     Hypertension     Kidney stone     Left bundle branch block     LBBB    MI (myocardial infarction) (Formerly [facility])     Myocardial infarction (Formerly [facility])     Neuropathy     PTSD (post-traumatic stress disorder)     RLS (restless legs syndrome)     Shortness of breath       Past Surgical History:   Procedure Laterality Date    ANGIOPLASTY      BREAST SURGERY  2016    Reduction    CARDIAC CATHETERIZATION  2018     Mid LAD: There was a tubular 40 % stenosis     SECTION      COLONOSCOPY      HYSTERECTOMY  2018    Complete    KIDNEY STONE SURGERY      LAPAROSCOPY      AR COLONOSCOPY FLX DX W/COLLJ SPEC WHEN PFRMD N/A 2017    Procedure: EGD AND COLONOSCOPY;  Surgeon: Wilberto Galeano MD;  Location: [facility];  Service: Gastroenterology    AR ESOPHAGOGASTRODUODENOSCOPY TRANSORAL DIAGNOSTIC N/A 10/10/2018    Procedure: ESOPHAGOGASTRODUODENOSCOPY (EGD);  Surgeon: Wilberto Galeano MD;  Location: [facility];  Service: Gastroenterology    AR LAPS TOTAL HYSTERECT 250 GM/< W/RMVL TUBE/OVARY N/A 2018    Procedure: ROBOTIC ASSISTED TOTAL LAPAROSCOPIC HYSTERECTOMY, BILATERAL SALPINGOOPHERECTOMY,;  Surgeon: Daron Valdez MD;  Location: [facility];  Service: Gynecology Oncology    UPPER  GASTROINTESTINAL ENDOSCOPY      WRIST SURGERY Right       Family History   Problem Relation Age of Onset    Diabetes Mother     Breast cancer Mother 45    Lung cancer Father 54    Diabetes Sister     Brain cancer Maternal Aunt 72    Breast cancer Other 25     Social History     Tobacco Use    Smoking status: Every Day     Current packs/day: 1.00     Average packs/day: 1 pack/day for 51.5 years (51.5 ttl pk-yrs)     Types: Cigarettes     Start date: 1/1/1973    Smokeless tobacco: Never    Tobacco comments:     pt. smoked this am 10/10   Substance Use Topics    Alcohol use: No     Comment: Rarely consumes alcohol - As per [facility]      Allergies   Allergen Reactions    Lisinopril Cough    Losartan Dizziness    Other Hives     Surgical tape    Prednisone Other (See Comments)     Thrush      Cat Hair Extract Rash         Current Outpatient Medications:     Accu-Chek Guide test strip, use to CHECK BLOOD GLUCOSE four times a day, Disp: , Rfl:     albuterol (PROVENTIL HFA,VENTOLIN HFA) 90 mcg/act inhaler, Inhale 2 puffs every 6 (six) hours as needed for wheezing, Disp: , Rfl:     BD Pen Needle Dayna 2nd Gen 32G X 4 MM MISC, 4 time a day, Disp: 200 each, Rfl: 2    Bevespi Aerosphere 9-4.8 MCG/ACT inhaler, inhale 2 puffs by mouth every morning and 2 puffs at bedtime, Disp: , Rfl:     bisoprolol (ZEBETA) 5 mg tablet, Take 1 tablet (5 mg total) by mouth daily, Disp: 90 tablet, Rfl: 3    Blood Glucose Monitoring Suppl (OneTouch Verio Reflect) w/Device KIT, Check blood sugars twice daily. Please substitute with appropriate alternative as covered by patient's insurance. Dx: E11.65, Disp: 1 kit, Rfl: 0    buPROPion (WELLBUTRIN XL) 300 mg 24 hr tablet, Take 450 mg by mouth every morning, Disp: , Rfl:     Constulose 10 GM/15ML solution, take 30 milliliters by mouth twice a day, Disp: 946 mL, Rfl: 1    DULoxetine (CYMBALTA) 60 mg delayed release capsule, take 1 capsule by mouth every morning DO NOT CRUSH, CHEW, AND/OR DIVIDE, Disp: ,  Rfl:     Empagliflozin (Jardiance) 25 MG TABS, Take 1 tablet (25 mg total) by mouth every morning, Disp: 90 tablet, Rfl: 1    glucose blood (OneTouch Verio) test strip, Check blood sugars twice daily. Please substitute with appropriate alternative as covered by patient's insurance. Dx: E11.65, Disp: 200 each, Rfl: 3    hydrOXYzine pamoate (VISTARIL) 25 mg capsule, take 1 capsule by mouth daily if needed for anxiety, Disp: , Rfl:     ibuprofen (MOTRIN) 200 mg tablet, Take 200 mg by mouth every 6 (six) hours as needed for mild pain, Disp: , Rfl:     insulin glargine (LANTUS SOLOSTAR) 100 units/mL injection pen, Inject 30 Units under the skin daily at bedtime, Disp: 27 mL, Rfl: 0    metFORMIN (GLUCOPHAGE-XR) 500 mg 24 hr tablet, 2 tablets at bedtime, Disp: 180 tablet, Rfl: 0    methocarbamol (ROBAXIN) 750 mg tablet, Take 500 mg by mouth every 6 (six) hours as needed for muscle spasms, Disp: , Rfl:     naproxen (NAPROSYN) 375 mg tablet, Take 1 tablet (375 mg total) by mouth 2 (two) times a day with meals, Disp: 20 tablet, Rfl: 0    NovoLOG FlexPen 100 units/mL injection pen, 12 units for low carb meals and 15 units for high carb meals., Disp: 33 mL, Rfl: 0    ondansetron (ZOFRAN) 4 mg tablet, Take 1 tablet (4 mg total) by mouth every 8 (eight) hours as needed for nausea or vomiting, Disp: 20 tablet, Rfl: 0    OneTouch Delica Lancets 33G MISC, Check blood sugars twice daily. Please substitute with appropriate alternative as covered by patient's insurance. Dx: E11.65, Disp: 200 each, Rfl: 3    oxyCODONE (ROXICODONE) 5 immediate release tablet, Take 5 mg by mouth 3 (three) times a day as needed, Disp: , Rfl:     pantoprazole (PROTONIX) 40 mg tablet, Take 1 tablet (40 mg total) by mouth 2 (two) times a day, Disp: 60 tablet, Rfl: 5    potassium chloride SA (KLOR-CON M15) 15 MEQ tablet, Take 15 mEq by mouth 2 (two) times a day, Disp: , Rfl:     pregabalin (LYRICA) 75 mg capsule, Take 75 mg by mouth 2 (two) times a day, Disp:  , Rfl:     rosuvastatin (CRESTOR) 20 MG tablet, Take 1 tablet (20 mg total) by mouth daily, Disp: 90 tablet, Rfl: 3    zolpidem (AMBIEN) 10 mg tablet, Take 10 mg by mouth daily at bedtime as needed for sleep, Disp: , Rfl:     clotrimazole-betamethasone (LOTRISONE) 1-0.05 % cream, Apply topically 2 (two) times a day (Patient not taking: Reported on 6/19/2024), Disp: 30 g, Rfl: 0    methylphenidate (RITALIN) 20 MG tablet, Take 20 mg by mouth every morning (Patient not taking: Reported on 6/10/2024), Disp: , Rfl:     mupirocin (BACTROBAN) 2 % ointment, apply topically to affected area three times a day for 14 days (Patient not taking: Reported on 6/19/2024), Disp: , Rfl:     naloxone (NARCAN) 4 mg/0.1 mL nasal spray, ADMINISTER A SINGLE SPRAY OF NARCAN IN ONE NOSTRIL REPEAT AFTER 3 MINUTES IF NO OR MINIMAL RESPONSE (Patient not taking: Reported on 1/8/2024), Disp: , Rfl:     nitroglycerin (NITROSTAT) 0.4 mg SL tablet, Place 1 tablet (0.4 mg total) under the tongue every 5 (five) minutes as needed for chest pain (Patient not taking: Reported on 1/8/2024), Disp: 25 tablet, Rfl: 0    nystatin-triamcinolone (MYCOLOG-II) ointment, Apply topically 2 (two) times a day (Patient not taking: Reported on 6/19/2024), Disp: 30 g, Rfl: 5    prazosin (MINIPRESS) 5 mg capsule, , Disp: , Rfl:     Spiriva Respimat 2.5 MCG/ACT AERS inhaler, , Disp: , Rfl:     terconazole (TERAZOL 7) 0.4 % vaginal cream, Insert 1 applicator into the vagina daily at bedtime (Patient not taking: Reported on 6/19/2024), Disp: 45 g, Rfl: 0  Review of Systems   Constitutional:  Negative for appetite change, fatigue and unexpected weight change.   HENT:  Negative for trouble swallowing and voice change.    Eyes:  Negative for visual disturbance.   Respiratory:  Negative for cough, shortness of breath and wheezing.    Cardiovascular:  Negative for palpitations and leg swelling.   Gastrointestinal:  Negative for abdominal pain, constipation, diarrhea, nausea and  "vomiting.   Endocrine: Negative for cold intolerance, heat intolerance, polyphagia and polyuria.   Musculoskeletal:  Negative for arthralgias.   Skin:  Negative for color change, rash and wound.   Neurological:  Negative for dizziness, tremors, weakness, light-headedness, numbness and headaches.   Psychiatric/Behavioral:  Negative for agitation and sleep disturbance. The patient is not nervous/anxious.        Physical Exam:  Body mass index is 26.87 kg/m².  /64 (BP Location: Right arm, Patient Position: Sitting, Cuff Size: Adult)   Pulse 78   Temp 98.6 °F (37 °C)   Wt 64.5 kg (142 lb 3.2 oz)   SpO2 98%   BMI 26.87 kg/m²    Wt Readings from Last 3 Encounters:   06/19/24 64.5 kg (142 lb 3.2 oz)   06/10/24 64.4 kg (142 lb)   03/07/24 65 kg (143 lb 6.4 oz)       GEN: NAD  E/n/m nl facies,   Eyes: no stare or proptosis,   Neuro: no tremor,   Skin: warm and dry,   Ext:  no edema bilaterally,   Psych: nl mood and affect, no gross lapses in memory      Labs:   No components found for: \"HA1C\"  No components found for: \"GLU\"    Lab Results   Component Value Date    CREATININE 0.63 12/06/2023    CREATININE 0.65 09/27/2023    CREATININE 0.74 03/03/2023    BUN 19 12/06/2023    K 4.4 12/06/2023    CL 97 12/06/2023    CO2 33 (H) 12/06/2023     eGFR   Date Value Ref Range Status   12/06/2023 100 ml/min/1.73sq m Final     No components found for: \"MALBCRER\"    Lab Results   Component Value Date    HDL 39 (L) 12/06/2023    TRIG 227 (H) 12/06/2023       Lab Results   Component Value Date    ALT 16 12/06/2023    AST 15 12/06/2023    GGT 18 11/02/2022    ALKPHOS 94 12/06/2023       No results found for: \"TSH\", \"FREET4\", \"TSI\"    Impression:  1. Type 2 diabetes mellitus with hyperglycemia, without long-term current use of insulin (HCC)    2. Hyperlipidemia, unspecified hyperlipidemia type           Plan:    Problem List Items Addressed This Visit          Endocrine    Type 2 diabetes mellitus with hyperglycemia, without " long-term current use of insulin (HCC) - Primary       Lab Results   Component Value Date    HGBA1C 10.3 (A) 06/19/2024   Diabetes remains uncontrolled, due to medication noncompliance, she reports not being consistent with insulin therapy.  I instructed her to take Lantus 30 units at bedtime and NovoLog 10 units before dinner and will continue metformin and Jardiance.  Her son who accompanied her at her visit, will reinforce her to be compliant with antihyperglycemic medications,  Microvascular and microvascular complications of uncontrolled diabetes were discussed.  Return back in 3 months with  Labs prior to next visit.         Relevant Medications    insulin glargine (LANTUS SOLOSTAR) 100 units/mL injection pen    Empagliflozin (Jardiance) 25 MG TABS    Other Relevant Orders    POCT hemoglobin A1c (Completed)       Other    Hyperlipidemia     Continue Crestor 20 mg daily.                  Discussed with the patient and all questioned fully answered. She will call me if any problems arise.    Counseled patient on diagnostic results, prognosis, risk and benefit of treatment options, instruction for management, importance of treatment compliance, Risk  factor reduction and impressions      Mary Bruce MD  I have spent a total time of 30 minutes on 06/19/24 in caring for this patient including Diagnostic results, Prognosis, Risks and benefits of tx options, Instructions for management, Patient and family education, Importance of tx compliance, Risk factor reductions, Impressions, Counseling / Coordination of care, Documenting in the medical record, Reviewing / ordering tests, medicine, procedures  , and Obtaining or reviewing history  .

## 2024-06-20 DIAGNOSIS — E11.65 TYPE 2 DIABETES MELLITUS WITH HYPERGLYCEMIA, WITH LONG-TERM CURRENT USE OF INSULIN (HCC): ICD-10-CM

## 2024-06-20 DIAGNOSIS — Z79.4 TYPE 2 DIABETES MELLITUS WITH HYPERGLYCEMIA, WITH LONG-TERM CURRENT USE OF INSULIN (HCC): ICD-10-CM

## 2024-06-20 RX ORDER — METFORMIN HYDROCHLORIDE 500 MG/1
TABLET, EXTENDED RELEASE ORAL
Qty: 180 TABLET | Refills: 1 | Status: SHIPPED | OUTPATIENT
Start: 2024-06-20

## 2024-06-20 NOTE — TELEPHONE ENCOUNTER
Upon review of the In Basket request we were able to locate, review, and update the patient chart as requested for Diabetic Eye Exam.    Any additional questions or concerns should be emailed to the Practice Liaisons via the appropriate education email address, please do not reply via In Basket.    Thank you  Keily Chanel MA   [facility]

## 2024-06-22 ENCOUNTER — HOSPITAL ENCOUNTER (INPATIENT)
Facility: HOSPITAL | Age: 59
LOS: 1 days | Discharge: HOME | DRG: 683 | End: 2024-06-23
Attending: EMERGENCY MEDICINE | Admitting: INTERNAL MEDICINE
Payer: COMMERCIAL

## 2024-06-22 DIAGNOSIS — N17.9 AKI (ACUTE KIDNEY INJURY) (HCC): Primary | ICD-10-CM

## 2024-06-22 DIAGNOSIS — E86.0 DEHYDRATION: ICD-10-CM

## 2024-06-22 DIAGNOSIS — K59.09 CHRONIC CONSTIPATION: ICD-10-CM

## 2024-06-22 DIAGNOSIS — N17.9 ARF (ACUTE RENAL FAILURE) (HCC): ICD-10-CM

## 2024-06-22 DIAGNOSIS — R82.81 PYURIA: ICD-10-CM

## 2024-06-22 DIAGNOSIS — J44.9 COPD (CHRONIC OBSTRUCTIVE PULMONARY DISEASE) (HCC): ICD-10-CM

## 2024-06-22 PROBLEM — F41.9 ANXIETY: Status: ACTIVE | Noted: 2024-06-22

## 2024-06-22 PROBLEM — Z79.4 TYPE 2 DIABETES MELLITUS WITH HYPERGLYCEMIA, WITH LONG-TERM CURRENT USE OF INSULIN (HCC): Status: ACTIVE | Noted: 2017-09-13

## 2024-06-22 PROBLEM — R53.83 MALAISE AND FATIGUE: Status: ACTIVE | Noted: 2024-06-22

## 2024-06-22 PROBLEM — R53.81 MALAISE AND FATIGUE: Status: ACTIVE | Noted: 2024-06-22

## 2024-06-22 PROBLEM — G47.00 INSOMNIA: Status: ACTIVE | Noted: 2024-06-22

## 2024-06-22 LAB
ALBUMIN SERPL BCG-MCNC: 3.6 G/DL (ref 3.5–5)
ALP SERPL-CCNC: 74 U/L (ref 34–104)
ALT SERPL W P-5'-P-CCNC: 15 U/L (ref 7–52)
ANION GAP SERPL CALCULATED.3IONS-SCNC: 12 MMOL/L (ref 4–13)
AST SERPL W P-5'-P-CCNC: 12 U/L (ref 13–39)
BACTERIA UR QL AUTO: ABNORMAL /HPF
BACTERIA UR QL AUTO: ABNORMAL /HPF
BASOPHILS # BLD AUTO: 0.04 THOUSANDS/ÂΜL (ref 0–0.1)
BASOPHILS NFR BLD AUTO: 0 % (ref 0–1)
BILIRUB SERPL-MCNC: 0.47 MG/DL (ref 0.2–1)
BILIRUB UR QL STRIP: NEGATIVE
BILIRUB UR QL STRIP: NEGATIVE
BUN SERPL-MCNC: 44 MG/DL (ref 5–25)
CALCIUM SERPL-MCNC: 9.1 MG/DL (ref 8.4–10.2)
CHLORIDE SERPL-SCNC: 99 MMOL/L (ref 96–108)
CLARITY UR: ABNORMAL
CLARITY UR: ABNORMAL
CO2 SERPL-SCNC: 26 MMOL/L (ref 21–32)
COLOR UR: YELLOW
COLOR UR: YELLOW
CREAT SERPL-MCNC: 3.63 MG/DL (ref 0.6–1.3)
EOSINOPHIL # BLD AUTO: 0.36 THOUSAND/ÂΜL (ref 0–0.61)
EOSINOPHIL NFR BLD AUTO: 3 % (ref 0–6)
ERYTHROCYTE [DISTWIDTH] IN BLOOD BY AUTOMATED COUNT: 13.9 % (ref 11.6–15.1)
ETHANOL SERPL-MCNC: <10 MG/DL
FLUAV RNA RESP QL NAA+PROBE: NEGATIVE
FLUBV RNA RESP QL NAA+PROBE: NEGATIVE
GFR SERPL CREATININE-BSD FRML MDRD: 13 ML/MIN/1.73SQ M
GLUCOSE SERPL-MCNC: 158 MG/DL (ref 65–140)
GLUCOSE SERPL-MCNC: 158 MG/DL (ref 65–140)
GLUCOSE SERPL-MCNC: 216 MG/DL (ref 65–140)
GLUCOSE SERPL-MCNC: 346 MG/DL (ref 65–140)
GLUCOSE UR STRIP-MCNC: ABNORMAL MG/DL
GLUCOSE UR STRIP-MCNC: ABNORMAL MG/DL
HCT VFR BLD AUTO: 44.2 % (ref 34.8–46.1)
HGB BLD-MCNC: 14.9 G/DL (ref 11.5–15.4)
HGB UR QL STRIP.AUTO: ABNORMAL
HGB UR QL STRIP.AUTO: ABNORMAL
IMM GRANULOCYTES # BLD AUTO: 0.04 THOUSAND/UL (ref 0–0.2)
IMM GRANULOCYTES NFR BLD AUTO: 0 % (ref 0–2)
KETONES UR STRIP-MCNC: NEGATIVE MG/DL
KETONES UR STRIP-MCNC: NEGATIVE MG/DL
LEUKOCYTE ESTERASE UR QL STRIP: ABNORMAL
LEUKOCYTE ESTERASE UR QL STRIP: ABNORMAL
LYMPHOCYTES # BLD AUTO: 3.24 THOUSANDS/ÂΜL (ref 0.6–4.47)
LYMPHOCYTES NFR BLD AUTO: 27 % (ref 14–44)
MAGNESIUM SERPL-MCNC: 2 MG/DL (ref 1.9–2.7)
MCH RBC QN AUTO: 28.8 PG (ref 26.8–34.3)
MCHC RBC AUTO-ENTMCNC: 33.7 G/DL (ref 31.4–37.4)
MCV RBC AUTO: 85 FL (ref 82–98)
MONOCYTES # BLD AUTO: 1.05 THOUSAND/ÂΜL (ref 0.17–1.22)
MONOCYTES NFR BLD AUTO: 9 % (ref 4–12)
NEUTROPHILS # BLD AUTO: 7.28 THOUSANDS/ÂΜL (ref 1.85–7.62)
NEUTS SEG NFR BLD AUTO: 61 % (ref 43–75)
NITRITE UR QL STRIP: NEGATIVE
NITRITE UR QL STRIP: NEGATIVE
NON-SQ EPI CELLS URNS QL MICRO: ABNORMAL /HPF
NON-SQ EPI CELLS URNS QL MICRO: ABNORMAL /HPF
NRBC BLD AUTO-RTO: 0 /100 WBCS
PH UR STRIP.AUTO: 5.5 [PH]
PH UR STRIP.AUTO: 6 [PH]
PLATELET # BLD AUTO: 221 THOUSANDS/UL (ref 149–390)
PMV BLD AUTO: 9.9 FL (ref 8.9–12.7)
POTASSIUM SERPL-SCNC: 3.3 MMOL/L (ref 3.5–5.3)
PROT SERPL-MCNC: 6.7 G/DL (ref 6.4–8.4)
PROT UR STRIP-MCNC: ABNORMAL MG/DL
PROT UR STRIP-MCNC: ABNORMAL MG/DL
RBC # BLD AUTO: 5.18 MILLION/UL (ref 3.81–5.12)
RBC #/AREA URNS AUTO: ABNORMAL /HPF
RBC #/AREA URNS AUTO: ABNORMAL /HPF
RSV RNA RESP QL NAA+PROBE: NEGATIVE
SARS-COV-2 RNA RESP QL NAA+PROBE: NEGATIVE
SODIUM SERPL-SCNC: 137 MMOL/L (ref 135–147)
SP GR UR STRIP.AUTO: 1.01
SP GR UR STRIP.AUTO: 1.02
UROBILINOGEN UR QL STRIP.AUTO: 0.2 E.U./DL
UROBILINOGEN UR QL STRIP.AUTO: 0.2 E.U./DL
WBC # BLD AUTO: 12.01 THOUSAND/UL (ref 4.31–10.16)
WBC #/AREA URNS AUTO: ABNORMAL /HPF
WBC #/AREA URNS AUTO: ABNORMAL /HPF
WBC CASTS URNS QL MICRO: ABNORMAL /LPF

## 2024-06-22 PROCEDURE — 81001 URINALYSIS AUTO W/SCOPE: CPT | Performed by: EMERGENCY MEDICINE

## 2024-06-22 PROCEDURE — 87077 CULTURE AEROBIC IDENTIFY: CPT | Performed by: EMERGENCY MEDICINE

## 2024-06-22 PROCEDURE — 99285 EMERGENCY DEPT VISIT HI MDM: CPT

## 2024-06-22 PROCEDURE — 36415 COLL VENOUS BLD VENIPUNCTURE: CPT | Performed by: EMERGENCY MEDICINE

## 2024-06-22 PROCEDURE — 85025 COMPLETE CBC W/AUTO DIFF WBC: CPT | Performed by: EMERGENCY MEDICINE

## 2024-06-22 PROCEDURE — 99223 1ST HOSP IP/OBS HIGH 75: CPT | Performed by: INTERNAL MEDICINE

## 2024-06-22 PROCEDURE — 83735 ASSAY OF MAGNESIUM: CPT | Performed by: EMERGENCY MEDICINE

## 2024-06-22 PROCEDURE — 0241U HB NFCT DS VIR RESP RNA 4 TRGT: CPT | Performed by: EMERGENCY MEDICINE

## 2024-06-22 PROCEDURE — 99285 EMERGENCY DEPT VISIT HI MDM: CPT | Performed by: EMERGENCY MEDICINE

## 2024-06-22 PROCEDURE — 87086 URINE CULTURE/COLONY COUNT: CPT | Performed by: EMERGENCY MEDICINE

## 2024-06-22 PROCEDURE — 81003 URINALYSIS AUTO W/O SCOPE: CPT | Performed by: EMERGENCY MEDICINE

## 2024-06-22 PROCEDURE — 81001 URINALYSIS AUTO W/SCOPE: CPT | Performed by: NURSE PRACTITIONER

## 2024-06-22 PROCEDURE — 82948 REAGENT STRIP/BLOOD GLUCOSE: CPT

## 2024-06-22 PROCEDURE — 82077 ASSAY SPEC XCP UR&BREATH IA: CPT | Performed by: EMERGENCY MEDICINE

## 2024-06-22 PROCEDURE — 87186 SC STD MICRODIL/AGAR DIL: CPT | Performed by: EMERGENCY MEDICINE

## 2024-06-22 PROCEDURE — 80053 COMPREHEN METABOLIC PANEL: CPT | Performed by: EMERGENCY MEDICINE

## 2024-06-22 PROCEDURE — 83036 HEMOGLOBIN GLYCOSYLATED A1C: CPT | Performed by: INTERNAL MEDICINE

## 2024-06-22 RX ORDER — DULOXETIN HYDROCHLORIDE 60 MG/1
60 CAPSULE, DELAYED RELEASE ORAL
Status: DISCONTINUED | OUTPATIENT
Start: 2024-06-22 | End: 2024-06-23 | Stop reason: HOSPADM

## 2024-06-22 RX ORDER — ZOLPIDEM TARTRATE 5 MG/1
10 TABLET ORAL
Status: DISCONTINUED | OUTPATIENT
Start: 2024-06-22 | End: 2024-06-23 | Stop reason: HOSPADM

## 2024-06-22 RX ORDER — ACETAMINOPHEN 325 MG/1
650 TABLET ORAL EVERY 6 HOURS PRN
Status: DISCONTINUED | OUTPATIENT
Start: 2024-06-22 | End: 2024-06-23 | Stop reason: HOSPADM

## 2024-06-22 RX ORDER — SODIUM CHLORIDE, SODIUM GLUCONATE, SODIUM ACETATE, POTASSIUM CHLORIDE, MAGNESIUM CHLORIDE, SODIUM PHOSPHATE, DIBASIC, AND POTASSIUM PHOSPHATE .53; .5; .37; .037; .03; .012; .00082 G/100ML; G/100ML; G/100ML; G/100ML; G/100ML; G/100ML; G/100ML
150 INJECTION, SOLUTION INTRAVENOUS CONTINUOUS
Status: DISCONTINUED | OUTPATIENT
Start: 2024-06-22 | End: 2024-06-23 | Stop reason: HOSPADM

## 2024-06-22 RX ORDER — BISOPROLOL FUMARATE 5 MG/1
5 TABLET, FILM COATED ORAL
Status: DISCONTINUED | OUTPATIENT
Start: 2024-06-23 | End: 2024-06-23 | Stop reason: HOSPADM

## 2024-06-22 RX ORDER — OXYCODONE HYDROCHLORIDE 5 MG/1
5 TABLET ORAL 3 TIMES DAILY PRN
Status: DISCONTINUED | OUTPATIENT
Start: 2024-06-22 | End: 2024-06-23 | Stop reason: HOSPADM

## 2024-06-22 RX ORDER — PANTOPRAZOLE SODIUM 40 MG/1
40 TABLET, DELAYED RELEASE ORAL 2 TIMES DAILY
Status: CANCELLED | OUTPATIENT
Start: 2024-06-22

## 2024-06-22 RX ORDER — NICOTINE 21 MG/24HR
1 PATCH, TRANSDERMAL 24 HOURS TRANSDERMAL DAILY
Status: DISCONTINUED | OUTPATIENT
Start: 2024-06-22 | End: 2024-06-22 | Stop reason: SDUPTHER

## 2024-06-22 RX ORDER — BUPROPION HYDROCHLORIDE 150 MG/1
450 TABLET ORAL EVERY MORNING
Status: DISCONTINUED | OUTPATIENT
Start: 2024-06-23 | End: 2024-06-22

## 2024-06-22 RX ORDER — LACTULOSE 10 G/15ML
10 SOLUTION ORAL DAILY PRN
Status: DISCONTINUED | OUTPATIENT
Start: 2024-06-22 | End: 2024-06-23 | Stop reason: HOSPADM

## 2024-06-22 RX ORDER — CEPHALEXIN 500 MG/1
500 CAPSULE ORAL EVERY 6 HOURS SCHEDULED
Status: DISCONTINUED | OUTPATIENT
Start: 2024-06-22 | End: 2024-06-22

## 2024-06-22 RX ORDER — INSULIN LISPRO 100 [IU]/ML
1-5 INJECTION, SOLUTION INTRAVENOUS; SUBCUTANEOUS
Status: DISCONTINUED | OUTPATIENT
Start: 2024-06-22 | End: 2024-06-23 | Stop reason: HOSPADM

## 2024-06-22 RX ORDER — BUPROPION HYDROCHLORIDE 150 MG/1
450 TABLET ORAL
Status: DISCONTINUED | OUTPATIENT
Start: 2024-06-22 | End: 2024-06-23 | Stop reason: HOSPADM

## 2024-06-22 RX ORDER — ACETAMINOPHEN 325 MG/1
650 TABLET ORAL EVERY 6 HOURS PRN
Status: DISCONTINUED | OUTPATIENT
Start: 2024-06-22 | End: 2024-06-22 | Stop reason: SDUPTHER

## 2024-06-22 RX ORDER — ALBUTEROL SULFATE 90 UG/1
2 AEROSOL, METERED RESPIRATORY (INHALATION) EVERY 6 HOURS PRN
Status: DISCONTINUED | OUTPATIENT
Start: 2024-06-22 | End: 2024-06-23 | Stop reason: HOSPADM

## 2024-06-22 RX ORDER — CEPHALEXIN 500 MG/1
500 CAPSULE ORAL ONCE
Status: COMPLETED | OUTPATIENT
Start: 2024-06-22 | End: 2024-06-22

## 2024-06-22 RX ORDER — CEPHALEXIN 500 MG/1
500 CAPSULE ORAL EVERY 8 HOURS SCHEDULED
Status: DISCONTINUED | OUTPATIENT
Start: 2024-06-22 | End: 2024-06-22

## 2024-06-22 RX ORDER — CEPHALEXIN 500 MG/1
500 CAPSULE ORAL EVERY 8 HOURS SCHEDULED
Status: DISCONTINUED | OUTPATIENT
Start: 2024-06-22 | End: 2024-06-23 | Stop reason: HOSPADM

## 2024-06-22 RX ORDER — PREGABALIN 25 MG/1
75 CAPSULE ORAL 2 TIMES DAILY
Status: CANCELLED | OUTPATIENT
Start: 2024-06-22

## 2024-06-22 RX ORDER — ONDANSETRON 2 MG/ML
4 INJECTION INTRAMUSCULAR; INTRAVENOUS EVERY 6 HOURS PRN
Status: DISCONTINUED | OUTPATIENT
Start: 2024-06-22 | End: 2024-06-23 | Stop reason: HOSPADM

## 2024-06-22 RX ORDER — INSULIN GLARGINE 100 [IU]/ML
15 INJECTION, SOLUTION SUBCUTANEOUS
Status: DISCONTINUED | OUTPATIENT
Start: 2024-06-22 | End: 2024-06-23

## 2024-06-22 RX ORDER — HEPARIN SODIUM 5000 [USP'U]/ML
5000 INJECTION, SOLUTION INTRAVENOUS; SUBCUTANEOUS EVERY 8 HOURS SCHEDULED
Status: DISCONTINUED | OUTPATIENT
Start: 2024-06-22 | End: 2024-06-23 | Stop reason: HOSPADM

## 2024-06-22 RX ORDER — NICOTINE 21 MG/24HR
1 PATCH, TRANSDERMAL 24 HOURS TRANSDERMAL DAILY
Status: DISCONTINUED | OUTPATIENT
Start: 2024-06-22 | End: 2024-06-23 | Stop reason: HOSPADM

## 2024-06-22 RX ADMIN — ZOLPIDEM TARTRATE 10 MG: 5 TABLET ORAL at 21:56

## 2024-06-22 RX ADMIN — INSULIN GLARGINE 15 UNITS: 100 INJECTION, SOLUTION SUBCUTANEOUS at 21:56

## 2024-06-22 RX ADMIN — INSULIN LISPRO 4 UNITS: 100 INJECTION, SOLUTION INTRAVENOUS; SUBCUTANEOUS at 21:57

## 2024-06-22 RX ADMIN — SODIUM CHLORIDE 1000 ML: 0.9 INJECTION, SOLUTION INTRAVENOUS at 15:05

## 2024-06-22 RX ADMIN — CEPHALEXIN 500 MG: 500 CAPSULE ORAL at 21:56

## 2024-06-22 RX ADMIN — NICOTINE 1 PATCH: 21 PATCH, EXTENDED RELEASE TRANSDERMAL at 15:35

## 2024-06-22 RX ADMIN — HEPARIN SODIUM 5000 UNITS: 5000 INJECTION, SOLUTION INTRAVENOUS; SUBCUTANEOUS at 16:02

## 2024-06-22 RX ADMIN — CEPHALEXIN 500 MG: 500 CAPSULE ORAL at 15:04

## 2024-06-22 RX ADMIN — SODIUM CHLORIDE, SODIUM GLUCONATE, SODIUM ACETATE, POTASSIUM CHLORIDE, MAGNESIUM CHLORIDE, SODIUM PHOSPHATE, DIBASIC, AND POTASSIUM PHOSPHATE 100 ML/HR: .53; .5; .37; .037; .03; .012; .00082 INJECTION, SOLUTION INTRAVENOUS at 17:11

## 2024-06-22 RX ADMIN — LACTULOSE 10 G: 20 SOLUTION ORAL at 21:56

## 2024-06-22 RX ADMIN — DULOXETINE HYDROCHLORIDE 60 MG: 60 CAPSULE, DELAYED RELEASE ORAL at 21:56

## 2024-06-22 RX ADMIN — INSULIN LISPRO 1 UNITS: 100 INJECTION, SOLUTION INTRAVENOUS; SUBCUTANEOUS at 17:13

## 2024-06-22 RX ADMIN — OXYCODONE HYDROCHLORIDE 5 MG: 5 TABLET ORAL at 21:56

## 2024-06-22 RX ADMIN — BUPROPION HYDROCHLORIDE 450 MG: 150 TABLET, EXTENDED RELEASE ORAL at 21:56

## 2024-06-22 RX ADMIN — HEPARIN SODIUM 5000 UNITS: 5000 INJECTION, SOLUTION INTRAVENOUS; SUBCUTANEOUS at 21:56

## 2024-06-22 NOTE — ASSESSMENT & PLAN NOTE
Presented for malaise, found to have ARF  Urinalysis turbid with 2+ leukocyte esterase and innumerable WBC  Urine culture is pending  Oral Keflex initiated in ER, will continue 500 mg PO Q12H

## 2024-06-22 NOTE — ED PROVIDER NOTES
"History  Chief Complaint   Patient presents with    Medical Problem     My fingers are numb and my feet were cold. I have been tipping over for a while and have an MRI appointment for that. I feel like I'm drunk or stoned     58-year-old female with history of diabetes, COPD presents with nonspecific symptoms of feeling off.  She does some nausea and vomiting yesterday.  She denies dizziness or lightheadedness.  She does states she has been outside recently, of note there is an excessive heat warning currently with heat index >100F outside.  She says that she feels \"drunk\" or \"high\".  She denies alcohol use however did smoke which she believes was marijuana 2 days ago      Medical Problem      Prior to Admission Medications   Prescriptions Last Dose Informant Patient Reported? Taking?   Accu-Chek Guide test strip  Self Yes No   Sig: use to CHECK BLOOD GLUCOSE four times a day   BD Pen Needle Dayna 2nd Gen 32G X 4 MM MISC  Self No No   Si time a day   Bevespi Aerosphere 9-4.8 MCG/ACT inhaler  Self Yes No   Sig: inhale 2 puffs by mouth every morning and 2 puffs at bedtime   Blood Glucose Monitoring Suppl (OneTouch Verio Reflect) w/Device KIT   No No   Sig: Check blood sugars twice daily. Please substitute with appropriate alternative as covered by patient's insurance. Dx: E11.65   Constulose 10 GM/15ML solution 2024  No Yes   Sig: take 30 milliliters by mouth twice a day   DULoxetine (CYMBALTA) 60 mg delayed release capsule 2024 Self Yes Yes   Sig: take 1 capsule by mouth every morning DO NOT CRUSH, CHEW, AND/OR DIVIDE   Empagliflozin (Jardiance) 25 MG TABS   No No   Sig: Take 1 tablet (25 mg total) by mouth every morning   NovoLOG FlexPen 100 units/mL injection pen   No No   Si units for low carb meals and 15 units for high carb meals.   OneTouch Delica Lancets 33G MISC   No No   Sig: Check blood sugars twice daily. Please substitute with appropriate alternative as covered by patient's insurance. " Dx: E11.65   Spiriva Respimat 2.5 MCG/ACT AERS inhaler  Self Yes No   Patient not taking: Reported on 11/8/2023   albuterol (PROVENTIL HFA,VENTOLIN HFA) 90 mcg/act inhaler  Self Yes No   Sig: Inhale 2 puffs every 6 (six) hours as needed for wheezing   bisoprolol (ZEBETA) 5 mg tablet 6/21/2024 Self No Yes   Sig: Take 1 tablet (5 mg total) by mouth daily   buPROPion (WELLBUTRIN XL) 300 mg 24 hr tablet 6/21/2024 Self Yes Yes   Sig: Take 450 mg by mouth every morning   clotrimazole-betamethasone (LOTRISONE) 1-0.05 % cream  Self No No   Sig: Apply topically 2 (two) times a day   Patient not taking: Reported on 6/19/2024   glucose blood (OneTouch Verio) test strip   No No   Sig: Check blood sugars twice daily. Please substitute with appropriate alternative as covered by patient's insurance. Dx: E11.65   hydrOXYzine pamoate (VISTARIL) 25 mg capsule  Self Yes No   Sig: take 1 capsule by mouth daily if needed for anxiety   ibuprofen (MOTRIN) 200 mg tablet  Self Yes No   Sig: Take 200 mg by mouth every 6 (six) hours as needed for mild pain   insulin glargine (LANTUS SOLOSTAR) 100 units/mL injection pen   No No   Sig: Inject 30 Units under the skin daily at bedtime   metFORMIN (GLUCOPHAGE-XR) 500 mg 24 hr tablet   No No   Sig: take 2 tablets by mouth at bedtime   methocarbamol (ROBAXIN) 750 mg tablet  Self Yes No   Sig: Take 500 mg by mouth every 6 (six) hours as needed for muscle spasms   methylphenidate (RITALIN) 20 MG tablet  Self Yes No   Sig: Take 20 mg by mouth every morning   Patient not taking: Reported on 6/10/2024   mupirocin (BACTROBAN) 2 % ointment  Self Yes No   Sig: apply topically to affected area three times a day for 14 days   Patient not taking: Reported on 6/19/2024   naloxone (NARCAN) 4 mg/0.1 mL nasal spray  Self Yes No   Sig: ADMINISTER A SINGLE SPRAY OF NARCAN IN ONE NOSTRIL REPEAT AFTER 3 MINUTES IF NO OR MINIMAL RESPONSE   Patient not taking: Reported on 1/8/2024   naproxen (NAPROSYN) 375 mg tablet   Self No No   Sig: Take 1 tablet (375 mg total) by mouth 2 (two) times a day with meals   nitroglycerin (NITROSTAT) 0.4 mg SL tablet  Self No No   Sig: Place 1 tablet (0.4 mg total) under the tongue every 5 (five) minutes as needed for chest pain   Patient not taking: Reported on 1/8/2024   nystatin-triamcinolone (MYCOLOG-II) ointment   No No   Sig: Apply topically 2 (two) times a day   Patient not taking: Reported on 6/19/2024   ondansetron (ZOFRAN) 4 mg tablet   No No   Sig: Take 1 tablet (4 mg total) by mouth every 8 (eight) hours as needed for nausea or vomiting   oxyCODONE (ROXICODONE) 5 immediate release tablet  Self Yes No   Sig: Take 5 mg by mouth 3 (three) times a day as needed   pantoprazole (PROTONIX) 40 mg tablet 6/21/2024  No Yes   Sig: Take 1 tablet (40 mg total) by mouth 2 (two) times a day   potassium chloride SA (KLOR-CON M15) 15 MEQ tablet  Self Yes No   Sig: Take 15 mEq by mouth 2 (two) times a day   prazosin (MINIPRESS) 5 mg capsule  Self Yes No   pregabalin (LYRICA) 75 mg capsule 6/21/2024 Self Yes Yes   Sig: Take 75 mg by mouth 2 (two) times a day   rosuvastatin (CRESTOR) 20 MG tablet 6/21/2024 Self No Yes   Sig: Take 1 tablet (20 mg total) by mouth daily   terconazole (TERAZOL 7) 0.4 % vaginal cream  Self No No   Sig: Insert 1 applicator into the vagina daily at bedtime   Patient not taking: Reported on 6/19/2024   zolpidem (AMBIEN) 10 mg tablet 6/21/2024 Self Yes Yes   Sig: Take 10 mg by mouth daily at bedtime as needed for sleep      Facility-Administered Medications: None       Past Medical History:   Diagnosis Date    Angina pectoris (HCC)     recent    Anxiety     Arthritis     Asthma     Carpal tunnel syndrome     Chronic headaches     COPD (chronic obstructive pulmonary disease) (HCC)     Diabetes (HCC)     Diabetes mellitus (HCC)     GERD (gastroesophageal reflux disease)     Headache     Hyperlipidemia     Hypertension     Kidney stone     Left bundle branch block     LBBB    MI  (myocardial infarction) (HCC)     Myocardial infarction (HCC)     Neuropathy     PTSD (post-traumatic stress disorder)     RLS (restless legs syndrome)     Shortness of breath        Past Surgical History:   Procedure Laterality Date    ANGIOPLASTY      BREAST SURGERY  2016    Reduction    CARDIAC CATHETERIZATION  2018     Mid LAD: There was a tubular 40 % stenosis     SECTION      COLONOSCOPY      HYSTERECTOMY  2018    Complete    KIDNEY STONE SURGERY      LAPAROSCOPY      KS COLONOSCOPY FLX DX W/COLLJ SPEC WHEN PFRMD N/A 2017    Procedure: EGD AND COLONOSCOPY;  Surgeon: Wilberto Galeano MD;  Location: [facility];  Service: Gastroenterology    KS ESOPHAGOGASTRODUODENOSCOPY TRANSORAL DIAGNOSTIC N/A 10/10/2018    Procedure: ESOPHAGOGASTRODUODENOSCOPY (EGD);  Surgeon: Wilberto Galeano MD;  Location: [facility];  Service: Gastroenterology    KS LAPS TOTAL HYSTERECT 250 GM/< W/RMVL TUBE/OVARY N/A 2018    Procedure: ROBOTIC ASSISTED TOTAL LAPAROSCOPIC HYSTERECTOMY, BILATERAL SALPINGOOPHERECTOMY,;  Surgeon: Daron Valdez MD;  Location: [facility];  Service: Gynecology Oncology    UPPER GASTROINTESTINAL ENDOSCOPY      WRIST SURGERY Right        Family History   Problem Relation Age of Onset    Diabetes Mother     Breast cancer Mother 45    Lung cancer Father 54    Diabetes Sister     Brain cancer Maternal Aunt 72    Breast cancer Other 25     I have reviewed and agree with the history as documented.    E-Cigarette/Vaping    E-Cigarette Use Never User      E-Cigarette/Vaping Substances    Nicotine No     THC No     CBD No     Flavoring No     Other No     Unknown No      Social History     Tobacco Use    Smoking status: Every Day     Current packs/day: 1.00     Average packs/day: 1 pack/day for 51.5 years (51.5 ttl pk-yrs)     Types: Cigarettes     Start date: 1973    Smokeless tobacco: Never    Tobacco comments:     pt. smoked this am 10/10   Vaping Use    Vaping status:  Never Used   Substance Use Topics    Alcohol use: No     Comment: Rarely consumes alcohol - As per [facility]     Drug use: Yes     Frequency: 7.0 times per week     Types: Marijuana     Comment: medical marijuana  last use over a month       Review of Systems    Physical Exam  Physical Exam  Vitals and nursing note reviewed.   Constitutional:       Appearance: Normal appearance. She is well-developed.   HENT:      Head: Normocephalic and atraumatic.      Mouth/Throat:      Mouth: Mucous membranes are dry.   Eyes:      Conjunctiva/sclera: Conjunctivae normal.      Pupils: Pupils are equal, round, and reactive to light.   Neck:      Trachea: No tracheal deviation.   Cardiovascular:      Rate and Rhythm: Normal rate and regular rhythm.      Heart sounds: Normal heart sounds. No murmur heard.  Pulmonary:      Effort: Pulmonary effort is normal. No respiratory distress.      Breath sounds: Normal breath sounds. No wheezing or rales.   Abdominal:      General: Bowel sounds are normal. There is no distension.      Palpations: Abdomen is soft.      Tenderness: There is no abdominal tenderness.   Musculoskeletal:         General: No deformity.      Cervical back: Normal range of motion and neck supple.   Skin:     General: Skin is warm and dry.      Capillary Refill: Capillary refill takes less than 2 seconds.   Neurological:      General: No focal deficit present.      Mental Status: She is alert and oriented to person, place, and time.      Sensory: No sensory deficit.   Psychiatric:         Mood and Affect: Mood normal.         Judgment: Judgment normal.         Vital Signs  ED Triage Vitals   Temperature Pulse Respirations Blood Pressure SpO2   06/22/24 1303 06/22/24 1303 06/22/24 1303 06/22/24 1304 06/22/24 1303   98.7 °F (37.1 °C) 66 20 121/58 93 %      Temp Source Heart Rate Source Patient Position - Orthostatic VS BP Location FiO2 (%)   06/22/24 1303 06/22/24 1303 06/22/24 1304 06/22/24 1304 --   Temporal Monitor  Sitting Left arm       Pain Score       06/22/24 1303       No Pain           Vitals:    06/22/24 1303 06/22/24 1304   BP:  121/58   Pulse: 66    Patient Position - Orthostatic VS:  Sitting         Visual Acuity      ED Medications  Medications   sodium chloride 0.9 % bolus 1,000 mL (1,000 mL Intravenous New Bag 6/22/24 1505)   acetaminophen (TYLENOL) tablet 650 mg (has no administration in time range)   nicotine (NICODERM CQ) 21 mg/24 hr TD 24 hr patch 1 patch (1 patch Transdermal Medication Applied 6/22/24 1535)   ondansetron (ZOFRAN) injection 4 mg (has no administration in time range)   cephalexin (KEFLEX) capsule 500 mg (500 mg Oral Given 6/22/24 1504)       Diagnostic Studies  Results Reviewed       Procedure Component Value Units Date/Time    FLU/RSV/COVID - if FLU/RSV clinically relevant [[id number]]  (Normal) Collected: 06/22/24 1356    Lab Status: Final result Specimen: Nares from Nose Updated: 06/22/24 1440     SARS-CoV-2 Negative     INFLUENZA A PCR Negative     INFLUENZA B PCR Negative     RSV PCR Negative    Narrative:      FOR PEDIATRIC PATIENTS - copy/paste COVID Guidelines URL to browser: [email or web address]    SARS-CoV-2 assay is a Nucleic Acid Amplification assay intended for the  qualitative detection of nucleic acid from SARS-CoV-2 in nasopharyngeal  swabs. Results are for the presumptive identification of SARS-CoV-2 RNA.    Positive results are indicative of infection with SARS-CoV-2, the virus  causing COVID-19, but do not rule out bacterial infection or co-infection  with other viruses. Laboratories within the [place] and its  territories are required to report all positive results to the appropriate  public health authorities. Negative results do not preclude SARS-CoV-2  infection and should not be used as the sole basis for treatment or other  patient management decisions. Negative results must be combined with  clinical  observations, patient history, and epidemiological information.  This test has not been FDA cleared or approved.    This test has been authorized by FDA under an Emergency Use Authorization  (EUA). This test is only authorized for the duration of time the  declaration that circumstances exist justifying the authorization of the  emergency use of an in vitro diagnostic tests for detection of SARS-CoV-2  virus and/or diagnosis of COVID-19 infection under section 564(b)(1) of  the Act, 21 U.S.C. 360bbb-3(b)(1), unless the authorization is terminated  or revoked sooner. The test has been validated but independent review by FDA  and CLIA is pending.    Test performed using [facility] GeneXpert: This RT-PCR assay targets N2,  a region unique to SARS-CoV-2. A conserved region in the E-gene was chosen  for pan-Sarbecovirus detection which includes SARS-CoV-2.    According to CMS-2020-01-R, this platform meets the definition of high-throughput technology.    Comprehensive metabolic panel [[id number]]  (Abnormal) Collected: 06/22/24 1356    Lab Status: Final result Specimen: Blood from Arm, Left Updated: 06/22/24 1423     Sodium 137 mmol/L      Potassium 3.3 mmol/L      Chloride 99 mmol/L      CO2 26 mmol/L      ANION GAP 12 mmol/L      BUN 44 mg/dL      Creatinine 3.63 mg/dL      Glucose 158 mg/dL      Calcium 9.1 mg/dL      AST 12 U/L      ALT 15 U/L      Alkaline Phosphatase 74 U/L      Total Protein 6.7 g/dL      Albumin 3.6 g/dL      Total Bilirubin 0.47 mg/dL      eGFR 13 ml/min/1.73sq m     Narrative:      National Kidney Disease Foundation guidelines for Chronic Kidney Disease (CKD):     Stage 1 with normal or high GFR (GFR > 90 mL/min/1.73 square meters)    Stage 2 Mild CKD (GFR = 60-89 mL/min/1.73 square meters)    Stage 3A Moderate CKD (GFR = 45-59 mL/min/1.73 square meters)    Stage 3B Moderate CKD (GFR = 30-44 mL/min/1.73 square meters)    Stage 4 Severe CKD (GFR = 15-29 mL/min/1.73 square meters)    Stage 5 End  Stage CKD (GFR <15 mL/min/1.73 square meters)  Note: GFR calculation is accurate only with a steady state creatinine    Magnesium [[id number]]  (Normal) Collected: 06/22/24 1356    Lab Status: Final result Specimen: Blood from Arm, Left Updated: 06/22/24 1423     Magnesium 2.0 mg/dL     Ethanol [[id number]]  (Normal) Collected: 06/22/24 1356    Lab Status: Final result Specimen: Blood from Arm, Left Updated: 06/22/24 1422     Ethanol Lvl <10 mg/dL     Urine Microscopic [[id number]]  (Abnormal) Collected: 06/22/24 1405    Lab Status: Final result Specimen: Urine, Clean Catch Updated: 06/22/24 1422     RBC, UA 4-10 /hpf      WBC, UA Innumerable /hpf      Epithelial Cells Occasional /hpf      Bacteria, UA Occasional /hpf      WBC Casts, UA 3-5 /lpf     Urine culture [[id number]] Collected: 06/22/24 1405    Lab Status: In process Specimen: Urine, Clean Catch Updated: 06/22/24 1422    UA w Reflex to Microscopic w Reflex to Culture [[id number]]  (Abnormal) Collected: 06/22/24 1405    Lab Status: Final result Specimen: Urine, Clean Catch Updated: 06/22/24 1411     Color, UA Yellow     Clarity, UA Turbid     Specific Gravity, UA 1.025     pH, UA 6.0     Leukocytes, UA 2+     Nitrite, UA Negative     Protein, UA 1+ mg/dl      Glucose, UA 2+ mg/dl      Ketones, UA Negative mg/dl      Urobilinogen, UA 0.2 E.U./dl      Bilirubin, UA Negative     Occult Blood, UA 2+    CBC and differential [[id number]]  (Abnormal) Collected: 06/22/24 1356    Lab Status: Final result Specimen: Blood from Arm, Left Updated: 06/22/24 1409     WBC 12.01 Thousand/uL      RBC 5.18 Million/uL      Hemoglobin 14.9 g/dL      Hematocrit 44.2 %      MCV 85 fL      MCH 28.8 pg      MCHC 33.7 g/dL      RDW 13.9 %      MPV 9.9 fL      Platelets 221 Thousands/uL      nRBC 0 /100 WBCs      Segmented % 61 %      Immature Grans % 0 %      Lymphocytes % 27 %      Monocytes % 9 %      Eosinophils Relative 3 %      Basophils Relative 0 %      Absolute Neutrophils 7.28  Thousands/µL      Absolute Immature Grans 0.04 Thousand/uL      Absolute Lymphocytes 3.24 Thousands/µL      Absolute Monocytes 1.05 Thousand/µL      Eosinophils Absolute 0.36 Thousand/µL      Basophils Absolute 0.04 Thousands/µL     Fingerstick Glucose (POCT) [[id number]]  (Abnormal) Collected: 06/22/24 1306    Lab Status: Final result Specimen: Blood Updated: 06/22/24 1307     POC Glucose 216 mg/dl                    No orders to display              Procedures  Procedures         ED Course  ED Course as of 06/22/24 1537   Sat Jun 22, 2024   1454 Creatinine(!): 3.63  Suspect pre-renal failure, significantly elevated from prior                                             Medical Decision Making  58-year-old female presents with generalized malaise over the last 2 days, did have some nausea however no vomiting today.  Denies alcohol use however admits to THC.  Her mouth is very dry on exam, concern for mild dehydration in the setting of heat exposure.    Will get a UA to rule out urinary tract infection.  Chemistries look for electrolyte abnormalities and renal failure.  CBC to look for significant leukocytosis or anemia as a cause of her symptoms.    Symptoms or not consistent with neurologic cause    Problems Addressed:  REBECCA (acute kidney injury) (HCC): acute illness or injury  Dehydration: acute illness or injury    Amount and/or Complexity of Data Reviewed  External Data Reviewed: labs.  Labs: ordered. Decision-making details documented in ED Course.    Risk  OTC drugs.  Prescription drug management.  Decision regarding hospitalization.             Disposition  Final diagnoses:   REBECCA (acute kidney injury) (HCC)   Dehydration     Time reflects when diagnosis was documented in both MDM as applicable and the Disposition within this note       Time User Action Codes Description Comment    6/22/2024  2:58 PM Frank Arellano Add [N17.9] REBECCA (acute kidney injury) (HCC)     6/22/2024  2:58 PM Frank Arellano  [E86.0] Dehydration           ED Disposition       ED Disposition   Admit    Condition   Stable    Date/Time   Sat Jun 22, 2024  2:58 PM    Comment   Case was discussed with sudheer and the patient's admission status was agreed to be Admission Status: inpatient status to the service of Dr. willard .               Follow-up Information    None         Patient's Medications   Discharge Prescriptions    No medications on file       No discharge procedures on file.    PDMP Review       None            ED Provider  Electronically Signed by             Frank Arellano DO  06/22/24 9918

## 2024-06-22 NOTE — ASSESSMENT & PLAN NOTE
Echocardiogram 1/19/2022: Ejection fraction is 45%. Systolic function is mildly reduced. There is mild global hypokinesis. Diastolic function is mildly abnormal, consistent with grade I (abnormal) relaxation  Nuc med stress test 8/20/2022: The ECG was negative for ischemia. The stress ECG is negative for ischemia after pharmacologic vasodilation, without reproduction of symptoms. There are no perfusion defects suggestive of ischemia or infarction. Left ventricular function post-stress is normal. Post-stress ejection fraction is 61 %  Continue bisoprolol  Monitor volume status

## 2024-06-22 NOTE — PLAN OF CARE
Problem: PAIN - ADULT  Goal: Verbalizes/displays adequate comfort level or baseline comfort level  Description: Interventions:  - Encourage patient to monitor pain and request assistance  - Assess pain using appropriate pain scale  - Administer analgesics based on type and severity of pain and evaluate response  - Implement non-pharmacological measures as appropriate and evaluate response  - Consider cultural and social influences on pain and pain management  - Notify physician/advanced practitioner if interventions unsuccessful or patient reports new pain  Outcome: Progressing     Problem: INFECTION - ADULT  Goal: Absence or prevention of progression during hospitalization  Description: INTERVENTIONS:  - Assess and monitor for signs and symptoms of infection  - Monitor lab/diagnostic results  - Monitor all insertion sites, i.e. indwelling lines, tubes, and drains  - Monitor endotracheal if appropriate and nasal secretions for changes in amount and color  - [place] appropriate cooling/warming therapies per order  - Administer medications as ordered  - Instruct and encourage patient and family to use good hand hygiene technique  - Identify and instruct in appropriate isolation precautions for identified infection/condition  Outcome: Progressing     Problem: DISCHARGE PLANNING  Goal: Discharge to home or other facility with appropriate resources  Description: INTERVENTIONS:  - Identify barriers to discharge w/patient and caregiver  - Arrange for needed discharge resources and transportation as appropriate  - Identify discharge learning needs (meds, wound care, etc.)  - Arrange for interpretive services to assist at discharge as needed  - Refer to Case Management Department for coordinating discharge planning if the patient needs post-hospital services based on physician/advanced practitioner order or complex needs related to functional status, cognitive ability, or social support system  Outcome: Progressing      Problem: Knowledge Deficit  Goal: Patient/family/caregiver demonstrates understanding of disease process, treatment plan, medications, and discharge instructions  Description: Complete learning assessment and assess knowledge base.  Interventions:  - Provide teaching at level of understanding  - Provide teaching via preferred learning methods  Outcome: Progressing     Problem: GENITOURINARY - ADULT  Goal: Maintains or returns to baseline urinary function  Description: INTERVENTIONS:  - Assess urinary function  - Encourage oral fluids to ensure adequate hydration if ordered  - Administer IV fluids as ordered to ensure adequate hydration  - Administer ordered medications as needed  - Offer frequent toileting  - Follow urinary retention protocol if ordered  Outcome: Progressing     Problem: METABOLIC, FLUID AND ELECTROLYTES - ADULT  Goal: Electrolytes maintained within normal limits  Description: INTERVENTIONS:  - Monitor labs and assess patient for signs and symptoms of electrolyte imbalances  - Administer electrolyte replacement as ordered  - Monitor response to electrolyte replacements, including repeat lab results as appropriate  - Instruct patient on fluid and nutrition as appropriate  Outcome: Progressing  Goal: Fluid balance maintained  Description: INTERVENTIONS:  - Monitor labs   - Monitor I/O and WT  - Instruct patient on fluid and nutrition as appropriate  - Assess for signs & symptoms of volume excess or deficit  Outcome: Progressing

## 2024-06-22 NOTE — ASSESSMENT & PLAN NOTE
Continue lidocaine patch  Continue oxycodone 5 mg 3 times daily.  PDMP was queried  Continue Lyrica 75 mg p.o. twice daily

## 2024-06-22 NOTE — ASSESSMENT & PLAN NOTE
"Presented for feeling \"off\"   Creatinine found to be 3.61, with baseline creatinine 0.7, suspect prerenal due to dehydration, as patient's son at bedside said she does not drink water only tea and sugar, and recently has been outside in the close to 100 degree temperatures  Received 1 L normal saline in the ER.  Will continue IV fluids with Plasma-Lyte at 100 mL/h for now  Recheck metabolic panel in the morning and trend creatinine  Check renal ultrasound  Avoid nephrotoxins and hypotension  Nephrology consultation if not improving  Monitor I&O's  Urinary retention protocol  Renally dose medications when possible  PT OT consultation  "

## 2024-06-22 NOTE — ASSESSMENT & PLAN NOTE
Lab Results   Component Value Date    HGBA1C 10.3 (A) 06/19/2024       Recent Labs     06/22/24  1306   POCGLU 216*       Blood Sugar Average: Last 72 hrs:  (P) 216    Patient is prescribed Lantus 30 units subcutaneously at night and mealtime insulin based on carbs either 12 or 15 units with meals.  She states she has not really been taking her insulin.  Has Dexcom glucose monitor  Will initiate Lantus 15 units subcutaneously at night  Diabetic diet  Fingerstick blood sugar with sliding scale coverage, algorithm 2  Monitor glucose and adjust insulin as needed

## 2024-06-22 NOTE — H&P
"[facility]  H&P  Name: Helena Newton 58 y.o. female I MRN: [id number]  Unit/Bed#: -01 I Date of Admission: 6/22/2024   Date of Service: 6/22/2024 I Hospital Day: 0      Assessment & Plan     * ARF (acute renal failure) (HCC)  Assessment & Plan  Presented for feeling \"off\"   Creatinine found to be 3.61, with baseline creatinine 0.7, suspect prerenal due to dehydration, as patient's son at bedside said she does not drink water only tea and sugar, and recently has been outside in the close to 100 degree temperatures  Received 1 L normal saline in the ER.  Will continue IV fluids with Plasma-Lyte at 100 mL/h for now  Recheck metabolic panel in the morning and trend creatinine  Check renal ultrasound  Avoid nephrotoxins and hypotension  Nephrology consultation if not improving  Monitor I&O's  Urinary retention protocol  Renally dose medications when possible  PT OT consultation    Pyuria  Assessment & Plan  Presented for malaise, found to have ARF  Urinalysis turbid with 2+ leukocyte esterase and innumerable WBC  Urine culture is pending  Oral Keflex initiated in ER, will continue 500 mg PO Q8H       Anxiety  Assessment & Plan  Continue Wellbutrin 150 mg p.o. daily and Celexa 60 mg p.o. daily.  Dosages were verified verbally with patient and last office visit  Supportive care      Insomnia  Assessment & Plan  Continue Ambien 10 mg p.o. at bedtime as needed    Non-ischemic cardiomyopathy (HCC)  Assessment & Plan  Echocardiogram 1/19/2022: Ejection fraction is 45%. Systolic function is mildly reduced. There is mild global hypokinesis. Diastolic function is mildly abnormal, consistent with grade I (abnormal) relaxation  Nuc med stress test 8/20/2022: The ECG was negative for ischemia. The stress ECG is negative for ischemia after pharmacologic vasodilation, without reproduction of symptoms. There are no perfusion defects suggestive of ischemia or infarction. Left ventricular function " post-stress is normal. Post-stress ejection fraction is 61 %  Continue bisoprolol  Monitor volume status    Tobacco use disorder, severe, dependence  Assessment & Plan  Smokes 1 pack/day  Cessation encouraged  Nicotine patch 21 mg TD daily    COPD (chronic obstructive pulmonary disease) ([facility])  Assessment & Plan  Not in exacerbation  Continue albuterol as needed    Type 2 diabetes mellitus with hyperglycemia, with long-term current use of insulin ([facility])  Assessment & Plan  Lab Results   Component Value Date    HGBA1C 10.3 (A) 06/19/2024       Recent Labs     06/22/24  1306   POCGLU 216*       Blood Sugar Average: Last 72 hrs:  (P) 216    Patient is prescribed Lantus 30 units subcutaneously at night and mealtime insulin based on carbs either 12 or 15 units with meals.  She states she has not really been taking her insulin.  Has Dexcom glucose monitor  Will initiate Lantus 15 units subcutaneously at night  Diabetic diet  Fingerstick blood sugar with sliding scale coverage, algorithm 2  Monitor glucose and adjust insulin as needed    Drug-induced constipation  Assessment & Plan  Continue lactulose as needed    Gastroesophageal reflux disease with esophagitis  Assessment & Plan  Continue Protonix 40 mg p.o. twice daily    Chronic midline low back pain with bilateral sciatica  Assessment & Plan  Continue lidocaine patch  Continue oxycodone 5 mg 3 times daily.  PDMP was queried  Continue Lyrica 75 mg p.o. twice daily         VTE Pharmacologic Prophylaxis: VTE Score: 3 High Risk (Score >/= 5) - Pharmacological DVT Prophylaxis Ordered: heparin. Sequential Compression Devices Ordered.  Code Status: Level 1 - Full Code   Discussion with family: Updated  (son) at bedside.    Anticipated Length of Stay: Patient will be admitted on an inpatient basis with an anticipated length of stay of greater than 2 midnights secondary to REBECCA/ARF.    Total Time Spent on Date of Encounter in care of patient: 48 mins. This time was  spent on one or more of the following: performing physical exam; counseling and coordination of care; obtaining or reviewing history; documenting in the medical record; reviewing/ordering tests, medications or procedures; communicating with other healthcare professionals and discussing with patient's family/caregivers.    Chief Complaint: Generalized malaise    History of Present Illness:  Helena Newton is a 58 y.o. female with a PMH of [place], anxiety, arthritis, asthma, COPD, CAD, MI, LBBB, diabetes, chronic headaches, GERD, essential hypertension, hyperlipidemia, nephrolithiasis, obesity, PTSD, and RLS, who presents with generalized malaise.  She reports disequilibrium.  She was noted to have leukocytosis and pyuria and a creatinine of over 3.  She was started on oral Keflex and treated with IV fluid.  She was admitted to medicine.  She reports that she has had numbness and tingling in her fingers and toes for years. She denies fever, headache, shortness of breath, cough, chest pain, dysuria, flank pain, or syncope.  She does report feeling unsteady on her feet and she had some nausea and vomiting yesterday.     Review of Systems:  Review of Systems   Constitutional:  Positive for fatigue. Negative for fever.   HENT:  Negative for congestion.    Eyes:  Negative for visual disturbance.   Respiratory:  Negative for shortness of breath.    Cardiovascular:  Negative for chest pain and leg swelling.   Gastrointestinal:  Positive for nausea and vomiting. Negative for abdominal pain.   Genitourinary:  Negative for dysuria.   Musculoskeletal:  Positive for back pain and gait problem. Negative for arthralgias.   Skin:  Negative for color change and rash.   Neurological:  Negative for dizziness and syncope.   All other systems reviewed and are negative.      Past Medical and Surgical History:   Past Medical History:   Diagnosis Date    Angina pectoris (HCC)     recent    Anxiety     Arthritis     Asthma     Carpal tunnel  syndrome     Chronic headaches     COPD (chronic obstructive pulmonary disease) (HCC)     Diabetes (HCC)     Diabetes mellitus (HCC)     GERD (gastroesophageal reflux disease)     Headache     Hyperlipidemia     Hypertension     Kidney stone     Left bundle branch block     LBBB    MI (myocardial infarction) (HCC)     Myocardial infarction (HCC)     Neuropathy     PTSD (post-traumatic stress disorder)     RLS (restless legs syndrome)     Shortness of breath        Past Surgical History:   Procedure Laterality Date    ANGIOPLASTY      BREAST SURGERY  2016    Reduction    CARDIAC CATHETERIZATION  2018     Mid LAD: There was a tubular 40 % stenosis     SECTION      COLONOSCOPY      HYSTERECTOMY  2018    Complete    KIDNEY STONE SURGERY      LAPAROSCOPY      TN COLONOSCOPY FLX DX W/COLLJ SPEC WHEN PFRMD N/A 2017    Procedure: EGD AND COLONOSCOPY;  Surgeon: Wilberto Galeano MD;  Location: [facility];  Service: Gastroenterology    TN ESOPHAGOGASTRODUODENOSCOPY TRANSORAL DIAGNOSTIC N/A 10/10/2018    Procedure: ESOPHAGOGASTRODUODENOSCOPY (EGD);  Surgeon: Wilberto Galeano MD;  Location: [facility];  Service: Gastroenterology    TN LAPS TOTAL HYSTERECT 250 GM/< W/RMVL TUBE/OVARY N/A 2018    Procedure: ROBOTIC ASSISTED TOTAL LAPAROSCOPIC HYSTERECTOMY, BILATERAL SALPINGOOPHERECTOMY,;  Surgeon: Daron Valdez MD;  Location: [facility];  Service: Gynecology Oncology    UPPER GASTROINTESTINAL ENDOSCOPY      WRIST SURGERY Right        Meds/Allergies:  Prior to Admission medications    Medication Sig Start Date End Date Taking? Authorizing Provider   Accu-Chek Guide test strip use to CHECK BLOOD GLUCOSE four times a day 23   Historical Provider, MD   albuterol (PROVENTIL HFA,VENTOLIN HFA) 90 mcg/act inhaler Inhale 2 puffs every 6 (six) hours as needed for wheezing    Historical Provider, MD   BD Pen Needle Dayna 2nd Gen 32G X 4 MM MISC 4 time a day 23   Mary Bruce MD   [facility]  Aerosphere 9-4.8 MCG/ACT inhaler inhale 2 puffs by mouth every morning and 2 puffs at bedtime 6/19/23   Historical Provider, MD   bisoprolol (ZEBETA) 5 mg tablet Take 1 tablet (5 mg total) by mouth daily 9/18/23   Nathan Cuba MD   Blood Glucose Monitoring Suppl (OneTouch Verio Reflect) w/Device KIT Check blood sugars twice daily. Please substitute with appropriate alternative as covered by patient's insurance. Dx: E11.65 12/6/23   NEELAM Ventura   buPROPion (WELLBUTRIN XL) 300 mg 24 hr tablet Take 450 mg by mouth every morning 11/18/21   Historical Provider, MD   clotrimazole-betamethasone (LOTRISONE) 1-0.05 % cream Apply topically 2 (two) times a day  Patient not taking: Reported on 6/19/2024 1/31/23   Diana Briceño MD   Constulose 10 GM/15ML solution take 30 milliliters by mouth twice a day 1/3/24   Sol Raines PA-C   DULoxetine (CYMBALTA) 60 mg delayed release capsule take 1 capsule by mouth every morning DO NOT CRUSH, CHEW, AND/OR DIVIDE 6/9/23   Historical Provider, MD   Empagliflozin (Jardiance) 25 MG TABS Take 1 tablet (25 mg total) by mouth every morning 6/19/24   Mary Bruce MD   glucose blood (OneTouch Verio) test strip Check blood sugars twice daily. Please substitute with appropriate alternative as covered by patient's insurance. Dx: E11.65 12/6/23   NEELAM Ventura   hydrOXYzine pamoate (VISTARIL) 25 mg capsule take 1 capsule by mouth daily if needed for anxiety 6/8/23   Historical Provider, MD   ibuprofen (MOTRIN) 200 mg tablet Take 200 mg by mouth every 6 (six) hours as needed for mild pain    Historical Provider, MD   insulin glargine (LANTUS SOLOSTAR) 100 units/mL injection pen Inject 30 Units under the skin daily at bedtime 6/19/24 9/17/24  Mary Bruce MD   metFORMIN (GLUCOPHAGE-XR) 500 mg 24 hr tablet take 2 tablets by mouth at bedtime 6/20/24   Mary Bruce MD   methocarbamol (ROBAXIN) 750 mg tablet Take 500 mg by mouth every 6 (six) hours as needed for muscle  spasms    Historical Provider, MD   methylphenidate (RITALIN) 20 MG tablet Take 20 mg by mouth every morning  Patient not taking: Reported on 6/10/2024 8/8/23   Historical Provider, MD   mupirocin (BACTROBAN) 2 % ointment apply topically to affected area three times a day for 14 days  Patient not taking: Reported on 6/19/2024 6/8/23   Historical Provider, MD   naloxone (NARCAN) 4 mg/0.1 mL nasal spray ADMINISTER A SINGLE SPRAY OF NARCAN IN ONE NOSTRIL REPEAT AFTER 3 MINUTES IF NO OR MINIMAL RESPONSE  Patient not taking: Reported on 1/8/2024 5/6/22   Historical Provider, MD   naproxen (NAPROSYN) 375 mg tablet Take 1 tablet (375 mg total) by mouth 2 (two) times a day with meals 12/25/21   Guero Dominguez MD   nitroglycerin (NITROSTAT) 0.4 mg SL tablet Place 1 tablet (0.4 mg total) under the tongue every 5 (five) minutes as needed for chest pain  Patient not taking: Reported on 1/8/2024 7/8/22   Nathan Cuba MD   NovoLOG FlexPen 100 units/mL injection pen 12 units for low carb meals and 15 units for high carb meals. 3/9/24   Mary Bruce MD   nystatin-triamcinolone (MYCOLOG-II) ointment Apply topically 2 (two) times a day  Patient not taking: Reported on 6/19/2024 11/14/23   Sol Smith MD   ondansetron (ZOFRAN) 4 mg tablet Take 1 tablet (4 mg total) by mouth every 8 (eight) hours as needed for nausea or vomiting 11/8/23   Sol Raines PA-C   OneTouch Delica Lancets 33G MISC Check blood sugars twice daily. Please substitute with appropriate alternative as covered by patient's insurance. Dx: E11.65 12/6/23   NEELAM Ventura   oxyCODONE (ROXICODONE) 5 immediate release tablet Take 5 mg by mouth 3 (three) times a day as needed 10/27/21   Historical Provider, MD   pantoprazole (PROTONIX) 40 mg tablet Take 1 tablet (40 mg total) by mouth 2 (two) times a day 11/8/23   Sol Raines PA-C   potassium chloride SA (KLOR-CON M15) 15 MEQ tablet Take 15 mEq by mouth 2 (two) times a day     Historical Provider, MD   prazosin (MINIPRESS) 5 mg capsule  8/22/23   Historical Provider, MD   pregabalin (LYRICA) 75 mg capsule Take 75 mg by mouth 2 (two) times a day    Historical Provider, MD   rosuvastatin (CRESTOR) 20 MG tablet Take 1 tablet (20 mg total) by mouth daily 10/26/23   Nathan Cuba MD   Spiriva Respimat 2.5 MCG/ACT AERS inhaler  11/10/21   Historical Provider, MD   terconazole (TERAZOL 7) 0.4 % vaginal cream Insert 1 applicator into the vagina daily at bedtime  Patient not taking: Reported on 6/19/2024 8/28/23   Dee Shetty MD   zolpidem (AMBIEN) 10 mg tablet Take 10 mg by mouth daily at bedtime as needed for sleep    Historical Provider, MD     I have reviewed home medications with patient personally and with recent office visits in [facility].    Allergies:   Allergies   Allergen Reactions    Lisinopril Cough    Losartan Dizziness    Other Hives     Surgical tape    Prednisone Other (See Comments)     Thrush      Cat Hair Extract Rash       Social History:  Marital Status:    Occupation: Not employed  Patient Pre-hospital Living Situation: Home  Patient Pre-hospital Level of Mobility: walks  Patient Pre-hospital Diet Restrictions: Carb controlled  Substance Use History:   Social History     Substance and Sexual Activity   Alcohol Use No    Comment: Rarely consumes alcohol - As per [facility]      Social History     Tobacco Use   Smoking Status Every Day    Current packs/day: 1.00    Average packs/day: 1 pack/day for 51.5 years (51.5 ttl pk-yrs)    Types: Cigarettes    Start date: 1/1/1973   Smokeless Tobacco Never   Tobacco Comments    pt. smoked this am 10/10     Social History     Substance and Sexual Activity   Drug Use Yes    Frequency: 7.0 times per week    Types: Marijuana    Comment: medical marijuana  last use over a month       Family History:  Family History   Problem Relation Age of Onset    Diabetes Mother     Breast cancer Mother 45    Lung cancer Father 54    Diabetes  "Sister     Brain cancer Maternal Aunt 72    Breast cancer Other 25       Physical Exam:     Vitals:   Blood Pressure: 98/51 (06/22/24 1549)  Pulse: 58 (06/22/24 1549)  Temperature: 98.7 °F (37.1 °C) (06/22/24 1303)  Temp Source: Temporal (06/22/24 1303)  Respirations: 17 (06/22/24 1549)  Height: 5' 1\" (154.9 cm) (06/22/24 1303)  Weight - Scale: 66 kg (145 lb 8.1 oz) (06/22/24 1546)  SpO2: 96 % (06/22/24 1549)    Physical Exam  Vitals and nursing note reviewed.   Constitutional:       General: She is not in acute distress.     Comments: Appears older than stated age and chronically ill-appearing   HENT:      Head: Normocephalic and atraumatic.      Nose: Nose normal.      Mouth/Throat:      Mouth: Mucous membranes are dry.      Pharynx: Oropharynx is clear.   Eyes:      Pupils: Pupils are equal, round, and reactive to light.   Cardiovascular:      Rate and Rhythm: Normal rate and regular rhythm.      Pulses: Normal pulses.      Heart sounds: Normal heart sounds.   Pulmonary:      Effort: Pulmonary effort is normal. No respiratory distress.      Breath sounds: Normal breath sounds. No wheezing or rales.   Abdominal:      General: Bowel sounds are normal.      Palpations: Abdomen is soft.      Tenderness: There is no abdominal tenderness.   Musculoskeletal:      Cervical back: Neck supple.      Right lower leg: No edema.      Left lower leg: No edema.   Skin:     General: Skin is warm and dry.      Capillary Refill: Capillary refill takes less than 2 seconds.      Comments: Skin is yessenia, poor turgor   Neurological:      General: No focal deficit present.      Mental Status: She is alert.          Additional Data:     Lab Results:  Results from last 7 days   Lab Units 06/22/24  1356   WBC Thousand/uL 12.01*   HEMOGLOBIN g/dL 14.9   HEMATOCRIT % 44.2   PLATELETS Thousands/uL 221   SEGS PCT % 61   LYMPHO PCT % 27   MONO PCT % 9   EOS PCT % 3     Results from last 7 days   Lab Units 06/22/24  1356   SODIUM mmol/L 137 "   POTASSIUM mmol/L 3.3*   CHLORIDE mmol/L 99   CO2 mmol/L 26   BUN mg/dL 44*   CREATININE mg/dL 3.63*   ANION GAP mmol/L 12   CALCIUM mg/dL 9.1   ALBUMIN g/dL 3.6   TOTAL BILIRUBIN mg/dL 0.47   ALK PHOS U/L 74   ALT U/L 15   AST U/L 12*   GLUCOSE RANDOM mg/dL 158*         Results from last 7 days   Lab Units 06/22/24  1604 06/22/24  1306   POC GLUCOSE mg/dl 158* 216*     Lab Results   Component Value Date    HGBA1C 10.3 (A) 06/19/2024    HGBA1C 9.1 (A) 03/07/2024    HGBA1C 13.2 (H) 12/06/2023           Lines/Drains:  Invasive Devices       Peripheral Intravenous Line  Duration             Peripheral IV 06/22/24 Left;Ventral (anterior) Forearm <1 day                  ** Please Note: This note has been constructed using a voice recognition system. **

## 2024-06-22 NOTE — ASSESSMENT & PLAN NOTE
Continue Wellbutrin 150 mg p.o. daily and Celexa 60 mg p.o. daily.  Dosages were verified verbally with patient and last office visit  Supportive care

## 2024-06-23 VITALS
HEART RATE: 58 BPM | HEIGHT: 61 IN | TEMPERATURE: 97.4 F | SYSTOLIC BLOOD PRESSURE: 101 MMHG | WEIGHT: 147.71 LBS | RESPIRATION RATE: 18 BRPM | OXYGEN SATURATION: 97 % | DIASTOLIC BLOOD PRESSURE: 64 MMHG | BODY MASS INDEX: 27.89 KG/M2

## 2024-06-23 LAB
ANION GAP SERPL CALCULATED.3IONS-SCNC: 10 MMOL/L (ref 4–13)
ANION GAP SERPL CALCULATED.3IONS-SCNC: 9 MMOL/L (ref 4–13)
BASOPHILS # BLD AUTO: 0.03 THOUSANDS/ÂΜL (ref 0–0.1)
BASOPHILS NFR BLD AUTO: 0 % (ref 0–1)
BUN SERPL-MCNC: 32 MG/DL (ref 5–25)
BUN SERPL-MCNC: 42 MG/DL (ref 5–25)
CALCIUM SERPL-MCNC: 8 MG/DL (ref 8.4–10.2)
CALCIUM SERPL-MCNC: 8 MG/DL (ref 8.4–10.2)
CHLORIDE SERPL-SCNC: 102 MMOL/L (ref 96–108)
CHLORIDE SERPL-SCNC: 99 MMOL/L (ref 96–108)
CO2 SERPL-SCNC: 26 MMOL/L (ref 21–32)
CO2 SERPL-SCNC: 27 MMOL/L (ref 21–32)
CREAT SERPL-MCNC: 1.49 MG/DL (ref 0.6–1.3)
CREAT SERPL-MCNC: 2.14 MG/DL (ref 0.6–1.3)
EOSINOPHIL # BLD AUTO: 0.31 THOUSAND/ÂΜL (ref 0–0.61)
EOSINOPHIL NFR BLD AUTO: 3 % (ref 0–6)
ERYTHROCYTE [DISTWIDTH] IN BLOOD BY AUTOMATED COUNT: 13.8 % (ref 11.6–15.1)
EST. AVERAGE GLUCOSE BLD GHB EST-MCNC: 237 MG/DL
GFR SERPL CREATININE-BSD FRML MDRD: 25 ML/MIN/1.73SQ M
GFR SERPL CREATININE-BSD FRML MDRD: 38 ML/MIN/1.73SQ M
GLUCOSE SERPL-MCNC: 204 MG/DL (ref 65–140)
GLUCOSE SERPL-MCNC: 217 MG/DL (ref 65–140)
GLUCOSE SERPL-MCNC: 367 MG/DL (ref 65–140)
GLUCOSE SERPL-MCNC: 387 MG/DL (ref 65–140)
HBA1C MFR BLD: 9.9 %
HCT VFR BLD AUTO: 40.6 % (ref 34.8–46.1)
HGB BLD-MCNC: 13.6 G/DL (ref 11.5–15.4)
IMM GRANULOCYTES # BLD AUTO: 0.03 THOUSAND/UL (ref 0–0.2)
IMM GRANULOCYTES NFR BLD AUTO: 0 % (ref 0–2)
LYMPHOCYTES # BLD AUTO: 3.21 THOUSANDS/ÂΜL (ref 0.6–4.47)
LYMPHOCYTES NFR BLD AUTO: 33 % (ref 14–44)
MCH RBC QN AUTO: 28.7 PG (ref 26.8–34.3)
MCHC RBC AUTO-ENTMCNC: 33.5 G/DL (ref 31.4–37.4)
MCV RBC AUTO: 86 FL (ref 82–98)
MONOCYTES # BLD AUTO: 0.77 THOUSAND/ÂΜL (ref 0.17–1.22)
MONOCYTES NFR BLD AUTO: 8 % (ref 4–12)
NEUTROPHILS # BLD AUTO: 5.43 THOUSANDS/ÂΜL (ref 1.85–7.62)
NEUTS SEG NFR BLD AUTO: 56 % (ref 43–75)
NRBC BLD AUTO-RTO: 0 /100 WBCS
PLATELET # BLD AUTO: 197 THOUSANDS/UL (ref 149–390)
PMV BLD AUTO: 9.8 FL (ref 8.9–12.7)
POTASSIUM SERPL-SCNC: 3.6 MMOL/L (ref 3.5–5.3)
POTASSIUM SERPL-SCNC: 3.8 MMOL/L (ref 3.5–5.3)
RBC # BLD AUTO: 4.74 MILLION/UL (ref 3.81–5.12)
SODIUM SERPL-SCNC: 136 MMOL/L (ref 135–147)
SODIUM SERPL-SCNC: 137 MMOL/L (ref 135–147)
WBC # BLD AUTO: 9.78 THOUSAND/UL (ref 4.31–10.16)

## 2024-06-23 PROCEDURE — 80048 BASIC METABOLIC PNL TOTAL CA: CPT | Performed by: NURSE PRACTITIONER

## 2024-06-23 PROCEDURE — 99239 HOSP IP/OBS DSCHRG MGMT >30: CPT | Performed by: NURSE PRACTITIONER

## 2024-06-23 PROCEDURE — 82948 REAGENT STRIP/BLOOD GLUCOSE: CPT

## 2024-06-23 PROCEDURE — 85025 COMPLETE CBC W/AUTO DIFF WBC: CPT | Performed by: NURSE PRACTITIONER

## 2024-06-23 RX ORDER — INSULIN GLARGINE 100 [IU]/ML
25 INJECTION, SOLUTION SUBCUTANEOUS
Status: DISCONTINUED | OUTPATIENT
Start: 2024-06-23 | End: 2024-06-23 | Stop reason: HOSPADM

## 2024-06-23 RX ORDER — SODIUM CHLORIDE, SODIUM GLUCONATE, SODIUM ACETATE, POTASSIUM CHLORIDE, MAGNESIUM CHLORIDE, SODIUM PHOSPHATE, DIBASIC, AND POTASSIUM PHOSPHATE .53; .5; .37; .037; .03; .012; .00082 G/100ML; G/100ML; G/100ML; G/100ML; G/100ML; G/100ML; G/100ML
500 INJECTION, SOLUTION INTRAVENOUS ONCE
Status: COMPLETED | OUTPATIENT
Start: 2024-06-23 | End: 2024-06-23

## 2024-06-23 RX ORDER — TIOTROPIUM BROMIDE INHALATION SPRAY 3.12 UG/1
2 SPRAY, METERED RESPIRATORY (INHALATION) DAILY
Qty: 4 G | Refills: 0 | Status: SHIPPED | OUTPATIENT
Start: 2024-06-23

## 2024-06-23 RX ORDER — CEPHALEXIN 500 MG/1
500 CAPSULE ORAL EVERY 8 HOURS SCHEDULED
Qty: 12 CAPSULE | Refills: 0 | Status: SHIPPED | OUTPATIENT
Start: 2024-06-23 | End: 2024-06-27

## 2024-06-23 RX ADMIN — CEPHALEXIN 500 MG: 500 CAPSULE ORAL at 05:58

## 2024-06-23 RX ADMIN — HEPARIN SODIUM 5000 UNITS: 5000 INJECTION, SOLUTION INTRAVENOUS; SUBCUTANEOUS at 05:58

## 2024-06-23 RX ADMIN — INSULIN LISPRO 4 UNITS: 100 INJECTION, SOLUTION INTRAVENOUS; SUBCUTANEOUS at 11:58

## 2024-06-23 RX ADMIN — INSULIN LISPRO 1 UNITS: 100 INJECTION, SOLUTION INTRAVENOUS; SUBCUTANEOUS at 08:40

## 2024-06-23 RX ADMIN — SODIUM CHLORIDE, SODIUM GLUCONATE, SODIUM ACETATE, POTASSIUM CHLORIDE, MAGNESIUM CHLORIDE, SODIUM PHOSPHATE, DIBASIC, AND POTASSIUM PHOSPHATE 100 ML/HR: .53; .5; .37; .037; .03; .012; .00082 INJECTION, SOLUTION INTRAVENOUS at 05:58

## 2024-06-23 RX ADMIN — SODIUM CHLORIDE, SODIUM GLUCONATE, SODIUM ACETATE, POTASSIUM CHLORIDE, MAGNESIUM CHLORIDE, SODIUM PHOSPHATE, DIBASIC, AND POTASSIUM PHOSPHATE 500 ML: .53; .5; .37; .037; .03; .012; .00082 INJECTION, SOLUTION INTRAVENOUS at 11:57

## 2024-06-23 RX ADMIN — CEPHALEXIN 500 MG: 500 CAPSULE ORAL at 14:19

## 2024-06-23 RX ADMIN — NICOTINE 1 PATCH: 21 PATCH, EXTENDED RELEASE TRANSDERMAL at 08:40

## 2024-06-23 RX ADMIN — HEPARIN SODIUM 5000 UNITS: 5000 INJECTION, SOLUTION INTRAVENOUS; SUBCUTANEOUS at 14:19

## 2024-06-23 RX ADMIN — ONDANSETRON 4 MG: 2 INJECTION INTRAMUSCULAR; INTRAVENOUS at 08:45

## 2024-06-23 RX ADMIN — OXYCODONE HYDROCHLORIDE 5 MG: 5 TABLET ORAL at 12:05

## 2024-06-23 NOTE — ASSESSMENT & PLAN NOTE
"Presented for feeling \"off\"   Creatinine found to be 3.61, with baseline creatinine 0.7, suspect prerenal due to dehydration, improved today to 2.14.  Patient clinically much improved and says she wants to leave as her family member is arriving for a visit.  Gave an additional 500 mL bolus followed by increased maintenance fluids 150 mL/h.  Creatinine was rechecked at 2 PM, was 1.4  Will discharge to home, patient understands she should obtain a repeat BMP within the next 3 days and follow-up with her PCP [place] Ybarra within the next week  Patient was advised to drink approximately 2 L of water a day for the next several days and to stay out of the heat.  She admitted that she had had multiple episodes of vomiting prior to arrival and probably should have come to the hospital at that time.  She understands that she should come back to the hospital at any time for worsening condition or any new concerns.  She understands to return to the hospital immediately for shortness of breath, chest pain, confusion, or excessive vomiting or diarrhea  "

## 2024-06-23 NOTE — ASSESSMENT & PLAN NOTE
Presented for malaise, found to have ARF  Urinalysis turbid with 2+ leukocyte esterase and innumerable WBC  Urine culture in progress  Continue Keflex p.o. 500 mg PO Q8H for 5 days.  Confirmed that patient's pharmacy is open and she will be able to receive her medication  Follow-up with PCP [place] Ybarra as an outpatient

## 2024-06-23 NOTE — PLAN OF CARE
Problem: PAIN - ADULT  Goal: Verbalizes/displays adequate comfort level or baseline comfort level  Description: Interventions:  - Encourage patient to monitor pain and request assistance  - Assess pain using appropriate pain scale  - Administer analgesics based on type and severity of pain and evaluate response  - Implement non-pharmacological measures as appropriate and evaluate response  - Consider cultural and social influences on pain and pain management  - Notify physician/advanced practitioner if interventions unsuccessful or patient reports new pain  Outcome: Progressing     Problem: INFECTION - ADULT  Goal: Absence or prevention of progression during hospitalization  Description: INTERVENTIONS:  - Assess and monitor for signs and symptoms of infection  - Monitor lab/diagnostic results  - Monitor all insertion sites, i.e. indwelling lines, tubes, and drains  - Monitor endotracheal if appropriate and nasal secretions for changes in amount and color  - [place] appropriate cooling/warming therapies per order  - Administer medications as ordered  - Instruct and encourage patient and family to use good hand hygiene technique  - Identify and instruct in appropriate isolation precautions for identified infection/condition  Outcome: Progressing     Problem: DISCHARGE PLANNING  Goal: Discharge to home or other facility with appropriate resources  Description: INTERVENTIONS:  - Identify barriers to discharge w/patient and caregiver  - Arrange for needed discharge resources and transportation as appropriate  - Identify discharge learning needs (meds, wound care, etc.)  - Arrange for interpretive services to assist at discharge as needed  - Refer to Case Management Department for coordinating discharge planning if the patient needs post-hospital services based on physician/advanced practitioner order or complex needs related to functional status, cognitive ability, or social support system  Outcome: Progressing      Problem: Knowledge Deficit  Goal: Patient/family/caregiver demonstrates understanding of disease process, treatment plan, medications, and discharge instructions  Description: Complete learning assessment and assess knowledge base.  Interventions:  - Provide teaching at level of understanding  - Provide teaching via preferred learning methods  Outcome: Progressing     Problem: GENITOURINARY - ADULT  Goal: Maintains or returns to baseline urinary function  Description: INTERVENTIONS:  - Assess urinary function  - Encourage oral fluids to ensure adequate hydration if ordered  - Administer IV fluids as ordered to ensure adequate hydration  - Administer ordered medications as needed  - Offer frequent toileting  - Follow urinary retention protocol if ordered  Outcome: Progressing     Problem: METABOLIC, FLUID AND ELECTROLYTES - ADULT  Goal: Electrolytes maintained within normal limits  Description: INTERVENTIONS:  - Monitor labs and assess patient for signs and symptoms of electrolyte imbalances  - Administer electrolyte replacement as ordered  - Monitor response to electrolyte replacements, including repeat lab results as appropriate  - Instruct patient on fluid and nutrition as appropriate  Outcome: Progressing  Goal: Fluid balance maintained  Description: INTERVENTIONS:  - Monitor labs   - Monitor I/O and WT  - Instruct patient on fluid and nutrition as appropriate  - Assess for signs & symptoms of volume excess or deficit  Outcome: Progressing

## 2024-06-23 NOTE — NURSING NOTE
Patient discharged to home with family. Patient instructed to follow up with PCP and given instructions for kidney/bladder ultrasound and lab work. Patient has all belongings and nicotine patch removed.

## 2024-06-23 NOTE — ASSESSMENT & PLAN NOTE
Lab Results   Component Value Date    HGBA1C 10.3 (A) 06/19/2024       Recent Labs     06/22/24  1306 06/22/24  1604 06/22/24  2104 06/23/24  0716   POCGLU 216* 158* 346* 204*         Blood Sugar Average: Last 72 hrs:  (P) 231    Patient is prescribed Lantus 30 units subcutaneously at night and mealtime insulin based on carbs either 12 or 15 units with meals.  She states she has not really been taking her insulin.  Has Dexcom glucose monitor  Continue Lantus at bedtime  Patient verifies that she will check her sugar and adjust her mealtime insulin accordingly  Resume Glucophage and Jardiance  Carb controlled diet  Continue follow-up as an outpatient

## 2024-06-23 NOTE — DISCHARGE SUMMARY
"Formerly [facility], later [facility]  Discharge- Helena Newton 1965, 58 y.o. female MRN: [id number]  Unit/Bed#: -01 Encounter: [id number]  Primary Care Provider: NEELAM Ventura   Date and time admitted to hospital: 6/22/2024  1:00 PM    * ARF (acute renal failure) (HCC)  Assessment & Plan  Presented for feeling \"off\"   Creatinine found to be 3.61, with baseline creatinine 0.7, suspect prerenal due to dehydration, improved today to 2.14.  Patient clinically much improved and says she wants to leave as her family member is arriving for a visit.  Gave an additional 500 mL bolus followed by increased maintenance fluids 150 mL/h.  Creatinine was rechecked at 2 PM, was 1.4  Will discharge to home, patient understands she should obtain a repeat BMP within the next 3 days and follow-up with her PCP [place] Ybarra within the next week  Patient was advised to drink approximately 2 L of water a day for the next several days and to stay out of the heat.  She admitted that she had had multiple episodes of vomiting prior to arrival and probably should have come to the hospital at that time.  She understands that she should come back to the hospital at any time for worsening condition or any new concerns.  She understands to return to the hospital immediately for shortness of breath, chest pain, confusion, or excessive vomiting or diarrhea    Pyuria  Assessment & Plan  Presented for malaise, found to have ARF  Urinalysis turbid with 2+ leukocyte esterase and innumerable WBC  Urine culture in progress  Continue Keflex p.o. 500 mg PO Q8H for 5 days.  Confirmed that patient's pharmacy is open and she will be able to receive her medication  Follow-up with PCP [place] Ybarra as an outpatient      Anxiety  Assessment & Plan  Continue Wellbutrin 150 mg p.o. daily and Celexa 60 mg p.o. daily.  Dosages were verified verbally with patient and last office visit  Supportive care      Insomnia  Assessment & Plan  Continue Ambien " 10 mg p.o. at bedtime as needed    Non-ischemic cardiomyopathy (HCC)  Assessment & Plan  Echocardiogram 1/19/2022: Ejection fraction is 45%. Systolic function is mildly reduced. There is mild global hypokinesis. Diastolic function is mildly abnormal, consistent with grade I (abnormal) relaxation  Nuc med stress test 8/20/2022: The ECG was negative for ischemia. The stress ECG is negative for ischemia after pharmacologic vasodilation, without reproduction of symptoms. There are no perfusion defects suggestive of ischemia or infarction. Left ventricular function post-stress is normal. Post-stress ejection fraction is 61 %  Continue bisoprolol  Patient was counseled on fluid intake, and advised to return to the hospital at any time should she experience shortness of breath    Tobacco use disorder, severe, dependence  Assessment & Plan  Smokes 1 pack/day  Cessation encouraged    COPD (chronic obstructive pulmonary disease) ([facility])  Assessment & Plan  Not in exacerbation  Continue albuterol as needed    Type 2 diabetes mellitus with hyperglycemia, with long-term current use of insulin ([facility])  Assessment & Plan  Lab Results   Component Value Date    HGBA1C 10.3 (A) 06/19/2024       Recent Labs     06/22/24  1306 06/22/24  1604 06/22/24  2104 06/23/24  0716   POCGLU 216* 158* 346* 204*         Blood Sugar Average: Last 72 hrs:  (P) 231    Patient is prescribed Lantus 30 units subcutaneously at night and mealtime insulin based on carbs either 12 or 15 units with meals.  She states she has not really been taking her insulin.  Has Dexcom glucose monitor  Continue Lantus at bedtime  Patient verifies that she will check her sugar and adjust her mealtime insulin accordingly  Resume Glucophage and Jardiance  Carb controlled diet  Continue follow-up as an outpatient    Drug-induced constipation  Assessment & Plan  Continue lactulose as needed    Gastroesophageal reflux disease with esophagitis  Assessment & Plan  Continue Protonix 40  mg p.o. twice daily    Chronic midline low back pain with bilateral sciatica  Assessment & Plan  Continue lidocaine patch  Continue oxycodone 5 mg 3 times daily.  PDMP was queried  Continue Lyrica 75 mg p.o. twice daily      Medical Problems       Resolved Problems  Date Reviewed: 6/23/2024   None       Discharging Physician / Practitioner: NEELAM Burton  PCP: NEELAM Ventura  Admission Date:   Admission Orders (From admission, onward)       Ordered        06/22/24 1458  INPATIENT ADMISSION  Once                          Discharge Date: 06/23/24    Significant Findings / Test Results:   Creatinine 3.63-->1.49     Outpatient Tests Requested:  BMP in 3 days  Renal ultrasound nonurgent as outpatient    Hospital Course:   Helena Newton is a 58 y.o. female patient who originally presented to the hospital on 6/22/2024 due to generalized weakness.  She reports a history of nausea and vomiting and also suspected dehydration due to sitting out in the hot weather.  Her son reported that she really just drinks tea with a lot of sugar.  Her creatinine was noted to be over 3.  She was treated with IV fluids.  She had rapid improvement.  She said she would like to go home because she had family visiting.  She understands that she is to monitor her fluid intake and aim for 2 L a day for the next several days at least.  She understands to follow-up with her PCP and get her lab work checked.  She also reports that she has an appointment with gastroenterology tomorrow.  Her hospital course was uneventful and she was discharged in stable condition.  Multiple family members are at bedside to escort her home.  She understands to return to the hospital at any time she should get worse or have any new concerns.  Please see full patient chart for additional details    Condition at Discharge: stable    Discharge Day Visit / Exam:   Subjective: Patient says she is feeling much better.  She says she has to go home because her  "nephew is arriving.  She is eating and drinking and voiding well.  She is ambulatory and fully dressed.  She denies headache, myalgia, dizziness, dysphagia, shortness of breath, chest pain, abdominal pain, diarrhea, dysuria, flank pain, numbness, or focal weakness.     Vitals: Blood Pressure: 101/64 (06/23/24 0719)  Pulse: 58 (06/23/24 0719)  Temperature: (!) 97.4 °F (36.3 °C) (06/23/24 0719)  Temp Source: Temporal (06/22/24 1303)  Respirations: 18 (06/23/24 0719)  Height: 5' 1\" (154.9 cm) (06/22/24 1303)  Weight - Scale: 67 kg (147 lb 11.3 oz) (06/23/24 0600)  SpO2: 97 % (06/23/24 0719)    Constitutional:       General: She is not in acute distress.     Comments: Looks well fully dressed in tank top and shorts ambulating around room.  Her color is better.  Her hydration status appears better.  Poor skin turgor has resolved   HENT:      Head: Normocephalic and atraumatic.      Nose: Nose normal.      Mouth/Throat:      Mouth: Mucous membranes are moist.      Pharynx: Oropharynx is clear.   Eyes:      Pupils: Pupils are equal, round, and reactive to light.   Cardiovascular:      Rate and Rhythm: Normal rate and regular rhythm.      Pulses: Normal pulses.   Pulmonary:      Effort: Pulmonary effort is normal. No respiratory distress.      Breath sounds: Normal breath sounds.      Comments: Scattered coarse sounds to right lung, left lung clear  Abdominal:      General: Bowel sounds are normal.      Palpations: Abdomen is soft.      Tenderness: There is no abdominal tenderness.   Musculoskeletal:      Cervical back: Neck supple.      Right lower leg: No edema.      Left lower leg: No edema.   Skin:     General: Skin is warm and dry.      Capillary Refill: Capillary refill takes less than 2 seconds.   Neurological:      General: No focal deficit present.      Mental Status: She is alert and oriented to person, place, and time.       Discussion with Family: Updated  (son, significant other, and friend) at " bedside.    Discharge instructions/Information to patient and family:   See after visit summary for information provided to patient and family.      Provisions for Follow-Up Care:  See after visit summary for information related to follow-up care and any pertinent home health orders.      Mobility at time of Discharge:   Basic Mobility Inpatient Raw Score: 21  JH-HLM Goal: 6: Walk 10 steps or more  JH-HLM Achieved: 7: Walk 25 feet or more  HLM Goal achieved. Continue to encourage appropriate mobility.     Disposition:   Home    Planned Readmission: No     Discharge Statement:  I spent 50 minutes discharging the patient. This time was spent on the day of discharge. I had direct contact with the patient on the day of discharge. Greater than 50% of the total time was spent examining patient, answering all patient questions, arranging and discussing plan of care with patient as well as directly providing post-discharge instructions.  Additional time then spent on discharge activities.    Discharge Medications:  See after visit summary for reconciled discharge medications provided to patient and/or family.      **Please Note: This note may have been constructed using a voice recognition system**

## 2024-06-23 NOTE — PLAN OF CARE
Problem: INFECTION - ADULT  Goal: Absence or prevention of progression during hospitalization  Description: INTERVENTIONS:  - Assess and monitor for signs and symptoms of infection  - Monitor lab/diagnostic results  - Monitor all insertion sites, i.e. indwelling lines, tubes, and drains  - Monitor endotracheal if appropriate and nasal secretions for changes in amount and color  - [place] appropriate cooling/warming therapies per order  - Administer medications as ordered  - Instruct and encourage patient and family to use good hand hygiene technique  - Identify and instruct in appropriate isolation precautions for identified infection/condition  Outcome: Progressing     Problem: GENITOURINARY - ADULT  Goal: Maintains or returns to baseline urinary function  Description: INTERVENTIONS:  - Assess urinary function  - Encourage oral fluids to ensure adequate hydration if ordered  - Administer IV fluids as ordered to ensure adequate hydration  - Administer ordered medications as needed  - Offer frequent toileting  - Follow urinary retention protocol if ordered  Outcome: Progressing     Problem: METABOLIC, FLUID AND ELECTROLYTES - ADULT  Goal: Electrolytes maintained within normal limits  Description: INTERVENTIONS:  - Monitor labs and assess patient for signs and symptoms of electrolyte imbalances  - Administer electrolyte replacement as ordered  - Monitor response to electrolyte replacements, including repeat lab results as appropriate  - Instruct patient on fluid and nutrition as appropriate  Outcome: Progressing  Goal: Fluid balance maintained  Description: INTERVENTIONS:  - Monitor labs   - Monitor I/O and WT  - Instruct patient on fluid and nutrition as appropriate  - Assess for signs & symptoms of volume excess or deficit  Bolus as needed  Outcome: Progressing

## 2024-06-23 NOTE — ASSESSMENT & PLAN NOTE
Echocardiogram 1/19/2022: Ejection fraction is 45%. Systolic function is mildly reduced. There is mild global hypokinesis. Diastolic function is mildly abnormal, consistent with grade I (abnormal) relaxation  Nuc med stress test 8/20/2022: The ECG was negative for ischemia. The stress ECG is negative for ischemia after pharmacologic vasodilation, without reproduction of symptoms. There are no perfusion defects suggestive of ischemia or infarction. Left ventricular function post-stress is normal. Post-stress ejection fraction is 61 %  Continue bisoprolol  Patient was counseled on fluid intake, and advised to return to the hospital at any time should she experience shortness of breath

## 2024-06-24 ENCOUNTER — TRANSITIONAL CARE MANAGEMENT (OUTPATIENT)
Dept: FAMILY MEDICINE CLINIC | Facility: CLINIC | Age: 59
End: 2024-06-24

## 2024-06-24 LAB — BACTERIA UR CULT: ABNORMAL

## 2024-06-24 RX ORDER — LACTULOSE 10 G/15ML
30 SOLUTION ORAL 2 TIMES DAILY
Qty: 946 ML | Refills: 1 | Status: SHIPPED | OUTPATIENT
Start: 2024-06-24

## 2024-06-24 NOTE — UTILIZATION REVIEW
Initial Clinical Review    Admission: Date/Time/Statement:   Admission Orders (From admission, onward)       Ordered        06/22/24 1458  INPATIENT ADMISSION  Once                          Orders Placed This Encounter   Procedures    INPATIENT ADMISSION     Standing Status:   Standing     Number of Occurrences:   1     Order Specific Question:   Level of Care     Answer:   Med Surg [16]     Order Specific Question:   Estimated length of stay     Answer:   More than 2 Midnights     Order Specific Question:   Certification     Answer:   I certify that inpatient services are medically necessary for this patient for a duration of greater than two midnights. See H&P and MD Progress Notes for additional information about the patient's course of treatment.     ED Arrival Information       Expected   -    Arrival   6/22/2024 12:55    Acuity   Urgent              Means of arrival   Walk-In    Escorted by   Family Member    Service   Hospitalist    Admission type   Emergency              Arrival complaint   blurred vision leg weakness hand numbness             Chief Complaint   Patient presents with    Medical Problem     My fingers are numb and my feet were cold. I have been tipping over for a while and have an MRI appointment for that. I feel like I'm drunk or stoned       Initial Presentation: 58 y.o. female with PMHx:  Draina, anxiety, arthritis, asthma, COPD, CAD, MI, LBBB, diabetes, chronic headaches, GERD, essential hypertension, hyperlipidemia, nephrolithiasis, obesity, PTSD, and RLS, who presented on 6/22 to [facility] ED due to generalized malaise.  She reports disequilibrium. She reports that she has had numbness and tingling in her fingers and toes for years. She does report feeling unsteady on her feet and she had some nausea and vomiting yesterday.  On exam, skin is yessenia, poor turgor.  Labs revealed WBC 12.01, K 3.3, BUN 44, Cr 3.63, AST 12, glucose 346. UA positive blood, protein, leuks, wbc, rbc.  Given 1L IVF and started on PO Keflex in ED.     Plan:  Admit Inpatient status to med surg dt Acute renal failure, Pyuria: Nephrology consult, monitor IO, urinary retention, bladder scan, PT OT giovanni, fu on urine cx, continue Keflex. Monitor volume status, IO and daily wts, labs, VS, start accuchecks w/ SSI.     Anticipated Length of Stay/Certification Statement: Patient will be admitted on an inpatient basis with an anticipated length of stay of greater than 2 midnights secondary to REBECCA/ARF.     Date: 6/23   Day 2: Per CRNP Discharge Note: Patient says she is feeling much better.  She says she has to go home because her nephew is arriving.  She is eating and drinking and voiding well.  She is ambulatory and fully dressed.  She denies headache, myalgia, dizziness, dysphagia, shortness of breath, chest pain, abdominal pain, diarrhea, dysuria, flank pain, numbness, or focal weakness.  Discharged to home.  Patient understands she should obtain a repeat BMP within the next 3 days and follow-up with her PCP within the next week, increase PO water intake, stay out of the heat. Continue Keflex.     ED Triage Vitals   Temperature Pulse Respirations Blood Pressure SpO2 Pain Score   06/22/24 1303 06/22/24 1303 06/22/24 1303 06/22/24 1304 06/22/24 1303 06/22/24 1303   98.7 °F (37.1 °C) 66 20 121/58 93 % No Pain     Weight (last 2 days) before discharge       Date/Time Weight    06/23/24 0600 67 (147.71)    06/22/24 1546 66 (145.5)    06/22/24 1303 64.4 (142)            Vital Signs (last 3 days) before discharge       Date/Time Temp Pulse Resp BP MAP (mmHg) SpO2 O2 Device Patient Position - Orthostatic VS Pain    06/23/24 1205 -- -- -- -- -- -- -- -- 5    06/23/24 07:19:13 97.4 °F (36.3 °C) 58 18 101/64 76 97 % -- -- --    06/22/24 22:05:20 97.7 °F (36.5 °C) 69 18 107/74 85 94 % -- -- --    06/22/24 2156 -- -- -- -- -- -- -- -- No Pain    06/22/24 2002 -- -- -- -- -- -- -- -- No Pain    06/22/24 15:49:59 -- 58 17 98/51 67 96 %  -- -- --    06/22/24 1304 -- -- -- 121/58 -- -- -- Sitting --    06/22/24 1303 98.7 °F (37.1 °C) 66 20 -- -- 93 % None (Room air) -- No Pain              Pertinent Labs/Diagnostic Test Results:   Radiology:  US kidney and bladder with pvr    (Results Pending)     Cardiology:  No orders to display     GI:  No orders to display       Results from last 7 days   Lab Units 06/22/24  1356   SARS-COV-2  Negative     Results from last 7 days   Lab Units 06/23/24  0611 06/22/24  1356   WBC Thousand/uL 9.78 12.01*   HEMOGLOBIN g/dL 13.6 14.9   HEMATOCRIT % 40.6 44.2   PLATELETS Thousands/uL 197 221   TOTAL NEUT ABS Thousands/µL 5.43 7.28         Results from last 7 days   Lab Units 06/23/24  1427 06/23/24  0611 06/22/24  1356   SODIUM mmol/L 136 137 137   POTASSIUM mmol/L 3.8 3.6 3.3*   CHLORIDE mmol/L 99 102 99   CO2 mmol/L 27 26 26   ANION GAP mmol/L 10 9 12   BUN mg/dL 32* 42* 44*   CREATININE mg/dL 1.49* 2.14* 3.63*   EGFR ml/min/1.73sq m 38 25 13   CALCIUM mg/dL 8.0* 8.0* 9.1   MAGNESIUM mg/dL  --   --  2.0     Results from last 7 days   Lab Units 06/22/24  1356   AST U/L 12*   ALT U/L 15   ALK PHOS U/L 74   TOTAL PROTEIN g/dL 6.7   ALBUMIN g/dL 3.6   TOTAL BILIRUBIN mg/dL 0.47     Results from last 7 days   Lab Units 06/23/24  1128 06/23/24  0716 06/22/24  2104 06/22/24  1604 06/22/24  1306   POC GLUCOSE mg/dl 387* 204* 346* 158* 216*     Results from last 7 days   Lab Units 06/23/24  1427 06/23/24  0611 06/22/24  1356   GLUCOSE RANDOM mg/dL 367* 217* 158*         Results from last 7 days   Lab Units 06/22/24  1356 06/19/24  1405   HEMOGLOBIN A1C % 9.9* 10.3*   EAG mg/dl 237  --        Results from last 7 days   Lab Units 06/22/24  1820 06/22/24  1405   CLARITY UA  Hazy Turbid*   COLOR UA  Yellow Yellow   SPEC GRAV UA  1.015 1.025   PH UA  5.5 6.0   GLUCOSE UA mg/dl 2+* 2+*   KETONES UA mg/dl Negative Negative   BLOOD UA  1+* 2+*   PROTEIN UA mg/dl Trace* 1+*   NITRITE UA  Negative Negative   BILIRUBIN UA  Negative  Negative   UROBILINOGEN UA E.U./dl 0.2 0.2   LEUKOCYTES UA  1+* 2+*   WBC UA /hpf 30-50* Innumerable*   RBC UA /hpf 0-1* 4-10*   BACTERIA UA /hpf Moderate* Occasional   EPITHELIAL CELLS WET PREP /hpf Occasional Occasional     Results from last 7 days   Lab Units 06/22/24  1356   INFLUENZA A PCR  Negative   INFLUENZA B PCR  Negative   RSV PCR  Negative     Results from last 7 days   Lab Units 06/22/24  1356   ETHANOL LVL mg/dL <10     Results from last 7 days   Lab Units 06/22/24  1405   URINE CULTURE  >100,000 cfu/ml Gram Negative [place]*     ED Treatment-Medication Administration from 06/22/2024 1255 to 06/22/2024 1543         Date/Time Order Dose Route Action     06/22/2024 1504 cephalexin (KEFLEX) capsule 500 mg 500 mg Oral Given     06/22/2024 1505 sodium chloride 0.9 % bolus 1,000 mL 1,000 mL Intravenous New Bag     06/22/2024 1535 nicotine (NICODERM CQ) 21 mg/24 hr TD 24 hr patch 1 patch 1 patch Transdermal Medication Applied            Past Medical History:   Diagnosis Date    Angina pectoris ([facility])     recent    Anxiety     Arthritis     Asthma     Carpal tunnel syndrome     Chronic headaches     COPD (chronic obstructive pulmonary disease) ([facility])     Diabetes ([facility])     Diabetes mellitus ([facility])     GERD (gastroesophageal reflux disease)     Headache     Hyperlipidemia     Hypertension     Kidney stone     Left bundle branch block     LBBB    MI (myocardial infarction) ([facility])     Myocardial infarction ([facility]) 2014/2016    Neuropathy     PTSD (post-traumatic stress disorder)     RLS (restless legs syndrome)     Shortness of breath      Present on Admission:   Chronic midline low back pain with bilateral sciatica   COPD (chronic obstructive pulmonary disease) ([facility])   Drug-induced constipation   Gastroesophageal reflux disease with esophagitis   Non-ischemic cardiomyopathy ([facility])   Tobacco use disorder, severe, dependence      Admitting Diagnosis: Dehydration [E86.0]  Fatigue [R53.83]  REBECCA (acute kidney injury) ([facility])  [N17.9]  Age/Sex: 58 y.o. female  Admission Orders:  Medications 06/14 06/15 06/16 06/17 06/18 06/19 06/20 06/21 06/22 06/23   bisoprolol (ZEBETA) tablet 5 mg  Dose: 5 mg  Freq: Daily at bedtime Route: PO  Start: 06/23/24 2200 End: 06/23/24 1828   Admin Instructions:      Order specific questions:                1828-D/C'd      buPROPion (WELLBUTRIN XL) 24 hr tablet 450 mg  Dose: 450 mg  Freq: Daily at bedtime Route: PO  Start: 06/22/24 2200 End: 06/23/24 1828   Admin Instructions:               2156      1828-D/C'd      buPROPion (WELLBUTRIN XL) 24 hr tablet 450 mg  Dose: 450 mg  Freq: Every morning Route: PO  Start: 06/23/24 0900 End: 06/22/24 1549   Admin Instructions:               1549-D/C'd       cephalexin (KEFLEX) capsule 500 mg  Dose: 500 mg  Freq: Every 8 hours scheduled Route: PO  Start: 06/22/24 2200 End: 06/23/24 1828   Admin Instructions:      Order specific questions:               2156      0558     1419     1828-D/C'd      cephalexin (KEFLEX) capsule 500 mg  Dose: 500 mg  Freq: Every 6 hours scheduled Route: PO  Start: 06/22/24 1800 End: 06/22/24 1635   Admin Instructions:      Order specific questions:               1635-D/C'd       cephalexin (KEFLEX) capsule 500 mg  Dose: 500 mg  Freq: Every 8 hours scheduled Route: PO  Start: 06/22/24 2300 End: 06/22/24 1625   Admin Instructions:      Order specific questions:               1625-D/C'd       cephalexin (KEFLEX) capsule 500 mg  Dose: 500 mg  Freq: Once Route: PO  Start: 06/22/24 1445 End: 06/22/24 1504   Admin Instructions:      Order specific questions:               1504         DULoxetine (CYMBALTA) delayed release capsule 60 mg  Dose: 60 mg  Freq: Daily at bedtime Route: PO  Start: 06/22/24 2200 End: 06/23/24 1828   Admin Instructions:               2156      1828-D/C'd      heparin (porcine) subcutaneous injection 5,000 Units  Dose: 5,000 Units  Freq: Every 8 hours scheduled Route: SC  Start: 06/22/24 1600 End: 06/23/24 1828   Admin  Instructions:               1602     2156      0558     1419     1828-D/C'd      insulin glargine (LANTUS) subcutaneous injection 15 Units 0.15 mL  Dose: 15 Units  Freq: Daily at bedtime Route: SC  Start: 06/22/24 2200 End: 06/23/24 1019   Admin Instructions:               2156      1019-D/C'd      insulin glargine (LANTUS) subcutaneous injection 25 Units 0.25 mL  Dose: 25 Units  Freq: Daily at bedtime Route: SC  Start: 06/23/24 2200 End: 06/23/24 1828   Admin Instructions:                1828-D/C'd      insulin lispro (HumALOG/ADMELOG) 100 units/mL subcutaneous injection 1-5 Units  Dose: 1-5 Units  Freq: Daily at bedtime Route: SC  Start: 06/22/24 2200 End: 06/23/24 1828   Admin Instructions:               2157      1828-D/C'd       insulin lispro (HumALOG/ADMELOG) 100 units/mL subcutaneous injection 1-5 Units  Dose: 1-5 Units  Freq: 3 times daily before meals Route: SC  Start: 06/22/24 1600 End: 06/23/24 1828   Admin Instructions:               1713      0840     1158     1600     1828-D/C'd      multi-electrolyte (ISOLYTE-S PH 7.4) bolus 500 mL  Dose: 500 mL  Freq: Once Route: IV  Last Dose: Stopped (06/23/24 1627)  Start: 06/23/24 1130 End: 06/23/24 1627             1157     1627        nicotine (NICODERM CQ) 21 mg/24 hr TD 24 hr patch 1 patch  Dose: 1 patch  Freq: Daily Route: TD  Start: 06/22/24 1600 End: 06/22/24 1554   Admin Instructions:               1554-D/C'd       nicotine (NICODERM CQ) 21 mg/24 hr TD 24 hr patch 1 patch  Dose: 1 patch  Freq: Daily Route: TD  Start: 06/22/24 1530 End: 06/23/24 1828   Admin Instructions:               1535      0840     1627 [C]     1828-D/C'd      sodium chloride 0.9 % bolus 1,000 mL  Dose: 1,000 mL  Freq: Once Route: IV  Last Dose: Stopped (06/22/24 1711)  Start: 06/22/24 1500 End: 06/22/24 1711            3235     1711                     Continuous Meds Sorted by Name  for Helena Newton as of 06/14/24 through 6/23/24  Legend:       Medications 06/14 06/15 06/16 06/17  06/18 06/19 06/20 06/21 06/22 06/23   multi-electrolyte (PLASMALYTE-A/ISOLYTE-S PH 7.4) IV solution  Rate: 150 mL/hr Dose: 150 mL/hr  Freq: Continuous Route: IV  Last Dose: Stopped (06/23/24 1627)  Start: 06/22/24 1600 End: 06/23/24 1710            1711      0552     0558     1246     1627     1710-D/C'd                  PRN Meds Sorted by Name  for Helena Newton as of 06/14/24 through 6/23/24  Legend:       Medications 06/14 06/15 06/16 06/17 06/18 06/19 06/20 06/21 06/22 06/23   acetaminophen (TYLENOL) tablet 650 mg  Dose: 650 mg  Freq: Every 6 hours PRN Route: PO  PRN Reasons: mild pain,headaches,fever  Indications of Use: FEVER,HEADACHE,MILD PAIN  Start: 06/22/24 1544 End: 06/22/24 1554            1554-D/C'd       acetaminophen (TYLENOL) tablet 650 mg  Dose: 650 mg  Freq: Every 6 hours PRN Route: PO  PRN Reasons: mild pain,headaches,fever  Indications of Use: FEVER,HEADACHE,MILD PAIN  Start: 06/22/24 1521 End: 06/23/24 1828             1828-D/C'd      albuterol (PROVENTIL HFA,VENTOLIN HFA) inhaler 2 puff  Dose: 2 puff  Freq: Every 6 hours PRN Route: IN  PRN Reason: wheezing  Start: 06/22/24 1538 End: 06/23/24 1828   Admin Instructions:                1828-D/C'd      lactulose (CHRONULAC) oral solution 10 g  Dose: 10 g  Freq: Daily PRN Route: PO  PRN Reason: constipation  Start: 06/22/24 1538 End: 06/23/24 1828            2156      1828-D/C'd      ondansetron (ZOFRAN) injection 4 mg  Dose: 4 mg  Freq: Every 6 hours PRN Route: IV  PRN Reasons: nausea,vomiting  Start: 06/22/24 1521 End: 06/23/24 1828   Admin Instructions:                0845     1828-D/C'd      oxyCODONE (ROXICODONE) IR tablet 5 mg  Dose: 5 mg  Freq: 3 times daily PRN Route: PO  PRN Reason: moderate pain  Start: 06/22/24 1538 End: 06/23/24 1828   Admin Instructions:               2156      1205     1828-D/C'd      zolpidem (AMBIEN) tablet 10 mg  Dose: 10 mg  Freq: Daily at bedtime PRN Route: PO  PRN Reason: insomnia  Start: 06/22/24 1538 End:  06/23/24 1828   Admin Instructions:               2156      1828-D/C'd                    Network Utilization Review Department  ATTENTION: Please call with any questions or concerns to [phone] and carefully listen to the prompts so that you are directed to the right person. All voicemails are confidential.   For Discharge needs, contact Care Management DC Support Team at [phone] opt. [phone]  Send all requests for admission clinical reviews, approved or denied determinations and any other requests to dedicated fax number below belonging to the [facility] where the patient is receiving treatment. List of dedicated fax numbers for the Facilities:  FACILITY NAME UR FAX NUMBER   ADMISSION DENIALS (Administrative/Medical Necessity) [phone]   DISCHARGE SUPPORT TEAM (NETWORK) [phone]   PARENT CHILD HEALTH (Maternity/NICU/Pediatrics) [phone]   [facility] [phone]   [facility] [phone]   [facility] [phone]   [facility] [phone]   [facility] [phone]   [facility] [phone]   [facility] [phone]   [facility] [phone]   [facility] [phone]   [facility] [phone]   [facility] [phone]   [facility] [phone]

## 2024-07-01 ENCOUNTER — TELEPHONE (OUTPATIENT)
Dept: NEUROLOGY | Facility: CLINIC | Age: 59
End: 2024-07-01

## 2024-07-02 ENCOUNTER — LAB (OUTPATIENT)
Dept: LAB | Facility: CLINIC | Age: 59
End: 2024-07-02
Payer: COMMERCIAL

## 2024-07-02 ENCOUNTER — OFFICE VISIT (OUTPATIENT)
Dept: FAMILY MEDICINE CLINIC | Facility: CLINIC | Age: 59
End: 2024-07-02
Payer: COMMERCIAL

## 2024-07-02 VITALS
WEIGHT: 144.2 LBS | TEMPERATURE: 97.5 F | DIASTOLIC BLOOD PRESSURE: 76 MMHG | BODY MASS INDEX: 27.23 KG/M2 | OXYGEN SATURATION: 96 % | SYSTOLIC BLOOD PRESSURE: 130 MMHG | HEIGHT: 61 IN | HEART RATE: 64 BPM

## 2024-07-02 DIAGNOSIS — Z76.89 ENCOUNTER FOR SUPPORT AND COORDINATION OF TRANSITION OF CARE: Primary | ICD-10-CM

## 2024-07-02 DIAGNOSIS — N17.9 AKI (ACUTE KIDNEY INJURY) (HCC): ICD-10-CM

## 2024-07-02 PROCEDURE — 80048 BASIC METABOLIC PNL TOTAL CA: CPT

## 2024-07-02 PROCEDURE — 36415 COLL VENOUS BLD VENIPUNCTURE: CPT

## 2024-07-02 PROCEDURE — 99495 TRANSJ CARE MGMT MOD F2F 14D: CPT | Performed by: NURSE PRACTITIONER

## 2024-07-02 NOTE — PROGRESS NOTES
Transition of Care  Follow-up After Hospitalization    Helena Newton 58 y.o. female   Date:  7/2/2024    TCM Call     Date and time call was made  6/24/2024  9:23 AM    Patient was hospitialized at  [facility]    Date of Admission  06/22/24    Date of discharge  06/23/24    Diagnosis  ARF (acute renal failure)    Disposition  Home      TCM Call     Scheduled for follow up?  --  left vm.    I have advised the patient to call PCP with any new or worsening symptoms  JOSE Peters    Living Arrangements  Children    Counseling  Patient; [facility] records were reviewed. Medications upon discharge reviewed/updated.   Medication Changes: none  Imaging: none  Consults: None  Follow up visits with other specialists: Endocrinology      Assessment and Plan:    Helena was seen today for transition of care management.    Diagnoses and all orders for this visit:    Encounter for support and coordination of transition of care    REBECCA (acute kidney injury) (Formerly [facility])  -     Basic metabolic panel; Future              Helena Newton present for TCM visit s/p hospitalization for patient presents to the hospital on 622 with generalized weakness nausea and vomiting.  Also this is suspected patient was dehydrated secondary to the hot weather.  Serology revealed that she had a creatinine over 3 was giving IV fluids with improvement.  She was discharged on 6/23 with a diagnosis of acute renal failure    ROS: Review of Systems   Constitutional: Negative.    HENT: Negative.     Eyes: Negative.    Respiratory: Negative.     Cardiovascular: Negative.    Gastrointestinal: Negative.    Endocrine: Negative.    Genitourinary: Negative.    Musculoskeletal: Negative.    Skin: Negative.    Allergic/Immunologic: Negative.    Neurological: Negative.    Hematological: Negative.    Psychiatric/Behavioral: Negative.         Past Medical History:   Diagnosis Date   • Angina pectoris (Formerly [facility])     recent   • Anxiety    • Arthritis    • Asthma    •  Carpal tunnel syndrome    • Chronic headaches    • COPD (chronic obstructive pulmonary disease) ([facility])    • Diabetes (HCC)    • Diabetes mellitus (HCC)    • GERD (gastroesophageal reflux disease)    • Headache    • Hyperlipidemia    • Hypertension    • Kidney stone    • Left bundle branch block     LBBB   • MI (myocardial infarction) ([facility])    • Myocardial infarction ([facility])    • Neuropathy    • PTSD (post-traumatic stress disorder)    • RLS (restless legs syndrome)    • Shortness of breath        Past Surgical History:   Procedure Laterality Date   • ANGIOPLASTY     • BREAST SURGERY  2016    Reduction   • CARDIAC CATHETERIZATION  2018     Mid LAD: There was a tubular 40 % stenosis   •  SECTION     • COLONOSCOPY     • HYSTERECTOMY  2018    Complete   • KIDNEY STONE SURGERY     • LAPAROSCOPY     • MA COLONOSCOPY FLX DX W/COLLJ SPEC WHEN PFRMD N/A 2017    Procedure: EGD AND COLONOSCOPY;  Surgeon: Wilberto Galeano MD;  Location: [facility];  Service: Gastroenterology   • MA ESOPHAGOGASTRODUODENOSCOPY TRANSORAL DIAGNOSTIC N/A 10/10/2018    Procedure: ESOPHAGOGASTRODUODENOSCOPY (EGD);  Surgeon: Wilberto Galeano MD;  Location: [facility];  Service: Gastroenterology   • MA LAPS TOTAL HYSTERECT 250 GM/< W/RMVL TUBE/OVARY N/A 2018    Procedure: ROBOTIC ASSISTED TOTAL LAPAROSCOPIC HYSTERECTOMY, BILATERAL SALPINGOOPHERECTOMY,;  Surgeon: Daron Valdez MD;  Location:  MAIN OR;  Service: Gynecology Oncology   • UPPER GASTROINTESTINAL ENDOSCOPY     • WRIST SURGERY Right        Social History     Socioeconomic History   • Marital status:      Spouse name: None   • Number of children: None   • Years of education: None   • Highest education level: None   Occupational History   • Occupation: Healthcare provider    Tobacco Use   • Smoking status: Every Day     Current packs/day: 1.00     Average packs/day: 1 pack/day for 51.5 years (51.5 ttl pk-yrs)     Types: Cigarettes     Start date:  1/1/1973   • Smokeless tobacco: Never   • Tobacco comments:     pt. smoked this am 10/10   Vaping Use   • Vaping status: Never Used   Substance and Sexual Activity   • Alcohol use: Not Currently     Comment: Rarely consumes alcohol - As per [employer]    • Drug use: Yes     Frequency: 7.0 times per week     Types: Marijuana     Comment: medical marijuana  last use over a month   • Sexual activity: Yes   Other Topics Concern   • None   Social History Narrative   • None     Social Determinants of Health     Financial Resource Strain: Patient Declined (12/6/2023)    Overall Financial Resource Strain (CARDIA)    • Difficulty of Paying Living Expenses: Patient declined   Food Insecurity: Not on file   Transportation Needs: Patient Declined (12/6/2023)    PRAPARE - Transportation    • Lack of Transportation (Medical): Patient declined    • Lack of Transportation (Non-Medical): Patient declined   Physical Activity: Not on file   Stress: Not on file   Social Connections: Not on file   Intimate Partner Violence: Not on file   Housing Stability: Not on file       Family History   Problem Relation Age of Onset   • Diabetes Mother    • Breast cancer Mother 45   • Lung cancer Father 54   • Diabetes Sister    • Brain cancer Maternal Aunt 72   • Breast cancer Other 25       Allergies   Allergen Reactions   • Lisinopril Cough   • Losartan Dizziness   • Other Hives     Surgical tape   • Prednisone Other (See Comments)     Thrush     • Cat Hair Extract Rash         Current Outpatient Medications:   •  Accu-Chek Guide test strip, use to CHECK BLOOD GLUCOSE four times a day, Disp: , Rfl:   •  albuterol (PROVENTIL HFA,VENTOLIN HFA) 90 mcg/act inhaler, Inhale 2 puffs every 6 (six) hours as needed for wheezing, Disp: , Rfl:   •  BD Pen Needle Dayna 2nd Gen 32G X 4 MM MISC, 4 time a day, Disp: 200 each, Rfl: 2  •  Bevespi Aerosphere 9-4.8 MCG/ACT inhaler, inhale 2 puffs by mouth every morning and 2 puffs at bedtime, Disp: , Rfl:   •  bisoprolol  (ZEBETA) 5 mg tablet, Take 1 tablet (5 mg total) by mouth daily, Disp: 90 tablet, Rfl: 3  •  Blood Glucose Monitoring Suppl (OneTouch Verio Reflect) w/Device KIT, Check blood sugars twice daily. Please substitute with appropriate alternative as covered by patient's insurance. Dx: E11.65, Disp: 1 kit, Rfl: 0  •  buPROPion (WELLBUTRIN XL) 300 mg 24 hr tablet, Take 450 mg by mouth every morning, Disp: , Rfl:   •  Constulose 10 GM/15ML solution, take 30 milliliters by mouth twice a day, Disp: 946 mL, Rfl: 1  •  DULoxetine (CYMBALTA) 60 mg delayed release capsule, take 1 capsule by mouth every morning DO NOT CRUSH, CHEW, AND/OR DIVIDE, Disp: , Rfl:   •  Empagliflozin (Jardiance) 25 MG TABS, Take 1 tablet (25 mg total) by mouth every morning, Disp: 90 tablet, Rfl: 1  •  glucose blood (OneTouch Verio) test strip, Check blood sugars twice daily. Please substitute with appropriate alternative as covered by patient's insurance. Dx: E11.65, Disp: 200 each, Rfl: 3  •  hydrOXYzine pamoate (VISTARIL) 25 mg capsule, take 1 capsule by mouth daily if needed for anxiety, Disp: , Rfl:   •  ibuprofen (MOTRIN) 200 mg tablet, Take 200 mg by mouth every 6 (six) hours as needed for mild pain, Disp: , Rfl:   •  insulin glargine (LANTUS SOLOSTAR) 100 units/mL injection pen, Inject 30 Units under the skin daily at bedtime, Disp: 27 mL, Rfl: 0  •  metFORMIN (GLUCOPHAGE-XR) 500 mg 24 hr tablet, take 2 tablets by mouth at bedtime, Disp: 180 tablet, Rfl: 1  •  naproxen (NAPROSYN) 375 mg tablet, Take 1 tablet (375 mg total) by mouth 2 (two) times a day with meals, Disp: 20 tablet, Rfl: 0  •  NovoLOG FlexPen 100 units/mL injection pen, 12 units for low carb meals and 15 units for high carb meals., Disp: 33 mL, Rfl: 0  •  ondansetron (ZOFRAN) 4 mg tablet, Take 1 tablet (4 mg total) by mouth every 8 (eight) hours as needed for nausea or vomiting, Disp: 20 tablet, Rfl: 0  •  OneTouch Delica Lancets 33G MISC, Check blood sugars twice daily. Please  "substitute with appropriate alternative as covered by patient's insurance. Dx: E11.65, Disp: 200 each, Rfl: 3  •  oxyCODONE (ROXICODONE) 5 immediate release tablet, Take 5 mg by mouth 3 (three) times a day as needed, Disp: , Rfl:   •  pantoprazole (PROTONIX) 40 mg tablet, Take 1 tablet (40 mg total) by mouth 2 (two) times a day, Disp: 60 tablet, Rfl: 5  •  potassium chloride SA (KLOR-CON M15) 15 MEQ tablet, Take 15 mEq by mouth 2 (two) times a day, Disp: , Rfl:   •  prazosin (MINIPRESS) 5 mg capsule, , Disp: , Rfl:   •  pregabalin (LYRICA) 75 mg capsule, Take 75 mg by mouth 2 (two) times a day, Disp: , Rfl:   •  rosuvastatin (CRESTOR) 20 MG tablet, Take 1 tablet (20 mg total) by mouth daily, Disp: 90 tablet, Rfl: 3  •  Spiriva Respimat 2.5 MCG/ACT AERS inhaler, Inhale 2 puffs daily, Disp: 4 g, Rfl: 0  •  zolpidem (AMBIEN) 10 mg tablet, Take 10 mg by mouth daily at bedtime as needed for sleep, Disp: , Rfl:   •  naloxone (NARCAN) 4 mg/0.1 mL nasal spray, ADMINISTER A SINGLE SPRAY OF NARCAN IN ONE NOSTRIL REPEAT AFTER 3 MINUTES IF NO OR MINIMAL RESPONSE (Patient not taking: Reported on 1/8/2024), Disp: , Rfl:       Physical Exam:  /76   Pulse 64   Temp 97.5 °F (36.4 °C) (Tympanic)   Ht 5' 1\" (1.549 m)   Wt 65.4 kg (144 lb 3.2 oz)   SpO2 96%   BMI 27.25 kg/m²     Physical Exam  Vitals and nursing note reviewed.   Constitutional:       Appearance: Normal appearance. She is well-developed.   HENT:      Head: Normocephalic and atraumatic.      Right Ear: Tympanic membrane, ear canal and external ear normal.      Left Ear: Tympanic membrane, ear canal and external ear normal.      Nose: Nose normal.      Mouth/Throat:      Mouth: Mucous membranes are moist.      Pharynx: Uvula midline.   Eyes:      General: Lids are normal.      Conjunctiva/sclera: Conjunctivae normal.      Pupils: Pupils are equal, round, and reactive to light.   Neck:      Thyroid: No thyroid mass or thyromegaly.      Vascular: No JVD.      " Trachea: Trachea and phonation normal.   Cardiovascular:      Rate and Rhythm: Normal rate and regular rhythm.      Pulses: Normal pulses.      Heart sounds: Normal heart sounds, S1 normal and S2 normal. No murmur heard.     No friction rub. No gallop.   Pulmonary:      Effort: Pulmonary effort is normal.      Breath sounds: Normal breath sounds.   Abdominal:      General: Bowel sounds are normal.      Palpations: Abdomen is soft.      Tenderness: There is no abdominal tenderness.   Genitourinary:     Comments: Deferred   Musculoskeletal:         General: Normal range of motion.      Cervical back: Full passive range of motion without pain, normal range of motion and neck supple.      Right lower leg: No edema.      Left lower leg: No edema.   Lymphadenopathy:      Head:      Right side of head: No submental, submandibular, tonsillar, preauricular, posterior auricular or occipital adenopathy.      Left side of head: No submental, submandibular, tonsillar, preauricular, posterior auricular or occipital adenopathy.      Cervical: No cervical adenopathy.   Skin:     General: Skin is warm and dry.      Capillary Refill: Capillary refill takes less than 2 seconds.   Neurological:      General: No focal deficit present.      Mental Status: She is alert and oriented to person, place, and time.      Cranial Nerves: No cranial nerve deficit.      Sensory: Sensation is intact.      Motor: Motor function is intact.      Coordination: Coordination is intact.      Gait: Gait is intact.      Deep Tendon Reflexes: Reflexes are normal and symmetric.   Psychiatric:         Attention and Perception: Attention and perception normal.         Mood and Affect: Mood and affect normal.         Speech: Speech normal.         Behavior: Behavior normal. Behavior is cooperative.         Thought Content: Thought content normal.         Cognition and Memory: Cognition normal.         Judgment: Judgment normal.       Labs:  Lab Results  "  Component Value Date    WBC 9.78 06/23/2024    HGB 13.6 06/23/2024    HCT 40.6 06/23/2024    MCV 86 06/23/2024     06/23/2024     Lab Results   Component Value Date    K 3.8 06/23/2024    CL 99 06/23/2024    CO2 27 06/23/2024    BUN 32 (H) 06/23/2024    CREATININE 1.49 (H) 06/23/2024    GLUF 340 (H) 12/06/2023    CALCIUM 8.0 (L) 06/23/2024    CORRECTEDCA 10.1 11/02/2022    AST 12 (L) 06/22/2024    ALT 15 06/22/2024    ALKPHOS 74 06/22/2024    EGFR 38 06/23/2024       Portions of the record may have been created with voice recognition software. Occasional wrong word or \"sound a like\" substitutions may have occurred due to the inherent limitations of voice recognition software. Read the chart carefully and recognize, using context, where substitutions have occurred. Contact me with any questions.      "

## 2024-07-03 LAB
ANION GAP SERPL CALCULATED.3IONS-SCNC: 8 MMOL/L (ref 4–13)
BUN SERPL-MCNC: 25 MG/DL (ref 5–25)
CALCIUM SERPL-MCNC: 9.6 MG/DL (ref 8.4–10.2)
CHLORIDE SERPL-SCNC: 101 MMOL/L (ref 96–108)
CO2 SERPL-SCNC: 31 MMOL/L (ref 21–32)
CREAT SERPL-MCNC: 0.69 MG/DL (ref 0.6–1.3)
GFR SERPL CREATININE-BSD FRML MDRD: 97 ML/MIN/1.73SQ M
GLUCOSE SERPL-MCNC: 188 MG/DL (ref 65–140)
POTASSIUM SERPL-SCNC: 4.2 MMOL/L (ref 3.5–5.3)
SODIUM SERPL-SCNC: 140 MMOL/L (ref 135–147)

## 2024-07-05 ENCOUNTER — HOSPITAL ENCOUNTER (OUTPATIENT)
Dept: CT IMAGING | Facility: HOSPITAL | Age: 59
End: 2024-07-05
Payer: COMMERCIAL

## 2024-07-05 DIAGNOSIS — F17.210 SMOKING GREATER THAN 20 PACK YEARS: ICD-10-CM

## 2024-07-05 PROCEDURE — 71271 CT THORAX LUNG CANCER SCR C-: CPT

## 2024-07-08 ENCOUNTER — OFFICE VISIT (OUTPATIENT)
Dept: NEUROLOGY | Facility: CLINIC | Age: 59
End: 2024-07-08
Payer: COMMERCIAL

## 2024-07-08 VITALS
OXYGEN SATURATION: 94 % | WEIGHT: 144 LBS | BODY MASS INDEX: 27.19 KG/M2 | HEART RATE: 62 BPM | DIASTOLIC BLOOD PRESSURE: 50 MMHG | TEMPERATURE: 97.3 F | HEIGHT: 61 IN | SYSTOLIC BLOOD PRESSURE: 130 MMHG

## 2024-07-08 DIAGNOSIS — R26.89 BALANCE DISORDER: Primary | ICD-10-CM

## 2024-07-08 DIAGNOSIS — R20.0 NUMBNESS: ICD-10-CM

## 2024-07-08 PROCEDURE — 99214 OFFICE O/P EST MOD 30 MIN: CPT | Performed by: PSYCHIATRY & NEUROLOGY

## 2024-07-08 PROCEDURE — G2211 COMPLEX E/M VISIT ADD ON: HCPCS | Performed by: PSYCHIATRY & NEUROLOGY

## 2024-07-08 RX ORDER — DICLOFENAC SODIUM 75 MG/1
75 TABLET, DELAYED RELEASE ORAL 2 TIMES DAILY
COMMUNITY

## 2024-07-08 NOTE — PROGRESS NOTES
"Please note: this note was created with voice recognition software. Occasional wrong word or \"sound alike\" substitutions may occur due to the inherent limitations of voice recognition software. Read the chart carefully and recognize (using context) where substitutions may have occurred. I am available to discuss any questions.       1. Balance disorder  Assessment & Plan:  Ms Newton has a long history of low back pain.  I am surprised that her MRI does not look worse than it is.  It actually looks quite good.  She has diffuse areflexia; I have little doubt that she has a degree of neuropathy but not enough that I think it can account for her problems.  I am going to obtain an EMG to look for electrical signs of radiculopathy or neuropathy.  I am more suspicious that her episodes of her legs turning to jelly is cardiovascular, perhaps due to episodes of low blood pressure.  I will obtain a cranial CT to make sure that there is no structural abnormality.  Obviously, management of her underlying medical problems is the most important issue that I think is likely to be helpful.  Further measures will be considered based on her test results and her clinical course.  Orders:  -     CT head wo contrast; Future; Expected date: 07/08/2024  2. Numbness  -     EMG 2 limb lower extremity; Future      Problem List Items Addressed This Visit       Balance disorder - Primary     Ms Newton has a long history of low back pain.  I am surprised that her MRI does not look worse than it is.  It actually looks quite good.  She has diffuse areflexia; I have little doubt that she has a degree of neuropathy but not enough that I think it can account for her problems.  I am going to obtain an EMG to look for electrical signs of radiculopathy or neuropathy.  I am more suspicious that her episodes of her legs turning to jelly is cardiovascular, perhaps due to episodes of low blood pressure.  I will obtain a cranial CT to make sure that there " is no structural abnormality.  Obviously, management of her underlying medical problems is the most important issue that I think is likely to be helpful.  Further measures will be considered based on her test results and her clinical course.         Relevant Orders    CT head wo contrast    Numbness    Relevant Orders    EMG 2 limb lower extremity       Problem List       Anxiety    ARF (acute renal failure) ([facility])    Balance disorder    Current Assessment & Plan     Ms Newton has a long history of low back pain.  I am surprised that her MRI does not look worse than it is.  It actually looks quite good.  She has diffuse areflexia; I have little doubt that she has a degree of neuropathy but not enough that I think it can account for her problems.  I am going to obtain an EMG to look for electrical signs of radiculopathy or neuropathy.  I am more suspicious that her episodes of her legs turning to jelly is cardiovascular, perhaps due to episodes of low blood pressure.  I will obtain a cranial CT to make sure that there is no structural abnormality.  Obviously, management of her underlying medical problems is the most important issue that I think is likely to be helpful.  Further measures will be considered based on her test results and her clinical course.         Bilious vomiting with nausea    Chronic midline low back pain with bilateral sciatica    Common migraine without aura    COPD (chronic obstructive pulmonary disease) ([facility])    Drug-induced constipation    Elevated liver enzymes    Esophageal dysphagia    Facet arthropathy, lumbosacral    Fatty liver    Gastroesophageal reflux disease with esophagitis    Gastroparesis due to DM  ([facility])    Glucosuria    Hoarseness    Hyperglycemia due to diabetes mellitus (HCC)    Hyperlipidemia    Insomnia    LBBB (left bundle branch block)    Lumbar spondylosis    Medical marijuana use    Microalbuminuria    Mixed stress and urge urinary incontinence    Non-ischemic  "cardiomyopathy (HCC)    Numbness    Other chronic pain    Overweight    Pyuria    Shortness of breath    Status post robotic assisted total laparoscopic hysterectomy, bilateral salpingo-oophorectomy    Tobacco use disorder, severe, dependence    Type 2 diabetes mellitus with hyperglycemia, with long-term current use of insulin (HCC)    Vulvar rash    Vulvovaginitis due to yeast         I had the pleasure of seeing Helena Newton, a 58 y.o. female, for neuromuscular consultation. The referral was for balance problems.     She has suffered with problems with her back for the last 10 years.  She has pain that shoots into the lateral thighs symmetrically but is accompanied by burning.  At other times her pain shoots all the way down both legs to the calves and feet, especially while walking.  She has had numerous pain procedures which have not seemed to help.  Her last lumbar MRI which I personally reviewed showed no significant abnormality; I took representative pictures and attach them to the chart.  While walking her legs can \"turn to jelly\" and she can \"tipped over\".  She denies diminished sensation to touch but has symmetrical tingling in both feet.  Cranial MRI several years ago was reportedly done at [facility] and showed \"low-volume\".  She has a history of diabetes with her most recent glycosylated hemoglobin level being 10.3.  She has a history of 2 myocardial infarctions and left bundle branch block.  She has chronic kidney disease with a recent creatinine of 3.0.      Past Medical History:   Diagnosis Date    Angina pectoris ([facility])     recent    Anxiety     Arthritis     Asthma     Carpal tunnel syndrome     Chronic headaches     COPD (chronic obstructive pulmonary disease) ([facility])     Diabetes ([facility])     Diabetes mellitus ([facility])     GERD (gastroesophageal reflux disease)     Headache     Hyperlipidemia     Hypertension     Kidney stone     Left bundle branch block     LBBB    MI (myocardial infarction) ([facility])     " Myocardial infarction (HCC)     Neuropathy     PTSD (post-traumatic stress disorder)     RLS (restless legs syndrome)     Shortness of breath        Past Surgical History:   Procedure Laterality Date    ANGIOPLASTY      BREAST SURGERY  2016    Reduction    CARDIAC CATHETERIZATION  2018     Mid LAD: There was a tubular 40 % stenosis     SECTION      COLONOSCOPY      HYSTERECTOMY  2018    Complete    KIDNEY STONE SURGERY      LAPAROSCOPY      NJ COLONOSCOPY FLX DX W/COLLJ SPEC WHEN PFRMD N/A 2017    Procedure: EGD AND COLONOSCOPY;  Surgeon: Wilberto Galeano MD;  Location: [facility];  Service: Gastroenterology    NJ ESOPHAGOGASTRODUODENOSCOPY TRANSORAL DIAGNOSTIC N/A 10/10/2018    Procedure: ESOPHAGOGASTRODUODENOSCOPY (EGD);  Surgeon: Wilberto Galeano MD;  Location: [facility];  Service: Gastroenterology    NJ LAPS TOTAL HYSTERECT 250 GM/< W/RMVL TUBE/OVARY N/A 2018    Procedure: ROBOTIC ASSISTED TOTAL LAPAROSCOPIC HYSTERECTOMY, BILATERAL SALPINGOOPHERECTOMY,;  Surgeon: Daron Valdez MD;  Location: [facility];  Service: Gynecology Oncology    UPPER GASTROINTESTINAL ENDOSCOPY      WRIST SURGERY Right        Lisinopril, Losartan, Other, Prednisone, and Cat hair extract    Social History     Tobacco Use   Smoking Status Every Day    Current packs/day: 1.00    Average packs/day: 1 pack/day for 51.5 years (51.5 ttl pk-yrs)    Types: Cigarettes    Start date: 1973   Smokeless Tobacco Never   Tobacco Comments    pt. smoked this am 10/10       Social History     Substance and Sexual Activity   Alcohol Use Not Currently    Comment: Rarely consumes alcohol - As per [employer]        Social History     Substance and Sexual Activity   Drug Use Yes    Frequency: 7.0 times per week    Types: Marijuana    Comment: medical marijuana  last use over a month       Family History   Problem Relation Age of Onset    Diabetes Mother     Breast cancer Mother 45    Lung cancer Father 54    Diabetes  Sister     Brain cancer Maternal Aunt 72    Breast cancer Other 25         Current Outpatient Medications:     Accu-Chek Guide test strip, use to CHECK BLOOD GLUCOSE four times a day, Disp: , Rfl:     albuterol (PROVENTIL HFA,VENTOLIN HFA) 90 mcg/act inhaler, Inhale 2 puffs every 6 (six) hours as needed for wheezing, Disp: , Rfl:     BD Pen Needle Dayna 2nd Gen 32G X 4 MM MISC, 4 time a day, Disp: 200 each, Rfl: 2    Bevespi Aerosphere 9-4.8 MCG/ACT inhaler, inhale 2 puffs by mouth every morning and 2 puffs at bedtime, Disp: , Rfl:     bisoprolol (ZEBETA) 5 mg tablet, Take 1 tablet (5 mg total) by mouth daily, Disp: 90 tablet, Rfl: 3    Blood Glucose Monitoring Suppl (OneTouch Verio Reflect) w/Device KIT, Check blood sugars twice daily. Please substitute with appropriate alternative as covered by patient's insurance. Dx: E11.65, Disp: 1 kit, Rfl: 0    buPROPion (WELLBUTRIN XL) 300 mg 24 hr tablet, Take 450 mg by mouth every morning, Disp: , Rfl:     Constulose 10 GM/15ML solution, take 30 milliliters by mouth twice a day, Disp: 946 mL, Rfl: 1    diclofenac (VOLTAREN) 75 mg EC tablet, Take 75 mg by mouth 2 (two) times a day, Disp: , Rfl:     DULoxetine (CYMBALTA) 60 mg delayed release capsule, take 1 capsule by mouth every morning DO NOT CRUSH, CHEW, AND/OR DIVIDE, Disp: , Rfl:     Empagliflozin (Jardiance) 25 MG TABS, Take 1 tablet (25 mg total) by mouth every morning, Disp: 90 tablet, Rfl: 1    glucose blood (OneTouch Verio) test strip, Check blood sugars twice daily. Please substitute with appropriate alternative as covered by patient's insurance. Dx: E11.65, Disp: 200 each, Rfl: 3    hydrOXYzine pamoate (VISTARIL) 25 mg capsule, take 1 capsule by mouth daily if needed for anxiety, Disp: , Rfl:     ibuprofen (MOTRIN) 200 mg tablet, Take 200 mg by mouth every 6 (six) hours as needed for mild pain, Disp: , Rfl:     insulin glargine (LANTUS SOLOSTAR) 100 units/mL injection pen, Inject 30 Units under the skin daily at  bedtime, Disp: 27 mL, Rfl: 0    metFORMIN (GLUCOPHAGE-XR) 500 mg 24 hr tablet, take 2 tablets by mouth at bedtime, Disp: 180 tablet, Rfl: 1    NovoLOG FlexPen 100 units/mL injection pen, 12 units for low carb meals and 15 units for high carb meals., Disp: 33 mL, Rfl: 0    ondansetron (ZOFRAN) 4 mg tablet, Take 1 tablet (4 mg total) by mouth every 8 (eight) hours as needed for nausea or vomiting, Disp: 20 tablet, Rfl: 0    OneTouch Delica Lancets 33G MISC, Check blood sugars twice daily. Please substitute with appropriate alternative as covered by patient's insurance. Dx: E11.65, Disp: 200 each, Rfl: 3    oxyCODONE (ROXICODONE) 5 immediate release tablet, Take 5 mg by mouth 3 (three) times a day as needed, Disp: , Rfl:     pantoprazole (PROTONIX) 40 mg tablet, Take 1 tablet (40 mg total) by mouth 2 (two) times a day, Disp: 60 tablet, Rfl: 5    pregabalin (LYRICA) 75 mg capsule, Take 75 mg by mouth 2 (two) times a day, Disp: , Rfl:     rosuvastatin (CRESTOR) 20 MG tablet, Take 1 tablet (20 mg total) by mouth daily, Disp: 90 tablet, Rfl: 3    Spiriva Respimat 2.5 MCG/ACT AERS inhaler, Inhale 2 puffs daily, Disp: 4 g, Rfl: 0    zolpidem (AMBIEN) 10 mg tablet, Take 10 mg by mouth daily at bedtime as needed for sleep, Disp: , Rfl:     naloxone (NARCAN) 4 mg/0.1 mL nasal spray, ADMINISTER A SINGLE SPRAY OF NARCAN IN ONE NOSTRIL REPEAT AFTER 3 MINUTES IF NO OR MINIMAL RESPONSE (Patient not taking: Reported on 1/8/2024), Disp: , Rfl:     naproxen (NAPROSYN) 375 mg tablet, Take 1 tablet (375 mg total) by mouth 2 (two) times a day with meals (Patient not taking: Reported on 7/8/2024), Disp: 20 tablet, Rfl: 0    potassium chloride SA (KLOR-CON M15) 15 MEQ tablet, Take 15 mEq by mouth 2 (two) times a day (Patient not taking: Reported on 7/8/2024), Disp: , Rfl:     prazosin (MINIPRESS) 5 mg capsule, , Disp: , Rfl:     Lab on 07/02/2024   Component Date Value Ref Range Status    Sodium 07/02/2024 140  135 - 147 mmol/L Final     Potassium 07/02/2024 4.2  3.5 - 5.3 mmol/L Final    Chloride 07/02/2024 101  96 - 108 mmol/L Final    CO2 07/02/2024 31  21 - 32 mmol/L Final    ANION GAP 07/02/2024 8  4 - 13 mmol/L Final    BUN 07/02/2024 25  5 - 25 mg/dL Final    Creatinine 07/02/2024 0.69  0.60 - 1.30 mg/dL Final    Standardized to IDMS reference method    Glucose 07/02/2024 188 (H)  65 - 140 mg/dL Final    If the patient is fasting, the ADA then defines impaired fasting glucose as > 100 mg/dL and diabetes as > or equal to 123 mg/dL.    Calcium 07/02/2024 9.6  8.4 - 10.2 mg/dL Final    eGFR 07/02/2024 97  ml/min/1.73sq m Final   Admission on 06/22/2024, Discharged on 06/23/2024   Component Date Value Ref Range Status    POC Glucose 06/22/2024 216 (H)  65 - 140 mg/dl Final    WBC 06/22/2024 12.01 (H)  4.31 - 10.16 Thousand/uL Final    RBC 06/22/2024 5.18 (H)  3.81 - 5.12 Million/uL Final    Hemoglobin 06/22/2024 14.9  11.5 - 15.4 g/dL Final    Hematocrit 06/22/2024 44.2  34.8 - 46.1 % Final    MCV 06/22/2024 85  82 - 98 fL Final    MCH 06/22/2024 28.8  26.8 - 34.3 pg Final    MCHC 06/22/2024 33.7  31.4 - 37.4 g/dL Final    RDW 06/22/2024 13.9  11.6 - 15.1 % Final    MPV 06/22/2024 9.9  8.9 - 12.7 fL Final    Platelets 06/22/2024 221  149 - 390 Thousands/uL Final    nRBC 06/22/2024 0  /100 WBCs Final    Segmented % 06/22/2024 61  43 - 75 % Final    Immature Grans % 06/22/2024 0  0 - 2 % Final    Lymphocytes % 06/22/2024 27  14 - 44 % Final    Monocytes % 06/22/2024 9  4 - 12 % Final    Eosinophils Relative 06/22/2024 3  0 - 6 % Final    Basophils Relative 06/22/2024 0  0 - 1 % Final    Absolute Neutrophils 06/22/2024 7.28  1.85 - 7.62 Thousands/µL Final    Absolute Immature Grans 06/22/2024 0.04  0.00 - 0.20 Thousand/uL Final    Absolute Lymphocytes 06/22/2024 3.24  0.60 - 4.47 Thousands/µL Final    Absolute Monocytes 06/22/2024 1.05  0.17 - 1.22 Thousand/µL Final    Eosinophils Absolute 06/22/2024 0.36  0.00 - 0.61 Thousand/µL Final    Basophils  Absolute 06/22/2024 0.04  0.00 - 0.10 Thousands/µL Final    Sodium 06/22/2024 137  135 - 147 mmol/L Final    Potassium 06/22/2024 3.3 (L)  3.5 - 5.3 mmol/L Final    Chloride 06/22/2024 99  96 - 108 mmol/L Final    CO2 06/22/2024 26  21 - 32 mmol/L Final    ANION GAP 06/22/2024 12  4 - 13 mmol/L Final    BUN 06/22/2024 44 (H)  5 - 25 mg/dL Final    Creatinine 06/22/2024 3.63 (H)  0.60 - 1.30 mg/dL Final    Standardized to IDMS reference method    Glucose 06/22/2024 158 (H)  65 - 140 mg/dL Final    If the patient is fasting, the ADA then defines impaired fasting glucose as > 100 mg/dL and diabetes as > or equal to 123 mg/dL.    Calcium 06/22/2024 9.1  8.4 - 10.2 mg/dL Final    AST 06/22/2024 12 (L)  13 - 39 U/L Final    ALT 06/22/2024 15  7 - 52 U/L Final    Specimen collection should occur prior to Sulfasalazine administration due to the potential for falsely depressed results.     Alkaline Phosphatase 06/22/2024 74  34 - 104 U/L Final    Total Protein 06/22/2024 6.7  6.4 - 8.4 g/dL Final    Albumin 06/22/2024 3.6  3.5 - 5.0 g/dL Final    Total Bilirubin 06/22/2024 0.47  0.20 - 1.00 mg/dL Final    Use of this assay is not recommended for patients undergoing treatment with eltrombopag due to the potential for falsely elevated results.  N-acetyl-p-benzoquinone imine (metabolite of Acetaminophen) will generate erroneously low results in samples for patients that have taken an overdose of Acetaminophen.    eGFR 06/22/2024 13  ml/min/1.73sq m Final    Magnesium 06/22/2024 2.0  1.9 - 2.7 mg/dL Final    SARS-CoV-2 06/22/2024 Negative  Negative Final    INFLUENZA A PCR 06/22/2024 Negative  Negative Final    INFLUENZA B PCR 06/22/2024 Negative  Negative Final    RSV PCR 06/22/2024 Negative  Negative Final    Color, UA 06/22/2024 Yellow  Yellow, Straw Final    Clarity,  06/22/2024 Turbid (A)  Hazy, Clear Final    Specific Gravity,  06/22/2024 1.025  >1.005 - <1.030 Final    pH,  06/22/2024 6.0  5.0, 5.5, 6.0, 6.5, 7.0,  7.5 Final    Leukocytes, UA 06/22/2024 2+ (A)  Negative Final    Nitrite, UA 06/22/2024 Negative  Negative Final    Protein, UA 06/22/2024 1+ (A)  Negative, Interference- unable to analyze mg/dl Final    Glucose, UA 06/22/2024 2+ (A)  Negative mg/dl Final    Ketones, UA 06/22/2024 Negative  Negative mg/dl Final    Urobilinogen, UA 06/22/2024 0.2  0.2, 1.0 E.U./dl E.U./dl Final    Bilirubin, UA 06/22/2024 Negative  Negative Final    Occult Blood, UA 06/22/2024 2+ (A)  Negative Final    Ethanol Lvl 06/22/2024 <10  <10 mg/dL Final    RBC, UA 06/22/2024 4-10 (A)  None Seen, 0-1, 1-2, 2-4, 0-5 /hpf Final    WBC, UA 06/22/2024 Innumerable (A)  None Seen, 0-1, 1-2, 0-5, 2-4 /hpf Final    Epithelial Cells 06/22/2024 Occasional  None Seen, Occasional /hpf Final    Bacteria, UA 06/22/2024 Occasional  None Seen, Occasional /hpf Final    WBC Casts, UA 06/22/2024 3-5 (A)  (none) /lpf Final    Urine Culture 06/22/2024 >100,000 cfu/ml Escherichia coli (A)   Final    This organism has been edited. The previous result was Gram Negative [place] on 6/23/2024 at 1800 EDT.  If amikacin is required for treatment, contact Microbiology for susceptibilities.    Color, UA 06/22/2024 Yellow  Yellow, Straw Final    Clarity, UA 06/22/2024 Hazy  Hazy, Clear Final    Specific Gravity, UA 06/22/2024 1.015  >1.005 - <1.030 Final    pH, UA 06/22/2024 5.5  5.0, 5.5, 6.0, 6.5, 7.0, 7.5 Final    Leukocytes, UA 06/22/2024 1+ (A)  Negative Final    Nitrite, UA 06/22/2024 Negative  Negative Final    Protein, UA 06/22/2024 Trace (A)  Negative, Interference- unable to analyze mg/dl Final    Glucose, UA 06/22/2024 2+ (A)  Negative mg/dl Final    Ketones, UA 06/22/2024 Negative  Negative mg/dl Final    Urobilinogen, UA 06/22/2024 0.2  0.2, 1.0 E.U./dl E.U./dl Final    Bilirubin, UA 06/22/2024 Negative  Negative Final    Occult Blood, UA 06/22/2024 1+ (A)  Negative Final    RBC, UA 06/22/2024 0-1 (A)  None Seen, 2-4 /hpf Final    WBC, UA 06/22/2024 30-50 (A)  None  Seen, 2-4, 5-60 /hpf Final    Epithelial Cells 06/22/2024 Occasional  None Seen, Occasional /hpf Final    Bacteria, UA 06/22/2024 Moderate (A)  None Seen, Occasional /hpf Final    POC Glucose 06/22/2024 158 (H)  65 - 140 mg/dl Final    Hemoglobin A1C 06/22/2024 9.9 (H)  Normal 4.0-5.6%; PreDiabetic 5.7-6.4%; Diabetic >=6.5%; Glycemic control for adults with diabetes <7.0% % Final    EAG 06/22/2024 237  mg/dl Final    POC Glucose 06/22/2024 346 (H)  65 - 140 mg/dl Final    WBC 06/23/2024 9.78  4.31 - 10.16 Thousand/uL Final    RBC 06/23/2024 4.74  3.81 - 5.12 Million/uL Final    Hemoglobin 06/23/2024 13.6  11.5 - 15.4 g/dL Final    Hematocrit 06/23/2024 40.6  34.8 - 46.1 % Final    MCV 06/23/2024 86  82 - 98 fL Final    MCH 06/23/2024 28.7  26.8 - 34.3 pg Final    MCHC 06/23/2024 33.5  31.4 - 37.4 g/dL Final    RDW 06/23/2024 13.8  11.6 - 15.1 % Final    MPV 06/23/2024 9.8  8.9 - 12.7 fL Final    Platelets 06/23/2024 197  149 - 390 Thousands/uL Final    nRBC 06/23/2024 0  /100 WBCs Final    Segmented % 06/23/2024 56  43 - 75 % Final    Immature Grans % 06/23/2024 0  0 - 2 % Final    Lymphocytes % 06/23/2024 33  14 - 44 % Final    Monocytes % 06/23/2024 8  4 - 12 % Final    Eosinophils Relative 06/23/2024 3  0 - 6 % Final    Basophils Relative 06/23/2024 0  0 - 1 % Final    Absolute Neutrophils 06/23/2024 5.43  1.85 - 7.62 Thousands/µL Final    Absolute Immature Grans 06/23/2024 0.03  0.00 - 0.20 Thousand/uL Final    Absolute Lymphocytes 06/23/2024 3.21  0.60 - 4.47 Thousands/µL Final    Absolute Monocytes 06/23/2024 0.77  0.17 - 1.22 Thousand/µL Final    Eosinophils Absolute 06/23/2024 0.31  0.00 - 0.61 Thousand/µL Final    Basophils Absolute 06/23/2024 0.03  0.00 - 0.10 Thousands/µL Final    Sodium 06/23/2024 137  135 - 147 mmol/L Final    Potassium 06/23/2024 3.6  3.5 - 5.3 mmol/L Final    Chloride 06/23/2024 102  96 - 108 mmol/L Final    CO2 06/23/2024 26  21 - 32 mmol/L Final    ANION GAP 06/23/2024 9  4 - 13  mmol/L Final    BUN 06/23/2024 42 (H)  5 - 25 mg/dL Final    Creatinine 06/23/2024 2.14 (H)  0.60 - 1.30 mg/dL Final    Standardized to IDMS reference method    Glucose 06/23/2024 217 (H)  65 - 140 mg/dL Final    If the patient is fasting, the ADA then defines impaired fasting glucose as > 100 mg/dL and diabetes as > or equal to 123 mg/dL.    Calcium 06/23/2024 8.0 (L)  8.4 - 10.2 mg/dL Final    eGFR 06/23/2024 25  ml/min/1.73sq m Final    POC Glucose 06/23/2024 204 (H)  65 - 140 mg/dl Final    POC Glucose 06/23/2024 387 (H)  65 - 140 mg/dl Final    Sodium 06/23/2024 136  135 - 147 mmol/L Final    Potassium 06/23/2024 3.8  3.5 - 5.3 mmol/L Final    Chloride 06/23/2024 99  96 - 108 mmol/L Final    CO2 06/23/2024 27  21 - 32 mmol/L Final    ANION GAP 06/23/2024 10  4 - 13 mmol/L Final    BUN 06/23/2024 32 (H)  5 - 25 mg/dL Final    Creatinine 06/23/2024 1.49 (H)  0.60 - 1.30 mg/dL Final    Standardized to IDMS reference method    Glucose 06/23/2024 367 (H)  65 - 140 mg/dL Final    If the patient is fasting, the ADA then defines impaired fasting glucose as > 100 mg/dL and diabetes as > or equal to 123 mg/dL.    Calcium 06/23/2024 8.0 (L)  8.4 - 10.2 mg/dL Final    eGFR 06/23/2024 38  ml/min/1.73sq m Final   Office Visit on 06/19/2024   Component Date Value Ref Range Status    Hemoglobin A1C 06/19/2024 10.3 (A)  6.5 Final   Telephone on 06/11/2024   Component Date Value Ref Range Status    Severity 12/18/2023 Normal   Final    Right Eye Diabetic Retinopathy 12/18/2023 None   Final    Left Eye Diabetic Retinopathy 12/18/2023 None   Final   Office Visit on 03/07/2024   Component Date Value Ref Range Status    Hemoglobin A1C 03/07/2024 9.1 (A)  6.5 Final   Hospital Outpatient Visit on 02/09/2024   Component Date Value Ref Range Status    POC Glucose 02/09/2024 79  65 - 140 mg/dl Final        No results found for this or any previous visit.     No results found for this or any previous visit.    No results found for this or  any previous visit.    No results found for this or any previous visit.    No results found for this or any previous visit.    No results found for this or any previous visit.    No results found for this or any previous visit.    Results for orders placed during the hospital encounter of 02/09/24    MRI lumbar spine wo contrast    Narrative  MRI LUMBAR SPINE WITHOUT CONTRAST    INDICATION: M47.897: Other spondylosis, lumbosacral region  M54.17: Radiculopathy, lumbosacral region.    COMPARISON: 5/26/2020.    TECHNIQUE:  Multiplanar, multisequence imaging of the lumbar spine was performed. .      IMAGE QUALITY:  Diagnostic    FINDINGS:    VERTEBRAL BODIES:  There are 5 lumbar type vertebral bodies.  Normal alignment of the lumbar spine.  No spondylolysis or spondylolisthesis. No scoliosis.  No compression fracture.    Normal marrow signal is identified within the visualized bony  structures.  No discrete marrow lesion other than an L3 vertebral body hemangioma..    SACRUM:  Normal signal within the sacrum. No evidence of insufficiency or stress fracture.    DISTAL CORD AND CONUS:  Normal size and signal within the distal cord and conus.    PARASPINAL SOFT TISSUES:  Paraspinal soft tissues are unremarkable.    LOWER THORACIC DISC SPACES:  Normal disc height and signal.  No disc herniation, canal stenosis or foraminal narrowing.    LUMBAR DISC SPACES:    L1-L2:  Normal.    L2-L3:  Normal.    L3-L4: Minimal bulge and facet hypertrophy without stenosis.    L4-L5: Mild facet arthrosis without stenosis.    L5-S1: Mild facet arthrosis without stenosis.    OTHER FINDINGS:  None.    Impression  Minor noncompressive spondylotic changes of the lower lumbar spine. No significant interval progression of disease.        Workstation performed: [id number]      Results for orders placed during the hospital encounter of 05/26/20    MRI lumbar spine wo contrast    Narrative  MRI LUMBAR SPINE WITHOUT CONTRAST    INDICATION: M54.9:  Dorsalgia, unspecified.    COMPARISON:  None.    TECHNIQUE:  Sagittal T1, sagittal T2, sagittal inversion recovery, axial T1 and axial T2, coronal T2.  IMAGE QUALITY:  Diagnostic    FINDINGS:    VERTEBRAL BODIES:  There are 5 lumbar type vertebral bodies.  Normal alignment of the lumbar spine.  No spondylolysis or spondylolisthesis. No scoliosis.  No compression fracture.    Normal marrow signal is identified within the visualized bony  structures.  There is L3 vertebral body hemangioma.    SACRUM:  Normal signal within the sacrum. No evidence of insufficiency or stress fracture.    DISTAL CORD AND CONUS:  Normal size and signal within the distal cord and conus.    PARASPINAL SOFT TISSUES:  Paraspinal soft tissues are unremarkable.    LOWER THORACIC DISC SPACES:  There is disc bulge at level T10-11 resulting in mild canal narrowing.    LUMBAR DISC SPACES:    L1-L2:  Unremarkable    L2-L3:  Mild facet arthropathy    L3-L4:  Very minimal disc bulge.  Mild bilateral facet arthropathy.  No canal or foraminal stenosis    L4-L5:  No significant disc bulge.  Mild bilateral facet arthropathy with no canal or foraminal stenosis    L5-S1:  No significant disc bulge.  Mild facet arthropathy with no canal or foraminal stenosis    Impression  Minor degenerative changes without significant canal or foraminal stenosis.      Workstation performed: [id number]    No results found for this or any previous visit.    No results found for this or any previous visit.    No results found for this or any previous visit.    No results found for this or any previous visit.      Review of Systems   Constitutional:  Positive for fatigue.   HENT: Negative.     Eyes: Negative.    Respiratory: Negative.     Cardiovascular: Negative.    Gastrointestinal: Negative.    Endocrine: Negative.    Genitourinary: Negative.    Musculoskeletal:  Positive for gait problem.   Skin: Negative.    Allergic/Immunologic: Negative.    Neurological:  Positive for  "dizziness and numbness.   Hematological: Negative.    Psychiatric/Behavioral: Negative.           On examination,     Blood pressure 130/50, pulse 62, temperature (!) 97.3 °F (36.3 °C), temperature source Temporal, height 5' 1\" (1.549 m), weight 65.3 kg (144 lb), SpO2 94%.    Well developed, well nourished, in no acute distress    Normocephalic, atraumatic    Adentulous    Heart: regular rate and rhythm  I did not hear a carotid bruit    Extremities: no clubbing, cyanosis, or edema    Speech and cognition appeared normal    Cranial nerves:  II: Pupils equal, round, and reactive to light. No gross visual field defect. I did not appreciate optic disc edema.  III, IV, VI: Extraocular movements intact  V: Normal facial sensation in all three divisions of the trigeminal nerve bilaterally  VII: Normal facial strength  VIII: Hearing intact to finger rub bilaterally  IX, X: Palate elevated symmetrically  XI: Sternocleidomastoid strength normal bilaterally  XII: Tongue protruded in midline without atrophy or fibrillations    Motor:  Normal tone and bulk throughout.   Muscle strength testing by the MRC scale (listed below) was 5/5 in the deltoid, biceps, triceps, wrist extensors, wrist flexors, finger extensors, finger flexors,. Hip flexors, quadriceps, ankle dorsiflexors, ankle plantar flexors, and EHL bilaterally  MRC scale:  0/5 No contraction is present   1/5 Incomplete range of active motion, even when gravity is eliminated   2/5 Complete active range of motion with gravity eliminated   3/5 Full motion against gravity, but not with any resistance  4/5 The patient is able to complete the action against gravity and some resistance by the examiner   5/5 Completely normal strength, overcoming gravity and all resistance by the examiner     Deep tendon reflexes:   Absent in the biceps, triceps, brachioradialis, patellas, and ankles  Toes downgoing (no Babinski sign)  No Messina's sign    Sensation: Normal pinprick throughout.  " Legs seemed less to light touch than the arms without any specific distribution of loss.    Cerebellar: normal finger to nose and heel to shin testing    Gait: Normal             There are no Patient Instructions on file for this visit.      Thank you very much for allowing me to participate in your patient's care. Please feel free to contact me for any questions or concerns. Please be aware of the inherent limitations of voice recognition software, which may result in transcriptional errors.    Petros Rosa MD

## 2024-07-08 NOTE — ASSESSMENT & PLAN NOTE
Ms Newton has a long history of low back pain.  I am surprised that her MRI does not look worse than it is.  It actually looks quite good.  She has diffuse areflexia; I have little doubt that she has a degree of neuropathy but not enough that I think it can account for her problems.  I am going to obtain an EMG to look for electrical signs of radiculopathy or neuropathy.  I am more suspicious that her episodes of her legs turning to jelly is cardiovascular, perhaps due to episodes of low blood pressure.  I will obtain a cranial CT to make sure that there is no structural abnormality.  Obviously, management of her underlying medical problems is the most important issue that I think is likely to be helpful.  Further measures will be considered based on her test results and her clinical course.

## 2024-07-09 ENCOUNTER — TELEPHONE (OUTPATIENT)
Age: 59
End: 2024-07-09

## 2024-07-09 NOTE — TELEPHONE ENCOUNTER
Melissa, from [facility], called in inquiring if we have received they're Physician Certificate from them?    Reviewed Chart and did not see anything received from them.    Melissa stated that she will re-fax it again.   Issued fx number for confirmation.

## 2024-07-11 NOTE — RESULT ENCOUNTER NOTE
[address]  [phone]      2021    Esophagogastroduodenoscopy (EGD) Procedure Note  Jeremy Brown  : 1940  [facility] Medical Record Number: [id number]      Indications:    Reported episode of melena, without other GI symptoms. Referring Physician:  Keo Hewitt NP  Anesthesia/Sedation:  Conscious sedation/deep sedation/monitored anesthesia -- see notes. Endoscopist:  Dr. Zurdo Ba  Complications:  None  Estimated Blood Loss:  None    Permit:  The indications, risks, benefits and alternatives were reviewed with the patient or their decision maker who was provided an opportunity to ask questions and all questions were answered. The specific risks of esophagogastroduodenoscopy with conscious sedation were reviewed, including but not limited to anesthetic complication, bleeding, adverse drug reaction, missed lesion, infection, IV site reactions, and intestinal perforation which would lead to the need for surgical repair. Alternatives to EGD including radiographic imaging, observation without testing, or laboratory testing were reviewed as well as the limitations of those alternatives discussed. After considering the options and having all their questions answered, the patient or their decision maker provided both verbal and written consent to proceed. Procedure in Detail:  After obtaining informed consent, positioning of the patient in the left lateral decubitus position, and conduction of a pre-procedure pause or \"time out\" the endoscope was introduced into the mouth and advanced to the duodenum. A careful inspection was made, and findings or interventions are described below.     Findings:   Esophagus:normal  Stomach: normal   Duodenum/jejunum: normal      Specimens: none    Impression: Normal EGD, no bleeding, ulcer, inflammation, vascular lesion, or neoplastic Please call Helena Newton regarding LDCT result.    No suspicious lung nodules or masses.     Continued annual screening with LDCT in 12 months.  findings. Recommendations:  -If melena or iron deficiency were to develop/re-occur would consider pillcam and repeat colonoscopy. However he repeats having taken some OTC product for an upset stomach (?bismuth) prior to the event noted as melena, and has not noted recurrent symptoms so for now would suggest observation without additional testing. Will arrange follow up in the office in a few weeks and on follow up if there is no recurrent melena or other symptoms; would recommend (if not already done - due to technical difficulties I cannot access his office data at this time) CBC, iron profile, and IFOB to determine if additional GI testing is required. Thank you for entrusting me with this patient's care. Please do not hesitate to contact me with any questions or if I can be of assistance with any of your other patients' GI needs. Signed By: Kim Mcarthur MD                        January 20, 2021     Surgical assistant none. Implants none unless specified.

## 2024-07-15 ENCOUNTER — HOSPITAL ENCOUNTER (OUTPATIENT)
Dept: CT IMAGING | Facility: HOSPITAL | Age: 59
Discharge: HOME | End: 2024-07-15
Attending: PSYCHIATRY & NEUROLOGY
Payer: COMMERCIAL

## 2024-07-15 DIAGNOSIS — R26.89 BALANCE DISORDER: ICD-10-CM

## 2024-07-15 PROCEDURE — 70450 CT HEAD/BRAIN W/O DYE: CPT

## 2024-07-16 ENCOUNTER — TELEPHONE (OUTPATIENT)
Age: 59
End: 2024-07-16

## 2024-07-16 NOTE — TELEPHONE ENCOUNTER
Tootie from [facility]  called is going to fax a position certification form to the office.

## 2024-07-22 ENCOUNTER — TELEPHONE (OUTPATIENT)
Dept: FAMILY MEDICINE CLINIC | Facility: CLINIC | Age: 59
End: 2024-07-22

## 2024-07-22 NOTE — TELEPHONE ENCOUNTER
Pt presented with [facility] office of [facility] Physician Certification Form to be filled out.  Kirill filled out everything, just need Dr. Pittman to sign off on it.  Will fax but also scanned into media in case that's faster.    TY :)

## 2024-07-25 ENCOUNTER — TELEPHONE (OUTPATIENT)
Age: 59
End: 2024-07-25

## 2024-07-25 DIAGNOSIS — K59.09 CHRONIC CONSTIPATION: ICD-10-CM

## 2024-07-25 RX ORDER — LACTULOSE 10 G/15ML
30 SOLUTION ORAL 2 TIMES DAILY
Qty: 946 ML | Refills: 1 | Status: SHIPPED | OUTPATIENT
Start: 2024-07-25

## 2024-07-25 NOTE — TELEPHONE ENCOUNTER
David, from [facility]  called. He was following up on the Physician Certification form. He is asking if that could be re-faxed to them?     Best fax number [phone]    Please advise, thank you.

## 2024-07-26 ENCOUNTER — HOSPITAL ENCOUNTER (OUTPATIENT)
Dept: NEUROLOGY | Facility: CLINIC | Age: 59
End: 2024-07-26
Payer: COMMERCIAL

## 2024-07-26 ENCOUNTER — TELEPHONE (OUTPATIENT)
Dept: FAMILY MEDICINE CLINIC | Facility: CLINIC | Age: 59
End: 2024-07-26

## 2024-07-26 DIAGNOSIS — R20.0 NUMBNESS: ICD-10-CM

## 2024-07-26 PROBLEM — G62.9 NEUROPATHY: Status: ACTIVE | Noted: 2024-07-26

## 2024-07-26 PROCEDURE — 95911 NRV CNDJ TEST 9-10 STUDIES: CPT | Performed by: PSYCHIATRY & NEUROLOGY

## 2024-07-26 PROCEDURE — 95886 MUSC TEST DONE W/N TEST COMP: CPT | Performed by: PSYCHIATRY & NEUROLOGY

## 2024-08-05 ENCOUNTER — TELEPHONE (OUTPATIENT)
Age: 59
End: 2024-08-05

## 2024-08-14 DIAGNOSIS — N17.9 ACUTE RENAL FAILURE, UNSPECIFIED ACUTE RENAL FAILURE TYPE (HCC): Primary | ICD-10-CM

## 2024-08-14 NOTE — TELEPHONE ENCOUNTER
Emailed [facility] rep. No documents were received to complete  
Fax completed and scanned into media  
Received document. Waiting provider signature and will fax   
[facility] called- they sent a form over via [employer] on 7/2 and wanted to verify that we received it.    I spoke with clinical staff who verified that we did not receive anything. They are resending it now, please advise once received.   
none

## 2024-08-16 DIAGNOSIS — K59.09 CHRONIC CONSTIPATION: ICD-10-CM

## 2024-08-16 RX ORDER — LACTULOSE 10 G/15ML
30 SOLUTION ORAL 2 TIMES DAILY
Qty: 946 ML | Refills: 5 | Status: SHIPPED | OUTPATIENT
Start: 2024-08-16

## 2024-08-20 ENCOUNTER — CONSULT (OUTPATIENT)
Dept: NEPHROLOGY | Facility: CLINIC | Age: 59
End: 2024-08-20
Payer: COMMERCIAL

## 2024-08-20 VITALS
HEART RATE: 64 BPM | WEIGHT: 146 LBS | HEIGHT: 61 IN | OXYGEN SATURATION: 95 % | SYSTOLIC BLOOD PRESSURE: 98 MMHG | TEMPERATURE: 97.9 F | DIASTOLIC BLOOD PRESSURE: 60 MMHG | BODY MASS INDEX: 27.56 KG/M2

## 2024-08-20 DIAGNOSIS — M15.9 PRIMARY OSTEOARTHRITIS INVOLVING MULTIPLE JOINTS: ICD-10-CM

## 2024-08-20 DIAGNOSIS — E11.65 TYPE 2 DIABETES MELLITUS WITH HYPERGLYCEMIA, WITHOUT LONG-TERM CURRENT USE OF INSULIN (HCC): ICD-10-CM

## 2024-08-20 DIAGNOSIS — N17.9 ARF (ACUTE RENAL FAILURE) (HCC): Primary | ICD-10-CM

## 2024-08-20 DIAGNOSIS — I10 PRIMARY HYPERTENSION: ICD-10-CM

## 2024-08-20 PROCEDURE — G2211 COMPLEX E/M VISIT ADD ON: HCPCS | Performed by: INTERNAL MEDICINE

## 2024-08-20 PROCEDURE — 99204 OFFICE O/P NEW MOD 45 MIN: CPT | Performed by: INTERNAL MEDICINE

## 2024-08-20 RX ORDER — LANOLIN ALCOHOL/MO/W.PET/CERES
CREAM (GRAM) TOPICAL
COMMUNITY

## 2024-08-20 NOTE — PATIENT INSTRUCTIONS
Stop the diclofenac and see how your pain is doing.    If your pain comes back, then if you want, we should start your on either celebrex or mobic to help with the pain.

## 2024-08-20 NOTE — PROGRESS NOTES
[facility]  Edin Britton DO    Name: Helena Newton  YOB: 1965      Assessment/Plan:    ARF (acute renal failure) (HCC)  Patient currently no longer in acute kidney injury.  All medical parameters are appropriate at this time.  She will discontinue diclofenac 75 mg twice daily, and see if there is a significant increase in pain.  If so I advised her to start a Howell 2 inhibitor which we will in due to lower association with acute kidney injury and other long-term side effects that other NSAIDs are typically known to cause such as peptic ulcer disease, and other cardiovascular events.    Continue to optimize care otherwise, and avoid potential nephrotoxins.    Primary hypertension  Patient remains on antihypertensive medications.  Current blood pressure is low normal, however the patient denies specific signs or symptoms due to reduced blood pressures.  May need to reduce dosing of bisoprolol depending on how she progresses from the clinical standpoint.    Primary osteoarthritis involving multiple joints  As noted above, discontinue diclofenac, and avoid other NSAIDs for now.  Patient will see how she does if pain worsens, we will initiate a Howell 2 inhibitor and see if this manages pain, which will assist in reducing future potential long-term effects of anti-inflammatories.         Problem List Items Addressed This Visit          Cardiovascular and Mediastinum    Primary hypertension     Patient remains on antihypertensive medications.  Current blood pressure is low normal, however the patient denies specific signs or symptoms due to reduced blood pressures.  May need to reduce dosing of bisoprolol depending on how she progresses from the clinical standpoint.            Musculoskeletal and Integument    Primary osteoarthritis involving multiple joints     As noted above, discontinue diclofenac, and avoid other NSAIDs for now.  Patient will see how she does if  pain worsens, we will initiate a Howell 2 inhibitor and see if this manages pain, which will assist in reducing future potential long-term effects of anti-inflammatories.            Genitourinary    ARF (acute renal failure) (HCC) - Primary     Patient currently no longer in acute kidney injury.  All medical parameters are appropriate at this time.  She will discontinue diclofenac 75 mg twice daily, and see if there is a significant increase in pain.  If so I advised her to start a Howell 2 inhibitor which we will in due to lower association with acute kidney injury and other long-term side effects that other NSAIDs are typically known to cause such as peptic ulcer disease, and other cardiovascular events.    Continue to optimize care otherwise, and avoid potential nephrotoxins.          Other Visit Diagnoses       Type 2 diabetes mellitus with hyperglycemia, without long-term current use of insulin ([facility])        Relevant Medications    Empagliflozin (Jardiance) 25 MG TABS            Patient recovered from the renal standpoint.  Medication modifications include discontinuation of all NSAIDs including diclofenac and ibuprofen.  Patient may benefit from being placed on a Howell 2 inhibitor if pain significantly increases.    Further from the renal standpoint, she is doing well in general, but will see her once more in 6 months if a Howell 2 inhibitor is initiated to ensure all is stable.  After that she will be seen on an as-needed basis.  On the other hand, if discontinuation of NSAIDs does not cause any increase in pain, then no further renal visits will be indicated unless otherwise necessitated clinically.    Subjective:      Patient ID: Helena Newton is a 59 y.o. female.    Patient presents for initial evaluation.    We reviewed labs in detail, most recent creatinine noted to be 0.69 mg/dL, estimate GFR is 97 mL/min.  Patient was previously seen in the hospital due to acute kidney injury felt to be due to volume depletion with  a creatinine of around 3.6 mg/dL.  With appropriate volume expansion, patient's creatinine improved back down to typical baseline.  She has been using oral diclofenac.  However at the same time, she admits that she does not believe there is any significant improvement in pain control on this medication.    There were no significant electrolyte abnormalities noted.  Patient is taking all medications as prescribed with no specific side effects.              The following portions of the patient's history were reviewed and updated as appropriate: allergies, current medications, past family history, past medical history, past social history, past surgical history and problem list.    Review of Systems   All other systems reviewed and are negative.        Social History     Socioeconomic History    Marital status:      Spouse name: None    Number of children: None    Years of education: None    Highest education level: None   Occupational History    Occupation: Healthcare provider    Tobacco Use    Smoking status: Every Day     Current packs/day: 1.00     Average packs/day: 1 pack/day for 51.6 years (51.6 ttl pk-yrs)     Types: Cigarettes     Start date: 1/1/1973    Smokeless tobacco: Never    Tobacco comments:     pt. smoked this am 10/10   Vaping Use    Vaping status: Never Used   Substance and Sexual Activity    Alcohol use: Not Currently     Comment: Rarely consumes alcohol - As per [employer]     Drug use: Yes     Frequency: 7.0 times per week     Types: Marijuana     Comment: medical marijuana  last use over a month    Sexual activity: Yes   Other Topics Concern    None   Social History Narrative    None     Social Determinants of Health     Financial Resource Strain: Patient Declined (12/6/2023)    Overall Financial Resource Strain (CARDIA)     Difficulty of Paying Living Expenses: Patient declined   Food Insecurity: Not on file   Transportation Needs: Patient Declined (12/6/2023)    PRAPARE - Transportation      Lack of Transportation (Medical): Patient declined     Lack of Transportation (Non-Medical): Patient declined   Physical Activity: Not on file   Stress: Not on file   Social Connections: Not on file   Intimate Partner Violence: Not on file   Housing Stability: Not on file     Past Medical History:   Diagnosis Date    Angina pectoris (HCC)     recent    Anxiety     Arthritis     Asthma     Carpal tunnel syndrome     Chronic headaches     COPD (chronic obstructive pulmonary disease) (HCC)     Diabetes (HCC)     Diabetes mellitus (HCC)     GERD (gastroesophageal reflux disease)     Headache     Hyperlipidemia     Hypertension     Kidney stone     Left bundle branch block     LBBB    MI (myocardial infarction) (HCC)     Myocardial infarction (HCC)     Neuropathy     PTSD (post-traumatic stress disorder)     RLS (restless legs syndrome)     Shortness of breath      Past Surgical History:   Procedure Laterality Date    ANGIOPLASTY      BREAST SURGERY  2016    Reduction    CARDIAC CATHETERIZATION  2018     Mid LAD: There was a tubular 40 % stenosis     SECTION      COLONOSCOPY      HYSTERECTOMY  2018    Complete    KIDNEY STONE SURGERY      LAPAROSCOPY      AZ COLONOSCOPY FLX DX W/COLLJ SPEC WHEN PFRMD N/A 2017    Procedure: EGD AND COLONOSCOPY;  Surgeon: Wilberto Galeano MD;  Location: [facility];  Service: Gastroenterology    AZ ESOPHAGOGASTRODUODENOSCOPY TRANSORAL DIAGNOSTIC N/A 10/10/2018    Procedure: ESOPHAGOGASTRODUODENOSCOPY (EGD);  Surgeon: Wilberto Galeano MD;  Location: [facility];  Service: Gastroenterology    AZ LAPS TOTAL HYSTERECT 250 GM/< W/RMVL TUBE/OVARY N/A 2018    Procedure: ROBOTIC ASSISTED TOTAL LAPAROSCOPIC HYSTERECTOMY, BILATERAL SALPINGOOPHERECTOMY,;  Surgeon: Daron Valdez MD;  Location: [facility];  Service: Gynecology Oncology    UPPER GASTROINTESTINAL ENDOSCOPY      WRIST SURGERY Right        Current Outpatient Medications:     Accu-Chek Guide test  strip, use to CHECK BLOOD GLUCOSE four times a day, Disp: , Rfl:     albuterol (PROVENTIL HFA,VENTOLIN HFA) 90 mcg/act inhaler, Inhale 2 puffs every 6 (six) hours as needed for wheezing, Disp: , Rfl:     BD Pen Needle Dayna 2nd Gen 32G X 4 MM MISC, 4 time a day, Disp: 200 each, Rfl: 2    Bevespi Aerosphere 9-4.8 MCG/ACT inhaler, inhale 2 puffs by mouth every morning and 2 puffs at bedtime, Disp: , Rfl:     bisoprolol (ZEBETA) 5 mg tablet, Take 1 tablet (5 mg total) by mouth daily, Disp: 90 tablet, Rfl: 3    Blood Glucose Monitoring Suppl (OneTouch Verio Reflect) w/Device KIT, Check blood sugars twice daily. Please substitute with appropriate alternative as covered by patient's insurance. Dx: E11.65, Disp: 1 kit, Rfl: 0    buPROPion (WELLBUTRIN XL) 300 mg 24 hr tablet, Take 450 mg by mouth every morning, Disp: , Rfl:     DULoxetine (CYMBALTA) 60 mg delayed release capsule, take 1 capsule by mouth every morning DO NOT CRUSH, CHEW, AND/OR DIVIDE, Disp: , Rfl:     Empagliflozin (Jardiance) 25 MG TABS, Take 1 tablet (25 mg total) by mouth every morning, Disp: , Rfl:     glucose blood (OneTouch Verio) test strip, Check blood sugars twice daily. Please substitute with appropriate alternative as covered by patient's insurance. Dx: E11.65, Disp: 200 each, Rfl: 3    hydrOXYzine pamoate (VISTARIL) 25 mg capsule, take 1 capsule by mouth daily if needed for anxiety, Disp: , Rfl:     insulin glargine (LANTUS SOLOSTAR) 100 units/mL injection pen, Inject 30 Units under the skin daily at bedtime, Disp: 27 mL, Rfl: 0    lactulose (Constulose) 10 g/15 mL solution, take 30 milliliters by mouth twice a day, Disp: 946 mL, Rfl: 5    melatonin 3 mg, Take by mouth daily at bedtime 2 at bedtime, Disp: , Rfl:     metFORMIN (GLUCOPHAGE-XR) 500 mg 24 hr tablet, take 2 tablets by mouth at bedtime, Disp: 180 tablet, Rfl: 1    NovoLOG FlexPen 100 units/mL injection pen, 12 units for low carb meals and 15 units for high carb meals., Disp: 33 mL,  Rfl: 0    ondansetron (ZOFRAN) 4 mg tablet, Take 1 tablet (4 mg total) by mouth every 8 (eight) hours as needed for nausea or vomiting, Disp: 20 tablet, Rfl: 0    OneTouch Delica Lancets 33G MISC, Check blood sugars twice daily. Please substitute with appropriate alternative as covered by patient's insurance. Dx: E11.65, Disp: 200 each, Rfl: 3    oxyCODONE (ROXICODONE) 5 immediate release tablet, Take 5 mg by mouth 3 (three) times a day as needed, Disp: , Rfl:     pantoprazole (PROTONIX) 40 mg tablet, Take 1 tablet (40 mg total) by mouth 2 (two) times a day, Disp: 60 tablet, Rfl: 5    pregabalin (LYRICA) 75 mg capsule, Take 75 mg by mouth 2 (two) times a day, Disp: , Rfl:     rosuvastatin (CRESTOR) 20 MG tablet, Take 1 tablet (20 mg total) by mouth daily, Disp: 90 tablet, Rfl: 3    Spiriva Respimat 2.5 MCG/ACT AERS inhaler, Inhale 2 puffs daily, Disp: 4 g, Rfl: 0    zolpidem (AMBIEN) 10 mg tablet, Take 10 mg by mouth daily at bedtime as needed for sleep, Disp: , Rfl:     Lab Results   Component Value Date    SODIUM 140 07/02/2024    K 4.2 07/02/2024     07/02/2024    CO2 31 07/02/2024    AGAP 8 07/02/2024    BUN 25 07/02/2024    CREATININE 0.69 07/02/2024    GLUC 188 (H) 07/02/2024    GLUF 340 (H) 12/06/2023    CALCIUM 9.6 07/02/2024    AST 12 (L) 06/22/2024    ALT 15 06/22/2024    ALKPHOS 74 06/22/2024    TP 6.7 06/22/2024    TBILI 0.47 06/22/2024    EGFR 97 07/02/2024     Lab Results   Component Value Date    WBC 9.78 06/23/2024    HGB 13.6 06/23/2024    HCT 40.6 06/23/2024    MCV 86 06/23/2024     06/23/2024     Lab Results   Component Value Date    CHOLESTEROL 134 12/06/2023    CHOLESTEROL 141 11/02/2022     Lab Results   Component Value Date    HDL 39 (L) 12/06/2023    HDL 36 (L) 11/02/2022     Lab Results   Component Value Date    LDLCALC 50 12/06/2023    LDLCALC 61 11/02/2022     Lab Results   Component Value Date    TRIG 227 (H) 12/06/2023    TRIG 221 (H) 11/02/2022     No results found for:  "\"CHOLHDL\"  Lab Results   Component Value Date    DEL0UJNREBZL 3.027 12/06/2023     Lab Results   Component Value Date    CALCIUM 9.6 07/02/2024     No results found for: \"SPEP\", \"UPEP\"  No results found for: \"MICROALBUR\", \"RNNN76HBT\"        Objective:      BP 98/60 (BP Location: Left arm, Patient Position: Sitting, Cuff Size: Standard)   Pulse 64   Temp 97.9 °F (36.6 °C)   Ht 5' 1\" (1.549 m)   Wt 66.2 kg (146 lb)   SpO2 95%   BMI 27.59 kg/m²          Physical Exam  Vitals reviewed.   Constitutional:       General: She is not in acute distress.     Appearance: Normal appearance.   HENT:      Head: Normocephalic and atraumatic.      Right Ear: External ear normal.      Left Ear: External ear normal.   Eyes:      Conjunctiva/sclera: Conjunctivae normal.   Cardiovascular:      Rate and Rhythm: Normal rate and regular rhythm.      Heart sounds: Normal heart sounds.   Pulmonary:      Effort: No respiratory distress.      Breath sounds: No wheezing.   Abdominal:      Palpations: Abdomen is soft.   Skin:     General: Skin is warm and dry.   Neurological:      General: No focal deficit present.      Mental Status: She is alert and oriented to person, place, and time.   Psychiatric:         Mood and Affect: Mood normal.         Behavior: Behavior normal.         "

## 2024-08-21 PROBLEM — M15.0 PRIMARY OSTEOARTHRITIS INVOLVING MULTIPLE JOINTS: Status: ACTIVE | Noted: 2024-08-21

## 2024-08-21 PROBLEM — M15.9 PRIMARY OSTEOARTHRITIS INVOLVING MULTIPLE JOINTS: Status: ACTIVE | Noted: 2024-08-21

## 2024-08-21 NOTE — ASSESSMENT & PLAN NOTE
As noted above, discontinue diclofenac, and avoid other NSAIDs for now.  Patient will see how she does if pain worsens, we will initiate a Howell 2 inhibitor and see if this manages pain, which will assist in reducing future potential long-term effects of anti-inflammatories.

## 2024-08-21 NOTE — ASSESSMENT & PLAN NOTE
Patient currently no longer in acute kidney injury.  All medical parameters are appropriate at this time.  She will discontinue diclofenac 75 mg twice daily, and see if there is a significant increase in pain.  If so I advised her to start a Howell 2 inhibitor which we will in due to lower association with acute kidney injury and other long-term side effects that other NSAIDs are typically known to cause such as peptic ulcer disease, and other cardiovascular events.    Continue to optimize care otherwise, and avoid potential nephrotoxins.

## 2024-08-21 NOTE — ASSESSMENT & PLAN NOTE
Patient remains on antihypertensive medications.  Current blood pressure is low normal, however the patient denies specific signs or symptoms due to reduced blood pressures.  May need to reduce dosing of bisoprolol depending on how she progresses from the clinical standpoint.

## 2024-08-22 ENCOUNTER — TELEPHONE (OUTPATIENT)
Dept: CARDIOLOGY CLINIC | Facility: CLINIC | Age: 59
End: 2024-08-22

## 2024-08-22 NOTE — TELEPHONE ENCOUNTER
Patient came in office requesting refil on Nitroglycerin , stating the few she has are out of date.     Do not see she's order it, do you want to fill?

## 2024-08-23 ENCOUNTER — NURSE TRIAGE (OUTPATIENT)
Age: 59
End: 2024-08-23

## 2024-08-23 NOTE — TELEPHONE ENCOUNTER
"Patient called office c/o intermittent vaginal spotting for a few years , however she states it has been more consistent recently and is red blood. She had a complete hysterectomy in 2018. She denies abdominal pain , but states she had abdominal pain last night she rated a 10. She denies clots, a fever, cramps or any further symptoms at this time. Attempted to schedule patient an appointment, unable to find appointment until end of September. Warm transferred patient to [facility] in office for appointment. Advised patient to call back if she has worsening symptoms, increased bleeding, abdominal pain, or any other symptoms. She verbalized understanding.         Reason for Disposition   Bleeding or spotting occurs after hysterectomy    Answer Assessment - Initial Assessment Questions  1. AMOUNT: \"Describe the bleeding that you are having.\"     - SPOTTING: spotting, or pinkish / brownish mucous discharge; does not fill panti-liner or pad     - MILD:  less than 1 pad / hour; less than patient's usual menstrual bleeding    - MODERATE: 1-2 pads / hour; 1 menstrual cup every 6 hours; small-medium blood clots (e.g., pea, grape, small coin)    - SEVERE: soaking 2 or more pads/hour for 2 or more hours; 1 menstrual cup every 2 hours; bleeding not contained by pads or continuous red blood from vagina; large blood clots (e.g., golf ball, large coin)       Spotting - red blood   2. ONSET: \"When did the bleeding begin?\" \"Is it continuing now?\"      Few years intermittent. More often now.  - continuing   3. MENSTRUAL PERIOD: \"When was the last normal menstrual period?\" \"How is this different than your period?\"      Hysterectomy 2018    5. ABDOMINAL PAIN: \"Do you have any pain?\" \"How bad is the pain?\"  (e.g., Scale 1-10; mild, moderate, or severe)    - MILD (1-3): doesn't interfere with normal activities, abdomen soft and not tender to touch     - MODERATE (4-7): interferes with normal activities or awakens from sleep, tender to touch " "    - SEVERE (8-10): excruciating pain, doubled over, unable to do any normal activities       Last night - 10.   No pain currently     8. HORMONES: \"Are you taking any hormone medications, prescription or OTC?\" (e.g., birth control pills, estrogen)      No  9. BLOOD THINNERS: \"Do you take any blood thinners?\" (e.g., Coumadin/warfarin, Pradaxa/dabigatran, aspirin)      No   10. CAUSE: \"What do you think is causing the bleeding?\" (e.g., recent gyn surgery, recent gyn procedure; known bleeding disorder, cervical cancer, polycystic ovarian disease, fibroids)          No   11. HEMODYNAMIC STATUS: \"Are you weak or feeling lightheaded?\" If Yes, ask: \"Can you stand and walk normally?\"         No   12. OTHER SYMPTOMS: \"What other symptoms are you having with the bleeding?\" (e.g., passed tissue, vaginal discharge, fever, menstrual-type cramps)        No    Protocols used: Vaginal Bleeding - Abnormal-ADULT-OH    "

## 2024-08-26 ENCOUNTER — OFFICE VISIT (OUTPATIENT)
Dept: OBGYN CLINIC | Facility: CLINIC | Age: 59
End: 2024-08-26
Payer: COMMERCIAL

## 2024-08-26 VITALS
DIASTOLIC BLOOD PRESSURE: 62 MMHG | SYSTOLIC BLOOD PRESSURE: 102 MMHG | BODY MASS INDEX: 28.28 KG/M2 | WEIGHT: 149.8 LBS | HEIGHT: 61 IN

## 2024-08-26 DIAGNOSIS — Z98.890 HISTORY OF HYSTEROSCOPY: ICD-10-CM

## 2024-08-26 DIAGNOSIS — R35.0 URINARY FREQUENCY: Primary | ICD-10-CM

## 2024-08-26 LAB
BACTERIA UR QL AUTO: ABNORMAL /HPF
BILIRUB UR QL STRIP: NEGATIVE
CLARITY UR: CLEAR
COLOR UR: ABNORMAL
GLUCOSE UR STRIP-MCNC: ABNORMAL MG/DL
HGB UR QL STRIP.AUTO: ABNORMAL
KETONES UR STRIP-MCNC: NEGATIVE MG/DL
LEUKOCYTE ESTERASE UR QL STRIP: ABNORMAL
NITRITE UR QL STRIP: NEGATIVE
NON-SQ EPI CELLS URNS QL MICRO: ABNORMAL /HPF
PH UR STRIP.AUTO: 6.5 [PH]
PROT UR STRIP-MCNC: ABNORMAL MG/DL
RBC #/AREA URNS AUTO: ABNORMAL /HPF
SP GR UR STRIP.AUTO: 1.03 (ref 1–1.03)
UROBILINOGEN UR STRIP-ACNC: <2 MG/DL
WBC #/AREA URNS AUTO: ABNORMAL /HPF

## 2024-08-26 PROCEDURE — 87086 URINE CULTURE/COLONY COUNT: CPT | Performed by: ADVANCED PRACTICE MIDWIFE

## 2024-08-26 PROCEDURE — 88112 CYTOPATH CELL ENHANCE TECH: CPT | Performed by: PATHOLOGY

## 2024-08-26 PROCEDURE — 81001 URINALYSIS AUTO W/SCOPE: CPT | Performed by: ADVANCED PRACTICE MIDWIFE

## 2024-08-26 PROCEDURE — 99214 OFFICE O/P EST MOD 30 MIN: CPT | Performed by: ADVANCED PRACTICE MIDWIFE

## 2024-08-26 RX ORDER — LIDOCAINE 36 MG/1
PATCH TOPICAL
COMMUNITY

## 2024-08-26 RX ORDER — UMECLIDINIUM BROMIDE AND VILANTEROL TRIFENATATE 62.5; 25 UG/1; UG/1
POWDER RESPIRATORY (INHALATION)
COMMUNITY

## 2024-08-26 RX ORDER — ARIPIPRAZOLE 2 MG/1
TABLET ORAL
COMMUNITY

## 2024-08-26 RX ORDER — DICLOFENAC SODIUM 75 MG/1
TABLET, DELAYED RELEASE ORAL
COMMUNITY
Start: 2024-08-23

## 2024-08-26 RX ORDER — ALBUTEROL SULFATE 90 UG/1
AEROSOL, METERED RESPIRATORY (INHALATION)
COMMUNITY

## 2024-08-26 NOTE — PROGRESS NOTES
[facility] [address] Iram Jiménez PA    Assessment/Plan:  No problem-specific Assessment & Plan notes found for this encounter.    Diagnoses and all orders for this visit:    Urinary frequency  -     Urine culture  -     Urinalysis with microscopic  -     Cancel: Cytology, urine  -     Cytology, urine  -     no indications that blood is coming from vagina  -     follow-up labs  -     to schedule annual exam    History of hysteroscopy    Other orders  -     umeclidinium-vilanterol (Anoro Ellipta) 62.5-25 mcg/actuation inhaler; Anoro Ellipta  -     ZTlido 1.8 % PTCH; apply 1 patch once daily REMOVE after 12 hours  -     diclofenac (VOLTAREN) 75 mg EC tablet  -     albuterol (Ventolin HFA) 90 mcg/act inhaler; Ventolin HFA  -     VALSARTAN PO; Valsartan  -     ARIPiprazole (ABILIFY) 2 mg tablet; ARIPiprazole          Subjective:   Helena Newton is a 59 y.o.  female.  CC: blood with wiping    HPI: Helena notes that she has seen blood on toilet tissue after emptying her bladder. Notes that it is dark red in color. States that it is not all the time. Denies vaginal itching, no discharge coming from the vagina. She had a hysterectomy in 2018. No fever, chills or discomfort.  No intercourse in the past month. Notes that she drinks caffeine, Sprite, ice tea.  HX of asymptomatic UTI in . Notes that she voids every hour and has urgency.         ROS: Review of Systems   Constitutional:  Negative for chills and fever.   Gastrointestinal:  Positive for constipation. Negative for diarrhea.   Genitourinary:  Positive for frequency, urgency and vaginal bleeding. Negative for difficulty urinating, dyspareunia, dysuria, pelvic pain, vaginal discharge and vaginal pain.       PFSH: The following portions of the patient's history were reviewed and updated as appropriate: allergies, current medications, past family history, past medical history, obstetric history, gynecologic history, past social  "history, past surgical history and problem list.       Objective:  /62   Ht 5' 1\" (1.549 m)   Wt 67.9 kg (149 lb 12.8 oz)   BMI 28.30 kg/m²    Physical Exam  Vitals reviewed.   Constitutional:       Appearance: Normal appearance.   Cardiovascular:      Rate and Rhythm: Normal rate.   Pulmonary:      Effort: Pulmonary effort is normal.   Genitourinary:     General: Normal vulva.      Exam position: Lithotomy position.      Labia:         Right: No rash, tenderness or lesion.         Left: No rash, tenderness or lesion.       Urethra: No urethral pain, urethral swelling or urethral lesion.      Vagina: No vaginal discharge, erythema, tenderness, bleeding or lesions.      Uterus: Absent.       Adnexa:         Right: No mass, tenderness or fullness.          Left: No mass, tenderness or fullness.        Comments: No blood in vagina. Thin white discharge with normal wet mount. Negative lesions or atrophy. Vaginal cuff pink and moist.  Neurological:      Mental Status: She is alert and oriented to person, place, and time.   Psychiatric:         Mood and Affect: Mood normal.         Behavior: Behavior normal.            "

## 2024-08-28 LAB — BACTERIA UR CULT: NORMAL

## 2024-08-29 ENCOUNTER — TELEPHONE (OUTPATIENT)
Age: 59
End: 2024-08-29

## 2024-08-29 ENCOUNTER — TELEPHONE (OUTPATIENT)
Dept: OBGYN CLINIC | Facility: CLINIC | Age: 59
End: 2024-08-29

## 2024-08-29 PROCEDURE — 88112 CYTOPATH CELL ENHANCE TECH: CPT | Performed by: PATHOLOGY

## 2024-08-29 NOTE — TELEPHONE ENCOUNTER
Patient called the RX Refill Line. Message is being forwarded to the office.     Patient is requesting - Pt called and stated she feels she has a yeast infection and she would like for the doctor to prescribed the same medication that it was prescribed for her previously. Pt stated she did not remembered the medication name and also she wants to make sure that is ok for her to take it while she is on Jardiance. Please review, Thank you.      [id number] - [place] - [address]

## 2024-08-29 NOTE — TELEPHONE ENCOUNTER
Per patient communication consent, left detailed message urine cytology came back negative. If she is having any itching she should call office and let us know and Eduarda will send in medication for yeast infection. Yeast was present in urine.

## 2024-08-29 NOTE — TELEPHONE ENCOUNTER
----- Message from Eduarda Madison CNM sent at 8/29/2024  3:02 PM EDT -----  Please let Helena know that urine cytology was negative. But noted some indications of yeast, which probably came from vaginal area. If she is having itching I can send a script for a vaginal yeast medication.

## 2024-08-29 NOTE — TELEPHONE ENCOUNTER
Spoke with patient, aware of no urine infection, contaminated with skin and vaginal cells. Awaiting the urine cytology to come back and will reach our to her once resulted.

## 2024-08-29 NOTE — TELEPHONE ENCOUNTER
----- Message from Eduarda Madison CNM sent at 8/28/2024  3:57 PM EDT -----  Please contact Helena an let her know that culture did not indicate infection, sample contaminated with skin and cells from vaginal area. Still waiting for urine cytology

## 2024-08-30 DIAGNOSIS — B37.31 VULVOVAGINAL CANDIDIASIS: Primary | ICD-10-CM

## 2024-08-30 RX ORDER — FLUCONAZOLE 150 MG/1
150 TABLET ORAL
Qty: 2 TABLET | Refills: 0 | Status: SHIPPED | OUTPATIENT
Start: 2024-08-30 | End: 2024-09-03

## 2024-08-30 NOTE — TELEPHONE ENCOUNTER
Cytology results reviewed with pt. Medication sent in by Dr. Briceño. PT is aware and will call us back to cancel future apt if problem resolves.

## 2024-09-03 ENCOUNTER — VBI (OUTPATIENT)
Dept: ADMINISTRATIVE | Facility: OTHER | Age: 59
End: 2024-09-03

## 2024-09-03 NOTE — TELEPHONE ENCOUNTER
09/03/24 12:43 PM     Chart reviewed for Hemoglobin A1c ; nothing is submitted to the patient's insurance at this time.     Reyes Murray MA   [facility]

## 2024-09-12 ENCOUNTER — TELEPHONE (OUTPATIENT)
Age: 59
End: 2024-09-12

## 2024-09-12 DIAGNOSIS — M19.90 ARTHRITIS: Primary | ICD-10-CM

## 2024-09-12 RX ORDER — CELECOXIB 100 MG/1
100 CAPSULE ORAL 2 TIMES DAILY
Qty: 60 CAPSULE | Refills: 2 | Status: SHIPPED | OUTPATIENT
Start: 2024-09-12

## 2024-09-12 NOTE — TELEPHONE ENCOUNTER
"Pt walked into the [place] office that she is having pain.     Per Dr. Wilson's note from 8/20,   \"If there is a significant increase in pain. If so I advised her to start a Howell 2 inhibitor\"   "

## 2024-09-14 DIAGNOSIS — I42.8 NON-ISCHEMIC CARDIOMYOPATHY (HCC): ICD-10-CM

## 2024-09-16 RX ORDER — BISOPROLOL FUMARATE 5 MG/1
5 TABLET, FILM COATED ORAL DAILY
Qty: 90 TABLET | Refills: 1 | Status: SHIPPED | OUTPATIENT
Start: 2024-09-16

## 2024-09-19 ENCOUNTER — APPOINTMENT (OUTPATIENT)
Dept: LAB | Facility: CLINIC | Age: 59
End: 2024-09-19
Payer: COMMERCIAL

## 2024-09-19 DIAGNOSIS — E78.5 HYPERLIPIDEMIA, UNSPECIFIED HYPERLIPIDEMIA TYPE: ICD-10-CM

## 2024-09-19 DIAGNOSIS — E11.65 TYPE 2 DIABETES MELLITUS WITH HYPERGLYCEMIA, WITHOUT LONG-TERM CURRENT USE OF INSULIN (HCC): ICD-10-CM

## 2024-09-19 DIAGNOSIS — E55.9 VITAMIN D DEFICIENCY: ICD-10-CM

## 2024-09-19 DIAGNOSIS — R80.9 MICROALBUMINURIA: ICD-10-CM

## 2024-09-19 LAB
25(OH)D3 SERPL-MCNC: 23.7 NG/ML (ref 30–100)
ALBUMIN SERPL BCG-MCNC: 3.5 G/DL (ref 3.5–5)
ALP SERPL-CCNC: 84 U/L (ref 34–104)
ALT SERPL W P-5'-P-CCNC: 15 U/L (ref 7–52)
ANION GAP SERPL CALCULATED.3IONS-SCNC: 8 MMOL/L (ref 4–13)
AST SERPL W P-5'-P-CCNC: 14 U/L (ref 13–39)
BILIRUB SERPL-MCNC: 0.4 MG/DL (ref 0.2–1)
BUN SERPL-MCNC: 15 MG/DL (ref 5–25)
CALCIUM SERPL-MCNC: 8.9 MG/DL (ref 8.4–10.2)
CHLORIDE SERPL-SCNC: 99 MMOL/L (ref 96–108)
CHOLEST SERPL-MCNC: 116 MG/DL
CO2 SERPL-SCNC: 31 MMOL/L (ref 21–32)
CREAT SERPL-MCNC: 0.58 MG/DL (ref 0.6–1.3)
CREAT UR-MCNC: 96.6 MG/DL
EST. AVERAGE GLUCOSE BLD GHB EST-MCNC: 278 MG/DL
GFR SERPL CREATININE-BSD FRML MDRD: 102 ML/MIN/1.73SQ M
GLUCOSE P FAST SERPL-MCNC: 241 MG/DL (ref 65–99)
HBA1C MFR BLD: 11.3 %
HDLC SERPL-MCNC: 38 MG/DL
LDLC SERPL CALC-MCNC: 55 MG/DL (ref 0–100)
MICROALBUMIN UR-MCNC: 297.1 MG/L
MICROALBUMIN/CREAT 24H UR: 308 MG/G CREATININE (ref 0–30)
POTASSIUM SERPL-SCNC: 3.9 MMOL/L (ref 3.5–5.3)
PROT SERPL-MCNC: 6.5 G/DL (ref 6.4–8.4)
SODIUM SERPL-SCNC: 138 MMOL/L (ref 135–147)
TRIGL SERPL-MCNC: 115 MG/DL

## 2024-09-19 PROCEDURE — 82043 UR ALBUMIN QUANTITATIVE: CPT

## 2024-09-19 PROCEDURE — 83036 HEMOGLOBIN GLYCOSYLATED A1C: CPT

## 2024-09-19 PROCEDURE — 80061 LIPID PANEL: CPT

## 2024-09-19 PROCEDURE — 36415 COLL VENOUS BLD VENIPUNCTURE: CPT

## 2024-09-19 PROCEDURE — 82306 VITAMIN D 25 HYDROXY: CPT

## 2024-09-19 PROCEDURE — 80053 COMPREHEN METABOLIC PANEL: CPT

## 2024-09-19 PROCEDURE — 82570 ASSAY OF URINE CREATININE: CPT

## 2024-09-20 ENCOUNTER — OFFICE VISIT (OUTPATIENT)
Dept: OBGYN CLINIC | Facility: CLINIC | Age: 59
End: 2024-09-20
Payer: COMMERCIAL

## 2024-09-20 VITALS
HEIGHT: 61 IN | SYSTOLIC BLOOD PRESSURE: 104 MMHG | BODY MASS INDEX: 28.58 KG/M2 | DIASTOLIC BLOOD PRESSURE: 66 MMHG | WEIGHT: 151.4 LBS

## 2024-09-20 DIAGNOSIS — N95.2 VAGINAL ATROPHY: Primary | ICD-10-CM

## 2024-09-20 DIAGNOSIS — E11.65 TYPE 2 DIABETES MELLITUS WITH HYPERGLYCEMIA, WITHOUT LONG-TERM CURRENT USE OF INSULIN (HCC): ICD-10-CM

## 2024-09-20 DIAGNOSIS — B37.31 VULVOVAGINAL CANDIDIASIS: ICD-10-CM

## 2024-09-20 PROCEDURE — 87106 FUNGI IDENTIFICATION YEAST: CPT | Performed by: OBSTETRICS & GYNECOLOGY

## 2024-09-20 PROCEDURE — 87070 CULTURE OTHR SPECIMN AEROBIC: CPT | Performed by: OBSTETRICS & GYNECOLOGY

## 2024-09-20 PROCEDURE — 99213 OFFICE O/P EST LOW 20 MIN: CPT | Performed by: OBSTETRICS & GYNECOLOGY

## 2024-09-20 RX ORDER — ESTRADIOL 0.1 MG/G
CREAM VAGINAL
Qty: 42.5 G | Refills: 0 | Status: SHIPPED | OUTPATIENT
Start: 2024-09-20 | End: 2024-10-16

## 2024-09-20 RX ORDER — PREGABALIN 100 MG/1
100 CAPSULE ORAL EVERY 8 HOURS
COMMUNITY
Start: 2024-08-26

## 2024-09-20 RX ORDER — PRAZOSIN HYDROCHLORIDE 5 MG/1
5 CAPSULE ORAL
COMMUNITY
Start: 2024-09-05

## 2024-09-20 RX ORDER — FLUCONAZOLE 150 MG/1
150 TABLET ORAL ONCE
Qty: 3 TABLET | Refills: 0 | Status: SHIPPED | OUTPATIENT
Start: 2024-09-20 | End: 2024-09-20

## 2024-09-20 NOTE — PROGRESS NOTES
Assessment:  59 y.o.  who presents with urinary and vulvar symptoms.    Plan:  Diagnoses and all orders for this visit:    Vaginal atrophy  -     estradiol (ESTRACE VAGINAL) 0.1 mg/g vaginal cream; Apply generous fingertip sized amount into vagina nightly for 7 nights, then use every 3rd night    Vulvovaginal candidiasis  -     fluconazole (DIFLUCAN) 150 mg tablet; Take 1 tablet (150 mg total) by mouth once for 1 dose . Reserve additional doses for instruction per MD after antibiotic  -     Genital Comprehensive Culture    Type 2 diabetes mellitus with hyperglycemia, without long-term current use of insulin (HCC)  -     fluconazole (DIFLUCAN) 150 mg tablet; Take 1 tablet (150 mg total) by mouth once for 1 dose . Reserve additional doses for instruction per MD after antibiotic      __________________________________________________________________    Subjective   Helena Newton is a 59 y.o.  who presents with concern for yeast infection.    Seen by   for urinary frequency. No UTI noted, but did show yeast on cytology. Treated with diflucan. Since treatment has felt improved, but having bleeding with wiping.  Only on toilet tissue. Itching and discomfort of vulva also. No discharge or odor. Unsure if rashes/lesions. Urinary frequency, leaking. More than prior. Stopped jardiance recently for similar concerns.       The following portions of the patient's history were reviewed and updated as appropriate: allergies, current medications, past medical history, past social history, past surgical history, and problem list.    Review of Systems  Review of Systems   Constitutional:  Negative for chills and fever.   Respiratory:  Negative for shortness of breath.    Cardiovascular:  Negative for chest pain and palpitations.   Gastrointestinal:  Negative for abdominal distention, abdominal pain, constipation, diarrhea, nausea and vomiting.   Genitourinary:  Positive for frequency, vaginal bleeding and vaginal pain.  "Negative for dysuria, flank pain, pelvic pain and vaginal discharge.   Neurological:  Negative for dizziness, light-headedness and headaches.            Objective  /66   Ht 5' 1\" (1.549 m)   Wt 68.7 kg (151 lb 6.4 oz)   BMI 28.61 kg/m²      Physical Exam:  Physical Exam  Exam conducted with a chaperone present.   Constitutional:       General: She is not in acute distress.     Appearance: Normal appearance. She is not ill-appearing, toxic-appearing or diaphoretic.   Eyes:      General: No scleral icterus.        Right eye: No discharge.         Left eye: No discharge.      Conjunctiva/sclera: Conjunctivae normal.   Cardiovascular:      Rate and Rhythm: Normal rate.   Pulmonary:      Effort: Pulmonary effort is normal. No respiratory distress.   Abdominal:      General: There is no distension.      Palpations: There is no mass.      Tenderness: There is no abdominal tenderness. There is no guarding or rebound.      Hernia: No hernia is present.   Genitourinary:     Labia:         Right: Rash present. No tenderness or lesion.         Left: Rash present. No tenderness or lesion.       Vagina: No signs of injury. Erythema (thin, erythematous epithelia) present. No vaginal discharge, tenderness or bleeding.      Cervix: No cervical motion tenderness (surgically absent cervix).      Comments: Microscopy with yeast  Musculoskeletal:         General: No swelling.   Skin:     General: Skin is warm and dry.      Coloration: Skin is not jaundiced or pale.      Findings: No bruising or erythema.   Neurological:      Mental Status: She is alert.   Psychiatric:         Mood and Affect: Mood normal.         Behavior: Behavior normal.         Thought Content: Thought content normal.         Judgment: Judgment normal.           Lab Review  UA, UCx, Ucyto (8/26/24)  A1c (9/19/24)  "

## 2024-09-23 ENCOUNTER — OFFICE VISIT (OUTPATIENT)
Dept: ENDOCRINOLOGY | Facility: CLINIC | Age: 59
End: 2024-09-23
Payer: COMMERCIAL

## 2024-09-23 VITALS
SYSTOLIC BLOOD PRESSURE: 100 MMHG | HEIGHT: 61 IN | WEIGHT: 150 LBS | BODY MASS INDEX: 28.32 KG/M2 | RESPIRATION RATE: 16 BRPM | TEMPERATURE: 97.9 F | OXYGEN SATURATION: 98 % | HEART RATE: 58 BPM | DIASTOLIC BLOOD PRESSURE: 56 MMHG

## 2024-09-23 DIAGNOSIS — E11.65 TYPE 2 DIABETES MELLITUS WITH HYPERGLYCEMIA, WITH LONG-TERM CURRENT USE OF INSULIN (HCC): Primary | ICD-10-CM

## 2024-09-23 DIAGNOSIS — R80.9 MICROALBUMINURIA: ICD-10-CM

## 2024-09-23 DIAGNOSIS — I10 PRIMARY HYPERTENSION: ICD-10-CM

## 2024-09-23 DIAGNOSIS — E78.2 MIXED HYPERLIPIDEMIA: ICD-10-CM

## 2024-09-23 DIAGNOSIS — Z79.4 TYPE 2 DIABETES MELLITUS WITH HYPERGLYCEMIA, WITH LONG-TERM CURRENT USE OF INSULIN (HCC): Primary | ICD-10-CM

## 2024-09-23 PROCEDURE — 95251 CONT GLUC MNTR ANALYSIS I&R: CPT | Performed by: STUDENT IN AN ORGANIZED HEALTH CARE EDUCATION/TRAINING PROGRAM

## 2024-09-23 PROCEDURE — 99214 OFFICE O/P EST MOD 30 MIN: CPT | Performed by: STUDENT IN AN ORGANIZED HEALTH CARE EDUCATION/TRAINING PROGRAM

## 2024-09-23 RX ORDER — DULAGLUTIDE 1.5 MG/.5ML
1.5 INJECTION, SOLUTION SUBCUTANEOUS
Qty: 6 ML | Refills: 0 | Status: SHIPPED | OUTPATIENT
Start: 2024-10-23 | End: 2024-09-23

## 2024-09-23 RX ORDER — DULAGLUTIDE 0.75 MG/.5ML
0.75 INJECTION, SOLUTION SUBCUTANEOUS
Qty: 2 ML | Refills: 0 | Status: SHIPPED | OUTPATIENT
Start: 2024-09-23 | End: 2024-09-23

## 2024-09-23 NOTE — ASSESSMENT & PLAN NOTE
Blood pressure is 100/56, the goal is less than 130/60, she has microalbuminuria, given intolerance to SGLT2 inhibitors we may consider ACE or ARB.

## 2024-09-23 NOTE — PROGRESS NOTES
"Ambulatory Visit  Name: Helena Newton      : 1965      MRN: [id number]  Encounter Provider: Mary Bruce MD  Encounter Date: 2024   Encounter department: [facility] [place]    Assessment & Plan  Type 2 diabetes mellitus with hyperglycemia, with long-term current use of insulin ([facility])    Lab Results   Component Value Date    HGBA1C 11.3 (H) 2024     Diabetes is poorly controlled with A1c of 11.8%, average blood sugar of 350 mg/dL and 100% time above range.  She admits that she is not taking her insulin, occasionally (once a week) takes Lantus otherwise noncompliant.  She has stopped taking Jardiance due to recurrent yeast infection.  I had an honest conversation with Helena that maintaining current condition will ended up having serious complication of diabetes.  (\" she does not like needles\".),  She agreed to take Lantus 30 units once day and NovoLog 12 units before dinner for now.  I believe that she will benefit from V-GO insulin delivery system, if this is covered by her insurance, this will save her just 1 shot daily.  Continue metformin.  SGLT2 inhibitors not an option due to recurrent yeast infection.  Not candidate for GLP-1 agonist due to gastroparesis.  Return back in 2 months.  Labs prior to next visit.    Orders:    Hemoglobin A1C; Future    Lipid Panel with Direct LDL reflex; Future    Albumin / creatinine urine ratio; Future    Primary hypertension  Blood pressure is 100/56, the goal is less than 130/60, she has microalbuminuria, given intolerance to SGLT2 inhibitors we may consider ACE or ARB.         Microalbuminuria  See plan for hypertension.         Mixed hyperlipidemia  Continue Crestor 20 mg daily           History of Present Illness     Helena Newton is a 59 y.o. female who presents for a routine follow-up for type 2 diabetes with long-term current use of insulin      Metformin 500 mg daily  Lantus 30 units \" sometimes\"   Novolog 10 units " before meals, not taking   Jardiance 25 mg/dl, because recurrent yeast infection      Helena Newton   Device used, avelina 2  Home use       Indication   Type 2 Diabetes      More than 72 hours of data was reviewed. Report to be scanned to chart.     Date Range: sep 10 - 23rd    Analysis of data:   Average Glucose: 350 mg/dl  Coefficient of Variation: 15.9%   SD : x   Time in Target Range: 0%   Time Above Range: 100%   Time Below Range: 0      Interpretation of data:     For hypertension, ? on valsartan.  For hyperlipidemia on Crestor 20 mg daily    History obtained from : patient  Review of Systems   Constitutional:  Positive for fatigue. Negative for appetite change and unexpected weight change.   HENT:  Negative for trouble swallowing and voice change.    Cardiovascular:  Negative for chest pain and palpitations.   Gastrointestinal:  Negative for abdominal pain, constipation, diarrhea, nausea and vomiting.   Endocrine: Negative for cold intolerance, heat intolerance, polydipsia, polyphagia and polyuria.   Psychiatric/Behavioral:  Negative for sleep disturbance. The patient is not nervous/anxious.      Medical History Reviewed by provider this encounter:       Current Outpatient Medications on File Prior to Visit   Medication Sig Dispense Refill    Accu-Chek Guide test strip use to CHECK BLOOD GLUCOSE four times a day      albuterol (Ventolin HFA) 90 mcg/act inhaler Ventolin HFA      ARIPiprazole (ABILIFY) 2 mg tablet ARIPiprazole      BD Pen Needle Dayna 2nd Gen 32G X 4 MM MISC 4 time a day 200 each 2    Bevespi Aerosphere 9-4.8 MCG/ACT inhaler inhale 2 puffs by mouth every morning and 2 puffs at bedtime      bisoprolol (ZEBETA) 5 mg tablet take 1 tablet by mouth once daily 90 tablet 1    Blood Glucose Monitoring Suppl (OneTouch Verio Reflect) w/Device KIT Check blood sugars twice daily. Please substitute with appropriate alternative as covered by patient's insurance. Dx: E11.65 1 kit 0    buPROPion (WELLBUTRIN XL)  300 mg 24 hr tablet Take 450 mg by mouth every morning      celecoxib (CeleBREX) 100 mg capsule Take 1 capsule (100 mg total) by mouth 2 (two) times a day 60 capsule 2    diclofenac (VOLTAREN) 75 mg EC tablet       estradiol (ESTRACE VAGINAL) 0.1 mg/g vaginal cream Apply generous fingertip sized amount into vagina nightly for 7 nights, then use every 3rd night 42.5 g 0    glucose blood (OneTouch Verio) test strip Check blood sugars twice daily. Please substitute with appropriate alternative as covered by patient's insurance. Dx: E11.65 200 each 3    hydrOXYzine pamoate (VISTARIL) 25 mg capsule take 1 capsule by mouth daily if needed for anxiety      insulin glargine (LANTUS SOLOSTAR) 100 units/mL injection pen Inject 30 Units under the skin daily at bedtime 27 mL 0    lactulose (Constulose) 10 g/15 mL solution take 30 milliliters by mouth twice a day 946 mL 5    melatonin 3 mg Take by mouth daily at bedtime 2 at bedtime      metFORMIN (GLUCOPHAGE-XR) 500 mg 24 hr tablet take 2 tablets by mouth at bedtime 180 tablet 1    NovoLOG FlexPen 100 units/mL injection pen 12 units for low carb meals and 15 units for high carb meals. 33 mL 0    ondansetron (ZOFRAN) 4 mg tablet Take 1 tablet (4 mg total) by mouth every 8 (eight) hours as needed for nausea or vomiting 20 tablet 0    OneTouch Delica Lancets 33G MISC Check blood sugars twice daily. Please substitute with appropriate alternative as covered by patient's insurance. Dx: E11.65 200 each 3    oxyCODONE (ROXICODONE) 5 immediate release tablet Take 5 mg by mouth 3 (three) times a day as needed      pantoprazole (PROTONIX) 40 mg tablet Take 1 tablet (40 mg total) by mouth 2 (two) times a day 60 tablet 5    prazosin (MINIPRESS) 5 mg capsule Take 5 mg by mouth daily at bedtime      pregabalin (LYRICA) 100 mg capsule Take 100 mg by mouth every 8 (eight) hours      pregabalin (LYRICA) 75 mg capsule Take 75 mg by mouth 2 (two) times a day      rosuvastatin (CRESTOR) 20 MG  tablet Take 1 tablet (20 mg total) by mouth daily 90 tablet 3    Spiriva Respimat 2.5 MCG/ACT AERS inhaler Inhale 2 puffs daily 4 g 0    umeclidinium-vilanterol (Anoro Ellipta) 62.5-25 mcg/actuation inhaler Anoro Ellipta      VALSARTAN PO Valsartan      zolpidem (AMBIEN) 10 mg tablet Take 10 mg by mouth daily at bedtime as needed for sleep      ZTlido 1.8 % PTCH apply 1 patch once daily REMOVE after 12 hours      DULoxetine (CYMBALTA) 60 mg delayed release capsule take 1 capsule by mouth every morning DO NOT CRUSH, CHEW, AND/OR DIVIDE      Empagliflozin (Jardiance) 25 MG TABS Take 1 tablet (25 mg total) by mouth every morning (Patient not taking: Reported on 9/20/2024)       No current facility-administered medications on file prior to visit.          Objective     There were no vitals taken for this visit.    Physical Exam  Vitals and nursing note reviewed.   Constitutional:       General: She is not in acute distress.     Appearance: She is well-developed.   HENT:      Head: Normocephalic and atraumatic.   Cardiovascular:      Rate and Rhythm: Normal rate and regular rhythm.   Pulmonary:      Effort: Pulmonary effort is normal. No respiratory distress.   Abdominal:      Palpations: Abdomen is soft.   Musculoskeletal:         General: No swelling.      Cervical back: Neck supple.   Skin:     General: Skin is warm and dry.   Neurological:      Mental Status: She is alert.           Component      Latest Ref Rng 9/19/2024   Sodium      135 - 147 mmol/L 138    Potassium      3.5 - 5.3 mmol/L 3.9    Chloride      96 - 108 mmol/L 99    Carbon Dioxide      21 - 32 mmol/L 31    ANION GAP      4 - 13 mmol/L 8    BUN      5 - 25 mg/dL 15    Creatinine      0.60 - 1.30 mg/dL 0.58 (L)    GLUCOSE, FASTING      65 - 99 mg/dL 241 (H)    Calcium      8.4 - 10.2 mg/dL 8.9    AST      13 - 39 U/L 14    ALT      7 - 52 U/L 15    ALK PHOS      34 - 104 U/L 84    Total Protein      6.4 - 8.4 g/dL 6.5    Albumin      3.5 - 5.0 g/dL 3.5     Total Bilirubin      0.20 - 1.00 mg/dL 0.40    GFR, Calculated      ml/min/1.73sq m 102    Cholesterol      See Comment mg/dL 116    Triglycerides      See Comment mg/dL 115    HDL      >=50 mg/dL 38 (L)    LDL Calculated      0 - 100 mg/dL 55    EXT Creatinine Urine      Reference range not established. mg/dL 96.6    Albumin,U,Random      <20.0 mg/L 297.1 (H)    Albumin Creat Ratio      0 - 30 mg/g creatinine 308 (H)    Hemoglobin A1C      Normal 4.0-5.6%; PreDiabetic 5.7-6.4%; Diabetic >=6.5%; Glycemic control for adults with diabetes <7.0% % 11.3 (H)    eAG, EST AVG Glucose      mg/dl 278    VITAMIN D, 25-HYDROXY      30.0 - 100.0 ng/mL 23.7 (L)       Legend:  (L) Low  (H) High

## 2024-09-23 NOTE — ASSESSMENT & PLAN NOTE
"  Lab Results   Component Value Date    HGBA1C 11.3 (H) 09/19/2024     Diabetes is poorly controlled with A1c of 11.8%, average blood sugar of 350 mg/dL and 100% time above range.  She admits that she is not taking her insulin, occasionally (once a week) takes Lantus otherwise noncompliant.  She has stopped taking Jardiance due to recurrent yeast infection.  I had an honest conversation with Helena that maintaining current condition will ended up having serious complication of diabetes.  (\" she does not like needles\".),  She agreed to take Lantus 30 units once day and NovoLog 12 units before dinner for now.  I believe that she will benefit from V-GO insulin delivery system, if this is covered by her insurance, this will save her just 1 shot daily.  Continue metformin.  SGLT2 inhibitors not an option due to recurrent yeast infection.  Not candidate for GLP-1 agonist due to gastroparesis.  Return back in 2 months.  Labs prior to next visit.    Orders:    Hemoglobin A1C; Future    Lipid Panel with Direct LDL reflex; Future    Albumin / creatinine urine ratio; Future    "

## 2024-09-23 NOTE — PATIENT INSTRUCTIONS
Take Lantus 30 units at bedtime  Take NovoLog 12 units before dinner  Continue metformin  We will let you know about ordering V-Go    Called her insurance and ask them if V- GO, 30, swelling delivery system is covered.

## 2024-09-24 LAB
BACTERIA GENITAL AEROBE CULT: ABNORMAL
BACTERIA GENITAL AEROBE CULT: ABNORMAL

## 2024-09-26 ENCOUNTER — TELEPHONE (OUTPATIENT)
Age: 59
End: 2024-09-26

## 2024-09-26 NOTE — TELEPHONE ENCOUNTER
Patient called stating she called her insurance and they told her the V-Go system will be covered.  Patient would like you to call that in for her to [facility].

## 2024-09-27 DIAGNOSIS — E11.65 TYPE 2 DIABETES MELLITUS WITH HYPERGLYCEMIA, WITH LONG-TERM CURRENT USE OF INSULIN (HCC): Primary | ICD-10-CM

## 2024-09-27 DIAGNOSIS — Z79.4 TYPE 2 DIABETES MELLITUS WITH HYPERGLYCEMIA, WITH LONG-TERM CURRENT USE OF INSULIN (HCC): Primary | ICD-10-CM

## 2024-09-27 RX ORDER — SUB-Q INSULIN DEVICE, 40 UNIT
EACH MISCELLANEOUS
Qty: 90 KIT | Refills: 1 | Status: SHIPPED | OUTPATIENT
Start: 2024-09-27

## 2024-10-01 ENCOUNTER — NURSE TRIAGE (OUTPATIENT)
Age: 59
End: 2024-10-01

## 2024-10-01 DIAGNOSIS — R30.0 DYSURIA: Primary | ICD-10-CM

## 2024-10-01 DIAGNOSIS — R35.0 URINE FREQUENCY: ICD-10-CM

## 2024-10-01 NOTE — TELEPHONE ENCOUNTER
"Pt called in with concerns for vaginal spotting and UTI symptoms. States she started with light pink vaginal spotting yesterday, only seen with wiping. Denies any clots or abdominal pain/cramping. Pt had a hysterectomy in May 2018. Also reporting dysuria and pressure with urination that started two days ago. Denies any back pain or fever. States she always has urinary frequency, no worse than normal. Advised pt to have outpatient urine testing done to confirm UTI. Also advised that she needs an office visit to evaluate her vaginal bleeding. Reviewed available appointments, pt does not want to go to [place], advised someone will contact her regarding scheduling. Pt aware to call back with any heavy bleeding, passing clots, severe pain, worsening UTI symptoms. Pt thankful.     Reason for Disposition   Bleeding or spotting occurs after hysterectomy    Answer Assessment - Initial Assessment Questions  1. AMOUNT: \"Describe the bleeding that you are having.\"     - SPOTTING: spotting, or pinkish / brownish mucous discharge; does not fill panti-liner or pad     - MILD:  less than 1 pad / hour; less than patient's usual menstrual bleeding    - MODERATE: 1-2 pads / hour; 1 menstrual cup every 6 hours; small-medium blood clots (e.g., pea, grape, small coin)    - SEVERE: soaking 2 or more pads/hour for 2 or more hours; 1 menstrual cup every 2 hours; bleeding not contained by pads or continuous red blood from vagina; large blood clots (e.g., golf ball, large coin)       spotting  2. ONSET: \"When did the bleeding begin?\" \"Is it continuing now?\"      yesterday  3. MENSTRUAL PERIOD: \"When was the last normal menstrual period?\" \"How is this different than your period?\"      Hysterectomy in 2018  4. REGULARITY: \"How regular are your periods?\"        5. ABDOMINAL PAIN: \"Do you have any pain?\" \"How bad is the pain?\"  (e.g., Scale 1-10; mild, moderate, or severe)    - MILD (1-3): doesn't interfere with normal activities, abdomen soft " "and not tender to touch     - MODERATE (4-7): interferes with normal activities or awakens from sleep, tender to touch     - SEVERE (8-10): excruciating pain, doubled over, unable to do any normal activities       denies  6. PREGNANCY: \"Could you be pregnant?\" \"Are you sexually active?\" \"Did you recently give birth?\"      denies  7. BREASTFEEDING: \"Are you breastfeeding?\"      denies  8. HORMONES: \"Are you taking any hormone medications, prescription or OTC?\" (e.g., birth control pills, estrogen)      denies  9. BLOOD THINNERS: \"Do you take any blood thinners?\" (e.g., Coumadin/warfarin, Pradaxa/dabigatran, aspirin)      denies  10. CAUSE: \"What do you think is causing the bleeding?\" (e.g., recent gyn surgery, recent gyn procedure; known bleeding disorder, cervical cancer, polycystic ovarian disease, fibroids)          unsure  11. HEMODYNAMIC STATUS: \"Are you weak or feeling lightheaded?\" If Yes, ask: \"Can you stand and walk normally?\"         denies  12. OTHER SYMPTOMS: \"What other symptoms are you having with the bleeding?\" (e.g., passed tissue, vaginal discharge, fever, menstrual-type cramps)        denies    Protocols used: Vaginal Bleeding - Abnormal-ADULT-[place]    "

## 2024-10-01 NOTE — TELEPHONE ENCOUNTER
Called and gave the patient the recommendations from Dr. Smith as follows, use the estradiol cream as instructed by Dr. Briceño - nightly for 7 days then every third night, apply at bedtime. Follow up with Dr. Briceño in 6 weeks unless bleeding stops, then the appointment can be cancelled. Patient is also going for urine testing and we will follow up with the patient when the provider reviews the results.

## 2024-10-02 ENCOUNTER — APPOINTMENT (OUTPATIENT)
Dept: LAB | Facility: CLINIC | Age: 59
End: 2024-10-02
Payer: COMMERCIAL

## 2024-10-02 DIAGNOSIS — R30.0 DYSURIA: ICD-10-CM

## 2024-10-02 LAB
BACTERIA UR QL AUTO: ABNORMAL /HPF
BILIRUB UR QL STRIP: NEGATIVE
CLARITY UR: ABNORMAL
COLOR UR: YELLOW
GLUCOSE UR STRIP-MCNC: ABNORMAL MG/DL
HGB UR QL STRIP.AUTO: ABNORMAL
KETONES UR STRIP-MCNC: NEGATIVE MG/DL
LEUKOCYTE ESTERASE UR QL STRIP: ABNORMAL
NITRITE UR QL STRIP: NEGATIVE
NON-SQ EPI CELLS URNS QL MICRO: ABNORMAL /HPF
PH UR STRIP.AUTO: 6 [PH]
PROT UR STRIP-MCNC: ABNORMAL MG/DL
RBC #/AREA URNS AUTO: ABNORMAL /HPF
SP GR UR STRIP.AUTO: 1.03 (ref 1–1.03)
UROBILINOGEN UR STRIP-ACNC: <2 MG/DL
WBC #/AREA URNS AUTO: ABNORMAL /HPF
WBC CLUMPS # UR AUTO: PRESENT /UL

## 2024-10-02 PROCEDURE — 81001 URINALYSIS AUTO W/SCOPE: CPT

## 2024-10-02 PROCEDURE — 87086 URINE CULTURE/COLONY COUNT: CPT

## 2024-10-02 PROCEDURE — 87077 CULTURE AEROBIC IDENTIFY: CPT

## 2024-10-02 PROCEDURE — 87186 SC STD MICRODIL/AGAR DIL: CPT

## 2024-10-03 ENCOUNTER — TELEPHONE (OUTPATIENT)
Dept: DIABETES SERVICES | Facility: CLINIC | Age: 59
End: 2024-10-03

## 2024-10-03 NOTE — TELEPHONE ENCOUNTER
Please enter a referral for carb counting so the patient can see diabetes education on 10/8. Thank you

## 2024-10-04 ENCOUNTER — TELEPHONE (OUTPATIENT)
Age: 59
End: 2024-10-04

## 2024-10-04 DIAGNOSIS — Z16.30 ANTIBIOTIC-RESISTANT BACTERIAL INFECTION: ICD-10-CM

## 2024-10-04 DIAGNOSIS — N30.01 ACUTE CYSTITIS WITH HEMATURIA: Primary | ICD-10-CM

## 2024-10-04 DIAGNOSIS — A49.9 ANTIBIOTIC-RESISTANT BACTERIAL INFECTION: ICD-10-CM

## 2024-10-04 LAB
BACTERIA UR CULT: ABNORMAL
BACTERIA UR CULT: ABNORMAL

## 2024-10-04 RX ORDER — SULFAMETHOXAZOLE/TRIMETHOPRIM 800-160 MG
1 TABLET ORAL EVERY 12 HOURS SCHEDULED
Qty: 14 TABLET | Refills: 0 | Status: SHIPPED | OUTPATIENT
Start: 2024-10-04 | End: 2024-10-11

## 2024-10-04 NOTE — TELEPHONE ENCOUNTER
----- Message from Diana Briceño MD sent at 10/4/2024 10:26 AM EDT -----  Please call pt with results. UCx notable for UTI. I will send in treatment for her. Theres a lot of antibiotic resistance noted though. This increases risk of persistence, so I recommend completing urine culture 1wk post treatment as well. Order adde  d for lab collection.

## 2024-10-04 NOTE — TELEPHONE ENCOUNTER
Patient called and aware of results  read verbatim.      Patient will be picking up medication and will be completing urine culture in 1 wk after treatment.

## 2024-10-07 ENCOUNTER — TELEPHONE (OUTPATIENT)
Age: 59
End: 2024-10-07

## 2024-10-07 DIAGNOSIS — E11.65 TYPE 2 DIABETES MELLITUS WITH HYPERGLYCEMIA, WITH LONG-TERM CURRENT USE OF INSULIN (HCC): Primary | ICD-10-CM

## 2024-10-07 DIAGNOSIS — Z79.4 TYPE 2 DIABETES MELLITUS WITH HYPERGLYCEMIA, WITH LONG-TERM CURRENT USE OF INSULIN (HCC): Primary | ICD-10-CM

## 2024-10-07 NOTE — PROGRESS NOTES
"  Carbohydrate Counting Instruction    Met with Helena Newton for carbohydrate counting, accompanied by her son, [place]. She is to have a [employer] training at a following session.    Helena is currently on the following insulin regimen: Lantus 30 units at night, Novolog 12 units for low carb meals and 15 units for high carb meals. Helena reported not taking her medication because she does not like \"poking herself\". Explained in detail the complications that occur from high glucose levels. Patient agreed to start taking her insulin.    Patient instructed on: Carbohydrate counting.  Instruction was provided using power point slides, food labels and an interactive carbohydrate counting activity.  Patient appeared to comprehend the materials presented at the visit.  Patient demonstrates some math skills and did okay on the carbohydrate counting activity they completed at the visit.  Patient agreed to keep a 3 day food record and return it in one week for assessment. +    Comments: Helena reported that she has not carb counted in years and event when she did carb count she did not know how to do this correctly. She had some understanding of carbohydrate counting today and with practice can perform carb counting.    Diabetes Education Record: Helena was given the following education material: Portions Booklet and food record.       Patient response to instruction  Comprehension: fair  Motivation: good  Expected Compliance: fair  Readiness: preparation  Barriers to Learning: none known     Begin Time: 3:44 pm  End Time: 5:06 pm  Referring Provider: Mary Bruce MD    Thank you for referring your patient to [facility], it was a pleasure working with them today. Please feel free to call with any questions or concerns.    Amira Benites, RD  [address] [place] [address]  [phone]  "

## 2024-10-07 NOTE — TELEPHONE ENCOUNTER
Patient called, she stated her [employer] insurance is requesting a referral for the patient to see her psychiatrist.       [facility]    NEELAM Gomes    [address],   [place] [address]  P: [phone]  F:  [phone]      Please advise, thank you.

## 2024-10-08 ENCOUNTER — OFFICE VISIT (OUTPATIENT)
Dept: DIABETES SERVICES | Facility: CLINIC | Age: 59
End: 2024-10-08
Payer: COMMERCIAL

## 2024-10-08 VITALS — WEIGHT: 146.8 LBS | BODY MASS INDEX: 27.74 KG/M2

## 2024-10-08 DIAGNOSIS — Z79.4 TYPE 2 DIABETES MELLITUS WITH HYPERGLYCEMIA, WITH LONG-TERM CURRENT USE OF INSULIN (HCC): Primary | ICD-10-CM

## 2024-10-08 DIAGNOSIS — E11.65 TYPE 2 DIABETES MELLITUS WITH HYPERGLYCEMIA, WITH LONG-TERM CURRENT USE OF INSULIN (HCC): Primary | ICD-10-CM

## 2024-10-08 PROCEDURE — G0108 DIAB MANAGE TRN  PER INDIV: HCPCS

## 2024-10-08 NOTE — PATIENT INSTRUCTIONS
Complete 3-day food & insulin log and return completed log in 2 week to [email or web address]

## 2024-10-10 ENCOUNTER — TELEPHONE (OUTPATIENT)
Age: 59
End: 2024-10-10

## 2024-10-10 DIAGNOSIS — F32.A ANXIETY AND DEPRESSION: Primary | ICD-10-CM

## 2024-10-10 DIAGNOSIS — F41.9 ANXIETY AND DEPRESSION: Primary | ICD-10-CM

## 2024-10-10 NOTE — TELEPHONE ENCOUNTER
Contacted patient in regards to Routine Referral in attempts to verify patient's needs of services and add patient to proper wait list. LVM for patient to contact intake dept  in regards to routine referral at [phone]. 1st attempt.

## 2024-10-14 ENCOUNTER — TELEPHONE (OUTPATIENT)
Age: 59
End: 2024-10-14

## 2024-10-14 NOTE — TELEPHONE ENCOUNTER
Contacted patient in regards to Routine Referral in attempts to verify patient's needs of services and add patient to proper wait list. LVM for patient to contact intake dept  in regards to routine referral at [phone]. 2nd attempt.

## 2024-10-16 DIAGNOSIS — N95.2 VAGINAL ATROPHY: ICD-10-CM

## 2024-10-16 RX ORDER — ESTRADIOL 0.1 MG/G
CREAM VAGINAL
Qty: 42.5 G | Refills: 3 | Status: SHIPPED | OUTPATIENT
Start: 2024-10-16

## 2024-11-06 ENCOUNTER — TELEPHONE (OUTPATIENT)
Dept: DIABETES SERVICES | Facility: CLINIC | Age: 59
End: 2024-11-06

## 2024-11-06 NOTE — TELEPHONE ENCOUNTER
Called patient to see how she was doing with filling out her 3-day food & insulin log for assessment. Helena stated that she has been busy and will drop it off on or about Nov 22nd.  She also shared that she filled it in but has been very busy lately.

## 2024-11-21 ENCOUNTER — OFFICE VISIT (OUTPATIENT)
Dept: CARDIOLOGY CLINIC | Facility: CLINIC | Age: 59
End: 2024-11-21
Payer: COMMERCIAL

## 2024-11-21 VITALS
WEIGHT: 151.2 LBS | DIASTOLIC BLOOD PRESSURE: 60 MMHG | BODY MASS INDEX: 28.55 KG/M2 | HEIGHT: 61 IN | SYSTOLIC BLOOD PRESSURE: 106 MMHG | HEART RATE: 60 BPM

## 2024-11-21 DIAGNOSIS — E78.2 MIXED HYPERLIPIDEMIA: ICD-10-CM

## 2024-11-21 DIAGNOSIS — I10 ESSENTIAL (PRIMARY) HYPERTENSION: ICD-10-CM

## 2024-11-21 DIAGNOSIS — E66.3 OVERWEIGHT (BMI 25.0-29.9): ICD-10-CM

## 2024-11-21 DIAGNOSIS — I42.8 NON-ISCHEMIC CARDIOMYOPATHY (HCC): Primary | ICD-10-CM

## 2024-11-21 DIAGNOSIS — I44.7 LBBB (LEFT BUNDLE BRANCH BLOCK): ICD-10-CM

## 2024-11-21 PROCEDURE — 99214 OFFICE O/P EST MOD 30 MIN: CPT | Performed by: INTERNAL MEDICINE

## 2024-11-21 NOTE — PROGRESS NOTES
[facility] [place]  [address] [facility] [address] [phone]                                                Cardiology Office Follow up  Helena Newton, 59 y.o. female  YOB: 1965  MRN: [id number] Encounter: [id number]      PCP - NEELAM Ventura    Assessment and Plan  Non-ischemic cardiomyopathy, LVEF 40-45%  Echo - 1/19/22 - DM 1%, grade 1 DD  Echo - 9/12/19 ([facility] report)- LVEF 40-44%, mildly dilated LV, mild diffuse hypokinesis, grade 1 diastolic dysfunction no significant valvular abnormalities  LHC - 5/2018 - mid LAD 40%, otherwise mild atherosclerosis  Nuclear Rx stress - 8/3/22 -  LVEF 61%, no significant perfusion defects, mild fixed defect related to breast attenuation artifact   Chronic LBBB  Non-obstructive CAD  mLAD 40% in 2018  GERD  Diabetes Mellitus Type 2   Has neuropathy, and possibly autonomic neuropathy as well with some orthostatic hypotension as well  A1c 12.4% on 2/21/22 --> 11.5% (11/2022)  Hyperlipidemia  Nocturnal hypoxia, on overnight O2 therapy  COPD / Chronic bronchitis  Active smoker  1 PPD  Previously quit with chantix, but resumed smoking due to her son's smoking, and people around her smoking  Depression/PTSD  Overweight, Body mass index is 28.57 kg/m².     Plan  Chest /abdominal pain, GERD/PUD  Overview  10/2022: She was reporting recurrent chest /abdominal pain, and as a result underwent nuclear stress test earlier this year, which did not reveal any ischemia   She has undergone multiple GI procedures as well without any clear diagnosis  After initiating on metformin, the symptoms have improved  10/2023: no chest pain. Gets occasional abdominal/epigastric pain, non-exertional. No shortness of breath.  Family members made her go to the ED 1 open for this.  She was ruled out for ACS and then prescribed sucralfate, which has been helping her with the symptoms.  11/2024: reports occasional chest  discomfort, non-exertional, infrequent, similar to prior  Impression  ?GERD/GI-related  Plan  Check echocardiogram check echocardiogram  Monitor for exertional symptoms and let me know if any increased frequency  If continued problems, problems or issues then we will pursue repeat stress testing     Non-ischemic cardiomyopathy, LVEF 40-45%, LBBB  Overview  1/2022: LVEF 45%, global hypokinesis, grade 1 diastolic dysfunction, LBBB   11/2024: Clinically euvolemic, no orthopnea or PND   Plan  Continue bisoprolol 5 mg daily   Not on ACE/ARB due to low BP - Previously had passed out with same  Not on diuretics and remains euvolemic    CAD, Non-obstructive  Overview  2018 - Nuclear Rx abnormal --> sent for [facility] - LAD - 40% stenosis   Medically manged  8/2022: Nuclear stress - negative for ischemia  9/27/2023: ECG reviewed - sinus bradycardia, LBBB  10/2023: free of chest pain or exertional symptoms  11/2024: No clear exertional or anginal symptoms  Plan  Optimize risk factors  Continue bisoprolol 5 mg daily  Continue rosuvastatin 20 mg daily     Hyperlipidemia    Reviewed prior lipid panel on patient's phone through [employer] (unclear why unable to connect same in Epic)  Lipid panel - 7/15/20 - , , HDL 40,   Lipid panel - 2/21/22 - , , HDL 37,      Cholesterol levels now much improved, triglycerides and LDL both at goal  Continue rosuvastatin 20 mg daily  Dietary modifications, weight loss recommended    No results found for this visit on 11/21/24.    No orders of the defined types were placed in this encounter.    No follow-ups on file.      History of Present Illness   59 y.o. female comes in for annual cardiac follow-up, after being sent back by her pulmonologist.   She used to see Dr. Schreiber last year, but is now being established with me.    She has a history of a left bundle branch block,  And had an echo and a nuclear stress test done in May 2018, which revealed  mildly reduced LVEF along with  A medium sized anterior defect with some reversibility.  As a result she was referred for a cardiac catheterization which showed nonobstructive coronary artery disease with 40% stenosis of mid LAD.  She was managed medically and has had no symptoms over the past year.    She has been doing fairly well over the past year, and does not have any active complaints.   She additionally follows with a pulmonologist at [facility], who had ordered an echocardiogram last month.  This revealed an LVEF of 40-44%, and as a result she was asked to follow up again with her cardiologist for further evaluation.    Interval history - 8/20/2020  She comes back for follow-up.  I previously saw her for a tele visit in March.  She has been doing fairly well overall.  Patient was unclear about her medications, but was eventually able to show me her my chart from [facility] which showed a completely different list of medications.  It appears she is not on bisoprolol, losartan or atorvastatin at present.  She reports having symptoms of dizziness on standing up for a few seconds, and a couple of months ago, she was started on irbesartan 150, after which she reportedly passed out.  As a result she stopped taking the same.    Interval history - 11/23/2020  He comes back for follow-up after 3 months.  She has been doing well overall and does not report any active complains of chest pain, shortness of breath, palpitations.  She has not had any further episodes of dizziness or passing out since being on her current antihypertensive regimen.     Interval history - 11/22/2021  She comes back for follow-up after 1 year.  She has been doing well overall and continues to be free of chest pain.  She does report mild a patent palpitations, but no persistent symptoms.  No acute complains of shortness of breath, and overall doing well and remains active.  No further complains of dizziness, near syncope or syncope.  She does  report some heartburn-like chest discomfort earlier this year, but states that she has run out of her PPI.    Interval history - 1/19/2022  She comes back for follow-up for an earlier visit within 2 months.  She has been having ongoing complains of epigastric discomfort with radiation to the center of her chest.  It also radiates on both sides on the lower side of chest.  It seems to occurr at rest and is not worsened with exertion.  She has been taking mylanta, without much help, but PPIs did provide some relief.  No current complains of shortness of breath, palpitations, dizziness.  She continues to have musculoskeletal and neuropathic pain, for which she takes multiple pain medications including diclofenac, ibuprofen and naproxen.    Interval history - 7/8/2022  She comes back for follow-up after about 6 months.  She followed up with GI and completed EGD and colonoscopy which did not reveal any major findings.  She has been treated for GERD, but has continued to have symptoms of chest pain, and ended up again in the ED for the same.  She was again ruled out for ACS and is now back to see Cardiology for further evaluation.  Most of her symptoms continue to be while at rest and without any clear triggers.  No complains of nausea, vomiting or diaphoresis.  No complains of palpitations, dizziness, near-syncope or syncope    Interval history - 10/17/2022  She comes back for follow-up after about 4 months.   She completed the nuclear stress test, which did not reveal any significant ischemia.  She does report to me that the abdominal pain that she had been having seem to improved after addition of metformin.   No current palpitations, dizziness or lightheadedness.   She remains fairly sedentary.  Shortness of breath remains stable/mild    Interval history - 10/26/2023  She will follow-up after about 1 year.  In the interim she continues to do well and has not had any chest pain or major shortness of breath.  She does  on and off get epigastric/abdominal discomfort, which occurs at rest and not changed with activity or exercise. She remains compliant with medical therapy    Interval history - 2024  She returns for follow-up after 1 year, and is here with her son for the visit.  In the meantime, she has been doing well and has not any major issues with shortness of breath, chest discomfort. In 2024, during the heat wave, she ended up in hospital for dehydration and had REBECCA witih Cr up to 3.6.  This is since improved with IV hydration.  She was on Jardiance at that point, but has since been discontinued due to UTIs.  Reports occasional chest discomfort and otherwise is free of any major dizziness      Historical Information   Past Medical History:   Diagnosis Date    Angina pectoris ([facility]ly [facility])     recent    Anxiety     Arthritis     Asthma     Carpal tunnel syndrome     Chronic headaches     COPD (chronic obstructive pulmonary disease) (Formerly [facility])     Diabetes (Formerly [facility])     Diabetes mellitus (Formerly [facility])     GERD (gastroesophageal reflux disease)     Headache     Hyperlipidemia     Hypertension     Kidney stone     Left bundle branch block     LBBB    MI (myocardial infarction) (Formerly [facility])     Myocardial infarction (Formerly [facility])     Neuropathy     PTSD (post-traumatic stress disorder)     RLS (restless legs syndrome)     Shortness of breath      Past Surgical History:   Procedure Laterality Date    ANGIOPLASTY      BREAST SURGERY  2016    Reduction    CARDIAC CATHETERIZATION  2018     Mid LAD: There was a tubular 40 % stenosis     SECTION      COLONOSCOPY      HYSTERECTOMY  2018    Complete    KIDNEY STONE SURGERY      LAPAROSCOPY      NE COLONOSCOPY FLX DX W/COLLJ SPEC WHEN PFRMD N/A 2017    Procedure: EGD AND COLONOSCOPY;  Surgeon: Wilberto Galeaon MD;  Location: [facility];  Service: Gastroenterology    NE ESOPHAGOGASTRODUODENOSCOPY TRANSORAL DIAGNOSTIC N/A 10/10/2018    Procedure: ESOPHAGOGASTRODUODENOSCOPY (EGD);   Surgeon: Wilberto Galeano MD;  Location: [facility];  Service: Gastroenterology    OH LAPS TOTAL HYSTERECT 250 GM/< W/RMVL TUBE/OVARY N/A 5/22/2018    Procedure: ROBOTIC ASSISTED TOTAL LAPAROSCOPIC HYSTERECTOMY, BILATERAL SALPINGOOPHERECTOMY,;  Surgeon: Daron Valdez MD;  Location:  MAIN OR;  Service: Gynecology Oncology    UPPER GASTROINTESTINAL ENDOSCOPY      WRIST SURGERY Right      Family History   Problem Relation Age of Onset    Diabetes Mother     Breast cancer Mother 45    Lung cancer Father 54    Diabetes Sister     Brain cancer Maternal Aunt 72    Breast cancer Other 25     Current Outpatient Medications on File Prior to Visit   Medication Sig Dispense Refill    Accu-Chek Guide test strip use to CHECK BLOOD GLUCOSE four times a day      albuterol (Ventolin HFA) 90 mcg/act inhaler Ventolin HFA      ARIPiprazole (ABILIFY) 2 mg tablet ARIPiprazole      BD Pen Needle Dayna 2nd Gen 32G X 4 MM MISC 4 time a day 200 each 2    Bevespi Aerosphere 9-4.8 MCG/ACT inhaler inhale 2 puffs by mouth every morning and 2 puffs at bedtime      bisoprolol (ZEBETA) 5 mg tablet take 1 tablet by mouth once daily 90 tablet 1    Blood Glucose Monitoring Suppl (OneTouch Verio Reflect) w/Device KIT Check blood sugars twice daily. Please substitute with appropriate alternative as covered by patient's insurance. Dx: E11.65 1 kit 0    buPROPion (WELLBUTRIN XL) 300 mg 24 hr tablet Take 450 mg by mouth every morning      celecoxib (CeleBREX) 100 mg capsule Take 1 capsule (100 mg total) by mouth 2 (two) times a day 60 capsule 2    DULoxetine (CYMBALTA) 60 mg delayed release capsule take 1 capsule by mouth every morning DO NOT CRUSH, CHEW, AND/OR DIVIDE      estradiol (ESTRACE) 0.1 mg/g vaginal cream Apply generous fingertip sized amount into vagina every 3rd night. 42.5 g 3    glucose blood (OneTouch Verio) test strip Check blood sugars twice daily. Please substitute with appropriate alternative as covered by patient's insurance. Dx:  E11.65 200 each 3    hydrOXYzine pamoate (VISTARIL) 25 mg capsule take 1 capsule by mouth daily if needed for anxiety      Insulin Disposable Pump (V-Go 30) 30 UNIT/24HR KIT As instructed. She will see our diabetes educator for training, V-go insulin delivery, will deliver 30 units of insulin over 24 hours and she will get extra 8 units (4 clicks ) before meals. 90 kit 1    lactulose (Constulose) 10 g/15 mL solution take 30 milliliters by mouth twice a day 946 mL 5    melatonin 3 mg Take by mouth daily at bedtime 2 at bedtime      metFORMIN (GLUCOPHAGE-XR) 500 mg 24 hr tablet take 2 tablets by mouth at bedtime 180 tablet 1    NovoLOG FlexPen 100 units/mL injection pen 12 units for low carb meals and 15 units for high carb meals. 33 mL 0    ondansetron (ZOFRAN) 4 mg tablet Take 1 tablet (4 mg total) by mouth every 8 (eight) hours as needed for nausea or vomiting 20 tablet 0    OneTouch Delica Lancets 33G MISC Check blood sugars twice daily. Please substitute with appropriate alternative as covered by patient's insurance. Dx: E11.65 200 each 3    oxyCODONE (ROXICODONE) 5 immediate release tablet Take 5 mg by mouth 3 (three) times a day as needed      pantoprazole (PROTONIX) 40 mg tablet Take 1 tablet (40 mg total) by mouth 2 (two) times a day 60 tablet 5    prazosin (MINIPRESS) 5 mg capsule Take 5 mg by mouth daily at bedtime      pregabalin (LYRICA) 100 mg capsule Take 100 mg by mouth every 8 (eight) hours (Patient taking differently: Take 100 mg by mouth 2 (two) times a day)      rosuvastatin (CRESTOR) 20 MG tablet Take 1 tablet (20 mg total) by mouth daily 90 tablet 3    Spiriva Respimat 2.5 MCG/ACT AERS inhaler Inhale 2 puffs daily 4 g 0    umeclidinium-vilanterol (Anoro Ellipta) 62.5-25 mcg/actuation inhaler Anoro Ellipta      VALSARTAN PO Valsartan      zolpidem (AMBIEN) 10 mg tablet Take 10 mg by mouth daily at bedtime as needed for sleep      ZTlido 1.8 % PTCH apply 1 patch once daily REMOVE after 12 hours       insulin glargine (LANTUS SOLOSTAR) 100 units/mL injection pen Inject 30 Units under the skin daily at bedtime 27 mL 0    pregabalin (LYRICA) 75 mg capsule Take 75 mg by mouth 2 (two) times a day (Patient not taking: Reported on 11/21/2024)      [DISCONTINUED] diclofenac (VOLTAREN) 75 mg EC tablet       [DISCONTINUED] Empagliflozin (Jardiance) 25 MG TABS Take 1 tablet (25 mg total) by mouth every morning (Patient not taking: Reported on 9/20/2024)       No current facility-administered medications on file prior to visit.     Allergies   Allergen Reactions    Betamethasone Hives    Lisinopril Cough    Losartan Dizziness    Other Hives     Surgical tape    Prednisone Other (See Comments)     Thrush      Cat Hair Extract Rash     Social History     Socioeconomic History    Marital status:      Spouse name: None    Number of children: None    Years of education: None    Highest education level: None   Occupational History    Occupation: Healthcare provider    Tobacco Use    Smoking status: Every Day     Current packs/day: 1.00     Average packs/day: 1 pack/day for 51.9 years (51.9 ttl pk-yrs)     Types: Cigarettes     Start date: 1/1/1973    Smokeless tobacco: Never    Tobacco comments:     pt. smoked this am 10/10   Vaping Use    Vaping status: Former   Substance and Sexual Activity    Alcohol use: Not Currently     Comment: Rarely consumes alcohol - As per [employer]     Drug use: Yes     Frequency: 7.0 times per week     Types: Marijuana     Comment: medical marijuana  last use over a month    Sexual activity: Yes     Partners: Male     Birth control/protection: Surgical   Other Topics Concern    None   Social History Narrative    None     Social Drivers of Health     Financial Resource Strain: Patient Declined (12/6/2023)    Overall Financial Resource Strain (CARDIA)     Difficulty of Paying Living Expenses: Patient declined   Food Insecurity: Not on file   Transportation Needs: Patient Declined (12/6/2023)     "PRAPARE - Transportation     Lack of Transportation (Medical): Patient declined     Lack of Transportation (Non-Medical): Patient declined   Physical Activity: Not on file   Stress: Not on file   Social Connections: Not on file   Intimate Partner Violence: Not on file   Housing Stability: Not on file        Review of Systems   All other systems reviewed and are negative.    Vitals:  Vitals:    11/21/24 1445   BP: 106/60   Pulse: 60   Weight: 68.6 kg (151 lb 3.2 oz)   Height: 5' 1\" (1.549 m)     BMI - Body mass index is 28.57 kg/m².  Wt Readings from Last 7 Encounters:   11/21/24 68.6 kg (151 lb 3.2 oz)   10/08/24 66.6 kg (146 lb 12.8 oz)   09/23/24 68 kg (150 lb)   09/20/24 68.7 kg (151 lb 6.4 oz)   08/26/24 67.9 kg (149 lb 12.8 oz)   08/20/24 66.2 kg (146 lb)   07/08/24 65.3 kg (144 lb)       Physical Exam  Vitals and nursing note reviewed.   Constitutional:       General: She is not in acute distress.     Appearance: Normal appearance. She is well-developed. She is not ill-appearing.   HENT:      Head: Normocephalic and atraumatic.      Nose: No congestion.   Eyes:      General: No scleral icterus.     Conjunctiva/sclera: Conjunctivae normal.   Neck:      Vascular: No carotid bruit or JVD.   Cardiovascular:      Rate and Rhythm: Normal rate and regular rhythm.      Pulses: Normal pulses.      Heart sounds: Normal heart sounds. No murmur heard.     No friction rub. No gallop.   Pulmonary:      Effort: Pulmonary effort is normal. No respiratory distress.      Breath sounds: Normal breath sounds. No rales.   Abdominal:      General: There is no distension.      Palpations: Abdomen is soft.      Tenderness: There is no abdominal tenderness.   Musculoskeletal:         General: No swelling or tenderness.      Cervical back: Neck supple.      Right lower leg: No edema.      Left lower leg: No edema.   Skin:     General: Skin is warm.   Neurological:      General: No focal deficit present.      Mental Status: She is alert " "and oriented to person, place, and time. Mental status is at baseline.   Psychiatric:         Mood and Affect: Mood normal.         Behavior: Behavior normal.         Thought Content: Thought content normal.       Labs:  CBC:   Lab Results   Component Value Date    WBC 9.78 06/23/2024    RBC 4.74 06/23/2024    HGB 13.6 06/23/2024    HCT 40.6 06/23/2024    MCV 86 06/23/2024     06/23/2024    RDW 13.8 06/23/2024       CMP:   Lab Results   Component Value Date    K 3.9 09/19/2024    CL 99 09/19/2024    CO2 31 09/19/2024    BUN 15 09/19/2024    CREATININE 0.58 (L) 09/19/2024    EGFR 102 09/19/2024    CALCIUM 8.9 09/19/2024    AST 14 09/19/2024    ALT 15 09/19/2024    ALKPHOS 84 09/19/2024       Magnesium:  Lab Results   Component Value Date    MG 2.0 06/22/2024       Lipid Profile:   Lab Results   Component Value Date    HDL 38 (L) 09/19/2024    TRIG 115 09/19/2024    LDLCALC 55 09/19/2024       Thyroid Studies:   Lab Results   Component Value Date    SQP0HHUYHYWN 3.027 12/06/2023       No components found for: \"HGA1C\"    Lab Results   Component Value Date    INR 0.94 09/27/2023    INR 0.92 03/03/2023    INR 0.98 10/18/2019   5    Imaging: Ct Abdomen Pelvis With Contrast    Result Date: 10/18/2019  Narrative: CT ABDOMEN AND PELVIS WITH IV CONTRAST INDICATION:   lower abd pain, diarrhea. COMPARISON:  None. TECHNIQUE:  CT examination of the abdomen and pelvis was performed. Axial, sagittal, and coronal 2D reformatted images were created from the source data and submitted for interpretation. Radiation dose length product (DLP) for this visit:  500 mGy-cm .  This examination, like all CT scans performed in the [facility], was performed utilizing techniques to minimize radiation dose exposure, including the use of iterative reconstruction and automated exposure control. IV Contrast:  100 mL of iohexol (OMNIPAQUE) Enteric Contrast:  Enteric contrast was not administered. FINDINGS: ABDOMEN LOWER CHEST:  " No clinically significant abnormality identified in the visualized lower chest. LIVER/BILIARY TREE: The liver is mildly enlarged.  Diffusely decreased hepatic attenuation suggesting steatosis.  No intra or extrahepatic biliary ductal dilation GALLBLADDER:  No calcified gallstones. No pericholecystic inflammatory change. SPLEEN:  Unremarkable. PANCREAS:  Unremarkable. ADRENAL GLANDS:  Unremarkable. KIDNEYS/URETERS: No hydronephrosis.  No suspicious renal lesion.  Left renal lower pole focal cortical loss, noting a 3 mm dystrophic calcification. STOMACH AND BOWEL: Circumferential wall thickening of the mid to distal transverse colon sparing the splenic flexure with pericolonic fat stranding in keeping with acute colitis (series 2, images 31-41).  No disproportionate dilation of small or large bowel loops. APPENDIX:  No findings to suggest appendicitis. ABDOMINOPELVIC CAVITY:  No ascites or free intraperitoneal air. No lymphadenopathy. VESSELS:  Unremarkable for patient's age. PELVIS REPRODUCTIVE ORGANS:  Status post hysterectomy.  No adnexal mass. URINARY BLADDER:  Unremarkable. ABDOMINAL WALL/INGUINAL REGIONS:  Unremarkable. OSSEOUS STRUCTURES:  No acute fracture or destructive osseous lesion.     Impression: 1.  Acute uncomplicated colitis of of the mid to distal transverse colon sparing the splenic flexure, favored to be infectious. 2.  Mild hepatomegaly and hepatic steatosis. Workstation performed: [id number]       Cardiac testing:   Results for orders placed during the hospital encounter of 18   Echo complete with contrast if indicated    [facility]  [address] [place] [address] [phone]    Transthoracic Echocardiogram  2D, M-mode, Doppler, and Color Doppler    Study date:  03-May-2018    Patient: LESLEY AMBROSIO  MR number: [id number]  Account number: [id number]  : 1965  Age: 52 years  Gender: Female  Status: Outpatient  Location: [facility]  [place]  Height: 61 in  Weight: 170 lb  BP: 112/ 78 mmHg    Indications: Dyspnea.    Diagnoses: R06.09 - Other forms of dyspnea    Sonographer:  Mika Cortes, ROSALINE  Interpreting Physician:  Teresa Kuo MD  Primary Physician:  Leoncio Farrell DO  Referring Physician:  Teresa Kuo MD  Group:  [facility]    SUMMARY    LEFT VENTRICLE:  Ejection fraction was estimated to be 40 %. Dyssynchronous septal motion likely secondary to LBBB.  There was mild diffuse hypokinesis.    MITRAL VALVE:  There was trace regurgitation.    HISTORY: PRIOR HISTORY: LBBB Myocardial infarction. Risk factors: hypertension, oral hypoglycemic-treated diabetes, a history of current cigarette use (within the last month), and a family history of coronary artery disease.    PROCEDURE: The study was performed in the [facility]. This was a routine study. The transthoracic approach was used. The study included complete 2D imaging, M-mode, complete spectral Doppler, and color Doppler. The  heart rate was 82 bpm, at the start of the study. Image quality was adequate.    LEFT VENTRICLE: Size was normal. Ejection fraction was estimated to be 40 %. Dyssynchronous septal motion likely secondary to LBBB. There was mild diffuse hypokinesis. DOPPLER: There was an increased relative contribution of atrial  contraction to ventricular filling.    RIGHT VENTRICLE: The size was normal. Systolic function was normal. Wall thickness was normal.    LEFT ATRIUM: Size was normal.    RIGHT ATRIUM: Size was normal.    MITRAL VALVE: Valve structure was normal. There was normal leaflet separation. DOPPLER: The transmitral velocity was within the normal range. There was no evidence for stenosis. There was trace regurgitation.    AORTIC VALVE: The valve was not well visualized. DOPPLER: There was no evidence for stenosis.    TRICUSPID VALVE: The valve structure was normal. There was normal leaflet separation. DOPPLER:  The transtricuspid velocity was within the normal range. There was no evidence for stenosis. There was no regurgitation.    PULMONIC VALVE: Not well visualized.    PERICARDIUM: There was no pericardial effusion. The pericardium was normal in appearance.    AORTA: The root exhibited normal size.    SYSTEM MEASUREMENT TABLES    Apical four chamber  4 chamber Left Atrium Volume Index; Planimetry; End Systole; Apical four chamber;: 9.19 cm2  Left Ventricular End Diastolic Volume; Method of Disks, Single Plane; Apical four chamber;: 71.9 ml  Left Ventricular End Systolic Volume; Method of Disks, Single Plane; Apical four chamber;: 50.6 ml  Right Atrium Systolic Area; Planimetry; End Systole; Apical four chamber;: 8.11 cm2  Right Ventricular Internal Diastolic Dimension; End Diastole; Apical four chamber;: 20.6 mm  TAPSE: 27.4 mm    Apical two chamber  Left Ventricular End Diastolic Volume; Method of Disks, Single Plane; Apical two chamber;: 105.3 ml  Left Ventricular End Systolic Volume; Method of Disks, Single Plane; Apical two chamber;: 35.7 ml    Unspecified Scan Mode  Aortic Root Diameter; End Systole;: 29.4 mm  Left atrial diameter; End Diastole;: 29.7 mm  Cardiac Output; Teichholz; Systole;: 5.91 L/min  Interventricular Septum Diastolic Thickness; Teichholz; End Diastole;: 8.5 mm  Left Ventricle Internal End Diastolic Dimension; Teichholz;: 51.8 mm  Left Ventricle Internal Systolic Dimension; Teichholz; End Systole;: 37.7 mm  Left Ventricle Posterior Wall Diastolic Thickness; Teichholz; End Diastole;: 8 mm  Left Ventricular Ejection Fraction; Teichholz;: 52.2 %  Left Ventricular End Diastolic Volume; Teichholz;: 127.2 ml  Left Ventricular End Systolic Volume; Teichholz;: 60.8 ml  Stroke volume; Teichholz; Systole;: 66.4 ml  Mitral Valve Area; Area by Pressure Half-Time; Systole;: 3.49 cm2  Mitral Valve E to A Ratio; Systole;: 0.69    [employer] Accredited Echocardiography Laboratory    Prepared and  electronically signed by    Teresa Kuo MD  Signed 04-May-2018 11:54:33       No results found for this or any previous visit.  No results found for this or any previous visit.  No results found for this or any previous visit.

## 2024-11-27 ENCOUNTER — VBI (OUTPATIENT)
Dept: ADMINISTRATIVE | Facility: OTHER | Age: 59
End: 2024-11-27

## 2024-11-27 NOTE — TELEPHONE ENCOUNTER
11/27/24 8:54 AM     Chart reviewed for Hemoglobin A1c ; nothing is submitted to the patient's insurance at this time.     Reyes Murray MA   [facility]

## 2024-12-03 ENCOUNTER — TELEPHONE (OUTPATIENT)
Age: 59
End: 2024-12-03

## 2024-12-03 NOTE — TELEPHONE ENCOUNTER
Danielle from [facility] called to confim that we are par with [facility] medicare. I advised that it isn't in our par list but she stated that we do par.

## 2024-12-06 DIAGNOSIS — E11.65 TYPE 2 DIABETES MELLITUS WITH HYPERGLYCEMIA, WITH LONG-TERM CURRENT USE OF INSULIN (HCC): ICD-10-CM

## 2024-12-06 DIAGNOSIS — Z79.4 TYPE 2 DIABETES MELLITUS WITH HYPERGLYCEMIA, WITH LONG-TERM CURRENT USE OF INSULIN (HCC): ICD-10-CM

## 2024-12-06 RX ORDER — METFORMIN HYDROCHLORIDE 500 MG/1
1000 TABLET, EXTENDED RELEASE ORAL
Qty: 180 TABLET | Refills: 1 | Status: SHIPPED | OUTPATIENT
Start: 2024-12-06

## 2024-12-08 DIAGNOSIS — E11.65 TYPE 2 DIABETES MELLITUS WITH HYPERGLYCEMIA, WITHOUT LONG-TERM CURRENT USE OF INSULIN (HCC): ICD-10-CM

## 2024-12-10 RX ORDER — EMPAGLIFLOZIN 25 MG/1
25 TABLET, FILM COATED ORAL EVERY MORNING
Qty: 90 TABLET | Refills: 1 | Status: SHIPPED | OUTPATIENT
Start: 2024-12-10 | End: 2024-12-13

## 2024-12-11 ENCOUNTER — TELEPHONE (OUTPATIENT)
Age: 59
End: 2024-12-11

## 2024-12-11 NOTE — TELEPHONE ENCOUNTER
"Pt is calling to find out if it is safe for her to have a \"tens unit implanted in her back\". She reports that she may have this done at the end of the month by her pain management doctor and wanted to know if it is safe with her heart condition.     I was not able to find the plan for the procedure in the chart. It looks like she see's Gunner SANTIAGO @  [phone]; [address], [place] [address]. The note found does not state that this was to be done or was a date scheduled. Advised the patient to have Dr. Carreno send the office a request of what exactly is being done and when and a determination can be made.   "

## 2024-12-12 ENCOUNTER — APPOINTMENT (OUTPATIENT)
Dept: LAB | Facility: CLINIC | Age: 59
End: 2024-12-12
Payer: COMMERCIAL

## 2024-12-12 DIAGNOSIS — Z16.30 ANTIBIOTIC-RESISTANT BACTERIAL INFECTION: ICD-10-CM

## 2024-12-12 DIAGNOSIS — N30.01 ACUTE CYSTITIS WITH HEMATURIA: ICD-10-CM

## 2024-12-12 DIAGNOSIS — E11.65 TYPE 2 DIABETES MELLITUS WITH HYPERGLYCEMIA, WITH LONG-TERM CURRENT USE OF INSULIN (HCC): ICD-10-CM

## 2024-12-12 DIAGNOSIS — E11.65 TYPE 2 DIABETES MELLITUS WITH HYPERGLYCEMIA, WITHOUT LONG-TERM CURRENT USE OF INSULIN (HCC): ICD-10-CM

## 2024-12-12 DIAGNOSIS — K74.00 LIVER FIBROSIS: ICD-10-CM

## 2024-12-12 DIAGNOSIS — E78.5 HYPERLIPIDEMIA, UNSPECIFIED HYPERLIPIDEMIA TYPE: ICD-10-CM

## 2024-12-12 DIAGNOSIS — Z13.0 SCREENING FOR DEFICIENCY ANEMIA: ICD-10-CM

## 2024-12-12 DIAGNOSIS — R80.9 MICROALBUMINURIA: ICD-10-CM

## 2024-12-12 DIAGNOSIS — Z79.4 TYPE 2 DIABETES MELLITUS WITH HYPERGLYCEMIA, WITH LONG-TERM CURRENT USE OF INSULIN (HCC): ICD-10-CM

## 2024-12-12 DIAGNOSIS — K76.0 FATTY LIVER: ICD-10-CM

## 2024-12-12 DIAGNOSIS — N17.9 ACUTE RENAL FAILURE, UNSPECIFIED ACUTE RENAL FAILURE TYPE (HCC): ICD-10-CM

## 2024-12-12 DIAGNOSIS — Z11.59 SCREENING FOR VIRAL DISEASE: ICD-10-CM

## 2024-12-12 DIAGNOSIS — N17.9 AKI (ACUTE KIDNEY INJURY) (HCC): ICD-10-CM

## 2024-12-12 DIAGNOSIS — A49.9 ANTIBIOTIC-RESISTANT BACTERIAL INFECTION: ICD-10-CM

## 2024-12-12 DIAGNOSIS — R74.8 ELEVATED LIVER ENZYMES: ICD-10-CM

## 2024-12-12 DIAGNOSIS — Z13.29 SCREENING FOR THYROID DISORDER: ICD-10-CM

## 2024-12-12 LAB
ALBUMIN SERPL BCG-MCNC: 4.4 G/DL (ref 3.5–5)
ALP SERPL-CCNC: 84 U/L (ref 34–104)
ALT SERPL W P-5'-P-CCNC: 26 U/L (ref 7–52)
ANION GAP SERPL CALCULATED.3IONS-SCNC: 11 MMOL/L (ref 4–13)
AST SERPL W P-5'-P-CCNC: 26 U/L (ref 13–39)
BASOPHILS # BLD AUTO: 0.05 THOUSANDS/ÂΜL (ref 0–0.1)
BASOPHILS NFR BLD AUTO: 1 % (ref 0–1)
BILIRUB DIRECT SERPL-MCNC: 0.17 MG/DL (ref 0–0.2)
BILIRUB SERPL-MCNC: 0.65 MG/DL (ref 0.2–1)
BUN SERPL-MCNC: 23 MG/DL (ref 5–25)
CALCIUM SERPL-MCNC: 9.3 MG/DL (ref 8.4–10.2)
CHLORIDE SERPL-SCNC: 97 MMOL/L (ref 96–108)
CHOLEST SERPL-MCNC: 128 MG/DL (ref ?–200)
CO2 SERPL-SCNC: 29 MMOL/L (ref 21–32)
CREAT SERPL-MCNC: 0.81 MG/DL (ref 0.6–1.3)
CREAT UR-MCNC: 122 MG/DL
CREAT UR-MCNC: 122 MG/DL
EOSINOPHIL # BLD AUTO: 0.17 THOUSAND/ÂΜL (ref 0–0.61)
EOSINOPHIL NFR BLD AUTO: 2 % (ref 0–6)
ERYTHROCYTE [DISTWIDTH] IN BLOOD BY AUTOMATED COUNT: 13.5 % (ref 11.6–15.1)
GFR SERPL CREATININE-BSD FRML MDRD: 80 ML/MIN/1.73SQ M
GLUCOSE P FAST SERPL-MCNC: 265 MG/DL (ref 65–99)
HBV CORE AB SER QL: NORMAL
HBV CORE IGM SER QL: NORMAL
HBV SURFACE AG SER QL: NORMAL
HCT VFR BLD AUTO: 51.5 % (ref 34.8–46.1)
HCV AB SER QL: NORMAL
HDLC SERPL-MCNC: 43 MG/DL
HGB BLD-MCNC: 17 G/DL (ref 11.5–15.4)
IGA SERPL-MCNC: 392 MG/DL (ref 66–433)
IGG SERPL-MCNC: 832 MG/DL (ref 635–1741)
IGM SERPL-MCNC: 89 MG/DL (ref 45–281)
IMM GRANULOCYTES # BLD AUTO: 0.02 THOUSAND/UL (ref 0–0.2)
IMM GRANULOCYTES NFR BLD AUTO: 0 % (ref 0–2)
INR PPP: 0.93 (ref 0.85–1.19)
IRON SATN MFR SERPL: 24 % (ref 15–50)
IRON SERPL-MCNC: 97 UG/DL (ref 50–212)
LDLC SERPL CALC-MCNC: 57 MG/DL (ref 0–100)
LYMPHOCYTES # BLD AUTO: 3.81 THOUSANDS/ÂΜL (ref 0.6–4.47)
LYMPHOCYTES NFR BLD AUTO: 39 % (ref 14–44)
MAGNESIUM SERPL-MCNC: 1.5 MG/DL (ref 1.9–2.7)
MCH RBC QN AUTO: 28 PG (ref 26.8–34.3)
MCHC RBC AUTO-ENTMCNC: 33 G/DL (ref 31.4–37.4)
MCV RBC AUTO: 85 FL (ref 82–98)
MICROALBUMIN UR-MCNC: 313.6 MG/L
MICROALBUMIN/CREAT 24H UR: 257 MG/G CREATININE (ref 0–30)
MONOCYTES # BLD AUTO: 0.52 THOUSAND/ÂΜL (ref 0.17–1.22)
MONOCYTES NFR BLD AUTO: 5 % (ref 4–12)
NEUTROPHILS # BLD AUTO: 5.15 THOUSANDS/ÂΜL (ref 1.85–7.62)
NEUTS SEG NFR BLD AUTO: 53 % (ref 43–75)
NRBC BLD AUTO-RTO: 0 /100 WBCS
PHOSPHATE SERPL-MCNC: 3.4 MG/DL (ref 2.7–4.5)
PLATELET # BLD AUTO: 253 THOUSANDS/UL (ref 149–390)
PMV BLD AUTO: 10.9 FL (ref 8.9–12.7)
POTASSIUM SERPL-SCNC: 3.6 MMOL/L (ref 3.5–5.3)
PROT SERPL-MCNC: 7.9 G/DL (ref 6.4–8.4)
PROT UR-MCNC: 87 MG/DL
PROT/CREAT UR: 0.7 MG/G{CREAT} (ref 0–0.1)
PROTHROMBIN TIME: 12.8 SECONDS (ref 12.3–15)
PTH-INTACT SERPL-MCNC: 42.4 PG/ML (ref 12–88)
RBC # BLD AUTO: 6.07 MILLION/UL (ref 3.81–5.12)
SODIUM SERPL-SCNC: 137 MMOL/L (ref 135–147)
TIBC SERPL-MCNC: 407 UG/DL (ref 250–450)
TRIGL SERPL-MCNC: 141 MG/DL (ref ?–150)
UIBC SERPL-MCNC: 310 UG/DL (ref 155–355)
WBC # BLD AUTO: 9.72 THOUSAND/UL (ref 4.31–10.16)

## 2024-12-12 PROCEDURE — 86015 ACTIN ANTIBODY EACH: CPT

## 2024-12-12 PROCEDURE — 84443 ASSAY THYROID STIM HORMONE: CPT

## 2024-12-12 PROCEDURE — 81001 URINALYSIS AUTO W/SCOPE: CPT

## 2024-12-12 PROCEDURE — 83540 ASSAY OF IRON: CPT

## 2024-12-12 PROCEDURE — 82570 ASSAY OF URINE CREATININE: CPT

## 2024-12-12 PROCEDURE — 87340 HEPATITIS B SURFACE AG IA: CPT

## 2024-12-12 PROCEDURE — 82248 BILIRUBIN DIRECT: CPT

## 2024-12-12 PROCEDURE — 87086 URINE CULTURE/COLONY COUNT: CPT

## 2024-12-12 PROCEDURE — 83970 ASSAY OF PARATHORMONE: CPT

## 2024-12-12 PROCEDURE — 84100 ASSAY OF PHOSPHORUS: CPT

## 2024-12-12 PROCEDURE — 86381 MITOCHONDRIAL ANTIBODY EACH: CPT

## 2024-12-12 PROCEDURE — 82784 ASSAY IGA/IGD/IGG/IGM EACH: CPT

## 2024-12-12 PROCEDURE — 85025 COMPLETE CBC W/AUTO DIFF WBC: CPT

## 2024-12-12 PROCEDURE — 82043 UR ALBUMIN QUANTITATIVE: CPT

## 2024-12-12 PROCEDURE — 86364 TISS TRNSGLTMNASE EA IG CLAS: CPT

## 2024-12-12 PROCEDURE — 36415 COLL VENOUS BLD VENIPUNCTURE: CPT

## 2024-12-12 PROCEDURE — 86038 ANTINUCLEAR ANTIBODIES: CPT

## 2024-12-12 PROCEDURE — 82103 ALPHA-1-ANTITRYPSIN TOTAL: CPT

## 2024-12-12 PROCEDURE — 82390 ASSAY OF CERULOPLASMIN: CPT

## 2024-12-12 PROCEDURE — 80061 LIPID PANEL: CPT

## 2024-12-12 PROCEDURE — 82728 ASSAY OF FERRITIN: CPT

## 2024-12-12 PROCEDURE — 84156 ASSAY OF PROTEIN URINE: CPT

## 2024-12-12 PROCEDURE — 80053 COMPREHEN METABOLIC PANEL: CPT

## 2024-12-12 PROCEDURE — 83550 IRON BINDING TEST: CPT

## 2024-12-12 PROCEDURE — 86803 HEPATITIS C AB TEST: CPT

## 2024-12-12 PROCEDURE — 85610 PROTHROMBIN TIME: CPT

## 2024-12-12 PROCEDURE — 86704 HEP B CORE ANTIBODY TOTAL: CPT

## 2024-12-12 PROCEDURE — 83036 HEMOGLOBIN GLYCOSYLATED A1C: CPT

## 2024-12-12 PROCEDURE — 83735 ASSAY OF MAGNESIUM: CPT

## 2024-12-12 PROCEDURE — 86225 DNA ANTIBODY NATIVE: CPT

## 2024-12-12 PROCEDURE — 86705 HEP B CORE ANTIBODY IGM: CPT

## 2024-12-13 ENCOUNTER — OFFICE VISIT (OUTPATIENT)
Dept: FAMILY MEDICINE CLINIC | Facility: CLINIC | Age: 59
End: 2024-12-13
Payer: COMMERCIAL

## 2024-12-13 VITALS
OXYGEN SATURATION: 96 % | BODY MASS INDEX: 27.56 KG/M2 | DIASTOLIC BLOOD PRESSURE: 60 MMHG | TEMPERATURE: 97.6 F | HEART RATE: 67 BPM | SYSTOLIC BLOOD PRESSURE: 96 MMHG | RESPIRATION RATE: 15 BRPM | HEIGHT: 61 IN | WEIGHT: 146 LBS

## 2024-12-13 DIAGNOSIS — N30.01 ACUTE CYSTITIS WITH HEMATURIA: ICD-10-CM

## 2024-12-13 DIAGNOSIS — E83.42 HYPOMAGNESEMIA: ICD-10-CM

## 2024-12-13 DIAGNOSIS — M79.604 BILATERAL LEG PAIN: Chronic | ICD-10-CM

## 2024-12-13 DIAGNOSIS — Z79.4 TYPE 2 DIABETES MELLITUS WITH HYPERGLYCEMIA, WITH LONG-TERM CURRENT USE OF INSULIN (HCC): ICD-10-CM

## 2024-12-13 DIAGNOSIS — R80.9 TYPE 2 DIABETES MELLITUS WITH DIABETIC MICROALBUMINURIA, WITH LONG-TERM CURRENT USE OF INSULIN (HCC): ICD-10-CM

## 2024-12-13 DIAGNOSIS — Z12.31 ENCOUNTER FOR SCREENING MAMMOGRAM FOR BREAST CANCER: ICD-10-CM

## 2024-12-13 DIAGNOSIS — M46.1 SACROILIITIS (HCC): ICD-10-CM

## 2024-12-13 DIAGNOSIS — E11.65 TYPE 2 DIABETES MELLITUS WITH HYPERGLYCEMIA, WITH LONG-TERM CURRENT USE OF INSULIN (HCC): ICD-10-CM

## 2024-12-13 DIAGNOSIS — Z79.4 TYPE 2 DIABETES MELLITUS WITH DIABETIC MICROALBUMINURIA, WITH LONG-TERM CURRENT USE OF INSULIN (HCC): ICD-10-CM

## 2024-12-13 DIAGNOSIS — E55.9 VITAMIN D DEFICIENCY: ICD-10-CM

## 2024-12-13 DIAGNOSIS — M79.605 BILATERAL LEG PAIN: Chronic | ICD-10-CM

## 2024-12-13 DIAGNOSIS — E78.2 MIXED HYPERLIPIDEMIA: ICD-10-CM

## 2024-12-13 DIAGNOSIS — J43.1 PANLOBULAR EMPHYSEMA (HCC): ICD-10-CM

## 2024-12-13 DIAGNOSIS — Z23 ENCOUNTER FOR IMMUNIZATION: ICD-10-CM

## 2024-12-13 DIAGNOSIS — Z72.0 TOBACCO USE: Chronic | ICD-10-CM

## 2024-12-13 DIAGNOSIS — F12.10 MARIJUANA ABUSE: ICD-10-CM

## 2024-12-13 DIAGNOSIS — N39.46 MIXED STRESS AND URGE URINARY INCONTINENCE: ICD-10-CM

## 2024-12-13 DIAGNOSIS — E11.29 TYPE 2 DIABETES MELLITUS WITH DIABETIC MICROALBUMINURIA, WITH LONG-TERM CURRENT USE OF INSULIN (HCC): ICD-10-CM

## 2024-12-13 DIAGNOSIS — K59.09 CHRONIC CONSTIPATION: ICD-10-CM

## 2024-12-13 DIAGNOSIS — Z00.00 MEDICARE ANNUAL WELLNESS VISIT, SUBSEQUENT: Primary | ICD-10-CM

## 2024-12-13 DIAGNOSIS — E86.0 MILD DEHYDRATION: Chronic | ICD-10-CM

## 2024-12-13 DIAGNOSIS — Z91.199 MEDICAL NON-COMPLIANCE: Chronic | ICD-10-CM

## 2024-12-13 PROBLEM — M70.61 TROCHANTERIC BURSITIS OF RIGHT HIP: Status: ACTIVE | Noted: 2024-12-13

## 2024-12-13 PROBLEM — M70.62 TROCHANTERIC BURSITIS OF LEFT HIP: Status: ACTIVE | Noted: 2024-12-13

## 2024-12-13 PROBLEM — G57.10 MERALGIA PARESTHETICA: Status: ACTIVE | Noted: 2024-12-13

## 2024-12-13 PROBLEM — E11.9 TYPE 2 DIABETES MELLITUS (HCC): Status: ACTIVE | Noted: 2021-06-22

## 2024-12-13 PROBLEM — M54.17 LUMBOSACRAL RADICULOPATHY: Status: ACTIVE | Noted: 2024-12-13

## 2024-12-13 PROBLEM — M47.817 LUMBOSACRAL SPONDYLOSIS WITHOUT MYELOPATHY: Status: ACTIVE | Noted: 2024-12-13

## 2024-12-13 LAB
A1AT SERPL-MCNC: 173 MG/DL (ref 101–187)
ACTIN IGG SERPL-ACNC: 3 UNITS (ref 0–19)
BACTERIA UR CULT: NORMAL
BACTERIA UR QL AUTO: ABNORMAL /HPF
BILIRUB UR QL STRIP: NEGATIVE
BUDDING YEAST: PRESENT
CERULOPLASMIN SERPL-MCNC: 34.8 MG/DL (ref 19–39)
CLARITY UR: ABNORMAL
COLOR UR: YELLOW
DSDNA IGG SERPL IA-ACNC: 1.3 IU/ML (ref ?–15)
EST. AVERAGE GLUCOSE BLD GHB EST-MCNC: 255 MG/DL
FERRITIN SERPL-MCNC: 37 NG/ML (ref 11–307)
GLUCOSE UR STRIP-MCNC: ABNORMAL MG/DL
HBA1C MFR BLD: 10.5 %
HGB UR QL STRIP.AUTO: ABNORMAL
KETONES UR STRIP-MCNC: NEGATIVE MG/DL
LEUKOCYTE ESTERASE UR QL STRIP: ABNORMAL
MITOCHONDRIA M2 IGG SER-ACNC: <20 UNITS (ref 0–20)
NITRITE UR QL STRIP: POSITIVE
NON-SQ EPI CELLS URNS QL MICRO: ABNORMAL /HPF
NUCLEAR IGG SER IA-RTO: 0.2 RATIO (ref ?–1)
PH UR STRIP.AUTO: 6 [PH]
PROT UR STRIP-MCNC: ABNORMAL MG/DL
RBC #/AREA URNS AUTO: ABNORMAL /HPF
SP GR UR STRIP.AUTO: 1.03 (ref 1–1.03)
TSH SERPL DL<=0.05 MIU/L-ACNC: 1.81 UIU/ML (ref 0.45–4.5)
TTG IGA SER IA-ACNC: 0.8 U/ML (ref ?–10)
UROBILINOGEN UR STRIP-ACNC: <2 MG/DL
WBC #/AREA URNS AUTO: ABNORMAL /HPF

## 2024-12-13 PROCEDURE — G0439 PPPS, SUBSEQ VISIT: HCPCS | Performed by: NURSE PRACTITIONER

## 2024-12-13 PROCEDURE — 99215 OFFICE O/P EST HI 40 MIN: CPT | Performed by: NURSE PRACTITIONER

## 2024-12-13 RX ORDER — PRAZOSIN HYDROCHLORIDE 2 MG/1
2 CAPSULE ORAL
COMMUNITY
Start: 2024-10-14

## 2024-12-13 RX ORDER — LIDOCAINE 4 G/G
PATCH TOPICAL
COMMUNITY

## 2024-12-13 RX ORDER — FLUCONAZOLE 200 MG/1
200 TABLET ORAL DAILY
Qty: 10 TABLET | Refills: 0 | Status: SHIPPED | OUTPATIENT
Start: 2024-12-13 | End: 2024-12-23

## 2024-12-13 RX ORDER — SULFAMETHOXAZOLE AND TRIMETHOPRIM 800; 160 MG/1; MG/1
1 TABLET ORAL EVERY 12 HOURS SCHEDULED
Qty: 20 TABLET | Refills: 0 | Status: SHIPPED | OUTPATIENT
Start: 2024-12-13 | End: 2024-12-23

## 2024-12-13 RX ORDER — OLANZAPINE 2.5 MG/1
2.5 TABLET, FILM COATED ORAL DAILY
COMMUNITY
Start: 2024-09-23

## 2024-12-13 RX ORDER — CALCIUM CARBONATE 300MG(750)
400 TABLET,CHEWABLE ORAL 2 TIMES DAILY
Qty: 180 TABLET | Refills: 3 | Status: SHIPPED | OUTPATIENT
Start: 2024-12-13

## 2024-12-13 NOTE — ASSESSMENT & PLAN NOTE
"  Lab Results   Component Value Date    HGBA1C 10.5 (H) 12/12/2024       Component  Ref Range & Units (hover) 12/12/24  9:59 AM 9/19/24  1:10 PM 6/22/24  1:56 PM 6/19/24  2:05 PM 3/7/24  2:57 PM 12/6/23 10:15 AM 12/6/23  9:46 AM   Hemoglobin A1C 10.5 High  11.3 High  9.9 High  10.3 Abnormal  R 9.1 Abnormal  R 13.2 High  12.9 Abnormal  R    278 237         9/23/2024 Endocrinology Visit    Lab Results   Component Value Date    HGBA1C 10.5 (H) 12/12/2024     Diabetes is poorly controlled with A1c of 11.8%, average blood sugar of 350 mg/dL and 100% time above range.  She admits that she is not taking her insulin, occasionally (once a week) takes Lantus otherwise noncompliant.  She has stopped taking Jardiance due to recurrent yeast infection.  I had an honest conversation with Helena that maintaining current condition will ended up having serious complication of diabetes.  (\" she does not like needles\".),  She agreed to take Lantus 30 units once day and NovoLog 12 units before dinner for now.  I believe that she will benefit from V-GO insulin delivery system, if this is covered by her insurance, this will save her just 1 shot daily.  Continue metformin.  SGLT2 inhibitors not an option due to recurrent yeast infection.  Not candidate for GLP-1 agonist due to gastroparesis.  Return back in 2 months.  Labs prior to next visit.      Orders:  •  Hemoglobin A1C; Future  •  Albumin / creatinine urine ratio; Future  •  Albumin / creatinine urine ratio; Future  •  Hemoglobin A1C; Future    "

## 2024-12-13 NOTE — ASSESSMENT & PLAN NOTE
Lab Results   Component Value Date    HGBA1C 10.5 (H) 12/12/2024     Component  Ref Range & Units (hover) 12/12/24  9:59 AM 12/12/24  9:59 AM 9/19/24  1:10 PM 12/6/23 10:15 AM 11/2/22  9:04 AM   Creatinine, Ur 122.0 122.0 96.6 41.1 54.3 R   Albumin,U,Random 313.6 High   297.1 High  58.2 High  77.0 High  R   Albumin Creat Ratio 257 High   308       12/13/2024  Discussed the importance of continuing her diabetic medication regimen  Discussed how her kidneys are in control of a number of body systems  Orders:  •  Albumin / creatinine urine ratio; Future  •  Hemoglobin A1C; Future

## 2024-12-13 NOTE — PATIENT INSTRUCTIONS
_________________________________________________________________  YOU ARE DUE FOR YOUR MEDICARE ANNUAL WELLNESS PHYSICAL EXAM AFTER 12/13/2025.  REPEAT LABS ORDERED, PLEASE HAVE THEM DRAWN THE WEEK PRIOR TO YOUR VISIT (APPROXIMATELY 12/6/2025).  LABS HAVE BEEN ORDERED FOR YOU.   THESE LABS SHOULD BE DRAWN PRIOR TO YOUR NEXT VISIT.  YOU CAN HAVE THEM DRAWN UP TO 1 WEEK PRIOR TO YOUR NEXT VISIT.  HAVING YOUR BLOOD WORK WHEN YOU COME TO YOUR NEXT VISIT WILL ALLOW ME TO PROVIDE THE BEST MEDICAL CARE FOR YOU WITHOUT HAVING EITHER OF US PERFORM MORE WORK THAN NECESSARY.  PLEASE BE COMPLIANT WITH THIS REQUEST.   _____________________________________________________________________  Medicare Preventive Visit Patient Instructions  Thank you for completing your Welcome to Medicare Visit or Medicare Annual Wellness Visit today. Your next wellness visit will be due in one year (12/14/2025).  The screening/preventive services that you may require over the next 5-10 years are detailed below. Some tests may not apply to you based off risk factors and/or age. Screening tests ordered at today's visit but not completed yet may show as past due. Also, please note that scanned in results may not display below.  Preventive Screenings:  Service Recommendations Previous Testing/Comments   Colorectal Cancer Screening  * Colonoscopy    * Fecal Occult Blood Test (FOBT)/Fecal Immunochemical Test (FIT)  * Fecal DNA/Cologuard Test  * Flexible Sigmoidoscopy Age: 45-75 years old   Colonoscopy: every 10 years (may be performed more frequently if at higher risk)  OR  FOBT/FIT: every 1 year  OR  Cologuard: every 3 years  OR  Sigmoidoscopy: every 5 years  Screening may be recommended earlier than age 45 if at higher risk for colorectal cancer. Also, an individualized decision between you and your healthcare provider will decide whether screening between the ages of 76-85 would be appropriate. Colonoscopy: 02/24/2022  FOBT/FIT: Not on  file  Cologuard: Not on file  Sigmoidoscopy: Not on file    Screening Current     Breast Cancer Screening Age: 40+ years old  Frequency: every 1-2 years  Not required if history of left and right mastectomy Mammogram: 10/30/2023    Screening Current   Cervical Cancer Screening Between the ages of 21-29, pap smear recommended once every 3 years.   Between the ages of 30-65, can perform pap smear with HPV co-testing every 5 years.   Recommendations may differ for women with a history of total hysterectomy, cervical cancer, or abnormal pap smears in past. Pap Smear: Not on file    Screening Not Indicated   Hepatitis C Screening Once for adults born between 1945 and 1965  More frequently in patients at high risk for Hepatitis C Hep C Antibody: 12/06/2023    Screening Current   Diabetes Screening 1-2 times per year if you're at risk for diabetes or have pre-diabetes Fasting glucose: 265 mg/dL (12/12/2024)  A1C: 10.5 % (12/12/2024)  Screening Not Indicated  History Diabetes   Cholesterol Screening Once every 5 years if you don't have a lipid disorder. May order more often based on risk factors. Lipid panel: 12/12/2024    Screening Not Indicated  History Lipid Disorder     Other Preventive Screenings Covered by Medicare:  Abdominal Aortic Aneurysm (AAA) Screening: covered once if your at risk. You're considered to be at risk if you have a family history of AAA.  Lung Cancer Screening: covers low dose CT scan once per year if you meet all of the following conditions: (1) Age 55-77; (2) No signs or symptoms of lung cancer; (3) Current smoker or have quit smoking within the last 15 years; (4) You have a tobacco smoking history of at least 20 pack years (packs per day multiplied by number of years you smoked); (5) You get a written order from a healthcare provider.  Glaucoma Screening: covered annually if you're considered high risk: (1) You have diabetes OR (2) Family history of glaucoma OR (3)  aged 50 and  older OR (4)  American aged 65 and older  Osteoporosis Screening: covered every 2 years if you meet one of the following conditions: (1) You're estrogen deficient and at risk for osteoporosis based off medical history and other findings; (2) Have a vertebral abnormality; (3) On glucocorticoid therapy for more than 3 months; (4) Have primary hyperparathyroidism; (5) On osteoporosis medications and need to assess response to drug therapy.   Last bone density test (DXA Scan): Not on file.  HIV Screening: covered annually if you're between the age of 15-65. Also covered annually if you are younger than 15 and older than 65 with risk factors for HIV infection. For pregnant patients, it is covered up to 3 times per pregnancy.    Immunizations:  Immunization Recommendations   Influenza Vaccine Annual influenza vaccination during flu season is recommended for all persons aged >= 6 months who do not have contraindications   Pneumococcal Vaccine   * Pneumococcal conjugate vaccine = PCV13 (Prevnar 13), PCV15 (Vaxneuvance), PCV20 (Prevnar 20)  * Pneumococcal polysaccharide vaccine = PPSV23 (Pneumovax) Adults 19-63 yo with certain risk factors or if 65+ yo  If never received any pneumonia vaccine: recommend Prevnar 20 (PCV20)  Give PCV20 if previously received 1 dose of PCV13 or PPSV23   Hepatitis B Vaccine 3 dose series if at intermediate or high risk (ex: diabetes, end stage renal disease, liver disease)   Respiratory syncytial virus (RSV) Vaccine - COVERED BY MEDICARE PART D  * RSVPreF3 (Arexvy) CDC recommends that adults 60 years of age and older may receive a single dose of RSV vaccine using shared clinical decision-making (SCDM)   Tetanus (Td) Vaccine - COST NOT COVERED BY MEDICARE PART B Following completion of primary series, a booster dose should be given every 10 years to maintain immunity against tetanus. Td may also be given as tetanus wound prophylaxis.   Tdap Vaccine - COST NOT COVERED BY MEDICARE PART B  Recommended at least once for all adults. For pregnant patients, recommended with each pregnancy.   Shingles Vaccine (Shingrix) - COST NOT COVERED BY MEDICARE PART B  2 shot series recommended in those 19 years and older who have or will have weakened immune systems or those 50 years and older     Health Maintenance Due:      Topic Date Due   • HIV Screening  Never done   • Breast Cancer Screening: Mammogram  10/30/2024   • Lung Cancer Screening  07/05/2025   • Colorectal Cancer Screening  02/23/2027   • Hepatitis C Screening  Completed     Immunizations Due:      Topic Date Due   • Hepatitis A Vaccine (1 of 2 - Risk 2-dose series) Never done   • Influenza Vaccine (1) 09/01/2024   • COVID-19 Vaccine (1 - 2024-25 season) Never done     Advance Directives   What are advance directives?  Advance directives are legal documents that state your wishes and plans for medical care. These plans are made ahead of time in case you lose your ability to make decisions for yourself. Advance directives can apply to any medical decision, such as the treatments you want, and if you want to donate organs.   What are the types of advance directives?  There are many types of advance directives, and each state has rules about how to use them. You may choose a combination of any of the following:  Living will:  This is a written record of the treatment you want. You can also choose which treatments you do not want, which to limit, and which to stop at a certain time. This includes surgery, medicine, IV fluid, and tube feedings.   Durable power of  for healthcare (DPAHC):  This is a written record that states who you want to make healthcare choices for you when you are unable to make them for yourself. This person, called a proxy, is usually a family member or a friend. You may choose more than 1 proxy.  Do not resuscitate (DNR) order:  A DNR order is used in case your heart stops beating or you stop breathing. It is a request not  to have certain forms of treatment, such as CPR. A DNR order may be included in other types of advance directives.  Medical directive:  This covers the care that you want if you are in a coma, near death, or unable to make decisions for yourself. You can list the treatments you want for each condition. Treatment may include pain medicine, surgery, blood transfusions, dialysis, IV or tube feedings, and a ventilator (breathing machine).  Values history:  This document has questions about your views, beliefs, and how you feel and think about life. This information can help others choose the care that you would choose.  Why are advance directives important?  An advance directive helps you control your care. Although spoken wishes may be used, it is better to have your wishes written down. Spoken wishes can be misunderstood, or not followed. Treatments may be given even if you do not want them. An advance directive may make it easier for your family to make difficult choices about your care.   Cigarette Smoking and Your Health   Risks to your health if you smoke:  Nicotine and other chemicals found in tobacco damage every cell in your body. Even if you are a light smoker, you have an increased risk for cancer, heart disease, and lung disease. If you are pregnant or have diabetes, smoking increases your risk for complications.   Benefits to your health if you stop smoking:   You decrease respiratory symptoms such as coughing, wheezing, and shortness of breath.   You reduce your risk for cancers of the lung, mouth, throat, kidney, bladder, pancreas, stomach, and cervix. If you already have cancer, you increase the benefits of chemotherapy. You also reduce your risk for cancer returning or a second cancer from developing.   You reduce your risk for heart disease, blood clots, heart attack, and stroke.   You reduce your risk for lung infections, and diseases such as pneumonia, asthma, chronic bronchitis, and emphysema.  Your  circulation improves. More oxygen can be delivered to your body. If you have diabetes, you lower your risk for complications, such as kidney, artery, and eye diseases. You also lower your risk for nerve damage. Nerve damage can lead to amputations, poor vision, and blindness.  You improve your body's ability to heal and to fight infections.  For more information and support to stop smoking:   [employer]  Phone: 4- 481 - 869-6993  Web Address: www.[employer]  Weight Management   Why it is important to manage your weight:  Being overweight increases your risk of health conditions such as heart disease, high blood pressure, type 2 diabetes, and certain types of cancer. It can also increase your risk for osteoarthritis, sleep apnea, and other respiratory problems. Aim for a slow, steady weight loss. Even a small amount of weight loss can lower your risk of health problems.  How to lose weight safely:  A safe and healthy way to lose weight is to eat fewer calories and get regular exercise. You can lose up about 1 pound a week by decreasing the number of calories you eat by 500 calories each day.   Healthy meal plan for weight management:  A healthy meal plan includes a variety of foods, contains fewer calories, and helps you stay healthy. A healthy meal plan includes the following:  Eat whole-grain foods more often.  A healthy meal plan should contain fiber. Fiber is the part of grains, fruits, and vegetables that is not broken down by your body. Whole-grain foods are healthy and provide extra fiber in your diet. Some examples of whole-grain foods are whole-wheat breads and pastas, oatmeal, brown rice, and bulgur.  Eat a variety of vegetables every day.  Include dark, leafy greens such as spinach, kale, debra greens, and mustard greens. Eat yellow and orange vegetables such as carrots, sweet potatoes, and winter squash.   Eat a variety of fruits every day.  Choose fresh or canned fruit (canned in its own juice or  light syrup) instead of juice. Fruit juice has very little or no fiber.  Eat low-fat dairy foods.  Drink fat-free (skim) milk or 1% milk. Eat fat-free yogurt and low-fat cottage cheese. Try low-fat cheeses such as mozzarella and other reduced-fat cheeses.  Choose meat and other protein foods that are low in fat.  Choose beans or other legumes such as split peas or lentils. Choose fish, skinless poultry (chicken or turkey), or lean cuts of red meat (beef or pork). Before you cook meat or poultry, cut off any visible fat.   Use less fat and oil.  Try baking foods instead of frying them. Add less fat, such as margarine, sour cream, regular salad dressing and mayonnaise to foods. Eat fewer high-fat foods. Some examples of high-fat foods include french fries, doughnuts, ice cream, and cakes.  Eat fewer sweets.  Limit foods and drinks that are high in sugar. This includes candy, cookies, regular soda, and sweetened drinks.  Exercise:  Exercise at least 30 minutes per day on most days of the week. Some examples of exercise include walking, biking, dancing, and swimming. You can also fit in more physical activity by taking the stairs instead of the elevator or parking farther away from stores. Ask your healthcare provider about the best exercise plan for you.   Narcotic (Opioid) Safety    Use narcotics safely:  Take prescribed narcotics exactly as directed  Do not give narcotics to others or take narcotics that belong to someone else  Do not mix narcotics without medicines or alcohol  Do not drive or operate heavy machinery after you take the narcotic  Monitor for side effects and notify your healthcare provider if you experienced side effects such as nausea, sleepiness, itching, or trouble thinking clearly.    Manage constipation:    Constipation is the most common side effect of narcotic medicine. Constipation is when you have hard, dry bowel movements, or you go longer than usual between bowel movements. Tell your  healthcare provider about all changes in your bowel movements while you are taking narcotics. He or she may recommend laxative medicine to help you have a bowel movement. He or she may also change the kind of narcotic you are taking, or change when you take it. The following are more ways you can prevent or relieve constipation:    Drink liquids as directed.  You may need to drink extra liquids to help soften and move your bowels. Ask how much liquid to drink each day and which liquids are best for you.  Eat high-fiber foods.  This may help decrease constipation by adding bulk to your bowel movements. High-fiber foods include fruits, vegetables, whole-grain breads and cereals, and beans. Your healthcare provider or dietitian can help you create a high-fiber meal plan. Your provider may also recommend a fiber supplement if you cannot get enough fiber from food.  Exercise regularly.  Regular physical activity can help stimulate your intestines. Walking is a good exercise to prevent or relieve constipation. Ask which exercises are best for you.  Schedule a time each day to have a bowel movement.  This may help train your body to have regular bowel movements. Bend forward while you are on the toilet to help move the bowel movement out. Sit on the toilet for at least 10 minutes, even if you do not have a bowel movement.    Store narcotics safely:   Store narcotics where others cannot easily get them.  Keep them in a locked cabinet or secure area. Do not  keep them in a purse or other bag you carry with you. A person may be looking for something else and find the narcotics.  Make sure narcotics are stored out of the reach of children.  A child can easily overdose on narcotics. Narcotics may look like candy to a small child.    The best way to dispose of narcotics:      The laws vary by country and area. In the [place], the best way is to return the narcotics through a take-back program. This program is offered by  the [place] Drug Enforcement Agency (ALINE). The following are options for using the program:  Take the narcotics to a ALINE collection site.  The site is often a law enforcement center. Call your local law enforcement center for scheduled take-back days in your area. You will be given information on where to go if the collection site is in a different location.  Take the narcotics to an approved pharmacy or hospital.  A pharmacy or hospital may be set up as a collection site. You will need to ask if it is a ALINE collection site if you were not directed there. A pharmacy or doctor's office may not be able to take back narcotics unless it is a ALINE site.  Use a mail-back system.  This means you are given containers to put the narcotics into. You will then mail them in the containers.  Use a take-back drop box.  This is a place to leave the narcotics at any time. People and animals will not be able to get into the box. Your local law enforcement agency can tell you where to find a drop box in your area.    Other ways to manage pain:   Ask your healthcare provider about non-narcotic medicines to control pain.  Nonprescription medicines include NSAIDs (such as ibuprofen) and acetaminophen. Prescription medicines include muscle relaxers, antidepressants, and steroids.  Pain may be managed without any medicines.  Some ways to relieve pain include massage, aromatherapy, or meditation. Physical or occupational therapy may also help.    For more information:   Drug Enforcement Administration  [address] [place] [address]  Phone: 7- 787 - 753-0732  Web Address: https://www.deadiversion.[facility] Facilityoj.gov/drug_disposal/    [place] Food and Drug Administration  [address] [place] [address]  Phone: 6- 176 - 149-7478  Web Address: http://www.fda.gov     © Copyright [employer] 2018 Information is for End User's use only and may not be sold, redistributed or otherwise used for commercial purposes. All  illustrations and images included in CareNotes® are the copyrighted property of [employer] or [employer]

## 2024-12-13 NOTE — ASSESSMENT & PLAN NOTE
Smoking cessation discussed  Medications discussed but declined  Discussion lasted 15 minutes.

## 2024-12-13 NOTE — ASSESSMENT & PLAN NOTE
Patient stated that she would rather not eat than attempt to eat well  Her main focus is on losing weight  She states that she drinks soda and iced tea  She states that she does not drink any water  We discussed the importance of plain water

## 2024-12-13 NOTE — PROGRESS NOTES
"Name: Helena Newton      : 1965      MRN: [id number]  Encounter Provider: NEELAM Cueto  Encounter Date: 2024   Encounter department: [facility]    Assessment & Plan  Encounter for screening mammogram for breast cancer    Orders:  •  Mammo screening bilateral w 3d and cad; Future    Encounter for immunization         Type 2 diabetes mellitus with hyperglycemia, with long-term current use of insulin (HCC)    Lab Results   Component Value Date    HGBA1C 10.5 (H) 2024       Component  Ref Range & Units (hover) 24  9:59 AM 24  1:10 PM 24  1:56 PM 24  2:05 PM 3/7/24  2:57 PM 23 10:15 AM 23  9:46 AM   Hemoglobin A1C 10.5 High  11.3 High  9.9 High  10.3 Abnormal  R 9.1 Abnormal  R 13.2 High  12.9 Abnormal  R    278 237         2024 Endocrinology Visit    Lab Results   Component Value Date    HGBA1C 10.5 (H) 2024     Diabetes is poorly controlled with A1c of 11.8%, average blood sugar of 350 mg/dL and 100% time above range.  She admits that she is not taking her insulin, occasionally (once a week) takes Lantus otherwise noncompliant.  She has stopped taking Jardiance due to recurrent yeast infection.  I had an honest conversation with Helena that maintaining current condition will ended up having serious complication of diabetes.  (\" she does not like needles\".),  She agreed to take Lantus 30 units once day and NovoLog 12 units before dinner for now.  I believe that she will benefit from V-GO insulin delivery system, if this is covered by her insurance, this will save her just 1 shot daily.  Continue metformin.  SGLT2 inhibitors not an option due to recurrent yeast infection.  Not candidate for GLP-1 agonist due to gastroparesis.  Return back in 2 months.  Labs prior to next visit.      Orders:  •  Hemoglobin A1C; Future  •  Albumin / creatinine urine ratio; Future  •  Albumin / creatinine urine ratio; Future  •  " Hemoglobin A1C; Future    Type 2 diabetes mellitus with diabetic microalbuminuria, with long-term current use of insulin ([facility])    Lab Results   Component Value Date    HGBA1C 10.5 (H) 12/12/2024     Component  Ref Range & Units (hover) 12/12/24  9:59 AM 12/12/24  9:59 AM 9/19/24  1:10 PM 12/6/23 10:15 AM 11/2/22  9:04 AM   Creatinine, Ur 122.0 122.0 96.6 41.1 54.3 R   Albumin,U,Random 313.6 High   297.1 High  58.2 High  77.0 High  R   Albumin Creat Ratio 257 High   308       12/13/2024  Discussed the importance of continuing her diabetic medication regimen  Discussed how her kidneys are in control of a number of body systems  Orders:  •  Albumin / creatinine urine ratio; Future  •  Hemoglobin A1C; Future    Hypomagnesemia         Component  Ref Range & Units (hover) 12/12/24  9:59 AM 6/22/24  1:56 PM 6/29/22  1:54 PM 10/18/19  5:38 PM   Magnesium 1.5 Low  2.0 1.7 Low  1.9      Patient would benefit from magnesium supplement   We will monitor labs      Orders:  •  Magnesium; Future  •  Magnesium 400 MG TABS; Take 1 tablet (400 mg total) by mouth 2 (two) times a day    Medicare annual wellness visit, subsequent    Orders:  •  CBC and differential; Future  •  Comprehensive metabolic panel; Future    Mixed hyperlipidemia    Orders:  •  Lipid panel; Future    Vitamin D deficiency    Orders:  •  Vitamin D 25 hydroxy; Future    Sacroiliitis (HCC)         Bilateral leg pain         Tobacco use  Smoking cessation discussed  Medications discussed but declined  Discussion lasted 15 minutes.        Marijuana abuse         Mild dehydration         Medical non-compliance  Patient stated that she would rather not eat than attempt to eat well  Her main focus is on losing weight  She states that she drinks soda and iced tea  She states that she does not drink any water  We discussed the importance of plain water        Chronic constipation    Orders:  •  Ammonia; Future    Acute cystitis with hematuria    Orders:  •   sulfamethoxazole-trimethoprim (BACTRIM DS) 800-160 mg per tablet; Take 1 tablet by mouth every 12 (twelve) hours for 10 days  •  fluconazole (DIFLUCAN) 200 mg tablet; Take 1 tablet (200 mg total) by mouth daily for 10 days    Panlobular emphysema (HCC)    Orders:  •  Spacer Device for Inhaler      Depression Screening and Follow-up Plan: Patient was screened for depression during today's encounter. They screened negative with a PHQ-9 score of 0.    Tobacco Cessation Counseling: Tobacco cessation counseling was provided. The patient is sincerely urged to quit consumption of tobacco. She is not ready to quit tobacco. Medication options and side effects of medication discussed. Patient refused medication.       Preventive health issues were discussed with patient, and age appropriate screening tests were ordered as noted in patient's After Visit Summary. Personalized health advice and appropriate referrals for health education or preventive services given if needed, as noted in patient's After Visit Summary.    History of Present Illness       The patient is here today to discuss her medications and treatment plans. I personally attempted to educate the patient on a number of topics but was met with a significant amount of resistance. We also discussed her medicare annual wellness.  I reviewed their medical conditions, medications, laboratory and radiology studies.  I reviewed their scheduled future lab studies with the patient.   The patient's current treatment plan is effective.   There is no change in the current therapy.   I ask the patient to continue their current therapy.   The patient voiced their understanding and agreement.   Follow up on 1/20/2025 as scheduled .  Please continue to the PORCH section of the note for details of today's visit.              Patient Care Team:  NEELAM Cueto as PCP - General (Internal Medicine)  Lei Gaspar MD as PCP - PCP-[facility] ([facility])  Mary Bruce MD as  PCP - Endocrinology (Endocrinology)  Wilberto Galeano MD as Endoscopist  NEELAM Edwards as Nurse Practitioner (Endocrinology)  Mary Bruce MD (Endocrinology)  Sol Raines PA-C as Physician Assistant (Physician Assistant)  Edin Britton DO (Nephrology)  Amira Benites RD as Diabetes Educator (Nutrition)    Review of Systems   Constitutional:  Negative for activity change, chills, fatigue and fever.   HENT:  Negative for rhinorrhea and sore throat.    Eyes:  Negative for pain.   Respiratory:  Negative for cough and shortness of breath.    Cardiovascular:  Negative for chest pain, palpitations and leg swelling.   Gastrointestinal:  Negative for abdominal pain, constipation, diarrhea, nausea and vomiting.   Genitourinary:  Negative for difficulty urinating, flank pain, frequency and urgency.   Musculoskeletal:  Negative for gait problem, joint swelling and myalgias.   Skin:  Negative for color change.   Neurological:  Negative for dizziness, weakness, light-headedness and headaches.   Psychiatric/Behavioral:  Negative for sleep disturbance. The patient is not nervous/anxious.    All other systems reviewed and are negative.    Medical History Reviewed by provider this encounter:  Tobacco  Allergies  Meds  Problems  Med Hx  Surg Hx  Fam Hx       Annual Wellness Visit Questionnaire   Helena is here for her Subsequent Wellness visit. Last Medicare Wellness visit information reviewed, patient interviewed and updates made to the record today.      Health Risk Assessment:   Patient rates overall health as good. Patient feels that their physical health rating is same. Patient is satisfied with their life. Eyesight was rated as slightly worse. Hearing was rated as same. Patient feels that their emotional and mental health rating is same. Patients states they are never, rarely angry. Patient states they are sometimes unusually tired/fatigued. Pain experienced in the last 7 days has been a lot.  Patient's pain rating has been 10/10. Patient states that she has experienced no weight loss or gain in last 6 months.     Depression Screening:   PHQ-9 Score: 0      Fall Risk Screening:   In the past year, patient has experienced: history of falling in past year    Number of falls: 2 or more  Injured during fall?: No    Feels unsteady when standing or walking?: No    Worried about falling?: Yes      Urinary Incontinence Screening:   Patient has not leaked urine accidently in the last six months.     Home Safety:  Patient has trouble with stairs inside or outside of their home. Patient has working smoke alarms and has working carbon monoxide detector. Home safety hazards include: none.     Nutrition:   Current diet is Diabetic.     Medications:   Patient is currently taking over-the-counter supplements. OTC medications include: see medication list. Patient is able to manage medications.     Activities of Daily Living (ADLs)/Instrumental Activities of Daily Living (IADLs):   Walk and transfer into and out of bed and chair?: Yes  Dress and groom yourself?: Yes    Bathe or shower yourself?: Yes    Feed yourself? Yes  Do your laundry/housekeeping?: Yes  Manage your money, pay your bills and track your expenses?: Yes  Make your own meals?: Yes    Do your own shopping?: Yes    ADL comments: Son helps with ADL's.     Previous Hospitalizations:   Any hospitalizations or ED visits within the last 12 months?: Yes    How many hospitalizations have you had in the last year?: 1-2    Advance Care Planning:   Living will: Yes    Durable POA for healthcare: Yes    Advanced directive: Yes      Cognitive Screening:   Provider or family/friend/caregiver concerned regarding cognition?: No    PREVENTIVE SCREENINGS      Cardiovascular Screening:    General: Screening Not Indicated and History Lipid Disorder      Diabetes Screening:     General: Screening Not Indicated and History Diabetes      Colorectal Cancer Screening:     General:  Screening Current      Breast Cancer Screening:     General: Screening Current      Cervical Cancer Screening:    General: Screening Not Indicated      Lung Cancer Screening:     General: Screening Current      Hepatitis C Screening:    General: Screening Current    Screening, Brief Intervention, and Referral to Treatment (SBIRT)    Screening  Typical number of drinks in a day: 0  Typical number of drinks in a week: 0  Interpretation: Low risk drinking behavior.    SDOH Risk Assessment  Social determinants of health (SDOH) risk assesment tool was completed. The tool at a minimum covered housing stability, food insecurity, transportation needs, and utility difficulty. Patient had at risk responses for the following SDOH domains: food insecurity, transportation needs and utilities.     Review of Current Opioid Use    Opioid Risk Tool (ORT) Interpretation: Complete Opioid Risk Tool (ORT)    [place]MP or [place]  reviewed. No red flags were identified    Other Counseling Topics:   Car/seat belt/driving safety, skin self-exam, sunscreen and calcium and vitamin D intake and regular weightbearing exercise.     Social Drivers of Health     Financial Resource Strain: Patient Declined (12/6/2023)    Overall Financial Resource Strain (CARDIA)    • Difficulty of Paying Living Expenses: Patient declined   Food Insecurity: Food Insecurity Present (12/13/2024)    Hunger Vital Sign    • Worried About Running Out of Food in the Last Year: Sometimes true    • Ran Out of Food in the Last Year: Sometimes true   Transportation Needs: Unmet Transportation Needs (12/13/2024)    PRAPARE - Transportation    • Lack of Transportation (Medical): Yes    • Lack of Transportation (Non-Medical): Yes   Housing Stability: Low Risk  (12/13/2024)    Housing Stability Vital Sign    • Unable to Pay for Housing in the Last Year: No    • Number of Times Moved in the Last Year: 0    • Homeless in the Last Year: No   Utilities: At Risk (12/13/2024)    [facility]  "Utilities    • Threatened with loss of utilities: Yes     No results found.    Objective   BP 96/60 (BP Location: Left arm, Patient Position: Sitting, Cuff Size: Standard)   Pulse 67   Temp 97.6 °F (36.4 °C) (Tympanic)   Resp 15   Ht 5' 1\" (1.549 m)   Wt 66.2 kg (146 lb)   SpO2 96%   BMI 27.59 kg/m²     Physical Exam  Vitals and nursing note reviewed.   Constitutional:       General: She is awake. She is not in acute distress.     Appearance: Normal appearance. She is well-developed.   HENT:      Head: Normocephalic and atraumatic.      Nose: Nose normal.      Mouth/Throat:      Mouth: Mucous membranes are moist.   Eyes:      Conjunctiva/sclera: Conjunctivae normal.   Cardiovascular:      Rate and Rhythm: Normal rate and regular rhythm.      Pulses: Normal pulses.      Heart sounds: Normal heart sounds. No murmur heard.  Pulmonary:      Effort: Pulmonary effort is normal. No respiratory distress.      Breath sounds: Normal breath sounds.   Abdominal:      General: Bowel sounds are normal.      Palpations: Abdomen is soft.      Tenderness: There is no abdominal tenderness.   Musculoskeletal:      Cervical back: Neck supple.      Right lower leg: No edema.      Left lower leg: No edema.   Skin:     General: Skin is warm and dry.   Neurological:      Mental Status: She is alert and oriented to person, place, and time.   Psychiatric:         Attention and Perception: Attention normal.         Mood and Affect: Mood normal.         Speech: Speech normal.         Behavior: Behavior normal. Behavior is cooperative.         Thought Content: Thought content normal.         Cognition and Memory: Cognition normal.         Judgment: Judgment normal.       Administrative Statements   I have spent a total time of 45 minutes in caring for this patient on the day of the visit/encounter including Diagnostic results, Prognosis, Risks and benefits of tx options, Instructions for management, Patient and family education, " Importance of tx compliance, Risk factor reductions, Impressions, Counseling / Coordination of care, Documenting in the medical record, Reviewing / ordering tests, medicine, procedures  , and Obtaining or reviewing history  . Topics discussed with the patient / family include symptom assessment and management, medication review, and psychosocial support.

## 2024-12-13 NOTE — ASSESSMENT & PLAN NOTE
Component  Ref Range & Units (hover) 12/12/24  9:59 AM 6/22/24  1:56 PM 6/29/22  1:54 PM 10/18/19  5:38 PM   Magnesium 1.5 Low  2.0 1.7 Low  1.9      Patient would benefit from magnesium supplement   We will monitor labs      Orders:  •  Magnesium; Future  •  Magnesium 400 MG TABS; Take 1 tablet (400 mg total) by mouth 2 (two) times a day

## 2024-12-13 NOTE — ASSESSMENT & PLAN NOTE
Orders:  •  sulfamethoxazole-trimethoprim (BACTRIM DS) 800-160 mg per tablet; Take 1 tablet by mouth every 12 (twelve) hours for 10 days  •  fluconazole (DIFLUCAN) 200 mg tablet; Take 1 tablet (200 mg total) by mouth daily for 10 days

## 2024-12-14 DIAGNOSIS — E78.5 HYPERLIPIDEMIA, UNSPECIFIED HYPERLIPIDEMIA TYPE: ICD-10-CM

## 2024-12-14 DIAGNOSIS — I42.8 NON-ISCHEMIC CARDIOMYOPATHY (HCC): ICD-10-CM

## 2024-12-16 ENCOUNTER — RESULTS FOLLOW-UP (OUTPATIENT)
Dept: GASTROENTEROLOGY | Facility: CLINIC | Age: 59
End: 2024-12-16

## 2024-12-16 ENCOUNTER — RESULTS FOLLOW-UP (OUTPATIENT)
Dept: OBGYN CLINIC | Facility: MEDICAL CENTER | Age: 59
End: 2024-12-16

## 2024-12-16 DIAGNOSIS — Z16.30 ANTIBIOTIC-RESISTANT BACTERIAL INFECTION: ICD-10-CM

## 2024-12-16 DIAGNOSIS — N30.00 ACUTE CYSTITIS WITHOUT HEMATURIA: Primary | ICD-10-CM

## 2024-12-16 DIAGNOSIS — A49.9 ANTIBIOTIC-RESISTANT BACTERIAL INFECTION: ICD-10-CM

## 2024-12-16 RX ORDER — ROSUVASTATIN CALCIUM 20 MG/1
20 TABLET, COATED ORAL DAILY
Qty: 90 TABLET | Refills: 1 | Status: SHIPPED | OUTPATIENT
Start: 2024-12-16

## 2024-12-16 NOTE — TELEPHONE ENCOUNTER
----- Message from Sol Raines PA-C sent at 12/16/2024  7:25 AM EST -----  Please inform pt that her blood work is negative for causes of autoimmune, genetic, and infectious causes of liver disease. Please arrange for follow up with GI to review this as well as to discuss repeating EGD for her history of Menendez's esophagus.

## 2024-12-16 NOTE — RESULT ENCOUNTER NOTE
Please call pt with results. UCx is contaminated and unfortunately cannot diagnose or exclude UTI. Would recommend repeat collection.

## 2024-12-17 ENCOUNTER — TELEPHONE (OUTPATIENT)
Age: 59
End: 2024-12-17

## 2024-12-17 NOTE — TELEPHONE ENCOUNTER
Sil from [facility] called to confirm paperwork has been received and completed that was faxed over on 12/11/24. Sil made aware I did not see any paperwork has been received in patient's chart. She will refax

## 2024-12-18 ENCOUNTER — HOSPITAL ENCOUNTER (OUTPATIENT)
Dept: NON INVASIVE DIAGNOSTICS | Facility: CLINIC | Age: 59
Discharge: HOME | End: 2024-12-18
Attending: INTERNAL MEDICINE
Payer: COMMERCIAL

## 2024-12-18 VITALS
HEART RATE: 58 BPM | WEIGHT: 146 LBS | SYSTOLIC BLOOD PRESSURE: 96 MMHG | DIASTOLIC BLOOD PRESSURE: 60 MMHG | HEIGHT: 61 IN | BODY MASS INDEX: 27.56 KG/M2

## 2024-12-18 DIAGNOSIS — I42.8 NON-ISCHEMIC CARDIOMYOPATHY (HCC): ICD-10-CM

## 2024-12-18 DIAGNOSIS — I10 ESSENTIAL (PRIMARY) HYPERTENSION: ICD-10-CM

## 2024-12-18 DIAGNOSIS — I44.7 LBBB (LEFT BUNDLE BRANCH BLOCK): ICD-10-CM

## 2024-12-18 LAB
AORTIC ROOT: 2.6 CM
APICAL FOUR CHAMBER EJECTION FRACTION: 52 %
ASCENDING AORTA: 2.4 CM
BSA FOR ECHO PROCEDURE: 1.65 M2
E WAVE DECELERATION TIME: 260 MS
E/A RATIO: 0.83
FRACTIONAL SHORTENING: 24 (ref 28–44)
INTERVENTRICULAR SEPTUM IN DIASTOLE (PARASTERNAL SHORT AXIS VIEW): 1 CM
INTERVENTRICULAR SEPTUM: 1 CM (ref 0.6–1.1)
IVC: 13 MM
LAAS-AP2: 14.4 CM2
LAAS-AP4: 10.4 CM2
LEFT ATRIUM SIZE: 2.8 CM
LEFT ATRIUM VOLUME (MOD BIPLANE): 29 ML
LEFT ATRIUM VOLUME INDEX (MOD BIPLANE): 17.6 ML/M2
LEFT INTERNAL DIMENSION IN SYSTOLE: 2.9 CM (ref 2.1–4)
LEFT VENTRICULAR INTERNAL DIMENSION IN DIASTOLE: 3.8 CM (ref 3.5–6)
LEFT VENTRICULAR POSTERIOR WALL IN END DIASTOLE: 1 CM
LEFT VENTRICULAR STROKE VOLUME: 32 ML
LVSV (TEICH): 32 ML
MV E'TISSUE VEL-SEP: 8 CM/S
MV PEAK A VEL: 1 M/S
MV PEAK E VEL: 83 CM/S
MV STENOSIS PRESSURE HALF TIME: 75 MS
MV VALVE AREA P 1/2 METHOD: 2.93
RA PRESSURE ESTIMATED: 5 MMHG
RIGHT ATRIUM AREA SYSTOLE A4C: 9.9 CM2
RIGHT VENTRICLE ID DIMENSION: 2.7 CM
RV PSP: 22 MMHG
SL CV LEFT ATRIUM LENGTH A2C: 4.4 CM
SL CV LV EF: 52
SL CV PED ECHO LEFT VENTRICLE DIASTOLIC VOLUME (MOD BIPLANE) 2D: 64 ML
SL CV PED ECHO LEFT VENTRICLE SYSTOLIC VOLUME (MOD BIPLANE) 2D: 32 ML
TR MAX PG: 17 MMHG
TR PEAK VELOCITY: 2.1 M/S
TRICUSPID ANNULAR PLANE SYSTOLIC EXCURSION: 2.3 CM
TRICUSPID VALVE PEAK REGURGITATION VELOCITY: 2.05 M/S

## 2024-12-18 PROCEDURE — 93306 TTE W/DOPPLER COMPLETE: CPT | Performed by: INTERNAL MEDICINE

## 2024-12-18 PROCEDURE — 93306 TTE W/DOPPLER COMPLETE: CPT

## 2024-12-19 ENCOUNTER — OFFICE VISIT (OUTPATIENT)
Dept: ENDOCRINOLOGY | Facility: CLINIC | Age: 59
End: 2024-12-19
Payer: COMMERCIAL

## 2024-12-19 VITALS
HEART RATE: 64 BPM | BODY MASS INDEX: 27.15 KG/M2 | DIASTOLIC BLOOD PRESSURE: 62 MMHG | SYSTOLIC BLOOD PRESSURE: 100 MMHG | WEIGHT: 143.8 LBS | TEMPERATURE: 98.2 F | HEIGHT: 61 IN | OXYGEN SATURATION: 97 %

## 2024-12-19 DIAGNOSIS — R80.9 MICROALBUMINURIA: ICD-10-CM

## 2024-12-19 DIAGNOSIS — I10 PRIMARY HYPERTENSION: ICD-10-CM

## 2024-12-19 DIAGNOSIS — E11.65 TYPE 2 DIABETES MELLITUS WITH HYPERGLYCEMIA, WITH LONG-TERM CURRENT USE OF INSULIN (HCC): Primary | Chronic | ICD-10-CM

## 2024-12-19 DIAGNOSIS — Z79.4 TYPE 2 DIABETES MELLITUS WITH HYPERGLYCEMIA, WITH LONG-TERM CURRENT USE OF INSULIN (HCC): Primary | Chronic | ICD-10-CM

## 2024-12-19 PROCEDURE — 99214 OFFICE O/P EST MOD 30 MIN: CPT | Performed by: STUDENT IN AN ORGANIZED HEALTH CARE EDUCATION/TRAINING PROGRAM

## 2024-12-19 PROCEDURE — 95249 CONT GLUC MNTR PT PROV EQP: CPT | Performed by: STUDENT IN AN ORGANIZED HEALTH CARE EDUCATION/TRAINING PROGRAM

## 2024-12-19 RX ORDER — INSULIN LISPRO 100 [IU]/ML
INJECTION, SOLUTION INTRAVENOUS; SUBCUTANEOUS
Qty: 60 ML | Refills: 1 | Status: SHIPPED | OUTPATIENT
Start: 2024-12-19

## 2024-12-19 RX ORDER — LOSARTAN POTASSIUM 25 MG/1
TABLET ORAL
Qty: 90 TABLET | Refills: 0 | Status: SHIPPED | OUTPATIENT
Start: 2024-12-19

## 2024-12-19 NOTE — PROGRESS NOTES
"Name: Helena Newton      : 1965      MRN: [id number]  Encounter Provider: Mary Bruce MD  Encounter Date: 2024   Encounter department: [facility] [place]  :  Assessment & Plan  Type 2 diabetes mellitus with hyperglycemia, with long-term current use of insulin ([facility])    Lab Results   Component Value Date    HGBA1C 10.5 (H) 2024     Diabetes remains poorly controlled, which is due to medication non-compliance, to help her to get less injections, I ordered V-Go at her last visit, she visited educator although is not completed.   I send her Humalog vials, and brief training was done at her visit, to start V-Go 30 , which give her 1.25 units/hr total of 30 units per day, in addition 10 units before meals( total of 5 clicks per meal),  Continue metformin.  Return back in 3 months   Lab prior to next visit.       Orders:    insulin lispro (HumaLOG) 100 units/mL injection; To use in V-GO maximum daily daily use of 60 units    losartan (COZAAR) 25 mg tablet; 1 every other day    Hemoglobin A1C; Future    Comprehensive metabolic panel; Future    Albumin / creatinine urine ratio; Future    Primary hypertension  Blood pressure controlled. 100/62,   I started Losartan given microalbuminuria , 25 mg 1/2 pill daily.  Monitor blood pressure at home.           Microalbuminuria  See plan for hypertension.   SGLT-2i caused recurrent yeast infection.             History of Present Illness   HPI  Helena Newton is a 59 y.o. female who presents for follow up for type 2 diabetes with long term use of insulin,    In her last visit I ordered V-GO which has started yet, she met with educator although education has not completed.  Denies recent illness or hospitalizations.    Current regimen:     Lantus 30 units \" sometimes\"   Novolog 10 units before meals, not taking   Metformin xr 1000 mg daily    Helena Newton   Device used,avelina 2  Home use       Indication   Type 2 Diabetes      More " "than 72 hours of data was reviewed. Report to be scanned to chart.     Date Range: dec 6 -19th    Analysis of data:   Average Glucose: 295 mg/dl  Coefficient of Variation: 24.4%   SD : x   Time in Target Range: 100%   Time Above Range: 0   Time Below Range: 0         Opthamology:  UTD;   Podiatry: UTD     on ACE inhibitor or ARB: no  on statin: Crestor 20 mg daily         Review of Systems   Constitutional:  Positive for fatigue.   Endocrine: Positive for polydipsia and polyuria.          Objective   /62 (BP Location: Right arm, Patient Position: Sitting, Cuff Size: Adult)   Pulse 64   Temp 98.2 °F (36.8 °C) (Temporal)   Ht 5' 1\" (1.549 m)   Wt 65.2 kg (143 lb 12.8 oz)   SpO2 97%   BMI 27.17 kg/m²      Physical Exam        Component      Latest Ref Rng 12/12/2024   Sodium      135 - 147 mmol/L 137    Potassium      3.5 - 5.3 mmol/L 3.6    Chloride      96 - 108 mmol/L 97    Carbon Dioxide      21 - 32 mmol/L 29    ANION GAP      4 - 13 mmol/L 11    BUN      5 - 25 mg/dL 23    Creatinine      0.60 - 1.30 mg/dL 0.81    GLUCOSE, FASTING      65 - 99 mg/dL 265 (H)    Calcium      8.4 - 10.2 mg/dL 9.3    AST      13 - 39 U/L 26    ALT      7 - 52 U/L 26    ALK PHOS      34 - 104 U/L 84    Total Protein      6.4 - 8.4 g/dL 7.9    Albumin      3.5 - 5.0 g/dL 4.4    Total Bilirubin      0.20 - 1.00 mg/dL 0.65    GFR, Calculated      ml/min/1.73sq m 80    Cholesterol      See Comment mg/dL 128    Triglycerides      See Comment mg/dL 141    HDL      >=50 mg/dL 43 (L)    LDL Calculated      0 - 100 mg/dL 57    EXT Creatinine Urine      Reference range not established. mg/dL 122.0    Albumin,U,Random      <20.0 mg/L 313.6 (H)    Albumin Creat Ratio      0 - 30 mg/g creatinine 257 (H)    Hemoglobin A1C      Normal 4.0-5.6%; PreDiabetic 5.7-6.4%; Diabetic >=6.5%; Glycemic control for adults with diabetes <7.0% % 10.5 (H)    eAG, EST AVG Glucose      mg/dl 255    TSH 3RD GENERATON      0.450 - 4.500 uIU/mL 1.813     "   Legend:  (H) High  (L) Low

## 2024-12-19 NOTE — PATIENT INSTRUCTIONS
V-Go, to apply every day and to do 5 clicks before each meal, do not take lantus or extra humalog beside V-GO      Losartan half a pill daily or one every other day,

## 2024-12-20 NOTE — ASSESSMENT & PLAN NOTE
Lab Results   Component Value Date    HGBA1C 10.5 (H) 12/12/2024     Diabetes remains poorly controlled, which is due to medication non-compliance, to help her to get less injections, I ordered V-Go at her last visit, she visited educator although is not completed.   I send her Humalog vials, and brief training was done at her visit, to start V-Go 30 , which give her 1.25 units/hr total of 30 units per day, in addition 10 units before meals( total of 5 clicks per meal),  Continue metformin.  Return back in 3 months   Lab prior to next visit.       Orders:    insulin lispro (HumaLOG) 100 units/mL injection; To use in V-GO maximum daily daily use of 60 units    losartan (COZAAR) 25 mg tablet; 1 every other day    Hemoglobin A1C; Future    Comprehensive metabolic panel; Future    Albumin / creatinine urine ratio; Future

## 2024-12-20 NOTE — ASSESSMENT & PLAN NOTE
Blood pressure controlled. 100/62,   I started Losartan given microalbuminuria , 25 mg 1/2 pill daily.  Monitor blood pressure at home.

## 2024-12-26 ENCOUNTER — TELEPHONE (OUTPATIENT)
Age: 59
End: 2024-12-26

## 2024-12-26 NOTE — TELEPHONE ENCOUNTER
Dianne from [facility] called to ask about papers that were faxed. Do not see any on Media. Warm called office spoke to Keira who checked and also did not see any paperwork. She will refax.

## 2024-12-27 ENCOUNTER — RESULTS FOLLOW-UP (OUTPATIENT)
Dept: CARDIOLOGY CLINIC | Facility: CLINIC | Age: 59
End: 2024-12-27

## 2024-12-29 NOTE — ASSESSMENT & PLAN NOTE
Patient uses briefs  Patient has no control or sensation of urinary output  Patient also requires gloves and wipes for healthcare provider and assistance in cleaning herself up.

## 2024-12-30 ENCOUNTER — APPOINTMENT (OUTPATIENT)
Dept: LAB | Facility: CLINIC | Age: 59
End: 2024-12-30
Payer: COMMERCIAL

## 2024-12-30 DIAGNOSIS — E11.65 TYPE 2 DIABETES MELLITUS WITH HYPERGLYCEMIA, WITH LONG-TERM CURRENT USE OF INSULIN (HCC): Chronic | ICD-10-CM

## 2024-12-30 DIAGNOSIS — Z79.4 TYPE 2 DIABETES MELLITUS WITH HYPERGLYCEMIA, WITH LONG-TERM CURRENT USE OF INSULIN (HCC): Chronic | ICD-10-CM

## 2024-12-30 LAB
ALBUMIN SERPL BCG-MCNC: 3.5 G/DL (ref 3.5–5)
ALP SERPL-CCNC: 65 U/L (ref 34–104)
ALT SERPL W P-5'-P-CCNC: 22 U/L (ref 7–52)
ANION GAP SERPL CALCULATED.3IONS-SCNC: 9 MMOL/L (ref 4–13)
AST SERPL W P-5'-P-CCNC: 18 U/L (ref 13–39)
BILIRUB SERPL-MCNC: 0.3 MG/DL (ref 0.2–1)
BUN SERPL-MCNC: 26 MG/DL (ref 5–25)
CALCIUM SERPL-MCNC: 8.6 MG/DL (ref 8.4–10.2)
CHLORIDE SERPL-SCNC: 101 MMOL/L (ref 96–108)
CO2 SERPL-SCNC: 28 MMOL/L (ref 21–32)
CREAT SERPL-MCNC: 0.76 MG/DL (ref 0.6–1.3)
CREAT UR-MCNC: 114 MG/DL
EST. AVERAGE GLUCOSE BLD GHB EST-MCNC: 255 MG/DL
GFR SERPL CREATININE-BSD FRML MDRD: 87 ML/MIN/1.73SQ M
GLUCOSE SERPL-MCNC: 241 MG/DL (ref 65–140)
HBA1C MFR BLD: 10.5 %
MICROALBUMIN UR-MCNC: 98.3 MG/L
MICROALBUMIN/CREAT 24H UR: 86 MG/G CREATININE (ref 0–30)
POTASSIUM SERPL-SCNC: 3.9 MMOL/L (ref 3.5–5.3)
PROT SERPL-MCNC: 6 G/DL (ref 6.4–8.4)
SODIUM SERPL-SCNC: 138 MMOL/L (ref 135–147)

## 2024-12-30 PROCEDURE — 83036 HEMOGLOBIN GLYCOSYLATED A1C: CPT

## 2024-12-30 PROCEDURE — 82570 ASSAY OF URINE CREATININE: CPT

## 2024-12-30 PROCEDURE — 82043 UR ALBUMIN QUANTITATIVE: CPT

## 2024-12-30 PROCEDURE — 36415 COLL VENOUS BLD VENIPUNCTURE: CPT

## 2024-12-30 PROCEDURE — 80053 COMPREHEN METABOLIC PANEL: CPT

## 2024-12-31 ENCOUNTER — RESULTS FOLLOW-UP (OUTPATIENT)
Dept: ENDOCRINOLOGY | Facility: CLINIC | Age: 59
End: 2024-12-31

## 2025-01-02 ENCOUNTER — OFFICE VISIT (OUTPATIENT)
Dept: OBGYN CLINIC | Facility: CLINIC | Age: 60
End: 2025-01-02
Payer: COMMERCIAL

## 2025-01-02 VITALS
DIASTOLIC BLOOD PRESSURE: 58 MMHG | SYSTOLIC BLOOD PRESSURE: 92 MMHG | WEIGHT: 152.2 LBS | HEIGHT: 61 IN | BODY MASS INDEX: 28.74 KG/M2

## 2025-01-02 DIAGNOSIS — N95.2 VAGINAL ATROPHY: ICD-10-CM

## 2025-01-02 DIAGNOSIS — E11.65 TYPE 2 DIABETES MELLITUS WITH HYPERGLYCEMIA, WITH LONG-TERM CURRENT USE OF INSULIN (HCC): ICD-10-CM

## 2025-01-02 DIAGNOSIS — Z79.4 TYPE 2 DIABETES MELLITUS WITH HYPERGLYCEMIA, WITH LONG-TERM CURRENT USE OF INSULIN (HCC): ICD-10-CM

## 2025-01-02 DIAGNOSIS — N39.0 RECURRENT UTI: Primary | ICD-10-CM

## 2025-01-02 PROCEDURE — 99213 OFFICE O/P EST LOW 20 MIN: CPT | Performed by: OBSTETRICS & GYNECOLOGY

## 2025-01-02 PROCEDURE — 87086 URINE CULTURE/COLONY COUNT: CPT | Performed by: OBSTETRICS & GYNECOLOGY

## 2025-01-03 RX ORDER — SUB-Q INSULIN DEVICE, 40 UNIT
EACH MISCELLANEOUS
Qty: 90 KIT | Refills: 1 | Status: SHIPPED | OUTPATIENT
Start: 2025-01-03

## 2025-01-04 LAB — BACTERIA UR CULT: NORMAL

## 2025-01-12 ENCOUNTER — RA CDI HCC (OUTPATIENT)
Dept: OTHER | Facility: HOSPITAL | Age: 60
End: 2025-01-12

## 2025-01-12 PROBLEM — N30.01 ACUTE CYSTITIS WITH HEMATURIA: Status: RESOLVED | Noted: 2024-12-13 | Resolved: 2025-01-12

## 2025-01-12 PROBLEM — Z00.00 MEDICARE ANNUAL WELLNESS VISIT, SUBSEQUENT: Status: RESOLVED | Noted: 2024-12-13 | Resolved: 2025-01-12

## 2025-01-12 NOTE — PROGRESS NOTES
E11.40  HCC coding opportunities          Chart Reviewed number of suggestions sent to Provider: 1     Patients Insurance     Medicare Insurance: [employer]

## 2025-01-13 ENCOUNTER — TELEPHONE (OUTPATIENT)
Age: 60
End: 2025-01-13

## 2025-01-13 NOTE — TELEPHONE ENCOUNTER
Freida with [facility] called to follow up on incontinence supply order that needed correction.  Request for correction was faxed to the office on 1/7/25.    Media shows corrected order was faxed back on 1/9/25. Caller states corrected order was never received.  Please re-fax to [phone].

## 2025-01-15 ENCOUNTER — TELEPHONE (OUTPATIENT)
Dept: ENDOCRINOLOGY | Facility: CLINIC | Age: 60
End: 2025-01-15

## 2025-01-15 NOTE — TELEPHONE ENCOUNTER
Received letter from insurance that V-Go 30 was not on Formulary for patient and only a 30 day supply will be given. Letter does not include formulary alternatives.     Letter in Media. Please advise.

## 2025-01-16 NOTE — TELEPHONE ENCOUNTER
Patient calling back, relayed the above message and she states she did not know about this letter. She does not want to go back to sticking herself. She is going to call her insurance companies [facility] and [facility] and discuss options.  She will call the office back once she has more information

## 2025-01-17 NOTE — TELEPHONE ENCOUNTER
Patient called in. She called [employer] and they said they will cover if if she gets the prior auth done

## 2025-01-17 NOTE — TELEPHONE ENCOUNTER
Patient calling back asking to speak with Rosmery. Call placed to office and unable to establish connection. Please return call to patient, thank you.

## 2025-01-17 NOTE — TELEPHONE ENCOUNTER
Era from [facility] called stating they never received the updated form that was faxed back on 1/9. She did state at the bottom where the NPI they need signature and dated and then if we could please send this back

## 2025-01-20 ENCOUNTER — OFFICE VISIT (OUTPATIENT)
Dept: FAMILY MEDICINE CLINIC | Facility: CLINIC | Age: 60
End: 2025-01-20
Payer: COMMERCIAL

## 2025-01-20 VITALS
HEIGHT: 61 IN | BODY MASS INDEX: 29.49 KG/M2 | OXYGEN SATURATION: 96 % | TEMPERATURE: 97.8 F | HEART RATE: 66 BPM | WEIGHT: 156.2 LBS | SYSTOLIC BLOOD PRESSURE: 110 MMHG | RESPIRATION RATE: 14 BRPM | DIASTOLIC BLOOD PRESSURE: 62 MMHG

## 2025-01-20 DIAGNOSIS — N39.46 MIXED STRESS AND URGE URINARY INCONTINENCE: Chronic | ICD-10-CM

## 2025-01-20 DIAGNOSIS — Z92.89 HISTORY OF ECHOCARDIOGRAM: ICD-10-CM

## 2025-01-20 DIAGNOSIS — M46.1 SACROILIITIS (HCC): ICD-10-CM

## 2025-01-20 DIAGNOSIS — Z23 ENCOUNTER FOR IMMUNIZATION: ICD-10-CM

## 2025-01-20 DIAGNOSIS — N17.9 AKI (ACUTE KIDNEY INJURY) (HCC): ICD-10-CM

## 2025-01-20 DIAGNOSIS — Z79.4 TYPE 2 DIABETES MELLITUS WITH HYPERGLYCEMIA, WITH LONG-TERM CURRENT USE OF INSULIN (HCC): Primary | Chronic | ICD-10-CM

## 2025-01-20 DIAGNOSIS — J44.9 COPD (CHRONIC OBSTRUCTIVE PULMONARY DISEASE) (HCC): ICD-10-CM

## 2025-01-20 DIAGNOSIS — Z72.0 TOBACCO USE: Chronic | ICD-10-CM

## 2025-01-20 DIAGNOSIS — F11.20 OPIOID DEPENDENCE, UNCOMPLICATED (HCC): ICD-10-CM

## 2025-01-20 DIAGNOSIS — Z79.4 TYPE 2 DIABETES MELLITUS WITH DIABETIC MICROALBUMINURIA, WITH LONG-TERM CURRENT USE OF INSULIN (HCC): ICD-10-CM

## 2025-01-20 DIAGNOSIS — R80.9 TYPE 2 DIABETES MELLITUS WITH DIABETIC MICROALBUMINURIA, WITH LONG-TERM CURRENT USE OF INSULIN (HCC): ICD-10-CM

## 2025-01-20 DIAGNOSIS — I42.8 NON-ISCHEMIC CARDIOMYOPATHY (HCC): ICD-10-CM

## 2025-01-20 DIAGNOSIS — E11.29 TYPE 2 DIABETES MELLITUS WITH DIABETIC MICROALBUMINURIA, WITH LONG-TERM CURRENT USE OF INSULIN (HCC): ICD-10-CM

## 2025-01-20 DIAGNOSIS — E83.42 HYPOMAGNESEMIA: Chronic | ICD-10-CM

## 2025-01-20 DIAGNOSIS — Z91.199 MEDICAL NON-COMPLIANCE: Chronic | ICD-10-CM

## 2025-01-20 DIAGNOSIS — E11.65 TYPE 2 DIABETES MELLITUS WITH HYPERGLYCEMIA, WITH LONG-TERM CURRENT USE OF INSULIN (HCC): Primary | Chronic | ICD-10-CM

## 2025-01-20 PROBLEM — Z00.00 MEDICARE ANNUAL WELLNESS VISIT, SUBSEQUENT: Chronic | Status: ACTIVE | Noted: 2024-12-13

## 2025-01-20 LAB — SL AMB POCT HEMOGLOBIN AIC: 10.8 (ref ?–6.5)

## 2025-01-20 PROCEDURE — G2211 COMPLEX E/M VISIT ADD ON: HCPCS | Performed by: NURSE PRACTITIONER

## 2025-01-20 PROCEDURE — 83036 HEMOGLOBIN GLYCOSYLATED A1C: CPT | Performed by: NURSE PRACTITIONER

## 2025-01-20 PROCEDURE — 99214 OFFICE O/P EST MOD 30 MIN: CPT | Performed by: NURSE PRACTITIONER

## 2025-01-20 RX ORDER — ALBUTEROL SULFATE 90 UG/1
2 INHALANT RESPIRATORY (INHALATION) EVERY 4 HOURS PRN
Qty: 18 G | Refills: 5 | Status: SHIPPED | OUTPATIENT
Start: 2025-01-20

## 2025-01-20 RX ORDER — TIOTROPIUM BROMIDE INHALATION SPRAY 3.12 UG/1
2 SPRAY, METERED RESPIRATORY (INHALATION) DAILY
Qty: 4 G | Refills: 0 | Status: SHIPPED | OUTPATIENT
Start: 2025-01-20

## 2025-01-20 RX ORDER — LOSARTAN POTASSIUM 25 MG/1
TABLET ORAL
Qty: 90 TABLET | Refills: 0 | Status: SHIPPED | OUTPATIENT
Start: 2025-01-20

## 2025-01-20 RX ORDER — LANOLIN ALCOHOL/MO/W.PET/CERES
1 CREAM (GRAM) TOPICAL 2 TIMES DAILY
COMMUNITY
Start: 2024-12-13 | End: 2025-01-20 | Stop reason: SDUPTHER

## 2025-01-20 RX ORDER — LANOLIN ALCOHOL/MO/W.PET/CERES
400 CREAM (GRAM) TOPICAL 2 TIMES DAILY
Qty: 180 TABLET | Refills: 3 | Status: SHIPPED | OUTPATIENT
Start: 2025-01-20

## 2025-01-20 NOTE — ASSESSMENT & PLAN NOTE
12/29/2024  Patient stated that she would rather not eat than attempt to eat well  Her main focus is on losing weight  She states that she drinks soda and iced tea  She states that she does not drink any water  We discussed the importance of plain water     1/20/2025  Doing well with V-Go   May need more education  Her HgbA1c: was higher at 10.8  She stopped drinking soda   She is doing much better and is more compliant.   Waiting to get a full medication report from the patient as she has not been taking many of her medications. I believe I have discontinued the medications she is not taking.

## 2025-01-20 NOTE — PROGRESS NOTES
Name: Helena Newton      : 1965      MRN: [id number]  Encounter Provider: NEELAM Cueto  Encounter Date: 2025   Encounter department: [facility] CARE  :  Assessment & Plan  Encounter for immunization    Orders:    influenza vaccine, recombinant, PF, 0.5 mL IM (Flublok)    History of echocardiogram  2024  Interpretation Summary    Left Ventricle: Left ventricular cavity size is normal. Wall thickness is normal. The left ventricular ejection fraction is 52% by biplane measurement. Systolic function is low normal. Wall motion is normal. Diastolic function is mildly abnormal, consistent with grade I (abnormal) relaxation.    IVS: There is abnormal septal motion consistent with bundle branch block.    Mitral Valve: There is mild annular calcification.       Tobacco use  Smoking cessation discussed  Medications discussed but declined  Patient does not want to quit.        Medical non-compliance  2024  Patient stated that she would rather not eat than attempt to eat well  Her main focus is on losing weight  She states that she drinks soda and iced tea  She states that she does not drink any water  We discussed the importance of plain water     2025  Doing well with V-Go   May need more education  Her HgbA1c: was higher at 10.8  She stopped drinking soda   She is doing much better and is more compliant.   Waiting to get a full medication report from the patient as she has not been taking many of her medications. I believe I have discontinued the medications she is not taking.          Type 2 diabetes mellitus with hyperglycemia, with long-term current use of insulin ([facility])    Lab Results   Component Value Date    HGBA1C 10.8 (A) 2025 Endocrinology   Diabetes remains poorly controlled, which is due to medication non-compliance, to help her to get less injections, I ordered V-Go at her last visit, she visited educator although is not  completed.   I send her Humalog vials, and brief training was done at her visit, to start V-Go 30 , which give her 1.25 units/hr total of 30 units per day, in addition 10 units before meals( total of 5 clicks per meal)  Continue metformin.  Return back in 3 months   Lab prior to next visit.     1/20/2025  In office Hgba1c: 10.8  Will message to her Endocrinology to see if her V-Go should be increased or insulin changed   Her V-Go is 1 click is 2 units   Her blood sugars have been in the low 100's to 200's   She stopping drinking soda!   Patient has only been clicking her V-GO 1 time prior to eating.  I will explain that she should be clicking her V-GO 5 times before Every meal!      Orders:    POCT hemoglobin A1c    losartan (COZAAR) 25 mg tablet; Take Every Monday, Wednesday, Friday and Every Other Saturday.    REBECCA (acute kidney injury) ([facility])    Orders:    Spiriva Respimat 2.5 MCG/ACT AERS inhaler; Inhale 2 puffs daily    COPD (chronic obstructive pulmonary disease) ([facility])    Orders:    albuterol (Ventolin HFA) 90 mcg/act inhaler; Inhale 2 puffs every 4 (four) hours as needed for wheezing    Spiriva Respimat 2.5 MCG/ACT AERS inhaler; Inhale 2 puffs daily    Hypomagnesemia    Orders:    magnesium Oxide (MAG-OX) 400 mg TABS; Take 1 tablet (400 mg total) by mouth 2 (two) times a day    Opioid dependence, uncomplicated ([facility])         Sacroiliitis ([facility])         Non-ischemic cardiomyopathy ([facility])         Type 2 diabetes mellitus with diabetic microalbuminuria, with long-term current use of insulin ([facility])    Lab Results   Component Value Date    HGBA1C 10.8 (A) 01/20/2025          Mixed stress and urge urinary incontinence  Severe incontinence issues.   Patient with use of disposable underpads, pull on briefs, gloves and wipes.                 History of Present Illness   The patient is here today to discuss her medications and diabetes. She admitted to not taking all of her medications as ordered. Her medication list has  "been updated as best as I could. I also discussed with the patient that she has been only clicking her V-Go one time with each meal. She should be clicking it 5 times with each meal.   I reviewed their medical conditions, medications, laboratory and radiology studies.  I reviewed their scheduled future lab studies with the patient.   The patient's current treatment plan is effective.   There is no change in the current therapy.   I ask the patient to continue their current therapy.   The patient voiced their understanding and agreement.   Follow up in 1 month.   Please continue to the PORCH section of the note for details of today's visit.             Review of Systems   Constitutional:  Negative for activity change, chills, fatigue and fever.   HENT:  Negative for rhinorrhea and sore throat.    Eyes:  Negative for pain.   Respiratory:  Negative for cough and shortness of breath.    Cardiovascular:  Negative for chest pain, palpitations and leg swelling.   Gastrointestinal:  Negative for abdominal pain, constipation, diarrhea, nausea and vomiting.   Genitourinary:  Negative for difficulty urinating, flank pain, frequency and urgency.   Musculoskeletal:  Negative for gait problem, joint swelling and myalgias.   Skin:  Negative for color change.   Neurological:  Negative for dizziness, weakness, light-headedness and headaches.   Psychiatric/Behavioral:  Negative for sleep disturbance. The patient is not nervous/anxious.    All other systems reviewed and are negative.      Objective   /62 (BP Location: Left arm, Patient Position: Sitting, Cuff Size: Standard)   Pulse 66   Temp 97.8 °F (36.6 °C) (Tympanic)   Resp 14   Ht 5' 1\" (1.549 m)   Wt 70.9 kg (156 lb 3.2 oz)   SpO2 96%   BMI 29.51 kg/m²      Physical Exam  Vitals and nursing note reviewed.   Constitutional:       General: She is awake. She is not in acute distress.     Appearance: Normal appearance. She is well-developed.   HENT:      Head: " Normocephalic and atraumatic.      Nose: Nose normal.      Mouth/Throat:      Mouth: Mucous membranes are moist.   Eyes:      Conjunctiva/sclera: Conjunctivae normal.   Cardiovascular:      Rate and Rhythm: Normal rate and regular rhythm.      Pulses: Normal pulses.      Heart sounds: Normal heart sounds. No murmur heard.  Pulmonary:      Effort: Pulmonary effort is normal. No respiratory distress.      Breath sounds: Normal breath sounds.   Abdominal:      General: Bowel sounds are normal.      Palpations: Abdomen is soft.      Tenderness: There is no abdominal tenderness.   Musculoskeletal:      Cervical back: Neck supple.      Right lower leg: No edema.      Left lower leg: No edema.   Skin:     General: Skin is warm and dry.   Neurological:      Mental Status: She is alert and oriented to person, place, and time.   Psychiatric:         Attention and Perception: Attention normal.         Mood and Affect: Mood normal.         Speech: Speech normal.         Behavior: Behavior normal. Behavior is cooperative.         Thought Content: Thought content normal.         Cognition and Memory: Cognition normal.         Judgment: Judgment normal.       Administrative Statements   I have spent a total time of 35 minutes in caring for this patient on the day of the visit/encounter including Diagnostic results, Prognosis, Risks and benefits of tx options, Instructions for management, Patient and family education, Importance of tx compliance, Risk factor reductions, Impressions, Counseling / Coordination of care, Documenting in the medical record, Reviewing / ordering tests, medicine, procedures  , and Obtaining or reviewing history  . Topics discussed with the patient / family include symptom assessment and management, medication review, psychosocial support, and supportive listening.

## 2025-01-20 NOTE — ASSESSMENT & PLAN NOTE
Lab Results   Component Value Date    HGBA1C 10.8 (A) 01/20/2025 12/19/2024 Endocrinology   Diabetes remains poorly controlled, which is due to medication non-compliance, to help her to get less injections, I ordered V-Go at her last visit, she visited educator although is not completed.   I send her Humalog vials, and brief training was done at her visit, to start V-Go 30 , which give her 1.25 units/hr total of 30 units per day, in addition 10 units before meals( total of 5 clicks per meal)  Continue metformin.  Return back in 3 months   Lab prior to next visit.     1/20/2025  In office Hgba1c: 10.8  Will message to her Endocrinology to see if her V-Go should be increased or insulin changed   Her V-Go is 1 click is 2 units   Her blood sugars have been in the low 100's to 200's   She stopping drinking soda!   Patient has only been clicking her V-GO 1 time prior to eating.  I will explain that she should be clicking her V-GO 5 times before Every meal!      Orders:    POCT hemoglobin A1c    losartan (COZAAR) 25 mg tablet; Take Every Monday, Wednesday, Friday and Every Other Saturday.

## 2025-01-20 NOTE — PATIENT INSTRUCTIONS
"\"I send her Humalog vials, and brief training was done at her visit, to start V-Go 30 , which give her 1.25 units/hr total of 30 units per day, in addition 10 units before meals( total of 5 clicks per meal)\"        Patient should be clicking her V-Go 5 Times BEFORE EACH MEAL!!!!        "

## 2025-01-20 NOTE — ASSESSMENT & PLAN NOTE
Orders:    albuterol (Ventolin HFA) 90 mcg/act inhaler; Inhale 2 puffs every 4 (four) hours as needed for wheezing    Spiriva Respimat 2.5 MCG/ACT AERS inhaler; Inhale 2 puffs daily

## 2025-01-20 NOTE — ASSESSMENT & PLAN NOTE
Physical Therapy Treatment Note     Name: Migue Mistry  Clinic Number: [id number]    Therapy Diagnosis:   Encounter Diagnosis   Name Primary?    Impaired functional mobility, balance, and endurance Yes         Physician: Yariel Judge Jr., MD    Visit Date: 12/28/2022    Physician Orders: PT Eval and Treat  Medical Diagnosis from Referral: Left knee anterior cruciate ligament reconstruction with patella tendon autograft  Evaluation Date: 11/1/2022  Authorization Period Expiration: As needed  Plan of Care Expiration: 03/01/2022  Visit # / Visits authorized: 22/ As needed    Time In: 1009  Time Out: 1038  Total Billable Time: 29 minutes    Surgery: Left knee anterior cruciate ligament reconstruction with patella tendon autograft 10/27/22  Orthopedic Precautions: Per protocol, scanned into media section of EMR  Pertinent History: None    Subjective     Patient reports: No adverse reports. All is going well.    CMS Impairment/Limitation/Restriction for FOTO Survey  Therapist reviewed FOTO scores for Migue Mistry on 11/1/2022.   FOTO documents entered into EPIC - see Media section.  Patient's Physical FS Primary Measure: 24  Risk Adjusted Statistical FOTO: 46  Limitation Score: 76%  Category: Mobility  LEFS: 9.4/80 = 11.75% functional      Therapist reviewed FOTO scores for Migue Mistry on 11/18/2022.   FOTO documents entered into EPIC - see Media section.  Patient's Physical FS Primary Measure: 44  Risk Adjusted Statistical FOTO: 46  Limitation Score: 56%  Category: Mobility  LEFS: 28.4/80 = 35.50% functional    Therapist reviewed FOTO scores for Migue Mistry on 12/02/2022.   FOTO documents entered into EPIC - see Media section.  Patient's Physical FS Primary Measure: 52  Risk Adjusted Statistical FOTO: 46  Limitation Score: 48%  Category: Mobility  LEFS: 38.7/80 = 48.38% functional    Therapist reviewed FOTO scores for Migue Mistry on 12/16/2022.   FOTO documents entered into EPIC - see Media  Severe incontinence issues.   Patient with use of disposable underpads, pull on briefs, gloves and wipes.           "section.  Patient's Physical FS Primary Measure: 59  Risk Adjusted Statistical FOTO: 46  Limitation Score: 41%  Category: Mobility  LEFS: 48.2/80 = 60.25% functional    Objective     ZAHRAA received the following treatment:     Time Activities   Manual  Left knee tibial on femoral internal rotation and posterior drawer grades 1-2 with movement    TherAct 29 min NuStep (low level, high level), bridge with HS curl, knee flx lunge stretch, calf board (gastroc bias, hamstrings bias), step ups (fwd, lat), mini squats (w/ clamshells), monster walk, single leg stance holds (knee at 15 degree bend), SLS with contralateral band hip abd, single leg deadlift into high knee march, BOSU step into lunge (fwd, lat), plank on hands (with hand taps, with toe taps, with hip ext), eccentric step down (fwd, lat), SLS with disc (ball toss), SLS on airex (body rotation), 8" step training (step over, lat step up/down)    Dynamic balance and proprioceptive training with and without foam     TherEx  There ex per flow sheet   Gait  Gait with visual cueing via mirror (normal step, high march step to reduce trendelenburg)   Neuro Re-ed  Russian e-stim to VMO and vastus lateralis co-contract with (wall TKE ball squeeze, mini squat holds, single leg stance with mini squat holds)   Modalities     E-Stim     Dry Needling     Canalith Repositioning           Home Exercises Provided and Patient Education Provided     Education provided:   -Plan of care, HEP    Assessment     Continues to show significant improvement in quick time period. He continues to perform all exercises quicker, less difficulty, more fluid each session. Now incorporating more activities to work knee stabilization in unilateral stance and challenge via balance beam/airex; some of the activities still seeing some knee shaking which is expected at this stage post-op; each subsequent session he shows significant balance improvement. He is clearly far beyond where he should be " functionally and strength-wise. Gait has now fully with no hip hike present. Expect continued progression at fast pace and return to full activity fairly quickly.    Patient prognosis is Excellent.      Anticipated barriers to physical therapy: None    Goals: ZAHRAA Is progressing well towards his goals.  Short Term Goals: 8 weeks   Patient will report at least 15% increase in functional capacity from initial LEFS score to indicate clinically significant functional improvement (goal met)  Patient will report at least 15 point increase on initial FOTO score to indicate clinically significant functional improvement (goal met)  Patient will demonstrate full day of ambulation without assistive device (goal met)     Long Term Goals: 16 weeks   Patient will report at least 30% increase in functional capacity from initial LEFS score to indicate clinically significant functional improvement (goal met)  Patient will report at least 30 point increase on initial FOTO score to indicate clinically significant functional improvement (goal met)  Patient will jog/run for 15 minutes without left knee pain    Plan   Consider DN to patella tendon if discomfort persists.  2-3x/week x 12 weeks    Aneta Marrero, PT

## 2025-01-20 NOTE — ASSESSMENT & PLAN NOTE
12/18/2024  Interpretation Summary    Left Ventricle: Left ventricular cavity size is normal. Wall thickness is normal. The left ventricular ejection fraction is 52% by biplane measurement. Systolic function is low normal. Wall motion is normal. Diastolic function is mildly abnormal, consistent with grade I (abnormal) relaxation.    IVS: There is abnormal septal motion consistent with bundle branch block.    Mitral Valve: There is mild annular calcification.

## 2025-01-20 NOTE — ASSESSMENT & PLAN NOTE
Smoking cessation discussed  Medications discussed but declined  Patient does not want to quit.

## 2025-01-20 NOTE — ASSESSMENT & PLAN NOTE
Orders:    magnesium Oxide (MAG-OX) 400 mg TABS; Take 1 tablet (400 mg total) by mouth 2 (two) times a day

## 2025-01-21 ENCOUNTER — TELEPHONE (OUTPATIENT)
Dept: FAMILY MEDICINE CLINIC | Facility: CLINIC | Age: 60
End: 2025-01-21

## 2025-01-22 NOTE — PROGRESS NOTES
Assessment:  59 y.o.  who presents as a follow up of vaginal atrophy, recurrent UTI symptoms.    Plan:  Diagnoses and all orders for this visit:    Recurrent UTI  -     Urine culture    Vaginal atrophy    - Continue estrace    __________________________________________________________________    Kelton Newton is a 59 y.o.  who presents to f/u vaginal atrophy, recurrent UTI symptoms.    Patient reports had another ecoli infection with tx since last seen. Still feels symptoms off and on, vulvar discomfort and burning with void largely. Some urgency. Denies hematuria, vaginal discharge, vaginal bleeding. No fevers, pelvic pain, or flank/back pain. Using estrace, good tolerance noted.    Reviewed interval cultures with pt. Discussed prior tx given for ecoli uti, however culture to follow too contaminated to adequately dx infection. With symptoms its possible, but likely inferred type and sensitivities from prior culture. Discussed symptom overlap of atrophy and UTI, as well as contributions of atrophy to increased sensitivity to this. Discussed contribution of DM to this as well. Discussed treatment most effective if targeted at source -- optimizing A1c, determining if adequate infection clearance, and managing atrophy.     The following portions of the patient's history were reviewed and updated as appropriate: allergies, current medications, past medical history, past social history, past surgical history, and problem list.    Review of Systems  Review of Systems   Constitutional:  Negative for chills and fever.   Respiratory:  Negative for shortness of breath.    Cardiovascular:  Negative for chest pain and palpitations.   Gastrointestinal:  Negative for abdominal distention, abdominal pain, constipation, diarrhea, nausea and vomiting.   Genitourinary:  Positive for dysuria and urgency. Negative for flank pain, frequency, genital sores, hematuria, pelvic pain, vaginal bleeding and vaginal  "discharge.   Neurological:  Negative for dizziness and light-headedness.            Objective  BP 92/58   Ht 5' 1\" (1.549 m)   Wt 69 kg (152 lb 3.2 oz)   BMI 28.76 kg/m²      Physical Exam:  Physical Exam  Exam conducted with a chaperone present.   Constitutional:       General: She is not in acute distress.     Appearance: Normal appearance. She is not ill-appearing, toxic-appearing or diaphoretic.   Eyes:      General: No scleral icterus.        Right eye: No discharge.         Left eye: No discharge.      Conjunctiva/sclera: Conjunctivae normal.   Cardiovascular:      Rate and Rhythm: Normal rate.   Pulmonary:      Effort: Pulmonary effort is normal. No respiratory distress.   Abdominal:      General: There is no distension.      Palpations: There is no mass.      Tenderness: There is no abdominal tenderness. There is no guarding or rebound.      Hernia: No hernia is present.   Genitourinary:     General: Normal vulva.      Exam position: Lithotomy position.      Labia:         Right: No rash, tenderness or lesion.         Left: No rash, tenderness or lesion.       Urethra: No prolapse, urethral swelling or urethral lesion.      Vagina: No signs of injury. No vaginal discharge, erythema (improved atrophic changes), tenderness, bleeding or prolapsed vaginal walls.   Musculoskeletal:         General: No swelling.   Skin:     General: Skin is warm and dry.      Coloration: Skin is not jaundiced or pale.      Findings: No bruising or erythema.   Neurological:      Mental Status: She is alert.   Psychiatric:         Mood and Affect: Mood normal.         Behavior: Behavior normal.         Thought Content: Thought content normal.         Judgment: Judgment normal.           Lab Review  UCx trend  A1c trend  "

## 2025-01-30 ENCOUNTER — VBI (OUTPATIENT)
Dept: ADMINISTRATIVE | Facility: OTHER | Age: 60
End: 2025-01-30

## 2025-01-30 NOTE — TELEPHONE ENCOUNTER
01/30/25 9:39 AM     Chart reviewed for Hemoglobin A1c ; nothing is submitted to the patient's insurance at this time.     Reyes Murray MA   [facility]

## 2025-02-04 DIAGNOSIS — N95.2 VAGINAL ATROPHY: ICD-10-CM

## 2025-02-04 RX ORDER — ESTRADIOL 0.1 MG/G
CREAM VAGINAL
Qty: 42.5 G | Refills: 0 | Status: SHIPPED | OUTPATIENT
Start: 2025-02-04

## 2025-02-19 ENCOUNTER — RA CDI HCC (OUTPATIENT)
Dept: OTHER | Facility: HOSPITAL | Age: 60
End: 2025-02-19

## 2025-02-19 PROBLEM — Z00.00 MEDICARE ANNUAL WELLNESS VISIT, SUBSEQUENT: Chronic | Status: RESOLVED | Noted: 2024-12-13 | Resolved: 2025-02-19

## 2025-02-19 PROBLEM — Z23 ENCOUNTER FOR IMMUNIZATION: Status: RESOLVED | Noted: 2024-12-13 | Resolved: 2025-02-19

## 2025-02-19 NOTE — PROGRESS NOTES
HCC coding opportunities          Chart Reviewed number of suggestions sent to Provider: 2     Patients Insurance     Medicare Insurance: [employer]age

## 2025-02-25 PROBLEM — E78.2 MIXED HYPERLIPIDEMIA: Chronic | Status: ACTIVE | Noted: 2019-11-17

## 2025-02-25 PROBLEM — J44.9 COPD (CHRONIC OBSTRUCTIVE PULMONARY DISEASE) (HCC): Status: RESOLVED | Noted: 2022-04-05 | Resolved: 2025-02-25

## 2025-02-25 PROBLEM — E78.2 MIXED HYPERLIPIDEMIA: Status: ACTIVE | Noted: 2019-11-17

## 2025-02-26 ENCOUNTER — OFFICE VISIT (OUTPATIENT)
Dept: FAMILY MEDICINE CLINIC | Facility: CLINIC | Age: 60
End: 2025-02-26
Payer: COMMERCIAL

## 2025-02-26 VITALS
TEMPERATURE: 97.6 F | HEIGHT: 61 IN | WEIGHT: 153 LBS | SYSTOLIC BLOOD PRESSURE: 100 MMHG | OXYGEN SATURATION: 96 % | HEART RATE: 66 BPM | BODY MASS INDEX: 28.89 KG/M2 | DIASTOLIC BLOOD PRESSURE: 58 MMHG | RESPIRATION RATE: 14 BRPM

## 2025-02-26 DIAGNOSIS — E11.65 TYPE 2 DIABETES MELLITUS WITH HYPERGLYCEMIA, WITH LONG-TERM CURRENT USE OF INSULIN (HCC): Chronic | ICD-10-CM

## 2025-02-26 DIAGNOSIS — E03.8 OTHER SPECIFIED HYPOTHYROIDISM: ICD-10-CM

## 2025-02-26 DIAGNOSIS — Z00.00 ANNUAL PHYSICAL EXAM: ICD-10-CM

## 2025-02-26 DIAGNOSIS — G89.29 CHRONIC MIDLINE LOW BACK PAIN WITH BILATERAL SCIATICA: ICD-10-CM

## 2025-02-26 DIAGNOSIS — Z72.0 TOBACCO USE: Chronic | ICD-10-CM

## 2025-02-26 DIAGNOSIS — N39.46 MIXED STRESS AND URGE URINARY INCONTINENCE: Chronic | ICD-10-CM

## 2025-02-26 DIAGNOSIS — F12.10 MARIJUANA ABUSE: ICD-10-CM

## 2025-02-26 DIAGNOSIS — Z13.29 SCREENING FOR THYROID DISORDER: ICD-10-CM

## 2025-02-26 DIAGNOSIS — E55.9 VITAMIN D DEFICIENCY: Chronic | ICD-10-CM

## 2025-02-26 DIAGNOSIS — K59.09 CHRONIC CONSTIPATION: Chronic | ICD-10-CM

## 2025-02-26 DIAGNOSIS — M54.41 CHRONIC MIDLINE LOW BACK PAIN WITH BILATERAL SCIATICA: ICD-10-CM

## 2025-02-26 DIAGNOSIS — G47.09 OTHER INSOMNIA: Chronic | ICD-10-CM

## 2025-02-26 DIAGNOSIS — M46.1 SACROILIITIS (HCC): Chronic | ICD-10-CM

## 2025-02-26 DIAGNOSIS — K21.00 GASTROESOPHAGEAL REFLUX DISEASE WITH ESOPHAGITIS WITHOUT HEMORRHAGE: ICD-10-CM

## 2025-02-26 DIAGNOSIS — M79.604 BILATERAL LEG PAIN: Chronic | ICD-10-CM

## 2025-02-26 DIAGNOSIS — E83.42 HYPOMAGNESEMIA: Chronic | ICD-10-CM

## 2025-02-26 DIAGNOSIS — F41.9 ANXIETY AND DEPRESSION: ICD-10-CM

## 2025-02-26 DIAGNOSIS — M79.605 BILATERAL LEG PAIN: Chronic | ICD-10-CM

## 2025-02-26 DIAGNOSIS — F32.A ANXIETY AND DEPRESSION: ICD-10-CM

## 2025-02-26 DIAGNOSIS — E11.65 TYPE 2 DIABETES MELLITUS WITH HYPERGLYCEMIA, WITH LONG-TERM CURRENT USE OF INSULIN (HCC): Primary | Chronic | ICD-10-CM

## 2025-02-26 DIAGNOSIS — I10 PRIMARY HYPERTENSION: ICD-10-CM

## 2025-02-26 DIAGNOSIS — J44.9 COPD (CHRONIC OBSTRUCTIVE PULMONARY DISEASE) (HCC): ICD-10-CM

## 2025-02-26 DIAGNOSIS — Z79.4 TYPE 2 DIABETES MELLITUS WITH HYPERGLYCEMIA, WITH LONG-TERM CURRENT USE OF INSULIN (HCC): Primary | Chronic | ICD-10-CM

## 2025-02-26 DIAGNOSIS — J43.1 PANLOBULAR EMPHYSEMA (HCC): ICD-10-CM

## 2025-02-26 DIAGNOSIS — M54.42 CHRONIC MIDLINE LOW BACK PAIN WITH BILATERAL SCIATICA: ICD-10-CM

## 2025-02-26 DIAGNOSIS — R11.0 CHRONIC NAUSEA: ICD-10-CM

## 2025-02-26 DIAGNOSIS — I42.8 NON-ISCHEMIC CARDIOMYOPATHY (HCC): Chronic | ICD-10-CM

## 2025-02-26 DIAGNOSIS — Z79.4 TYPE 2 DIABETES MELLITUS WITH HYPERGLYCEMIA, WITH LONG-TERM CURRENT USE OF INSULIN (HCC): Chronic | ICD-10-CM

## 2025-02-26 DIAGNOSIS — Z01.818 PREOP EXAMINATION: Primary | Chronic | ICD-10-CM

## 2025-02-26 DIAGNOSIS — Z92.89 HISTORY OF ECHOCARDIOGRAM: Chronic | ICD-10-CM

## 2025-02-26 DIAGNOSIS — E78.2 MIXED HYPERLIPIDEMIA: ICD-10-CM

## 2025-02-26 PROBLEM — Z90.710 STATUS POST LAPAROSCOPIC HYSTERECTOMY: Chronic | Status: ACTIVE | Noted: 2018-05-22

## 2025-02-26 PROCEDURE — 99215 OFFICE O/P EST HI 40 MIN: CPT | Performed by: NURSE PRACTITIONER

## 2025-02-26 PROCEDURE — G2211 COMPLEX E/M VISIT ADD ON: HCPCS | Performed by: NURSE PRACTITIONER

## 2025-02-26 RX ORDER — ALBUTEROL SULFATE 90 UG/1
2 INHALANT RESPIRATORY (INHALATION) EVERY 4 HOURS PRN
Qty: 18 G | Refills: 5 | Status: SHIPPED | OUTPATIENT
Start: 2025-02-26

## 2025-02-26 RX ORDER — PANTOPRAZOLE SODIUM 20 MG/1
20 TABLET, DELAYED RELEASE ORAL 2 TIMES DAILY
COMMUNITY
End: 2025-02-26 | Stop reason: SDUPTHER

## 2025-02-26 RX ORDER — PANTOPRAZOLE SODIUM 20 MG/1
20 TABLET, DELAYED RELEASE ORAL 2 TIMES DAILY
Qty: 180 TABLET | Refills: 3 | Status: SHIPPED | OUTPATIENT
Start: 2025-02-26

## 2025-02-26 RX ORDER — ONDANSETRON 4 MG/1
4 TABLET, FILM COATED ORAL EVERY 8 HOURS PRN
Qty: 20 TABLET | Refills: 1 | Status: SHIPPED | OUTPATIENT
Start: 2025-02-26

## 2025-02-26 NOTE — PROGRESS NOTES
Pre-operative Clearance  Name: Helena Newton      : 1965      MRN: [id number]  Encounter Provider: NEELAM Cueto  Encounter Date: 2025   Encounter department: [facility]    Assessment & Plan  Primary hypertension         Panlobular emphysema (HCC)         Type 2 diabetes mellitus with hyperglycemia, with long-term current use of insulin ([facility])    Lab Results   Component Value Date    HGBA1C 10.8 (A) 2025       Jason samples are in the mail  Will give her Dexcom samples       Tobacco use  Smoking cessation discussed  Medications discussed but declined  Discussion lasted 8 minutes.        COPD (chronic obstructive pulmonary disease) ([facility])    Orders:  •  albuterol (Ventolin HFA) 90 mcg/act inhaler; Inhale 2 puffs every 4 (four) hours as needed for wheezing    Gastroesophageal reflux disease with esophagitis without hemorrhage    Orders:  •  pantoprazole (PROTONIX) 20 mg tablet; Take 1 tablet (20 mg total) by mouth 2 (two) times a day    Chronic nausea    Orders:  •  ondansetron (ZOFRAN) 4 mg tablet; Take 1 tablet (4 mg total) by mouth every 8 (eight) hours as needed for nausea or vomiting    Preop examination  The patient is here today for preoperative clearance for spine stimulator, due to chronic back pain, by comprehensive spine and pain management, to be performed on 3/19/2025.  The patient is medically stable for this procedure as planned.       Vitamin D deficiency    Orders:  •  Vitamin D 25 hydroxy; Future    Hypomagnesemia    Orders:  •  Magnesium; Future    Other insomnia         Sacroiliitis (HCC)         Bilateral leg pain         Marijuana abuse         Anxiety and depression         Mixed stress and urge urinary incontinence         Chronic midline low back pain with bilateral sciatica         Chronic constipation         Non-ischemic cardiomyopathy (HCC)         History of echocardiogram  2024  Interpretation Summary  •  Left Ventricle: Left  ventricular cavity size is normal. Wall thickness is normal. The left ventricular ejection fraction is 52% by biplane measurement. Systolic function is low normal. Wall motion is normal. Diastolic function is mildly abnormal, consistent with grade I (abnormal) relaxation.  •  IVS: There is abnormal septal motion consistent with bundle branch block.  •  Mitral Valve: There is mild annular calcification.       Annual physical exam    Orders:  •  Comprehensive metabolic panel; Future  •  CBC and differential; Future    Screening for thyroid disorder    Orders:  •  TSH, 3rd generation with Free T4 reflex; Future    Other specified hypothyroidism    Orders:  •  TSH, 3rd generation with Free T4 reflex; Future    Mixed hyperlipidemia    Orders:  •  Lipid Panel with Direct LDL reflex; Future    Pre-operative Clearance:     Revised Cardiac Risk Index:  RCI RISK CLASS III (2 risk factors, risk of major cardiac complications approximately 3.6%)    Clearance:  Patient is medically optimized (CLEARED) for proposed surgery without any additional cardiac testing.      Medication Instructions:   - Avoid herbs or non-directed vitamins one week prior to surgery    - Avoid aspirin containing medications or non-steroidal anti-inflammatory drugs one week preceding surgery    - ACE Inhibitors or ARBs: Continue this medication up to the evening before surgery/procedure, but do not take the morning of the day of surgery.  - 5HT3 Antagonist (ie, zofran):  Continue to take this medication on your normal schedule.   - Alpha-1 Adrenergic Blocker (ie, tamsulosin, doxazosin, alfusozin): Continue to take this medication on your normal schedule.  - Antiepileptic meds: Continue to take this medication on your normal schedule.  - Beta blockers:  Continue to take this medication on your normal schedule.  - Hormone replacement therapy: Continue to take this medication on your normal schedule. This medication may need to be discontinued for at least 4  weeks prior to surgery if the surgical procedure is associated with high risk of blood clots. Consult with your surgeon.  - Hyperlipidemia meds: Continue to take this medication on your normal schedule.  - Opioids: Continue to take this medication on your normal schedule.  - Sleep meds (zolpidem, zaleplon, eszopiclone): Continue taking medication up to the evening before procedure/surgery, but do not take this medication on the morning of procedure/surgery.      Medicine Instructions for Adults with Diabetes (NO Bowel Prep)    Follow these instructions when a BOWEL PREP is NOT required for your procedure or surgery!    NOTE:  GLP Agonists taken weekly: do not take in the 7 days before your procedure. **Bariatric surgery: do not take 4 weeks prior to your procedure.    SGLT-2 Inhibitors: do not take in the 4 days before your procedure    On the Day Before Surgery/Procedure  If you are having a procedure (e.g., Colonoscopy) or surgery which DOES NOT require a bowel prep, follow the directions below based on the type of medicine you take for your diabetes.  Type of Medicine You Take Examples What to Do   Pre-Mixed Insulin Intermediate  Hhbkqgi87/25, Uhrqcwl30/30, Novolog 70/30, Regular Insulin Take 1/2 your regular dose the evening before our procedure.   Rapid/Fast Acting  Insulin and/or Long-Acting Insulin Humalog U200, NovoLog, Apidra,  Lantus, Levemir, Tresiba, Toujeo,  Fias, Basaglar Take your FULL regular dose the day before procedure.   Oral Diabetic Medicines (sulfonylurea) Glipizide/Glimepiride/  Glucotrol Take your regular dose with dinner the evening before your procedure.   Other Oral Diabetic Medicines Metformin, Glucophage, Glucophage  XR, Riomet, Glumetza, Actose,  Avandia, Gl set, Prandin Take your regular dose with dinner the evening before your procedure   GLP Agonists Adlyxin, Byetta, Bydureon,  Ozempic, Soliqua, Tanzeum,  Trulicity, Victoza, Saxenda,  Rybelsus, Wegovy, Mounjaro, Zepbound If taken  daily, take as normal  If taken weekly, do not take this medicine for 7 days before your procedure including the day of the procedure (resume taking after the procedure). **Bariatric surgery: do not take 4 weeks prior to procedure   SGLT-2 Inhibitors Jardiance, Invokana, Farxiga, Steglatro, Brenzavvy, Qtern, Segluromet Glyxambi, Synjardy, Synjardy XR, Invokamet, InvokametXR, Trijary XR, Xigduo X Do not take for 4 days before your procedure including the day of the procedure (resume taking after the procedure)   This educational material has been approved by the Patient Education Advisory Committee.    On the Day of Surgery/Procedure  Follow the directions below based on the type of medicine you take for your diabetes.  Type of Medicine You Take  Examples What to Do   Long-Acting Insulin Lantus, Levemir, Tresiba,  Toujeo, Basaglar, Semglee If you normally take your Long Acting Insulin in the morning, take the full dose as scheduled.   GLP-I Agonists Adlyxin, Byetta, Bydureon,  Ozempic, Soliqua, Tanzeum,  Trulicity, Victoza, Saxenda,  Rybelsus, Mounjaro Do NOT take this medicine on the day of your procedure (resume taking after the procedure)   Except for the morning Long-Acting Insulin, DO NOT take ANY diabetic medicine on the day of your procedure unless you were instructed by the doctor who manages your diabetes medicines.  Continue to check your blood sugars.  If you have an insulin pump, ask your endocrinologist for instructions at least 3 days before your procedure. NOTE: If you are not able to ask your endocrinologist in advance, on the day of the procedure set your insulin pump to your basal rate only. Bring your insulin pump supplies to the hospital.         History of Present Illness     · The patient is here today for preoperative clearance for spine stimulator, due to chronic back pain, by comprehensive spine and pain management, to be performed on 3/19/2025.  · The patient is medically stable for this  procedure as planned.      Review of Systems   Constitutional:  Negative for activity change, chills, fatigue and fever.   HENT:  Negative for rhinorrhea and sore throat.    Eyes:  Negative for pain.   Respiratory:  Negative for cough and shortness of breath.    Cardiovascular:  Negative for chest pain, palpitations and leg swelling.   Gastrointestinal:  Negative for abdominal pain, constipation, diarrhea, nausea and vomiting.   Genitourinary:  Negative for difficulty urinating, flank pain, frequency and urgency.   Musculoskeletal:  Positive for arthralgias, back pain and myalgias. Negative for gait problem and joint swelling.   Skin:  Negative for color change.   Neurological:  Negative for dizziness, weakness, light-headedness and headaches.   Psychiatric/Behavioral:  Positive for sleep disturbance. The patient is nervous/anxious.    All other systems reviewed and are negative.    Past Medical History   Past Medical History:   Diagnosis Date   • Angina pectoris ([facility])     recent   • Anxiety    • Arthritis    • Asthma    • Carpal tunnel syndrome    • Chronic headaches    • COPD (chronic obstructive pulmonary disease) ([facility])    • Diabetes ([facility])    • Diabetes mellitus ([facility])    • GERD (gastroesophageal reflux disease)    • Headache    • Hyperlipidemia    • Hypertension    • Kidney stone    • Left bundle branch block     LBBB   • MI (myocardial infarction) ([facility])    • Myocardial infarction (HCC)    • Neuropathy    • PTSD (post-traumatic stress disorder)    • RLS (restless legs syndrome)    • Shortness of breath      Past Surgical History:   Procedure Laterality Date   • ANGIOPLASTY     • BREAST SURGERY  2016    Reduction   • CARDIAC CATHETERIZATION  2018     Mid LAD: There was a tubular 40 % stenosis   •  SECTION     • COLONOSCOPY     • HYSTERECTOMY  2018    Complete   • KIDNEY STONE SURGERY     • LAPAROSCOPY     • AR COLONOSCOPY FLX DX W/COLLJ SPEC WHEN PFRMD N/A 2017    Procedure: EGD  AND COLONOSCOPY;  Surgeon: Wilberto Galeano MD;  Location: [facility];  Service: Gastroenterology   • NM ESOPHAGOGASTRODUODENOSCOPY TRANSORAL DIAGNOSTIC N/A 10/10/2018    Procedure: ESOPHAGOGASTRODUODENOSCOPY (EGD);  Surgeon: Wilberto Galeano MD;  Location: [facility];  Service: Gastroenterology   • NM LAPS TOTAL HYSTERECT 250 GM/< W/RMVL TUBE/OVARY N/A 5/22/2018    Procedure: ROBOTIC ASSISTED TOTAL LAPAROSCOPIC HYSTERECTOMY, BILATERAL SALPINGOOPHERECTOMY,;  Surgeon: Daron Valdez MD;  Location:  MAIN OR;  Service: Gynecology Oncology   • UPPER GASTROINTESTINAL ENDOSCOPY     • WRIST SURGERY Right      Family History   Problem Relation Age of Onset   • Diabetes Mother    • Breast cancer Mother 45   • Lung cancer Father 54   • Diabetes Sister    • Brain cancer Maternal Aunt 72   • Breast cancer Other 25     Social History     Tobacco Use   • Smoking status: Every Day     Current packs/day: 1.00     Average packs/day: 1 pack/day for 52.2 years (52.2 ttl pk-yrs)     Types: Cigarettes     Start date: 1/1/1973   • Smokeless tobacco: Never   • Tobacco comments:     pt. smoked this am 10/10   Vaping Use   • Vaping status: Former   Substance and Sexual Activity   • Alcohol use: Not Currently     Comment: Rarely consumes alcohol - As per [employer]    • Drug use: Yes     Frequency: 7.0 times per week     Types: Marijuana     Comment: medical marijuana  last use over a month   • Sexual activity: Yes     Partners: Male     Birth control/protection: Surgical     Current Outpatient Medications on File Prior to Visit   Medication Sig   • Accu-Chek Guide test strip use to CHECK BLOOD GLUCOSE four times a day   • BD Pen Needle Dayna 2nd Gen 32G X 4 MM MISC 4 time a day   • bisoprolol (ZEBETA) 5 mg tablet take 1 tablet by mouth once daily   • Blood Glucose Monitoring Suppl (OneTouch Verio Reflect) w/Device KIT Check blood sugars twice daily. Please substitute with appropriate alternative as covered by patient's insurance. Dx:  E11.65   • celecoxib (CeleBREX) 100 mg capsule Take 1 capsule (100 mg total) by mouth 2 (two) times a day   • estradiol (ESTRACE) 0.1 mg/g vaginal cream Apply generous fingertip sized amount into vagina every 3rd night.   • glucose blood (OneTouch Verio) test strip Check blood sugars twice daily. Please substitute with appropriate alternative as covered by patient's insurance. Dx: E11.65   • Insulin Disposable Pump (V-Go 30) 30 UNIT/24HR KIT USE AS DIRECTED SEE DIABETES EDUCATOR FOR TRAINING   • insulin lispro (HumaLOG) 100 units/mL injection To use in V-GO maximum daily daily use of 60 units   • Lidocaine 4 % PTCH Apply topically   • losartan (COZAAR) 25 mg tablet Take Every Monday, Wednesday, Friday and Every Other Saturday.   • metFORMIN (GLUCOPHAGE-XR) 500 mg 24 hr tablet take 2 tablets by mouth at bedtime   • NON FORMULARY THC Cream   • OneTouch Delica Lancets 33G MISC Check blood sugars twice daily. Please substitute with appropriate alternative as covered by patient's insurance. Dx: E11.65   • oxyCODONE (ROXICODONE) 5 immediate release tablet Take 5 mg by mouth 3 (three) times a day as needed   • prazosin (MINIPRESS) 2 mg capsule Take 2 mg by mouth daily at bedtime   • pregabalin (LYRICA) 100 mg capsule Take 100 mg by mouth 2 (two) times a day   • rosuvastatin (CRESTOR) 20 MG tablet take 1 tablet by mouth once daily   • Spiriva Respimat 2.5 MCG/ACT AERS inhaler Inhale 2 puffs daily   • zolpidem (AMBIEN) 10 mg tablet Take 10 mg by mouth daily at bedtime as needed for sleep   • ZTlido 1.8 % PTCH apply 1 patch once daily REMOVE after 12 hours   • [DISCONTINUED] albuterol (Ventolin HFA) 90 mcg/act inhaler Inhale 2 puffs every 4 (four) hours as needed for wheezing   • [DISCONTINUED] pantoprazole (PROTONIX) 20 mg tablet Take 20 mg by mouth 2 (two) times a day   • [DISCONTINUED] magnesium Oxide (MAG-OX) 400 mg TABS Take 1 tablet (400 mg total) by mouth 2 (two) times a day (Patient not taking: Reported on 2/26/2025)  "    Allergies   Allergen Reactions   • Betamethasone Hives   • Lisinopril Cough   • Losartan Dizziness   • Other Hives     Surgical tape   • Prednisone Other (See Comments)     Thrush     • Cat Dander Rash     Objective   /58 (BP Location: Left arm, Patient Position: Sitting, Cuff Size: Standard)   Pulse 66   Temp 97.6 °F (36.4 °C) (Tympanic)   Resp 14   Ht 5' 1\" (1.549 m)   Wt 69.4 kg (153 lb)   SpO2 96%   BMI 28.91 kg/m²     Physical Exam  Vitals and nursing note reviewed.   Constitutional:       General: She is awake. She is not in acute distress.     Appearance: Normal appearance. She is well-developed.   HENT:      Head: Normocephalic and atraumatic.      Nose: Nose normal.      Mouth/Throat:      Mouth: Mucous membranes are moist.   Eyes:      Conjunctiva/sclera: Conjunctivae normal.   Cardiovascular:      Rate and Rhythm: Normal rate and regular rhythm.      Pulses: Normal pulses.      Heart sounds: Normal heart sounds. No murmur heard.  Pulmonary:      Effort: Pulmonary effort is normal. No respiratory distress.      Breath sounds: Normal breath sounds.   Abdominal:      General: Bowel sounds are normal.      Palpations: Abdomen is soft.      Tenderness: There is no abdominal tenderness.   Musculoskeletal:      Cervical back: Neck supple.      Lumbar back: Spasms and tenderness present. Decreased range of motion.      Right lower leg: No edema.      Left lower leg: No edema.      Comments: With chronic bilateral lower extremity sciatica   Skin:     General: Skin is warm and dry.   Neurological:      Mental Status: She is alert and oriented to person, place, and time.      Gait: Gait abnormal.   Psychiatric:         Attention and Perception: Attention normal.         Mood and Affect: Mood is anxious and depressed.         Speech: Speech normal.         Behavior: Behavior normal. Behavior is cooperative.         Thought Content: Thought content normal.         Cognition and Memory: Cognition " normal.         Judgment: Judgment normal.       Administrative Statements   I have spent a total time of 45 minutes in caring for this patient on the day of the visit/encounter including Diagnostic results, Prognosis, Risks and benefits of tx options, Instructions for management, Patient and family education, Importance of tx compliance, Risk factor reductions, Impressions, Counseling / Coordination of care, Documenting in the medical record, Reviewing/placing orders in the medical record (including tests, medications, and/or procedures), and Obtaining or reviewing history  .    NEELAM Cueto

## 2025-02-26 NOTE — ASSESSMENT & PLAN NOTE
Orders:  •  pantoprazole (PROTONIX) 20 mg tablet; Take 1 tablet (20 mg total) by mouth 2 (two) times a day

## 2025-02-26 NOTE — ASSESSMENT & PLAN NOTE
The patient is here today for preoperative clearance for spine stimulator, due to chronic back pain, by comprehensive spine and pain management, to be performed on 3/19/2025.  The patient is medically stable for this procedure as planned.

## 2025-02-26 NOTE — ASSESSMENT & PLAN NOTE
12/18/2024  Interpretation Summary  •  Left Ventricle: Left ventricular cavity size is normal. Wall thickness is normal. The left ventricular ejection fraction is 52% by biplane measurement. Systolic function is low normal. Wall motion is normal. Diastolic function is mildly abnormal, consistent with grade I (abnormal) relaxation.  •  IVS: There is abnormal septal motion consistent with bundle branch block.  •  Mitral Valve: There is mild annular calcification.

## 2025-02-26 NOTE — ASSESSMENT & PLAN NOTE
Orders:  •  ondansetron (ZOFRAN) 4 mg tablet; Take 1 tablet (4 mg total) by mouth every 8 (eight) hours as needed for nausea or vomiting

## 2025-02-26 NOTE — ASSESSMENT & PLAN NOTE
Orders:  •  albuterol (Ventolin HFA) 90 mcg/act inhaler; Inhale 2 puffs every 4 (four) hours as needed for wheezing

## 2025-02-26 NOTE — ASSESSMENT & PLAN NOTE
Smoking cessation discussed  Medications discussed but declined  Discussion lasted 8 minutes.

## 2025-02-26 NOTE — ASSESSMENT & PLAN NOTE
Lab Results   Component Value Date    HGBA1C 10.8 (A) 01/20/2025       Jason samples are in the mail  Will give her Dexcom samples

## 2025-02-28 ENCOUNTER — TELEPHONE (OUTPATIENT)
Dept: ENDOCRINOLOGY | Facility: CLINIC | Age: 60
End: 2025-02-28

## 2025-02-28 NOTE — TELEPHONE ENCOUNTER
Reason for call:   [x] Prior Auth  [] Other:     Caller:  [x] Patient  [] Pharmacy  Name:   Address:   Callback Number:     Medication: humalog     Dose/Frequency: 100 units/ml to use in V-GO maxium daily use of 60 units     Quantity: 60 mL    Ordering Provider:   [] PCP/Provider -   [x] Speciality/Provider - jacob Bruce    Has the patient tried other medications and failed? If failed, which medications did they fail?    [] No   [] Yes -     Is the patient's insurance updated in EPIC?   [] Yes   [] No     Is a copy of the patient's insurance scanned in EPIC?   [] Yes   [] No

## 2025-03-04 NOTE — TELEPHONE ENCOUNTER
PA for (HumaLOG) 100 units SUBMITTED to [facility]    via      [x][employer]-Case ID # [id number]    [x]PA sent as URGENT    All office notes, labs and other pertaining documents and studies sent. Clinical questions answered. Awaiting determination from insurance company.     Turnaround time for your insurance to make a decision on your Prior Authorization can take 7-21 business days.

## 2025-03-06 ENCOUNTER — APPOINTMENT (OUTPATIENT)
Dept: LAB | Facility: CLINIC | Age: 60
End: 2025-03-06
Payer: COMMERCIAL

## 2025-03-06 ENCOUNTER — TELEPHONE (OUTPATIENT)
Age: 60
End: 2025-03-06

## 2025-03-06 DIAGNOSIS — Z13.29 SCREENING FOR THYROID DISORDER: ICD-10-CM

## 2025-03-06 DIAGNOSIS — Z79.4 TYPE 2 DIABETES MELLITUS WITH HYPERGLYCEMIA, WITH LONG-TERM CURRENT USE OF INSULIN (HCC): Chronic | ICD-10-CM

## 2025-03-06 DIAGNOSIS — Z01.818 OTHER SPECIFIED PRE-OPERATIVE EXAMINATION: ICD-10-CM

## 2025-03-06 DIAGNOSIS — E03.8 OTHER SPECIFIED HYPOTHYROIDISM: ICD-10-CM

## 2025-03-06 DIAGNOSIS — E11.65 TYPE 2 DIABETES MELLITUS WITH HYPERGLYCEMIA, WITH LONG-TERM CURRENT USE OF INSULIN (HCC): Chronic | ICD-10-CM

## 2025-03-06 DIAGNOSIS — E55.9 VITAMIN D DEFICIENCY: Chronic | ICD-10-CM

## 2025-03-06 DIAGNOSIS — M54.17 LUMBOSACRAL NEURITIS: ICD-10-CM

## 2025-03-06 DIAGNOSIS — M47.897 OTHER OSTEOARTHRITIS OF SPINE, LUMBOSACRAL REGION: ICD-10-CM

## 2025-03-06 DIAGNOSIS — M70.61 TROCHANTERIC BURSITIS OF RIGHT HIP: ICD-10-CM

## 2025-03-06 DIAGNOSIS — I42.8 NON-ISCHEMIC CARDIOMYOPATHY (HCC): ICD-10-CM

## 2025-03-06 DIAGNOSIS — E83.42 HYPOMAGNESEMIA: Chronic | ICD-10-CM

## 2025-03-06 DIAGNOSIS — E78.2 MIXED HYPERLIPIDEMIA: ICD-10-CM

## 2025-03-06 DIAGNOSIS — Z00.00 ANNUAL PHYSICAL EXAM: ICD-10-CM

## 2025-03-06 LAB
25(OH)D3 SERPL-MCNC: 18.9 NG/ML (ref 30–100)
ALBUMIN SERPL BCG-MCNC: 3.5 G/DL (ref 3.5–5)
ALP SERPL-CCNC: 87 U/L (ref 34–104)
ALT SERPL W P-5'-P-CCNC: 20 U/L (ref 7–52)
ANION GAP SERPL CALCULATED.3IONS-SCNC: 7 MMOL/L (ref 4–13)
AST SERPL W P-5'-P-CCNC: 16 U/L (ref 13–39)
BASOPHILS # BLD AUTO: 0.05 THOUSANDS/ÂΜL (ref 0–0.1)
BASOPHILS NFR BLD AUTO: 1 % (ref 0–1)
BILIRUB SERPL-MCNC: 0.37 MG/DL (ref 0.2–1)
BUN SERPL-MCNC: 19 MG/DL (ref 5–25)
CALCIUM SERPL-MCNC: 9 MG/DL (ref 8.4–10.2)
CHLORIDE SERPL-SCNC: 99 MMOL/L (ref 96–108)
CHOLEST SERPL-MCNC: 126 MG/DL (ref ?–200)
CO2 SERPL-SCNC: 30 MMOL/L (ref 21–32)
CREAT SERPL-MCNC: 0.66 MG/DL (ref 0.6–1.3)
EOSINOPHIL # BLD AUTO: 0.18 THOUSAND/ÂΜL (ref 0–0.61)
EOSINOPHIL NFR BLD AUTO: 2 % (ref 0–6)
ERYTHROCYTE [DISTWIDTH] IN BLOOD BY AUTOMATED COUNT: 13.2 % (ref 11.6–15.1)
EST. AVERAGE GLUCOSE BLD GHB EST-MCNC: 243 MG/DL
GFR SERPL CREATININE-BSD FRML MDRD: 98 ML/MIN/1.73SQ M
GLUCOSE P FAST SERPL-MCNC: 301 MG/DL (ref 65–99)
HBA1C MFR BLD: 10.1 %
HCT VFR BLD AUTO: 46.5 % (ref 34.8–46.1)
HDLC SERPL-MCNC: 39 MG/DL
HGB BLD-MCNC: 15 G/DL (ref 11.5–15.4)
IMM GRANULOCYTES # BLD AUTO: 0.02 THOUSAND/UL (ref 0–0.2)
IMM GRANULOCYTES NFR BLD AUTO: 0 % (ref 0–2)
LDLC SERPL CALC-MCNC: 59 MG/DL (ref 0–100)
LYMPHOCYTES # BLD AUTO: 2.09 THOUSANDS/ÂΜL (ref 0.6–4.47)
LYMPHOCYTES NFR BLD AUTO: 27 % (ref 14–44)
MAGNESIUM SERPL-MCNC: 1.4 MG/DL (ref 1.9–2.7)
MCH RBC QN AUTO: 28 PG (ref 26.8–34.3)
MCHC RBC AUTO-ENTMCNC: 32.3 G/DL (ref 31.4–37.4)
MCV RBC AUTO: 87 FL (ref 82–98)
MONOCYTES # BLD AUTO: 0.45 THOUSAND/ÂΜL (ref 0.17–1.22)
MONOCYTES NFR BLD AUTO: 6 % (ref 4–12)
NEUTROPHILS # BLD AUTO: 4.85 THOUSANDS/ÂΜL (ref 1.85–7.62)
NEUTS SEG NFR BLD AUTO: 64 % (ref 43–75)
NRBC BLD AUTO-RTO: 0 /100 WBCS
PLATELET # BLD AUTO: 229 THOUSANDS/UL (ref 149–390)
PMV BLD AUTO: 10.3 FL (ref 8.9–12.7)
POTASSIUM SERPL-SCNC: 4.1 MMOL/L (ref 3.5–5.3)
PROT SERPL-MCNC: 6.4 G/DL (ref 6.4–8.4)
RBC # BLD AUTO: 5.35 MILLION/UL (ref 3.81–5.12)
SODIUM SERPL-SCNC: 136 MMOL/L (ref 135–147)
TRIGL SERPL-MCNC: 142 MG/DL (ref ?–150)
TSH SERPL DL<=0.05 MIU/L-ACNC: 1 UIU/ML (ref 0.45–4.5)
WBC # BLD AUTO: 7.64 THOUSAND/UL (ref 4.31–10.16)

## 2025-03-06 PROCEDURE — 80053 COMPREHEN METABOLIC PANEL: CPT

## 2025-03-06 PROCEDURE — 84443 ASSAY THYROID STIM HORMONE: CPT

## 2025-03-06 PROCEDURE — 85610 PROTHROMBIN TIME: CPT

## 2025-03-06 PROCEDURE — 83735 ASSAY OF MAGNESIUM: CPT

## 2025-03-06 PROCEDURE — 80061 LIPID PANEL: CPT

## 2025-03-06 PROCEDURE — 85025 COMPLETE CBC W/AUTO DIFF WBC: CPT

## 2025-03-06 PROCEDURE — 82306 VITAMIN D 25 HYDROXY: CPT

## 2025-03-06 PROCEDURE — 36415 COLL VENOUS BLD VENIPUNCTURE: CPT

## 2025-03-06 PROCEDURE — 85730 THROMBOPLASTIN TIME PARTIAL: CPT

## 2025-03-06 PROCEDURE — 83036 HEMOGLOBIN GLYCOSYLATED A1C: CPT

## 2025-03-06 RX ORDER — BISOPROLOL FUMARATE 5 MG/1
5 TABLET, FILM COATED ORAL DAILY
Qty: 90 TABLET | Refills: 1 | Status: SHIPPED | OUTPATIENT
Start: 2025-03-06

## 2025-03-06 NOTE — TELEPHONE ENCOUNTER
Pt called, just checking to see if we received the forms from Dr. London of [facility] that were faxed to office for pt's upcoming procedure on 3/19. States they need to be reviewed and sent back as soon as possible. Please advise.

## 2025-03-06 NOTE — TELEPHONE ENCOUNTER
PA for (HumaLOG) 100 units  APPROVED     Date(s) approved 01/01/205-12/31/25    Case #[id number]    Patient advised by          []MyChart Message  []Phone call   [x]LMOM  []L/M to call office as no active Communication consent on file  []Unable to leave detailed message as VM not approved on Communication consent       Pharmacy advised by    [x]Fax  []Phone call  []Secure Chat    Specialty Pharmacy    []      Approval letter scanned into Media Yes

## 2025-03-07 LAB
APTT PPP: 31 SECONDS (ref 23–34)
INR PPP: 0.92 (ref 0.85–1.19)
PROTHROMBIN TIME: 12.7 SECONDS (ref 12.3–15)

## 2025-03-11 ENCOUNTER — RESULTS FOLLOW-UP (OUTPATIENT)
Dept: FAMILY MEDICINE CLINIC | Facility: CLINIC | Age: 60
End: 2025-03-11

## 2025-03-11 DIAGNOSIS — E83.42 HYPOMAGNESEMIA: Chronic | ICD-10-CM

## 2025-03-11 DIAGNOSIS — E55.9 VITAMIN D DEFICIENCY: Primary | Chronic | ICD-10-CM

## 2025-03-11 RX ORDER — CALCIUM CARBONATE 300MG(750)
400 TABLET,CHEWABLE ORAL DAILY
Qty: 90 TABLET | Refills: 3 | Status: SHIPPED | OUTPATIENT
Start: 2025-03-11

## 2025-03-11 NOTE — TELEPHONE ENCOUNTER
Pt called back and was read her lab results from [facility].  She will  the Vitamin D3 script and said she is already taking the magnesium.  She has no other questions at this time.

## 2025-03-11 NOTE — TELEPHONE ENCOUNTER
Debby from [facility] called in patients surgery date is 3/19 and she  still have not received clearance forms. Patient is having spine cord stimulator trial. Please fax forms to      [phone]  ATNN:Debby

## 2025-03-11 NOTE — TELEPHONE ENCOUNTER
Called Pt & left msg that we did not receive paperwork from dr Carreno's office despite the central fax being fixed.  Asked her to give them our lab fax [phone] so we can expedite the paperwork for her upcoming procedure 3/19.  Upon looking into this encounter, I found a direct phone number for Debby, I called her & gave the lab fax.  She will send shortly & we can get the paperwork sent back to her.

## 2025-03-21 ENCOUNTER — TELEPHONE (OUTPATIENT)
Age: 60
End: 2025-03-21

## 2025-03-21 NOTE — TELEPHONE ENCOUNTER
Anya from [facility] called stating that patients insurance is either requiring a prior authorization for the pantoprazole (PROTONIX) 20 mg tablet or a substitute like omeprazole please advise

## 2025-03-24 NOTE — TELEPHONE ENCOUNTER
PA for (PROTONIX) 20 mg tablet SUBMITTED to     via    []CMM-KEY:   [x]Surescripts-Case ID # [id number]   []Availity-Auth ID # NDC #   []Faxed to plan   []Other website   []Phone call Case ID #     [x]PA sent as URGENT    All office notes, labs and other pertaining documents and studies sent. Clinical questions answered. Awaiting determination from insurance company.     Turnaround time for your insurance to make a decision on your Prior Authorization can take 7-21 business days.

## 2025-03-25 NOTE — TELEPHONE ENCOUNTER
Patient called after receiving a text that a medication was approved. Informed patient that Protonix was approved by her insurance. No further questions asked by patient.

## 2025-03-25 NOTE — TELEPHONE ENCOUNTER
PA for (PROTONIX) 20 mg tablet  APPROVED     Date(s) approved January 1, 2025 to December 31, 2025     Case # [id number]     Patient advised by          []Prestodiaghart Message  []Phone call   [x]LMOM  []L/M to call office as no active Communication consent on file  []Unable to leave detailed message as VM not approved on Communication consent       Pharmacy advised by    [x]Fax  []Phone call  []Secure Chat       Approval letter scanned into Media Yes

## 2025-03-31 ENCOUNTER — TELEPHONE (OUTPATIENT)
Age: 60
End: 2025-03-31

## 2025-03-31 DIAGNOSIS — B37.0 ORAL THRUSH: Primary | ICD-10-CM

## 2025-03-31 RX ORDER — NYSTATIN 100000 [USP'U]/ML
500000 SUSPENSION ORAL 3 TIMES DAILY
Qty: 120 ML | Refills: 0 | Status: SHIPPED | OUTPATIENT
Start: 2025-03-31 | End: 2025-04-07

## 2025-03-31 NOTE — TELEPHONE ENCOUNTER
Patient called to asked if provider can send her a script for Thrush. She noticed it yesterday, patient denies pain. States that she had a medication that was previously prescribed but it was too old. The Rx can be send to [facility] and if she can get a call so she is aware @ [phone]. Thank you

## 2025-04-17 ENCOUNTER — TELEPHONE (OUTPATIENT)
Age: 60
End: 2025-04-17

## 2025-04-17 NOTE — TELEPHONE ENCOUNTER
Patient calling to make sure we are open tomorrow since it is Good Friday.  Advised that we are and that she has an appointment.

## 2025-04-18 ENCOUNTER — OFFICE VISIT (OUTPATIENT)
Dept: GASTROENTEROLOGY | Facility: CLINIC | Age: 60
End: 2025-04-18
Payer: COMMERCIAL

## 2025-04-18 VITALS
HEART RATE: 74 BPM | OXYGEN SATURATION: 96 % | WEIGHT: 155 LBS | BODY MASS INDEX: 29.27 KG/M2 | DIASTOLIC BLOOD PRESSURE: 60 MMHG | HEIGHT: 61 IN | SYSTOLIC BLOOD PRESSURE: 118 MMHG | RESPIRATION RATE: 18 BRPM | TEMPERATURE: 97.8 F

## 2025-04-18 DIAGNOSIS — K59.09 CHRONIC CONSTIPATION: ICD-10-CM

## 2025-04-18 DIAGNOSIS — E11.43 GASTROPARESIS DUE TO DM  (HCC): ICD-10-CM

## 2025-04-18 DIAGNOSIS — K74.00 LIVER FIBROSIS: ICD-10-CM

## 2025-04-18 DIAGNOSIS — K31.84 GASTROPARESIS DUE TO DM  (HCC): ICD-10-CM

## 2025-04-18 DIAGNOSIS — B37.31 VULVOVAGINITIS DUE TO YEAST: Primary | ICD-10-CM

## 2025-04-18 DIAGNOSIS — Z86.0100 PERSONAL HISTORY OF COLON POLYPS, UNSPECIFIED: ICD-10-CM

## 2025-04-18 DIAGNOSIS — K22.70 BARRETT'S ESOPHAGUS WITHOUT DYSPLASIA: Primary | ICD-10-CM

## 2025-04-18 DIAGNOSIS — R13.10 DYSPHAGIA, UNSPECIFIED TYPE: ICD-10-CM

## 2025-04-18 DIAGNOSIS — F17.200 TOBACCO USE DISORDER: ICD-10-CM

## 2025-04-18 PROCEDURE — 99214 OFFICE O/P EST MOD 30 MIN: CPT | Performed by: PHYSICIAN ASSISTANT

## 2025-04-18 RX ORDER — LACTULOSE 10 G/15ML
20 SOLUTION ORAL DAILY
COMMUNITY

## 2025-04-18 RX ORDER — SODIUM CHLORIDE, SODIUM LACTATE, POTASSIUM CHLORIDE, CALCIUM CHLORIDE 600; 310; 30; 20 MG/100ML; MG/100ML; MG/100ML; MG/100ML
125 INJECTION, SOLUTION INTRAVENOUS CONTINUOUS
OUTPATIENT
Start: 2025-04-18

## 2025-04-18 RX ORDER — FLUCONAZOLE 150 MG/1
150 TABLET ORAL
Qty: 3 TABLET | Refills: 0 | Status: SHIPPED | OUTPATIENT
Start: 2025-04-18 | End: 2025-04-25

## 2025-04-18 NOTE — ASSESSMENT & PLAN NOTE
History of such, thought to be secondary to uncontrolled diabetes and she also takes narcotic analgesia.  Once again commend small frequent meals, avoiding foods high in saturated fats and difficult to digest (foods.  Strongly encouraged continued work on her glycemic control.  She continues to have intermittent nausea and vomiting despite Zofran, we did review the potential to trial Reglan though I would like to update her EKG first to ensure she has an appropriate QTc interval.  I also reviewed potential side effects including EKG abnormalities and tardive dyskinesia.  I also encouraged her to trial cannabis cessation for at least 8 weeks or so, though she feels she is unable to do so at this time.  Lab Results   Component Value Date    HGBA1C 10.1 (H) 03/06/2025   Orders:    ECG 12 lead; Future

## 2025-04-18 NOTE — PATIENT INSTRUCTIONS
Stay on pantoprazole.   Work on quitting smoking.     Small frequent meals.     Clear liquid diet for dinner the day before the scope.   Eat lite all day before the scope.     Take lactulose 20mL daily.   Adjust per your needs.

## 2025-04-18 NOTE — PROGRESS NOTES
Name: Helena Newton      : 1965      MRN: [id number]  Encounter Provider: Sol Raines PA-C  Encounter Date: 2025   Encounter department: [facility] [place]  :  Assessment & Plan  Menendez's esophagus without dysplasia  History of such, last EGD in 2022 demonstrated intestinal metaplasia without dysplasia.  Continue PPI indefinitely.  Once again encourage smoking cessation.  Due for repeat EGD for surveillance purposes.    Recommend diet and lifestyle modifications for GERD.  This includes avoiding spicy, saucy, greasy/oily foods, citrus, EtOH, NSAIDs, tobacco.  Avoid eating within 2 to 3 hours of bed.  Elevating height of bed 6 inches on blocks may be beneficial.  Weight loss would likely be beneficial.    I discussed informed consent with the patient. The risks/benefits/alternatives of the procedure were discussed with the patient. Risks included, but not limited to, infection, bleeding, perforation, injury to organs in the abdomen, missed lesion and incomplete procedure were discussed. Patient was agreeable.    Orders:    EGD; Future    Dysphagia, unspecified type  VSS with SLP in 2024 with mild oral dysphagia.  They recommended slow rate of feeding, alternating liquids with solids and a swallow prior to additional oral intake, as well as upright position during and after meals.  Last EGD in 2022 with no specific esophageal abnormalities noted.  She had manometry in 2018 which was unremarkable.  She feels discomfort at the level of her neck and is interested in being evaluated by ENT for her persistent issues.  Orders:    Ambulatory Referral to Otolaryngology; Future    Gastroparesis due to DM  (HCC)  History of such, thought to be secondary to uncontrolled diabetes and she also takes narcotic analgesia.  Once again commend small frequent meals, avoiding foods high in saturated fats and difficult to digest (foods.  Strongly encouraged continued work on her  glycemic control.  She continues to have intermittent nausea and vomiting despite Zofran, we did review the potential to trial Reglan though I would like to update her EKG first to ensure she has an appropriate QTc interval.  I also reviewed potential side effects including EKG abnormalities and tardive dyskinesia.  I also encouraged her to trial cannabis cessation for at least 8 weeks or so, though she feels she is unable to do so at this time.  Lab Results   Component Value Date    HGBA1C 10.1 (H) 03/06/2025   Orders:    ECG 12 lead; Future    Liver fibrosis  Reviewed findings from the elastography in 02/2024 demonstrating as S0, F3.  I previously recommended that the patient be evaluated by my hepatology colleagues, and once again shared this recommendation with her today.  It appears she has preserved synthetic liver function.  I wonder if the elastography may have been an overestimate?    Previous imaging studies did comment on hepatic steatosis though interestingly elastography did not.  Do think her metabolic comorbidities may be playing a role regarding liver findings.  She does not have any significant EtOH intake at present.    We will plan to repeat elastography now given it has been a year.  Orders:    US elastography/UGAP; Future    EGD; Future    Chronic constipation  History of such, she had tried numerous over-the-counter laxatives and was prescribed linaclotide which was suboptimal, as well as prescribed Motegrity which was not covered by her insurance.  She is currently on lactulose which she finds is helpful when she uses it.  I have reviewed with her to take smaller amounts daily to help prevent recurrent constipation and adjust to effect.       Tobacco use disorder  Once again reviewed recommendation for smoking cessation.       Personal history of colon polyps, unspecified  Last colon in 02/2022 with hyperplastic polyp only.  Endoscopist recommended 5 year recall given personal history of colon  polyps.  Due in 02/2027.       Follow-up after repeat endoscopy is completed.    History of Present Illness   HPI  Helena Newton is a 59 y.o. female who presents for f/u for Menendez's esophagus. Pmhx sig for Menendez's esophagus, gastroparesis, CAD, HTN, HLD, DM2, COPD, anxiety/depression, tobacco use disorder.     History obtained from: patient    Patient was last evaluated in 02/2024.  She was complaining of dysphagia at that time and a video swallow with speech was completed which demonstrated mild oral dysphagia.  She also had a history of fatty liver and had an elastography completed in 02/2024 which demonstrated F3, S0.  Recommendation to follow-up with hepatology was made.    04/18/25:    Patient is here today with her son.  She is not having any heartburn.  She does still get nausea quite frequently as well as infrequent nonbloody emesis.  She does also still feel dysphagia intermittently at the level of her neck, has some discomfort in this area related to her swallowing.  Feels early satiety at times.  No abnormal weight loss since last office visit.    Pertaining to her bowels, she feels that the lactulose works when she takes this though daily dosing resulted in diarrhea.  She has BM every 3 to 4 days or so.  She needs to strain intermittently.  She gets a scant amount of blood on the wipe only with straining.  No large volume hematochezia or melenic stool reported. No abd pain or rectal pain related to defecation.    NSAIDs: prn    Etoh: none   Tobacco: 1 PPD   Cannabis: nightly      05/2023: A1C: 13.4   09/2023: BUN 16, Cr 0.65, AST 13, ALT 15, , albumin 4.0, t bili 0.36, Hb 16.6, MCV 83, Plt 256  12/2023: Hb 15.8, MCV 87, Plt 255, A1C 13.2, BUN 19, Cr 0.63, AST 15, ALT 16, ALP 94, t bli 0.38, albumin 3.8  02/2024: Elastography: S0,F3  03/2024: A1c 10.1, BUN 19, creatinine 0.66, AST 16, ALT 20, ALP 87, albumin 3.5, T. bili 0.37, Hb 15, MCV 87, platelets 229, TSH 0.98, INR 0.92     11/2023: CT  neck: No cervical mass lesion or pathologic adenopathy. Heterogeneous thyroid gland with subcentimeter nodules for which no additional work-up is recommended at this time  09/2023: CT A/P: Diffuse bladder wall thickening consistent with cystitis. Enhancement of the left ureteral lining suggesting ascending infection. Stable nonobstructing 4 mm left lower pole intrarenal calculus.  02/2022: RUQ US: hepatomegaly and hepatic steatosis      Endoscopic history:   EGD: 02/2022: Menendez's tongue 34 cm; Mild inflammation of the antrum and incisura  A. Stomach, antrum, biopsy:  Chronic inactive antral gastritis. Negative for Helicobacter pylori, by H&E stain. Negative for atrophy, intestinal metaplasia, dysplasia or carcinoma.  B. Stomach, body, biopsy: Chronic inactive oxyntic gastritis. Negative for Helicobacter pylori, by H&E stain. Negative for atrophy, intestinal metaplasia, dysplasia or carcinoma  C. Stomach, fundus, biopsy: Chronic inactive oxyntic gastritis. Negative for Helicobacter pylori, by H&E stain. Negative for atrophy, intestinal metaplasia, dysplasia or carcinoma  D. Esophagus, 34 cm, biopsy: Gastroesophageal junction mucosa with intestinal metaplasia, compatible with Menendez's esophagus in the correct clinical setting. Negative for dysplasia or carcinoma.  E. Esophagus, proximal, biopsy: Benign squamous mucosa without significant histopathologic abnormality.Intraepithelial eosinophils are not increased, negative for features of eosinophilic esophagitis. Negative for dysplasia or carcinoma  Colon: 02/2022: One sessile polyp measuring smaller than 5 mm in the distal rectum; otherwise normal   F. Colon, rectal polyp, biopsy: Inflammatory polyp with hyperplastic/reactive surface epithelial change, negative for dysplasia or carcinoma   Esophageal manometry 12/2018: normal motility     Review of Systems  Per HPI    Past Medical History   Past Medical History:   Diagnosis Date    Angina pectoris (HCC)     recent     Anxiety     Arthritis     Asthma     Carpal tunnel syndrome     Chronic headaches     COPD (chronic obstructive pulmonary disease) (HCC)     Diabetes (HCC)     Diabetes mellitus (HCC)     GERD (gastroesophageal reflux disease)     Headache     Hyperlipidemia     Hypertension     Kidney stone     Left bundle branch block     LBBB    MI (myocardial infarction) (HCC)     Myocardial infarction (HCC)     Neuropathy     PTSD (post-traumatic stress disorder)     RLS (restless legs syndrome)     Shortness of breath      Past Surgical History:   Procedure Laterality Date    ANGIOPLASTY      BREAST SURGERY  2016    Reduction    CARDIAC CATHETERIZATION  2018     Mid LAD: There was a tubular 40 % stenosis     SECTION      COLONOSCOPY      HYSTERECTOMY  2018    Complete    KIDNEY STONE SURGERY      LAPAROSCOPY      IN COLONOSCOPY FLX DX W/COLLJ SPEC WHEN PFRMD N/A 2017    Procedure: EGD AND COLONOSCOPY;  Surgeon: Wilberto Galeano MD;  Location: [facility];  Service: Gastroenterology    IN ESOPHAGOGASTRODUODENOSCOPY TRANSORAL DIAGNOSTIC N/A 10/10/2018    Procedure: ESOPHAGOGASTRODUODENOSCOPY (EGD);  Surgeon: Wilberto Galeano MD;  Location: [facility];  Service: Gastroenterology    IN LAPS TOTAL HYSTERECT 250 GM/< W/RMVL TUBE/OVARY N/A 2018    Procedure: ROBOTIC ASSISTED TOTAL LAPAROSCOPIC HYSTERECTOMY, BILATERAL SALPINGOOPHERECTOMY,;  Surgeon: Daron Valdez MD;  Location: [facility];  Service: Gynecology Oncology    UPPER GASTROINTESTINAL ENDOSCOPY      WRIST SURGERY Right      Family History   Problem Relation Age of Onset    Diabetes Mother     Breast cancer Mother 45    Lung cancer Father 54    Diabetes Sister     Brain cancer Maternal Aunt 72    Breast cancer Other 25      reports that she has been smoking cigarettes. She started smoking about 52 years ago. She has a 52.3 pack-year smoking history. She has never used smokeless tobacco. She reports that she does not currently use  alcohol. She reports current drug use. Frequency: 7.00 times per week. Drug: Marijuana.  Current Outpatient Medications   Medication Instructions    Accu-Chek Guide test strip use to CHECK BLOOD GLUCOSE four times a day    albuterol (Ventolin HFA) 90 mcg/act inhaler 2 puffs, Inhalation, Every 4 hours PRN    BD Pen Needle Dayna 2nd Gen 32G X 4 MM MISC 4 time a day    bisoprolol (ZEBETA) 5 mg, Oral, Daily    Blood Glucose Monitoring Suppl (OneTouch Verio Reflect) w/Device KIT Check blood sugars twice daily. Please substitute with appropriate alternative as covered by patient's insurance. Dx: E11.65    celecoxib (CELEBREX) 100 mg, Oral, 2 times daily    estradiol (ESTRACE) 0.1 mg/g vaginal cream Apply generous fingertip sized amount into vagina every 3rd night.    fluconazole (DIFLUCAN) 150 mg, Oral, Every 3 days    glucose blood (OneTouch Verio) test strip Check blood sugars twice daily. Please substitute with appropriate alternative as covered by patient's insurance. Dx: E11.65    Insulin Disposable Pump (V-Go 30) 30 UNIT/24HR KIT USE AS DIRECTED SEE DIABETES EDUCATOR FOR TRAINING    insulin lispro (HumaLOG) 100 units/mL injection To use in V-GO maximum daily daily use of 60 units    Lidocaine 4 % PTCH Apply externally    losartan (COZAAR) 25 mg tablet Take Every Monday, Wednesday, Friday and Every Other Saturday.    Magnesium 400 mg, Oral, Daily    metFORMIN (GLUCOPHAGE-XR) 1,000 mg, Oral, Daily at bedtime    NON FORMULARY THC Cream     ondansetron (ZOFRAN) 4 mg, Oral, Every 8 hours PRN    OneTouch Delica Lancets 33G MISC Check blood sugars twice daily. Please substitute with appropriate alternative as covered by patient's insurance. Dx: E11.65    oxyCODONE (ROXICODONE) 5 mg, 3 times daily PRN    pantoprazole (PROTONIX) 20 mg, Oral, 2 times daily    prazosin (MINIPRESS) 2 mg, Oral, Daily at bedtime    pregabalin (LYRICA) 100 mg, Oral, 2 times daily    rosuvastatin (CRESTOR) 20 mg, Oral, Daily    Spiriva Respimat 2.5  "MCG/ACT AERS inhaler 2 puffs, Inhalation, Daily    Vitamin D3 5,000 Units, Oral, Daily    zolpidem (AMBIEN) 10 mg, Daily at bedtime PRN    ZTlido 1.8 % PTCH apply 1 patch once daily REMOVE after 12 hours     Allergies   Allergen Reactions    Betamethasone Hives    Lisinopril Cough    Losartan Dizziness    Other Hives     Surgical tape    Prednisone Other (See Comments)     Thrush      Cat Dander Rash         Objective   /60 (BP Location: Right arm, Patient Position: Sitting, Cuff Size: Standard)   Pulse 74   Temp 97.8 °F (36.6 °C) (Temporal)   Resp 18   Ht 5' 1\" (1.549 m)   Wt 70.3 kg (155 lb)   SpO2 96%   BMI 29.29 kg/m²      Physical Exam  Vitals and nursing note reviewed.   Constitutional:       General: She is not in acute distress.     Appearance: She is well-developed.   HENT:      Head: Normocephalic and atraumatic.   Eyes:      General: No scleral icterus.     Conjunctiva/sclera: Conjunctivae normal.   Cardiovascular:      Rate and Rhythm: Normal rate.   Pulmonary:      Effort: Pulmonary effort is normal.   Abdominal:      General: Bowel sounds are normal. There is no distension.      Palpations: Abdomen is soft.      Tenderness: There is no abdominal tenderness. There is no guarding or rebound.   Skin:     General: Skin is warm and dry.      Coloration: Skin is not jaundiced.   Neurological:      General: No focal deficit present.      Mental Status: She is alert.   Psychiatric:         Mood and Affect: Mood normal.         Behavior: Behavior normal.       **Please note:  Dictation voice to text software may have been used in the creation of this record.  Occasional wrong word or “sound alike” substitutions may have occurred due to the inherent limitations of voice recognition software.  Read the chart carefully and recognize, using context, where substitutions have occurred.**  "

## 2025-04-21 ENCOUNTER — TELEPHONE (OUTPATIENT)
Age: 60
End: 2025-04-21

## 2025-04-21 NOTE — ASSESSMENT & PLAN NOTE
History of such, she had tried numerous over-the-counter laxatives and was prescribed linaclotide which was suboptimal, as well as prescribed Motegrity which was not covered by her insurance.  She is currently on lactulose which she finds is helpful when she uses it.  I have reviewed with her to take smaller amounts daily to help prevent recurrent constipation and adjust to effect.

## 2025-04-21 NOTE — TELEPHONE ENCOUNTER
Left message on patient voicemail to call back for further prep instructions for her upcoming EGD.   EGD 5/20/2025

## 2025-04-21 NOTE — TELEPHONE ENCOUNTER
Patient returning call.  Discussed Martha recommendations for EGD.  Patient states understanding.

## 2025-04-21 NOTE — TELEPHONE ENCOUNTER
----- Message from Sol Raines PA-C sent at 4/21/2025 10:50 AM EDT -----  Scot Andrews,  I was completing this patient's note and I have an update.  I would actually like her to have a clear liquid diet for the entire day before the endoscopy because of her history of obesity and I am concerned that she may residual food in the stomach so if possible clear liquids entire day prior. Thanks!

## 2025-04-23 DIAGNOSIS — Z79.4 TYPE 2 DIABETES MELLITUS WITH HYPERGLYCEMIA, WITH LONG-TERM CURRENT USE OF INSULIN (HCC): ICD-10-CM

## 2025-04-23 DIAGNOSIS — E11.65 TYPE 2 DIABETES MELLITUS WITH HYPERGLYCEMIA, WITH LONG-TERM CURRENT USE OF INSULIN (HCC): ICD-10-CM

## 2025-04-23 RX ORDER — METFORMIN HYDROCHLORIDE 500 MG/1
1000 TABLET, EXTENDED RELEASE ORAL
Qty: 180 TABLET | Refills: 1 | Status: SHIPPED | OUTPATIENT
Start: 2025-04-23

## 2025-05-02 DIAGNOSIS — N95.2 VAGINAL ATROPHY: ICD-10-CM

## 2025-05-02 RX ORDER — ESTRADIOL 0.1 MG/G
CREAM VAGINAL
Qty: 42.5 G | Refills: 1 | Status: SHIPPED | OUTPATIENT
Start: 2025-05-02

## 2025-05-06 ENCOUNTER — HOSPITAL ENCOUNTER (OUTPATIENT)
Dept: ULTRASOUND IMAGING | Facility: HOSPITAL | Age: 60
Discharge: HOME | End: 2025-05-06
Attending: PHYSICIAN ASSISTANT

## 2025-05-06 DIAGNOSIS — K74.00 LIVER FIBROSIS: ICD-10-CM

## 2025-05-13 ENCOUNTER — APPOINTMENT (OUTPATIENT)
Dept: LAB | Facility: CLINIC | Age: 60
End: 2025-05-13
Payer: COMMERCIAL

## 2025-05-13 ENCOUNTER — HOSPITAL ENCOUNTER (OUTPATIENT)
Dept: ULTRASOUND IMAGING | Facility: HOSPITAL | Age: 60
Discharge: HOME | End: 2025-05-13
Attending: PHYSICIAN ASSISTANT
Payer: COMMERCIAL

## 2025-05-13 DIAGNOSIS — M54.17 LUMBOSACRAL NEURITIS: ICD-10-CM

## 2025-05-13 DIAGNOSIS — Z01.812 PRE-OPERATIVE LABORATORY EXAMINATION: ICD-10-CM

## 2025-05-13 DIAGNOSIS — M47.816 LUMBAR SPONDYLOSIS: ICD-10-CM

## 2025-05-13 DIAGNOSIS — M47.897 OTHER OSTEOARTHRITIS OF SPINE, LUMBOSACRAL REGION: ICD-10-CM

## 2025-05-13 DIAGNOSIS — K74.00 LIVER FIBROSIS: ICD-10-CM

## 2025-05-13 DIAGNOSIS — M46.1 SACROILIITIS, NOT ELSEWHERE CLASSIFIED (HCC): ICD-10-CM

## 2025-05-13 LAB
ANION GAP SERPL CALCULATED.3IONS-SCNC: 6 MMOL/L (ref 4–13)
APTT PPP: 29 SECONDS (ref 23–34)
BASOPHILS # BLD AUTO: 0.04 THOUSANDS/ÂΜL (ref 0–0.1)
BASOPHILS NFR BLD AUTO: 1 % (ref 0–1)
BUN SERPL-MCNC: 20 MG/DL (ref 5–25)
CALCIUM SERPL-MCNC: 9.5 MG/DL (ref 8.4–10.2)
CHLORIDE SERPL-SCNC: 99 MMOL/L (ref 96–108)
CO2 SERPL-SCNC: 35 MMOL/L (ref 21–32)
CREAT SERPL-MCNC: 0.71 MG/DL (ref 0.6–1.3)
EOSINOPHIL # BLD AUTO: 0.2 THOUSAND/ÂΜL (ref 0–0.61)
EOSINOPHIL NFR BLD AUTO: 2 % (ref 0–6)
ERYTHROCYTE [DISTWIDTH] IN BLOOD BY AUTOMATED COUNT: 13 % (ref 11.6–15.1)
GFR SERPL CREATININE-BSD FRML MDRD: 94 ML/MIN/1.73SQ M
GLUCOSE P FAST SERPL-MCNC: 307 MG/DL (ref 65–99)
HCT VFR BLD AUTO: 48.4 % (ref 34.8–46.1)
HGB BLD-MCNC: 15.3 G/DL (ref 11.5–15.4)
IMM GRANULOCYTES # BLD AUTO: 0.02 THOUSAND/UL (ref 0–0.2)
IMM GRANULOCYTES NFR BLD AUTO: 0 % (ref 0–2)
INR PPP: 0.84 (ref 0.85–1.19)
LYMPHOCYTES # BLD AUTO: 2.78 THOUSANDS/ÂΜL (ref 0.6–4.47)
LYMPHOCYTES NFR BLD AUTO: 33 % (ref 14–44)
MCH RBC QN AUTO: 27.6 PG (ref 26.8–34.3)
MCHC RBC AUTO-ENTMCNC: 31.6 G/DL (ref 31.4–37.4)
MCV RBC AUTO: 87 FL (ref 82–98)
MONOCYTES # BLD AUTO: 0.53 THOUSAND/ÂΜL (ref 0.17–1.22)
MONOCYTES NFR BLD AUTO: 6 % (ref 4–12)
NEUTROPHILS # BLD AUTO: 4.75 THOUSANDS/ÂΜL (ref 1.85–7.62)
NEUTS SEG NFR BLD AUTO: 58 % (ref 43–75)
NRBC BLD AUTO-RTO: 0 /100 WBCS
PLATELET # BLD AUTO: 228 THOUSANDS/UL (ref 149–390)
PMV BLD AUTO: 10.5 FL (ref 8.9–12.7)
POTASSIUM SERPL-SCNC: 4.1 MMOL/L (ref 3.5–5.3)
PROTHROMBIN TIME: 11.8 SECONDS (ref 12.3–15)
RBC # BLD AUTO: 5.54 MILLION/UL (ref 3.81–5.12)
SODIUM SERPL-SCNC: 140 MMOL/L (ref 135–147)
WBC # BLD AUTO: 8.32 THOUSAND/UL (ref 4.31–10.16)

## 2025-05-13 PROCEDURE — 80048 BASIC METABOLIC PNL TOTAL CA: CPT

## 2025-05-13 PROCEDURE — 85610 PROTHROMBIN TIME: CPT

## 2025-05-13 PROCEDURE — 76981 USE PARENCHYMA: CPT

## 2025-05-13 PROCEDURE — 85730 THROMBOPLASTIN TIME PARTIAL: CPT

## 2025-05-13 PROCEDURE — 36415 COLL VENOUS BLD VENIPUNCTURE: CPT

## 2025-05-13 PROCEDURE — 85025 COMPLETE CBC W/AUTO DIFF WBC: CPT

## 2025-05-14 ENCOUNTER — OFFICE VISIT (OUTPATIENT)
Dept: ENDOCRINOLOGY | Facility: CLINIC | Age: 60
End: 2025-05-14
Payer: COMMERCIAL

## 2025-05-14 VITALS
RESPIRATION RATE: 18 BRPM | HEIGHT: 61 IN | HEART RATE: 61 BPM | WEIGHT: 154.6 LBS | SYSTOLIC BLOOD PRESSURE: 110 MMHG | TEMPERATURE: 98 F | BODY MASS INDEX: 29.19 KG/M2 | OXYGEN SATURATION: 95 % | DIASTOLIC BLOOD PRESSURE: 64 MMHG

## 2025-05-14 DIAGNOSIS — I10 PRIMARY HYPERTENSION: Chronic | ICD-10-CM

## 2025-05-14 DIAGNOSIS — E11.65 TYPE 2 DIABETES MELLITUS WITH HYPERGLYCEMIA, WITH LONG-TERM CURRENT USE OF INSULIN (HCC): Primary | ICD-10-CM

## 2025-05-14 DIAGNOSIS — Z79.4 TYPE 2 DIABETES MELLITUS WITH HYPERGLYCEMIA, WITH LONG-TERM CURRENT USE OF INSULIN (HCC): Primary | ICD-10-CM

## 2025-05-14 DIAGNOSIS — E78.2 MIXED HYPERLIPIDEMIA: Chronic | ICD-10-CM

## 2025-05-14 LAB — SL AMB POCT HEMOGLOBIN AIC: 11.9 (ref ?–6.5)

## 2025-05-14 PROCEDURE — 83036 HEMOGLOBIN GLYCOSYLATED A1C: CPT | Performed by: STUDENT IN AN ORGANIZED HEALTH CARE EDUCATION/TRAINING PROGRAM

## 2025-05-14 PROCEDURE — G2211 COMPLEX E/M VISIT ADD ON: HCPCS | Performed by: STUDENT IN AN ORGANIZED HEALTH CARE EDUCATION/TRAINING PROGRAM

## 2025-05-14 PROCEDURE — 99214 OFFICE O/P EST MOD 30 MIN: CPT | Performed by: STUDENT IN AN ORGANIZED HEALTH CARE EDUCATION/TRAINING PROGRAM

## 2025-05-14 RX ORDER — DULAGLUTIDE 0.75 MG/.5ML
0.75 INJECTION, SOLUTION SUBCUTANEOUS WEEKLY
Qty: 2 ML | Refills: 0 | Status: SHIPPED | OUTPATIENT
Start: 2025-05-14

## 2025-05-14 RX ORDER — DULAGLUTIDE 1.5 MG/.5ML
1.5 INJECTION, SOLUTION SUBCUTANEOUS WEEKLY
Qty: 6 ML | Refills: 0 | Status: SHIPPED | OUTPATIENT
Start: 2025-06-13

## 2025-05-14 NOTE — ASSESSMENT & PLAN NOTE
Her blood pressure is well-controlled at 110/64 mmHg. She is currently taking losartan 25 mg daily.

## 2025-05-14 NOTE — PROGRESS NOTES
Name: Helena Newton      : 1965      MRN: [id number]  Encounter Provider: Mary Bruce MD  Encounter Date: 2025   Encounter department: [facility] [place]    Chief Complaint   Patient presents with   • Follow-up   :  Assessment & Plan  Type 2 diabetes mellitus with hyperglycemia, with long-term current use of insulin (HCC)    Lab Results   Component Value Date    HGBA1C 11.9 (A) 2025     Her blood glucose levels remain significantly elevated, with an A1c exceeding 11%, at her last visit she was placed on V-GO 30, hoping that decreasing frequency of daily injections will improve her compliance, unfortunately not effective. She is instructed to apply the V-Go device around her belly button, on her buttocks, or the back of her arm not in chest area. She will continue with metformin 1000 mg twice daily.   Patient denies personal and family history of MCT and MEN2 tumors. Patient denies personal history of pancreatitis. Side effects discussed but not limited to diarrhea, bloating, constipation, GI upset, headache, fatigue and dizziness.   Trulicity 0.75 mg will be administered weekly for 4 weeks, after which the dosage will be increased to 1.5 mg weekly. She is advised to administer 6 clicks ( 12 units ) of V-Go before each meal.   V-Go to increase to 40 units dose, when she finished her current dose.  Emphasized importance of daily exercise, weight loss, caloric reduction, small meals, consumption of multiple servings of fruits and vegetables and avoidance of concentrated sweets such as juice and soda.     Orders:  •  POCT hemoglobin A1c  •  Dulaglutide (Trulicity) 0.75 MG/0.5ML SOAJ; Inject 0.75 mg under the skin once a week  •  Dulaglutide (Trulicity) 1.5 MG/0.5ML SOAJ; Inject 1.5 mg under the skin once a week Do not start before 2025.  •  Comprehensive metabolic panel; Future    Primary hypertension  Her blood pressure is well-controlled at 110/64 mmHg.  "She is currently taking losartan 25 mg daily.       Mixed hyperlipidemia  She is currently taking Crestor 20 mg daily for hyperlipidemia, and to be continued.              History of Present Illness   History of Present Illness    Helena Newton is a 59 y.o. female with type 2 diabetes seen in follow up.       She was initiated on V-Go 30 during her last visit on 12/19/2024, she has been utilizing with a dosage of 6 clicks per meal. Despite this, she continues to experience elevated blood glucose levels. She has recently increased her metformin dosage to 2 tablets ( 1000 mg ) in the morning and 2 at night due to persistently high blood glucose levels. She reports no history of pancreatitis or  medullary thyroid cancer. She has been consuming ICE drinks, which are sugar-free and calorie-free. She has been applying the V-Go device on her chest She has not attempted to place the device on her abdomen. She typically administers 5 to 6 clicks prior to each meal,     Her last ophthalmological examination was conducted in 09/2024, and she is due for a follow-up in 09/2025.    She is currently on losartan 25 mg daily for blood pressure management.    She is also taking Crestor 20 mg daily for cholesterol control.                    Last Eye Exam: 12/18/2023  Last Foot Exam: 06/10/2024  Health Maintenance   Topic Date Due   • Diabetic Foot Exam  06/10/2025   • Diabetic Eye Exam  12/18/2025     Pertinent Medical History           Review of Systems as per [facility]    Medical History Reviewed by provider this encounter:     .  Medications Ordered Prior to Encounter[1]      Medical History Reviewed by provider this encounter:     .    Objective   /64 (BP Location: Left arm, Patient Position: Sitting, Cuff Size: Adult)   Pulse 61   Temp 98 °F (36.7 °C) (Temporal)   Resp 18   Ht 5' 1\" (1.549 m)   Wt 70.1 kg (154 lb 9.6 oz)   SpO2 95%   BMI 29.21 kg/m²      Body mass index is 29.21 kg/m².  Wt Readings from Last 3 Encounters: "   05/14/25 70.1 kg (154 lb 9.6 oz)   05/12/25 70.3 kg (155 lb)   04/18/25 70.3 kg (155 lb)     Physical Exam  Vital Signs  Blood pressure is 110/64.  Physical Exam  Constitutional:       General: She is not in acute distress.     Appearance: She is not ill-appearing.   HENT:      Head: Normocephalic and atraumatic.   Pulmonary:      Effort: Pulmonary effort is normal. No respiratory distress.     Neurological:      Mental Status: She is oriented to person, place, and time.       Results  Laboratory Studies  A1c is more than 11.  Labs:   Lab Results   Component Value Date    HGBA1C 11.9 (A) 05/14/2025    HGBA1C 10.1 (H) 03/06/2025    HGBA1C 10.8 (A) 01/20/2025     Lab Results   Component Value Date    CREATININE 0.71 05/13/2025    CREATININE 0.66 03/06/2025    CREATININE 0.76 12/30/2024    BUN 20 05/13/2025    K 4.1 05/13/2025    CL 99 05/13/2025    CO2 35 (H) 05/13/2025     eGFR   Date Value Ref Range Status   05/13/2025 94 ml/min/1.73sq m Final     Lab Results   Component Value Date    HDL 39 (L) 03/06/2025    TRIG 142 03/06/2025     Lab Results   Component Value Date    ALT 20 03/06/2025    AST 16 03/06/2025    GGT 18 11/02/2022    ALKPHOS 87 03/06/2025     Lab Results   Component Value Date    NND2SSTYJHEJ 0.998 03/06/2025    PNT4LJGVLTHI 1.813 12/12/2024    ETH9QTYYVTYN 3.027 12/06/2023       Patient Instructions   Today, we started Trulicity, 0.75 mg weekly for 4 weeks, and then you will receive a new dose after a month for 1.5 milligram weekly.  Continue metformin 1000 mg twice a day.  For your next refill of V-go, let me know to order the new form of 40 units.    Discussed with the patient and all questioned fully answered. She will call me if any problems arise.             [1]  Current Outpatient Medications on File Prior to Visit   Medication Sig Dispense Refill   • Accu-Chek Guide test strip      • albuterol (Ventolin HFA) 90 mcg/act inhaler Inhale 2 puffs every 4 (four) hours as needed for wheezing 18 g  5   • BD Pen Needle Dayna 2nd Gen 32G X 4 MM MISC 4 time a day 200 each 2   • bisoprolol (ZEBETA) 5 mg tablet take 1 tablet by mouth once daily 90 tablet 1   • Blood Glucose Monitoring Suppl (OneTouch Verio Reflect) w/Device KIT Check blood sugars twice daily. Please substitute with appropriate alternative as covered by patient's insurance. Dx: E11.65 1 kit 0   • celecoxib (CeleBREX) 100 mg capsule Take 1 capsule (100 mg total) by mouth 2 (two) times a day 60 capsule 2   • Cholecalciferol (Vitamin D3) 125 MCG (5000 UT) CAPS Take 1 capsule (5,000 Units total) by mouth in the morning 90 capsule 3   • estradiol (ESTRACE) 0.1 mg/g vaginal cream Apply generous fingertip sized amount into vagina every 3rd night. 42.5 g 1   • glucose blood (OneTouch Verio) test strip Check blood sugars twice daily. Please substitute with appropriate alternative as covered by patient's insurance. Dx: E11.65 200 each 3   • Insulin Disposable Pump (V-Go 30) 30 UNIT/24HR KIT USE AS DIRECTED SEE DIABETES EDUCATOR FOR TRAINING 90 kit 1   • insulin lispro (HumaLOG) 100 units/mL injection To use in V-GO maximum daily daily use of 60 units 60 mL 1   • lactulose (CHRONULAC) 10 g/15 mL solution Take 20 g by mouth in the morning     • Lidocaine 4 % PTCH Apply topically     • losartan (COZAAR) 25 mg tablet Take Every Monday, Wednesday, Friday and Every Other Saturday. 90 tablet 0   • Magnesium 400 MG TABS Take 1 tablet (400 mg total) by mouth in the morning 90 tablet 3   • metFORMIN (GLUCOPHAGE-XR) 500 mg 24 hr tablet take 2 tablets by mouth at bedtime (Patient taking differently: Take 1,000 mg by mouth daily at bedtime Two tablets twice a day) 180 tablet 1   • NON FORMULARY THC Cream     • ondansetron (ZOFRAN) 4 mg tablet Take 1 tablet (4 mg total) by mouth every 8 (eight) hours as needed for nausea or vomiting 20 tablet 1   • OneTouch Delica Lancets 33G MISC Check blood sugars twice daily. Please substitute with appropriate alternative as covered by  patient's insurance. Dx: E11.65 200 each 3   • oxyCODONE (ROXICODONE) 5 immediate release tablet Take 5 mg by mouth as needed in the morning and 5 mg as needed at noon and 5 mg as needed in the evening.     • pantoprazole (PROTONIX) 20 mg tablet Take 1 tablet (20 mg total) by mouth 2 (two) times a day 180 tablet 3   • prazosin (MINIPRESS) 2 mg capsule Take 2 mg by mouth daily at bedtime     • pregabalin (LYRICA) 100 mg capsule Take 100 mg by mouth in the morning and 100 mg in the evening.     • rosuvastatin (CRESTOR) 20 MG tablet take 1 tablet by mouth once daily 90 tablet 1   • Spiriva Respimat 2.5 MCG/ACT AERS inhaler Inhale 2 puffs daily 4 g 0   • zolpidem (AMBIEN) 10 mg tablet Take 10 mg by mouth daily at bedtime as needed for sleep     • ZTlido 1.8 % PTCH        No current facility-administered medications on file prior to visit.

## 2025-05-14 NOTE — PATIENT INSTRUCTIONS
Today, we started Trulicity, 0.75 mg weekly for 4 weeks, and then you will receive a new dose after a month for 1.5 milligram weekly.  Continue metformin 1000 mg twice a day.  For your next refill of V-go, let me know to order the new form of 40 units.

## 2025-05-14 NOTE — ASSESSMENT & PLAN NOTE
Lab Results   Component Value Date    HGBA1C 11.9 (A) 05/14/2025     Her blood glucose levels remain significantly elevated, with an A1c exceeding 11%, at her last visit she was placed on V-GO 30, hoping that decreasing frequency of daily injections will improve her compliance, unfortunately not effective. She is instructed to apply the V-Go device around her belly button, on her buttocks, or the back of her arm not in chest area. She will continue with metformin 1000 mg twice daily.   Patient denies personal and family history of MCT and MEN2 tumors. Patient denies personal history of pancreatitis. Side effects discussed but not limited to diarrhea, bloating, constipation, GI upset, headache, fatigue and dizziness.   Trulicity 0.75 mg will be administered weekly for 4 weeks, after which the dosage will be increased to 1.5 mg weekly. She is advised to administer 6 clicks ( 12 units ) of V-Go before each meal.   V-Go to increase to 40 units dose, when she finished her current dose.  Emphasized importance of daily exercise, weight loss, caloric reduction, small meals, consumption of multiple servings of fruits and vegetables and avoidance of concentrated sweets such as juice and soda.     Orders:  •  POCT hemoglobin A1c  •  Dulaglutide (Trulicity) 0.75 MG/0.5ML SOAJ; Inject 0.75 mg under the skin once a week  •  Dulaglutide (Trulicity) 1.5 MG/0.5ML SOAJ; Inject 1.5 mg under the skin once a week Do not start before June 13, 2025.  •  Comprehensive metabolic panel; Future

## 2025-05-20 ENCOUNTER — ANESTHESIA (OUTPATIENT)
Dept: GASTROENTEROLOGY | Facility: HOSPITAL | Age: 60
End: 2025-05-20
Payer: COMMERCIAL

## 2025-05-20 ENCOUNTER — ANESTHESIA EVENT (OUTPATIENT)
Dept: GASTROENTEROLOGY | Facility: HOSPITAL | Age: 60
End: 2025-05-20
Payer: COMMERCIAL

## 2025-05-20 ENCOUNTER — RA CDI HCC (OUTPATIENT)
Dept: OTHER | Facility: HOSPITAL | Age: 60
End: 2025-05-20

## 2025-05-20 ENCOUNTER — HOSPITAL ENCOUNTER (OUTPATIENT)
Dept: GASTROENTEROLOGY | Facility: HOSPITAL | Age: 60
Setting detail: OUTPATIENT SURGERY
Discharge: HOME | End: 2025-05-20
Attending: PHYSICIAN ASSISTANT
Payer: COMMERCIAL

## 2025-05-20 ENCOUNTER — TELEPHONE (OUTPATIENT)
Age: 60
End: 2025-05-20

## 2025-05-20 VITALS
TEMPERATURE: 97.5 F | RESPIRATION RATE: 20 BRPM | BODY MASS INDEX: 28.89 KG/M2 | OXYGEN SATURATION: 96 % | HEIGHT: 61 IN | WEIGHT: 153 LBS | HEART RATE: 75 BPM | SYSTOLIC BLOOD PRESSURE: 110 MMHG | DIASTOLIC BLOOD PRESSURE: 67 MMHG

## 2025-05-20 DIAGNOSIS — K74.00 LIVER FIBROSIS: ICD-10-CM

## 2025-05-20 DIAGNOSIS — K22.70 BARRETT'S ESOPHAGUS WITHOUT DYSPLASIA: ICD-10-CM

## 2025-05-20 LAB — GLUCOSE SERPL-MCNC: 156 MG/DL (ref 65–140)

## 2025-05-20 PROCEDURE — 82948 REAGENT STRIP/BLOOD GLUCOSE: CPT

## 2025-05-20 RX ORDER — LIDOCAINE HYDROCHLORIDE 20 MG/ML
INJECTION, SOLUTION EPIDURAL; INFILTRATION; INTRACAUDAL; PERINEURAL AS NEEDED
Status: DISCONTINUED | OUTPATIENT
Start: 2025-05-20 | End: 2025-05-20

## 2025-05-20 RX ORDER — PROPOFOL 10 MG/ML
INJECTION, EMULSION INTRAVENOUS AS NEEDED
Status: DISCONTINUED | OUTPATIENT
Start: 2025-05-20 | End: 2025-05-20

## 2025-05-20 RX ORDER — SODIUM CHLORIDE, SODIUM LACTATE, POTASSIUM CHLORIDE, CALCIUM CHLORIDE 600; 310; 30; 20 MG/100ML; MG/100ML; MG/100ML; MG/100ML
INJECTION, SOLUTION INTRAVENOUS CONTINUOUS PRN
Status: DISCONTINUED | OUTPATIENT
Start: 2025-05-20 | End: 2025-05-20

## 2025-05-20 RX ORDER — SODIUM CHLORIDE, SODIUM LACTATE, POTASSIUM CHLORIDE, CALCIUM CHLORIDE 600; 310; 30; 20 MG/100ML; MG/100ML; MG/100ML; MG/100ML
125 INJECTION, SOLUTION INTRAVENOUS CONTINUOUS
Status: DISCONTINUED | OUTPATIENT
Start: 2025-05-20 | End: 2025-05-24 | Stop reason: HOSPADM

## 2025-05-20 RX ORDER — GLYCOPYRROLATE 0.2 MG/ML
INJECTION INTRAMUSCULAR; INTRAVENOUS AS NEEDED
Status: DISCONTINUED | OUTPATIENT
Start: 2025-05-20 | End: 2025-05-20

## 2025-05-20 RX ADMIN — SODIUM CHLORIDE, SODIUM LACTATE, POTASSIUM CHLORIDE, AND CALCIUM CHLORIDE 125 ML/HR: .6; .31; .03; .02 INJECTION, SOLUTION INTRAVENOUS at 07:20

## 2025-05-20 RX ADMIN — PROPOFOL 100 MG: 10 INJECTION, EMULSION INTRAVENOUS at 07:36

## 2025-05-20 RX ADMIN — SODIUM CHLORIDE, SODIUM LACTATE, POTASSIUM CHLORIDE, AND CALCIUM CHLORIDE: .6; .31; .03; .02 INJECTION, SOLUTION INTRAVENOUS at 07:15

## 2025-05-20 RX ADMIN — GLYCOPYRROLATE 0.2 MG: 0.2 INJECTION, SOLUTION INTRAMUSCULAR; INTRAVENOUS at 07:33

## 2025-05-20 RX ADMIN — LIDOCAINE HYDROCHLORIDE 60 MG: 20 INJECTION, SOLUTION EPIDURAL; INFILTRATION; INTRACAUDAL; PERINEURAL at 07:36

## 2025-05-20 NOTE — TELEPHONE ENCOUNTER
Discharge Note:Pt Discharge home alert and oriented x4, left knee with aquacell drsging, clean, dry and intact. + peripheral pulse, denies any numbness or tingling.pt connected to [employer].   Patient states she has been feeling nauseous since she started taking Trulicity. She states she can barely eat due to the nausea and is asking if the provider can give her a medication to stop the nausea. She states she does not want to ask her Endocrinologist, due to not wanting to stop the Trulicity. Please notify patient if medication can be sent

## 2025-05-20 NOTE — H&P
History and Physical - [facility]  Helena Newton 59 y.o. female MRN: [id number]                  HPI: Helena Newton is a 59 y.o. year old female who presents for liver fibrosis, Menendez's      REVIEW OF SYSTEMS: Per the HPI, and otherwise unremarkable.    Historical Information   Past Medical History:   Diagnosis Date    Angina pectoris (HCC)     recent    Anxiety     Arthritis     Asthma     Carpal tunnel syndrome     Chronic headaches     COPD (chronic obstructive pulmonary disease) (HCC)     Diabetes (HCC)     Diabetes mellitus (HCC)     GERD (gastroesophageal reflux disease)     Headache     Hyperlipidemia     Hypertension     Kidney stone     Left bundle branch block     LBBB    MI (myocardial infarction) (HCC)     Myocardial infarction (HCC)     Neuropathy     PTSD (post-traumatic stress disorder)     RLS (restless legs syndrome)     Shortness of breath      Past Surgical History:   Procedure Laterality Date    ANGIOPLASTY      BREAST SURGERY  2016    Reduction    CARDIAC CATHETERIZATION  2018     Mid LAD: There was a tubular 40 % stenosis     SECTION      COLONOSCOPY      HYSTERECTOMY  2018    Complete    KIDNEY STONE SURGERY      LAPAROSCOPY      WI COLONOSCOPY FLX DX W/COLLJ SPEC WHEN PFRMD N/A 2017    Procedure: EGD AND COLONOSCOPY;  Surgeon: Wilberto Galeano MD;  Location: [facility];  Service: Gastroenterology    WI ESOPHAGOGASTRODUODENOSCOPY TRANSORAL DIAGNOSTIC N/A 10/10/2018    Procedure: ESOPHAGOGASTRODUODENOSCOPY (EGD);  Surgeon: Wilberto Galeano MD;  Location: [facility];  Service: Gastroenterology    WI LAPS TOTAL HYSTERECT 250 GM/< W/RMVL TUBE/OVARY N/A 2018    Procedure: ROBOTIC ASSISTED TOTAL LAPAROSCOPIC HYSTERECTOMY, BILATERAL SALPINGOOPHERECTOMY,;  Surgeon: Daron Valdez MD;  Location: [facility];  Service: Gynecology Oncology    UPPER GASTROINTESTINAL ENDOSCOPY      WRIST SURGERY Right      Social History   Social History  "    Substance and Sexual Activity   Alcohol Use Not Currently    Comment: Rarely consumes alcohol - As per [facility]      Social History     Substance and Sexual Activity   Drug Use Yes    Frequency: 7.0 times per week    Types: Marijuana    Comment: medical marijuana  last use over a month     Tobacco Use History[1]  Family History   Problem Relation Age of Onset    Diabetes Mother     Breast cancer Mother 45    Lung cancer Father 54    Diabetes Sister     Brain cancer Maternal Aunt 72    Breast cancer Other 25       Meds/Allergies     Current Medications[2]    Allergies[3]    Objective     /76   Pulse 74   Temp (!) 97.4 °F (36.3 °C) (Temporal)   Resp 16   Ht 5' 1\" (1.549 m)   Wt 69.4 kg (153 lb)   SpO2 97%   BMI 28.91 kg/m²       PHYSICAL EXAM    Gen: NAD  Head: NCAT  CV: RRR  CHEST: Clear  ABD: soft, NT/ND  EXT: no edema      ASSESSMENT/PLAN:  This is a 59 y.o. year old female here for EGD, and she is stable and optimized for her procedure.             [1]   Social History  Tobacco Use   Smoking Status Every Day    Current packs/day: 1.00    Average packs/day: 1 pack/day for 52.4 years (52.4 ttl pk-yrs)    Types: Cigarettes    Start date: 1/1/1973   Smokeless Tobacco Never   Tobacco Comments    pt. smoked this am 5/20   [2]   Current Outpatient Medications:     albuterol (Ventolin HFA) 90 mcg/act inhaler    bisoprolol (ZEBETA) 5 mg tablet    celecoxib (CeleBREX) 100 mg capsule    Cholecalciferol (Vitamin D3) 125 MCG (5000 UT) CAPS    insulin lispro (HumaLOG) 100 units/mL injection    lactulose (CHRONULAC) 10 g/15 mL solution    Lidocaine 4 % PTCH    losartan (COZAAR) 25 mg tablet    Magnesium 400 MG TABS    metFORMIN (GLUCOPHAGE-XR) 500 mg 24 hr tablet    NON FORMULARY    ondansetron (ZOFRAN) 4 mg tablet    oxyCODONE (ROXICODONE) 5 immediate release tablet    pantoprazole (PROTONIX) 20 mg tablet    prazosin (MINIPRESS) 2 mg capsule    pregabalin (LYRICA) 100 mg capsule    rosuvastatin (CRESTOR) 20 MG " tablet    Spiriva Respimat 2.5 MCG/ACT AERS inhaler    zolpidem (AMBIEN) 10 mg tablet    Accu-Chek Guide test strip    BD Pen Needle Dayna 2nd Gen 32G X 4 MM MISC    Blood Glucose Monitoring Suppl (OneTouch Verio Reflect) w/Device KIT    Dulaglutide (Trulicity) 0.75 MG/0.5ML SOAJ    [START ON 6/13/2025] Dulaglutide (Trulicity) 1.5 MG/0.5ML SOAJ    estradiol (ESTRACE) 0.1 mg/g vaginal cream    glucose blood (OneTouch Verio) test strip    Insulin Disposable Pump (V-Go 30) 30 UNIT/24HR KIT    OneTouch Delica Lancets 33G MISC    ZTlido 1.8 % PTCH  [3]   Allergies  Allergen Reactions    Betamethasone Hives    Lisinopril Cough    Losartan Dizziness    Other Hives     Surgical tape    Prednisone Other (See Comments)     Thrush      Cat Dander Rash

## 2025-05-20 NOTE — ANESTHESIA PREPROCEDURE EVALUATION
Procedure:  EGD    HbA1c 11.9  Smoker, did not smoke today  Chronic cough  Used albuterol pre procedure    Relevant Problems   CARDIO   (+) Common migraine without aura   (+) LBBB (left bundle branch block)   (+) Mixed hyperlipidemia   (+) Primary hypertension      ENDO   (+) Type 2 diabetes mellitus with hyperglycemia, with long-term current use of insulin (HCC)      GI/HEPATIC   (+) Esophageal dysphagia   (+) Fatty liver   (+) Gastroesophageal reflux disease with esophagitis      MUSCULOSKELETAL   (+) Chronic midline low back pain with bilateral sciatica   (+) Primary osteoarthritis involving multiple joints      NEURO/PSYCH   (+) Anxiety and depression   (+) Chronic midline low back pain with bilateral sciatica   (+) Common migraine without aura   (+) Gastroparesis due to DM  (HCC)   (+) Other chronic pain      PULMONARY   (+) COPD (chronic obstructive pulmonary disease) (HCC)   (+) Panlobular emphysema (HCC)      Behavioral Health   (+) Medical marijuana use   (+) Opioid dependence, uncomplicated (HCC)      Care Coordination   (+) Medical non-compliance      TTE 12/18/24  •  Left Ventricle: Left ventricular cavity size is normal. Wall thickness is normal. The left ventricular ejection fraction is 52% by biplane measurement. Systolic function is low normal. Wall motion is normal. Diastolic function is mildly abnormal, consistent with grade I (abnormal) relaxation.  •  IVS: There is abnormal septal motion consistent with bundle branch block.  •  Mitral Valve: There is mild annular calcification.  Physical Exam    Airway     Mallampati score: II  TM Distance: >3 FB  Neck ROM: full      Cardiovascular      Dental    edentulous    Pulmonary      Neurological      Other Findings  post-pubertal.      Anesthesia Plan  ASA Score- 3     Anesthesia Type- IV sedation with anesthesia with ASA Monitors.         Additional Monitors:     Airway Plan:     Comment: Recent labs personally reviewed:  Lab Results       Component                 Value               Date                       WBC                      8.32                05/13/2025                 HGB                      15.3                05/13/2025                 PLT                      228                 05/13/2025            Lab Results       Component                Value               Date                       K                        4.1                 05/13/2025                 BUN                      20                  05/13/2025                 CREATININE               0.71                05/13/2025            Lab Results       Component                Value               Date                       PTT                      29                  05/13/2025             Lab Results       Component                Value               Date                       INR                      0.84 (L)            05/13/2025              Blood type A    Patient was consented for sedation with IV anesthetic. Discussed that we will maintain spontaneous respirations and utilize supplemental O2. I discussed the risks of aspiration, hypoxia, laryngospasm and bronchospasm. I discussed the scenarios related to conversion to general anesthetic. All questions answered.     I, Chaya Hopkins MD, have personally seen and evaluated the patient prior to anesthetic care.  I have reviewed the pre-anesthetic record, medical history, allergies, medications and any other medical records if appropriate to the anesthetic care.  If a CRNA is involved in the case, I have reviewed the CRNA assessment, if present, and agree. Patient consented for IV Sedation, general anesthesia as back up. Discussed risks of aspiration, IV infiltration, indications for conversion to general anesthesia. All questions and concerns addressed.   .       Plan Factors-Exercise tolerance (METS): >4 METS.    Chart reviewed. EKG reviewed. Imaging results reviewed. Existing labs reviewed. Patient summary reviewed.    Patient is not  a current smoker.  Patient did not smoke on day of surgery.            Induction-     Postoperative Plan- .   Monitoring Plan - Monitoring plan - standard ASA monitoring          Informed Consent- Anesthetic plan and risks discussed with patient.  I personally reviewed this patient with the CRNA. Discussed and agreed on the Anesthesia Plan with the CRNA..      NPO Status:  Vitals Value Taken Time   Date of last liquid 05/19/25 05/20/25 06:59   Time of last liquid 1900 05/20/25 06:59   Date of last solid 05/19/25 05/20/25 06:59   Time of last solid 1830 05/20/25 06:59

## 2025-05-20 NOTE — ANESTHESIA POSTPROCEDURE EVALUATION
Post-Op Assessment Note    CV Status:  Stable  Pain Score: 0    Pain management: adequate       Mental Status:  Arousable   Hydration Status:  Stable   PONV Controlled:  None   Airway Patency:  Patent     Post Op Vitals Reviewed: Yes    No anethesia notable event occurred.    Staff: CRNA           Last Filed PACU Vitals:  Vitals Value Taken Time   Temp 97.2    Pulse 77    /55    Resp 16    SpO2 93%

## 2025-05-21 NOTE — TELEPHONE ENCOUNTER
Phone call from patient calling back to update her pharmacy. Patient is going to use [facility] from now on.  Please advise. Thank you.

## 2025-05-22 ENCOUNTER — RESULTS FOLLOW-UP (OUTPATIENT)
Dept: GASTROENTEROLOGY | Facility: CLINIC | Age: 60
End: 2025-05-22

## 2025-05-22 DIAGNOSIS — J44.9 COPD (CHRONIC OBSTRUCTIVE PULMONARY DISEASE) (HCC): ICD-10-CM

## 2025-05-22 DIAGNOSIS — E11.65 TYPE 2 DIABETES MELLITUS WITH HYPERGLYCEMIA, WITH LONG-TERM CURRENT USE OF INSULIN (HCC): Chronic | ICD-10-CM

## 2025-05-22 DIAGNOSIS — Z79.4 TYPE 2 DIABETES MELLITUS WITH HYPERGLYCEMIA, WITH LONG-TERM CURRENT USE OF INSULIN (HCC): Chronic | ICD-10-CM

## 2025-05-22 DIAGNOSIS — E55.9 VITAMIN D DEFICIENCY: Chronic | ICD-10-CM

## 2025-05-22 DIAGNOSIS — E83.42 HYPOMAGNESEMIA: Chronic | ICD-10-CM

## 2025-05-22 DIAGNOSIS — K21.00 GASTROESOPHAGEAL REFLUX DISEASE WITH ESOPHAGITIS WITHOUT HEMORRHAGE: ICD-10-CM

## 2025-05-22 DIAGNOSIS — N17.9 AKI (ACUTE KIDNEY INJURY) (HCC): ICD-10-CM

## 2025-05-22 RX ORDER — TIOTROPIUM BROMIDE INHALATION SPRAY 3.12 UG/1
2 SPRAY, METERED RESPIRATORY (INHALATION) DAILY
Qty: 4 G | Refills: 3 | Status: SHIPPED | OUTPATIENT
Start: 2025-05-22

## 2025-05-22 RX ORDER — LOSARTAN POTASSIUM 25 MG/1
TABLET ORAL
Qty: 90 TABLET | Refills: 3 | Status: SHIPPED | OUTPATIENT
Start: 2025-05-22

## 2025-05-22 RX ORDER — CALCIUM CARBONATE 300MG(750)
400 TABLET,CHEWABLE ORAL DAILY
Qty: 100 TABLET | Refills: 3 | Status: SHIPPED | OUTPATIENT
Start: 2025-05-22

## 2025-05-22 RX ORDER — PANTOPRAZOLE SODIUM 20 MG/1
20 TABLET, DELAYED RELEASE ORAL 2 TIMES DAILY
Qty: 100 TABLET | Refills: 3 | Status: SHIPPED | OUTPATIENT
Start: 2025-05-22

## 2025-05-22 RX ORDER — ALBUTEROL SULFATE 90 UG/1
2 INHALANT RESPIRATORY (INHALATION) EVERY 4 HOURS PRN
Qty: 18 G | Refills: 5 | Status: SHIPPED | OUTPATIENT
Start: 2025-05-22

## 2025-05-23 ENCOUNTER — TELEPHONE (OUTPATIENT)
Age: 60
End: 2025-05-23

## 2025-05-23 DIAGNOSIS — J43.1 PANLOBULAR EMPHYSEMA (HCC): Primary | Chronic | ICD-10-CM

## 2025-05-23 NOTE — TELEPHONE ENCOUNTER
Sharla from [employer] called stating the Spiriva inhaler that was prescribed for patient is non-formulary.  It will require a prior authorization or the covered alternative medication can be ordered for patient which is Incruse ellipta.

## 2025-05-27 ENCOUNTER — TELEPHONE (OUTPATIENT)
Age: 60
End: 2025-05-27

## 2025-05-27 NOTE — TELEPHONE ENCOUNTER
Received a call from Helena making sure updated pharmacy information is in system, pt will be using [facility] advised already in the system, pt verbally understood

## 2025-05-28 ENCOUNTER — OFFICE VISIT (OUTPATIENT)
Dept: FAMILY MEDICINE CLINIC | Facility: CLINIC | Age: 60
End: 2025-05-28
Payer: COMMERCIAL

## 2025-05-28 VITALS
RESPIRATION RATE: 15 BRPM | HEART RATE: 67 BPM | SYSTOLIC BLOOD PRESSURE: 102 MMHG | BODY MASS INDEX: 28.66 KG/M2 | OXYGEN SATURATION: 97 % | WEIGHT: 151.8 LBS | HEIGHT: 61 IN | DIASTOLIC BLOOD PRESSURE: 68 MMHG | TEMPERATURE: 97.1 F

## 2025-05-28 DIAGNOSIS — Z79.899 MEDICAL MARIJUANA USE: ICD-10-CM

## 2025-05-28 DIAGNOSIS — Z72.0 TOBACCO USE: Chronic | ICD-10-CM

## 2025-05-28 DIAGNOSIS — E11.65 TYPE 2 DIABETES MELLITUS WITH HYPERGLYCEMIA, WITH LONG-TERM CURRENT USE OF INSULIN (HCC): Primary | Chronic | ICD-10-CM

## 2025-05-28 DIAGNOSIS — Z11.4 SCREENING FOR HIV (HUMAN IMMUNODEFICIENCY VIRUS): ICD-10-CM

## 2025-05-28 DIAGNOSIS — Z79.4 TYPE 2 DIABETES MELLITUS WITH HYPERGLYCEMIA, WITH LONG-TERM CURRENT USE OF INSULIN (HCC): Primary | Chronic | ICD-10-CM

## 2025-05-28 DIAGNOSIS — N39.46 MIXED STRESS AND URGE URINARY INCONTINENCE: Chronic | ICD-10-CM

## 2025-05-28 DIAGNOSIS — E55.9 VITAMIN D DEFICIENCY: Chronic | ICD-10-CM

## 2025-05-28 DIAGNOSIS — R26.2 AMBULATORY DYSFUNCTION: Chronic | ICD-10-CM

## 2025-05-28 DIAGNOSIS — F41.9 ANXIETY AND DEPRESSION: Chronic | ICD-10-CM

## 2025-05-28 DIAGNOSIS — F32.A ANXIETY AND DEPRESSION: Chronic | ICD-10-CM

## 2025-05-28 DIAGNOSIS — J43.1 PANLOBULAR EMPHYSEMA (HCC): Chronic | ICD-10-CM

## 2025-05-28 DIAGNOSIS — I10 PRIMARY HYPERTENSION: Chronic | ICD-10-CM

## 2025-05-28 DIAGNOSIS — K21.00 GASTROESOPHAGEAL REFLUX DISEASE WITH ESOPHAGITIS WITHOUT HEMORRHAGE: Chronic | ICD-10-CM

## 2025-05-28 DIAGNOSIS — F17.210 SMOKING GREATER THAN 20 PACK YEARS: ICD-10-CM

## 2025-05-28 DIAGNOSIS — E11.9 ENCOUNTER FOR DIABETIC FOOT EXAM (HCC): Chronic | ICD-10-CM

## 2025-05-28 DIAGNOSIS — Z99.81 DEPENDENCE ON SUPPLEMENTAL OXYGEN: Chronic | ICD-10-CM

## 2025-05-28 DIAGNOSIS — G47.09 OTHER INSOMNIA: Chronic | ICD-10-CM

## 2025-05-28 DIAGNOSIS — Z12.31 ENCOUNTER FOR SCREENING MAMMOGRAM FOR BREAST CANCER: ICD-10-CM

## 2025-05-28 PROBLEM — M54.16 LUMBAR RADICULOPATHY: Status: ACTIVE | Noted: 2025-05-28

## 2025-05-28 PROBLEM — G89.4 CHRONIC PAIN DISORDER: Status: ACTIVE | Noted: 2025-05-28

## 2025-05-28 PROBLEM — M79.2 NEURALGIA AND NEURITIS, UNSPECIFIED: Status: ACTIVE | Noted: 2025-05-28

## 2025-05-28 PROBLEM — M48.061 SPINAL STENOSIS OF LUMBAR REGION: Status: ACTIVE | Noted: 2025-05-28

## 2025-05-28 PROCEDURE — G2211 COMPLEX E/M VISIT ADD ON: HCPCS | Performed by: NURSE PRACTITIONER

## 2025-05-28 PROCEDURE — 99213 OFFICE O/P EST LOW 20 MIN: CPT | Performed by: NURSE PRACTITIONER

## 2025-05-28 RX ORDER — LANOLIN ALCOHOL/MO/W.PET/CERES
400 CREAM (GRAM) TOPICAL DAILY
COMMUNITY
Start: 2025-05-22

## 2025-05-28 RX ORDER — BUPROPION HYDROCHLORIDE 150 MG/1
150 TABLET, EXTENDED RELEASE ORAL DAILY
COMMUNITY
Start: 2025-05-19

## 2025-05-28 NOTE — PROGRESS NOTES
Diabetic Foot Exam    Patient's shoes and socks removed.    Right Foot/Ankle   Right Foot Inspection  Skin Exam: skin normal, skin intact and dry skin. No warmth, no callus, no erythema, no maceration, no abnormal color, no pre-ulcer, no ulcer and no callus.     Toe Exam: ROM and strength within normal limits.     Sensory   Vibration: intact  Proprioception: intact  Monofilament testing: diminished    Vascular  Capillary refills: < 3 seconds  The right DP pulse is 2+. The right PT pulse is 2+.     Left Foot/Ankle  Left Foot Inspection  Skin Exam: skin normal, skin intact and dry skin. No warmth, no erythema, no maceration, normal color, no pre-ulcer, no ulcer and no callus.     Toe Exam: ROM and strength within normal limits.     Sensory   Vibration: intact  Proprioception: intact  Monofilament testing: diminished    Vascular  Capillary refills: < 3 seconds  The left DP pulse is 2+. The left PT pulse is 2+.     Assign Risk Category  No deformity present  Loss of protective sensation  No weak pulses  Risk: 1

## 2025-05-28 NOTE — ASSESSMENT & PLAN NOTE
Component  Ref Range & Units (hover) 3/6/25 12:20 PM 9/19/24  1:10 PM 12/6/23 10:15 AM   Vit D, 25-Hydroxy 18.9 Low  23.7 Low  CM 26.8 Low      Will check her lab  Should take vitamin d supplements

## 2025-05-28 NOTE — TELEPHONE ENCOUNTER
Patient called about a refill for her diflucan,lotrisone,and mycostatin if possible  Patient is still experiencing symptoms and pharmacy on file  Please review when you get a chance   Thank you
Patient with DM  Inc risk of recurrence  Refills for lotrisone and diflucan sent  Please call to discuss symptoms; mycostatin sent for thrush, not vaginal symptoms  If recurrence noted, happy to send this too  Would encourage eval with PCP if recurrent thrush noted though  For vaginal symptoms, please encourage appointment if insufficient relief after treatment 
Pt aware of recommendations  Does not need medication for thrush 
[Nl] : Neurological

## 2025-05-28 NOTE — PROGRESS NOTES
Name: Helena Newton      : 1965      MRN: [id number]  Encounter Provider: NEELAM Cueto  Encounter Date: 2025   Encounter department: formerly [facility]  :  Assessment & Plan  Smoking greater than 20 pack years    Orders:    CT lung screening program; Future    Type 2 diabetes mellitus with hyperglycemia, with long-term current use of insulin (HCC)    Lab Results   Component Value Date    HGBA1C 11.9 (A) 2025     Patient follows with endocrinology  She has had multiple medication changes and significant education regarding her diabetes  She is doing better and understands more each day  We are monitoring her blood sugars  She has had some significant nausea with the Trulicity     Orders:    Diabetic foot exam; Future    Screening for HIV (human immunodeficiency virus)    Orders:    HIV 1/2 AG/AB w Reflex [facility] for 2 yr old and above; Future    Encounter for screening mammogram for breast cancer    Orders:    Mammo screening bilateral w 3d and cad; Future    Primary hypertension    Orders:    Durable Medical Equipment    Panlobular emphysema (HCC)         Gastroesophageal reflux disease with esophagitis without hemorrhage         Mixed stress and urge urinary incontinence         Anxiety and depression           Medical marijuana use         Tobacco use         Other insomnia         Vitamin D deficiency  Component  Ref Range & Units (hover) 3/6/25 12:20 PM 24  1:10 PM 23 10:15 AM   Vit D, 25-Hydroxy 18.9 Low  23.7 Low  CM 26.8 Low      Will check her lab  Should take vitamin d supplements       Encounter for diabetic foot exam (HCC)    Orders:    Ambulatory Referral to Podiatry; Future    Dependence on supplemental oxygen         Ambulatory dysfunction    Orders:    Durable Medical Equipment           History of Present Illness   The patient is here today to discuss her medications and treatment plans. Patient is more interested in her health, will order him  "a BP machine, she is also concerned about her feet. Will refer her to podiatry. Will order her a rollator for ambulation.  I reviewed their medical conditions, medications, laboratory and radiology studies.  I reviewed their scheduled future lab studies with the patient.   The patient's current treatment plan is effective.   There is no change in the current therapy.   I ask the patient to continue their current therapy.   The patient voiced their understanding and agreement.   Follow up as scheduled .  Please continue to the PORCH section of the note for details of today's visit.             Review of Systems   Constitutional:  Negative for activity change, chills, fatigue and fever.   HENT:  Negative for rhinorrhea and sore throat.    Eyes:  Negative for pain.   Respiratory:  Negative for cough and shortness of breath.    Cardiovascular:  Negative for chest pain, palpitations and leg swelling.   Gastrointestinal:  Positive for nausea. Negative for abdominal pain, constipation, diarrhea and vomiting.   Genitourinary:  Negative for difficulty urinating, flank pain, frequency and urgency.   Musculoskeletal:  Positive for arthralgias and myalgias. Negative for gait problem and joint swelling.   Skin:  Negative for color change.   Neurological:  Negative for dizziness, weakness, light-headedness and headaches.   Psychiatric/Behavioral:  Positive for sleep disturbance. The patient is nervous/anxious.    All other systems reviewed and are negative.      Objective   /68 (BP Location: Left arm, Patient Position: Sitting, Cuff Size: Standard)   Pulse 67   Temp (!) 97.1 °F (36.2 °C) (Tympanic)   Resp 15   Ht 5' 1\" (1.549 m)   Wt 68.9 kg (151 lb 12.8 oz)   SpO2 97%   BMI 28.68 kg/m²      Physical Exam  Vitals and nursing note reviewed.   Constitutional:       General: She is awake. She is not in acute distress.     Appearance: Normal appearance. She is well-developed.   HENT:      Head: Normocephalic and " atraumatic.      Nose: Nose normal.      Mouth/Throat:      Mouth: Mucous membranes are moist.     Eyes:      Conjunctiva/sclera: Conjunctivae normal.       Cardiovascular:      Rate and Rhythm: Normal rate and regular rhythm.      Pulses: Normal pulses.      Heart sounds: Normal heart sounds. No murmur heard.  Pulmonary:      Effort: Pulmonary effort is normal. No respiratory distress.      Breath sounds: Normal breath sounds.   Abdominal:      General: Bowel sounds are normal.      Palpations: Abdomen is soft.      Tenderness: There is no abdominal tenderness.      Comments: Nausea when she takes her weekly insulin injection      Musculoskeletal:      Cervical back: Neck supple.      Right lower leg: No edema.      Left lower leg: No edema.   Feet:      Comments: Toe nail pain and discomfort bilaterally    Skin:     General: Skin is warm and dry.     Neurological:      Mental Status: She is alert and oriented to person, place, and time.     Psychiatric:         Attention and Perception: Attention normal.         Mood and Affect: Mood is anxious and depressed.         Speech: Speech normal.         Behavior: Behavior normal. Behavior is cooperative.         Thought Content: Thought content normal.         Cognition and Memory: Cognition normal.         Judgment: Judgment normal.       Administrative Statements   I have spent a total time of 25 minutes in caring for this patient on the day of the visit/encounter including Diagnostic results, Prognosis, Risks and benefits of tx options, Instructions for management, Patient and family education, Importance of tx compliance, Risk factor reductions, Impressions, Counseling / Coordination of care, Documenting in the medical record, Reviewing/placing orders in the medical record (including tests, medications, and/or procedures), and Obtaining or reviewing history  .

## 2025-05-28 NOTE — ASSESSMENT & PLAN NOTE
Lab Results   Component Value Date    HGBA1C 11.9 (A) 05/14/2025     Patient follows with endocrinology  She has had multiple medication changes and significant education regarding her diabetes  She is doing better and understands more each day  We are monitoring her blood sugars  She has had some significant nausea with the Trulicity     Orders:    Diabetic foot exam; Future

## 2025-05-31 DIAGNOSIS — N95.2 VAGINAL ATROPHY: ICD-10-CM

## 2025-06-02 RX ORDER — ESTRADIOL 0.1 MG/G
CREAM VAGINAL
Qty: 127.5 G | Refills: 1 | Status: SHIPPED | OUTPATIENT
Start: 2025-06-02

## 2025-06-10 ENCOUNTER — OFFICE VISIT (OUTPATIENT)
Age: 60
End: 2025-06-10
Attending: NURSE PRACTITIONER
Payer: COMMERCIAL

## 2025-06-10 VITALS — WEIGHT: 151 LBS | HEIGHT: 61 IN | BODY MASS INDEX: 28.51 KG/M2

## 2025-06-10 DIAGNOSIS — E11.65 TYPE 2 DIABETES MELLITUS WITH HYPERGLYCEMIA, WITH LONG-TERM CURRENT USE OF INSULIN (HCC): Chronic | ICD-10-CM

## 2025-06-10 DIAGNOSIS — Z72.0 SMOKING TRYING TO QUIT: Primary | ICD-10-CM

## 2025-06-10 DIAGNOSIS — Z79.4 TYPE 2 DIABETES MELLITUS WITH HYPERGLYCEMIA, WITH LONG-TERM CURRENT USE OF INSULIN (HCC): Chronic | ICD-10-CM

## 2025-06-10 DIAGNOSIS — B35.1 ONYCHOMYCOSIS: ICD-10-CM

## 2025-06-10 PROCEDURE — 99204 OFFICE O/P NEW MOD 45 MIN: CPT

## 2025-06-10 NOTE — PROGRESS NOTES
"Name: Helena Newton      : 1965      MRN: [id number]  Encounter Provider: Kyaw Smith DPM  Encounter Date: 6/10/2025   [facility]  :  Assessment & Plan  Smoking trying to quit    Orders:    VAS ARTERIAL DUPLEX- LOWER LIMB BILATERAL; Future    Type 2 diabetes mellitus with hyperglycemia, with long-term current use of insulin (HCC)    Lab Results   Component Value Date    HGBA1C 11.9 (A) 2025            Onychomycosis           Diagnosis and options discussed with patient  Patient agreeable to the plan as stated below    -DM foot risk is low. Recommend annual diabetic foot exam  -Discussed DM risk to lower extremities, proper shoe gear, and daily monitoring of feet.   -Discussed weight loss and suitable exercise regiment.   -Reviewed most recent PCP visit on 2025  -Educated on A1C and the risks of poorly controlled Diabetes. Reviewed recent A1C:  Lab Results   Component Value Date    HGBA1C 11.9 (A) 2025    HGBA1C 10.1 (H) 2025   .     History of Present Illness   HPI  Helena Newton is a 59 y.o. female who presents for diabetic foot evaluation.  The patient has diabetes for several years.  The blood glucose is under control.  The patient denied any history of diabetic foot ulcer or related foot infection.  No significant numbness or paresthesia.  Denied weakness or significant functional deficit.  The patient presents with family.    History obtained from: patient    Review of Systems  Medications Ordered Prior to Encounter[1]      Objective   Ht 5' 1\" (1.549 m)   Wt 68.5 kg (151 lb)   BMI 28.53 kg/m²      Physical Exam    Cardiovascular:      Pulses: Pulses are weak.           Dorsalis pedis pulses are 2+ on the right side and 2+ on the left side.        Posterior tibial pulses are 1+ on the right side and 1+ on the left side.     Musculoskeletal:      Right foot: Decreased range of motion. Prominent metatarsal heads present.      " Left foot: Decreased range of motion. Prominent metatarsal heads present.   Feet:      Right foot:      Protective Sensation: 10 sites tested.  8 sites sensed.      Skin integrity: Skin integrity normal. No ulcer, skin breakdown, erythema, warmth, callus or dry skin.      Toenail Condition: Fungal disease present.     Left foot:      Protective Sensation: 10 sites tested.  8 sites sensed.      Skin integrity: Skin integrity normal. No ulcer, skin breakdown, erythema, warmth, callus or dry skin.      Toenail Condition: Fungal disease present.        Diabetic Foot Exam    Patient's shoes and socks removed.    Right Foot/Ankle   Right Foot Inspection  Skin Exam: skin normal and skin intact. No dry skin, no warmth, no callus, no erythema, no maceration, no abnormal color, no pre-ulcer, no ulcer and no callus.     Toe Exam: ROM and strength within normal limits.     Sensory   Monofilament testing: diminished    Vascular  The right DP pulse is 2+. The right PT pulse is 1+.     Left Foot/Ankle  Left Foot Inspection  Skin Exam: skin normal and skin intact. No dry skin, no warmth, no erythema, no maceration, normal color, no pre-ulcer, no ulcer and no callus.     Toe Exam: ROM and strength within normal limits.     Sensory   Monofilament testing: diminished    Vascular  The left DP pulse is 2+. The left PT pulse is 1+.     Assign Risk Category  No deformity present  Loss of protective sensation  Weak pulses  Risk: 2         [1]   Current Outpatient Medications on File Prior to Visit   Medication Sig Dispense Refill    Accu-Chek Guide test strip       albuterol (Ventolin HFA) 90 mcg/act inhaler Inhale 2 puffs every 4 (four) hours as needed for wheezing 18 g 5    BD Pen Needle Dayna 2nd Gen 32G X 4 MM MISC 4 time a day 200 each 2    bisoprolol (ZEBETA) 5 mg tablet take 1 tablet by mouth once daily 90 tablet 1    Blood Glucose Monitoring Suppl (OneTouch Verio Reflect) w/Device KIT Check blood sugars twice daily. Please  substitute with appropriate alternative as covered by patient's insurance. Dx: E11.65 1 kit 0    buPROPion (WELLBUTRIN SR) 150 mg 12 hr tablet Take 150 mg by mouth in the morning      celecoxib (CeleBREX) 100 mg capsule Take 1 capsule (100 mg total) by mouth 2 (two) times a day 60 capsule 2    Cholecalciferol (Vitamin D3) 125 MCG (5000 UT) CAPS Take 1 capsule (5,000 Units total) by mouth in the morning 90 capsule 3    Dulaglutide (Trulicity) 0.75 MG/0.5ML SOAJ Inject 0.75 mg under the skin once a week 2 mL 0    [START ON 6/13/2025] Dulaglutide (Trulicity) 1.5 MG/0.5ML SOAJ Inject 1.5 mg under the skin once a week Do not start before June 13, 2025. 6 mL 0    estradiol (ESTRACE) 0.1 mg/g vaginal cream Apply generous fingertip sized amount into vagina every 3rd night. 127.5 g 1    glucose blood (OneTouch Verio) test strip Check blood sugars twice daily. Please substitute with appropriate alternative as covered by patient's insurance. Dx: E11.65 200 each 3    Insulin Disposable Pump (V-Go 30) 30 UNIT/24HR KIT USE AS DIRECTED SEE DIABETES EDUCATOR FOR TRAINING 90 kit 1    insulin lispro (HumaLOG) 100 units/mL injection To use in V-GO maximum daily daily use of 60 units 60 mL 1    lactulose (CHRONULAC) 10 g/15 mL solution Take 20 g by mouth in the morning      Lidocaine 4 % PTCH Apply topically      losartan (COZAAR) 25 mg tablet Take Every Monday, Wednesday, Friday and Every Other Saturday. 90 tablet 3    Magnesium 400 MG TABS Take 1 tablet (400 mg total) by mouth in the morning 100 tablet 3    magnesium Oxide (MAG-OX) 400 mg TABS Take 400 mg by mouth daily      MAGNESIUM PO Magnesium      metFORMIN (GLUCOPHAGE-XR) 500 mg 24 hr tablet take 2 tablets by mouth at bedtime (Patient taking differently: Take 1,000 mg by mouth daily at bedtime Two tablets twice a day) 180 tablet 1    NON FORMULARY THC Cream      OneTouch Delica Lancets 33G MISC Check blood sugars twice daily. Please substitute with appropriate alternative as  covered by patient's insurance. Dx: E11.65 200 each 3    oxyCODONE (ROXICODONE) 5 immediate release tablet Take 5 mg by mouth as needed in the morning and 5 mg as needed at noon and 5 mg as needed in the evening.      pantoprazole (PROTONIX) 20 mg tablet Take 1 tablet (20 mg total) by mouth in the morning and 1 tablet (20 mg total) before bedtime. 100 tablet 3    POTASSIUM PO Potassium      prazosin (MINIPRESS) 2 mg capsule Take 2 mg by mouth daily at bedtime      pregabalin (LYRICA) 100 mg capsule Take 100 mg by mouth in the morning and 100 mg in the evening.      rosuvastatin (CRESTOR) 20 MG tablet take 1 tablet by mouth once daily 90 tablet 1    Spiriva Respimat 2.5 MCG/ACT AERS inhaler Inhale 2 puffs daily 4 g 3    umeclidinium 62.5 mcg/actuation AEPB inhaler Inhale 1 puff daily 30 blister 5    VITAMIN D PO Vitamin D      zolpidem (AMBIEN) 10 mg tablet Take 10 mg by mouth daily at bedtime as needed for sleep      ZTlido 1.8 % PTCH        No current facility-administered medications on file prior to visit.

## 2025-06-17 ENCOUNTER — OFFICE VISIT (OUTPATIENT)
Dept: GASTROENTEROLOGY | Facility: CLINIC | Age: 60
End: 2025-06-17
Payer: COMMERCIAL

## 2025-06-17 VITALS
SYSTOLIC BLOOD PRESSURE: 92 MMHG | RESPIRATION RATE: 17 BRPM | HEIGHT: 61 IN | HEART RATE: 67 BPM | BODY MASS INDEX: 28.89 KG/M2 | TEMPERATURE: 97.4 F | DIASTOLIC BLOOD PRESSURE: 62 MMHG | WEIGHT: 153 LBS | OXYGEN SATURATION: 95 %

## 2025-06-17 DIAGNOSIS — Z11.59 ENCOUNTER FOR SCREENING FOR OTHER VIRAL DISEASES: ICD-10-CM

## 2025-06-17 DIAGNOSIS — K76.0 METABOLIC DYSFUNCTION-ASSOCIATED STEATOTIC LIVER DISEASE (MASLD): Primary | ICD-10-CM

## 2025-06-17 PROCEDURE — 99213 OFFICE O/P EST LOW 20 MIN: CPT | Performed by: FAMILY MEDICINE

## 2025-06-17 NOTE — PROGRESS NOTES
Name: Helena Newton      : 1965      MRN: [id number]  Encounter Provider: Alayna Abarca PA-C  Encounter Date: 2025   Encounter department: [facility] [place]  :  Assessment & Plan  Metabolic dysfunction-associated steatotic liver disease (MASLD)  Patient with an enlarged and fatty appearing liver on imaging since at least 2017. Her liver chemistries have been routinely within normal limits. She has had a complete serologic evaluation which has been unremarkable for competing causes of liver disease. She has also had an ultrasound elastography in 2024 showing F3 fibrosis and again in May 2025 showing F2 fibrosis (IQR 23%).      Her serologies show normal liver synthetic function and a preserved platelet count. She also recently had an EGD which did not demonstrate endoscopic evidence of portal hypertension. She does have some facial telangiectasias on exam but otherwise no clinical, serologic or radiographic evidence of cirrhosis.    Suspect patient has MASLD in the setting of multiple metabolic risk factors. Plan for a focused serologic workup including updated LFTs, and A1AT phenotype and labs to assess her immunity to hepatitis A and B.  She is otherwise aware of the basic pathophysiology of fatty liver disease, potential to progress to cirrhosis of left untreated and recommendations for treatment including setting sustainable weight loss, optimization of her metabolic risk factors and limiting her EtOH use.  She was recently started on Trulicity for both glycemic control and weight loss. Agree with GLP therapy seeing as these medications have also proven favorable for the treatment of MASH. Also thoroughly counseled on dietary/lifestyle modifications to help promote weight loss.    She is not currently interested in starting Rezdiffra but will rediscuss this in follow-up. Of note, she is taking rosuvastatin 20 mg once daily.  She will  "otherwise continue to have her LFTs checked every 6 months and have a repeat US elastography in May 2026.    Orders:  •  CBC and differential; Future  •  Comprehensive metabolic panel; Future  •  Protime-INR; Future  •  Alpha 1 Antitrypsin Phenotype; Future  •  Hepatitis A antibody, total; Future  •  Hepatitis B surface antibody; Future  •  US elastography/UGAP; Future    Encounter for screening for other viral diseases  Refer to A/P above.   Orders:  •  Hepatitis B surface antibody; Future      Follow-up in 6 months or sooner if necessary.       History of Present Illness   HPI    Helena Newton is a 59 y.o. female with PMH significant for DM2, HTN, HLD, COPD, GERD, nonischemic cardiomyopathy, LBBB, migraine headaches, anxiety with depression, medical marijuana use, drug-induced constipation and gastroparesis who presents today for a consultation regarding hepatic fibrosis.    Helena is familiar with [facility] gastroenterology last seen by Sol Raines PA-C on 4/18/2025. She has been seen to have an enlarged and fatty appearing liver on imaging since at least September 2017. Her liver chemistries, particularly her serum transaminases, have routinely been within normal limits. She had a complete serologic evaluation (12/2024) which was unremarkable for viral, autoimmune and other genetic causes of liver disease including celiac disease. Her serologies have also shown a normal liver synthetic function and preserved platelet count. She completed a US elastography in February 2024 showing F3 fibrosis. This was recently repeated (5/13) showing F2 fibrosis (IQR 23%).    She also underwent an EGD for Menendez's surveillance which was aborted due to a large volume of retained food in the stomach that cannot be cleared with suction. It was noted that \"Brief inspection of the esophagus demonstrated hiatal hernia with very short tongue of salmon colored mucosa. No varices present\".    Denies a family history of liver disease " or liver-related cancers. Denies EtOH use.       History obtained from: patient    Review of Systems  Pertinent Medical History     Medical History Reviewed by provider this encounter:  Tobacco  Allergies  Meds  Problems  Med Hx  Surg Hx  Fam Hx     .  Past Medical History   Past Medical History[1]  Past Surgical History[2]  Family History[3]   reports that she has been smoking cigarettes. She started smoking about 52 years ago. She has a 52.5 pack-year smoking history. She has never used smokeless tobacco. She reports that she does not currently use alcohol. She reports current drug use. Frequency: 7.00 times per week. Drug: Marijuana.  Current Outpatient Medications   Medication Instructions   • Accu-Chek Guide test strip    • albuterol (Ventolin HFA) 90 mcg/act inhaler 2 puffs, Inhalation, Every 4 hours PRN   • BD Pen Needle Dayna 2nd Gen 32G X 4 MM MISC 4 time a day   • bisoprolol (ZEBETA) 5 mg, Oral, Daily   • Blood Glucose Monitoring Suppl (OneTouch Verio Reflect) w/Device KIT Check blood sugars twice daily. Please substitute with appropriate alternative as covered by patient's insurance. Dx: E11.65   • buPROPion (WELLBUTRIN SR) 150 mg, Daily   • celecoxib (CELEBREX) 100 mg, Oral, 2 times daily   • estradiol (ESTRACE) 0.1 mg/g vaginal cream Apply generous fingertip sized amount into vagina every 3rd night.   • glucose blood (OneTouch Verio) test strip Check blood sugars twice daily. Please substitute with appropriate alternative as covered by patient's insurance. Dx: E11.65   • Insulin Disposable Pump (V-Go 30) 30 UNIT/24HR KIT USE AS DIRECTED SEE DIABETES EDUCATOR FOR TRAINING   • insulin lispro (HumaLOG) 100 units/mL injection To use in V-GO maximum daily daily use of 60 units   • lactulose (CHRONULAC) 20 g, Daily   • Lidocaine 4 % PTCH Apply topically   • losartan (COZAAR) 25 mg tablet Take Every Monday, Wednesday, Friday and Every Other Saturday.   • Magnesium 400 mg, Oral, Daily   • metFORMIN  "(GLUCOPHAGE-XR) 1,000 mg, Oral, Daily at bedtime   • NON FORMULARY THC Cream   • OneTouch Delica Lancets 33G MISC Check blood sugars twice daily. Please substitute with appropriate alternative as covered by patient's insurance. Dx: E11.65   • oxyCODONE (ROXICODONE) 5 mg, 3 times daily PRN   • pantoprazole (PROTONIX) 20 mg, Oral, 2 times daily   • prazosin (MINIPRESS) 2 mg, Daily at bedtime   • pregabalin (LYRICA) 100 mg, 2 times daily   • rosuvastatin (CRESTOR) 20 mg, Oral, Daily   • Spiriva Respimat 2.5 MCG/ACT AERS inhaler 2 puffs, Inhalation, Daily   • Trulicity 1.5 mg, Subcutaneous, Weekly   • umeclidinium 62.5 mcg/actuation AEPB inhaler 1 puff, Inhalation, Daily   • Vitamin D3 5,000 Units, Oral, Daily   • zolpidem (AMBIEN) 10 mg, Daily at bedtime PRN   Allergies[4]   Medications Ordered Prior to Encounter[5]   Social History[6]     Objective   BP 92/62 (BP Location: Left arm, Patient Position: Sitting, Cuff Size: Adult)   Pulse 67   Temp (!) 97.4 °F (36.3 °C) (Temporal)   Resp 17   Ht 5' 1\" (1.549 m)   Wt 69.4 kg (153 lb)   SpO2 95%   BMI 28.91 kg/m²      Physical Exam             [1]  Past Medical History:  Diagnosis Date   • Angina pectoris ([facility])     recent   • Anxiety    • Arthritis    • Asthma    • Carpal tunnel syndrome    • Chronic headaches    • COPD (chronic obstructive pulmonary disease) ([facility])    • Diabetes ([facility])    • Diabetes mellitus ([facility])    • GERD (gastroesophageal reflux disease)    • Headache    • Hyperlipidemia    • Hypertension    • Kidney stone    • Left bundle branch block     LBBB   • MI (myocardial infarction) ([facility])    • Myocardial infarction ([facility]) 2014/2016   • Neuropathy    • PTSD (post-traumatic stress disorder)    • RLS (restless legs syndrome)    • Shortness of breath    [2]  Past Surgical History:  Procedure Laterality Date   • ANGIOPLASTY     • BREAST SURGERY  08/2016    Reduction   • CARDIAC CATHETERIZATION  05/09/2018     Mid LAD: There was a tubular 40 % stenosis   • "  SECTION     • COLONOSCOPY     • HYSTERECTOMY  2018    Complete   • KIDNEY STONE SURGERY     • LAPAROSCOPY     • VA COLONOSCOPY FLX DX W/COLLJ SPEC WHEN PFRMD N/A 2017    Procedure: EGD AND COLONOSCOPY;  Surgeon: Wilberto Galeano MD;  Location: [facility];  Service: Gastroenterology   • VA ESOPHAGOGASTRODUODENOSCOPY TRANSORAL DIAGNOSTIC N/A 10/10/2018    Procedure: ESOPHAGOGASTRODUODENOSCOPY (EGD);  Surgeon: Wilberto Galeano MD;  Location: [facility];  Service: Gastroenterology   • VA LAPS TOTAL HYSTERECT 250 GM/< W/RMVL TUBE/OVARY N/A 2018    Procedure: ROBOTIC ASSISTED TOTAL LAPAROSCOPIC HYSTERECTOMY, BILATERAL SALPINGOOPHERECTOMY,;  Surgeon: Daron Valdez MD;  Location:  MAIN OR;  Service: Gynecology Oncology   • UPPER GASTROINTESTINAL ENDOSCOPY     • WRIST SURGERY Right    [3]  Family History  Problem Relation Name Age of Onset   • Diabetes Mother     • Breast cancer Mother  45   • Lung cancer Father  54   • Diabetes Sister     • Brain cancer Maternal Aunt  72   • Breast cancer Other niece (mom side 25   [4]  Allergies  Allergen Reactions   • Betamethasone Hives   • Lisinopril Cough   • Losartan Dizziness   • Other Hives     Surgical tape   • Prednisone Other (See Comments)     Thrush     • Cat Dander Rash   [5]  Current Outpatient Medications on File Prior to Visit   Medication Sig Dispense Refill   • Accu-Chek Guide test strip      • albuterol (Ventolin HFA) 90 mcg/act inhaler Inhale 2 puffs every 4 (four) hours as needed for wheezing 18 g 5   • BD Pen Needle Dayna 2nd Gen 32G X 4 MM MISC 4 time a day 200 each 2   • bisoprolol (ZEBETA) 5 mg tablet take 1 tablet by mouth once daily 90 tablet 1   • Blood Glucose Monitoring Suppl (OneTouch Verio Reflect) w/Device KIT Check blood sugars twice daily. Please substitute with appropriate alternative as covered by patient's insurance. Dx: E11.65 1 kit 0   • buPROPion (WELLBUTRIN SR) 150 mg 12 hr tablet Take 150 mg by mouth in the morning      • celecoxib (CeleBREX) 100 mg capsule Take 1 capsule (100 mg total) by mouth 2 (two) times a day 60 capsule 2   • Cholecalciferol (Vitamin D3) 125 MCG (5000 UT) CAPS Take 1 capsule (5,000 Units total) by mouth in the morning 90 capsule 3   • Dulaglutide (Trulicity) 1.5 MG/0.5ML SOAJ Inject 1.5 mg under the skin once a week Do not start before June 13, 2025. 6 mL 0   • estradiol (ESTRACE) 0.1 mg/g vaginal cream Apply generous fingertip sized amount into vagina every 3rd night. 127.5 g 1   • glucose blood (OneTouch Verio) test strip Check blood sugars twice daily. Please substitute with appropriate alternative as covered by patient's insurance. Dx: E11.65 200 each 3   • Insulin Disposable Pump (V-Go 30) 30 UNIT/24HR KIT USE AS DIRECTED SEE DIABETES EDUCATOR FOR TRAINING 90 kit 1   • insulin lispro (HumaLOG) 100 units/mL injection To use in V-GO maximum daily daily use of 60 units 60 mL 1   • lactulose (CHRONULAC) 10 g/15 mL solution Take 20 g by mouth in the morning     • Lidocaine 4 % PTCH Apply topically     • losartan (COZAAR) 25 mg tablet Take Every Monday, Wednesday, Friday and Every Other Saturday. 90 tablet 3   • Magnesium 400 MG TABS Take 1 tablet (400 mg total) by mouth in the morning 100 tablet 3   • metFORMIN (GLUCOPHAGE-XR) 500 mg 24 hr tablet take 2 tablets by mouth at bedtime 180 tablet 1   • NON FORMULARY THC Cream     • OneTouch Delica Lancets 33G MISC Check blood sugars twice daily. Please substitute with appropriate alternative as covered by patient's insurance. Dx: E11.65 200 each 3   • oxyCODONE (ROXICODONE) 5 immediate release tablet Take 5 mg by mouth as needed in the morning and 5 mg as needed at noon and 5 mg as needed in the evening.     • pantoprazole (PROTONIX) 20 mg tablet Take 1 tablet (20 mg total) by mouth in the morning and 1 tablet (20 mg total) before bedtime. 100 tablet 3   • prazosin (MINIPRESS) 2 mg capsule Take 2 mg by mouth daily at bedtime     • pregabalin (LYRICA) 100 mg  capsule Take 100 mg by mouth in the morning and 100 mg in the evening.     • rosuvastatin (CRESTOR) 20 MG tablet take 1 tablet by mouth once daily 90 tablet 1   • Spiriva Respimat 2.5 MCG/ACT AERS inhaler Inhale 2 puffs daily 4 g 3   • umeclidinium 62.5 mcg/actuation AEPB inhaler Inhale 1 puff daily 30 blister 5   • zolpidem (AMBIEN) 10 mg tablet Take 10 mg by mouth daily at bedtime as needed for sleep     • [DISCONTINUED] VITAMIN D PO Take by mouth in the morning     • [DISCONTINUED] Dulaglutide (Trulicity) 0.75 MG/0.5ML SOAJ Inject 0.75 mg under the skin once a week (Patient not taking: Reported on 6/17/2025) 2 mL 0   • [DISCONTINUED] magnesium Oxide (MAG-OX) 400 mg TABS Take 400 mg by mouth daily (Patient not taking: Reported on 6/17/2025)     • [DISCONTINUED] MAGNESIUM PO Magnesium (Patient not taking: Reported on 6/17/2025)     • [DISCONTINUED] POTASSIUM PO Potassium (Patient not taking: Reported on 6/17/2025)     • [DISCONTINUED] ZTlido 1.8 % PTCH  (Patient not taking: Reported on 6/17/2025)       No current facility-administered medications on file prior to visit.   [6]  Social History  Tobacco Use   • Smoking status: Every Day     Current packs/day: 1.00     Average packs/day: 1 pack/day for 52.5 years (52.5 ttl pk-yrs)     Types: Cigarettes     Start date: 1/1/1973   • Smokeless tobacco: Never   • Tobacco comments:     pt. smoked this am 5/20   Vaping Use   • Vaping status: Former   Substance and Sexual Activity   • Alcohol use: Not Currently     Comment: Rarely consumes alcohol - As per [employer]    • Drug use: Yes     Frequency: 7.0 times per week     Types: Marijuana     Comment: medical marijuana  last use over a month   • Sexual activity: Yes     Partners: Male     Birth control/protection: Surgical

## 2025-06-27 DIAGNOSIS — I42.8 NON-ISCHEMIC CARDIOMYOPATHY (HCC): ICD-10-CM

## 2025-06-27 DIAGNOSIS — E78.5 HYPERLIPIDEMIA, UNSPECIFIED HYPERLIPIDEMIA TYPE: ICD-10-CM

## 2025-06-27 NOTE — TELEPHONE ENCOUNTER
Medication: rosuvastatin (CRESTOR)     Dose/Frequency: 20MG     Quantity: 90 tablets    Pharmacy: [facility]/pharmacy #[id number] - [place] - [address]      Office:   [] PCP/Provider -   [x] Speciality/Provider - cardiology     Does the patient have enough for 3 days?   [x] Yes   [] No - Send as HP to POD

## 2025-06-30 ENCOUNTER — HOSPITAL ENCOUNTER (OUTPATIENT)
Dept: NON INVASIVE DIAGNOSTICS | Facility: HOSPITAL | Age: 60
Discharge: HOME | End: 2025-06-30
Payer: COMMERCIAL

## 2025-06-30 DIAGNOSIS — Z72.0 SMOKING TRYING TO QUIT: ICD-10-CM

## 2025-06-30 PROCEDURE — 93925 LOWER EXTREMITY STUDY: CPT

## 2025-06-30 PROCEDURE — 93922 UPR/L XTREMITY ART 2 LEVELS: CPT | Performed by: SURGERY

## 2025-06-30 PROCEDURE — 93925 LOWER EXTREMITY STUDY: CPT | Performed by: SURGERY

## 2025-06-30 PROCEDURE — 93923 UPR/LXTR ART STDY 3+ LVLS: CPT

## 2025-06-30 RX ORDER — ROSUVASTATIN CALCIUM 20 MG/1
20 TABLET, COATED ORAL DAILY
Qty: 90 TABLET | Refills: 1 | Status: SHIPPED | OUTPATIENT
Start: 2025-06-30

## 2025-07-01 DIAGNOSIS — E11.65 TYPE 2 DIABETES MELLITUS WITH HYPERGLYCEMIA, WITH LONG-TERM CURRENT USE OF INSULIN (HCC): Chronic | ICD-10-CM

## 2025-07-01 DIAGNOSIS — Z79.4 TYPE 2 DIABETES MELLITUS WITH HYPERGLYCEMIA, WITH LONG-TERM CURRENT USE OF INSULIN (HCC): Chronic | ICD-10-CM

## 2025-07-01 RX ORDER — INSULIN LISPRO 100 [IU]/ML
INJECTION, SOLUTION INTRAVENOUS; SUBCUTANEOUS
Qty: 60 ML | Refills: 1 | Status: SHIPPED | OUTPATIENT
Start: 2025-07-01

## 2025-07-01 NOTE — TELEPHONE ENCOUNTER
Medication: HumaLOG    Dose/Frequency: To use in V-GO maximum daily daily use of 60 units     Quantity: 30 day supply    Pharmacy: [facility]  [address]    Office:   [] PCP/Provider -   [x] Speciality/Provider -     Does the patient have enough for 3 days?   [x] Yes   [] No - Send as HP to POD         Medication: V-Go 40 unit (increasing from V-30 unit)    Dose/Frequency: use as directed    Quantity: 30 day supply    Pharmacy: [facility]  [address]    Office:   [] PCP/Provider -   [x] Speciality/Provider -     Does the patient have enough for 3 days?   [] Yes   [x] No - Send as HP to POD       Medication: Trulicity     Dose/Frequency: 1.5 mg once a week    Quantity: 30 day supply    Pharmacy: [facility]  [address]    Office:   [] PCP/Provider -   [x] Speciality/Provider -     Does the patient have enough for 3 days?   [] Yes   [x] No - Send as HP to POD

## 2025-07-07 DIAGNOSIS — E11.65 TYPE 2 DIABETES MELLITUS WITH HYPERGLYCEMIA, WITH LONG-TERM CURRENT USE OF INSULIN (HCC): ICD-10-CM

## 2025-07-07 DIAGNOSIS — Z79.4 TYPE 2 DIABETES MELLITUS WITH HYPERGLYCEMIA, WITH LONG-TERM CURRENT USE OF INSULIN (HCC): ICD-10-CM

## 2025-07-07 NOTE — TELEPHONE ENCOUNTER
Medication: Dulaglutide (Trulicity) 1.5 MG/0.5ML SOAJ    Dose/Frequency: Inject 1.5 mg under the skin once a week     Quantity: 6mL    [facility]     Office:   [] PCP/Provider -   [x] Speciality/Provider - endo     Does the patient have enough for 3 days?   [] Yes   [x] No - Send as HP to POD

## 2025-07-08 RX ORDER — DULAGLUTIDE 1.5 MG/.5ML
1.5 INJECTION, SOLUTION SUBCUTANEOUS WEEKLY
Qty: 6 ML | Refills: 1 | Status: SHIPPED | OUTPATIENT
Start: 2025-07-08

## 2025-07-09 ENCOUNTER — TELEPHONE (OUTPATIENT)
Age: 60
End: 2025-07-09

## 2025-07-09 NOTE — TELEPHONE ENCOUNTER
PA for (Trulicity) 1.5 MG  APPROVED     Date(s) approved 1/1/25-12/31/25    Case #[id number]    Patient advised by          [x][employer] Message  []Phone call   []LMOM  []L/M to call office as no active Communication consent on file  []Unable to leave detailed message as VM not approved on Communication consent       Pharmacy advised by    [x]Fax  []Phone call  []Secure Chat    Specialty Pharmacy    []      Approval letter scanned into Media Yes

## 2025-07-09 NOTE — TELEPHONE ENCOUNTER
PA for (Trulicity) 1.5 MG SUBMITTED to [employer]    via    [x][employer]-Case ID # [id number]  [x]PA sent as URGENT    All office notes, labs and other pertaining documents and studies sent. Clinical questions answered. Awaiting determination from insurance company.     Turnaround time for your insurance to make a decision on your Prior Authorization can take 7-21 business days.

## 2025-07-30 ENCOUNTER — TELEPHONE (OUTPATIENT)
Age: 60
End: 2025-07-30

## 2025-08-02 LAB
DME PARACHUTE DELIVERY DATE REQUESTED: NORMAL
DME PARACHUTE ITEM DESCRIPTION: NORMAL
DME PARACHUTE ORDER STATUS: NORMAL
DME PARACHUTE SUPPLIER NAME: NORMAL
DME PARACHUTE SUPPLIER PHONE: NORMAL

## 2025-08-04 DIAGNOSIS — B37.0 ORAL THRUSH: Primary | Chronic | ICD-10-CM

## 2025-08-04 RX ORDER — FLUCONAZOLE 100 MG/1
100 TABLET ORAL DAILY
Qty: 14 TABLET | Refills: 0 | Status: SHIPPED | OUTPATIENT
Start: 2025-08-04 | End: 2025-08-18

## 2025-08-04 RX ORDER — NYSTATIN 100000 [USP'U]/ML
500000 SUSPENSION ORAL 4 TIMES DAILY
Qty: 473 ML | Refills: 0 | Status: SHIPPED | OUTPATIENT
Start: 2025-08-04

## 2025-08-06 ENCOUNTER — OFFICE VISIT (OUTPATIENT)
Dept: ENDOCRINOLOGY | Facility: CLINIC | Age: 60
End: 2025-08-06
Payer: COMMERCIAL

## 2025-08-06 VITALS
SYSTOLIC BLOOD PRESSURE: 100 MMHG | RESPIRATION RATE: 18 BRPM | TEMPERATURE: 98.4 F | HEIGHT: 61 IN | HEART RATE: 74 BPM | WEIGHT: 151.8 LBS | DIASTOLIC BLOOD PRESSURE: 60 MMHG | OXYGEN SATURATION: 96 % | BODY MASS INDEX: 28.66 KG/M2

## 2025-08-06 DIAGNOSIS — E11.29 TYPE 2 DIABETES MELLITUS WITH DIABETIC MICROALBUMINURIA, WITH LONG-TERM CURRENT USE OF INSULIN (HCC): Chronic | ICD-10-CM

## 2025-08-06 DIAGNOSIS — R80.9 TYPE 2 DIABETES MELLITUS WITH DIABETIC MICROALBUMINURIA, WITH LONG-TERM CURRENT USE OF INSULIN (HCC): Chronic | ICD-10-CM

## 2025-08-06 DIAGNOSIS — E11.65 TYPE 2 DIABETES MELLITUS WITH HYPERGLYCEMIA, WITH LONG-TERM CURRENT USE OF INSULIN (HCC): Primary | ICD-10-CM

## 2025-08-06 DIAGNOSIS — E78.2 MIXED HYPERLIPIDEMIA: Chronic | ICD-10-CM

## 2025-08-06 DIAGNOSIS — Z79.4 TYPE 2 DIABETES MELLITUS WITH HYPERGLYCEMIA, WITH LONG-TERM CURRENT USE OF INSULIN (HCC): Primary | ICD-10-CM

## 2025-08-06 DIAGNOSIS — Z79.4 TYPE 2 DIABETES MELLITUS WITH DIABETIC MICROALBUMINURIA, WITH LONG-TERM CURRENT USE OF INSULIN (HCC): Chronic | ICD-10-CM

## 2025-08-06 DIAGNOSIS — I10 PRIMARY HYPERTENSION: Chronic | ICD-10-CM

## 2025-08-06 LAB
DME PARACHUTE DELIVERY DATE ACTUAL: NORMAL
DME PARACHUTE DELIVERY DATE REQUESTED: NORMAL
DME PARACHUTE DELIVERY DATE REQUESTED: NORMAL
DME PARACHUTE ITEM DESCRIPTION: NORMAL
DME PARACHUTE ORDER STATUS: NORMAL
DME PARACHUTE ORDER STATUS: NORMAL
DME PARACHUTE SUPPLIER NAME: NORMAL
DME PARACHUTE SUPPLIER NAME: NORMAL
DME PARACHUTE SUPPLIER PHONE: NORMAL
DME PARACHUTE SUPPLIER PHONE: NORMAL
SL AMB POCT HEMOGLOBIN AIC: 9.1 (ref ?–6.5)

## 2025-08-06 PROCEDURE — 99214 OFFICE O/P EST MOD 30 MIN: CPT | Performed by: STUDENT IN AN ORGANIZED HEALTH CARE EDUCATION/TRAINING PROGRAM

## 2025-08-06 PROCEDURE — 83036 HEMOGLOBIN GLYCOSYLATED A1C: CPT | Performed by: STUDENT IN AN ORGANIZED HEALTH CARE EDUCATION/TRAINING PROGRAM

## 2025-08-06 PROCEDURE — 95251 CONT GLUC MNTR ANALYSIS I&R: CPT | Performed by: STUDENT IN AN ORGANIZED HEALTH CARE EDUCATION/TRAINING PROGRAM

## 2025-08-08 ENCOUNTER — TELEPHONE (OUTPATIENT)
Dept: FAMILY MEDICINE CLINIC | Facility: CLINIC | Age: 60
End: 2025-08-08

## 2025-08-12 ENCOUNTER — TELEPHONE (OUTPATIENT)
Dept: FAMILY MEDICINE CLINIC | Facility: CLINIC | Age: 60
End: 2025-08-12

## (undated) DEVICE — CLAMP TOWEL TUBING DISPOSABLE

## (undated) DEVICE — PROGRASP FORCEPS: Brand: ENDOWRIST

## (undated) DEVICE — TROCAR PORT ACCESS 12 X120MM W/BLDLS OPTICAL TIP AIRSEAL

## (undated) DEVICE — MONOPOLAR CURVED SCISSORS: Brand: ENDOWRIST

## (undated) DEVICE — Device

## (undated) DEVICE — GLOVE SRG LF STRL BGL SKNSNS 7.5 PF

## (undated) DEVICE — SKIN MARKER DUAL TIP WITH RULER CAP, FLEXIBLE RULER AND LABELS: Brand: DEVON

## (undated) DEVICE — INTENDED FOR TISSUE SEPARATION, AND OTHER PROCEDURES THAT REQUIRE A SHARP SURGICAL BLADE TO PUNCTURE OR CUT.: Brand: BARD-PARKER SAFETY BLADES SIZE 15, STERILE

## (undated) DEVICE — STRL COTTON TIP APPLCTR 6IN PK: Brand: CARDINAL HEALTH

## (undated) DEVICE — SPECIMEN TRAP: Brand: ARGYLE

## (undated) DEVICE — SUT MONOCRYL 4-0 PS-2 27 IN Y426H

## (undated) DEVICE — AIRSEAL TUBE SMOKE EVAC LUMENX3 FILTERED

## (undated) DEVICE — ARM DRAPE

## (undated) DEVICE — COLUMN DRAPE

## (undated) DEVICE — GLOVE INDICATOR PI UNDERGLOVE SZ 7 BLUE

## (undated) DEVICE — TIP COVER ACCESSORY

## (undated) DEVICE — SYRINGE 50ML LL

## (undated) DEVICE — 1820 FOAM BLOCK NEEDLE COUNTER: Brand: DEVON

## (undated) DEVICE — LARGE NEEDLE DRIVER: Brand: ENDOWRIST

## (undated) DEVICE — CHLORHEXIDINE 4PCT 4 OZ

## (undated) DEVICE — ENDOPATH XCEL BLADELESS TROCARS WITH STABILITY SLEEVES: Brand: ENDOPATH XCEL

## (undated) DEVICE — PREMIUM DRY TRAY LF: Brand: MEDLINE INDUSTRIES, INC.

## (undated) DEVICE — GLOVE INDICATOR PI UNDERGLOVE SZ 8 BLUE

## (undated) DEVICE — PACK ROBOTIC PROSTATE PBDS DA VINCI SI/XI

## (undated) DEVICE — TRAY FOLEY 16FR URIMETER SILICONE SURESTEP

## (undated) DEVICE — CHLORAPREP HI-LITE 26ML ORANGE

## (undated) DEVICE — INTENDED FOR TISSUE SEPARATION, AND OTHER PROCEDURES THAT REQUIRE A SHARP SURGICAL BLADE TO PUNCTURE OR CUT.: Brand: BARD-PARKER SAFETY BLADES SIZE 11, STERILE

## (undated) DEVICE — ANTIBACTERIAL VIOLET BRAIDED (POLYGLACTIN 910), SYNTHETIC ABSORBABLE SUTURE: Brand: COATED VICRYL

## (undated) DEVICE — MAYO STAND COVER: Brand: CONVERTORS

## (undated) DEVICE — NEEDLE 25G X 1 1/2

## (undated) DEVICE — ADHESIVE SKN CLSR HISTOACRYL FLEX 0.5ML LF

## (undated) DEVICE — FENESTRATED BIPOLAR FORCEPS: Brand: ENDOWRIST

## (undated) DEVICE — LUBRICANT INST ELECTROLUBE ANTISTK WO PAD

## (undated) DEVICE — SUT STRATAFIX SPIRAL 2-0 CT-1 20 CM SXPD1B400

## (undated) DEVICE — 3000CC GUARDIAN II: Brand: GUARDIAN

## (undated) DEVICE — CANNULA SEAL